# Patient Record
Sex: FEMALE | Race: BLACK OR AFRICAN AMERICAN | Employment: OTHER | URBAN - METROPOLITAN AREA
[De-identification: names, ages, dates, MRNs, and addresses within clinical notes are randomized per-mention and may not be internally consistent; named-entity substitution may affect disease eponyms.]

---

## 2017-10-31 PROBLEM — Z86.79 HISTORY OF PERICARDITIS: Status: ACTIVE | Noted: 2017-10-31

## 2018-01-13 PROBLEM — R10.10 PAIN OF UPPER ABDOMEN: Status: ACTIVE | Noted: 2018-01-13

## 2018-01-13 PROBLEM — R63.4 WEIGHT LOSS: Status: ACTIVE | Noted: 2018-01-13

## 2018-01-13 PROBLEM — R11.15 NON-INTRACTABLE CYCLICAL VOMITING WITH NAUSEA: Status: ACTIVE | Noted: 2018-01-13

## 2018-01-13 PROBLEM — K59.1 FUNCTIONAL DIARRHEA: Status: ACTIVE | Noted: 2018-01-13

## 2018-01-13 PROBLEM — K52.9 COLITIS: Status: ACTIVE | Noted: 2018-01-13

## 2018-04-24 ENCOUNTER — APPOINTMENT (OUTPATIENT)
Dept: CT IMAGING | Age: 48
End: 2018-04-24
Payer: MEDICAID

## 2018-04-24 ENCOUNTER — HOSPITAL ENCOUNTER (EMERGENCY)
Age: 48
Discharge: HOME | End: 2018-04-25
Attending: EMERGENCY MEDICINE
Payer: MEDICAID

## 2018-04-24 VITALS
RESPIRATION RATE: 18 BRPM | HEART RATE: 89 BPM | HEIGHT: 62 IN | SYSTOLIC BLOOD PRESSURE: 142 MMHG | DIASTOLIC BLOOD PRESSURE: 91 MMHG | TEMPERATURE: 98 F | OXYGEN SATURATION: 100 % | BODY MASS INDEX: 31.28 KG/M2 | WEIGHT: 170 LBS

## 2018-04-24 DIAGNOSIS — K59.09 OTHER CONSTIPATION: ICD-10-CM

## 2018-04-24 DIAGNOSIS — R10.30 LOWER ABDOMINAL PAIN: Primary | ICD-10-CM

## 2018-04-24 DIAGNOSIS — K52.9 COLITIS: ICD-10-CM

## 2018-04-24 LAB
ALBUMIN SERPL-MCNC: 4.6 G/DL (ref 3.5–5.2)
ALP BLD-CCNC: 176 U/L (ref 35–104)
ALT SERPL-CCNC: 35 U/L (ref 0–32)
ANION GAP SERPL CALCULATED.3IONS-SCNC: 10 MMOL/L (ref 7–16)
AST SERPL-CCNC: 15 U/L (ref 0–31)
BACTERIA: ABNORMAL /HPF
BASOPHILS ABSOLUTE: 0.01 E9/L (ref 0–0.2)
BASOPHILS RELATIVE PERCENT: 0.2 % (ref 0–2)
BILIRUB SERPL-MCNC: 0.3 MG/DL (ref 0–1.2)
BILIRUBIN URINE: NEGATIVE
BLOOD, URINE: NEGATIVE
BUN BLDV-MCNC: 10 MG/DL (ref 6–20)
CALCIUM SERPL-MCNC: 9.9 MG/DL (ref 8.6–10.2)
CHLORIDE BLD-SCNC: 100 MMOL/L (ref 98–107)
CHP ED QC CHECK: YES
CLARITY: CLEAR
CO2: 30 MMOL/L (ref 22–29)
COLOR: YELLOW
CREAT SERPL-MCNC: 0.6 MG/DL (ref 0.5–1)
EOSINOPHILS ABSOLUTE: 0.01 E9/L (ref 0.05–0.5)
EOSINOPHILS RELATIVE PERCENT: 0.2 % (ref 0–6)
GFR AFRICAN AMERICAN: >60
GFR NON-AFRICAN AMERICAN: >60 ML/MIN/1.73
GLUCOSE BLD-MCNC: 97 MG/DL (ref 74–109)
GLUCOSE URINE: NEGATIVE MG/DL
HCT VFR BLD CALC: 40.7 % (ref 34–48)
HEMOGLOBIN: 12.8 G/DL (ref 11.5–15.5)
IMMATURE GRANULOCYTES #: 0.01 E9/L
IMMATURE GRANULOCYTES %: 0.2 % (ref 0–5)
KETONES, URINE: NEGATIVE MG/DL
LACTIC ACID: 1.3 MMOL/L (ref 0.5–2.2)
LEUKOCYTE ESTERASE, URINE: ABNORMAL
LIPASE: 29 U/L (ref 13–60)
LYMPHOCYTES ABSOLUTE: 1.78 E9/L (ref 1.5–4)
LYMPHOCYTES RELATIVE PERCENT: 29.2 % (ref 20–42)
MCH RBC QN AUTO: 28.8 PG (ref 26–35)
MCHC RBC AUTO-ENTMCNC: 31.4 % (ref 32–34.5)
MCV RBC AUTO: 91.5 FL (ref 80–99.9)
MONOCYTES ABSOLUTE: 0.17 E9/L (ref 0.1–0.95)
MONOCYTES RELATIVE PERCENT: 2.8 % (ref 2–12)
NEUTROPHILS ABSOLUTE: 4.11 E9/L (ref 1.8–7.3)
NEUTROPHILS RELATIVE PERCENT: 67.4 % (ref 43–80)
NITRITE, URINE: NEGATIVE
PDW BLD-RTO: 14.2 FL (ref 11.5–15)
PH UA: 7 (ref 5–9)
PLATELET # BLD: 456 E9/L (ref 130–450)
PMV BLD AUTO: 8.3 FL (ref 7–12)
POTASSIUM SERPL-SCNC: 4.6 MMOL/L (ref 3.5–5)
PREGNANCY TEST URINE, POC: NEGATIVE
PROTEIN UA: NEGATIVE MG/DL
RBC # BLD: 4.45 E12/L (ref 3.5–5.5)
RBC UA: ABNORMAL /HPF (ref 0–2)
SODIUM BLD-SCNC: 140 MMOL/L (ref 132–146)
SPECIFIC GRAVITY UA: <=1.005 (ref 1–1.03)
TOTAL PROTEIN: 7.8 G/DL (ref 6.4–8.3)
UROBILINOGEN, URINE: 0.2 E.U./DL
WBC # BLD: 6.1 E9/L (ref 4.5–11.5)
WBC UA: ABNORMAL /HPF (ref 0–5)

## 2018-04-24 PROCEDURE — 6370000000 HC RX 637 (ALT 250 FOR IP): Performed by: EMERGENCY MEDICINE

## 2018-04-24 PROCEDURE — 36415 COLL VENOUS BLD VENIPUNCTURE: CPT

## 2018-04-24 PROCEDURE — 83605 ASSAY OF LACTIC ACID: CPT

## 2018-04-24 PROCEDURE — 6360000002 HC RX W HCPCS: Performed by: EMERGENCY MEDICINE

## 2018-04-24 PROCEDURE — 96372 THER/PROPH/DIAG INJ SC/IM: CPT

## 2018-04-24 PROCEDURE — 2580000003 HC RX 258: Performed by: EMERGENCY MEDICINE

## 2018-04-24 PROCEDURE — 99284 EMERGENCY DEPT VISIT MOD MDM: CPT

## 2018-04-24 PROCEDURE — 6360000004 HC RX CONTRAST MEDICATION: Performed by: RADIOLOGY

## 2018-04-24 PROCEDURE — 96374 THER/PROPH/DIAG INJ IV PUSH: CPT

## 2018-04-24 PROCEDURE — 81001 URINALYSIS AUTO W/SCOPE: CPT

## 2018-04-24 PROCEDURE — 96375 TX/PRO/DX INJ NEW DRUG ADDON: CPT

## 2018-04-24 PROCEDURE — 80053 COMPREHEN METABOLIC PANEL: CPT

## 2018-04-24 PROCEDURE — 74177 CT ABD & PELVIS W/CONTRAST: CPT

## 2018-04-24 PROCEDURE — 85025 COMPLETE CBC W/AUTO DIFF WBC: CPT

## 2018-04-24 PROCEDURE — 83690 ASSAY OF LIPASE: CPT

## 2018-04-24 RX ORDER — PREDNISONE 50 MG/1
50 TABLET ORAL DAILY
Qty: 5 TABLET | Refills: 0 | Status: SHIPPED | OUTPATIENT
Start: 2018-04-24 | End: 2018-04-29

## 2018-04-24 RX ORDER — ONDANSETRON 2 MG/ML
4 INJECTION INTRAMUSCULAR; INTRAVENOUS ONCE
Status: COMPLETED | OUTPATIENT
Start: 2018-04-24 | End: 2018-04-24

## 2018-04-24 RX ORDER — AMOXICILLIN AND CLAVULANATE POTASSIUM 875; 125 MG/1; MG/1
1 TABLET, FILM COATED ORAL 2 TIMES DAILY
Qty: 20 TABLET | Refills: 0 | Status: SHIPPED | OUTPATIENT
Start: 2018-04-24 | End: 2018-05-04

## 2018-04-24 RX ORDER — PREDNISONE 20 MG/1
60 TABLET ORAL ONCE
Status: COMPLETED | OUTPATIENT
Start: 2018-04-24 | End: 2018-04-24

## 2018-04-24 RX ORDER — ACETAMINOPHEN 325 MG/1
650 TABLET ORAL ONCE
Status: COMPLETED | OUTPATIENT
Start: 2018-04-24 | End: 2018-04-24

## 2018-04-24 RX ORDER — AMOXICILLIN AND CLAVULANATE POTASSIUM 875; 125 MG/1; MG/1
1 TABLET, FILM COATED ORAL ONCE
Status: COMPLETED | OUTPATIENT
Start: 2018-04-24 | End: 2018-04-24

## 2018-04-24 RX ORDER — 0.9 % SODIUM CHLORIDE 0.9 %
1000 INTRAVENOUS SOLUTION INTRAVENOUS ONCE
Status: COMPLETED | OUTPATIENT
Start: 2018-04-24 | End: 2018-04-24

## 2018-04-24 RX ORDER — MORPHINE SULFATE 10 MG/ML
4 INJECTION, SOLUTION INTRAMUSCULAR; INTRAVENOUS ONCE
Status: COMPLETED | OUTPATIENT
Start: 2018-04-24 | End: 2018-04-24

## 2018-04-24 RX ORDER — NALBUPHINE HYDROCHLORIDE 20 MG/ML
5 INJECTION, SOLUTION INTRAMUSCULAR; INTRAVENOUS; SUBCUTANEOUS ONCE
Status: COMPLETED | OUTPATIENT
Start: 2018-04-24 | End: 2018-04-24

## 2018-04-24 RX ADMIN — ACETAMINOPHEN 650 MG: 325 TABLET ORAL at 22:58

## 2018-04-24 RX ADMIN — NALBUPHINE HYDROCHLORIDE 5 MG: 20 INJECTION, SOLUTION INTRAMUSCULAR; INTRAVENOUS; SUBCUTANEOUS at 19:27

## 2018-04-24 RX ADMIN — PREDNISONE 60 MG: 20 TABLET ORAL at 23:38

## 2018-04-24 RX ADMIN — MORPHINE SULFATE 4 MG: 10 INJECTION INTRAVENOUS at 23:38

## 2018-04-24 RX ADMIN — ONDANSETRON 4 MG: 2 INJECTION INTRAMUSCULAR; INTRAVENOUS at 19:27

## 2018-04-24 RX ADMIN — IOHEXOL 50 ML: 240 INJECTION, SOLUTION INTRATHECAL; INTRAVASCULAR; INTRAVENOUS; ORAL at 21:05

## 2018-04-24 RX ADMIN — TRIMETHOBENZAMIDE HYDROCHLORIDE 200 MG: 100 INJECTION INTRAMUSCULAR at 20:28

## 2018-04-24 RX ADMIN — IOPAMIDOL 80 ML: 755 INJECTION, SOLUTION INTRAVENOUS at 21:05

## 2018-04-24 RX ADMIN — AMOXICILLIN AND CLAVULANATE POTASSIUM 1 TABLET: 875; 125 TABLET, FILM COATED ORAL at 23:38

## 2018-04-24 RX ADMIN — SODIUM CHLORIDE 1000 ML: 9 INJECTION, SOLUTION INTRAVENOUS at 19:27

## 2018-04-24 ASSESSMENT — PAIN DESCRIPTION - PAIN TYPE
TYPE: ACUTE PAIN
TYPE: ACUTE PAIN

## 2018-04-24 ASSESSMENT — PAIN SCALES - GENERAL
PAINLEVEL_OUTOF10: 7
PAINLEVEL_OUTOF10: 9
PAINLEVEL_OUTOF10: 7
PAINLEVEL_OUTOF10: 9
PAINLEVEL_OUTOF10: 8

## 2018-04-24 ASSESSMENT — ENCOUNTER SYMPTOMS
WHEEZING: 0
CONSTIPATION: 0
BACK PAIN: 1
DIARRHEA: 0
NAUSEA: 0
VOMITING: 0
COUGH: 0

## 2018-04-24 ASSESSMENT — PAIN DESCRIPTION - LOCATION
LOCATION: ABDOMEN
LOCATION: ABDOMEN

## 2018-04-24 ASSESSMENT — PAIN DESCRIPTION - DESCRIPTORS: DESCRIPTORS: CRAMPING;SHARP

## 2019-01-18 ENCOUNTER — HOSPITAL ENCOUNTER (OUTPATIENT)
Dept: MAMMOGRAPHY | Age: 49
Discharge: HOME | End: 2019-01-20
Payer: MEDICAID

## 2019-01-18 DIAGNOSIS — Z12.39 BREAST SCREENING: ICD-10-CM

## 2019-01-18 PROCEDURE — 77063 BREAST TOMOSYNTHESIS BI: CPT

## 2019-02-15 ENCOUNTER — OFFICE VISIT (OUTPATIENT)
Dept: NEUROLOGY | Age: 49
End: 2019-02-15
Payer: MEDICAID

## 2019-02-15 VITALS
HEART RATE: 111 BPM | WEIGHT: 180 LBS | BODY MASS INDEX: 33.13 KG/M2 | SYSTOLIC BLOOD PRESSURE: 138 MMHG | DIASTOLIC BLOOD PRESSURE: 90 MMHG | HEIGHT: 62 IN | RESPIRATION RATE: 12 BRPM | OXYGEN SATURATION: 99 %

## 2019-02-15 DIAGNOSIS — I10 ESSENTIAL HYPERTENSION: Primary | ICD-10-CM

## 2019-02-15 DIAGNOSIS — R55 VASOVAGAL SYNCOPE: ICD-10-CM

## 2019-02-15 PROBLEM — Z91.199 MEDICALLY NONCOMPLIANT: Status: ACTIVE | Noted: 2019-02-15

## 2019-02-15 PROCEDURE — G8428 CUR MEDS NOT DOCUMENT: HCPCS | Performed by: PSYCHIATRY & NEUROLOGY

## 2019-02-15 PROCEDURE — 99201 PR OFFICE OUTPATIENT NEW 10 MINUTES: CPT | Performed by: PSYCHIATRY & NEUROLOGY

## 2019-02-15 PROCEDURE — 1036F TOBACCO NON-USER: CPT | Performed by: PSYCHIATRY & NEUROLOGY

## 2019-02-15 PROCEDURE — G8484 FLU IMMUNIZE NO ADMIN: HCPCS | Performed by: PSYCHIATRY & NEUROLOGY

## 2019-02-15 PROCEDURE — G8417 CALC BMI ABV UP PARAM F/U: HCPCS | Performed by: PSYCHIATRY & NEUROLOGY

## 2019-02-15 RX ORDER — CITALOPRAM 20 MG/1
20 TABLET ORAL DAILY
Status: ON HOLD | COMMUNITY
End: 2019-08-08 | Stop reason: HOSPADM

## 2019-02-15 RX ORDER — BUPRENORPHINE HYDROCHLORIDE AND NALOXONE HYDROCHLORIDE DIHYDRATE 8; 2 MG/1; MG/1
1 TABLET SUBLINGUAL DAILY
COMMUNITY

## 2019-02-15 RX ORDER — BACLOFEN 10 MG/1
10 TABLET ORAL NIGHTLY
Refills: 0 | COMMUNITY
Start: 2019-01-18 | End: 2019-05-02

## 2019-02-15 RX ORDER — CYPROHEPTADINE HYDROCHLORIDE 4 MG/1
4 TABLET ORAL 2 TIMES DAILY
COMMUNITY
End: 2019-05-02

## 2019-02-15 RX ORDER — OMEPRAZOLE 20 MG/1
20 CAPSULE, DELAYED RELEASE ORAL 2 TIMES DAILY
Refills: 1 | COMMUNITY
Start: 2019-01-10 | End: 2020-03-02 | Stop reason: SDUPTHER

## 2019-05-02 ENCOUNTER — HOSPITAL ENCOUNTER (EMERGENCY)
Age: 49
Discharge: HOME | End: 2019-05-03
Attending: EMERGENCY MEDICINE
Payer: MEDICAID

## 2019-05-02 ENCOUNTER — APPOINTMENT (OUTPATIENT)
Dept: GENERAL RADIOLOGY | Age: 49
End: 2019-05-02
Payer: MEDICAID

## 2019-05-02 DIAGNOSIS — R10.10 PAIN OF UPPER ABDOMEN: Primary | ICD-10-CM

## 2019-05-02 LAB
AMPHETAMINE SCREEN, URINE: NOT DETECTED
BARBITURATE SCREEN URINE: NOT DETECTED
BASOPHILS ABSOLUTE: 0.02 E9/L (ref 0–0.2)
BASOPHILS RELATIVE PERCENT: 0.4 % (ref 0–2)
BENZODIAZEPINE SCREEN, URINE: NOT DETECTED
BILIRUBIN URINE: NEGATIVE
BLOOD, URINE: NEGATIVE
CANNABINOID SCREEN URINE: NOT DETECTED
CLARITY: CLEAR
COCAINE METABOLITE SCREEN URINE: NOT DETECTED
COLOR: YELLOW
EOSINOPHILS ABSOLUTE: 0.08 E9/L (ref 0.05–0.5)
EOSINOPHILS RELATIVE PERCENT: 1.6 % (ref 0–6)
GLUCOSE URINE: NEGATIVE MG/DL
HCG, URINE, POC: NEGATIVE
HCT VFR BLD CALC: 40.3 % (ref 34–48)
HEMOGLOBIN: 12.3 G/DL (ref 11.5–15.5)
IMMATURE GRANULOCYTES #: 0.01 E9/L
IMMATURE GRANULOCYTES %: 0.2 % (ref 0–5)
KETONES, URINE: NEGATIVE MG/DL
LEUKOCYTE ESTERASE, URINE: NEGATIVE
LYMPHOCYTES ABSOLUTE: 2.44 E9/L (ref 1.5–4)
LYMPHOCYTES RELATIVE PERCENT: 48.9 % (ref 20–42)
Lab: NORMAL
MCH RBC QN AUTO: 27 PG (ref 26–35)
MCHC RBC AUTO-ENTMCNC: 30.5 % (ref 32–34.5)
MCV RBC AUTO: 88.4 FL (ref 80–99.9)
METHADONE SCREEN, URINE: NOT DETECTED
MONOCYTES ABSOLUTE: 0.17 E9/L (ref 0.1–0.95)
MONOCYTES RELATIVE PERCENT: 3.4 % (ref 2–12)
NEGATIVE QC PASS/FAIL: NORMAL
NEUTROPHILS ABSOLUTE: 2.27 E9/L (ref 1.8–7.3)
NEUTROPHILS RELATIVE PERCENT: 45.5 % (ref 43–80)
NITRITE, URINE: NEGATIVE
OPIATE SCREEN URINE: NOT DETECTED
PDW BLD-RTO: 13.8 FL (ref 11.5–15)
PH UA: 5.5 (ref 5–9)
PHENCYCLIDINE SCREEN URINE: NOT DETECTED
PLATELET # BLD: 341 E9/L (ref 130–450)
PMV BLD AUTO: 9 FL (ref 7–12)
POSITIVE QC PASS/FAIL: NORMAL
PROPOXYPHENE SCREEN: NOT DETECTED
PROTEIN UA: NEGATIVE MG/DL
RBC # BLD: 4.56 E12/L (ref 3.5–5.5)
SPECIFIC GRAVITY UA: 1.02 (ref 1–1.03)
UROBILINOGEN, URINE: 0.2 E.U./DL
WBC # BLD: 5 E9/L (ref 4.5–11.5)

## 2019-05-02 PROCEDURE — 80053 COMPREHEN METABOLIC PANEL: CPT

## 2019-05-02 PROCEDURE — 83690 ASSAY OF LIPASE: CPT

## 2019-05-02 PROCEDURE — 99284 EMERGENCY DEPT VISIT MOD MDM: CPT

## 2019-05-02 PROCEDURE — 80307 DRUG TEST PRSMV CHEM ANLYZR: CPT

## 2019-05-02 PROCEDURE — 85025 COMPLETE CBC W/AUTO DIFF WBC: CPT

## 2019-05-02 PROCEDURE — 80074 ACUTE HEPATITIS PANEL: CPT

## 2019-05-02 PROCEDURE — 6370000000 HC RX 637 (ALT 250 FOR IP): Performed by: PREVENTIVE MEDICINE

## 2019-05-02 PROCEDURE — 81003 URINALYSIS AUTO W/O SCOPE: CPT

## 2019-05-02 PROCEDURE — 6370000000 HC RX 637 (ALT 250 FOR IP)

## 2019-05-02 PROCEDURE — 84703 CHORIONIC GONADOTROPIN ASSAY: CPT

## 2019-05-02 PROCEDURE — 71045 X-RAY EXAM CHEST 1 VIEW: CPT

## 2019-05-02 PROCEDURE — 83605 ASSAY OF LACTIC ACID: CPT

## 2019-05-02 PROCEDURE — 36415 COLL VENOUS BLD VENIPUNCTURE: CPT

## 2019-05-02 RX ORDER — FAMOTIDINE 20 MG/1
20 TABLET, FILM COATED ORAL ONCE
Status: COMPLETED | OUTPATIENT
Start: 2019-05-02 | End: 2019-05-02

## 2019-05-02 RX ORDER — FAMOTIDINE 20 MG/1
TABLET, FILM COATED ORAL
Status: COMPLETED
Start: 2019-05-02 | End: 2019-05-02

## 2019-05-02 RX ADMIN — FAMOTIDINE 20 MG: 20 TABLET, FILM COATED ORAL at 23:45

## 2019-05-02 RX ADMIN — LIDOCAINE HYDROCHLORIDE: 20 SOLUTION ORAL; TOPICAL at 23:44

## 2019-05-02 RX ADMIN — FAMOTIDINE 20 MG: 20 TABLET ORAL at 23:45

## 2019-05-02 ASSESSMENT — ENCOUNTER SYMPTOMS
SHORTNESS OF BREATH: 0
CHEST TIGHTNESS: 0
CONSTIPATION: 0
NAUSEA: 0
COUGH: 0
ALLERGIC/IMMUNOLOGIC NEGATIVE: 1
DIARRHEA: 0
ABDOMINAL PAIN: 1
VOMITING: 0

## 2019-05-02 ASSESSMENT — PAIN DESCRIPTION - PAIN TYPE: TYPE: ACUTE PAIN

## 2019-05-02 ASSESSMENT — PAIN DESCRIPTION - DESCRIPTORS: DESCRIPTORS: OTHER (COMMENT)

## 2019-05-02 ASSESSMENT — PAIN SCALES - GENERAL: PAINLEVEL_OUTOF10: 4

## 2019-05-02 ASSESSMENT — PAIN DESCRIPTION - ORIENTATION: ORIENTATION: UPPER;MID

## 2019-05-02 ASSESSMENT — PAIN DESCRIPTION - LOCATION: LOCATION: ABDOMEN

## 2019-05-03 VITALS
DIASTOLIC BLOOD PRESSURE: 82 MMHG | TEMPERATURE: 98.4 F | RESPIRATION RATE: 16 BRPM | OXYGEN SATURATION: 100 % | HEIGHT: 62 IN | BODY MASS INDEX: 34.17 KG/M2 | HEART RATE: 77 BPM | WEIGHT: 185.7 LBS | SYSTOLIC BLOOD PRESSURE: 146 MMHG

## 2019-05-03 LAB
ALBUMIN SERPL-MCNC: 4.3 G/DL (ref 3.5–5.2)
ALP BLD-CCNC: 168 U/L (ref 35–104)
ALT SERPL-CCNC: 56 U/L (ref 0–32)
ANION GAP SERPL CALCULATED.3IONS-SCNC: 13 MMOL/L (ref 7–16)
AST SERPL-CCNC: 39 U/L (ref 0–31)
BILIRUB SERPL-MCNC: <0.2 MG/DL (ref 0–1.2)
BUN BLDV-MCNC: 7 MG/DL (ref 6–20)
CALCIUM SERPL-MCNC: 9.2 MG/DL (ref 8.6–10.2)
CHLORIDE BLD-SCNC: 104 MMOL/L (ref 98–107)
CO2: 27 MMOL/L (ref 22–29)
CREAT SERPL-MCNC: 0.7 MG/DL (ref 0.5–1)
GFR AFRICAN AMERICAN: >60
GFR NON-AFRICAN AMERICAN: >60 ML/MIN/1.73
GLUCOSE BLD-MCNC: 97 MG/DL (ref 74–99)
HAV IGM SER IA-ACNC: NORMAL
HCG QUALITATIVE: NEGATIVE
HEPATITIS B CORE IGM ANTIBODY: NORMAL
HEPATITIS B SURFACE ANTIGEN INTERPRETATION: NORMAL
HEPATITIS C ANTIBODY INTERPRETATION: NORMAL
LACTIC ACID: 1.8 MMOL/L (ref 0.5–2.2)
LIPASE: 22 U/L (ref 13–60)
POTASSIUM SERPL-SCNC: 4.4 MMOL/L (ref 3.5–5)
SODIUM BLD-SCNC: 144 MMOL/L (ref 132–146)
TOTAL PROTEIN: 7.1 G/DL (ref 6.4–8.3)

## 2019-05-03 NOTE — ED PROVIDER NOTES
2005 unknown Hep, h/o pyloric dilation. x3-4 days. No change in BM. This is a 75-year-old female with past history of gastroparesis, hepatitis, pyloric stenosis, gastritis presenting to the emergency department for evaluation of abdominal pain. Patient is the primary historian. She reports onset of her symptoms 3-4 days ago. She states her pain is localized to the mid epigastric area is a 3 out of 10 severity. She has been taking Tylenol for this. She reports she has been eating and drinking without difficulty. She states she has had normal bowel movements and has had no changes in her urination. She denies any fevers or chills nausea or vomiting associated with this pain. She reports having a EGD performed approximately one year ago and she thinks there was an ulcer for which she was given Carafate at that time. Review of Systems   Constitutional: Negative for chills and fever. HENT: Negative for congestion. Eyes: Negative for visual disturbance. Respiratory: Negative for cough, chest tightness and shortness of breath. Cardiovascular: Negative for chest pain, palpitations and leg swelling. Gastrointestinal: Positive for abdominal pain. Negative for constipation, diarrhea, nausea and vomiting. Endocrine: Negative. Genitourinary: Negative for dysuria, frequency, hematuria and urgency. Musculoskeletal: Negative for arthralgias. Skin: Negative. Allergic/Immunologic: Negative. Neurological: Negative for dizziness, weakness and headaches. Hematological: Negative. Psychiatric/Behavioral: Negative. Physical Exam   Constitutional: She is oriented to person, place, and time. She appears well-developed and well-nourished. No distress. HENT:   Head: Normocephalic and atraumatic. Right Ear: External ear normal.   Left Ear: External ear normal.   Nose: Nose normal.   Mouth/Throat: Oropharynx is clear and moist. No oropharyngeal exudate.    Eyes: Pupils are equal, round, and reactive to light. Conjunctivae and EOM are normal. Right eye exhibits no discharge. Left eye exhibits no discharge. Neck: Normal range of motion. Neck supple. No JVD present. No tracheal deviation present. No thyromegaly present. Cardiovascular: Normal rate, regular rhythm, normal heart sounds and intact distal pulses. Exam reveals no gallop and no friction rub. No murmur heard. Pulmonary/Chest: Effort normal and breath sounds normal. No respiratory distress. She has no wheezes. She has no rales. Abdominal: Soft. Bowel sounds are normal. She exhibits no distension. There is tenderness. There is no rebound and no guarding. Examination of the abdomen demonstrates multiple prior surgical scars which are well-healed. There is minimal tenderness to palpation in the epigastric area with no rebound or involuntary guarding noted. There are no rashes or ecchymosis noted at this time. Bowel sounds present throughout   Musculoskeletal: Normal range of motion. She exhibits no edema. Lymphadenopathy:     She has no cervical adenopathy. Neurological: She is alert and oriented to person, place, and time. Skin: Skin is warm and dry. She is not diaphoretic. Psychiatric: She has a normal mood and affect. Her behavior is normal. Judgment and thought content normal.   Vitals reviewed. Procedures    MDM  Number of Diagnoses or Management Options  Pain of upper abdomen:   Diagnosis management comments: 49-year-old female with the above stated history, signs and symptoms. We'll obtain appropriate laboratory and imaging studies at this time. Clinical concern is for gastritis, hepatopathy, pancreatitis, patient has a history of cholecystectomy. We'll treat with absent and GI cocktail at this time while waiting for lab work and imaging studies to resolve. 2358: Upon repeat examination the patient's exam is unchanged. She states mild improvement with pain medications.  Review of laboratory and imaging studies is largely unremarkable aside from a mildly elevated transaminitis. Patient's hepatitis panel will not result during this emergency department visits, recommend she follow up with her primary care physician for these results. I have discussed the results of the workup as well as the plan with the patient and family who indicate understanding and agreement with the plan. All questions answered at this time. --------------------------------------------- PAST HISTORY ---------------------------------------------  Past Medical History:  has a past medical history of Back pain, Depression, Gastroparesis, Hepatitis, History of cardiovascular stress test, Medically noncompliant, Pancreatitis, acute, Pyloric stenosis, Seizures ([address] and Vasovagal syncope. Past Surgical History:  has a past surgical history that includes Salpingo-oophorectomy (Left); Cholecystectomy, laparoscopic; Pyloroplasty (3/30/2012); ECHO Compl W Dop Color Flow (7/1/2012); Cardiac catheterization; Upper gastrointestinal endoscopy (3/15/13); and Abdomen surgery. Social History:  reports that she has never smoked. She has never used smokeless tobacco. She reports that she does not drink alcohol or use drugs. Family History: family history includes Depression in her paternal grandmother; High Blood Pressure in her mother; Other in her father. The patients home medications have been reviewed. Allergies: Ketorolac tromethamine; Naproxen; Nsaids; Prochlorperazine edisylate; Reglan [metoclopramide]; Codeine;  Levofloxacin; and Percocet [oxycodone-acetaminophen]    -------------------------------------------------- RESULTS -------------------------------------------------  Labs:  Results for orders placed or performed during the hospital encounter of 05/02/19   CBC auto differential   Result Value Ref Range    WBC 5.0 4.5 - 11.5 E9/L    RBC 4.56 3.50 - 5.50 E12/L    Hemoglobin 12.3 11.5 - 15.5 g/dL    Hematocrit 40.3 34.0 - 48.0 % MCV 88.4 80.0 - 99.9 fL    MCH 27.0 26.0 - 35.0 pg    MCHC 30.5 (L) 32.0 - 34.5 %    RDW 13.8 11.5 - 15.0 fL    Platelets 471 807 - 133 E9/L    MPV 9.0 7.0 - 12.0 fL    Neutrophils % 45.5 43.0 - 80.0 %    Immature Granulocytes % 0.2 0.0 - 5.0 %    Lymphocytes % 48.9 (H) 20.0 - 42.0 %    Monocytes % 3.4 2.0 - 12.0 %    Eosinophils % 1.6 0.0 - 6.0 %    Basophils % 0.4 0.0 - 2.0 %    Neutrophils # 2.27 1.80 - 7.30 E9/L    Immature Granulocytes # 0.01 E9/L    Lymphocytes # 2.44 1.50 - 4.00 E9/L    Monocytes # 0.17 0.10 - 0.95 E9/L    Eosinophils # 0.08 0.05 - 0.50 E9/L    Basophils # 0.02 0.00 - 0.20 E9/L   Comprehensive Metabolic Panel   Result Value Ref Range    Sodium 144 132 - 146 mmol/L    Potassium 4.4 3.5 - 5.0 mmol/L    Chloride 104 98 - 107 mmol/L    CO2 27 22 - 29 mmol/L    Anion Gap 13 7 - 16 mmol/L    Glucose 97 74 - 99 mg/dL    BUN 7 6 - 20 mg/dL    CREATININE 0.7 0.5 - 1.0 mg/dL    GFR Non-African American >60 >=60 mL/min/1.73    GFR African American >60     Calcium 9.2 8.6 - 10.2 mg/dL    Total Protein 7.1 6.4 - 8.3 g/dL    Alb 4.3 3.5 - 5.2 g/dL    Total Bilirubin <0.2 0.0 - 1.2 mg/dL    Alkaline Phosphatase 168 (H) 35 - 104 U/L    ALT 56 (H) 0 - 32 U/L    AST 39 (H) 0 - 31 U/L   Lipase   Result Value Ref Range    Lipase 22 13 - 60 U/L   Lactic Acid, Plasma   Result Value Ref Range    Lactic Acid 1.8 0.5 - 2.2 mmol/L   Urinalysis   Result Value Ref Range    Color, UA Yellow Straw/Yellow    Clarity, UA Clear Clear    Glucose, Ur Negative Negative mg/dL    Bilirubin Urine Negative Negative    Ketones, Urine Negative Negative mg/dL    Specific Gravity, UA 1.025 1.005 - 1.030    Blood, Urine Negative Negative    pH, UA 5.5 5.0 - 9.0    Protein, UA Negative Negative mg/dL    Urobilinogen, Urine 0.2 <2.0 E.U./dL    Nitrite, Urine Negative Negative    Leukocyte Esterase, Urine Negative Negative   Urine Drug Screen   Result Value Ref Range    Amphetamine Screen, Urine NOT DETECTED Negative <1000 ng/mL hospitalization or inpatient management. They have remained hemodynamically stable throughout their entire ED visit and are stable for discharge with outpatient follow-up. The plan has been discussed in detail and they are aware of the specific conditions for emergent return, as well as the importance of follow-up. Discharge Medication List as of 5/3/2019 12:25 AM          Diagnosis:  1. Pain of upper abdomen        Disposition:  Patient's disposition: Discharge to home  Patient's condition is stable.               Ovidio Paredes, DO  Resident  05/03/19 5324

## 2019-07-19 ENCOUNTER — APPOINTMENT (OUTPATIENT)
Dept: CT IMAGING | Age: 49
End: 2019-07-19
Payer: MEDICAID

## 2019-07-19 ENCOUNTER — HOSPITAL ENCOUNTER (EMERGENCY)
Age: 49
Discharge: HOME | End: 2019-07-19
Attending: EMERGENCY MEDICINE
Payer: MEDICAID

## 2019-07-19 VITALS
TEMPERATURE: 99.2 F | HEART RATE: 102 BPM | OXYGEN SATURATION: 97 % | DIASTOLIC BLOOD PRESSURE: 89 MMHG | RESPIRATION RATE: 17 BRPM | WEIGHT: 186 LBS | BODY MASS INDEX: 32.96 KG/M2 | SYSTOLIC BLOOD PRESSURE: 120 MMHG | HEIGHT: 63 IN

## 2019-07-19 DIAGNOSIS — R56.9 SEIZURE-LIKE ACTIVITY (HCC): Primary | ICD-10-CM

## 2019-07-19 LAB
ALBUMIN SERPL-MCNC: 4.5 G/DL (ref 3.5–5.2)
ALP BLD-CCNC: 233 U/L (ref 35–104)
ALT SERPL-CCNC: 117 U/L (ref 0–32)
AMPHETAMINE SCREEN, URINE: NOT DETECTED
ANION GAP SERPL CALCULATED.3IONS-SCNC: 13 MMOL/L (ref 7–16)
AST SERPL-CCNC: 104 U/L (ref 0–31)
BARBITURATE SCREEN URINE: NOT DETECTED
BASOPHILS ABSOLUTE: 0.02 E9/L (ref 0–0.2)
BASOPHILS RELATIVE PERCENT: 0.4 % (ref 0–2)
BENZODIAZEPINE SCREEN, URINE: NOT DETECTED
BILIRUB SERPL-MCNC: <0.2 MG/DL (ref 0–1.2)
BUN BLDV-MCNC: 14 MG/DL (ref 6–20)
CALCIUM SERPL-MCNC: 9.4 MG/DL (ref 8.6–10.2)
CANNABINOID SCREEN URINE: NOT DETECTED
CHLORIDE BLD-SCNC: 104 MMOL/L (ref 98–107)
CHP ED QC CHECK: NORMAL
CO2: 27 MMOL/L (ref 22–29)
COCAINE METABOLITE SCREEN URINE: NOT DETECTED
CREAT SERPL-MCNC: 0.8 MG/DL (ref 0.5–1)
EOSINOPHILS ABSOLUTE: 0.05 E9/L (ref 0.05–0.5)
EOSINOPHILS RELATIVE PERCENT: 0.9 % (ref 0–6)
GFR AFRICAN AMERICAN: >60
GFR NON-AFRICAN AMERICAN: >60 ML/MIN/1.73
GLUCOSE BLD-MCNC: 64 MG/DL (ref 74–99)
GLUCOSE BLD-MCNC: 80 MG/DL
HCT VFR BLD CALC: 40.4 % (ref 34–48)
HEMOGLOBIN: 12.3 G/DL (ref 11.5–15.5)
IMMATURE GRANULOCYTES #: 0.01 E9/L
IMMATURE GRANULOCYTES %: 0.2 % (ref 0–5)
LACTIC ACID: 1.7 MMOL/L (ref 0.5–2.2)
LYMPHOCYTES ABSOLUTE: 2.26 E9/L (ref 1.5–4)
LYMPHOCYTES RELATIVE PERCENT: 40 % (ref 20–42)
MCH RBC QN AUTO: 27.1 PG (ref 26–35)
MCHC RBC AUTO-ENTMCNC: 30.4 % (ref 32–34.5)
MCV RBC AUTO: 89 FL (ref 80–99.9)
METER GLUCOSE: 80 MG/DL (ref 74–99)
METHADONE SCREEN, URINE: NOT DETECTED
MONOCYTES ABSOLUTE: 0.34 E9/L (ref 0.1–0.95)
MONOCYTES RELATIVE PERCENT: 6 % (ref 2–12)
NEUTROPHILS ABSOLUTE: 2.97 E9/L (ref 1.8–7.3)
NEUTROPHILS RELATIVE PERCENT: 52.5 % (ref 43–80)
OPIATE SCREEN URINE: NOT DETECTED
PDW BLD-RTO: 14.6 FL (ref 11.5–15)
PHENCYCLIDINE SCREEN URINE: NOT DETECTED
PLATELET # BLD: 335 E9/L (ref 130–450)
PMV BLD AUTO: 9 FL (ref 7–12)
POTASSIUM SERPL-SCNC: 4.1 MMOL/L (ref 3.5–5)
PROPOXYPHENE SCREEN: NOT DETECTED
RBC # BLD: 4.54 E12/L (ref 3.5–5.5)
SODIUM BLD-SCNC: 144 MMOL/L (ref 132–146)
TOTAL CK: 122 U/L (ref 20–180)
TOTAL PROTEIN: 7.3 G/DL (ref 6.4–8.3)
WBC # BLD: 5.7 E9/L (ref 4.5–11.5)

## 2019-07-19 PROCEDURE — 82550 ASSAY OF CK (CPK): CPT

## 2019-07-19 PROCEDURE — 80053 COMPREHEN METABOLIC PANEL: CPT

## 2019-07-19 PROCEDURE — 70450 CT HEAD/BRAIN W/O DYE: CPT

## 2019-07-19 PROCEDURE — 83605 ASSAY OF LACTIC ACID: CPT

## 2019-07-19 PROCEDURE — 85025 COMPLETE CBC W/AUTO DIFF WBC: CPT

## 2019-07-19 PROCEDURE — 99285 EMERGENCY DEPT VISIT HI MDM: CPT

## 2019-07-19 PROCEDURE — 80307 DRUG TEST PRSMV CHEM ANLYZR: CPT

## 2019-07-19 PROCEDURE — 36415 COLL VENOUS BLD VENIPUNCTURE: CPT

## 2019-07-19 PROCEDURE — 82962 GLUCOSE BLOOD TEST: CPT

## 2019-07-19 PROCEDURE — 93005 ELECTROCARDIOGRAM TRACING: CPT | Performed by: EMERGENCY MEDICINE

## 2019-07-19 PROCEDURE — G0480 DRUG TEST DEF 1-7 CLASSES: HCPCS

## 2019-07-19 RX ORDER — SODIUM CHLORIDE 0.9 % (FLUSH) 0.9 %
10 SYRINGE (ML) INJECTION PRN
Status: DISCONTINUED | OUTPATIENT
Start: 2019-07-19 | End: 2019-07-19 | Stop reason: HOSPADM

## 2019-07-19 ASSESSMENT — ENCOUNTER SYMPTOMS
WHEEZING: 0
SINUS PRESSURE: 0
DIARRHEA: 0
COUGH: 0
ABDOMINAL DISTENTION: 0
EYE DISCHARGE: 0
VOMITING: 0
NAUSEA: 0
BACK PAIN: 0
SORE THROAT: 0
SHORTNESS OF BREATH: 0
EYE REDNESS: 0
EYE PAIN: 0

## 2019-07-19 NOTE — ED NOTES
Bed: 09  Expected date:   Expected time:   Means of arrival:   Comments:  [address], RN  07/19/19 7988

## 2019-07-20 LAB
ACETAMINOPHEN LEVEL: <5 MCG/ML (ref 10–30)
EKG ATRIAL RATE: 102 BPM
EKG P AXIS: 52 DEGREES
EKG P-R INTERVAL: 148 MS
EKG Q-T INTERVAL: 348 MS
EKG QRS DURATION: 78 MS
EKG QTC CALCULATION (BAZETT): 453 MS
EKG R AXIS: 56 DEGREES
EKG T AXIS: 7 DEGREES
EKG VENTRICULAR RATE: 102 BPM
ETHANOL: <10 MG/DL (ref 0–0.08)
SALICYLATE, SERUM: <0.3 MG/DL (ref 0–30)
TRICYCLIC ANTIDEPRESSANTS SCREEN SERUM: POSITIVE NG/ML

## 2019-07-20 PROCEDURE — 93010 ELECTROCARDIOGRAM REPORT: CPT | Performed by: INTERNAL MEDICINE

## 2019-07-20 NOTE — ED PROVIDER NOTES
This patient reports that she has a cyst on her brain and associated seizure activity. She states the primary care physician she was seen at [facility] had been prescribed her Lamictal but, she was taken off of this medication for an unknown reason. She does not have a neurologist nor has she seen a neurosurgeon. She does not know the location of the cyst.  She cannot relate why she was taken off the Lamictal.  She states that she has had at least 3 seizures today witnessed by her family members. She states they told her that each 1 was a grand mal type seizure lasting a few minutes. She denies any associated trauma. No recent illness or injury. The history is provided by the patient. Seizures   Seizure activity on arrival: no    Seizure type:  Unable to specify  Initial focality:  Unable to specify  Return to baseline: yes    Severity:  Unable to specify  Timing:  Unable to specify  Progression:  Resolved      Review of Systems   Constitutional: Negative for chills and fever. HENT: Negative for ear pain, sinus pressure and sore throat. Eyes: Negative for pain, discharge and redness. Respiratory: Negative for cough, shortness of breath and wheezing. Cardiovascular: Negative for chest pain. Gastrointestinal: Negative for abdominal distention, diarrhea, nausea and vomiting. Genitourinary: Negative for dysuria and frequency. Musculoskeletal: Negative for arthralgias and back pain. Skin: Negative for rash and wound. Neurological: Positive for seizures. Negative for weakness and headaches. Hematological: Negative for adenopathy. All other systems reviewed and are negative. Physical Exam   Constitutional: She is oriented to person, place, and time. She appears well-developed and well-nourished. HENT:   Head: Normocephalic and atraumatic. Eyes: Pupils are equal, round, and reactive to light. Neck: Normal range of motion. Neck supple.    Cardiovascular: Normal rate,

## 2019-08-07 ENCOUNTER — HOSPITAL ENCOUNTER (INPATIENT)
Age: 49
LOS: 1 days | Discharge: OTHER INSTITUTION | DRG: 812 | End: 2019-08-09
Attending: EMERGENCY MEDICINE | Admitting: INTERNAL MEDICINE
Payer: MEDICAID

## 2019-08-07 ENCOUNTER — APPOINTMENT (OUTPATIENT)
Dept: CT IMAGING | Age: 49
DRG: 812 | End: 2019-08-07
Payer: MEDICAID

## 2019-08-07 DIAGNOSIS — F32.A DEPRESSION, UNSPECIFIED DEPRESSION TYPE: ICD-10-CM

## 2019-08-07 DIAGNOSIS — Z86.69 HISTORY OF SEIZURE DISORDER: ICD-10-CM

## 2019-08-07 DIAGNOSIS — T50.902A INTENTIONAL DRUG OVERDOSE, INITIAL ENCOUNTER (HCC): Primary | ICD-10-CM

## 2019-08-07 PROBLEM — R45.851 SUICIDE IDEATION: Status: ACTIVE | Noted: 2019-08-07

## 2019-08-07 PROBLEM — G40.909 SEIZURE DISORDER (HCC): Status: ACTIVE | Noted: 2019-08-07

## 2019-08-07 PROBLEM — I10 ESSENTIAL HYPERTENSION: Status: ACTIVE | Noted: 2019-08-07

## 2019-08-07 LAB
ACETAMINOPHEN LEVEL: <5 MCG/ML (ref 10–30)
ALBUMIN SERPL-MCNC: 4.7 G/DL (ref 3.5–5.2)
ALP BLD-CCNC: 251 U/L (ref 35–104)
ALT SERPL-CCNC: 52 U/L (ref 0–32)
AMPHETAMINE SCREEN, URINE: NOT DETECTED
ANION GAP SERPL CALCULATED.3IONS-SCNC: 11 MMOL/L (ref 7–16)
AST SERPL-CCNC: 23 U/L (ref 0–31)
BARBITURATE SCREEN URINE: NOT DETECTED
BASOPHILS ABSOLUTE: 0.02 E9/L (ref 0–0.2)
BASOPHILS RELATIVE PERCENT: 0.3 % (ref 0–2)
BENZODIAZEPINE SCREEN, URINE: NOT DETECTED
BILIRUB SERPL-MCNC: <0.2 MG/DL (ref 0–1.2)
BUN BLDV-MCNC: 13 MG/DL (ref 6–20)
CALCIUM SERPL-MCNC: 9.5 MG/DL (ref 8.6–10.2)
CANNABINOID SCREEN URINE: NOT DETECTED
CHLORIDE BLD-SCNC: 98 MMOL/L (ref 98–107)
CO2: 30 MMOL/L (ref 22–29)
COCAINE METABOLITE SCREEN URINE: NOT DETECTED
CREAT SERPL-MCNC: 0.8 MG/DL (ref 0.5–1)
EOSINOPHILS ABSOLUTE: 0.05 E9/L (ref 0.05–0.5)
EOSINOPHILS RELATIVE PERCENT: 0.8 % (ref 0–6)
ETHANOL: <10 MG/DL (ref 0–0.08)
GFR AFRICAN AMERICAN: >60
GFR NON-AFRICAN AMERICAN: >60 ML/MIN/1.73
GLUCOSE BLD-MCNC: 100 MG/DL (ref 74–99)
HCG, URINE, POC: NEGATIVE
HCT VFR BLD CALC: 40 % (ref 34–48)
HEMOGLOBIN: 12.5 G/DL (ref 11.5–15.5)
IMMATURE GRANULOCYTES #: 0.02 E9/L
IMMATURE GRANULOCYTES %: 0.3 % (ref 0–5)
LYMPHOCYTES ABSOLUTE: 2.34 E9/L (ref 1.5–4)
LYMPHOCYTES RELATIVE PERCENT: 36.4 % (ref 20–42)
Lab: NORMAL
MCH RBC QN AUTO: 27.4 PG (ref 26–35)
MCHC RBC AUTO-ENTMCNC: 31.3 % (ref 32–34.5)
MCV RBC AUTO: 87.5 FL (ref 80–99.9)
METER GLUCOSE: 96 MG/DL (ref 74–99)
METHADONE SCREEN, URINE: NOT DETECTED
MONOCYTES ABSOLUTE: 0.23 E9/L (ref 0.1–0.95)
MONOCYTES RELATIVE PERCENT: 3.6 % (ref 2–12)
NEGATIVE QC PASS/FAIL: NORMAL
NEUTROPHILS ABSOLUTE: 3.76 E9/L (ref 1.8–7.3)
NEUTROPHILS RELATIVE PERCENT: 58.6 % (ref 43–80)
OPIATE SCREEN URINE: NOT DETECTED
PDW BLD-RTO: 14.2 FL (ref 11.5–15)
PHENCYCLIDINE SCREEN URINE: NOT DETECTED
PLATELET # BLD: 364 E9/L (ref 130–450)
PMV BLD AUTO: 8.5 FL (ref 7–12)
POSITIVE QC PASS/FAIL: NORMAL
POTASSIUM SERPL-SCNC: 3.9 MMOL/L (ref 3.5–5)
PROPOXYPHENE SCREEN: NOT DETECTED
RBC # BLD: 4.57 E12/L (ref 3.5–5.5)
SALICYLATE, SERUM: <0.3 MG/DL (ref 0–30)
SODIUM BLD-SCNC: 139 MMOL/L (ref 132–146)
TOTAL PROTEIN: 7.7 G/DL (ref 6.4–8.3)
TRICYCLIC ANTIDEPRESSANTS SCREEN SERUM: POSITIVE NG/ML
TSH SERPL DL<=0.05 MIU/L-ACNC: 2.38 UIU/ML (ref 0.27–4.2)
WBC # BLD: 6.4 E9/L (ref 4.5–11.5)

## 2019-08-07 PROCEDURE — 99285 EMERGENCY DEPT VISIT HI MDM: CPT

## 2019-08-07 PROCEDURE — 96374 THER/PROPH/DIAG INJ IV PUSH: CPT

## 2019-08-07 PROCEDURE — G0378 HOSPITAL OBSERVATION PER HR: HCPCS

## 2019-08-07 PROCEDURE — 6370000000 HC RX 637 (ALT 250 FOR IP): Performed by: INTERNAL MEDICINE

## 2019-08-07 PROCEDURE — 80307 DRUG TEST PRSMV CHEM ANLYZR: CPT

## 2019-08-07 PROCEDURE — 70450 CT HEAD/BRAIN W/O DYE: CPT

## 2019-08-07 PROCEDURE — 99220 PR INITIAL OBSERVATION CARE/DAY 70 MINUTES: CPT | Performed by: INTERNAL MEDICINE

## 2019-08-07 PROCEDURE — 96372 THER/PROPH/DIAG INJ SC/IM: CPT

## 2019-08-07 PROCEDURE — 84443 ASSAY THYROID STIM HORMONE: CPT

## 2019-08-07 PROCEDURE — 2580000003 HC RX 258: Performed by: INTERNAL MEDICINE

## 2019-08-07 PROCEDURE — 2580000003 HC RX 258: Performed by: EMERGENCY MEDICINE

## 2019-08-07 PROCEDURE — G0480 DRUG TEST DEF 1-7 CLASSES: HCPCS

## 2019-08-07 PROCEDURE — APPSS45 APP SPLIT SHARED TIME 31-45 MINUTES: Performed by: NURSE PRACTITIONER

## 2019-08-07 PROCEDURE — 82962 GLUCOSE BLOOD TEST: CPT

## 2019-08-07 PROCEDURE — 93005 ELECTROCARDIOGRAM TRACING: CPT | Performed by: INTERNAL MEDICINE

## 2019-08-07 PROCEDURE — 93005 ELECTROCARDIOGRAM TRACING: CPT | Performed by: EMERGENCY MEDICINE

## 2019-08-07 PROCEDURE — 36415 COLL VENOUS BLD VENIPUNCTURE: CPT

## 2019-08-07 PROCEDURE — 99291 CRITICAL CARE FIRST HOUR: CPT | Performed by: INTERNAL MEDICINE

## 2019-08-07 PROCEDURE — 6360000002 HC RX W HCPCS: Performed by: INTERNAL MEDICINE

## 2019-08-07 PROCEDURE — 85025 COMPLETE CBC W/AUTO DIFF WBC: CPT

## 2019-08-07 PROCEDURE — 80053 COMPREHEN METABOLIC PANEL: CPT

## 2019-08-07 RX ORDER — BUPRENORPHINE HYDROCHLORIDE AND NALOXONE HYDROCHLORIDE DIHYDRATE 8; 2 MG/1; MG/1
1 TABLET SUBLINGUAL 2 TIMES DAILY
Status: DISCONTINUED | OUTPATIENT
Start: 2019-08-07 | End: 2019-08-10 | Stop reason: HOSPADM

## 2019-08-07 RX ORDER — LEVETIRACETAM 500 MG/1
500 TABLET ORAL 2 TIMES DAILY
Status: ON HOLD | COMMUNITY
End: 2019-08-08 | Stop reason: HOSPADM

## 2019-08-07 RX ORDER — LORAZEPAM 2 MG/ML
1 INJECTION INTRAMUSCULAR ONCE
Status: COMPLETED | OUTPATIENT
Start: 2019-08-07 | End: 2019-08-07

## 2019-08-07 RX ORDER — 0.9 % SODIUM CHLORIDE 0.9 %
500 INTRAVENOUS SOLUTION INTRAVENOUS ONCE
Status: COMPLETED | OUTPATIENT
Start: 2019-08-07 | End: 2019-08-07

## 2019-08-07 RX ORDER — PRAVASTATIN SODIUM 20 MG
20 TABLET ORAL DAILY
Status: DISCONTINUED | OUTPATIENT
Start: 2019-08-07 | End: 2019-08-10 | Stop reason: HOSPADM

## 2019-08-07 RX ORDER — LEVETIRACETAM 500 MG/1
500 TABLET ORAL 2 TIMES DAILY
Status: DISCONTINUED | OUTPATIENT
Start: 2019-08-07 | End: 2019-08-07

## 2019-08-07 RX ORDER — PAROXETINE 10 MG/5ML
SUSPENSION ORAL EVERY MORNING
COMMUNITY
End: 2019-08-07 | Stop reason: ALTCHOICE

## 2019-08-07 RX ORDER — ACETAMINOPHEN 325 MG/1
650 TABLET ORAL EVERY 4 HOURS PRN
Status: DISCONTINUED | OUTPATIENT
Start: 2019-08-07 | End: 2019-08-10 | Stop reason: HOSPADM

## 2019-08-07 RX ORDER — ONDANSETRON 2 MG/ML
4 INJECTION INTRAMUSCULAR; INTRAVENOUS EVERY 6 HOURS PRN
Status: DISCONTINUED | OUTPATIENT
Start: 2019-08-07 | End: 2019-08-10 | Stop reason: HOSPADM

## 2019-08-07 RX ORDER — SODIUM CHLORIDE 0.9 % (FLUSH) 0.9 %
10 SYRINGE (ML) INJECTION EVERY 12 HOURS SCHEDULED
Status: DISCONTINUED | OUTPATIENT
Start: 2019-08-07 | End: 2019-08-10 | Stop reason: HOSPADM

## 2019-08-07 RX ORDER — SODIUM CHLORIDE 9 MG/ML
INJECTION, SOLUTION INTRAVENOUS CONTINUOUS
Status: DISCONTINUED | OUTPATIENT
Start: 2019-08-07 | End: 2019-08-09

## 2019-08-07 RX ORDER — PAROXETINE 10 MG/1
10 TABLET, FILM COATED ORAL EVERY MORNING
Status: ON HOLD | COMMUNITY
End: 2019-08-08 | Stop reason: HOSPADM

## 2019-08-07 RX ORDER — PANTOPRAZOLE SODIUM 40 MG/1
40 TABLET, DELAYED RELEASE ORAL
Status: DISCONTINUED | OUTPATIENT
Start: 2019-08-07 | End: 2019-08-10 | Stop reason: HOSPADM

## 2019-08-07 RX ORDER — AMITRIPTYLINE HYDROCHLORIDE 75 MG/1
75 TABLET, FILM COATED ORAL NIGHTLY
Status: ON HOLD | COMMUNITY
End: 2019-08-08 | Stop reason: HOSPADM

## 2019-08-07 RX ORDER — SODIUM CHLORIDE 0.9 % (FLUSH) 0.9 %
10 SYRINGE (ML) INJECTION PRN
Status: DISCONTINUED | OUTPATIENT
Start: 2019-08-07 | End: 2019-08-10 | Stop reason: HOSPADM

## 2019-08-07 RX ORDER — LOSARTAN POTASSIUM 100 MG/1
100 TABLET ORAL DAILY
Status: DISCONTINUED | OUTPATIENT
Start: 2019-08-07 | End: 2019-08-10 | Stop reason: HOSPADM

## 2019-08-07 RX ORDER — LEVETIRACETAM 500 MG/1
500 TABLET ORAL 2 TIMES DAILY
Status: DISCONTINUED | OUTPATIENT
Start: 2019-08-08 | End: 2019-08-07

## 2019-08-07 RX ORDER — LOSARTAN POTASSIUM 100 MG/1
50 TABLET ORAL DAILY
COMMUNITY
End: 2020-03-02 | Stop reason: SDUPTHER

## 2019-08-07 RX ORDER — MIRTAZAPINE 15 MG/1
15 TABLET, FILM COATED ORAL NIGHTLY
Status: ON HOLD | COMMUNITY
End: 2019-08-08 | Stop reason: HOSPADM

## 2019-08-07 RX ADMIN — PRAVASTATIN SODIUM 20 MG: 20 TABLET ORAL at 21:36

## 2019-08-07 RX ADMIN — LORAZEPAM 1 MG: 2 INJECTION INTRAMUSCULAR; INTRAVENOUS at 17:00

## 2019-08-07 RX ADMIN — ENOXAPARIN SODIUM 40 MG: 40 INJECTION SUBCUTANEOUS at 11:00

## 2019-08-07 RX ADMIN — Medication 10 ML: at 11:00

## 2019-08-07 RX ADMIN — BUPRENORPHINE HYDROCHLORIDE AND NALOXONE HYDROCHLORIDE DIHYDRATE 1 TABLET: 8; 2 TABLET SUBLINGUAL at 21:36

## 2019-08-07 RX ADMIN — SODIUM CHLORIDE: 9 INJECTION, SOLUTION INTRAVENOUS at 11:00

## 2019-08-07 RX ADMIN — SODIUM CHLORIDE: 9 INJECTION, SOLUTION INTRAVENOUS at 21:43

## 2019-08-07 RX ADMIN — LEVETIRACETAM 750 MG: 250 TABLET, FILM COATED ORAL at 21:36

## 2019-08-07 RX ADMIN — LOSARTAN POTASSIUM 100 MG: 100 TABLET ORAL at 12:13

## 2019-08-07 RX ADMIN — SODIUM CHLORIDE 500 ML: 9 INJECTION, SOLUTION INTRAVENOUS at 08:15

## 2019-08-07 ASSESSMENT — PAIN SCALES - GENERAL
PAINLEVEL_OUTOF10: 0

## 2019-08-07 ASSESSMENT — ENCOUNTER SYMPTOMS
SHORTNESS OF BREATH: 1
VOMITING: 0
ABDOMINAL DISTENTION: 0
WHEEZING: 0
NAUSEA: 1
INGESTION: 1
VISUAL CHANGE: 0
COUGH: 0
ABDOMINAL PAIN: 0
DIARRHEA: 0

## 2019-08-07 NOTE — PROGRESS NOTES
Pt is a 1:1. There nurse's aid in the room said the patient stated, Chuy[place] November I am about to have a seizure. \" Rapid was called. On entering the room the patient was non responsive but laying still in the bed. She did not have tremors or shaking, but body was stiff. Heart rate was 117. Blood pressure 139/70. 100 RA. Patient finally opened her eyes and stood at the bedside and we instructed her to sit down. Patient is still not responding, but eyes are open. When Dr. Myesha Cruz left the room the patient looked at me and said, Artem [place] what happened. \" Dr. Myesha Cruz entered the room and she stopped talking again. Pt has eyes open and vitals stable. Ct head ordered and 1 mg ativan.

## 2019-08-07 NOTE — CONSULTS
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 Patient has three children. SUBSTANCE ABUSE HISTORY:  reports that she has never smoked. She has never used smokeless tobacco. She reports that she does not drink alcohol or use drugs. VITALS:   Vitals:    08/07/19 1007   BP: 134/69   Pulse: 108   Resp: 10   Temp: 98.7 °F (37.1 °C)   SpO2: 99%     LABS:   Admission on 08/07/2019   Component Date Value Ref Range Status    Ventricular Rate 08/07/2019 96  BPM Incomplete    Atrial Rate 08/07/2019 96  BPM Incomplete    P-R Interval 08/07/2019 144  ms Incomplete    QRS Duration 08/07/2019 82  ms Incomplete    Q-T Interval 08/07/2019 362  ms Incomplete    QTc Calculation (Bazett) 08/07/2019 457  ms Incomplete    P Axis 08/07/2019 67  degrees Incomplete    R Axis 08/07/2019 82  degrees Incomplete    T Axis 08/07/2019 18  degrees Incomplete    Amphetamine Screen, Urine 08/07/2019 NOT DETECTED  Negative <1000 ng/mL Final    Barbiturate Screen, Ur 08/07/2019 NOT DETECTED  Negative < 200 ng/mL Final    Benzodiazepine Screen, Urine 08/07/2019 NOT DETECTED  Negative < 200 ng/mL Final    Cannabinoid Scrn, Ur 08/07/2019 NOT DETECTED  Negative < 50ng/mL Final    Cocaine Metabolite Screen, Urine 08/07/2019 NOT DETECTED  Negative < 300 ng/mL Final    Opiate Scrn, Ur 08/07/2019 NOT DETECTED  Negative < 300ng/mL Final    Note:  The Opiate Screen is not intended to detect Oxycodone.     PCP Screen, Urine 08/07/2019 NOT DETECTED  Negative < 25 ng/mL Final    Methadone Screen, Urine 08/07/2019 NOT DETECTED  Negative <300 ng/mL Final    Propoxyphene Scrn, Ur 08/07/2019 NOT DETECTED  Negative <300 ng/mL Final    Ethanol Lvl 08/07/2019 <10  mg/dL Final    Not Detected    Acetaminophen Level 08/07/2019 <5.0* 10.0 - 30.0 mcg/mL Final    Salicylate, Serum 75/39/2937 <0.3  0.0 - 30.0 mg/dL Final    TCA Scrn 08/07/2019 POSITIVE  Cutoff:300 ng/mL Final    WBC 08/07/2019 6.4  4.5 - 11.5 E9/L Final    on psych. I am going to hold all of her psychotropics for now; unclear what patient is even taking or being prescribed at this point.      Nellie Potter M.D. 8/7/2019 4:21 PM

## 2019-08-07 NOTE — ED PROVIDER NOTES
Patient presents with complaint of depression and feeling of hopelessness. She admits to intentionally overdosing on her medications for the past week. She states today she took 5-Keppra 500mg, 4-Elavil, 5-Respiradol, and 4-Paxil. She admits to nausea and denies pain. She states her seizures have been poorly controlled and she can't work or drive. Her depression has been worsening. The history is provided by the patient. Ingestion   Ingested substance:  Prescription medication  Time since incident: 5:20 AM.  Incident location:  Home  Context: intentional ingestion    Associated symptoms: nausea and shortness of breath    Associated symptoms: no abdominal pain, no altered mental status, no chest pain, no cough, no diarrhea, no headaches, no lethargy, no unresponsiveness, no visual changes and no vomiting    Risk factors: no hx of self injury and no hx of sucide attempts        Review of Systems   Constitutional: Negative for chills and fever. HENT: Negative. Eyes: Negative for visual disturbance. Respiratory: Positive for shortness of breath. Negative for cough and wheezing. Cardiovascular: Negative for chest pain. Gastrointestinal: Positive for nausea. Negative for abdominal distention, abdominal pain, diarrhea and vomiting. Genitourinary: Negative for dysuria and frequency. Musculoskeletal: Negative. Skin: Negative for rash and wound. Neurological: Positive for seizures. Negative for weakness and headaches. Hematological: Negative for adenopathy. Psychiatric/Behavioral: Positive for dysphoric mood. All other systems reviewed and are negative. Physical Exam   Constitutional: She is oriented to person, place, and time. She appears well-developed and well-nourished. HENT:   Head: Normocephalic and atraumatic.    Right Ear: External ear normal.   Left Ear: External ear normal.   Nose: Nose normal.   Mouth/Throat: Oropharynx is clear and moist.   Eyes: Pupils are equal, patient and discussed todays results, in addition to providing specific details for the plan of care and counseling regarding the diagnosis and prognosis. Their questions are answered at this time and they are agreeable with the plan.      --------------------------------- ADDITIONAL PROVIDER NOTES ---------------------------------  Consultations:  Spoke with Dr. Denny Ellis,  They will admit this patient. Patient has requested neurology consult for seizure disorder which she feels is the cause of her seasonal depression. This patient's ED course included: a personal history and physicial examination and multiple bedside re-evaluations    This patient has remained hemodynamically stable during their ED course. Please note that the withdrawal or failure to initiate urgent interventions for this patient would likely result in a life threatening deterioration or permanent disability. Accordingly this patient received 0 minutes of critical care time, excluding separately billable procedures. Clinical Impression  1. Intentional drug overdose, initial encounter ([facility] [address]    2. Depression, unspecified depression type    3. History of seizure disorder          Disposition  Patient's disposition: Admit to telemetry  Patient's condition is stable.        Viktor Prado,   08/07/19 0900

## 2019-08-07 NOTE — SIGNIFICANT EVENT
08/07/19  0750      K 3.9   CL 98   CO2 30*   BUN 13   CREATININE 0.8   GLUCOSE 100*   CALCIUM 9.5       Recent Labs     08/07/19  0750   WBC 6.4   RBC 4.57   HGB 12.5   HCT 40.0   MCV 87.5   MCH 27.4   MCHC 31.3*   RDW 14.2      MPV 8.5           I/O last 3 completed shifts: In: 240 [P.O.:240]  Out: -   No intake/output data recorded. EKG: sinus tachycardia at 103; nonspecific t wave abnormality. Radiology: CT head pending. Assessment:    Active Problems:    Chronic low back pain    Depression    Suicide ideation    Seizure disorder ([facility] [address]    Essential hypertension  Resolved Problems:    * No resolved hospital problems. *      Plan:    1. Unresponsive episode--not clearly seizure activity. Pseudoseizure? Will check CT head to rule out acute abnormality. Ativan 1 mg IV on call to CT. EKG no acute finding. Dr. Olga Lidia Man of psychiatry on floor and was update on incident. He will be evaluating patient shortly. [facility] Jurado updated regarding RRT. Critical care time 35 minutes not including procedures. NOTE: This report was transcribed using voice recognition software.  Every effort was made to ensure accuracy; however, inadvertent computerized transcription errors may be present.     Electronically signed by Gonzalo Vasquez MD on 8/7/2019 at 3:30 PM

## 2019-08-07 NOTE — H&P
drugs. Family History:   family history includes Depression in her paternal grandmother; High Blood Pressure in her mother; Other in her father. PHYSICAL EXAM:  Vitals:  /70   Pulse 92   Temp 98.4 °F (36.9 °C) (Oral)   Resp 22   Ht 5' 2\" (1.575 m)   Wt 183 lb (83 kg)   SpO2 96%   BMI 33.47 kg/m²     General Appearance: alert and oriented to person, place and time and in no acute distress  Skin: warm and dry  Head: normocephalic and atraumatic  Eyes: pupils equal, round, and reactive to light, extraocular eye movements intact, conjunctivae normal  Neck: neck supple and non tender without mass   Pulmonary/Chest: clear to auscultation bilaterally- no wheezes, rales or rhonchi, normal air movement, no respiratory distress  Cardiovascular: normal rate, normal S1 and S2 and no carotid bruits  Abdomen: soft, non-tender, non-distended, normal bowel sounds, no masses or organomegaly  Extremities: no cyanosis, no clubbing and no edema  Neurologic: no cranial nerve deficit and speech normal    LABS:  Recent Labs     08/07/19  0750      K 3.9   CL 98   CO2 30*   BUN 13   CREATININE 0.8   GLUCOSE 100*   CALCIUM 9.5       Recent Labs     08/07/19  0750   WBC 6.4   RBC 4.57   HGB 12.5   HCT 40.0   MCV 87.5   MCH 27.4   MCHC 31.3*   RDW 14.2      MPV 8.5       No results for input(s): POCGLU in the last 72 hours. Radiology: No results found. ASSESSMENT:      Active Problems:    Depression    Suicide ideation    Chronic low back pain    Seizure disorder (HCC)    Essential hypertension  Resolved Problems:    * No resolved hospital problems. *      PLAN:    1. Intentional drug overdose: home psych meds. Will hold for now, consult psych. [place] slipped with sitter  2. seziure disorder: last seizure reported last night. Continue home keppra, consult neurology. 3. Depression with SI/attempt: is pink slipped, conslt psych services.    4. Chronic back pain with chronic opiate use: states is

## 2019-08-08 LAB
ANION GAP SERPL CALCULATED.3IONS-SCNC: 10 MMOL/L (ref 7–16)
BUN BLDV-MCNC: 14 MG/DL (ref 6–20)
CALCIUM SERPL-MCNC: 8.7 MG/DL (ref 8.6–10.2)
CHLORIDE BLD-SCNC: 106 MMOL/L (ref 98–107)
CO2: 25 MMOL/L (ref 22–29)
CREAT SERPL-MCNC: 0.7 MG/DL (ref 0.5–1)
EKG ATRIAL RATE: 103 BPM
EKG ATRIAL RATE: 89 BPM
EKG ATRIAL RATE: 96 BPM
EKG P AXIS: 45 DEGREES
EKG P AXIS: 61 DEGREES
EKG P AXIS: 67 DEGREES
EKG P-R INTERVAL: 144 MS
EKG P-R INTERVAL: 154 MS
EKG P-R INTERVAL: 154 MS
EKG Q-T INTERVAL: 362 MS
EKG Q-T INTERVAL: 372 MS
EKG Q-T INTERVAL: 376 MS
EKG QRS DURATION: 74 MS
EKG QRS DURATION: 78 MS
EKG QRS DURATION: 82 MS
EKG QTC CALCULATION (BAZETT): 452 MS
EKG QTC CALCULATION (BAZETT): 457 MS
EKG QTC CALCULATION (BAZETT): 492 MS
EKG R AXIS: 51 DEGREES
EKG R AXIS: 62 DEGREES
EKG R AXIS: 82 DEGREES
EKG T AXIS: 18 DEGREES
EKG T AXIS: 23 DEGREES
EKG T AXIS: 53 DEGREES
EKG VENTRICULAR RATE: 103 BPM
EKG VENTRICULAR RATE: 89 BPM
EKG VENTRICULAR RATE: 96 BPM
GFR AFRICAN AMERICAN: >60
GFR NON-AFRICAN AMERICAN: >60 ML/MIN/1.73
GLUCOSE BLD-MCNC: 112 MG/DL (ref 74–99)
HCT VFR BLD CALC: 36.8 % (ref 34–48)
HEMOGLOBIN: 11 G/DL (ref 11.5–15.5)
MCH RBC QN AUTO: 27.1 PG (ref 26–35)
MCHC RBC AUTO-ENTMCNC: 29.9 % (ref 32–34.5)
MCV RBC AUTO: 90.6 FL (ref 80–99.9)
METER GLUCOSE: 105 MG/DL (ref 74–99)
PDW BLD-RTO: 14.2 FL (ref 11.5–15)
PLATELET # BLD: 307 E9/L (ref 130–450)
PMV BLD AUTO: 9 FL (ref 7–12)
POTASSIUM REFLEX MAGNESIUM: 4.3 MMOL/L (ref 3.5–5)
RBC # BLD: 4.06 E12/L (ref 3.5–5.5)
SODIUM BLD-SCNC: 141 MMOL/L (ref 132–146)
WBC # BLD: 5.3 E9/L (ref 4.5–11.5)

## 2019-08-08 PROCEDURE — 1200000000 HC SEMI PRIVATE

## 2019-08-08 PROCEDURE — G0378 HOSPITAL OBSERVATION PER HR: HCPCS

## 2019-08-08 PROCEDURE — 99226 PR SBSQ OBSERVATION CARE/DAY 35 MINUTES: CPT | Performed by: INTERNAL MEDICINE

## 2019-08-08 PROCEDURE — 6370000000 HC RX 637 (ALT 250 FOR IP): Performed by: INTERNAL MEDICINE

## 2019-08-08 PROCEDURE — 85027 COMPLETE CBC AUTOMATED: CPT

## 2019-08-08 PROCEDURE — 99291 CRITICAL CARE FIRST HOUR: CPT | Performed by: NURSE PRACTITIONER

## 2019-08-08 PROCEDURE — 80048 BASIC METABOLIC PNL TOTAL CA: CPT

## 2019-08-08 PROCEDURE — 96372 THER/PROPH/DIAG INJ SC/IM: CPT

## 2019-08-08 PROCEDURE — 82962 GLUCOSE BLOOD TEST: CPT

## 2019-08-08 PROCEDURE — APPSS30 APP SPLIT SHARED TIME 16-30 MINUTES: Performed by: NURSE PRACTITIONER

## 2019-08-08 PROCEDURE — 6360000002 HC RX W HCPCS: Performed by: INTERNAL MEDICINE

## 2019-08-08 PROCEDURE — 2580000003 HC RX 258: Performed by: INTERNAL MEDICINE

## 2019-08-08 PROCEDURE — 93010 ELECTROCARDIOGRAM REPORT: CPT | Performed by: INTERNAL MEDICINE

## 2019-08-08 PROCEDURE — 93005 ELECTROCARDIOGRAM TRACING: CPT | Performed by: INTERNAL MEDICINE

## 2019-08-08 PROCEDURE — 36415 COLL VENOUS BLD VENIPUNCTURE: CPT

## 2019-08-08 RX ORDER — LAMOTRIGINE 25 MG/1
25 TABLET ORAL DAILY
Status: DISCONTINUED | OUTPATIENT
Start: 2019-08-08 | End: 2019-08-10 | Stop reason: HOSPADM

## 2019-08-08 RX ORDER — LAMOTRIGINE 25 MG/1
25 TABLET ORAL DAILY
Qty: 30 TABLET | Refills: 3 | Status: ON HOLD | DISCHARGE
Start: 2019-08-09 | End: 2019-11-05 | Stop reason: HOSPADM

## 2019-08-08 RX ADMIN — ENOXAPARIN SODIUM 40 MG: 40 INJECTION SUBCUTANEOUS at 09:24

## 2019-08-08 RX ADMIN — BUPRENORPHINE HYDROCHLORIDE AND NALOXONE HYDROCHLORIDE DIHYDRATE 1 TABLET: 8; 2 TABLET SUBLINGUAL at 09:23

## 2019-08-08 RX ADMIN — PRAVASTATIN SODIUM 20 MG: 20 TABLET ORAL at 21:33

## 2019-08-08 RX ADMIN — SODIUM CHLORIDE: 9 INJECTION, SOLUTION INTRAVENOUS at 17:18

## 2019-08-08 RX ADMIN — PANTOPRAZOLE SODIUM 40 MG: 40 TABLET, DELAYED RELEASE ORAL at 06:33

## 2019-08-08 RX ADMIN — SODIUM CHLORIDE: 9 INJECTION, SOLUTION INTRAVENOUS at 07:43

## 2019-08-08 RX ADMIN — BUPRENORPHINE HYDROCHLORIDE AND NALOXONE HYDROCHLORIDE DIHYDRATE 1 TABLET: 8; 2 TABLET SUBLINGUAL at 21:33

## 2019-08-08 ASSESSMENT — PAIN SCALES - GENERAL
PAINLEVEL_OUTOF10: 0

## 2019-08-08 NOTE — PLAN OF CARE
Problem: Falls - Risk of:  Goal: Will remain free from falls  Description  Will remain free from falls  8/7/2019 2210 by Deven Hassan RN  Outcome: Met This Shift

## 2019-08-08 NOTE — PROGRESS NOTES
Himanshu Arrieta [phone] called floor and stated she was patient's . She was asking to talk to the patient just to get an update from her when she gets a chance. Patient stated she was tired and wanted to take a nap, she did not want to call her back right now.

## 2019-08-08 NOTE — SIGNIFICANT EVENT
[address] [place] [address]    Hospitalist RRT/Code Blue Note    Subjective:    Called to bedside unresponsive    Pt was being assisted to the bathroom. When entered bathroom pt was standing at the sink, weakened, stopped responding and was lowered to the floor per nursing.  losartan  100 mg Oral Daily    pantoprazole  40 mg Oral QAM AC    pravastatin  20 mg Oral Daily    sodium chloride flush  10 mL Intravenous 2 times per day    enoxaparin  40 mg Subcutaneous Daily    buprenorphine-naloxone  1 tablet Sublingual BID    levETIRAcetam  750 mg Oral BID       sodium chloride flush 10 mL PRN   magnesium hydroxide 30 mL Daily PRN   ondansetron 4 mg Q6H PRN   acetaminophen 650 mg Q4H PRN        Objective:    /69   Pulse 94   Temp 98.7 °F (37.1 °C) (Oral)   Resp 16   Ht 5' 2\" (1.575 m)   Wt 186 lb (84.4 kg)   SpO2 97%   BMI 34.02 kg/m²     Upon arrival pt eyes closed /102  RR18 SPO2 97% ORA PERRL. Sternal rub initially ineffective. POCGT 105. Approximately 2minutes after arrival, pt opened her eyes and began to move in an attempt to stand from the floor. Pt did not have any seizure like activity. With assistance, pt was ambulated back to bed. Pt was not verbalizing and was unable to focus. IN assisting the pt with her robe she verbalized \"thank you\". She then requested to [facility] for a walk\". Explained to pt event that just transpired, she did not recall. 0346 /68  RR 18 SPO2 97% ORA 97.8. Recent Labs     08/07/19  0750      K 3.9   CL 98   CO2 30*   BUN 13   CREATININE 0.8   GLUCOSE 100*   CALCIUM 9.5       Recent Labs     08/07/19  0750   WBC 6.4   RBC 4.57   HGB 12.5   HCT 40.0   MCV 87.5   MCH 27.4   MCHC 31.3*   RDW 14.2      MPV 8.5            I/O last 3 completed shifts: In: 480 [P.O.:480]  Out: -   No intake/output data recorded.       Radiology:   CT Head:  Redemonstration of a CSF attenuation arachnoid cyst within the left   middle cranial fossa with mild mass effect in the left anterior   temporal lobe. Assessment:    Active Problems:    Chronic low back pain    Depression    Suicide ideation    Seizure disorder ([address]    Essential hypertension    Pseudoseizure    Intentional drug overdose ([facility] [address]    History of seizure disorder  Resolved Problems:    * No resolved hospital problems. *      Plan:  1. Pt with Seizure history. On Keppra 750mg BID. Suspected pseudoseizure activity. Evaluation psychologist complete Mood disorder and probable conversion disorder noted. Neurology to evaluate this am. Pt continue to have sitter at bedside and seizure precautions remain in place. No arrhythmias noted during event. Orthostatic BP. Appreciate Neurology assessment. Critical care time 30 minutes not including procedures. NOTE: This report was transcribed using voice recognition software. Every effort was made to ensure accuracy; however, inadvertent computerized transcription errors may be present.     Electronically signed by Alyssa Mahmood.  [place] Mon CNP on 8/8/2019 at 3:56 AM

## 2019-08-08 NOTE — CONSULTS
noncompliant  No date: Pancreatitis, acute  No date: Pyloric stenosis  No date: Seizures ([address]  2/15/2019: Vasovagal syncope    Past Surgical History:       No date: ABDOMEN SURGERY      Comment:  Patient has had 9 surgeries  No date: CARDIAC CATHETERIZATION      Comment:  had 7 - 5 - 2012  No date: CHOLECYSTECTOMY, LAPAROSCOPIC  7/1/2012: ECHO COMPL W DOP COLOR FLOW      Comment:     3/30/2012: PYLOROPLASTY      Comment:  lap plylorplasty open upper endoscopy lysis of adhesions  No date: SALPINGO-OOPHORECTOMY; Left      Comment:  left  3/15/13: UPPER GASTROINTESTINAL ENDOSCOPY      Comment:  balloon opened pyloric sphincter      Outside reports reviewed: ER records, historical medical records, lab reports and radiology reports. Patient's medications, allergies, past medical, surgical, social and family histories were reviewed and updated as appropriate. Review of Systems  A comprehensive review of systems was negative except for:      Objective:    Neuro exam 120/70 pulse 94, she is afebrile  General: awake and alert. Cranial nerve testing was normal.  Funduscopic eye exam revealed not testable. Motor exam: normal strength and muscle mass. Deep tendon reflexes were 1+ bilaterally. Plantar responses were flexor bilaterally. Cerebellar exam noted finger to nose without dysmetria. Sensation was normal to joint position sense and light touch . Michael Daily Assessment:  Probable nonepileptic spells. Conversion disorder        Plan:  Stop Keppra since it has well-known neuropsychiatric adverse side effects. We will start lamotrigine which has dual properties of mood stabilization and as an anticonvulsant. Lamotrigine can be titrated slowly to approximately 100 mg twice daily depending upon response. We will check EEG. [facility] will be [facility] as per psychiatry above. She is advised against driving.

## 2019-08-08 NOTE — PROGRESS NOTES
[place] [id number] Hospitalist   Progress Note    Admitting Date and Time: 8/7/2019  7:27 AM  Admit Dx: Suicide ideation [R45.851]    Subjective:    Seen and examined  Eating breakfast  Sitter at bedside  Reports numbness in BUE  Strong hand grasps josé manuel  Wants transferred to [facility] for evaluation of her seizures. ROS: denies fever, chills, cp, sob, n/v, HA unless stated above.      lamoTRIgine  25 mg Oral Daily    losartan  100 mg Oral Daily    pantoprazole  40 mg Oral QAM AC    pravastatin  20 mg Oral Daily    sodium chloride flush  10 mL Intravenous 2 times per day    enoxaparin  40 mg Subcutaneous Daily    buprenorphine-naloxone  1 tablet Sublingual BID       sodium chloride flush 10 mL PRN   magnesium hydroxide 30 mL Daily PRN   ondansetron 4 mg Q6H PRN   acetaminophen 650 mg Q4H PRN        Objective:    /80   Pulse 87   Temp 98.1 °F (36.7 °C) (Oral)   Resp 19   Ht 5' 2\" (1.575 m)   Wt 186 lb (84.4 kg)   SpO2 97%   BMI 34.02 kg/m²   General Appearance: alert and oriented to person, place and time and in no acute distress  Skin: warm and dry  Head: normocephalic and atraumatic  Eyes: pupils equal, round, and reactive to light, extraocular eye movements intact, conjunctivae normal  Neck: neck supple and non tender without mass   Pulmonary/Chest: clear to auscultation bilaterally- no wheezes, rales or rhonchi, normal air movement, no respiratory distress  Cardiovascular: normal rate, normal S1 and S2 and no carotid bruits  Abdomen: soft, non-tender, non-distended, normal bowel sounds, no masses or organomegaly  Extremities: no cyanosis, no clubbing and no edema  Neurologic: no cranial nerve deficit and speech normal      Recent Labs     08/07/19 0750 08/08/19 0527    141   K 3.9 4.3   CL 98 106   CO2 30* 25   BUN 13 14   CREATININE 0.8 0.7   GLUCOSE 100* 112*   CALCIUM 9.5 8.7       Recent Labs     08/07/19  0750 08/08/19 0527   WBC 6.4 5.3   RBC 4.57 4.06   HGB 12.5 11.0*

## 2019-08-09 ENCOUNTER — APPOINTMENT (OUTPATIENT)
Dept: NEUROLOGY | Age: 49
DRG: 812 | End: 2019-08-09
Payer: MEDICAID

## 2019-08-09 VITALS
HEART RATE: 80 BPM | OXYGEN SATURATION: 97 % | BODY MASS INDEX: 34.23 KG/M2 | DIASTOLIC BLOOD PRESSURE: 80 MMHG | WEIGHT: 186 LBS | SYSTOLIC BLOOD PRESSURE: 140 MMHG | TEMPERATURE: 98 F | RESPIRATION RATE: 17 BRPM | HEIGHT: 62 IN

## 2019-08-09 PROBLEM — Z76.5 MALINGERING: Status: ACTIVE | Noted: 2019-08-09

## 2019-08-09 LAB
ALBUMIN SERPL-MCNC: 4.3 G/DL (ref 3.5–5.2)
ALP BLD-CCNC: 223 U/L (ref 35–104)
ALT SERPL-CCNC: 33 U/L (ref 0–32)
ANION GAP SERPL CALCULATED.3IONS-SCNC: 11 MMOL/L (ref 7–16)
AST SERPL-CCNC: 19 U/L (ref 0–31)
BILIRUB SERPL-MCNC: 0.2 MG/DL (ref 0–1.2)
BUN BLDV-MCNC: 10 MG/DL (ref 6–20)
CALCIUM SERPL-MCNC: 9.2 MG/DL (ref 8.6–10.2)
CHLORIDE BLD-SCNC: 106 MMOL/L (ref 98–107)
CO2: 25 MMOL/L (ref 22–29)
CREAT SERPL-MCNC: 0.7 MG/DL (ref 0.5–1)
GFR AFRICAN AMERICAN: >60
GFR NON-AFRICAN AMERICAN: >60 ML/MIN/1.73
GLUCOSE BLD-MCNC: 126 MG/DL (ref 74–99)
POTASSIUM SERPL-SCNC: 4 MMOL/L (ref 3.5–5)
SODIUM BLD-SCNC: 142 MMOL/L (ref 132–146)
TOTAL PROTEIN: 7.2 G/DL (ref 6.4–8.3)

## 2019-08-09 PROCEDURE — 95819 EEG AWAKE AND ASLEEP: CPT

## 2019-08-09 PROCEDURE — 99231 SBSQ HOSP IP/OBS SF/LOW 25: CPT | Performed by: PSYCHIATRY & NEUROLOGY

## 2019-08-09 PROCEDURE — G0378 HOSPITAL OBSERVATION PER HR: HCPCS

## 2019-08-09 PROCEDURE — 6370000000 HC RX 637 (ALT 250 FOR IP): Performed by: INTERNAL MEDICINE

## 2019-08-09 PROCEDURE — 96372 THER/PROPH/DIAG INJ SC/IM: CPT

## 2019-08-09 PROCEDURE — 80053 COMPREHEN METABOLIC PANEL: CPT

## 2019-08-09 PROCEDURE — 6370000000 HC RX 637 (ALT 250 FOR IP): Performed by: PSYCHIATRY & NEUROLOGY

## 2019-08-09 PROCEDURE — 6360000002 HC RX W HCPCS: Performed by: INTERNAL MEDICINE

## 2019-08-09 PROCEDURE — 36415 COLL VENOUS BLD VENIPUNCTURE: CPT

## 2019-08-09 PROCEDURE — APPSS30 APP SPLIT SHARED TIME 16-30 MINUTES: Performed by: NURSE PRACTITIONER

## 2019-08-09 PROCEDURE — 99232 SBSQ HOSP IP/OBS MODERATE 35: CPT | Performed by: INTERNAL MEDICINE

## 2019-08-09 PROCEDURE — 2580000003 HC RX 258: Performed by: INTERNAL MEDICINE

## 2019-08-09 RX ORDER — MIRTAZAPINE 15 MG/1
15 TABLET, FILM COATED ORAL NIGHTLY
Status: DISCONTINUED | OUTPATIENT
Start: 2019-08-09 | End: 2019-08-10 | Stop reason: HOSPADM

## 2019-08-09 RX ORDER — PAROXETINE 10 MG/1
10 TABLET, FILM COATED ORAL DAILY
Status: DISCONTINUED | OUTPATIENT
Start: 2019-08-09 | End: 2019-08-10 | Stop reason: HOSPADM

## 2019-08-09 RX ORDER — AMITRIPTYLINE HYDROCHLORIDE 25 MG/1
75 TABLET, FILM COATED ORAL NIGHTLY
Status: DISCONTINUED | OUTPATIENT
Start: 2019-08-09 | End: 2019-08-10 | Stop reason: HOSPADM

## 2019-08-09 RX ADMIN — PAROXETINE HYDROCHLORIDE 10 MG: 10 TABLET, FILM COATED ORAL at 17:25

## 2019-08-09 RX ADMIN — AMITRIPTYLINE HYDROCHLORIDE 75 MG: 25 TABLET, FILM COATED ORAL at 20:56

## 2019-08-09 RX ADMIN — LAMOTRIGINE 25 MG: 25 TABLET ORAL at 09:16

## 2019-08-09 RX ADMIN — BUPRENORPHINE HYDROCHLORIDE AND NALOXONE HYDROCHLORIDE DIHYDRATE 1 TABLET: 8; 2 TABLET SUBLINGUAL at 20:55

## 2019-08-09 RX ADMIN — LOSARTAN POTASSIUM 100 MG: 100 TABLET ORAL at 09:14

## 2019-08-09 RX ADMIN — PANTOPRAZOLE SODIUM 40 MG: 40 TABLET, DELAYED RELEASE ORAL at 06:01

## 2019-08-09 RX ADMIN — MIRTAZAPINE 15 MG: 15 TABLET, FILM COATED ORAL at 20:56

## 2019-08-09 RX ADMIN — PRAVASTATIN SODIUM 20 MG: 20 TABLET ORAL at 20:56

## 2019-08-09 RX ADMIN — Medication 10 ML: at 20:55

## 2019-08-09 RX ADMIN — ENOXAPARIN SODIUM 40 MG: 40 INJECTION SUBCUTANEOUS at 09:14

## 2019-08-09 RX ADMIN — BUPRENORPHINE HYDROCHLORIDE AND NALOXONE HYDROCHLORIDE DIHYDRATE 1 TABLET: 8; 2 TABLET SUBLINGUAL at 09:14

## 2019-08-09 ASSESSMENT — PAIN SCALES - GENERAL
PAINLEVEL_OUTOF10: 0
PAINLEVEL_OUTOF10: 0
PAINLEVEL_OUTOF10: 5
PAINLEVEL_OUTOF10: 0

## 2019-08-09 NOTE — PLAN OF CARE
Problem: Falls - Risk of:  Goal: Will remain free from falls  Description  Will remain free from falls  8/9/2019 0531 by Cathy Pacheco RN  Outcome: Met This Shift     Problem: Falls - Risk of:  Goal: Absence of physical injury  Description  Absence of physical injury  8/9/2019 0531 by Cathy Pacheco RN  Outcome: Met This Shift     Problem: Suicide risk  Goal: Provide patient with safe environment  Description  Provide patient with safe environment  8/9/2019 0531 by Cathy Pacheco RN  Outcome: Met This Shift

## 2019-08-09 NOTE — PROGRESS NOTES
Patient upset - states that someone told her family that she was on the \"psych\" floor. She stated that that is the only way her family would find out. I corrected her in saying that we would not have told the family that because this is not the psych floor. I reassured her that I would vinay her chart not to give out any information to anyone.

## 2019-09-16 ENCOUNTER — HOSPITAL ENCOUNTER (EMERGENCY)
Age: 49
Discharge: HOME | End: 2019-09-16
Payer: MEDICAID

## 2019-09-16 VITALS
BODY MASS INDEX: 32.78 KG/M2 | OXYGEN SATURATION: 97 % | WEIGHT: 185 LBS | TEMPERATURE: 99 F | HEIGHT: 63 IN | HEART RATE: 106 BPM | SYSTOLIC BLOOD PRESSURE: 122 MMHG | DIASTOLIC BLOOD PRESSURE: 60 MMHG | RESPIRATION RATE: 18 BRPM

## 2019-09-16 DIAGNOSIS — N39.0 UTI (URINARY TRACT INFECTION), UNCOMPLICATED: Primary | ICD-10-CM

## 2019-09-16 LAB
BACTERIA: ABNORMAL /HPF
BILIRUBIN URINE: NEGATIVE
BLOOD, URINE: NEGATIVE
CLARITY: ABNORMAL
COLOR: YELLOW
GLUCOSE URINE: NEGATIVE MG/DL
KETONES, URINE: NEGATIVE MG/DL
LEUKOCYTE ESTERASE, URINE: ABNORMAL
NITRITE, URINE: POSITIVE
PH UA: 6 (ref 5–9)
PROTEIN UA: NEGATIVE MG/DL
RBC UA: ABNORMAL /HPF (ref 0–2)
SPECIFIC GRAVITY UA: 1.02 (ref 1–1.03)
UROBILINOGEN, URINE: 0.2 E.U./DL
WBC UA: ABNORMAL /HPF (ref 0–5)

## 2019-09-16 PROCEDURE — 81001 URINALYSIS AUTO W/SCOPE: CPT

## 2019-09-16 PROCEDURE — 87186 SC STD MICRODIL/AGAR DIL: CPT

## 2019-09-16 PROCEDURE — 99212 OFFICE O/P EST SF 10 MIN: CPT

## 2019-09-16 PROCEDURE — 87088 URINE BACTERIA CULTURE: CPT

## 2019-09-16 RX ORDER — CEPHALEXIN 500 MG/1
500 CAPSULE ORAL 3 TIMES DAILY
Qty: 21 CAPSULE | Refills: 0 | Status: SHIPPED | OUTPATIENT
Start: 2019-09-16 | End: 2019-09-23

## 2019-09-16 ASSESSMENT — PAIN DESCRIPTION - LOCATION: LOCATION: PERINEUM

## 2019-09-16 ASSESSMENT — PAIN SCALES - GENERAL: PAINLEVEL_OUTOF10: 5

## 2019-09-16 ASSESSMENT — PAIN DESCRIPTION - PROGRESSION: CLINICAL_PROGRESSION: GRADUALLY WORSENING

## 2019-09-16 ASSESSMENT — PAIN DESCRIPTION - ONSET: ONSET: GRADUAL

## 2019-09-16 ASSESSMENT — PAIN DESCRIPTION - PAIN TYPE: TYPE: ACUTE PAIN

## 2019-09-16 ASSESSMENT — PAIN DESCRIPTION - FREQUENCY: FREQUENCY: INTERMITTENT

## 2019-09-16 ASSESSMENT — PAIN DESCRIPTION - DESCRIPTORS: DESCRIPTORS: BURNING

## 2019-09-17 NOTE — ED PROVIDER NOTES
Percocet [oxycodone-acetaminophen]        ---------------------------------------------------PHYSICAL EXAM--------------------------------------    Constitutional/General: Alert and oriented x3, well appearing, non toxic in NAD  Head: Normocephalic and atraumatic  Eyes: clear  Mouth: Oropharynx clear, handling secretions,  Neck: Supple, full ROM  Pulmonary: Lungs clear to auscultation bilaterally, no wheezes, rales, or rhonchi. Not in respiratory distress  Cardiovascular:  Regular rate. Regular rhythm. No murmurs, gallops, or rubs. 2+ distal pulses  Abdomen: Soft. Non tender. Non distended. +BS. No rebound, guarding, or rigidity. No pulsatile masses appreciated. Musculoskeletal: Moves all extremities x 4. Warm and well perfused. Capillary refill <3 seconds  Skin: warm and dry. No rashes. Neurologic: GCS 15  Psych: Normal Affect      -------------------------------------------------- RESULTS -------------------------------------------------  I have personally reviewed all laboratory and imaging results for this patient. Results are listed below.      LABS:  Results for orders placed or performed during the hospital encounter of 09/16/19   Urinalysis   Result Value Ref Range    Color, UA Yellow Straw/Yellow    Clarity, UA SL CLOUDY Clear    Glucose, Ur Negative Negative mg/dL    Bilirubin Urine Negative Negative    Ketones, Urine Negative Negative mg/dL    Specific Gravity, UA 1.020 1.005 - 1.030    Blood, Urine Negative Negative    pH, UA 6.0 5.0 - 9.0    Protein, UA Negative Negative mg/dL    Urobilinogen, Urine 0.2 <2.0 E.U./dL    Nitrite, Urine POSITIVE (A) Negative    Leukocyte Esterase, Urine SMALL (A) Negative   Microscopic Urinalysis   Result Value Ref Range    WBC, UA 10-20 (A) 0 - 5 /HPF    RBC, UA 0-1 0 - 2 /HPF    Bacteria, UA MANY (A) /HPF       RADIOLOGY:  Interpreted by Radiologist.  No orders to display         ------------------------- NURSING NOTES AND VITALS REVIEWED

## 2019-09-20 LAB
ORGANISM: ABNORMAL
URINE CULTURE, ROUTINE: ABNORMAL

## 2019-10-24 ENCOUNTER — APPOINTMENT (OUTPATIENT)
Dept: GENERAL RADIOLOGY | Age: 49
End: 2019-10-24
Payer: MEDICAID

## 2019-10-24 ENCOUNTER — HOSPITAL ENCOUNTER (EMERGENCY)
Age: 49
Discharge: ANOTHER HOSPITAL | End: 2019-10-25
Attending: EMERGENCY MEDICINE
Payer: MEDICAID

## 2019-10-24 ENCOUNTER — APPOINTMENT (OUTPATIENT)
Dept: CT IMAGING | Age: 49
End: 2019-10-24
Payer: MEDICAID

## 2019-10-24 DIAGNOSIS — N39.0 URINARY TRACT INFECTION WITHOUT HEMATURIA, SITE UNSPECIFIED: ICD-10-CM

## 2019-10-24 DIAGNOSIS — I31.39 PERICARDIAL EFFUSION: ICD-10-CM

## 2019-10-24 DIAGNOSIS — G40.919 BREAKTHROUGH SEIZURE (HCC): ICD-10-CM

## 2019-10-24 DIAGNOSIS — A41.9 SEPSIS, DUE TO UNSPECIFIED ORGANISM, UNSPECIFIED WHETHER ACUTE ORGAN DYSFUNCTION PRESENT (HCC): Primary | ICD-10-CM

## 2019-10-24 DIAGNOSIS — R07.9 CHEST PAIN, UNSPECIFIED TYPE: ICD-10-CM

## 2019-10-24 LAB
ALBUMIN SERPL-MCNC: 4.6 G/DL (ref 3.5–5.2)
ALP BLD-CCNC: 221 U/L (ref 35–104)
ALT SERPL-CCNC: 71 U/L (ref 0–32)
AMMONIA: 48 UMOL/L (ref 11–51)
AMPHETAMINE SCREEN, URINE: NOT DETECTED
ANION GAP SERPL CALCULATED.3IONS-SCNC: 13 MMOL/L (ref 7–16)
APTT: 32.4 SEC (ref 24.5–35.1)
AST SERPL-CCNC: 62 U/L (ref 0–31)
BACTERIA: ABNORMAL /HPF
BARBITURATE SCREEN URINE: NOT DETECTED
BASOPHILS ABSOLUTE: 0.02 E9/L (ref 0–0.2)
BASOPHILS RELATIVE PERCENT: 0.3 % (ref 0–2)
BENZODIAZEPINE SCREEN, URINE: NOT DETECTED
BILIRUB SERPL-MCNC: <0.2 MG/DL (ref 0–1.2)
BILIRUBIN URINE: NEGATIVE
BLOOD, URINE: NEGATIVE
BUN BLDV-MCNC: 11 MG/DL (ref 6–20)
CALCIUM SERPL-MCNC: 9.4 MG/DL (ref 8.6–10.2)
CANNABINOID SCREEN URINE: NOT DETECTED
CHLORIDE BLD-SCNC: 96 MMOL/L (ref 98–107)
CHP ED QC CHECK: NORMAL
CLARITY: ABNORMAL
CO2: 27 MMOL/L (ref 22–29)
COCAINE METABOLITE SCREEN URINE: NOT DETECTED
COLOR: YELLOW
CREAT SERPL-MCNC: 0.7 MG/DL (ref 0.5–1)
EOSINOPHILS ABSOLUTE: 0.01 E9/L (ref 0.05–0.5)
EOSINOPHILS RELATIVE PERCENT: 0.2 % (ref 0–6)
EPITHELIAL CELLS, UA: ABNORMAL /HPF
GFR AFRICAN AMERICAN: >60
GFR NON-AFRICAN AMERICAN: >60 ML/MIN/1.73
GLUCOSE BLD-MCNC: 130 MG/DL
GLUCOSE BLD-MCNC: 140 MG/DL (ref 74–99)
GLUCOSE URINE: NEGATIVE MG/DL
HCT VFR BLD CALC: 37.7 % (ref 34–48)
HEMOGLOBIN: 11.8 G/DL (ref 11.5–15.5)
IMMATURE GRANULOCYTES #: 0.06 E9/L
IMMATURE GRANULOCYTES %: 0.9 % (ref 0–5)
INR BLD: 1
KETONES, URINE: NEGATIVE MG/DL
LACTIC ACID, SEPSIS: 3.4 MMOL/L (ref 0.5–1.9)
LEUKOCYTE ESTERASE, URINE: ABNORMAL
LIPASE: 25 U/L (ref 13–60)
LYMPHOCYTES ABSOLUTE: 1.27 E9/L (ref 1.5–4)
LYMPHOCYTES RELATIVE PERCENT: 19.9 % (ref 20–42)
Lab: NORMAL
MCH RBC QN AUTO: 27.1 PG (ref 26–35)
MCHC RBC AUTO-ENTMCNC: 31.3 % (ref 32–34.5)
MCV RBC AUTO: 86.5 FL (ref 80–99.9)
METER GLUCOSE: 130 MG/DL (ref 74–99)
METHADONE SCREEN, URINE: NOT DETECTED
MONOCYTES ABSOLUTE: 0.22 E9/L (ref 0.1–0.95)
MONOCYTES RELATIVE PERCENT: 3.5 % (ref 2–12)
NEUTROPHILS ABSOLUTE: 4.79 E9/L (ref 1.8–7.3)
NEUTROPHILS RELATIVE PERCENT: 75.2 % (ref 43–80)
NITRITE, URINE: POSITIVE
OPIATE SCREEN URINE: NOT DETECTED
PDW BLD-RTO: 14.4 FL (ref 11.5–15)
PH UA: 7.5 (ref 5–9)
PHENCYCLIDINE SCREEN URINE: NOT DETECTED
PLATELET # BLD: 371 E9/L (ref 130–450)
PMV BLD AUTO: 8.5 FL (ref 7–12)
POTASSIUM REFLEX MAGNESIUM: 4.4 MMOL/L (ref 3.5–5)
PROPOXYPHENE SCREEN: NOT DETECTED
PROTEIN UA: ABNORMAL MG/DL
PROTHROMBIN TIME: 11.1 SEC (ref 9.3–12.4)
RBC # BLD: 4.36 E12/L (ref 3.5–5.5)
RBC UA: ABNORMAL /HPF (ref 0–2)
SODIUM BLD-SCNC: 136 MMOL/L (ref 132–146)
SPECIFIC GRAVITY UA: 1.02 (ref 1–1.03)
TOTAL PROTEIN: 7.7 G/DL (ref 6.4–8.3)
TROPONIN: <0.01 NG/ML (ref 0–0.03)
UROBILINOGEN, URINE: 0.2 E.U./DL
WBC # BLD: 6.4 E9/L (ref 4.5–11.5)
WBC UA: ABNORMAL /HPF (ref 0–5)

## 2019-10-24 PROCEDURE — 6360000002 HC RX W HCPCS: Performed by: EMERGENCY MEDICINE

## 2019-10-24 PROCEDURE — 85730 THROMBOPLASTIN TIME PARTIAL: CPT

## 2019-10-24 PROCEDURE — 83690 ASSAY OF LIPASE: CPT

## 2019-10-24 PROCEDURE — 6370000000 HC RX 637 (ALT 250 FOR IP): Performed by: EMERGENCY MEDICINE

## 2019-10-24 PROCEDURE — 99285 EMERGENCY DEPT VISIT HI MDM: CPT

## 2019-10-24 PROCEDURE — 85025 COMPLETE CBC W/AUTO DIFF WBC: CPT

## 2019-10-24 PROCEDURE — 96375 TX/PRO/DX INJ NEW DRUG ADDON: CPT

## 2019-10-24 PROCEDURE — 73562 X-RAY EXAM OF KNEE 3: CPT

## 2019-10-24 PROCEDURE — 84484 ASSAY OF TROPONIN QUANT: CPT

## 2019-10-24 PROCEDURE — 80307 DRUG TEST PRSMV CHEM ANLYZR: CPT

## 2019-10-24 PROCEDURE — 72125 CT NECK SPINE W/O DYE: CPT

## 2019-10-24 PROCEDURE — 83605 ASSAY OF LACTIC ACID: CPT

## 2019-10-24 PROCEDURE — 82140 ASSAY OF AMMONIA: CPT

## 2019-10-24 PROCEDURE — 87040 BLOOD CULTURE FOR BACTERIA: CPT

## 2019-10-24 PROCEDURE — 85610 PROTHROMBIN TIME: CPT

## 2019-10-24 PROCEDURE — 94760 N-INVAS EAR/PLS OXIMETRY 1: CPT

## 2019-10-24 PROCEDURE — 80175 DRUG SCREEN QUAN LAMOTRIGINE: CPT

## 2019-10-24 PROCEDURE — 81001 URINALYSIS AUTO W/SCOPE: CPT

## 2019-10-24 PROCEDURE — 80053 COMPREHEN METABOLIC PANEL: CPT

## 2019-10-24 PROCEDURE — 82962 GLUCOSE BLOOD TEST: CPT

## 2019-10-24 PROCEDURE — 36415 COLL VENOUS BLD VENIPUNCTURE: CPT

## 2019-10-24 PROCEDURE — 93005 ELECTROCARDIOGRAM TRACING: CPT | Performed by: EMERGENCY MEDICINE

## 2019-10-24 PROCEDURE — 71045 X-RAY EXAM CHEST 1 VIEW: CPT

## 2019-10-24 PROCEDURE — 2580000003 HC RX 258: Performed by: EMERGENCY MEDICINE

## 2019-10-24 PROCEDURE — 70450 CT HEAD/BRAIN W/O DYE: CPT

## 2019-10-24 PROCEDURE — 96365 THER/PROPH/DIAG IV INF INIT: CPT

## 2019-10-24 PROCEDURE — G0480 DRUG TEST DEF 1-7 CLASSES: HCPCS

## 2019-10-24 PROCEDURE — 87088 URINE BACTERIA CULTURE: CPT

## 2019-10-24 PROCEDURE — 87186 SC STD MICRODIL/AGAR DIL: CPT

## 2019-10-24 RX ORDER — ACETAMINOPHEN 500 MG
1000 TABLET ORAL ONCE
Status: COMPLETED | OUTPATIENT
Start: 2019-10-24 | End: 2019-10-24

## 2019-10-24 RX ORDER — LEVETIRACETAM 10 MG/ML
1000 INJECTION INTRAVASCULAR ONCE
Status: COMPLETED | OUTPATIENT
Start: 2019-10-24 | End: 2019-10-24

## 2019-10-24 RX ORDER — 0.9 % SODIUM CHLORIDE 0.9 %
1000 INTRAVENOUS SOLUTION INTRAVENOUS ONCE
Status: COMPLETED | OUTPATIENT
Start: 2019-10-24 | End: 2019-10-25

## 2019-10-24 RX ORDER — 0.9 % SODIUM CHLORIDE 0.9 %
1000 INTRAVENOUS SOLUTION INTRAVENOUS ONCE
Status: COMPLETED | OUTPATIENT
Start: 2019-10-24 | End: 2019-10-24

## 2019-10-24 RX ADMIN — CEFTRIAXONE 2 G: 2 INJECTION, POWDER, FOR SOLUTION INTRAMUSCULAR; INTRAVENOUS at 23:28

## 2019-10-24 RX ADMIN — SODIUM CHLORIDE 1000 ML: 9 INJECTION, SOLUTION INTRAVENOUS at 22:15

## 2019-10-24 RX ADMIN — SODIUM CHLORIDE 1000 ML: 9 INJECTION, SOLUTION INTRAVENOUS at 23:29

## 2019-10-24 RX ADMIN — LEVETIRACETAM 1000 MG: 10 INJECTION INTRAVENOUS at 22:19

## 2019-10-24 RX ADMIN — ACETAMINOPHEN 1000 MG: 500 TABLET, FILM COATED ORAL at 22:16

## 2019-10-24 ASSESSMENT — PAIN SCALES - GENERAL: PAINLEVEL_OUTOF10: 5

## 2019-10-25 ENCOUNTER — HOSPITAL ENCOUNTER (OUTPATIENT)
Age: 49
Discharge: HOME | End: 2019-10-25
Payer: MEDICAID

## 2019-10-25 ENCOUNTER — HOSPITAL ENCOUNTER (INPATIENT)
Age: 49
LOS: 11 days | Discharge: HOME HEALTH | DRG: 053 | End: 2019-11-05
Attending: INTERNAL MEDICINE | Admitting: INTERNAL MEDICINE
Payer: MEDICAID

## 2019-10-25 ENCOUNTER — APPOINTMENT (OUTPATIENT)
Dept: CT IMAGING | Age: 49
End: 2019-10-25
Payer: MEDICAID

## 2019-10-25 VITALS
BODY MASS INDEX: 35.43 KG/M2 | DIASTOLIC BLOOD PRESSURE: 68 MMHG | OXYGEN SATURATION: 97 % | HEART RATE: 107 BPM | TEMPERATURE: 97.5 F | RESPIRATION RATE: 18 BRPM | WEIGHT: 200 LBS | SYSTOLIC BLOOD PRESSURE: 115 MMHG

## 2019-10-25 PROBLEM — R07.9 CHEST PAIN: Status: ACTIVE | Noted: 2019-10-25

## 2019-10-25 LAB
ACETAMINOPHEN LEVEL: <5 MCG/ML (ref 10–30)
ALBUMIN SERPL-MCNC: 4.2 G/DL (ref 3.5–5.2)
ALP BLD-CCNC: 186 U/L (ref 35–104)
ALT SERPL-CCNC: 54 U/L (ref 0–32)
AMMONIA: 47 UMOL/L (ref 11–51)
ANION GAP SERPL CALCULATED.3IONS-SCNC: 9 MMOL/L (ref 7–16)
AST SERPL-CCNC: 37 U/L (ref 0–31)
B.E.: 0 MMOL/L (ref -3–0)
BASOPHILS ABSOLUTE: 0.02 E9/L (ref 0–0.2)
BASOPHILS RELATIVE PERCENT: 0.3 % (ref 0–2)
BILIRUB SERPL-MCNC: <0.2 MG/DL (ref 0–1.2)
BUN BLDV-MCNC: 10 MG/DL (ref 6–20)
CALCIUM SERPL-MCNC: 9.3 MG/DL (ref 8.6–10.2)
CHLORIDE BLD-SCNC: 103 MMOL/L (ref 98–107)
CO2: 30 MMOL/L (ref 22–29)
CREAT SERPL-MCNC: 0.7 MG/DL (ref 0.5–1)
DELIVERY SYSTEMS: ABNORMAL
DEVICE: ABNORMAL
EOSINOPHILS ABSOLUTE: 0.04 E9/L (ref 0.05–0.5)
EOSINOPHILS RELATIVE PERCENT: 0.6 % (ref 0–6)
ETHANOL: <10 MG/DL (ref 0–0.08)
GFR AFRICAN AMERICAN: >60
GFR NON-AFRICAN AMERICAN: >60 ML/MIN/1.73
GLUCOSE BLD-MCNC: 115 MG/DL (ref 74–99)
HCG QUALITATIVE: NEGATIVE
HCO3 ARTERIAL: 25.2 MMOL/L (ref 22–26)
HCT VFR BLD CALC: 35.3 % (ref 34–48)
HEMOGLOBIN: 10.7 G/DL (ref 11.5–15.5)
IMMATURE GRANULOCYTES #: 0.02 E9/L
IMMATURE GRANULOCYTES %: 0.3 % (ref 0–5)
LACTIC ACID, SEPSIS: 1.9 MMOL/L (ref 0.5–1.9)
LACTIC ACID: 1.9 MMOL/L (ref 0.5–2.2)
LYMPHOCYTES ABSOLUTE: 2.34 E9/L (ref 1.5–4)
LYMPHOCYTES RELATIVE PERCENT: 37.9 % (ref 20–42)
MCH RBC QN AUTO: 26.8 PG (ref 26–35)
MCHC RBC AUTO-ENTMCNC: 30.3 % (ref 32–34.5)
MCV RBC AUTO: 88.5 FL (ref 80–99.9)
MONOCYTES ABSOLUTE: 0.29 E9/L (ref 0.1–0.95)
MONOCYTES RELATIVE PERCENT: 4.7 % (ref 2–12)
NEUTROPHILS ABSOLUTE: 3.47 E9/L (ref 1.8–7.3)
NEUTROPHILS RELATIVE PERCENT: 56.2 % (ref 43–80)
O2 SATURATION: 93.1 % (ref 92–98.5)
OPERATOR ID: 914
PCO2 ARTERIAL: 42.3 MMHG (ref 35–45)
PDW BLD-RTO: 14.6 FL (ref 11.5–15)
PH BLOOD GAS: 7.38 (ref 7.35–7.45)
PLATELET # BLD: 354 E9/L (ref 130–450)
PMV BLD AUTO: 8.9 FL (ref 7–12)
PO2 ARTERIAL: 68.5 MMHG (ref 80–100)
POTASSIUM SERPL-SCNC: 3.9 MMOL/L (ref 3.5–5)
RBC # BLD: 3.99 E12/L (ref 3.5–5.5)
SALICYLATE, SERUM: <0.3 MG/DL (ref 0–30)
SEDIMENTATION RATE, ERYTHROCYTE: 10 MM/HR (ref 0–20)
SODIUM BLD-SCNC: 142 MMOL/L (ref 132–146)
SOURCE, BLOOD GAS: ABNORMAL
TOTAL PROTEIN: 6.9 G/DL (ref 6.4–8.3)
TRICYCLIC ANTIDEPRESSANTS SCREEN SERUM: POSITIVE NG/ML
TROPONIN: <0.01 NG/ML (ref 0–0.03)
TROPONIN: <0.01 NG/ML (ref 0–0.03)
WBC # BLD: 6.2 E9/L (ref 4.5–11.5)

## 2019-10-25 PROCEDURE — 36415 COLL VENOUS BLD VENIPUNCTURE: CPT

## 2019-10-25 PROCEDURE — 85651 RBC SED RATE NONAUTOMATED: CPT

## 2019-10-25 PROCEDURE — 80053 COMPREHEN METABOLIC PANEL: CPT

## 2019-10-25 PROCEDURE — 99253 IP/OBS CNSLTJ NEW/EST LOW 45: CPT | Performed by: PSYCHIATRY & NEUROLOGY

## 2019-10-25 PROCEDURE — 83605 ASSAY OF LACTIC ACID: CPT

## 2019-10-25 PROCEDURE — 2060000000 HC ICU INTERMEDIATE R&B

## 2019-10-25 PROCEDURE — 6360000002 HC RX W HCPCS: Performed by: INTERNAL MEDICINE

## 2019-10-25 PROCEDURE — 2500000003 HC RX 250 WO HCPCS

## 2019-10-25 PROCEDURE — 84484 ASSAY OF TROPONIN QUANT: CPT

## 2019-10-25 PROCEDURE — 82803 BLOOD GASES ANY COMBINATION: CPT

## 2019-10-25 PROCEDURE — 87040 BLOOD CULTURE FOR BACTERIA: CPT

## 2019-10-25 PROCEDURE — 6370000000 HC RX 637 (ALT 250 FOR IP): Performed by: INTERNAL MEDICINE

## 2019-10-25 PROCEDURE — 6370000000 HC RX 637 (ALT 250 FOR IP): Performed by: STUDENT IN AN ORGANIZED HEALTH CARE EDUCATION/TRAINING PROGRAM

## 2019-10-25 PROCEDURE — 2580000003 HC RX 258: Performed by: INTERNAL MEDICINE

## 2019-10-25 PROCEDURE — A0425 GROUND MILEAGE: HCPCS

## 2019-10-25 PROCEDURE — 2580000003 HC RX 258: Performed by: EMERGENCY MEDICINE

## 2019-10-25 PROCEDURE — 84703 CHORIONIC GONADOTROPIN ASSAY: CPT

## 2019-10-25 PROCEDURE — 6360000002 HC RX W HCPCS

## 2019-10-25 PROCEDURE — 87088 URINE BACTERIA CULTURE: CPT

## 2019-10-25 PROCEDURE — 80175 DRUG SCREEN QUAN LAMOTRIGINE: CPT

## 2019-10-25 PROCEDURE — 82140 ASSAY OF AMMONIA: CPT

## 2019-10-25 PROCEDURE — A0426 ALS 1: HCPCS

## 2019-10-25 PROCEDURE — 82962 GLUCOSE BLOOD TEST: CPT

## 2019-10-25 PROCEDURE — 6360000004 HC RX CONTRAST MEDICATION: Performed by: RADIOLOGY

## 2019-10-25 PROCEDURE — 85025 COMPLETE CBC W/AUTO DIFF WBC: CPT

## 2019-10-25 PROCEDURE — 71275 CT ANGIOGRAPHY CHEST: CPT

## 2019-10-25 PROCEDURE — 2580000003 HC RX 258

## 2019-10-25 RX ORDER — LEVETIRACETAM 500 MG/1
1000 TABLET ORAL 2 TIMES DAILY
Status: DISCONTINUED | OUTPATIENT
Start: 2019-10-25 | End: 2019-10-26

## 2019-10-25 RX ORDER — LABETALOL HYDROCHLORIDE 5 MG/ML
INJECTION, SOLUTION INTRAVENOUS
Status: COMPLETED
Start: 2019-10-25 | End: 2019-10-25

## 2019-10-25 RX ORDER — LANOLIN ALCOHOL/MO/W.PET/CERES
3 CREAM (GRAM) TOPICAL NIGHTLY
Status: DISCONTINUED | OUTPATIENT
Start: 2019-10-25 | End: 2019-11-05 | Stop reason: HOSPADM

## 2019-10-25 RX ORDER — LANOLIN ALCOHOL/MO/W.PET/CERES
9 CREAM (GRAM) TOPICAL NIGHTLY
Status: ON HOLD | COMMUNITY
End: 2020-01-31 | Stop reason: HOSPADM

## 2019-10-25 RX ORDER — BUPRENORPHINE HYDROCHLORIDE AND NALOXONE HYDROCHLORIDE DIHYDRATE 8; 2 MG/1; MG/1
1 TABLET SUBLINGUAL 2 TIMES DAILY
Status: DISCONTINUED | OUTPATIENT
Start: 2019-10-25 | End: 2019-10-27

## 2019-10-25 RX ORDER — PRAVASTATIN SODIUM 20 MG
20 TABLET ORAL DAILY
Status: DISCONTINUED | OUTPATIENT
Start: 2019-10-25 | End: 2019-11-05 | Stop reason: HOSPADM

## 2019-10-25 RX ORDER — ONDANSETRON 2 MG/ML
4 INJECTION INTRAMUSCULAR; INTRAVENOUS EVERY 6 HOURS PRN
Status: DISCONTINUED | OUTPATIENT
Start: 2019-10-25 | End: 2019-11-05 | Stop reason: HOSPADM

## 2019-10-25 RX ORDER — LABETALOL HYDROCHLORIDE 5 MG/ML
10 INJECTION, SOLUTION INTRAVENOUS ONCE
Status: COMPLETED | OUTPATIENT
Start: 2019-10-25 | End: 2019-10-25

## 2019-10-25 RX ORDER — SODIUM CHLORIDE 0.9 % (FLUSH) 0.9 %
10 SYRINGE (ML) INJECTION EVERY 12 HOURS SCHEDULED
Status: DISCONTINUED | OUTPATIENT
Start: 2019-10-25 | End: 2019-11-04 | Stop reason: SDUPTHER

## 2019-10-25 RX ORDER — PANTOPRAZOLE SODIUM 40 MG/1
40 TABLET, DELAYED RELEASE ORAL
Status: DISCONTINUED | OUTPATIENT
Start: 2019-10-26 | End: 2019-10-28

## 2019-10-25 RX ORDER — LORAZEPAM 2 MG/ML
INJECTION INTRAMUSCULAR
Status: COMPLETED
Start: 2019-10-25 | End: 2019-10-25

## 2019-10-25 RX ORDER — LEVETIRACETAM 500 MG/1
500 TABLET ORAL 2 TIMES DAILY
Status: DISCONTINUED | OUTPATIENT
Start: 2019-10-25 | End: 2019-10-25

## 2019-10-25 RX ORDER — SODIUM CHLORIDE 0.9 % (FLUSH) 0.9 %
10 SYRINGE (ML) INJECTION PRN
Status: DISCONTINUED | OUTPATIENT
Start: 2019-10-25 | End: 2019-10-28 | Stop reason: SDUPTHER

## 2019-10-25 RX ORDER — 0.9 % SODIUM CHLORIDE 0.9 %
1000 INTRAVENOUS SOLUTION INTRAVENOUS ONCE
Status: COMPLETED | OUTPATIENT
Start: 2019-10-25 | End: 2019-10-25

## 2019-10-25 RX ORDER — LOSARTAN POTASSIUM 50 MG/1
100 TABLET ORAL DAILY
Status: DISCONTINUED | OUTPATIENT
Start: 2019-10-25 | End: 2019-11-05 | Stop reason: HOSPADM

## 2019-10-25 RX ORDER — LORAZEPAM 2 MG/ML
2 INJECTION INTRAMUSCULAR ONCE
Status: COMPLETED | OUTPATIENT
Start: 2019-10-25 | End: 2019-10-25

## 2019-10-25 RX ORDER — LEVETIRACETAM 500 MG/1
750 TABLET ORAL 2 TIMES DAILY
COMMUNITY
End: 2020-06-16

## 2019-10-25 RX ADMIN — LOSARTAN POTASSIUM 100 MG: 50 TABLET, FILM COATED ORAL at 17:56

## 2019-10-25 RX ADMIN — Medication 10 ML: at 22:30

## 2019-10-25 RX ADMIN — BUPRENORPHINE HYDROCHLORIDE AND NALOXONE HYDROCHLORIDE DIHYDRATE 1 TABLET: 8; 2 TABLET SUBLINGUAL at 14:34

## 2019-10-25 RX ADMIN — MELATONIN 3 MG ORAL TABLET 3 MG: 3 TABLET ORAL at 22:29

## 2019-10-25 RX ADMIN — LEVETIRACETAM 1000 MG: 500 TABLET ORAL at 22:30

## 2019-10-25 RX ADMIN — BUPRENORPHINE HYDROCHLORIDE AND NALOXONE HYDROCHLORIDE DIHYDRATE 1 TABLET: 8; 2 TABLET SUBLINGUAL at 22:29

## 2019-10-25 RX ADMIN — LABETALOL HYDROCHLORIDE 10 MG: 5 INJECTION, SOLUTION INTRAVENOUS at 16:07

## 2019-10-25 RX ADMIN — LEVETIRACETAM 500 MG: 500 TABLET ORAL at 14:34

## 2019-10-25 RX ADMIN — LABETALOL HYDROCHLORIDE 10 MG: 5 INJECTION INTRAVENOUS at 16:07

## 2019-10-25 RX ADMIN — SODIUM CHLORIDE 1000 ML: 9 INJECTION, SOLUTION INTRAVENOUS at 00:50

## 2019-10-25 RX ADMIN — LORAZEPAM 2 MG: 2 INJECTION INTRAMUSCULAR at 16:11

## 2019-10-25 RX ADMIN — IOPAMIDOL 80 ML: 755 INJECTION, SOLUTION INTRAVENOUS at 02:39

## 2019-10-25 RX ADMIN — PRAVASTATIN SODIUM 20 MG: 20 TABLET ORAL at 22:28

## 2019-10-25 RX ADMIN — ENOXAPARIN SODIUM 40 MG: 100 INJECTION SUBCUTANEOUS at 17:56

## 2019-10-25 RX ADMIN — LORAZEPAM 2 MG: 2 INJECTION, SOLUTION INTRAMUSCULAR; INTRAVENOUS at 16:11

## 2019-10-25 ASSESSMENT — PAIN SCALES - GENERAL
PAINLEVEL_OUTOF10: 4
PAINLEVEL_OUTOF10: 0
PAINLEVEL_OUTOF10: 3
PAINLEVEL_OUTOF10: 0
PAINLEVEL_OUTOF10: 4

## 2019-10-25 ASSESSMENT — ENCOUNTER SYMPTOMS
VOMITING: 0
NAUSEA: 0
CHEST TIGHTNESS: 0
PHOTOPHOBIA: 0
SHORTNESS OF BREATH: 0
WHEEZING: 0

## 2019-10-25 ASSESSMENT — PAIN DESCRIPTION - LOCATION: LOCATION: OTHER (COMMENT)

## 2019-10-25 NOTE — PROGRESS NOTES
Per edgar NP with [facility] cardiology instructed to check with pt re which cardiologist she wants consulted since she has seen both [facility] cardio and dr Jez Tucker in the past.   Pt request Dr Jez Tucker at this time. Consult switched to Dr Jez Tucker at this time and Dr Urbano Conde made aware of new consult and he will see the patient today.

## 2019-10-25 NOTE — CONSULTS
Cardiology consult  Dr. Jordan Amador      Reason for Consult: pericardial effusion noted on CT and chest pain  Requesting Physician: Cassidy Horn DO   CHIEF COMPLAINT: chest pain and seizures   History Obtained From: patient, electronic medical record  HISTORY OF PRESENT ILLNESS:   Patient is a 52year old female with a medical history of pericardial effusion, hypertension, hyperlipidemia, depression. Patient was admitted to the hospital with chest pain, cardiology was consulted. Patient states that she was out of her medications at home for that past few days. Yesterday she reports she started having seizures that would not stop. This prompted patient to seek medical attention. She did not attempt any at home treatment. She reports she continues to have seizures this admission. She states she started having chest pain, described as discomfort, non radiating. Patient denies any shortness of breath, no lightheadedness, no dizziness, no palpitations, no pedal edema, no PND, no orthopnea, no syncope, no presyncopal episodes.           Past Medical History:   Diagnosis Date    Back pain     Depression 11/30/2016    Essential hypertension 8/7/2019    Gastroparesis     Hepatitis     History of cardiovascular stress test 7/1/2012    EXERCISE NUCLEAR    Hyperlipidemia     Medically noncompliant 2/15/2019    Pancreatitis, acute     Pyloric stenosis     Seizures ([address]     Vasovagal syncope 2/15/2019       Past Surgical History:   Procedure Laterality Date    ABDOMEN SURGERY      Patient has had 9 surgeries   [address]      had 7 - 5 - 2012    CHOLECYSTECTOMY, LAPAROSCOPIC      ECHO COMPL W DOP COLOR FLOW  7/1/2012         ENDOMETRIAL ABLATION      PYLOROPLASTY  3/30/2012    lap plylorplasty open upper endoscopy lysis of adhesions    SALPINGO-OOPHORECTOMY Left     left    UPPER GASTROINTESTINAL ENDOSCOPY  3/15/13    balloon opened pyloric sphincter         Current Facility-Administered jaundice  GENITOURINARY:  negative for frequency, dysuria, nocturia, urinary incontinence and hesitancy  HEMATOLOGIC/LYMPHATIC:  negative for easy bruising, bleeding, lymphadenopathy and petechiae  ALLERGIC/IMMUNOLOGIC:  negative for urticaria, hay fever and angioedema  ENDOCRINE:  negative for heat intolerance, cold intolerance, tremor, hair loss and diabetic symptoms including neither polyuria nor polydipsia nor blurred vision  MUSCULOSKELETAL:  negative for  myalgias, arthralgias, joint swelling, stiff joints and decreased range of motion  NEUROLOGICAL:  negative for memory problems, speech problems, visual disturbance, dysphagia, weakness and numbness      PHYSICAL EXAM:   CONSTITUTIONAL:  awake, alert, cooperative, no apparent distress, and appears stated age  EYES:  lids and lashes normal and pupils equal, round and reactive to light, anicteric sclerae  HEAD:  normocepalic, without obvious abnormality, atraumatic, pink, moist mucous membranes. NECK:  Supple, symmetrical, trachea midline, no adenopathy, thyroid symmetric, not enlarged and no tenderness, skin normal  HEMATOLOGIC/LYMPHATICS:  no cervical lymphadenopathy and no supraclavicular lymphadenopathy  LUNGS:  No increased work of breathing, good air exchange, clear to auscultation bilaterally, no crackles or wheezing  CARDIOVASCULAR:  Normal apical impulse, regular rate and rhythm, normal S1 and S2, no S3 or S4, and no murmur noted and no JVD, no carotid bruit, no pedal edema, good carotid upstroke bilaterally. ABDOMEN:  Soft, nontender, no masses, no hepatomegaly or splenomegaly, BS+  CHEST: nontender to palpation, expands symmetrically  MUSCULOSKELETAL:  No clubbing no cyanosis. there is no redness, warmth, or swelling of the joints  full range of motion noted  NEUROLOGIC:  Alert, awake,oriented x3, no focal neurologic deficit was appreciated.  Seizures   SKIN:  no bruising or bleeding, normal skin color, texture, turgor and no redness, warmth, or swelling        /72   Pulse 99   Temp 97.9 °F (36.6 °C) (Temporal)   Resp 16   SpO2 92%     DATA:   I personally reviewed the admission EKG with the following interpretation: sinus tachycardiac with ST and T wave depression in anterior leads     ECHO:  10/31/17   Summary   Left ventricular size is grossly normal.   Normal diastolic function for age.  Gabi Custer left ventricular concentric hypertrophy noted.   No regional wall motion abnormalities seen.   Ejection fraction is visually estimated at 60%.   There is a small circumferential pericardial effusion noted.     Stress Test: 10/31/17     Impression   ? 1. No pharmacologically-induced perfusion defect identified. 2. Normal left ventricular wall motion.    3. Left ventricular ejection fraction of 70 %         Angiography:   Cardiology Labs:   BMP:    Lab Results   Component Value Date     10/24/2019    K 4.4 10/24/2019    CL 96 10/24/2019    CO2 27 10/24/2019    BUN 11 10/24/2019     CMP:    Lab Results   Component Value Date     10/24/2019    K 4.4 10/24/2019    CL 96 10/24/2019    CO2 27 10/24/2019    BUN 11 10/24/2019    PROT 7.7 10/24/2019     CBC:    Lab Results   Component Value Date    WBC 6.4 10/24/2019    RBC 4.36 10/24/2019    HGB 11.8 10/24/2019    HCT 37.7 10/24/2019    MCV 86.5 10/24/2019    RDW 14.4 10/24/2019     10/24/2019     PT/INR:  No results found for: PTINR  PT/INR Warfarin:  No components found for: PTPATWAR, PTINRWAR  PTT:    Lab Results   Component Value Date    APTT 32.4 10/24/2019     PTT Heparin:  No components found for: APTTHEP  Magnesium:    Lab Results   Component Value Date    MG 2.1 08/11/2016     TSH:    Lab Results   Component Value Date    TSH 2.380 08/07/2019     TROPONIN:  No components found for: TROP  BNP:  No results found for: BNP  FASTING LIPID PANEL:    Lab Results   Component Value Date    CHOL 225 07/05/2012    HDL 69.0 07/05/2012    TRIG 72 07/05/2012     No orders to display         I have personally reviewed the laboratory, cardiac diagnostic and radiographic testing as outlined above:    IMPRESSION:  1. Pericardial effusion: on CT chest. Will further evaluate with echocardiogram   2. Chest Pain: Atypical. ST and T wave changes. Troponin negative x1, will repeat. Stress test is troponin trend is negative   3. Sinus tachycardia: Etiology?,  Will check TSH, will follow closely. 4. Mild mitral valve regurgitation  5. Tricuspid valve regurgitation  6. Hypertension  7. Hyperlipidemia  8. Seizures: will follow neurology. 9. Depression  10. Noncompliance     RECOMMENDATIONS:   1. Echocardiogram  2. Trend troponin   3. TSH  4. Stress test tomorrow, if troponin remains negative   5. CBC and BMP   6. Intake and Output   7. Further cardiac recommendations forthcoming pending patients clinical progression and diagnostic test findings        I have reviewed my findings and recommendations with patient  Discussed with Dr. Giancarlo Martin     Thank you for the consult! Electronically signed by TITI Dowell CNP on 10/25/2019 at 4:10 PM  I have personally participated in a face-to-face history and physical exam on the date of service. Reviewed chart, vitals, labs and radiologic studies. I also participated in medical decision making with Sarah Higgins CNP on the date of service and I agree with all of the pertinent clinical information, assessment and treatment plan. I have reviewed and edited the note above based on my findings during my history, exam, and decision making. CONSTITUTIONAL: awake, alert, cooperative  HEAD: normocepalic, without obvious abnormality, atraumatic  NECK: Supple, no JVD  LUNGS: CTA  CARDIOVASCULAR: Tachycardic with systolic murmur  As above  NOTE: This report was transcribed using voice recognition software.  Every effort was made to ensure accuracy; however, inadvertent computerized transcription errors may be present

## 2019-10-25 NOTE — H&P
History and Physical      CHIEF COMPLAINT:  Seizures/chest pain       HISTORY OF PRESENT ILLNESS:      The patient is a 52 y.o. female patient of Dr Dre Gunderson who presents with seizures from home. She has a long history of seizure disorder. She apparently had an unwitnessed seizure at home presented to the emergency room. She was noted to be tachycardic and CT of the chest revealed a pericardial effusion. She stated she had 5 seizures during the day. She apparently is supposed to be taking Keppra but was out of Keppra and was taking Lamictal only. She does not have a neurologist.  Internal medicine was consulted in the emergency room at [address] [place] [address] and recommended transfer here for cardiothoracic surgery consultation. Patient does admit to having intermittent chest discomfort which is nonradiating not associated with exertion or movement. She denies any worsening with deep inspiration. She denies any cough fever chills nausea or vomiting. Did have lactic acidosis in the emergency room. Patient states that she moved to [place] to live with her sister because of her seizure disorder and has not seen her PCP for some time which has limited her ability to obtain her medication. She attributes transportation issues to her failure to follow-up with primary care. She is requesting  assistance with obtaining transportation. Also states that she is taking Suboxone in an attempt to be weaned from her [address] which she has been taking for chronic back pain.     Past Medical History:    Past Medical History:   Diagnosis Date    Back pain     Depression 11/30/2016    Essential hypertension 8/7/2019    Gastroparesis     Hepatitis     History of cardiovascular stress test 7/1/2012    EXERCISE NUCLEAR    Hyperlipidemia     Medically noncompliant 2/15/2019    Pancreatitis, acute     Pyloric stenosis     Seizures ([facility] [address]     Vasovagal syncope 2/15/2019       Past Surgical History:    Past Surgical History:   Procedure Laterality Date    ABDOMEN SURGERY      Patient has had 9 surgeries    CARDIAC CATHETERIZATION      had 7 - 5 - 2012    CHOLECYSTECTOMY, LAPAROSCOPIC      ECHO COMPL W DOP COLOR FLOW  7/1/2012         ENDOMETRIAL ABLATION      PYLOROPLASTY  3/30/2012    lap plylorplasty open upper endoscopy lysis of adhesions    SALPINGO-OOPHORECTOMY Left     left    UPPER GASTROINTESTINAL ENDOSCOPY  3/15/13    balloon opened pyloric sphincter       Medications Prior to Admission:    Medications Prior to Admission: levETIRAcetam (KEPPRA) 500 MG tablet, Take 500 mg by mouth 2 times daily  melatonin 3 MG TABS tablet, Take 3 mg by mouth nightly  losartan (COZAAR) 100 MG tablet, Take 100 mg by mouth daily  buprenorphine-naloxone (SUBOXONE) 8-2 MG SUBL SL tablet, Place 1 tablet under the tongue 2 times daily. omeprazole (PRILOSEC) 20 MG delayed release capsule, Take 20 mg by mouth 2 times daily   pravastatin (PRAVACHOL) 20 MG tablet, Take 20 mg by mouth daily  lamoTRIgine (LAMICTAL) 25 MG tablet, Take 1 tablet by mouth daily    Allergies:    Ketorolac tromethamine; Naproxen; Nsaids; Prochlorperazine edisylate; Reglan [metoclopramide]; Codeine; Levofloxacin; and Percocet [oxycodone-acetaminophen]    Social History:    reports that she has never smoked. She has never used smokeless tobacco. She reports that she does not drink alcohol or use drugs. Family History:   family history includes Depression in her paternal grandmother; High Blood Pressure in her mother; Other in her father.     REVIEW OF SYSTEMS    Constitutional: negative for chills and fevers  Eyes: negative for icterus and redness  Ears, nose, mouth, throat, and face: negative for epistaxis, hearing loss, nasal congestion, sore mouth, sore throat and tinnitus  Respiratory: negative for cough and hemoptysis  Cardiovascular: positive for chest pain, negative for dyspnea and exertional chest pressure/discomfort  Gastrointestinal: negative for abdominal pain and vomiting  Genitourinary:negative for dysuria and frequency  Integument/breast: negative for pruritus and rash  Hematologic/lymphatic: negative for bleeding and easy bruising  Musculoskeletal:negative for arthralgias and back pain  Neurological: positive for seizures, negative for headaches and memory problems  Behavioral/Psych: negative for anxiety and depression  Endocrine: negative for temperature intolerance  Allergic/Immunologic: negative for anaphylaxis and angioedema    PHYSICAL EXAM:    Vitals:  /74   Pulse 100   Temp 97.4 °F (36.3 °C) (Temporal)   Resp 18   SpO2 95%     General appearance: alert, appears stated age and cooperative  Head: Normocephalic, without obvious abnormality, atraumatic  Eyes: conjunctivae/corneas clear. PERRL, EOM's intact. Fundi benign. Ears: normal TM's and external ear canals both ears  Nose: Nares normal. Septum midline. Mucosa normal. No drainage or sinus tenderness.   Throat: lips, mucosa, and tongue normal; teeth and gums normal  Neck: no adenopathy, no carotid bruit, no JVD, supple, symmetrical, trachea midline and thyroid not enlarged, symmetric, no tenderness/mass/nodules  Lungs: clear to auscultation bilaterally  Heart: regular rate and rhythm, S1, S2 normal, no murmur, click, rub or gallop  Abdomen: soft, non-tender; bowel sounds normal; no masses,  no organomegaly  Extremities: extremities normal, atraumatic, no cyanosis or edema  Pulses: 2+ and symmetric  Skin: Skin color, texture, turgor normal. No rashes or lesions  Neurologic: Grossly normal    Results for orders placed or performed during the hospital encounter of 10/24/19   Lactate, Sepsis   Result Value Ref Range     Lactic Acid, Sepsis 3.4 (H) 0.5 - 1.9 mmol/L   Lactate, Sepsis   Result Value Ref Range     Lactic Acid, Sepsis 1.9 0.5 - 1.9 mmol/L   CBC auto differential   Result Value Ref Range     WBC 6.4 4.5 - 11.5 E9/L     RBC 4.36 3.50 - 5.50 E12/L     Hemoglobin 11.8 11.5 - 15.5 g/dL     Hematocrit 37.7 34.0 - 48.0 %     MCV 86.5 80.0 - 99.9 fL     MCH 27.1 26.0 - 35.0 pg     MCHC 31.3 (L) 32.0 - 34.5 %     RDW 14.4 11.5 - 15.0 fL     Platelets 916 466 - 390 E9/L     MPV 8.5 7.0 - 12.0 fL     Neutrophils % 75.2 43.0 - 80.0 %     Immature Granulocytes % 0.9 0.0 - 5.0 %     Lymphocytes % 19.9 (L) 20.0 - 42.0 %     Monocytes % 3.5 2.0 - 12.0 %     Eosinophils % 0.2 0.0 - 6.0 %     Basophils % 0.3 0.0 - 2.0 %     Neutrophils Absolute 4.79 1.80 - 7.30 E9/L     Immature Granulocytes # 0.06 E9/L     Lymphocytes Absolute 1.27 (L) 1.50 - 4.00 E9/L     Monocytes Absolute 0.22 0.10 - 0.95 E9/L     Eosinophils Absolute 0.01 (L) 0.05 - 0.50 E9/L     Basophils Absolute 0.02 0.00 - 0.20 E9/L   Comprehensive Metabolic Panel w/ Reflex to MG   Result Value Ref Range     Sodium 136 132 - 146 mmol/L     Potassium reflex Magnesium 4.4 3.5 - 5.0 mmol/L     Chloride 96 (L) 98 - 107 mmol/L     CO2 27 22 - 29 mmol/L     Anion Gap 13 7 - 16 mmol/L     Glucose 140 (H) 74 - 99 mg/dL     BUN 11 6 - 20 mg/dL     CREATININE 0.7 0.5 - 1.0 mg/dL     GFR Non-African American >60 >=60 mL/min/1.73     GFR African American >60       Calcium 9.4 8.6 - 10.2 mg/dL     Total Protein 7.7 6.4 - 8.3 g/dL     Alb 4.6 3.5 - 5.2 g/dL     Total Bilirubin <0.2 0.0 - 1.2 mg/dL     Alkaline Phosphatase 221 (H) 35 - 104 U/L     ALT 71 (H) 0 - 32 U/L     AST 62 (H) 0 - 31 U/L   Urinalysis   Result Value Ref Range     Color, UA Yellow Straw/Yellow     Clarity, UA CLOUDY (A) Clear     Glucose, Ur Negative Negative mg/dL     Bilirubin Urine Negative Negative     Ketones, Urine Negative Negative mg/dL     Specific Gravity, UA 1.020 1.005 - 1.030     Blood, Urine Negative Negative     pH, UA 7.5 5.0 - 9.0     Protein, UA TRACE Negative mg/dL     Urobilinogen, Urine 0.2 <2.0 E.U./dL     Nitrite, Urine POSITIVE (A) Negative     Leukocyte Esterase, Urine SMALL (A) Negative   APTT   Result Value Ref Range     aPTT 32.4 24.5 - 35.1 sec   Protime-INR Result Value Ref Range     Protime 11.1 9.3 - 12.4 sec     INR 1.0     Lipase   Result Value Ref Range     Lipase 25 13 - 60 U/L   Troponin   Result Value Ref Range     Troponin <0.01 0.00 - 0.03 ng/mL   Urine Drug Screen   Result Value Ref Range     Amphetamine Screen, Urine NOT DETECTED Negative <1000 ng/mL     Barbiturate Screen, Ur NOT DETECTED Negative < 200 ng/mL     Benzodiazepine Screen, Urine NOT DETECTED Negative < 200 ng/mL     Cannabinoid Scrn, Ur NOT DETECTED Negative < 50ng/mL     Cocaine Metabolite Screen, Urine NOT DETECTED Negative < 300 ng/mL     Opiate Scrn, Ur NOT DETECTED Negative < 300ng/mL     PCP Screen, Urine NOT DETECTED Negative < 25 ng/mL     Methadone Screen, Urine NOT DETECTED Negative <300 ng/mL     Propoxyphene Scrn, Ur NOT DETECTED Negative <300 ng/mL     Drug Screen Comment: see below     Serum Drug Screen   Result Value Ref Range     Ethanol Lvl <10 mg/dL     Acetaminophen Level <5.0 (L) 10.0 - 41.5 mcg/mL     Salicylate, Serum <2.4 0.0 - 30.0 mg/dL   Ammonia   Result Value Ref Range     Ammonia 48.0 11.0 - 51.0 umol/L   Microscopic Urinalysis   Result Value Ref Range     WBC, UA 5-10 0 - 5 /HPF     RBC, UA NONE 0 - 2 /HPF     Epi Cells FEW /HPF     Bacteria, UA MANY (A) /HPF   POCT glucose   Result Value Ref Range     Glucose 130 mg/dL     QC OK? ok     POCT Glucose   Result Value Ref Range     Meter Glucose 130 (H) 74 - 99 mg/dL   EKG 12 lead   Result Value Ref Range     Ventricular Rate 122 BPM     Atrial Rate 122 BPM     P-R Interval 146 ms     QRS Duration 70 ms     Q-T Interval 326 ms     QTc Calculation (Bazett) 464 ms     P Axis 56 degrees     R Axis 70 degrees     T Axis 8 degrees         RADIOLOGY:  CT Head WO Contrast   Final Result   1. There is no acute intracranial abnormality.    2. Stable arachnoid cyst within the left middle cranial fossa with   mild mass effect upon the anterior aspect of the left temporal lobe.       CT Cervical Spine WO Contrast   Final Result 1.No fracture or malalignment.       XR CHEST 1 VW    (Results Pending)   XR KNEE RIGHT (3 VIEWS)    (Results Pending)   CTA CHEST W CONTRAST    (Results Pending)      CTA chest (ONRAD read): No evidence of acute pulmonary embolism. There is slightly limited evaluation of peripheral pulmonary arterial branches. No active pulmonary infiltrates. Cardiomegaly. 1 cm thick pericardial effusion.     This X-Ray is independently viewed and interpreted by me:              - Study: Chest X-Ray              - Number of Views: 1  - Findings: No acute cardiopulmonary disease.     This X-Ray is independently viewed and interpreted by me:              - Study: Right knee              - Number of Views: 3              - Findings: no fracture seen        EKG: This EKG is signed and interpreted by me. Rate: 122  Rhythm: Sinus  Interpretation: non-specific EKG and sinus tachycardia  Comparison: no previous EKG available         Problem list:    Patient Active Problem List   Diagnosis    Nausea & vomiting    Gastroparesis    Gastroparalysis    Precordial pain    Chronic abdominal pain    Chronic low back pain    Mood disorder (HCC)    History of pericarditis    Shortness of breath    Sinus tachycardia    Non-intractable cyclical vomiting with nausea    Functional diarrhea    Weight loss    Pain of upper abdomen    Colitis    Vasovagal syncope    Medically noncompliant    Suicide ideation    Seizure disorder ([address]    Essential hypertension    Pseudoseizure    Intentional drug overdose ([facility] [address]    History of seizure disorder    Suicide and self-inflicted injuries by jumping from high place [facility])    Malingering    Chest pain         ASSESSMENT:      1.  Seizure disorder    2. Pericardial effusion    3. Essential hypertension    4. Chronic back pain    PLAN:     1. Admit for neurologic evaluation    2.  Echocardiogram to assess effusion    3. Consult cardiology regarding chest pain    4.   Continue Marixa Cruz D.O., FACOI  2:26 PM  10/25/2019

## 2019-10-25 NOTE — SIGNIFICANT EVENT
Rapid Response Team Note  Date of event: 10/25/2019   Time of event: 15:40  Phani Ravi 52y.o. year old female   YOB: 1970   Admit date:  10/25/2019   Location: 12/18-A   Witnessed? : [x]Yes  [] No  Monitored? : [x]Yes  [] No  Code status: [x] Full  [] DNR-CCA  []DNR-CC  ______________________________________________________________________  Reason for RRT:    [] RR < 8     [] RR > 28   [] SpO2 <90%   [] HR < 40 bpm   [] HR > 130 bpm  [] SBP < 90 mmHg    [] LOC    [] Seizures   [] Significant Bleeding Event    [x] Other: altered mental status with known seizures    Subjective:   RRT called for altered mental status. Briefly patient is 52years old female with history of hypertension epilepsy was transferred from [address] [place] [address] last night for pericardial effusion evaluation. Moreover, patient also had seizure episode multiple times in [address] [place] [address]. She is on Keppra, and Ativan as needed for seizures. Before RRT was called, patient had episode of staring into space, not responsive to verbal stimuli and not communative. On arrival, patient was not responding to verbal stimuli, but randomly move all of her extremities. Her heart rate and blood pressure were high. Patient had a family member in the room, he reported patient was talking appropriately before all this happened. Before RRT arrived, patient already received 2 mg of Ativan through IV. About several minutes later, patient started to respond, follow commands, lift up all 4 extremities without difficulty. Patient still appeared confused, but she is able to tell her name.        Objective:   Vital signs: BP: 187/106   /RR: 20   /HR: 144    /Temp: 98   Constitutional: Not responsive to verbal stimuli initially  Respiratory: Breathes comfortably, able to protect airway, oxygen saturation about 95%, clear breath sound bilaterally  Cardiac: Tachycardia with heart rate 110 regular, no murmurs  Abdomen: Soft no tenderness  Extremities:

## 2019-10-25 NOTE — PROGRESS NOTES
Attempted to reach Dr Soumya Nichole x3 since [phone]. No response via perfect serve. Message sent to Dr Brenda Villarreal re the above. Advised to call Dr Cristina Andrews re the above. Spoke with Dr Cristina Andrews re the above he will contact Dr Zulma Muñoz at this time to notify of the above. Per Dr Cristina Muñoz states he wrote admission orders and that he signed out to Dr Luz Maria Munoz on his patients. Dr Cristina Andrews notified that there are no admission orders or signed and held orders present at this time. Advised to contact Dr Luz Maria Munoz re the above. Dr Luz Maria Munoz paged re the above.

## 2019-10-25 NOTE — CONSULTS
Kelsey John is a 52 y.o. female       No chief complaint on file. Seizure disorder  HPI:    Patient is a 52year old female with a history of seizure disorder, HTN, HLD that was admitted to the hospital for evaluation of breakthrough seizures. Patient states that she has a history of seizures. She states her seizures have been described as a a starring off spell with lip smacking and shuffling walking. Unsure exactly how long these episodes last for. She also has \"grand mal\" seizures but patient is unable to describe what happens with these episodes. Yesterday the patient reports having 5 seizures. Her son called EMS and she was brought into the ED. Patient reports being out of her keppra medication for at least a week and has been taking Lamictal from a previous prescription during that time. In the ED she was confused consistent with post ictal state. CT head showed a stable subarachnoid cyst with a small amount of mass affect in the temporal lobe. Blood work showed an elevated lactate of 3.4. CTA chest showed a small pericardial effusion. Patient was transferred to [facility] for further neurologic evaluation. At the end of physical with the patient, she went off into a starring spell and was not responding to commands, sitting on the edge of the bed. She did not initially have any abnormal movements. Nursing assistant was called in to help patient lay back in bed. She began having small twitching movements of the hands and was attempting to get out of bed. RRT was called due to suspected seizure. Recommended 2mg of ativan which was given. Internal medicine resident arrived and took over care of the patient at that time. Episode was consistent with patient's previously described seizures. Patient began to calm down after ativan was given but was in a confused stated and not following commands.  She was protecting her airway throughout episode and slowly returned to baseline. Prior to Visit Medications    Medication Sig Taking? Authorizing Provider   levETIRAcetam (KEPPRA) 500 MG tablet Take 500 mg by mouth 2 times daily Yes Historical Provider, MD   melatonin 3 MG TABS tablet Take 3 mg by mouth nightly Yes Historical Provider, MD   losartan (COZAAR) 100 MG tablet Take 100 mg by mouth daily Yes Historical Provider, MD   buprenorphine-naloxone (SUBOXONE) 8-2 MG SUBL SL tablet Place 1 tablet under the tongue 2 times daily.   Yes Historical Provider, MD   omeprazole (PRILOSEC) 20 MG delayed release capsule Take 20 mg by mouth 2 times daily  Yes Historical Provider, MD   pravastatin (PRAVACHOL) 20 MG tablet Take 20 mg by mouth daily Yes Historical Provider, MD   lamoTRIgine (LAMICTAL) 25 MG tablet Take 1 tablet by mouth daily  TIIT Fenton - CNP     Social History     Tobacco Use    Smoking status: Never Smoker    Smokeless tobacco: Never Used   Substance Use Topics    Alcohol use: No     Comment: three times yearly    Drug use: No     Family History   Problem Relation Age of Onset    High Blood Pressure Mother     Other Father         BPH    Depression Paternal Grandmother      Past Surgical History:   Procedure Laterality Date    ABDOMEN SURGERY      Patient has had 5 surgeries   [address]      had 7 - 5 - 2012    CHOLECYSTECTOMY, LAPAROSCOPIC      ECHO COMPL W DOP COLOR FLOW  7/1/2012         ENDOMETRIAL ABLATION      PYLOROPLASTY  3/30/2012    lap plylorplasty open upper endoscopy lysis of adhesions    SALPINGO-OOPHORECTOMY Left     left    UPPER GASTROINTESTINAL ENDOSCOPY  3/15/13    balloon opened pyloric sphincter     Past Medical History:   Diagnosis Date    Back pain     Depression 11/30/2016    Essential hypertension 8/7/2019    Gastroparesis     Hepatitis     History of cardiovascular stress test 7/1/2012    EXERCISE NUCLEAR    Hyperlipidemia     Medically noncompliant 2/15/2019    Pancreatitis, acute     Pyloric stenosis     Seizures ([facility] [address]     Vasovagal syncope 2/15/2019     Review of Systems   Constitutional: Positive for fatigue. Negative for activity change and appetite change. Eyes: Negative for photophobia and visual disturbance. Respiratory: Negative for chest tightness, shortness of breath and wheezing. Cardiovascular: Positive for chest pain. Negative for leg swelling. Gastrointestinal: Negative for nausea and vomiting. Genitourinary: Negative for decreased urine volume, difficulty urinating, dysuria and urgency. Musculoskeletal: Negative for myalgias, neck pain and neck stiffness. Skin: Negative for rash and wound. Neurological: Positive for seizures and syncope. Negative for dizziness, weakness, light-headedness, numbness and headaches. Objective:   /74   Pulse 100   Temp 97.4 °F (36.3 °C) (Temporal)   Resp 18   SpO2 95%     Physical Exam   Constitutional: She appears well-developed and well-nourished. HENT:   Head: Normocephalic and atraumatic. Mouth/Throat: Oropharynx is clear and moist.   Eyes: Pupils are equal, round, and reactive to light. EOM are normal.   Neck: Normal range of motion. Neck supple. Cardiovascular: Regular rhythm. Tachycardia present. Pulmonary/Chest: Effort normal and breath sounds normal. Tachypnea noted. Musculoskeletal: She exhibits no edema or deformity. Neurological: She is alert. Skin: Skin is warm and dry. Nursing note and vitals reviewed. Neurological Exam  Mental Status  Awake and alert. Oriented to person, place, time and situation. no dysarthria present. Language is fluent with no aphasia. Cranial Nerves  CN III, IV, VI: Extraocular movements intact bilaterally. Pupils equal round and reactive to light bilaterally. CN 2-12 intact. Motor  Normal muscle bulk throughout. No abnormal involuntary movements. 5/5 strength in all extremities. AAOx3, follows commands, no aphasia/dysarthria.   Normal fund of knowledge  PERRL, EOMI, VFF, normal saccades and pursuit, no gaze preference/nystagmus. Normal fundi  Intact facial sensation, no asymmetry. Intact hearing bilaterally. Tongue midline. Symmetric palate elevation. Neck muscles and shoulder shrug 5/5. Sensation: intact all over to LT and proprioception, no neglect. Motor: Normal bulk and tone, No drift/orbiting. 5/5 in all extremities. No adventitious movements  Coordination: intact F-N and H-S B/L. No dysmetria.  Intact DIANA.      Laboratory/Radiology:     CBC with Differential:    Lab Results   Component Value Date    WBC 6.4 10/24/2019    RBC 4.36 10/24/2019    HGB 11.8 10/24/2019    HCT 37.7 10/24/2019     10/24/2019    MCV 86.5 10/24/2019    MCH 27.1 10/24/2019    MCHC 31.3 10/24/2019    RDW 14.4 10/24/2019    SEGSPCT 70 11/30/2013    LYMPHOPCT 19.9 10/24/2019    MONOPCT 3.5 10/24/2019    BASOPCT 0.3 10/24/2019    MONOSABS 0.22 10/24/2019    LYMPHSABS 1.27 10/24/2019    EOSABS 0.01 10/24/2019    BASOSABS 0.02 10/24/2019     CMP:    Lab Results   Component Value Date     10/24/2019    K 4.4 10/24/2019    CL 96 10/24/2019    CO2 27 10/24/2019    BUN 11 10/24/2019    CREATININE 0.7 10/24/2019    GFRAA >60 10/24/2019    LABGLOM >60 10/24/2019    GLUCOSE 130 10/24/2019    GLUCOSE 115 03/31/2012    PROT 7.7 10/24/2019    LABALBU 4.6 10/24/2019    LABALBU 4.3 03/31/2012    CALCIUM 9.4 10/24/2019    BILITOT <0.2 10/24/2019    ALKPHOS 221 10/24/2019    AST 62 10/24/2019    ALT 71 10/24/2019     U/A:    Lab Results   Component Value Date    COLORU Yellow 10/24/2019    PROTEINU TRACE 10/24/2019    PHUR 7.5 10/24/2019    WBCUA 5-10 10/24/2019    WBCUA NONE 12/01/2011    RBCUA NONE 10/24/2019    RBCUA NONE 11/30/2013    BACTERIA MANY 10/24/2019    CLARITYU CLOUDY 10/24/2019    SPECGRAV 1.020 10/24/2019    LEUKOCYTESUR SMALL 10/24/2019    UROBILINOGEN 0.2 10/24/2019    BILIRUBINUR Negative 10/24/2019    BILIRUBINUR NEGATIVE 12/01/2011    BLOODU Negative 10/24/2019    GLUCOSEU Negative 10/24/2019    GLUCOSEU NEGATIVE 12/01/2011    AMORPHOUS FEW 07/25/2013       CT head:   1. There is no acute intracranial abnormality. 2. Stable arachnoid cyst within the left middle cranial fossa with   mild mass effect upon the anterior aspect of the left temporal lobe. I independently reviewed the labs and imaging studies at today's appointment. Assessment:         Patient Active Problem List   Diagnosis    Nausea & vomiting    Gastroparesis    Gastroparalysis    Precordial pain    Chronic abdominal pain    Chronic low back pain    Mood disorder (HCC)    History of pericarditis    Shortness of breath    Sinus tachycardia    Non-intractable cyclical vomiting with nausea    Functional diarrhea    Weight loss    Pain of upper abdomen    Colitis    Vasovagal syncope    Medically noncompliant    Suicide ideation    Seizure disorder ([address]    Essential hypertension    Pseudoseizure    Intentional drug overdose ([address]    History of seizure disorder    Suicide and self-inflicted injuries by jumping from high place [facility])    Malingering    Chest pain       Plan:     Breakthrough seizure likely secondary to medication noncompliance.    - Increase keppra to 1000mg BID     Valla Puls  2:56 PM  10/25/2019

## 2019-10-26 ENCOUNTER — APPOINTMENT (OUTPATIENT)
Dept: NUCLEAR MEDICINE | Age: 49
DRG: 053 | End: 2019-10-26
Attending: INTERNAL MEDICINE
Payer: MEDICAID

## 2019-10-26 ENCOUNTER — ANESTHESIA (OUTPATIENT)
Dept: INPATIENT UNIT | Age: 49
DRG: 053 | End: 2019-10-26
Payer: MEDICAID

## 2019-10-26 ENCOUNTER — APPOINTMENT (OUTPATIENT)
Dept: GENERAL RADIOLOGY | Age: 49
DRG: 053 | End: 2019-10-26
Attending: INTERNAL MEDICINE
Payer: MEDICAID

## 2019-10-26 ENCOUNTER — ANESTHESIA EVENT (OUTPATIENT)
Dept: INPATIENT UNIT | Age: 49
DRG: 053 | End: 2019-10-26
Payer: MEDICAID

## 2019-10-26 ENCOUNTER — APPOINTMENT (OUTPATIENT)
Dept: CT IMAGING | Age: 49
DRG: 053 | End: 2019-10-26
Attending: INTERNAL MEDICINE
Payer: MEDICAID

## 2019-10-26 LAB
LAMOTRIGINE LEVEL: 2.1 UG/ML (ref 2.5–15)
LV EF: 69 %
LVEF MODALITY: NORMAL
METER GLUCOSE: 112 MG/DL (ref 74–99)

## 2019-10-26 PROCEDURE — 0BH17EZ INSERTION OF ENDOTRACHEAL AIRWAY INTO TRACHEA, VIA NATURAL OR ARTIFICIAL OPENING: ICD-10-PCS | Performed by: ANESTHESIOLOGY

## 2019-10-26 PROCEDURE — 31500 INSERT EMERGENCY AIRWAY: CPT | Performed by: ANESTHESIOLOGY

## 2019-10-26 PROCEDURE — 2000000000 HC ICU R&B

## 2019-10-26 PROCEDURE — 2580000003 HC RX 258: Performed by: INTERNAL MEDICINE

## 2019-10-26 PROCEDURE — 6360000002 HC RX W HCPCS

## 2019-10-26 PROCEDURE — 6360000002 HC RX W HCPCS: Performed by: NURSE PRACTITIONER

## 2019-10-26 PROCEDURE — 71045 X-RAY EXAM CHEST 1 VIEW: CPT

## 2019-10-26 PROCEDURE — 6360000002 HC RX W HCPCS: Performed by: INTERNAL MEDICINE

## 2019-10-26 PROCEDURE — 5A1945Z RESPIRATORY VENTILATION, 24-96 CONSECUTIVE HOURS: ICD-10-PCS | Performed by: ANESTHESIOLOGY

## 2019-10-26 PROCEDURE — 99232 SBSQ HOSP IP/OBS MODERATE 35: CPT | Performed by: PHYSICIAN ASSISTANT

## 2019-10-26 PROCEDURE — 6370000000 HC RX 637 (ALT 250 FOR IP): Performed by: STUDENT IN AN ORGANIZED HEALTH CARE EDUCATION/TRAINING PROGRAM

## 2019-10-26 PROCEDURE — 93017 CV STRESS TEST TRACING ONLY: CPT

## 2019-10-26 PROCEDURE — 31500 INSERT EMERGENCY AIRWAY: CPT

## 2019-10-26 PROCEDURE — 3430000000 HC RX DIAGNOSTIC RADIOPHARMACEUTICAL: Performed by: RADIOLOGY

## 2019-10-26 PROCEDURE — A9500 TC99M SESTAMIBI: HCPCS | Performed by: RADIOLOGY

## 2019-10-26 PROCEDURE — 6370000000 HC RX 637 (ALT 250 FOR IP): Performed by: INTERNAL MEDICINE

## 2019-10-26 PROCEDURE — 70450 CT HEAD/BRAIN W/O DYE: CPT

## 2019-10-26 PROCEDURE — 72125 CT NECK SPINE W/O DYE: CPT

## 2019-10-26 PROCEDURE — 94002 VENT MGMT INPAT INIT DAY: CPT

## 2019-10-26 PROCEDURE — 82962 GLUCOSE BLOOD TEST: CPT

## 2019-10-26 PROCEDURE — 78452 HT MUSCLE IMAGE SPECT MULT: CPT

## 2019-10-26 PROCEDURE — 74018 RADEX ABDOMEN 1 VIEW: CPT

## 2019-10-26 RX ORDER — SUCCINYLCHOLINE CHLORIDE 20 MG/ML
INJECTION INTRAMUSCULAR; INTRAVENOUS
Status: COMPLETED
Start: 2019-10-26 | End: 2019-10-26

## 2019-10-26 RX ORDER — PROPOFOL 10 MG/ML
10 INJECTION, EMULSION INTRAVENOUS
Status: DISCONTINUED | OUTPATIENT
Start: 2019-10-27 | End: 2019-10-28

## 2019-10-26 RX ORDER — LEVETIRACETAM 15 MG/ML
1500 INJECTION INTRAVASCULAR EVERY 12 HOURS
Status: DISCONTINUED | OUTPATIENT
Start: 2019-10-27 | End: 2019-10-29 | Stop reason: ALTCHOICE

## 2019-10-26 RX ORDER — LEVETIRACETAM 15 MG/ML
1500 INJECTION INTRAVASCULAR ONCE
Status: COMPLETED | OUTPATIENT
Start: 2019-10-26 | End: 2019-10-27

## 2019-10-26 RX ORDER — LORAZEPAM 2 MG/ML
INJECTION INTRAMUSCULAR
Status: COMPLETED
Start: 2019-10-26 | End: 2019-10-27

## 2019-10-26 RX ORDER — BACITRACIN, NEOMYCIN, POLYMYXIN B 400; 3.5; 5 [USP'U]/G; MG/G; [USP'U]/G
OINTMENT TOPICAL 2 TIMES DAILY PRN
Status: DISCONTINUED | OUTPATIENT
Start: 2019-10-26 | End: 2019-11-05 | Stop reason: HOSPADM

## 2019-10-26 RX ORDER — CIPROFLOXACIN 250 MG/1
250 TABLET, FILM COATED ORAL EVERY 12 HOURS SCHEDULED
Status: DISCONTINUED | OUTPATIENT
Start: 2019-10-26 | End: 2019-10-27

## 2019-10-26 RX ORDER — LORAZEPAM 2 MG/ML
2 INJECTION INTRAMUSCULAR 2 TIMES DAILY
Status: DISCONTINUED | OUTPATIENT
Start: 2019-10-26 | End: 2019-10-27

## 2019-10-26 RX ORDER — FENTANYL CITRATE 50 UG/ML
INJECTION, SOLUTION INTRAMUSCULAR; INTRAVENOUS
Status: DISPENSED
Start: 2019-10-26 | End: 2019-10-27

## 2019-10-26 RX ORDER — LEVETIRACETAM 500 MG/1
1500 TABLET ORAL 2 TIMES DAILY
Status: DISCONTINUED | OUTPATIENT
Start: 2019-10-26 | End: 2019-10-26

## 2019-10-26 RX ORDER — FENTANYL CITRATE 50 UG/ML
50 INJECTION, SOLUTION INTRAMUSCULAR; INTRAVENOUS
Status: DISCONTINUED | OUTPATIENT
Start: 2019-10-26 | End: 2019-10-29

## 2019-10-26 RX ORDER — PROPOFOL 10 MG/ML
INJECTION, EMULSION INTRAVENOUS PRN
Status: DISCONTINUED | OUTPATIENT
Start: 2019-10-26 | End: 2019-10-29

## 2019-10-26 RX ORDER — PROPOFOL 10 MG/ML
10 INJECTION, EMULSION INTRAVENOUS
Status: DISCONTINUED | OUTPATIENT
Start: 2019-10-26 | End: 2019-10-26 | Stop reason: CLARIF

## 2019-10-26 RX ORDER — PROPOFOL 10 MG/ML
INJECTION, EMULSION INTRAVENOUS
Status: COMPLETED
Start: 2019-10-26 | End: 2019-10-26

## 2019-10-26 RX ORDER — SUCCINYLCHOLINE CHLORIDE 20 MG/ML
INJECTION INTRAMUSCULAR; INTRAVENOUS PRN
Status: DISCONTINUED | OUTPATIENT
Start: 2019-10-26 | End: 2019-10-31 | Stop reason: HOSPADM

## 2019-10-26 RX ADMIN — BUPRENORPHINE HYDROCHLORIDE AND NALOXONE HYDROCHLORIDE DIHYDRATE 1 TABLET: 8; 2 TABLET SUBLINGUAL at 08:07

## 2019-10-26 RX ADMIN — MIDAZOLAM 1 MG/HR: 5 INJECTION INTRAMUSCULAR; INTRAVENOUS at 22:11

## 2019-10-26 RX ADMIN — Medication 10 ML: at 08:08

## 2019-10-26 RX ADMIN — PANTOPRAZOLE SODIUM 40 MG: 40 TABLET, DELAYED RELEASE ORAL at 06:55

## 2019-10-26 RX ADMIN — REGADENOSON 0.4 MG: 0.08 INJECTION, SOLUTION INTRAVENOUS at 10:13

## 2019-10-26 RX ADMIN — SUCCINYLCHOLINE CHLORIDE 140 MG: 20 INJECTION, SOLUTION INTRAMUSCULAR; INTRAVENOUS at 21:35

## 2019-10-26 RX ADMIN — LEVETIRACETAM 1000 MG: 500 TABLET ORAL at 08:02

## 2019-10-26 RX ADMIN — PROPOFOL 10 MCG/KG/MIN: 10 INJECTION, EMULSION INTRAVENOUS at 21:46

## 2019-10-26 RX ADMIN — LOSARTAN POTASSIUM 100 MG: 50 TABLET, FILM COATED ORAL at 08:03

## 2019-10-26 RX ADMIN — PROPOFOL 200 MG: 10 INJECTION, EMULSION INTRAVENOUS at 21:35

## 2019-10-26 RX ADMIN — PROPOFOL 20 MCG/KG/MIN: 10 INJECTION, EMULSION INTRAVENOUS at 23:45

## 2019-10-26 RX ADMIN — CIPROFLOXACIN HYDROCHLORIDE 250 MG: 250 TABLET, FILM COATED ORAL at 11:56

## 2019-10-26 RX ADMIN — Medication 10.6 MILLICURIE: at 10:19

## 2019-10-26 RX ADMIN — Medication 35 MILLICURIE: at 10:19

## 2019-10-26 RX ADMIN — ENOXAPARIN SODIUM 40 MG: 100 INJECTION SUBCUTANEOUS at 08:03

## 2019-10-26 RX ADMIN — NEOMYCIN AND POLYMYXIN B SULFATES AND BACITRACIN ZINC: 400; 3.5; 5 OINTMENT TOPICAL at 11:55

## 2019-10-26 ASSESSMENT — PULMONARY FUNCTION TESTS
PIF_VALUE: 0
PIF_VALUE: 35

## 2019-10-26 ASSESSMENT — PAIN SCALES - GENERAL
PAINLEVEL_OUTOF10: 0
PAINLEVEL_OUTOF10: 3
PAINLEVEL_OUTOF10: 0
PAINLEVEL_OUTOF10: 0

## 2019-10-26 NOTE — PROGRESS NOTES
Patient is seen in follow-up for pericardial effusion    Subjective:     Ms. Marion denies chest pain, no shortness of breath  Laying in bed no apparent distress    ROS:  CONSTITUTIONAL:  negative for  fevers, chills  HEENT:  negative for earaches, nasal congestion and epistaxis  RESPIRATORY:  negative for  dry cough, cough with sputum,wheezing and hemoptysis  GASTROINTESTINAL:  negative for nausea, vomiting  MUSCULOSKELETAL:  negative for  myalgias, arthralgias  NEUROLOGICAL:  negative for visual disturbance, dysphagia    Medication side effects: none    Scheduled Meds:   ciprofloxacin  250 mg Oral 2 times per day    levETIRAcetam  1,500 mg Oral BID    influenza virus vaccine  0.5 mL Intramuscular Prior to discharge    buprenorphine-naloxone  1 tablet Sublingual BID    losartan  100 mg Oral Daily    melatonin  3 mg Oral Nightly    pantoprazole  40 mg Oral QAM AC    pravastatin  20 mg Oral Daily    sodium chloride flush  10 mL Intravenous 2 times per day    enoxaparin  40 mg Subcutaneous Daily     Continuous Infusions:  PRN Meds:neomycin-bacitracin-polymyxin, sodium chloride flush, magnesium hydroxide, ondansetron, perflutren lipid microspheres      Objective:      Physical Exam:   /77   Pulse 112   Temp 96.9 °F (36.1 °C) (Temporal)   Resp 18   Wt 194 lb 3.2 oz (88.1 kg)   SpO2 95%   BMI 34.40 kg/m²   CONSTITUTIONAL:  awake, alert, cooperative, no apparent distress, and appears stated age  HEAD:  normocepalic, without obvious abnormality, atraumatic  NECK:  Supple, symmetrical, trachea midline, no adenopathy, thyroid symmetric, not enlarged and no tenderness, skin normal  LUNGS:  No increased work of breathing, good air exchange, clear to auscultation bilaterally, no crackles or wheezing  CARDIOVASCULAR:  Normal apical impulse, regular rate and rhythm, normal S1 and S2, no S3 or S4, and no murmur noted, no edema, no JVD, no carotid bruit.   ABDOMEN:  Soft, nontender, no masses, no hepatomegaly, no splenomegaly, BS+  MUSCULOSKELETAL:  No clubbing no cyanosis. there is no redness, warmth, or swelling of the joints  full range of motion noted  NEUROLOGIC:  Alert, awake,oriented x3  SKIN:  no bruising or bleeding, normal skin color, texture, turgor and no redness, warmth, or swelling      Cardiographics  I personally reviewed the telemetry monitor strips with the following interpretation: Sinus rhythm alternating with sinus tachycardia    Echocardiogram:     Imaging  NM Cardiac Stress Test Nuclear Imaging   Final Result   1. There is a small reversible defect in the inferior wall   2. Ejection fraction is 69 %. 3. No significant wall motion abnormality      ALERT:  THIS IS AN ABNORMAL REPORT             Lab Review   Lab Results   Component Value Date     10/25/2019    K 3.9 10/25/2019    K 4.4 10/24/2019     10/25/2019    CO2 30 10/25/2019    BUN 10 10/25/2019    CREATININE 0.7 10/25/2019    GLUCOSE 115 10/25/2019    GLUCOSE 115 03/31/2012    CALCIUM 9.3 10/25/2019     Lab Results   Component Value Date    WBC 6.2 10/25/2019    HGB 10.7 10/25/2019    HCT 35.3 10/25/2019    MCV 88.5 10/25/2019     10/25/2019     I have personally reviewed the laboratory, cardiac diagnostic and radiographic testing as outlined above:    Assessment:     1. Pericardial effusion: on CT chest, echocardiogram is pending  2. Chest Pain: Atypical. ST and T wave changes, for Lexiscan stress test  3. Sinus tachycardia: Etiology?,  Will check TSH, will follow closely. 4. Mild mitral valve regurgitation  5. Tricuspid valve regurgitation  6. Hypertension  7. Hyperlipidemia  8. Seizures: will follow neurology. 9. Depression  10. Noncompliance       Recommendations:     1. Continue current treatment  2.  will review echocardiogram when done  3. Lexiscan stress test  4.   Further cardiac recommendations will be forthcoming pending her clinical course and diagnostic test findings    Discussed with patient  Electronically signed by Keyana Reyez MD on 10/26/2019 at 1:02 PM  NOTE: This report was transcribed using voice recognition software.  Every effort was made to ensure accuracy; however, inadvertent computerized transcription errors may be present

## 2019-10-26 NOTE — PROGRESS NOTES
Pt requesting for her UTI to be addressed. Asking for an antibiotic and for Neosporin for her abrasions. Called Dr. Eduni Post. He said its okay to order pt neosporin, he will order an antibiotic.

## 2019-10-26 NOTE — PROGRESS NOTES
NPO after midnight for stress test in the AM.  Patient is in bed. Padded side rails intact for seizure precautions. Will continue to monitor.

## 2019-10-26 NOTE — PROCEDURES
Procedure: [employer] stress test     Ordering physician: Heidi Aguilar   Referring physician:Odell Tan MD     Indication Chest Pain   Pretest evaluation no chest pain, no shortness of breath  Resting EKG showed: sinus rhythm     Protocol: Patient was given 0.4mg of Lexiscan followed by Cardiolite injection    Heart rate response:   Resting heart rate: 76 BPM   Stress heart rate: 116 BPM     Blood pressure response:   Resting blood pressure:122/78 mmHg   Stress blood pressure: 136/74 mmHg     Symptoms and signs: none     EKG changes:  Resting EKG nonischemic   Stress EKG : nonischemic     Impression:   Clinical: nonischemic   EKG: nonischemic     Cardiolite injected and nuclear images are pending

## 2019-10-26 NOTE — PROGRESS NOTES
myocardial injury       >= 0.10 ng/mL     Myocardial injury       Comprehensive Metabolic Panel [[id number]] (Abnormal)    Collected: 10/25/19 1701    Updated: 10/25/19 1755    Specimen Type: Blood     Sodium 142 mmol/L    Potassium 3.9 mmol/L    Chloride 103 mmol/L    CO2 30High  mmol/L    Anion Gap 9 mmol/L    Glucose 115High  mg/dL    BUN 10 mg/dL    CREATININE 0.7 mg/dL    GFR Non-African American >60 mL/min/1.73    Comment: Chronic Kidney Disease: less than 60 ml/min/1.73 sq. m.         Kidney Failure: less than 15 ml/min/1.73 sq.m. Results valid for patients 18 years and older.         GFR African American >60    Calcium 9.3 mg/dL    Total Protein 6.9 g/dL    Alb 4.2 g/dL    Total Bilirubin <0.2 mg/dL    Alkaline Phosphatase 186High  U/L    ALT 54High  U/L    AST 37High  U/L   Lactic Acid, Plasma [[id number]]    Collected: 10/25/19 1701    Updated: 10/25/19 1752    Specimen Type: Blood     Lactic Acid 1.9 mmol/L   CBC Auto Differential [[id number]] (Abnormal)    Collected: 10/25/19 1701    Updated: 10/25/19 1743    Specimen Source: Blood     WBC 6.2 E9/L    RBC 3.99 E12/L    Hemoglobin 10.7Low  g/dL    Hematocrit 35.3 %    MCV 88.5 fL    MCH 26.8 pg    MCHC 30.3Low  %    RDW 14.6 fL    Platelets 962 D1/H    MPV 8.9 fL    Neutrophils % 56.2 %    Immature Granulocytes % 0.3 %    Lymphocytes % 37.9 %    Monocytes % 4.7 %    Eosinophils % 0.6 %    Basophils % 0.3 %    Neutrophils Absolute 3.47 E9/L    Immature Granulocytes # 0.02 E9/L    Lymphocytes Absolute 2.34 E9/L    Monocytes Absolute 0.29 E9/L    Eosinophils Absolute 0.04Low  E9/L    Basophils Absolute 0.02 E9/L   Culture blood #1 [[id number]]    Collected: 10/25/19 1701    Updated: 10/25/19 1731    Specimen Type: Blood    Culture blood #2 [[id number]]    Collected: 10/25/19 1708    Updated: 10/25/19 1730    Specimen Source: Blood    LAMOTRIGINE LEVEL [[id number]]    Collected: 10/25/19 1701    Updated: 10/25/19 1720    Specimen Source: Blood    Narrative:   (FLUZONE;FLUARIX;FLULAVAL;AFLURIA) injection 0.5 mL  0.5 mL Intramuscular Prior to discharge Tarah Lanier MD        buprenorphine-naloxone (SUBOXONE) 8-2 MG SL tablet 1 tablet  1 tablet Sublingual BID Nubia Corral, DO   1 tablet at 10/26/19 6446    losartan (COZAAR) tablet 100 mg  100 mg Oral Daily Nubia Rack, DO   100 mg at 10/26/19 0803    melatonin tablet 3 mg  3 mg Oral Nightly Nubia Rack, DO   3 mg at 10/25/19 2229    pantoprazole (PROTONIX) tablet 40 mg  40 mg Oral QAM AC Nubia Corral, DO   40 mg at 10/26/19 0788    pravastatin (PRAVACHOL) tablet 20 mg  20 mg Oral Daily Nubia Rack, DO   20 mg at 10/25/19 2228    sodium chloride flush 0.9 % injection 10 mL  10 mL Intravenous 2 times per day Nubia Corral, DO   10 mL at 10/26/19 3647    sodium chloride flush 0.9 % injection 10 mL  10 mL Intravenous PRN Nubia Corral, DO        magnesium hydroxide (MILK OF MAGNESIA) 400 MG/5ML suspension 30 mL  30 mL Oral Daily PRN Nubia Corral, DO        [facility]dans[facility]ron [facility] injection 4 mg  4 mg Intravenous Q6H PRN Nubia Corral, DO        enoxaparin (LOVENOX) injection 40 mg  40 mg Subcutaneous Daily Nubia Corral, DO   40 mg at 10/26/19 6461    perflutren lipid microspheres (DEFINITY) injection 1.65 mg  1.5 mL Intravenous ONCE PRN Nubia Corral, DO               Problem list:    Active Problems:    Chest pain    Intractable epilepsy without status epilepticus ([facility] [address]  Resolved Problems:    * No resolved hospital problems. *      Assessment:       1.  Seizure disorder     2. Pericardial effusion     3.  Essential hypertension     4. Chronic back pain    Plan:    1. Await further plans per cardiology    2. Continue Keppra 1500 mg twice daily    3.   Await further plans per neurology      ABEBE Colvin D.O.  4:00 PM  10/26/2019

## 2019-10-26 NOTE — PROGRESS NOTES
Tonya Wilkinson is a 52 y.o.  female     Neurology is following for seizure     PMH significant for HTN, HLD and seizures     Patient reports hx of seizure since 2012. Initially she was told that it was due to a reaction to Reglan. Patient reports MRI done at [facility] which revealed an arachnoid cyst.     She presented with suspected BT seizure. She is noted to have several staring spells. She had run out of her Keppra a few days prior and was taking a left over Lamictal rx that she had. The patient saw Dr. Lizet Cervantes in 2/2019. At that visit, there was vague history surrounding her seizures. Per chart review, the patient stated that she was driving and that \"if the doctor was going to take away her license, she would leave\". She was informed about the cyst on her scan and that records from [facility] would need to be obtained. It was also noted that the cyst should be evaluated by a neurosurgeon. The patient said that she was \"at the wrong office\" and \"sorry that she wasted our time\" and left the appointment. Through chart review, the patient was seen by Dr. Guillermo Small in 8/2019 for seizure activity. EEG at that time was normal. Keppra was stopped and she was started on Lamotrigine. It was felt that these were probable nonepileptic spells. She was also admitted for inpatient psych at that time for mood disorder and probable conversion disorder. Patient also found to have UTI. Cardiology following for pericardial effusion   She had stress test completed this am     RRT yesterday due to patient not responding to commands and suspected seizure. She was given 2 mg of Ativan. Keppra was increased to 1 G BID. Per nursing, patient came back from her stress test this morning and was in the bathroom. After a couple of minutes, the patient was not responding to RN. RN opened door and found patient to be standing, not responding to verbal commands and picking at the waistband of her pants.  Keppra was again increased to [facility] injection 4 mg  4 mg Intravenous Q6H PRN Lida Romp, DO        enoxaparin (LOVENOX) injection 40 mg  40 mg Subcutaneous Daily Lida Romp, DO   40 mg at 10/26/19 7392    perflutren lipid microspheres (DEFINITY) injection 1.65 mg  1.5 mL Intravenous ONCE PRN Lida Romp, DO           Objective:       /77   Pulse 112   Temp 96.9 °F (36.1 °C) (Temporal)   Resp 18   Wt 194 lb 3.2 oz (88.1 kg)   SpO2 95%   BMI 34.40 kg/m²        General appearance: alert, appears stated age and cooperative  Head: Normocephalic, without obvious abnormality, atraumatic  Eyes: conjunctivae/corneas clear. Neck: no adenopathy, no carotid bruit, no JVD, supple, symmetrical, trachea midline and thyroid not enlarged, symmetric, no tenderness/mass/nodules  Lungs: clear to auscultation bilaterally  Heart: regular rate and rhythm, S1, S2 normal, no murmur, click, rub or gallop  Extremities: extremities normal, atraumatic, no cyanosis or edema  Pulses: 2+ and symmetric  Skin: Skin color, texture, turgor normal. No rashes or lesions       Mental Status: Alert. Oriented. Thought content appropriate.      Appropriate attention/concentration  Intact fundus of knowledge  Repetition intact  Intact memories    Speech: clear  Language: appropriate    Cranial Nerves:  I: smell    II: visual acuity     II: visual fields Full to confrontation   II: pupils JR   III,VII: ptosis None   III,IV,VI: extraocular muscles  Full ROM   V: mastication Normal   V: facial light touch sensation  Normal   V,VII: corneal reflex     VII: facial muscle function - upper  Normal   VII: facial muscle function - lower Normal   VIII: hearing Normal   IX: soft palate elevation  Normal   IX,X: gag reflex    XI: trapezius strength  5/5   XI: sternocleidomastoid strength 5/5   XI: neck extension strength  5/5   XII: tongue strength  Normal     Motor:  5/5 throughout   Normal tone   Obese bulk  No abnormal  Movements seen     Sensory:  LT normal Epi Cells Latest Units: /HPF FEW   Bacteria, UA Latest Units: /HPF MANY (A)       CTH  1. There is no acute intracranial abnormality. 2. Stable arachnoid cyst within the left middle cranial fossa with  mild mass effect upon the anterior aspect of the left temporal lobe. EEG 8/2019  Normal awake and sleep EEG. A normal EEG does  not rule out seizure disorder. Clinical correlation advised. I personally reviewed all labs and images today     Assessment:     BT seizures    Hx medication non-compliance    UTI and pleural effusion on this admission    CT with stable arachnoid cyst with mild mass effect upon the  anterior aspect of the L temporal lobe    ? Psychogenic seizures in the past per chart and hx of mood  disorder and conversion d/o     Based on the above, will obtain another EEG. Keppra was increased to 1500 mg BID today. We may need further EEG monitoring to better characterize these spells.    Plan:     EEG    Keppra 1500 mg bid    Seizure precautions     No driving       Bill Muhammad PA-C  1:44 PM  10/26/2019

## 2019-10-27 ENCOUNTER — APPOINTMENT (OUTPATIENT)
Dept: GENERAL RADIOLOGY | Age: 49
DRG: 053 | End: 2019-10-27
Attending: INTERNAL MEDICINE
Payer: MEDICAID

## 2019-10-27 LAB
AADO2: 214.2 MMHG
AADO2: 224.7 MMHG
ALBUMIN SERPL-MCNC: 4 G/DL (ref 3.5–5.2)
ALP BLD-CCNC: 195 U/L (ref 35–104)
ALT SERPL-CCNC: 44 U/L (ref 0–32)
ANION GAP SERPL CALCULATED.3IONS-SCNC: 13 MMOL/L (ref 7–16)
AST SERPL-CCNC: 28 U/L (ref 0–31)
B.E.: 0.5 MMOL/L (ref -3–3)
B.E.: 1.3 MMOL/L (ref -3–3)
BASOPHILS ABSOLUTE: 0.02 E9/L (ref 0–0.2)
BASOPHILS RELATIVE PERCENT: 0.3 % (ref 0–2)
BILIRUB SERPL-MCNC: <0.2 MG/DL (ref 0–1.2)
BUN BLDV-MCNC: 11 MG/DL (ref 6–20)
CALCIUM SERPL-MCNC: 8.9 MG/DL (ref 8.6–10.2)
CHLORIDE BLD-SCNC: 99 MMOL/L (ref 98–107)
CO2: 26 MMOL/L (ref 22–29)
COHB: 0 % (ref 0–1.5)
COHB: 0.4 % (ref 0–1.5)
CREAT SERPL-MCNC: 0.8 MG/DL (ref 0.5–1)
CRITICAL: ABNORMAL
CRITICAL: ABNORMAL
DATE ANALYZED: ABNORMAL
DATE ANALYZED: ABNORMAL
DATE OF COLLECTION: ABNORMAL
DATE OF COLLECTION: ABNORMAL
EKG ATRIAL RATE: 122 BPM
EKG P AXIS: 56 DEGREES
EKG P-R INTERVAL: 146 MS
EKG Q-T INTERVAL: 326 MS
EKG QRS DURATION: 70 MS
EKG QTC CALCULATION (BAZETT): 464 MS
EKG R AXIS: 70 DEGREES
EKG T AXIS: 8 DEGREES
EKG VENTRICULAR RATE: 122 BPM
EOSINOPHILS ABSOLUTE: 0.02 E9/L (ref 0.05–0.5)
EOSINOPHILS RELATIVE PERCENT: 0.3 % (ref 0–6)
FIO2: 60 %
FIO2: 60 %
GFR AFRICAN AMERICAN: >60
GFR NON-AFRICAN AMERICAN: >60 ML/MIN/1.73
GLUCOSE BLD-MCNC: 112 MG/DL (ref 74–99)
HCO3: 24.6 MMOL/L (ref 22–26)
HCO3: 27.1 MMOL/L (ref 22–26)
HCT VFR BLD CALC: 32.2 % (ref 34–48)
HEMOGLOBIN: 9.9 G/DL (ref 11.5–15.5)
HHB: 0.8 % (ref 0–5)
HHB: 1.4 % (ref 0–5)
IMMATURE GRANULOCYTES #: 0.02 E9/L
IMMATURE GRANULOCYTES %: 0.3 % (ref 0–5)
LAB: ABNORMAL
LAB: ABNORMAL
LACTIC ACID: 2 MMOL/L (ref 0.5–2.2)
LAMOTRIGINE LEVEL: 1.2 UG/ML (ref 2.5–15)
LV EF: 55 %
LVEF MODALITY: NORMAL
LYMPHOCYTES ABSOLUTE: 1.65 E9/L (ref 1.5–4)
LYMPHOCYTES RELATIVE PERCENT: 24.2 % (ref 20–42)
Lab: ABNORMAL
Lab: ABNORMAL
MAGNESIUM: 2 MG/DL (ref 1.6–2.6)
MCH RBC QN AUTO: 26.8 PG (ref 26–35)
MCHC RBC AUTO-ENTMCNC: 30.7 % (ref 32–34.5)
MCV RBC AUTO: 87 FL (ref 80–99.9)
METHB: 0.4 % (ref 0–1.5)
METHB: 0.4 % (ref 0–1.5)
MODE: AC
MODE: AC
MONOCYTES ABSOLUTE: 0.24 E9/L (ref 0.1–0.95)
MONOCYTES RELATIVE PERCENT: 3.5 % (ref 2–12)
NEUTROPHILS ABSOLUTE: 4.87 E9/L (ref 1.8–7.3)
NEUTROPHILS RELATIVE PERCENT: 71.4 % (ref 43–80)
O2 CONTENT: 15.8 ML/DL
O2 CONTENT: 15.8 ML/DL
O2 SATURATION: 98.6 % (ref 92–98.5)
O2 SATURATION: 99.2 % (ref 92–98.5)
O2HB: 98.2 % (ref 94–97)
O2HB: 98.4 % (ref 94–97)
OPERATOR ID: 2464
OPERATOR ID: 2577
ORGANISM: ABNORMAL
PATIENT TEMP: 37 C
PATIENT TEMP: 37 C
PCO2: 37.9 MMHG (ref 35–45)
PCO2: 48.1 MMHG (ref 35–45)
PDW BLD-RTO: 14.8 FL (ref 11.5–15)
PEEP/CPAP: 5 CMH2O
PEEP/CPAP: 5 CMH2O
PFO2: 2.25 MMHG/%
PFO2: 2.62 MMHG/%
PH BLOOD GAS: 7.37 (ref 7.35–7.45)
PH BLOOD GAS: 7.43 (ref 7.35–7.45)
PHOSPHORUS: 3.8 MG/DL (ref 2.5–4.5)
PLATELET # BLD: 254 E9/L (ref 130–450)
PMV BLD AUTO: 10 FL (ref 7–12)
PO2: 135.2 MMHG (ref 60–100)
PO2: 156.9 MMHG (ref 60–100)
POTASSIUM SERPL-SCNC: 4.7 MMOL/L (ref 3.5–5)
PROCALCITONIN: 0.03 NG/ML (ref 0–0.08)
RBC # BLD: 3.7 E12/L (ref 3.5–5.5)
REASON FOR REJECTION: NORMAL
REJECTED TEST: NORMAL
RI(T): 1.37
RI(T): 1.66
RR MECHANICAL: 14 B/MIN
RR MECHANICAL: 14 B/MIN
SODIUM BLD-SCNC: 138 MMOL/L (ref 132–146)
SOURCE, BLOOD GAS: ABNORMAL
SOURCE, BLOOD GAS: ABNORMAL
THB: 11.2 G/DL (ref 11.5–16.5)
THB: 11.3 G/DL (ref 11.5–16.5)
TIME ANALYZED: 36
TIME ANALYZED: 426
TOTAL PROTEIN: 6.5 G/DL (ref 6.4–8.3)
URINE CULTURE, ROUTINE: ABNORMAL
VT MECHANICAL: 450 ML
VT MECHANICAL: 450 ML
WBC # BLD: 6.8 E9/L (ref 4.5–11.5)

## 2019-10-27 PROCEDURE — C9254 INJECTION, LACOSAMIDE: HCPCS | Performed by: PHYSICIAN ASSISTANT

## 2019-10-27 PROCEDURE — 94003 VENT MGMT INPAT SUBQ DAY: CPT

## 2019-10-27 PROCEDURE — 6360000002 HC RX W HCPCS: Performed by: PHYSICIAN ASSISTANT

## 2019-10-27 PROCEDURE — 85025 COMPLETE CBC W/AUTO DIFF WBC: CPT

## 2019-10-27 PROCEDURE — 36415 COLL VENOUS BLD VENIPUNCTURE: CPT

## 2019-10-27 PROCEDURE — 2580000003 HC RX 258: Performed by: PHYSICIAN ASSISTANT

## 2019-10-27 PROCEDURE — 6360000002 HC RX W HCPCS: Performed by: INTERNAL MEDICINE

## 2019-10-27 PROCEDURE — 83605 ASSAY OF LACTIC ACID: CPT

## 2019-10-27 PROCEDURE — 2500000003 HC RX 250 WO HCPCS: Performed by: INTERNAL MEDICINE

## 2019-10-27 PROCEDURE — 83735 ASSAY OF MAGNESIUM: CPT

## 2019-10-27 PROCEDURE — 93010 ELECTROCARDIOGRAM REPORT: CPT | Performed by: INTERNAL MEDICINE

## 2019-10-27 PROCEDURE — 71045 X-RAY EXAM CHEST 1 VIEW: CPT

## 2019-10-27 PROCEDURE — 82805 BLOOD GASES W/O2 SATURATION: CPT

## 2019-10-27 PROCEDURE — 2580000003 HC RX 258: Performed by: INTERNAL MEDICINE

## 2019-10-27 PROCEDURE — 93306 TTE W/DOPPLER COMPLETE: CPT

## 2019-10-27 PROCEDURE — 6360000002 HC RX W HCPCS

## 2019-10-27 PROCEDURE — 2000000000 HC ICU R&B

## 2019-10-27 PROCEDURE — 80053 COMPREHEN METABOLIC PANEL: CPT

## 2019-10-27 PROCEDURE — 84145 PROCALCITONIN (PCT): CPT

## 2019-10-27 PROCEDURE — 84100 ASSAY OF PHOSPHORUS: CPT

## 2019-10-27 PROCEDURE — 99233 SBSQ HOSP IP/OBS HIGH 50: CPT | Performed by: PHYSICIAN ASSISTANT

## 2019-10-27 PROCEDURE — 6370000000 HC RX 637 (ALT 250 FOR IP): Performed by: INTERNAL MEDICINE

## 2019-10-27 RX ORDER — HYDRALAZINE HYDROCHLORIDE 20 MG/ML
10 INJECTION INTRAMUSCULAR; INTRAVENOUS EVERY 6 HOURS PRN
Status: DISCONTINUED | OUTPATIENT
Start: 2019-10-27 | End: 2019-11-05 | Stop reason: HOSPADM

## 2019-10-27 RX ADMIN — Medication 25 MCG/HR: at 10:10

## 2019-10-27 RX ADMIN — LEVETIRACETAM 1500 MG: 15 INJECTION INTRAVENOUS at 09:28

## 2019-10-27 RX ADMIN — PROPOFOL 50 MCG/KG/MIN: 10 INJECTION, EMULSION INTRAVENOUS at 06:35

## 2019-10-27 RX ADMIN — LEVETIRACETAM 1500 MG: 15 INJECTION INTRAVENOUS at 02:17

## 2019-10-27 RX ADMIN — Medication 10 ML: at 08:18

## 2019-10-27 RX ADMIN — Medication 125 MCG/HR: at 23:46

## 2019-10-27 RX ADMIN — PROPOFOL 50 MCG/KG/MIN: 10 INJECTION, EMULSION INTRAVENOUS at 03:00

## 2019-10-27 RX ADMIN — DEXTROSE MONOHYDRATE 200 MG: 50 INJECTION, SOLUTION INTRAVENOUS at 09:32

## 2019-10-27 RX ADMIN — LOSARTAN POTASSIUM 100 MG: 50 TABLET, FILM COATED ORAL at 09:10

## 2019-10-27 RX ADMIN — DEXTROSE MONOHYDRATE 100 MG: 50 INJECTION, SOLUTION INTRAVENOUS at 21:35

## 2019-10-27 RX ADMIN — LORAZEPAM 2 MG: 2 INJECTION, SOLUTION INTRAMUSCULAR; INTRAVENOUS at 08:16

## 2019-10-27 RX ADMIN — PROPOFOL 40 MCG/KG/MIN: 10 INJECTION, EMULSION INTRAVENOUS at 16:28

## 2019-10-27 RX ADMIN — Medication 10 ML: at 21:36

## 2019-10-27 RX ADMIN — LEVETIRACETAM 1500 MG: 15 INJECTION INTRAVENOUS at 22:07

## 2019-10-27 RX ADMIN — PROPOFOL 40 MCG/KG/MIN: 10 INJECTION, EMULSION INTRAVENOUS at 11:45

## 2019-10-27 RX ADMIN — PROPOFOL 50 MCG/KG/MIN: 10 INJECTION, EMULSION INTRAVENOUS at 21:04

## 2019-10-27 RX ADMIN — ENOXAPARIN SODIUM 40 MG: 100 INJECTION SUBCUTANEOUS at 08:15

## 2019-10-27 ASSESSMENT — PULMONARY FUNCTION TESTS
PIF_VALUE: 25
PIF_VALUE: 31
PIF_VALUE: 28
PIF_VALUE: 26
PIF_VALUE: 24
PIF_VALUE: 25
PIF_VALUE: 25
PIF_VALUE: 26
PIF_VALUE: 25
PIF_VALUE: 39
PIF_VALUE: 26
PIF_VALUE: 26
PIF_VALUE: 27
PIF_VALUE: 34
PIF_VALUE: 38
PIF_VALUE: 33
PIF_VALUE: 26
PIF_VALUE: 24
PIF_VALUE: 29
PIF_VALUE: 32
PIF_VALUE: 27
PIF_VALUE: 31
PIF_VALUE: 26
PIF_VALUE: 29
PIF_VALUE: 27
PIF_VALUE: 25
PIF_VALUE: 30
PIF_VALUE: 25
PIF_VALUE: 24
PIF_VALUE: 41

## 2019-10-27 ASSESSMENT — PAIN SCALES - GENERAL
PAINLEVEL_OUTOF10: 0
PAINLEVEL_OUTOF10: 2

## 2019-10-27 NOTE — PROGRESS NOTES
Nurse to nurse report given to Zoey Lange RN on Medical ICU. Patient is to be transferred to room 4429 once CAT scan is complete.

## 2019-10-27 NOTE — PROGRESS NOTES
Subjective: Intubated and sedated    Objective:    BP (!) 158/102   Pulse 92   Temp 98.4 °F (36.9 °C) (Esophageal)   Resp 14   Wt 194 lb (88 kg)   SpO2 100%   BMI 34.37 kg/m²   Skin: Warm and dry  Neck: Supple, no JVD  Heart:  RRR, no murmurs, gallops, or rubs. Lungs:  CTA bilaterally, no wheeze, rales or rhonchi  Abd: bowel sounds present, nontender, nondistended, no masses  Extrem:  No clubbing, cyanosis, or edema, pulses intact    I/O last 3 completed shifts: In: 412 [P.O.:240; I.V.:437; IV Piggyback:50]  Out: 7693 [QUWPS:3302; Emesis/NG output:50]    Last Refreshed: 10/27/19 1901 [Time [facility] Orders]   Results      Component Value Units   XR CHEST PORTABLE [[id number]]    Resulted: 10/27/19 0853    Updated: 10/27/19 0855    Narrative:     Patient MRN: [id number]  : 1970  Age:  49 years  Gender: Female  Order Date: 10/27/2019 6:00 AM  Exam: XR CHEST PORTABLE  Number of Images: 1 view  Indication:  Acute respiratory failure     Comparison: 2019    FINDINGS:  Stable endotracheal and nasogastric tubes. PH probe in the  midesophagus. Heart enlarged. Pulmonary vascularity is upper normal. There is  opacity in the retrocardiac which may represent atelectasis and/or  infiltrate. Impression:     Cardiomegaly. Retrocardiac density which may represent atelectasis  and/or infiltrate.      Urine culture [[id number]]    Collected: 10/25/19 2000    Updated: 10/27/19 0820    Specimen Source: Urine, clean catch     Urine Culture, Routine --    Growth not present, incubation continues   <10,000 CFU/mL   Mixed gram positive organisms    Narrative:     Source: URINE       Site: Urine&Urine             CBC Auto Differential [[id number]] (Abnormal)    Collected: 10/27/19 0440    Updated: 10/27/19 0729     WBC 6.8 E9/L    RBC 3.70 E12/L    Hemoglobin 9.9Low  g/dL    Hematocrit 32.2Low  %    MCV 87.0 fL    MCH 26.8 pg    MCHC 30.7Low  %    RDW 14.8 fL    Platelets 640 R2/E    Comment: Platelet clumps are identified. This may decrease the automated platelet   count artifactually. MPV 10.0 fL    Neutrophils % 71.4 %    Immature Granulocytes % 0.3 %    Lymphocytes % 24.2 %    Monocytes % 3.5 %    Eosinophils % 0.3 %    Basophils % 0.3 %    Neutrophils Absolute 4.87 E9/L    Immature Granulocytes # 0.02 E9/L    Lymphocytes Absolute 1.65 E9/L    Monocytes Absolute 0.24 E9/L    Eosinophils Absolute 0. 02Low  E9/L    Basophils Absolute 0.02 E9/L   Blood Gas, Arterial [[id number]] (Abnormal)    Resulted: 10/27/19 0426    Updated: 10/27/19 0428    Specimen Source: Blood     Date Analyzed [id number]    Time Analyzed 0426    Source: Blood Arterial    pH, Blood Gas 7.431    PCO2 37.9 mmHg    PO2 156. 9High  mmHg    HCO3 24.6 mmol/L    B.E. 0.5 mmol/L    O2 Sat 99. 2High  %    PO2/FIO2 2.62 mmHg/%    AaDO2 214.2 mmHg    RI(T) 1.37    O2Hb 98. 4High  %    COHb 0.4 %    MetHb 0.4 %    O2 Content 15.8 mL/dL    HHb 0.8 %    tHb (est) 11.2Low  g/dL    Mode AC    FIO2 60.0 %    Rr Mechanical 14.0 b/min    Vt Mechanical 450.0 mL    Peep/Cpap 5.0 cmH2O    Date Of Collection --    Time Collected --    Pt Temp 37.0 C     ID [id number]    Lab [id number]    Critical(s) Notified . No Critical Values   Procalcitonin [[id number]]    Collected: 10/27/19 0026    Updated: 10/27/19 0119    Specimen Type: Blood     Procalcitonin 0.03 ng/mL   Comprehensive metabolic panel [[id number]] (Abnormal)    Collected: 10/27/19 0026    Updated: 10/27/19 0114    Specimen Source: Blood     Sodium 138 mmol/L    Potassium 4.7 mmol/L    Chloride 99 mmol/L    CO2 26 mmol/L    Anion Gap 13 mmol/L    Glucose 112High  mg/dL    BUN 11 mg/dL    CREATININE 0.8 mg/dL    GFR Non-African American >60 mL/min/1.73    Comment: Chronic Kidney Disease: less than 60 ml/min/1.73 sq. m.         Kidney Failure: less than 15 ml/min/1.73 sq.m. Results valid for patients 18 years and older.         GFR African American >60    Calcium 8.9 mg/dL    Total Protein 6.5 g/dL    Alb 4.0 g/dL 48.1High  mmHg    PO2 135. 2High  mmHg    HCO3 27.1High  mmol/L    B.E. 1.3 mmol/L    O2 Sat 98. 6High  %    PO2/FIO2 2.25 mmHg/%    AaDO2 224.7 mmHg    RI(T) 1.66    O2Hb 98. 2High  %    COHb 0.0 %    MetHb 0.4 %    O2 Content 15.8 mL/dL    HHb 1.4 %    tHb (est) 11.3Low  g/dL    Mode AC    FIO2 60.0 %    Rr Mechanical 14.0 b/min    Vt Mechanical 450.0 mL    Peep/Cpap 5.0 cmH2O    Date Of Collection --    Time Collected --    Pt Temp 37.0 C     ID [id number]    Lab [id number]    Critical(s) Notified . No Critical Values   XR ABDOMEN FOR NG/OG/NE TUBE PLACEMENT [[id number]]    Resulted: 10/27/19 0029    Updated: 10/27/19 003    Narrative:     Patient MRN:  [id number]  : 1970  Age: 52 years  Gender: Female    Order Date:  10/26/2019 10:30 PM        TECHNIQUE/NUMBER OF IMAGES/COMPARISON/CLINICAL HISTORY:    Abdomen supine view    1 image one view    History NG tube placement. FINDINGS: NG tube tip in the stomach in good position. This study not  intended to full evaluate the chest or the abdomen. There is a relative low position for the endotracheal tube. This can  be relate with the plain radiographs of the chest.   Impression:     NG tube in good position. Relative low position for an endotracheal tube. Can correlate with the  chest radiograph. CT CERVICAL SPINE WO CONTRAST [[id number]]    Resulted: 10/26/19 2320    Updated: 10/26/19 2322    Narrative:     Patient MRN:  [id number]  : 1970  Age: 52 years  Gender: Female    Order Date:  10/26/2019 10:30 PM        TECHNIQUE/NUMBER OF IMAGES/COMPARISON/CLINICAL HISTORY:    CT cervical    Axial with sagittal coronal and oblique reconstructions    72-year-old female patient is seizure trauma injury. Comparison study . FINDINGS: Vertebral bodies have normal height. Disc spaces are  well-maintained. Alignment is preserved in the sagittal and coronal  images are    The odontoid process intact.  Articular relation between the occipital  condyles and C1 and between C1 and C2 are preserved. All facet joints are well aligned the. There is no bone or soft tissue encroachment of the cervical spine  canal or neural foramina. There are intact appearance for pedicles, spinous process and  transverse process. All facet joints are well aligned. Patient has a orotracheal tube and orogastric tube. No acute fractures are seen in the cervical spine. Lung apices demonstrate a lateral pleural effusions. Impression:     No acute fractures or dislocations identified in the  cervical spine. CT Head WO Contrast [[id number]]    Resulted: 10/26/19 2315    Updated: 10/26/19 2317    Narrative:     Patient MRN:  [id number]  : 1970  Age: 52 years  Gender: Female    Order Date:  10/26/2019 10:30 PM        TECHNIQUE/NUMBER OF IMAGES/COMPARISON/CLINICAL HISTORY:    CT brain    Axial images were obtained sagittal and coronal reconstructions    History seizures and fall. 445 images    Comparison study of . FINDINGS: There is no indication for an acute intracranial hemorrhagic  event. There is no indication for a sizable effusion acute or recent  insult to the brain parenchyma. There is a left medial cranial fossa arachnoid cyst which is an old  finding likely on developmental bases. There is no focal mass defect or midline shift. Images with bone window settings demonstrate no significant findings. Midline structures have unremarkable appearance. Impression:     No interval change since the previous study of . No indication for an acute intracranial process. XR CHEST PORTABLE [[id number]]    Resulted: 10/26/19 2243    Updated: 10/26/19 2246    Narrative:     Patient MRN:  [id number]  : 1970  Age: 52 years  Gender: Female    Order Date:  10/26/2019 9:45 PM        TECHNIQUE/NUMBER OF IMAGES/COMPARISON/CLINICAL HISTORY:    Chest AP    1 image, one view    Comparison  7 day.     After fentaNYL (SUBLIMAZE) injection 50 mcg  50 mcg Intravenous Q1H PRN Avelina Colindres MD        influenza quadrivalent split vaccine (FLUZONE;FLUARIX;FLULAVAL;AFLURIA) injection 0.5 mL  0.5 mL Intramuscular Prior to discharge Saman Ward MD        losartan (COZAAR) tablet 100 mg  100 mg Oral Daily Ugandan Osler, DO   100 mg at 10/27/19 0910    melatonin tablet 3 mg  3 mg Oral Nightly Ugandan Osler, DO   3 mg at 10/25/19 2229    pantoprazole (PROTONIX) tablet 40 mg  40 mg Oral QAM AC Ugandan Osler, DO   40 mg at 10/26/19 9293    pravastatin (PRAVACHOL) tablet 20 mg  20 mg Oral Daily Ugandan Osler, DO   20 mg at 10/25/19 2228    sodium chloride flush 0.9 % injection 10 mL  10 mL Intravenous 2 times per day Ugandan Osler, DO   10 mL at 10/27/19 0818    sodium chloride flush 0.9 % injection 10 mL  10 mL Intravenous PRN Ugandan Osler, DO        magnesium hydroxide (MILK OF MAGNESIA) 400 MG/5ML suspension 30 mL  30 mL Oral Daily PRN Ugandan Osler, DO        ondansetron [facility]) injection 4 mg  4 mg Intravenous Q6H PRN Ugandan Osler, DO        enoxaparin (LOVENOX) injection 40 mg  40 mg Subcutaneous Daily Ugandan Osler, DO   40 mg at 10/27/19 0815    perflutren lipid microspheres (DEFINITY) injection 1.65 mg  1.5 mL Intravenous ONCE PRN Ugandan Osler, DO         Facility-Administered Medications Ordered in Other Encounters   Medication Dose Route Frequency Provider Last Rate Last Dose    propofol injection    PRN Curry Hoffman RN   200 mg at 10/26/19 2135    succinylcholine (ANECTINE) injection    PRN Curry Hoffman RN   140 mg at 10/26/19 2135           Problem list:    Active Problems:    Chest pain    Intractable epilepsy without status epilepticus ([facility] [address]  Resolved Problems:    * No resolved hospital problems.  *      Assessment:       1.  Seizure disorder     2.  Pericardial effusion mild per CT     3.  Essential hypertension     4.  Chronic back pain    Plan:    Continue Keppra 1500 mg twice

## 2019-10-27 NOTE — PROGRESS NOTES
Patients son Arturo Montaño notified of change in patient's condition. Informed that patient was found on the floor and having seizure and is currently intubated. States that he will be coming to hospital to see pt.

## 2019-10-27 NOTE — SIGNIFICANT EVENT
Normal Saline:  Bolus:       L, Rate:   cc/hr      Imaging:   CXR: No infiltrates bilateral     CT: No intracranial acute processes, no cervical fracture            Laboratory Tests:     CBC:   Lab Results   Component Value Date    WBC 6.2 10/25/2019    RBC 3.99 10/25/2019    HGB 10.7 10/25/2019    HCT 35.3 10/25/2019    MCV 88.5 10/25/2019    MCH 26.8 10/25/2019    MCHC 30.3 10/25/2019    RDW 14.6 10/25/2019     10/25/2019    MPV 8.9 10/25/2019     BMP:    Lab Results   Component Value Date     10/25/2019    K 3.9 10/25/2019    K 4.4 10/24/2019     10/25/2019    CO2 30 10/25/2019    BUN 10 10/25/2019    LABALBU 4.2 10/25/2019    LABALBU 4.3 03/31/2012    CREATININE 0.7 10/25/2019    CALCIUM 9.3 10/25/2019    GFRAA >60 10/25/2019    LABGLOM >60 10/25/2019    GLUCOSE 115 10/25/2019    GLUCOSE 115 03/31/2012         Teams Assessment and Plan:    · Status epilepticus, patient with known history of epilepsy, this episode seemed to be more likely generalized tonic seizures, given patient rigidity and lack of movement, there is hypertonicity was bilateral in upper and lower extremities, jaw muscles, she was unable to protect her airway and was intubated by anesthesia. Neurology already following, had increased Keppra to 1.5 g twice daily   · Fall, unsure mechanism but most likely secondary to seizure activity. · History of ESBL     Plan  · Secure airway  · Stat CT had and cervical spine without contrast  · Start propofol and Versed drip  · Continue Keppra 1.5 g twice daily  · Discussed with neurology  · Obtain UA and urine culture  · Seizure precautions  · Fall precautions  · Obtain EEG  · Transfer to ICU    ?   Disposition:  [] No transfer   [] Transfer to monitor floor  [x] Transfer to: [x] MICU [] NICU [] CCU [] SICU        Patients family updated: [x] Yes  [] No   Discussed with:  [x] Critical Care Intensivist: Dr. Darrel Alexandra        [x] Primary Care Provider: Dr. Merlinda Picket    [] Other: ?    Zofia Cervantes Bita Kemp MD, PGY-3  11:38 PM  Attending Physician: Dr. Arcadio Lanes

## 2019-10-27 NOTE — PROGRESS NOTES
sodium chloride flush 0.9 % injection 10 mL  10 mL Intravenous 2 times per day Lynwhit Mcclellan, DO   10 mL at 10/27/19 0818    sodium chloride flush 0.9 % injection 10 mL  10 mL Intravenous PRN Lynwhit Mcclellan, DO        magnesium hydroxide (MILK OF MAGNESIA) 400 MG/5ML suspension 30 mL  30 mL Oral Daily PRN Lyn Eleuterio, DO        ondansetron [facility]) injection 4 mg  4 mg Intravenous Q6H PRN Lyn Mcclellan, DO        enoxaparin (LOVENOX) injection 40 mg  40 mg Subcutaneous Daily Lyn Eleuterio, DO   40 mg at 10/27/19 0815    perflutren lipid microspheres (DEFINITY) injection 1.65 mg  1.5 mL Intravenous ONCE PRN Lyn Mcclellan, DO         Facility-Administered Medications Ordered in Other Encounters   Medication Dose Route Frequency Provider Last Rate Last Dose    propofol injection    PRN Earma Drain, RN   200 mg at 10/26/19 2135    succinylcholine (ANECTINE) injection    PRN Earma Drain, RN   140 mg at 10/26/19 2135       Objective:       BP (!) 152/100   Pulse 74   Temp 96.6 °F (35.9 °C) (Esophageal)   Resp 25   Wt 194 lb (88 kg)   SpO2 98%   BMI 34.37 kg/m²        General appearance: Intubated, sedated. In no acute distress   Head: Normocephalic, without obvious abnormality, atraumatic  Eyes: conjunctivae/corneas clear. Neck: no adenopathy, no carotid bruit, no JVD, supple, symmetrical, trachea midline and thyroid not enlarged, symmetric, no tenderness/mass/nodules  Lungs: mechanically ventilated. Breathing same rate as vent. Effort normal   Heart: NSR on tele. Extremities: extremities normal, atraumatic, no cyanosis or edema-- bilateral soft wrist restraints in place   Pulses: 2+ and symmetric  Skin: Skin color, texture, turgor normal. No rashes or lesions     Mental Status: Intubated. Sedated. Not following commands.      Exam limited due to intubation and sedation     Cranial Nerves:  I: smell    II: visual acuity     II: visual fields    II: pupils JR   III,VII: ptosis None   III,IV,VI:

## 2019-10-27 NOTE — PROGRESS NOTES
A purse, cellphone  was found in patient's room by housekeeping. This nurse along with Germain Matos, [address] [place] [address] handed jaimee (cellphone  inside) to Almita Squires [address] [place] [address].

## 2019-10-27 NOTE — FLOWSHEET NOTE
Continues to attempt to pull @ ETT. Unable to verbally redirect @ this time. B/L soft wrist restraints continued. Will continue to monitor.

## 2019-10-27 NOTE — PROGRESS NOTES
Intubated by Anesthesia with a 7.5 ETT, 22 @ lip. No difficulties, slight skin tear on corner of lip. ETCO2 38, BS bilaterally, chest x-ray ordered.

## 2019-10-28 ENCOUNTER — APPOINTMENT (OUTPATIENT)
Dept: GENERAL RADIOLOGY | Age: 49
DRG: 053 | End: 2019-10-28
Attending: INTERNAL MEDICINE
Payer: MEDICAID

## 2019-10-28 ENCOUNTER — APPOINTMENT (OUTPATIENT)
Dept: NEUROLOGY | Age: 49
DRG: 053 | End: 2019-10-28
Attending: INTERNAL MEDICINE
Payer: MEDICAID

## 2019-10-28 LAB
AADO2: 187.3 MMHG
ALBUMIN SERPL-MCNC: 3.9 G/DL (ref 3.5–5.2)
ALP BLD-CCNC: 214 U/L (ref 35–104)
ALT SERPL-CCNC: 45 U/L (ref 0–32)
ANION GAP SERPL CALCULATED.3IONS-SCNC: 15 MMOL/L (ref 7–16)
AST SERPL-CCNC: 39 U/L (ref 0–31)
B.E.: 0.8 MMOL/L (ref -3–3)
BASOPHILS ABSOLUTE: 0.01 E9/L (ref 0–0.2)
BASOPHILS RELATIVE PERCENT: 0.1 % (ref 0–2)
BILIRUB SERPL-MCNC: 0.3 MG/DL (ref 0–1.2)
BUN BLDV-MCNC: 6 MG/DL (ref 6–20)
CALCIUM SERPL-MCNC: 8.9 MG/DL (ref 8.6–10.2)
CHLORIDE BLD-SCNC: 101 MMOL/L (ref 98–107)
CO2: 22 MMOL/L (ref 22–29)
COHB: 0.2 % (ref 0–1.5)
CREAT SERPL-MCNC: 0.7 MG/DL (ref 0.5–1)
CRITICAL: ABNORMAL
DATE ANALYZED: ABNORMAL
DATE OF COLLECTION: ABNORMAL
EOSINOPHILS ABSOLUTE: 0.01 E9/L (ref 0.05–0.5)
EOSINOPHILS RELATIVE PERCENT: 0.1 % (ref 0–6)
FIO2: 50 %
GFR AFRICAN AMERICAN: >60
GFR NON-AFRICAN AMERICAN: >60 ML/MIN/1.73
GLUCOSE BLD-MCNC: 120 MG/DL (ref 74–99)
HCO3: 23.1 MMOL/L (ref 22–26)
HCT VFR BLD CALC: 35.1 % (ref 34–48)
HEMOGLOBIN: 11 G/DL (ref 11.5–15.5)
HHB: 1.4 % (ref 0–5)
IMMATURE GRANULOCYTES #: 0.05 E9/L
IMMATURE GRANULOCYTES %: 0.5 % (ref 0–5)
LAB: ABNORMAL
LYMPHOCYTES ABSOLUTE: 1.57 E9/L (ref 1.5–4)
LYMPHOCYTES RELATIVE PERCENT: 17 % (ref 20–42)
Lab: ABNORMAL
MCH RBC QN AUTO: 26.5 PG (ref 26–35)
MCHC RBC AUTO-ENTMCNC: 31.3 % (ref 32–34.5)
MCV RBC AUTO: 84.6 FL (ref 80–99.9)
METER GLUCOSE: 115 MG/DL (ref 74–99)
METER GLUCOSE: 93 MG/DL (ref 74–99)
METHB: 0.2 % (ref 0–1.5)
MODE: AC
MONOCYTES ABSOLUTE: 0.33 E9/L (ref 0.1–0.95)
MONOCYTES RELATIVE PERCENT: 3.6 % (ref 2–12)
NEUTROPHILS ABSOLUTE: 7.24 E9/L (ref 1.8–7.3)
NEUTROPHILS RELATIVE PERCENT: 78.7 % (ref 43–80)
O2 CONTENT: 16.9 ML/DL
O2 SATURATION: 98.6 % (ref 92–98.5)
O2HB: 98.2 % (ref 94–97)
OPERATOR ID: 1874
PATIENT TEMP: 37 C
PCO2: 29.8 MMHG (ref 35–45)
PDW BLD-RTO: 14.7 FL (ref 11.5–15)
PEEP/CPAP: 5 CMH2O
PFO2: 2.46 MMHG/%
PH BLOOD GAS: 7.51 (ref 7.35–7.45)
PLATELET # BLD: 359 E9/L (ref 130–450)
PMV BLD AUTO: 9.1 FL (ref 7–12)
PO2: 123.2 MMHG (ref 60–100)
POTASSIUM SERPL-SCNC: 4.1 MMOL/L (ref 3.5–5)
RBC # BLD: 4.15 E12/L (ref 3.5–5.5)
RI(T): 1.52
RR MECHANICAL: 14 B/MIN
SODIUM BLD-SCNC: 138 MMOL/L (ref 132–146)
SOURCE, BLOOD GAS: ABNORMAL
THB: 12.1 G/DL (ref 11.5–16.5)
TIME ANALYZED: 551
TOTAL PROTEIN: 7.2 G/DL (ref 6.4–8.3)
URINE CULTURE, ROUTINE: NORMAL
VT MECHANICAL: 450 ML
WBC # BLD: 9.2 E9/L (ref 4.5–11.5)

## 2019-10-28 PROCEDURE — 6360000002 HC RX W HCPCS: Performed by: INTERNAL MEDICINE

## 2019-10-28 PROCEDURE — 2580000003 HC RX 258: Performed by: INTERNAL MEDICINE

## 2019-10-28 PROCEDURE — 71045 X-RAY EXAM CHEST 1 VIEW: CPT

## 2019-10-28 PROCEDURE — C9254 INJECTION, LACOSAMIDE: HCPCS | Performed by: PHYSICIAN ASSISTANT

## 2019-10-28 PROCEDURE — 82962 GLUCOSE BLOOD TEST: CPT

## 2019-10-28 PROCEDURE — 36415 COLL VENOUS BLD VENIPUNCTURE: CPT

## 2019-10-28 PROCEDURE — 6360000002 HC RX W HCPCS

## 2019-10-28 PROCEDURE — 94664 DEMO&/EVAL PT USE INHALER: CPT

## 2019-10-28 PROCEDURE — 94003 VENT MGMT INPAT SUBQ DAY: CPT

## 2019-10-28 PROCEDURE — 6370000000 HC RX 637 (ALT 250 FOR IP): Performed by: INTERNAL MEDICINE

## 2019-10-28 PROCEDURE — 85025 COMPLETE CBC W/AUTO DIFF WBC: CPT

## 2019-10-28 PROCEDURE — 94640 AIRWAY INHALATION TREATMENT: CPT

## 2019-10-28 PROCEDURE — 80053 COMPREHEN METABOLIC PANEL: CPT

## 2019-10-28 PROCEDURE — 2500000003 HC RX 250 WO HCPCS: Performed by: INTERNAL MEDICINE

## 2019-10-28 PROCEDURE — 95953 HC EEG CONT EA 24HR 16CH UNATTENDED: CPT

## 2019-10-28 PROCEDURE — 82805 BLOOD GASES W/O2 SATURATION: CPT

## 2019-10-28 PROCEDURE — 99233 SBSQ HOSP IP/OBS HIGH 50: CPT | Performed by: NURSE PRACTITIONER

## 2019-10-28 PROCEDURE — 95813 EEG EXTND MNTR 61-119 MIN: CPT | Performed by: PSYCHIATRY & NEUROLOGY

## 2019-10-28 PROCEDURE — 6360000002 HC RX W HCPCS: Performed by: PHYSICIAN ASSISTANT

## 2019-10-28 PROCEDURE — 2580000003 HC RX 258: Performed by: PHYSICIAN ASSISTANT

## 2019-10-28 PROCEDURE — 74018 RADEX ABDOMEN 1 VIEW: CPT

## 2019-10-28 PROCEDURE — 2000000000 HC ICU R&B

## 2019-10-28 RX ORDER — PANTOPRAZOLE SODIUM 40 MG/10ML
40 INJECTION, POWDER, LYOPHILIZED, FOR SOLUTION INTRAVENOUS DAILY
Status: DISCONTINUED | OUTPATIENT
Start: 2019-10-28 | End: 2019-10-29

## 2019-10-28 RX ORDER — DEXTROSE MONOHYDRATE 25 G/50ML
12.5 INJECTION, SOLUTION INTRAVENOUS PRN
Status: DISCONTINUED | OUTPATIENT
Start: 2019-10-28 | End: 2019-11-05 | Stop reason: HOSPADM

## 2019-10-28 RX ORDER — DEXTROSE MONOHYDRATE 50 MG/ML
100 INJECTION, SOLUTION INTRAVENOUS PRN
Status: DISCONTINUED | OUTPATIENT
Start: 2019-10-28 | End: 2019-11-05 | Stop reason: HOSPADM

## 2019-10-28 RX ORDER — SODIUM CHLORIDE FOR INHALATION 0.9 %
3 VIAL, NEBULIZER (ML) INHALATION ONCE
Status: COMPLETED | OUTPATIENT
Start: 2019-10-28 | End: 2019-10-28

## 2019-10-28 RX ORDER — NICOTINE POLACRILEX 4 MG
15 LOZENGE BUCCAL PRN
Status: DISCONTINUED | OUTPATIENT
Start: 2019-10-28 | End: 2019-11-05 | Stop reason: HOSPADM

## 2019-10-28 RX ORDER — ALBUTEROL SULFATE 2.5 MG/3ML
2.5 SOLUTION RESPIRATORY (INHALATION) EVERY 6 HOURS PRN
Status: DISCONTINUED | OUTPATIENT
Start: 2019-10-28 | End: 2019-11-05 | Stop reason: HOSPADM

## 2019-10-28 RX ORDER — MIDAZOLAM HYDROCHLORIDE 1 MG/ML
INJECTION INTRAMUSCULAR; INTRAVENOUS
Status: COMPLETED
Start: 2019-10-28 | End: 2019-10-28

## 2019-10-28 RX ORDER — ACETAMINOPHEN 325 MG/1
650 TABLET ORAL EVERY 4 HOURS PRN
Status: DISCONTINUED | OUTPATIENT
Start: 2019-10-28 | End: 2019-11-05 | Stop reason: HOSPADM

## 2019-10-28 RX ORDER — SODIUM CHLORIDE 0.9 % (FLUSH) 0.9 %
10 SYRINGE (ML) INJECTION PRN
Status: DISCONTINUED | OUTPATIENT
Start: 2019-10-28 | End: 2019-11-04 | Stop reason: SDUPTHER

## 2019-10-28 RX ORDER — SODIUM CHLORIDE 9 MG/ML
10 INJECTION INTRAVENOUS DAILY
Status: DISCONTINUED | OUTPATIENT
Start: 2019-10-28 | End: 2019-10-29

## 2019-10-28 RX ADMIN — ACETAMINOPHEN 650 MG: 325 TABLET, FILM COATED ORAL at 22:45

## 2019-10-28 RX ADMIN — ALBUTEROL SULFATE 2.5 MG: 2.5 SOLUTION RESPIRATORY (INHALATION) at 17:20

## 2019-10-28 RX ADMIN — ENOXAPARIN SODIUM 40 MG: 100 INJECTION SUBCUTANEOUS at 09:28

## 2019-10-28 RX ADMIN — Medication 10 ML: at 23:25

## 2019-10-28 RX ADMIN — Medication 10 ML: at 09:28

## 2019-10-28 RX ADMIN — PROPOFOL 50 MCG/KG/MIN: 10 INJECTION, EMULSION INTRAVENOUS at 11:01

## 2019-10-28 RX ADMIN — PROPOFOL 50 MCG/KG/MIN: 10 INJECTION, EMULSION INTRAVENOUS at 03:48

## 2019-10-28 RX ADMIN — MIDAZOLAM HYDROCHLORIDE 2 MG: 2 INJECTION, SOLUTION INTRAMUSCULAR; INTRAVENOUS at 10:38

## 2019-10-28 RX ADMIN — LOSARTAN POTASSIUM 100 MG: 50 TABLET, FILM COATED ORAL at 09:28

## 2019-10-28 RX ADMIN — PROPOFOL 50 MCG/KG/MIN: 10 INJECTION, EMULSION INTRAVENOUS at 08:29

## 2019-10-28 RX ADMIN — PROPOFOL 50 MCG/KG/MIN: 10 INJECTION, EMULSION INTRAVENOUS at 00:29

## 2019-10-28 RX ADMIN — LEVETIRACETAM 1500 MG: 15 INJECTION INTRAVENOUS at 23:24

## 2019-10-28 RX ADMIN — PRAVASTATIN SODIUM 20 MG: 20 TABLET ORAL at 23:04

## 2019-10-28 RX ADMIN — DEXTROSE MONOHYDRATE 100 MG: 50 INJECTION, SOLUTION INTRAVENOUS at 21:44

## 2019-10-28 RX ADMIN — RACEPINEPHRINE HYDROCHLORIDE 11.25 MG: 11.25 SOLUTION RESPIRATORY (INHALATION) at 17:27

## 2019-10-28 RX ADMIN — MELATONIN 3 MG ORAL TABLET 3 MG: 3 TABLET ORAL at 23:05

## 2019-10-28 RX ADMIN — Medication 150 MCG/HR: at 08:29

## 2019-10-28 RX ADMIN — ISODIUM CHLORIDE 3 ML: 0.03 SOLUTION RESPIRATORY (INHALATION) at 17:27

## 2019-10-28 ASSESSMENT — PULMONARY FUNCTION TESTS
PIF_VALUE: 42
PIF_VALUE: 35
PIF_VALUE: 38
PIF_VALUE: 33
PIF_VALUE: 37
PIF_VALUE: 38
PIF_VALUE: 31
PIF_VALUE: 31
PIF_VALUE: 22
PIF_VALUE: 28
PIF_VALUE: 37
PIF_VALUE: 26
PIF_VALUE: 28
PIF_VALUE: 37
PIF_VALUE: 28
PIF_VALUE: 26
PIF_VALUE: 11
PIF_VALUE: 0
PIF_VALUE: 24
PIF_VALUE: 21
PIF_VALUE: 26

## 2019-10-28 ASSESSMENT — PAIN SCALES - GENERAL
PAINLEVEL_OUTOF10: 0
PAINLEVEL_OUTOF10: 0
PAINLEVEL_OUTOF10: 3

## 2019-10-28 NOTE — PLAN OF CARE
Problem: Falls - Risk of:  Goal: Will remain free from falls  Description  Will remain free from falls  Outcome: Met This Shift     Problem: Falls - Risk of:  Goal: Absence of physical injury  Description  Absence of physical injury  Outcome: Met This Shift     Problem: Pain:  Goal: Control of acute pain  Description  Control of acute pain  Outcome: Met This Shift     Problem: Risk for Impaired Skin Integrity  Goal: Tissue integrity - skin and mucous membranes  Description  Structural intactness and normal physiological function of skin and  mucous membranes.   Outcome: Met This Shift     Problem: Restraint Use - Nonviolent/Non-Self-Destructive Behavior:  Goal: Absence of restraint indications  Description  Absence of restraint indications  Outcome: Completed     Problem: Restraint Use - Nonviolent/Non-Self-Destructive Behavior:  Goal: Absence of restraint-related injury  Description  Absence of restraint-related injury  Outcome: Completed

## 2019-10-28 NOTE — PROGRESS NOTES
bruit.  ABDOMEN:  Soft, nontender, no masses, no hepatomegaly, no splenomegaly, BS+  MUSCULOSKELETAL:  No clubbing no cyanosis. there is no redness, warmth, or swelling of the joints  NEUROLOGIC: Intubated, sedated  SKIN:  no bruising or bleeding, normal skin color, texture, turgor and no redness, warmth, or swelling      Cardiographics  I personally reviewed the telemetry monitor strips with the following interpretation: Sinus rhythm alternating with sinus tachycardia    Echocardiogram: Not done yet    Imaging  XR ABDOMEN FOR NG/OG/NE TUBE PLACEMENT   Final Result   Nasogastric tube with the tip looped in the stomach. Endotracheal tube close to porfirio and needs to be retracted. ALERT:  THIS IS AN ABNORMAL REPORT                  XR CHEST PORTABLE   Final Result   Low lung volumes with  mild CHF. Endotracheal tube close to porfirio and needs to be retracted by 1 cm. No discrete pneumothorax. XR CHEST PORTABLE   Final Result      Endotracheal tube tip is encroaching upon the right mainstem bronchus. Opacities in the right lung base are suggestive of atelectasis. Hazy opacification in the left lung base may also be due to   atelectasis, although a developing infiltrate and/or pleural effusion   is difficult to exclude, especially given the low lung volumes. Large cardiac silhouette, similar to prior studies, compatible with   cardiomegaly and small pericardial effusion, which can be seen on the   recent CTA. XR CHEST PORTABLE   Final Result   Cardiomegaly. Retrocardiac density which may represent atelectasis   and/or infiltrate. CT CERVICAL SPINE WO CONTRAST   Final Result   No acute fractures or dislocations identified in the   cervical spine. CT Head WO Contrast   Final Result   No interval change since the previous study of October 24. No indication for an acute intracranial process.       XR ABDOMEN FOR NG/OG/NE TUBE PLACEMENT   Final Result   NG tube in clinical course and diagnostic test findings        No family at bedside  Discussed with Dr. Eleni Calloway      Electronically signed by TITI Villarreal CNP on 10/28/2019 at 3:04 PM   I have discussed the care of patient including pertinent history and exam and reviewed chart, vitals, labs and radiologic studies. I also participated in medical decision making with Stefani Fajardo CNP on the date of service and I agree with all of the pertinent clinical information, assessment and treatment plan and status of the problem list as documented and have edited it. NOTE: This report was transcribed using voice recognition software.  Every effort was made to ensure accuracy; however, inadvertent computerized transcription errors may be present

## 2019-10-28 NOTE — FLOWSHEET NOTE
# RN attempted to place peripheral iv. Unable to obtain access at this time. Patient will require a line tomorrow.

## 2019-10-28 NOTE — PROGRESS NOTES
Subjective: Intubated awake and on assist now    Objective:    BP (!) 197/153   Pulse 118   Temp 100.2 °F (37.9 °C)   Resp 29   Wt 195 lb 6.4 oz (88.6 kg)   SpO2 96%   BMI 34.61 kg/m²   Skin: Warm and dry  Neck: Supple, no JVD  Heart:  RRR, no murmurs, gallops, or rubs. Lungs:  CTA bilaterally, no wheeze, rales or rhonchi  Abd: bowel sounds present, nontender, nondistended, no masses  Extrem:  No clubbing, cyanosis, or edema, pulses intact    I/O last 3 completed shifts: In: 1340 [I.V.:1240; IV Piggyback:100]  Out: 7369 [Urine:2560; Emesis/NG output:250]    Last Refreshed: 10/27/19 1901 [Time [facility] Orders]   Results      Component Value Units   XR CHEST PORTABLE [[id number]]    Resulted: 10/27/19 0853    Updated: 10/27/19 0855    Narrative:     Patient MRN: [id number]  : 1970  Age:  49 years  Gender: Female  Order Date: 10/27/2019 6:00 AM  Exam: XR CHEST PORTABLE  Number of Images: 1 view  Indication:  Acute respiratory failure     Comparison: 2019    FINDINGS:  Stable endotracheal and nasogastric tubes. PH probe in the  midesophagus. Heart enlarged. Pulmonary vascularity is upper normal. There is  opacity in the retrocardiac which may represent atelectasis and/or  infiltrate. Impression:     Cardiomegaly. Retrocardiac density which may represent atelectasis  and/or infiltrate.      Urine culture [[id number]]    Collected: 10/25/19 2000    Updated: 10/27/19 0820    Specimen Source: Urine, clean catch     Urine Culture, Routine --    Growth not present, incubation continues   <10,000 CFU/mL   Mixed gram positive organisms    Narrative:     Source: URINE       Site: Urine&Urine             CBC Auto Differential [[id number]] (Abnormal)    Collected: 10/27/19 0440    Updated: 10/27/19 0729     WBC 6.8 E9/L    RBC 3.70 E12/L    Hemoglobin 9.9Low  g/dL    Hematocrit 32.2Low  %    MCV 87.0 fL    MCH 26.8 pg    MCHC 30.7Low  %    RDW 14.8 fL    Platelets 013 U8/W    Comment: Platelet clumps are identified. This may decrease the automated platelet   count artifactually. MPV 10.0 fL    Neutrophils % 71.4 %    Immature Granulocytes % 0.3 %    Lymphocytes % 24.2 %    Monocytes % 3.5 %    Eosinophils % 0.3 %    Basophils % 0.3 %    Neutrophils Absolute 4.87 E9/L    Immature Granulocytes # 0.02 E9/L    Lymphocytes Absolute 1.65 E9/L    Monocytes Absolute 0.24 E9/L    Eosinophils Absolute 0. 02Low  E9/L    Basophils Absolute 0.02 E9/L   Blood Gas, Arterial [[id number]] (Abnormal)    Resulted: 10/27/19 0426    Updated: 10/27/19 0428    Specimen Source: Blood     Date Analyzed [id number]    Time Analyzed 0426    Source: Blood Arterial    pH, Blood Gas 7.431    PCO2 37.9 mmHg    PO2 156. 9High  mmHg    HCO3 24.6 mmol/L    B.E. 0.5 mmol/L    O2 Sat 99. 2High  %    PO2/FIO2 2.62 mmHg/%    AaDO2 214.2 mmHg    RI(T) 1.37    O2Hb 98. 4High  %    COHb 0.4 %    MetHb 0.4 %    O2 Content 15.8 mL/dL    HHb 0.8 %    tHb (est) 11.2Low  g/dL    Mode AC    FIO2 60.0 %    Rr Mechanical 14.0 b/min    Vt Mechanical 450.0 mL    Peep/Cpap 5.0 cmH2O    Date Of Collection --    Time Collected --    Pt Temp 37.0 C     ID [id number]    Lab [id number]    Critical(s) Notified . No Critical Values   Procalcitonin [[id number]]    Collected: 10/27/19 0026    Updated: 10/27/19 0119    Specimen Type: Blood     Procalcitonin 0.03 ng/mL   Comprehensive metabolic panel [[id number]] (Abnormal)    Collected: 10/27/19 0026    Updated: 10/27/19 0114    Specimen Source: Blood     Sodium 138 mmol/L    Potassium 4.7 mmol/L    Chloride 99 mmol/L    CO2 26 mmol/L    Anion Gap 13 mmol/L    Glucose 112High  mg/dL    BUN 11 mg/dL    CREATININE 0.8 mg/dL    GFR Non-African American >60 mL/min/1.73    Comment: Chronic Kidney Disease: less than 60 ml/min/1.73 sq. m.         Kidney Failure: less than 15 ml/min/1.73 sq.m. Results valid for patients 18 years and older.         GFR African American >60    Calcium 8.9 mg/dL    Total Protein 6.5 g/dL    Alb 4.0 g/dL Total Bilirubin <0.2 mg/dL    Alkaline Phosphatase 195High  U/L    ALT 44High  U/L    AST 28 U/L   Narrative:     CALL  [facility]  H44 tel. ,  Rejected Test Name/Called to:velasquez cannon rn, 10/27/2019 01:03, by  [facility]  Collection has been rescheduled by [facility] at 10/26/2019 21:45 Reason: pt   is rrt   Magnesium [[id number]]    Collected: 10/27/19 0026    Updated: 10/27/19 0114    Specimen Source: Blood     Magnesium 2.0 mg/dL   Narrative:     CALL  [facility]  H44 tel. ,  Rejected Test Name/Called to:velasquez cannon rn, 10/27/2019 01:03, by  [facility]  Collection has been rescheduled by [facility] at 10/26/2019 21:45 Reason: pt   is rrt   Phosphorus [[id number]]    Collected: 10/27/19 0026    Updated: 10/27/19 0114    Specimen Source: Blood     Phosphorus 3.8 mg/dL   Narrative:     CALL  [facility]  H44 tel. ,  Rejected Test Name/Called to:velasquez cannon rn, 10/27/2019 01:03, by  [facility]  Collection has been rescheduled by [facility] at 10/26/2019 21:45 Reason: pt   is rrt   Lactic acid, plasma [[id number]]    Collected: 10/27/19 0026    Updated: 10/27/19 0108    Specimen Source: Blood     Lactic Acid 2.0 mmol/L   Narrative:     CALL  [facility]  H44 tel. ,  Rejected Test Name/Called to:velasquez cannon rn, 10/27/2019 01:03, by  [facility]  Collection has been rescheduled by [facility] at 10/26/2019 21:45 Reason: pt   is rrt   [address] [[id number]]    Collected: 10/27/19 0103    Updated: 10/27/19 0105     Rejected Test velasquez    Reason for Rejection see below    Comment: Unable to perform testing; specimen clotted.    To perform testing the specimen will need to be recollected.  Clotted       Narrative:     CALL  [facility]  H44 tel. ,  Rejected Test Name/Called to:velasquez cannon rn, 10/27/2019 01:03, by  [facility]   Blood Gas, Arterial [[id number]] (Abnormal)    Resulted: 10/27/19 0036    Updated: 10/27/19 0037    Specimen Source: Blood     Date Analyzed [id number]    Time Analyzed 0036    Source: Blood Arterial    pH, Blood Gas 7.369    PCO2 48.1High  mmHg    PO2 135. 2High  mmHg    HCO3 27.1High  mmol/L    B.E. 1.3 mmol/L    O2 Sat 98. 6High  %    PO2/FIO2 2.25 mmHg/%    AaDO2 224.7 mmHg    RI(T) 1.66    O2Hb 98. 2High  %    COHb 0.0 %    MetHb 0.4 %    O2 Content 15.8 mL/dL    HHb 1.4 %    tHb (est) 11.3Low  g/dL    Mode AC    FIO2 60.0 %    Rr Mechanical 14.0 b/min    Vt Mechanical 450.0 mL    Peep/Cpap 5.0 cmH2O    Date Of Collection --    Time Collected --    Pt Temp 37.0 C     ID [id number]    Lab [id number]    Critical(s) Notified . No Critical Values   XR ABDOMEN FOR NG/OG/NE TUBE PLACEMENT [[id number]]    Resulted: 10/27/19 0029    Updated: 10/27/19 0031    Narrative:     Patient MRN:  [id number]  : 1970  Age: 52 years  Gender: Female    Order Date:  10/26/2019 10:30 PM        TECHNIQUE/NUMBER OF IMAGES/COMPARISON/CLINICAL HISTORY:    Abdomen supine view    1 image one view    History NG tube placement. FINDINGS: NG tube tip in the stomach in good position. This study not  intended to full evaluate the chest or the abdomen. There is a relative low position for the endotracheal tube. This can  be relate with the plain radiographs of the chest.   Impression:     NG tube in good position. Relative low position for an endotracheal tube. Can correlate with the  chest radiograph. CT CERVICAL SPINE WO CONTRAST [[id number]]    Resulted: 10/26/19 2320    Updated: 10/26/19 2322    Narrative:     Patient MRN:  [id number]  : 1970  Age: 52 years  Gender: Female    Order Date:  10/26/2019 10:30 PM        TECHNIQUE/NUMBER OF IMAGES/COMPARISON/CLINICAL HISTORY:    CT cervical    Axial with sagittal coronal and oblique reconstructions    26-year-old female patient is seizure trauma injury. Comparison study . FINDINGS: Vertebral bodies have normal height. Disc spaces are  well-maintained. Alignment is preserved in the sagittal and coronal  images are    The odontoid process intact.  Articular relation between the occipital  condyles and C1 and between C1 and C2 are preserved. All facet joints are well aligned the. There is no bone or soft tissue encroachment of the cervical spine  canal or neural foramina. There are intact appearance for pedicles, spinous process and  transverse process. All facet joints are well aligned. Patient has a orotracheal tube and orogastric tube. No acute fractures are seen in the cervical spine. Lung apices demonstrate a lateral pleural effusions. Impression:     No acute fractures or dislocations identified in the  cervical spine. CT Head WO Contrast [[id number]]    Resulted: 10/26/19 2315    Updated: 10/26/19 2317    Narrative:     Patient MRN:  [id number]  : 1970  Age: 52 years  Gender: Female    Order Date:  10/26/2019 10:30 PM        TECHNIQUE/NUMBER OF IMAGES/COMPARISON/CLINICAL HISTORY:    CT brain    Axial images were obtained sagittal and coronal reconstructions    History seizures and fall. 445 images    Comparison study of . FINDINGS: There is no indication for an acute intracranial hemorrhagic  event. There is no indication for a sizable effusion acute or recent  insult to the brain parenchyma. There is a left medial cranial fossa arachnoid cyst which is an old  finding likely on developmental bases. There is no focal mass defect or midline shift. Images with bone window settings demonstrate no significant findings. Midline structures have unremarkable appearance. Impression:     No interval change since the previous study of . No indication for an acute intracranial process. XR CHEST PORTABLE [[id number]]    Resulted: 10/26/19 2243    Updated: 10/26/19 2246    Narrative:     Patient MRN:  [id number]  : 1970  Age: 52 years  Gender: Female    Order Date:  10/26/2019 9:45 PM        TECHNIQUE/NUMBER OF IMAGES/COMPARISON/CLINICAL HISTORY:    Chest AP    1 image, one view    Comparison  7 day.     After endotracheal tube placement. FINDINGS: Endotracheal tube is in the lower borderline position the  level of the lower margin of the aorta, it is proximal to the porfirio. NG tube in good position below diaphragma. Expiratory study causing  some crowding of the bronchovascular markings. No sizable infiltrates  or consolidations are seen. Impression:     Endotracheal tube in low borderline position at the level of the lower  arch of the aorta, proximal to the porfirio. NG tube in good position    Expiratory study, no acute cardiopulmonary process. POCT Glucose [[id number]] (Abnormal)    Collected: 10/26/19 2118    Updated: 10/26/19 2139     Meter Glucose 112High  mg/dL   Culture blood #2 [[id number]]    Collected: 10/25/19 1708    Updated: 10/26/19 2135    Specimen Source: Blood     Culture, Blood 2 24 Hours- no growth   Narrative:     Source: BLOOD       Site:              Culture blood #1 [[id number]]    Collected: 10/25/19 1701    Updated: 10/26/19 2135    Specimen Type: Blood     Blood Culture, Routine 24 Hours- no growth   Narrative:     Source: BLOOD       Site: Blood&Blood                   XR CHEST PORTABLE   Final Result      Endotracheal tube tip is encroaching upon the right mainstem bronchus. Opacities in the right lung base are suggestive of atelectasis. Hazy opacification in the left lung base may also be due to   atelectasis, although a developing infiltrate and/or pleural effusion   is difficult to exclude, especially given the low lung volumes. Large cardiac silhouette, similar to prior studies, compatible with   cardiomegaly and small pericardial effusion, which can be seen on the   recent CTA. XR CHEST PORTABLE   Final Result   Cardiomegaly. Retrocardiac density which may represent atelectasis   and/or infiltrate. CT CERVICAL SPINE WO CONTRAST   Final Result   No acute fractures or dislocations identified in the   cervical spine.        CT Head WO Contrast   Final Result above    Plan:  Continue Keppra 1500 mg twice daily  Vimpat started per neurology  Wean sedation as tolerated  Cardiology input appreciated        Moshe Soriano MD  8:09 AM  10/28/2019

## 2019-10-28 NOTE — FLOWSHEET NOTE
Patient remains intubated. Will attempt to pull ett and lines when restraints loosened. 2 point soft restraints continued for safety.

## 2019-10-28 NOTE — PLAN OF CARE
Problem: Falls - Risk of:  Goal: Will remain free from falls  Description  Will remain free from falls  10/27/2019 2211 by Carolina Bonilla RN  Outcome: Met This Shift     Problem: Falls - Risk of:  Goal: Absence of physical injury  Description  Absence of physical injury  10/27/2019 2211 by Carolina Bonilla RN  Outcome: Met This Shift     Problem: Restraint Use - Nonviolent/Non-Self-Destructive Behavior:  Goal: Absence of restraint-related injury  Description  Absence of restraint-related injury  10/27/2019 2211 by Carolina Bonilla RN  Outcome: Met This Shift     Problem: Restraint Use - Nonviolent/Non-Self-Destructive Behavior:  Goal: Absence of restraint indications  Description  Absence of restraint indications  10/27/2019 2211 by Carolina Bonilla RN  Outcome: Not Met This Shift  [id number] by Ulises Meléndez RN  Outcome: Not Met This Shift

## 2019-10-28 NOTE — PROCEDURES
EEG Report  Prema Pereira is a 52 y.o. female      Appointment Date 10/28/2019 Appointment Time 2:30 PM   [place] [facility] [place]  [id number]   Type of Study Routine Floor 4429     Technical Specifications  Technician [facility] of consciousness Awake   Sleep deprived? No   Hyperventilation tested? No   Photic stim tested? No   EEG recording Standard 10-20 electrode placement    Duration of recording Ongoing   EEG complete?  Ongoing       Clinical History  Sz    Medications    Current Facility-Administered Medications:     lidocaine 1 % injection 5 mL, 5 mL, Intradermal, Once, Glynis Leyden, MD    sodium chloride flush 0.9 % injection 10 mL, 10 mL, Intravenous, PRN, Glynis Leyden, MD    heparin flush (100 units/mL) injection 100 Units, 1 mL, Intravenous, 2 times per day, Glynis Leyden, MD    heparin flush (100 units/mL) injection 100 Units, 1 mL, Intracatheter, PRN, Glynis Leyden, MD    albuterol (PROVENTIL) nebulizer solution 2.5 mg, 2.5 mg, Nebulization, Q6H PRN, Hillary Hodge MD    glucose (GLUTOSE) 40 % oral gel 15 g, 15 g, Oral, PRN, Flakito Cardozo MD    dextrose 50 % IV solution, 12.5 g, Intravenous, PRN, Flakito Cardozo MD    glucagon (rDNA) injection 1 mg, 1 mg, Intramuscular, PRN, Flakito Cardozo MD    dextrose 5 % solution, 100 mL/hr, Intravenous, PRN, Flakito Cardozo MD    pantoprazole (PROTONIX) injection 40 mg, 40 mg, Intravenous, Daily **AND** sodium chloride (PF) 0.9 % injection 10 mL, 10 mL, Intravenous, Daily, Flakito Cardozo MD    perflutren lipid microspheres (DEFINITY) injection 1.65 mg, 1.5 mL, Intravenous, ONCE PRN, Hailey Martin APRN - CNP    lacosamide (VIMPAT) 100 mg in dextrose 5 % 50 mL IVPB, 100 mg, Intravenous, BID, ZACARIAS Morgan, Stopped at 10/27/19 2205    fentaNYL 5 mcg/ml in 0.9%  ml infusion, 25 mcg/hr, Intravenous, Continuous, Flakito Cardozo MD, Stopped at 10/28/19 1544    hydrALAZINE (APRESOLINE) injection 10 mg, 10 mg, Intravenous, Q6H PRN, Flakito Cardozo MD  [address] [place] [address] neomycin-bacitracin-polymyxin (NEOSPORIN) ointment, , Topical, BID PRN, Carletha Moses, DO    propofol injection, 10 mcg/kg/min, Intravenous, Titrated, Cesar Emery MD, Stopped at 10/28/19 1544    levetiracetam (KEPPRA) 1500 mg/100 mL IVPB, 1,500 mg, Intravenous, Q12H, Tavo French MD, Stopped at 10/28/19 0028    fentaNYL (SUBLIMAZE) injection 50 mcg, 50 mcg, Intravenous, Q1H PRN, Tavo French MD    influenza quadrivalent split vaccine (FLUZONE;FLUARIX;FLULAVAL;AFLURIA) injection 0.5 mL, 0.5 mL, Intramuscular, Prior to discharge, Ilsa Pandya MD    losartan (COZAAR) tablet 100 mg, 100 mg, Oral, Daily, Carletha Moses, DO, 100 mg at 10/28/19 3004    melatonin tablet 3 mg, 3 mg, Oral, Nightly, Carletha Moses, DO, 3 mg at 10/25/19 2229    pravastatin (PRAVACHOL) tablet 20 mg, 20 mg, Oral, Daily, Carletha Moses, DO, 20 mg at 10/25/19 2228    sodium chloride flush 0.9 % injection 10 mL, 10 mL, Intravenous, 2 times per day, Carletha Moses, DO, 10 mL at 10/28/19 9800    magnesium hydroxide (MILK OF MAGNESIA) 400 MG/5ML suspension 30 mL, 30 mL, Oral, Daily PRN, Carletha Moses, DO    ondansetron [facility]) injection 4 mg, 4 mg, Intravenous, Q6H PRN, Carletha Moses, DO    enoxaparin (LOVENOX) injection 40 mg, 40 mg, Subcutaneous, Daily, Carletha Moses, DO, 40 mg at 10/28/19 9676    perflutren lipid microspheres (DEFINITY) injection 1.65 mg, 1.5 mL, Intravenous, ONCE PRN, Carletha Moses, DO    Facility-Administered Medications Ordered in Other Encounters:     propofol injection, , , PRN, Charlie Godfrey RN, 200 mg at 10/26/19 2135    succinylcholine (ANECTINE) injection, , , PRN, Charlie Godfrey, RN, 140 mg at 10/26/19 0310        Physician Interpretation    General EEG Report        Epileptiform Discharge   Sharp waves, max C3      Non-Epileptiform Abnormality  No clear PDR but the patient has blinking artifact.       Ictal  None    General Impression  This 9 hours continuous EEG shows epliptogenicity

## 2019-10-28 NOTE — PROGRESS NOTES
Jennifer Salazar is a 52 y.o.  female     Neurology is following for seizure     PMH significant for HTN, HLD and seizures     Patient reports hx of seizure since 2012. Initially she was told that it was due to a reaction to Reglan. Patient reports MRI done at [facility] which revealed an arachnoid cyst.     Presented with suspected BT seizure  ---noted to have several staring spells   ---ran out of her Keppra a few days prior and was taking a left over Lamictal rx that she had     The patient saw Dr. Cortney Patiño in 2/2019  ---At that visit, there was vague history surrounding her seizures    Per chart review, the patient stated that she was driving and that \"if the doctor was going to take away her license, she would leave\". She was informed about the cyst on her scan and that records from [facility] would need to be obtained. It was also noted that the cyst should be evaluated by a neurosurgeon. The patient said that she was \"at the wrong office\" and \"sorry that she wasted our time\" and left the appointment. Through chart review, the patient was seen by Dr. Poonam Cobb in 8/2019 for seizure activity  ---EEG at that time was normal  --- Keppra was stopped and she was started on Lamotrigine   ---probable nonepileptic spells  ---admitted for inpatient psych at that time for mood disorder and probable conversion disorder    RRT on 10/25 for decreased responsiveness  ---Given 2 mg Ativan  ---Keppra was increased to 1 G BID at that time  ---10/26- patient found to be standing in the bathroom, not responding to verbal direction and picking at her waistband  ---Keppra increased to 1500 mg BID.  Patient with post ictal confusion afterwards   ---RRT last pm 10/26- patient had unwitnessed fall, RRT team found patient to be very rigid, unresponsive, clenching her jaw, biting her tongue  ---Given 4 mg of Ativan w/o resolution of sx  ---Intubated for airway protection and transferred to MICU    Patient remains in the MICU, intubated and sedated to discharge Allen Frazier MD        losartan (COZAAR) tablet 100 mg  100 mg Oral Daily Felix Actis, DO   100 mg at 10/28/19 4142    melatonin tablet 3 mg  3 mg Oral Nightly Shobonier Actis, DO   3 mg at 10/25/19 2229    pantoprazole (PROTONIX) tablet 40 mg  40 mg Oral QAM AC Felix Actis, DO   40 mg at 10/26/19 2981    pravastatin (PRAVACHOL) tablet 20 mg  20 mg Oral Daily Shobonier Actis, DO   20 mg at 10/25/19 2228    sodium chloride flush 0.9 % injection 10 mL  10 mL Intravenous 2 times per day Shobonier Actis, DO   10 mL at 10/28/19 5396    magnesium hydroxide (MILK OF MAGNESIA) 400 MG/5ML suspension 30 mL  30 mL Oral Daily PRN Shobonier Actis, DO        on[facility]ron [facility]) injection 4 mg  4 mg Intravenous Q6H PRN Shobonier Actis, DO        enoxaparin (LOVENOX) injection 40 mg  40 mg Subcutaneous Daily Shobonier Actis, DO   40 mg at 10/28/19 8311    perflutren lipid microspheres (DEFINITY) injection 1.65 mg  1.5 mL Intravenous ONCE PRN Felix Actis, DO         Facility-Administered Medications Ordered in Other Encounters   Medication Dose Route Frequency Provider Last Rate Last Dose    propofol injection    PRN Melanie Cogan, RN   200 mg at 10/26/19 2135    succinylcholine (ANECTINE) injection    PRN Melanie Cogan, RN   140 mg at 10/26/19 2135     Objective:     BP (!) 166/105   Pulse 96   Temp 99 °F (37.2 °C) (Esophageal)   Resp 19   Wt 195 lb 6.4 oz (88.6 kg)   SpO2 100%   BMI 34.61 kg/m²      General appearance: Intubated, sedated. In no acute distress   Head: Normocephalic, without obvious abnormality, atraumatic  Eyes: conjunctivae/corneas clear. Neck: no adenopathy, no carotid bruit, no JVD, supple, symmetrical, trachea midline and thyroid not enlarged, symmetric, no tenderness/mass/nodules  Lungs: mechanically ventilated. Breathing same rate as vent. Effort normal   Heart: NSR on tele.    Extremities: extremities normal, atraumatic, no cyanosis or edema-- bilateral soft wrist restraints in place and secured   Pulses: 2+ and symmetric  Skin: Skin color, texture, turgor normal. No rashes or lesions     Mental Status: Intubated. Sedated. Not following commands. Only opens her eyes when name is called.     Exam limited due to intubation and sedation     Cranial Nerves:  I: smell    II: visual acuity     II: visual fields    II: pupils JR   III,VII: ptosis None   III,IV,VI: extraocular muscles  + VOR    V: mastication    V: facial light touch sensation     V,VII: corneal reflex     VII: facial muscle function - upper     VII: facial muscle function - lower Appears symmetric    VIII: hearing    IX: soft palate elevation     IX,X: gag reflex    XI: trapezius strength     XI: sternocleidomastoid strength    XI: neck extension strength     XII: tongue strength       Motor/Sensory:  Does not withdraw to noxious stimuli in all extremities   No spontaneous or purposeful movement at time of my exam   No abnormal movements seen      DTR:   No Babinskis    No pathological reflexes    Laboratory/Radiology:     CBC with Differential:    Lab Results   Component Value Date    WBC 6.8 10/27/2019    RBC 3.70 10/27/2019    HGB 9.9 10/27/2019    HCT 32.2 10/27/2019     10/27/2019    MCV 87.0 10/27/2019    MCH 26.8 10/27/2019    MCHC 30.7 10/27/2019    RDW 14.8 10/27/2019    SEGSPCT 70 11/30/2013    LYMPHOPCT 24.2 10/27/2019    MONOPCT 3.5 10/27/2019    BASOPCT 0.3 10/27/2019    MONOSABS 0.24 10/27/2019    LYMPHSABS 1.65 10/27/2019    EOSABS 0.02 10/27/2019    BASOSABS 0.02 10/27/2019     CMP:    Lab Results   Component Value Date     10/27/2019    K 4.7 10/27/2019    K 4.4 10/24/2019    CL 99 10/27/2019    CO2 26 10/27/2019    BUN 11 10/27/2019    CREATININE 0.8 10/27/2019    GFRAA >60 10/27/2019    LABGLOM >60 10/27/2019    GLUCOSE 112 10/27/2019    GLUCOSE 115 03/31/2012    PROT 6.5 10/27/2019    LABALBU 4.0 10/27/2019    LABALBU 4.3 03/31/2012    CALCIUM 8.9 10/27/2019    BILITOT <0.2 10/27/2019    ALKPHOS 195 10/27/2019    AST 28 10/27/2019    ALT 44 10/27/2019     Results for Belia Ramos (MRN [id number]) as of 10/26/2019 13:59   Ref. Range 10/24/2019 23:00   Color, UA Latest Ref Range: Straw/Yellow  Yellow   Clarity, UA Latest Ref Range: Clear  CLOUDY (A)   Glucose, UA Latest Ref Range: Negative mg/dL Negative   Bilirubin, Urine Latest Ref Range: Negative  Negative   Ketones, Urine Latest Ref Range: Negative mg/dL Negative   Specific Gravity, UA Latest Ref Range: 1.005 - 1.030  1.020   Blood, Urine Latest Ref Range: Negative  Negative   pH, UA Latest Ref Range: 5.0 - 9.0  7.5   Protein, UA Latest Ref Range: Negative mg/dL TRACE   Urobilinogen, Urine Latest Ref Range: <2.0 E.U./dL 0.2   Nitrite, Urine Latest Ref Range: Negative  POSITIVE (A)   Leukocyte Esterase, Urine Latest Ref Range: Negative  SMALL (A)   WBC, UA Latest Ref Range: 0 - 5 /HPF 5-10   RBC, UA Latest Ref Range: 0 - 2 /HPF NONE   Epi Cells Latest Units: /HPF FEW   Bacteria, UA Latest Units: /HPF MANY (A)       CTH  1. There is no acute intracranial abnormality. 2. Stable arachnoid cyst within the left middle cranial fossa with  mild mass effect upon the anterior aspect of the left temporal lobe. EEG 8/2019  Normal awake and sleep EEG. A normal EEG does  not rule out seizure disorder. Clinical correlation advised. I personally reviewed all labs and images today     Assessment:     BT seizures   ---Hx medication non-compliance   ---CTH with stable arachnoid cyst with mild mass effect upon the anterior aspect of the L temporal lobe --? Possible seizure focus   ---EEG 8/2019 normal  ---?  Psychogenic seizures in the past per chart and hx of mood disorder and conversion d/o    ---no reported seizures by nursing staff although pt is sedated     Pericardial effusion  ---Cardiology following     Plan:     Continuous EEG pending although pt is sedated at this time     Continue Keppra 1500 mg bid    Continue Vimpat 100 mg BID Wean sedation as able     Seizure precautions     No driving     Neurology will follow       TITI Franco, CNP  10:21 AM  10/28/2019

## 2019-10-28 NOTE — PROGRESS NOTES
Attending Physician Attestation: Dr. Nain Turcios    Thank you very much for allowing me to see this patient in consultation and follow up. I personally saw, examined and provided care for the patient. Radiographs, labs and medication list were reviewed by me independently. I spoke with bedside nursing, respiratory therapists and consultants. Critical care services and times documented are independent of procedures and multidisciplinary rounds with Residents. Additionally comprehensive, multidisciplinary rounds were conducted with the MICU team. The case was discussed in detail and plans for care were established. Review of Residents documentation was conducted and revisions were made as appropriate. I agree with the the above documented information. ASSESSMENT:  1.) Status Epilepticus vs Psychogenic Seizures   2.) Acute Respiratory Failure with Hypoxia     In addition the following applies:    Check: reviewed   Medication Alterations: N/A   Procedures: EEG  Imaging: reviewed  New Consultations: N/A  VENT: ACVC, wean, extubation to room air      - look to transfer patient out of ICU level of care    Access: peripheral   Consults: Neurology, Cardiology   Drips: N/A    Thank you for allowing me to participate in the care of this patient. Care reviewed with nursing staff, medical and surgical specialty care, primary care and the patient's family as available. Restraints are ordered when the patient can do harm to him/herself by pulling out devices.     Critical care time spent reviewing labs/films, examining patient, collaborating with other physicians but excluding procedures for life threatening organ failure is:    Chart review/lab review/X-ray viewing/documentation: 10 minutes  Assessment: 10 minutes  Conversation patient/family re: prognosis, care options and any end of life issues: 10 minutes  Conversation with staff: 5 minutes    Critical Care Time: > 35 minutes excluding procedures    Nain Turcios

## 2019-10-28 NOTE — PLAN OF CARE
Problem: Falls - Risk of:  Goal: Will remain free from falls  Description  Will remain free from falls  10/28/2019 0223 by Stephane Carrasco RN  Outcome: Met This Shift     Problem: Falls - Risk of:  Goal: Absence of physical injury  Description  Absence of physical injury  10/28/2019 0223 by Stephane Carrasco RN  Outcome: Met This Shift     Problem: Pain:  Goal: Pain level will decrease  Description  Pain level will decrease  Outcome: Met This Shift     Problem: Restraint Use - Nonviolent/Non-Self-Destructive Behavior:  Goal: Absence of restraint-related injury  Description  Absence of restraint-related injury  10/28/2019 0223 by Stephane Carrasco RN  Outcome: Met This Shift     Problem: Risk for Impaired Skin Integrity  Goal: Tissue integrity - skin and mucous membranes  Description  Structural intactness and normal physiological function of skin and  mucous membranes.   Outcome: Met This Shift     Problem: Restraint Use - Nonviolent/Non-Self-Destructive Behavior:  Goal: Absence of restraint indications  Description  Absence of restraint indications  10/28/2019 0223 by Stephane Carrasco RN  Outcome: Not Met This Shift

## 2019-10-29 ENCOUNTER — APPOINTMENT (OUTPATIENT)
Dept: GENERAL RADIOLOGY | Age: 49
DRG: 053 | End: 2019-10-29
Attending: INTERNAL MEDICINE
Payer: MEDICAID

## 2019-10-29 LAB
ALBUMIN SERPL-MCNC: 4 G/DL (ref 3.5–5.2)
ALP BLD-CCNC: 204 U/L (ref 35–104)
ALT SERPL-CCNC: 43 U/L (ref 0–32)
ANION GAP SERPL CALCULATED.3IONS-SCNC: 15 MMOL/L (ref 7–16)
AST SERPL-CCNC: 32 U/L (ref 0–31)
BASOPHILS ABSOLUTE: 0.02 E9/L (ref 0–0.2)
BASOPHILS RELATIVE PERCENT: 0.3 % (ref 0–2)
BILIRUB SERPL-MCNC: 0.6 MG/DL (ref 0–1.2)
BUN BLDV-MCNC: 6 MG/DL (ref 6–20)
CALCIUM SERPL-MCNC: 9.2 MG/DL (ref 8.6–10.2)
CHLORIDE BLD-SCNC: 102 MMOL/L (ref 98–107)
CO2: 23 MMOL/L (ref 22–29)
CREAT SERPL-MCNC: 0.7 MG/DL (ref 0.5–1)
EOSINOPHILS ABSOLUTE: 0.08 E9/L (ref 0.05–0.5)
EOSINOPHILS RELATIVE PERCENT: 1.1 % (ref 0–6)
GFR AFRICAN AMERICAN: >60
GFR NON-AFRICAN AMERICAN: >60 ML/MIN/1.73
GLUCOSE BLD-MCNC: 93 MG/DL (ref 74–99)
HCT VFR BLD CALC: 34.9 % (ref 34–48)
HEMOGLOBIN: 10.8 G/DL (ref 11.5–15.5)
IMMATURE GRANULOCYTES #: 0.03 E9/L
IMMATURE GRANULOCYTES %: 0.4 % (ref 0–5)
LYMPHOCYTES ABSOLUTE: 2.45 E9/L (ref 1.5–4)
LYMPHOCYTES RELATIVE PERCENT: 32.6 % (ref 20–42)
MCH RBC QN AUTO: 26.8 PG (ref 26–35)
MCHC RBC AUTO-ENTMCNC: 30.9 % (ref 32–34.5)
MCV RBC AUTO: 86.6 FL (ref 80–99.9)
METER GLUCOSE: 105 MG/DL (ref 74–99)
METER GLUCOSE: 99 MG/DL (ref 74–99)
MONOCYTES ABSOLUTE: 0.27 E9/L (ref 0.1–0.95)
MONOCYTES RELATIVE PERCENT: 3.6 % (ref 2–12)
NEUTROPHILS ABSOLUTE: 4.67 E9/L (ref 1.8–7.3)
NEUTROPHILS RELATIVE PERCENT: 62 % (ref 43–80)
PDW BLD-RTO: 14.6 FL (ref 11.5–15)
PLATELET # BLD: 341 E9/L (ref 130–450)
PMV BLD AUTO: 8.7 FL (ref 7–12)
POTASSIUM SERPL-SCNC: 4 MMOL/L (ref 3.5–5)
RBC # BLD: 4.03 E12/L (ref 3.5–5.5)
SODIUM BLD-SCNC: 140 MMOL/L (ref 132–146)
TOTAL PROTEIN: 7 G/DL (ref 6.4–8.3)
TSH SERPL DL<=0.05 MIU/L-ACNC: 1.8 UIU/ML (ref 0.27–4.2)
WBC # BLD: 7.5 E9/L (ref 4.5–11.5)

## 2019-10-29 PROCEDURE — 82962 GLUCOSE BLOOD TEST: CPT

## 2019-10-29 PROCEDURE — 71045 X-RAY EXAM CHEST 1 VIEW: CPT

## 2019-10-29 PROCEDURE — C9254 INJECTION, LACOSAMIDE: HCPCS | Performed by: PHYSICIAN ASSISTANT

## 2019-10-29 PROCEDURE — 80053 COMPREHEN METABOLIC PANEL: CPT

## 2019-10-29 PROCEDURE — 2580000003 HC RX 258: Performed by: INTERNAL MEDICINE

## 2019-10-29 PROCEDURE — 6370000000 HC RX 637 (ALT 250 FOR IP): Performed by: INTERNAL MEDICINE

## 2019-10-29 PROCEDURE — 2700000000 HC OXYGEN THERAPY PER DAY

## 2019-10-29 PROCEDURE — 6360000002 HC RX W HCPCS: Performed by: INTERNAL MEDICINE

## 2019-10-29 PROCEDURE — 99233 SBSQ HOSP IP/OBS HIGH 50: CPT | Performed by: NURSE PRACTITIONER

## 2019-10-29 PROCEDURE — 2060000000 HC ICU INTERMEDIATE R&B

## 2019-10-29 PROCEDURE — 36415 COLL VENOUS BLD VENIPUNCTURE: CPT

## 2019-10-29 PROCEDURE — 0CJS8ZZ INSPECTION OF LARYNX, VIA NATURAL OR ARTIFICIAL OPENING ENDOSCOPIC: ICD-10-PCS | Performed by: OTOLARYNGOLOGY

## 2019-10-29 PROCEDURE — C9113 INJ PANTOPRAZOLE SODIUM, VIA: HCPCS | Performed by: INTERNAL MEDICINE

## 2019-10-29 PROCEDURE — 2580000003 HC RX 258: Performed by: PHYSICIAN ASSISTANT

## 2019-10-29 PROCEDURE — 6370000000 HC RX 637 (ALT 250 FOR IP): Performed by: RADIOLOGY

## 2019-10-29 PROCEDURE — 84443 ASSAY THYROID STIM HORMONE: CPT

## 2019-10-29 PROCEDURE — 6370000000 HC RX 637 (ALT 250 FOR IP): Performed by: NURSE PRACTITIONER

## 2019-10-29 PROCEDURE — 6360000002 HC RX W HCPCS: Performed by: PHYSICIAN ASSISTANT

## 2019-10-29 PROCEDURE — 85025 COMPLETE CBC W/AUTO DIFF WBC: CPT

## 2019-10-29 RX ORDER — BUPRENORPHINE HYDROCHLORIDE AND NALOXONE HYDROCHLORIDE DIHYDRATE 8; 2 MG/1; MG/1
1 TABLET SUBLINGUAL 2 TIMES DAILY
Status: DISCONTINUED | OUTPATIENT
Start: 2019-10-29 | End: 2019-11-05 | Stop reason: HOSPADM

## 2019-10-29 RX ORDER — LACOSAMIDE 50 MG/1
100 TABLET ORAL 2 TIMES DAILY
Status: DISCONTINUED | OUTPATIENT
Start: 2019-10-29 | End: 2019-10-30

## 2019-10-29 RX ORDER — BENZONATATE 100 MG/1
100 CAPSULE ORAL 3 TIMES DAILY PRN
Status: DISCONTINUED | OUTPATIENT
Start: 2019-10-29 | End: 2019-11-05 | Stop reason: HOSPADM

## 2019-10-29 RX ORDER — LEVETIRACETAM 500 MG/1
1500 TABLET ORAL 2 TIMES DAILY
Status: DISCONTINUED | OUTPATIENT
Start: 2019-10-29 | End: 2019-10-30

## 2019-10-29 RX ADMIN — LOSARTAN POTASSIUM 100 MG: 50 TABLET, FILM COATED ORAL at 08:42

## 2019-10-29 RX ADMIN — BENZONATATE 100 MG: 100 CAPSULE ORAL at 17:47

## 2019-10-29 RX ADMIN — ACETAMINOPHEN 650 MG: 325 TABLET, FILM COATED ORAL at 06:07

## 2019-10-29 RX ADMIN — Medication 10 ML: at 22:37

## 2019-10-29 RX ADMIN — CHLORASEPTIC 1 SPRAY: 1.5 LIQUID ORAL at 06:07

## 2019-10-29 RX ADMIN — DEXTROSE MONOHYDRATE 100 MG: 50 INJECTION, SOLUTION INTRAVENOUS at 08:43

## 2019-10-29 RX ADMIN — LEVETIRACETAM 1500 MG: 15 INJECTION INTRAVENOUS at 11:04

## 2019-10-29 RX ADMIN — PANTOPRAZOLE SODIUM 40 MG: 40 INJECTION, POWDER, FOR SOLUTION INTRAVENOUS at 11:05

## 2019-10-29 RX ADMIN — LACOSAMIDE 100 MG: 50 TABLET, FILM COATED ORAL at 21:04

## 2019-10-29 RX ADMIN — BUPRENORPHINE HYDROCHLORIDE AND NALOXONE HYDROCHLORIDE DIHYDRATE 1 TABLET: 8; 2 TABLET SUBLINGUAL at 21:05

## 2019-10-29 RX ADMIN — CHLORASEPTIC 1 SPRAY: 1.5 LIQUID ORAL at 00:24

## 2019-10-29 RX ADMIN — ACETAMINOPHEN 650 MG: 325 TABLET, FILM COATED ORAL at 17:47

## 2019-10-29 RX ADMIN — ACETAMINOPHEN 650 MG: 325 TABLET, FILM COATED ORAL at 22:36

## 2019-10-29 RX ADMIN — Medication 10 ML: at 11:05

## 2019-10-29 RX ADMIN — ENOXAPARIN SODIUM 40 MG: 100 INJECTION SUBCUTANEOUS at 08:42

## 2019-10-29 RX ADMIN — MELATONIN 3 MG ORAL TABLET 3 MG: 3 TABLET ORAL at 21:04

## 2019-10-29 RX ADMIN — PRAVASTATIN SODIUM 20 MG: 20 TABLET ORAL at 21:42

## 2019-10-29 RX ADMIN — BUPRENORPHINE HYDROCHLORIDE AND NALOXONE HYDROCHLORIDE DIHYDRATE 1 TABLET: 8; 2 TABLET SUBLINGUAL at 13:27

## 2019-10-29 RX ADMIN — LEVETIRACETAM 1500 MG: 500 TABLET ORAL at 22:36

## 2019-10-29 ASSESSMENT — PAIN DESCRIPTION - ONSET
ONSET: ON-GOING
ONSET: ON-GOING

## 2019-10-29 ASSESSMENT — PAIN DESCRIPTION - LOCATION
LOCATION: GENERALIZED

## 2019-10-29 ASSESSMENT — PAIN SCALES - GENERAL
PAINLEVEL_OUTOF10: 4
PAINLEVEL_OUTOF10: 0
PAINLEVEL_OUTOF10: 2
PAINLEVEL_OUTOF10: 0
PAINLEVEL_OUTOF10: 3
PAINLEVEL_OUTOF10: 3
PAINLEVEL_OUTOF10: 0

## 2019-10-29 ASSESSMENT — PAIN - FUNCTIONAL ASSESSMENT
PAIN_FUNCTIONAL_ASSESSMENT: ACTIVITIES ARE NOT PREVENTED
PAIN_FUNCTIONAL_ASSESSMENT: ACTIVITIES ARE NOT PREVENTED

## 2019-10-29 ASSESSMENT — PAIN DESCRIPTION - DESCRIPTORS
DESCRIPTORS: ACHING;DISCOMFORT
DESCRIPTORS: ACHING
DESCRIPTORS: ACHING;DISCOMFORT

## 2019-10-29 ASSESSMENT — PAIN DESCRIPTION - PAIN TYPE
TYPE: CHRONIC PAIN

## 2019-10-29 ASSESSMENT — PAIN DESCRIPTION - FREQUENCY
FREQUENCY: INTERMITTENT
FREQUENCY: INTERMITTENT

## 2019-10-29 ASSESSMENT — PAIN DESCRIPTION - PROGRESSION
CLINICAL_PROGRESSION: NOT CHANGED
CLINICAL_PROGRESSION: NOT CHANGED

## 2019-10-29 NOTE — PROGRESS NOTES
10/29/2019    MCV 86.6 10/29/2019    MCH 26.8 10/29/2019    MCHC 30.9 10/29/2019    RDW 14.6 10/29/2019    SEGSPCT 70 11/30/2013    LYMPHOPCT 32.6 10/29/2019    MONOPCT 3.6 10/29/2019    BASOPCT 0.3 10/29/2019    MONOSABS 0.27 10/29/2019    LYMPHSABS 2.45 10/29/2019    EOSABS 0.08 10/29/2019    BASOSABS 0.02 10/29/2019     Magnesium:    Lab Results   Component Value Date    MG 2.0 10/27/2019     Phosphorus:    Lab Results   Component Value Date    PHOS 3.8 10/27/2019        CBC:   Recent Labs     10/27/19  0440 10/28/19  1810 10/29/19  0550   WBC 6.8 9.2 7.5   HGB 9.9* 11.0* 10.8*   HCT 32.2* 35.1 34.9   MCV 87.0 84.6 86.6    359 341       BMP:    Recent Labs     10/27/19  0026 10/28/19  1810 10/29/19  0550    138 140   K 4.7 4.1 4.0   CL 99 101 102   CO2 26 22 23   BUN 11 6 6   CREATININE 0.8 0.7 0.7   GLUCOSE 112* 120* 93       LIVER PROFILE:   Recent Labs     10/27/19  0026 10/28/19  1810 10/29/19  0550   AST 28 39* 32*   ALT 44* 45* 43*   BILITOT <0.2 0.3 0.6   ALKPHOS 195* 214* 204*       PT/INR:   No results for input(s): PROTIME, INR in the last 72 hours. APTT:   No results for input(s): APTT in the last 72 hours. Fasting Lipid Panel:    Lab Results   Component Value Date    CHOL 225 07/05/2012    TRIG 72 07/05/2012    HDL 69.0 07/05/2012       Cardiac Enzymes:    Lab Results   Component Value Date    CKTOTAL 122 07/19/2019    CKTOTAL 78 10/31/2017    CKTOTAL 74 10/31/2017    CKMB <1.0 10/31/2017    CKMB <1.0 10/31/2017    CKMB <0.3 09/21/2013    TROPONINI <0.01 10/25/2019    TROPONINI <0.01 10/25/2019    TROPONINI <0.01 10/24/2019         Notable Cultures:       Culture  Date sent  Result    Urine  10/25/19  E. Coli       Antibiotic  Day started  Days     None            Lines:  Site   Date inserted Days     Peripheral line   L brachial    10/28                     DVT Prophylaxis      Enoxaparin          PPI Prophylaxis      Protonix        Imaging studies:  Ct Head Wo Contrast    Result Date: 10/26/2019  Patient MRN:  [id number] : 1970 Age: 52 years Gender: Female Order Date:  10/26/2019 10:30 PM TECHNIQUE/NUMBER OF IMAGES/COMPARISON/CLINICAL HISTORY: CT brain Axial images were obtained sagittal and coronal reconstructions History seizures and fall. 445 images Comparison study of . FINDINGS: There is no indication for an acute intracranial hemorrhagic event. There is no indication for a sizable effusion acute or recent insult to the brain parenchyma. There is a left medial cranial fossa arachnoid cyst which is an old finding likely on developmental bases. There is no focal mass defect or midline shift. Images with bone window settings demonstrate no significant findings. Midline structures have unremarkable appearance. No interval change since the previous study of . No indication for an acute intracranial process. Ct Cervical Spine Wo Contrast    Result Date: 10/26/2019  Patient MRN:  [id number] : 1970 Age: 52 years Gender: Female Order Date:  10/26/2019 10:30 PM TECHNIQUE/NUMBER OF IMAGES/COMPARISON/CLINICAL HISTORY: CT cervical Axial with sagittal coronal and oblique reconstructions 70-year-old female patient is seizure trauma injury. Comparison study . FINDINGS: Vertebral bodies have normal height. Disc spaces are well-maintained. Alignment is preserved in the sagittal and coronal images are The odontoid process intact. Articular relation between the occipital condyles and C1 and between C1 and C2 are preserved. All facet joints are well aligned the. There is no bone or soft tissue encroachment of the cervical spine canal or neural foramina. There are intact appearance for pedicles, spinous process and transverse process. All facet joints are well aligned. Patient has a orotracheal tube and orogastric tube. No acute fractures are seen in the cervical spine. Lung apices demonstrate a lateral pleural effusions.      No acute fractures or are unremarkable. OSSEOUS STRUCTURES: No suspicious lytic or blastic lesions. No fractures. 1. No evidence of pulmonary embolism. 2. Small pericardial effusion. 3. 6 mm nodule in the right upper lobe is likely a perifissural nodule. Recommend follow-up. Xr Abdomen For Ng/og/ne Tube Placement    Result Date: 10/27/2019  Patient MRN:  [id number] : 1970 Age: 52 years Gender: Female Order Date:  10/26/2019 10:30 PM TECHNIQUE/NUMBER OF IMAGES/COMPARISON/CLINICAL HISTORY: Abdomen supine view 1 image one view History NG tube placement. FINDINGS: NG tube tip in the stomach in good position. This study not intended to full evaluate the chest or the abdomen. There is a relative low position for the endotracheal tube. This can be relate with the plain radiographs of the chest.     NG tube in good position. Relative low position for an endotracheal tube. Can correlate with the chest radiograph. Nm Cardiac Stress Test Nuclear Imaging    Result Date: 10/26/2019  Patient MRN:  [id number] : 1970 Age: 52 years Gender: Female Order Date:  10/26/2019 10:00 AM EXAM: NM MYOCARDIAL SPECT REST EXERCISE OR RX Number of Images:  9   views INDICATION:  Chest pain Reason for Exam?->Chest pain Procedure Type->Rx COMPARISON: None TECHNIQUE: 10.6 mCi of Tc-99m MIBI was injected intravenously at rest and cardiac SPECT images were performed. In addition 33 mCi of Tc-99m MIBI was injected intravenously at maximum stress by using Lexiscan stress. Stress SPECT images and gated study were performed. FINDINGS: Perfusion images demonstrate small anterior wall reversible defect Wall motion is within normal limits. The end diastolic volume is 54 ml. The end systolic volume is 17 ml. The estimated ejection fraction is 69 %. 1. There is a small reversible defect in the inferior wall 2. Ejection fraction is 69 %.  3. No significant wall motion abnormality ALERT:  THIS IS AN ABNORMAL REPORT       Resident's Assessment & Plan Aryan Miller a 52 y.o. female was tranfered to the MICU for status epilepticus and failure to protect airway.      Neurologic   Status epilepticus 2/2 noncompliance vs Psychogenic seizure   · Neurology on board  · On Keppra 1.5 mg twice daily, Vimpat 100 mg  · CT head showed no acute intracranial bleeds or processes  · Follow up continuous EEG       Unwitnessed fall with LOC  · CT cervical spine was negative for acute fractures or intra-spinal processes  · CT head without contrast was negative for acute intracranial events     Cardiology   Chest pain  · Told that patient initially complained of chest pain  · Stress test was negative for chest pain and EKG changes  · [place] showed reversible inferior wall defect, EF 69% with no wall motion abnormalities and reversible defect in inferior wall.       HTN  · Continue home med-  Losartan      Hyperlipidemia  · Continue home med-  Pravastatin     Pulmonary   Acute respiratory failure 2/2 inability to protect airway 2/2 status epilepticus  - resolved  · Patient was extubated 10/28/2019  · Patient is awake, alert, oriented. Passed swallow study. All meds changed to PO. Vocal cord injury vs paralysis   - Coarse voice   - ENT consulted      Gastrointestinal    Transaminites    - ALP - 204, ALT/AST- 43/32    Infectious disease  UTI vs colonization  · UA shows positive nitrates small leukocyte esterase 5-10 WBCs many bacteria. Culture positive for E. coli  · History of ESBL  · Afebrile without leucocytosis  · Antibiotics not indicated at this time     Hematology/Oncology    Anemia  · Initial hemoglobin 11.8  · Today 10.8  · Monitor H&H     Consults  1. Neurology   2. ENT     DVT/GI prophylaxis: Enoxaparin/PPI  Disposition: MICU.  Plan to transfer out today.   Ryan Loaiza MD, PGY-1  Attending physician: Dr. Lebron Weeks    Attending Physician Attestation: Dr. Jung Quezada    Thank you very much for allowing me to see this patient in consultation and follow

## 2019-10-29 NOTE — PROGRESS NOTES
Dandre  is a 52 y.o.  female     Neurology is following for seizure     PMH significant for HTN, HLD and seizures     Presented with suspected BT seizure  ---noted to have several staring spells   ---ran out of her Keppra a few days prior and was taking a left over Lamictal rx that she had   ---saw Dr. Mark Delgado in 2019  ---At that visit, there was vague history surrounding her seizures    Per chart review, the patient stated that she was driving and that \"if the doctor was going to take away her license, she would leave\". She was informed about the cyst on her scan and that records from [facility] would need to be obtained. It was also noted that the cyst should be evaluated by a neurosurgeon. The patient said that she was \"at the wrong office\" and \"sorry that she wasted our time\" and left the appointment. Patient was seen by Dr. Debbie Floyd in 2019 for seizure activity  ---EEG at that time was normal  --- Keppra was stopped and she was started on Lamotrigine   ---probable nonepileptic spells  ---admitted for inpatient psych at that time for mood disorder and probable conversion disorder    RRT on 10/25 for decreased responsiveness  ---Given 2 mg Ativan  ---Keppra was increased to 1 G BID at that time  ---10/26- patient found to be standing in the bathroom, not responding to verbal direction and picking at her waistband  ---Keppra increased to 1500 mg BID.  Patient with post ictal confusion afterwards   ---RRT 10/26- patient had unwitnessed fall, RRT team found patient to be very rigid, unresponsive, clenching her jaw, biting her tongue  ---Given 4 mg of Ativan w/o resolution of sx  ---Intubated for airway protection and transferred to MICU    Patient was extubated yesterday and able to explain her history regarding her seizures she reports hx of seizure since   ---pt reports that she had lysis adhesion surgery back in [id number] and this surgeon did something to her vagus nerve   ---she was given high doses spray at 10/29/19 0507    hydrALAZINE (APRESOLINE) injection 10 mg  10 mg Intravenous Q6H PRN Alexi Victor MD        neomycin-bacitracin-polymyxin (NEOSPORIN) ointment   Topical BID PRN Sarah Johnson DO        influenza quadrivalent split vaccine (FLUZONE;FLUARIX;FLULAVAL;AFLURIA) injection 0.5 mL  0.5 mL Intramuscular Prior to discharge Andreea Wilson MD        losartan (COZAAR) tablet 100 mg  100 mg Oral Daily Sarah Johnson DO   100 mg at 10/29/19 2465    melatonin tablet 3 mg  3 mg Oral Nightly Sarah Johnson DO   3 mg at 10/28/19 2305    pravastatin (PRAVACHOL) tablet 20 mg  20 mg Oral Daily Sarah Johnson DO   20 mg at 10/28/19 2304    sodium chloride flush 0.9 % injection 10 mL  10 mL Intravenous 2 times per day Sarah Johnson DO   10 mL at 10/29/19 1105    magnesium hydroxide (MILK OF MAGNESIA) 400 MG/5ML suspension 30 mL  30 mL Oral Daily PRN Sarah Johnson DO        [facility]ron [facility] injection 4 mg  4 mg Intravenous Q6H PRN Sarah Johnson DO        enoxaparin (LOVENOX) injection 40 mg  40 mg Subcutaneous Daily Sarah Johnson DO   40 mg at 10/29/19 5596    perflutren lipid microspheres (DEFINITY) injection 1.65 mg  1.5 mL Intravenous ONCE PRN Sarah Johnson DO         Facility-Administered Medications Ordered in Other Encounters   Medication Dose Route Frequency Provider Last Rate Last Dose    succinylcholine (ANECTINE) injection    PRN Alvino Wilks RN   140 mg at 10/26/19 2135     Objective:     /86   Pulse 104   Temp 97.2 °F (36.2 °C)   Resp 18   Wt 191 lb (86.6 kg)   SpO2 95%   BMI 33.83 kg/m²      General appearance: awake lying in bed and in no acute distress   Head: Normocephalic, without obvious abnormality, atraumatic  Eyes: conjunctivae/corneas clear. Neck: no adenopathy, no carotid bruit, no JVD, supple, symmetrical, trachea midline and thyroid not enlarged, symmetric, no tenderness/mass/nodules  Lungs: mechanically ventilated. Breathing same rate as vent. Effort normal   Heart: NSR on tele. Extremities: extremities normal, atraumatic, no cyanosis or edema   Pulses: 2+ and symmetric  Skin: Skin color, texture, turgor normal. No rashes or lesions     Mental Status: alert and oriented x 3. Follows all commands.     Speech: no dysarthria  Language: no aphasias     Cranial Nerves:  I: smell    II: visual acuity     II: visual fields Blinks to threat   II: pupils JR   III,VII: ptosis None   III,IV,VI: extraocular muscles  EOMI with horizontal nystagmus   V: mastication    V: facial light touch sensation  Intact    V,VII: corneal reflex     VII: facial muscle function - upper  Symmetric   VII: facial muscle function - lower Appears symmetric    VIII: hearing Intact   IX: soft palate elevation     IX,X: gag reflex    XI: trapezius strength  5/5   XI: sternocleidomastoid strength 5/5   XI: neck extension strength  5/5   XII: tongue strength  Symmetric      Motor:  5/5 strength throughout  Normal muscle bulk and tone  No drift or abnormal movements    Sensory:  LT intact throughout    Coordination:  Difficulty with FFM  FN intact mild dysmetria on the left  HS intact     DTR:   2+ throughout     No Babinskis    No pathological reflexes    Laboratory/Radiology:     CBC with Differential:    Lab Results   Component Value Date    WBC 7.5 10/29/2019    RBC 4.03 10/29/2019    HGB 10.8 10/29/2019    HCT 34.9 10/29/2019     10/29/2019    MCV 86.6 10/29/2019    MCH 26.8 10/29/2019    MCHC 30.9 10/29/2019    RDW 14.6 10/29/2019    SEGSPCT 70 11/30/2013    LYMPHOPCT 32.6 10/29/2019    MONOPCT 3.6 10/29/2019    BASOPCT 0.3 10/29/2019    MONOSABS 0.27 10/29/2019    LYMPHSABS 2.45 10/29/2019    EOSABS 0.08 10/29/2019    BASOSABS 0.02 10/29/2019     CMP:    Lab Results   Component Value Date     10/29/2019    K 4.0 10/29/2019    K 4.4 10/24/2019     10/29/2019    CO2 23 10/29/2019    BUN 6 10/29/2019    CREATININE 0.7 10/29/2019    GFRAA >60 10/29/2019    LABGLOM >60

## 2019-10-29 NOTE — PROGRESS NOTES
Patient is seen in follow-up for pericardial effusion    Subjective:     Ms. Bonnie Heath states she feels okay, very tired. She denies any chest pain or shortness of breath. Sitting up in bed, awake and alert.      ROS:  CONSTITUTIONAL:  negative for  fevers, chills  HEENT:  negative for earaches, nasal congestion and epistaxis  RESPIRATORY:  negative for  dry cough, cough with sputum,wheezing and hemoptysis  GASTROINTESTINAL:  negative for nausea, vomiting  MUSCULOSKELETAL:  negative for  myalgias, arthralgias  NEUROLOGICAL:  negative for visual disturbance, dysphagia      Medication side effects: none    Scheduled Meds:   lidocaine  5 mL Intradermal Once    heparin flush (100 units/mL)  1 mL Intravenous 2 times per day    pantoprazole  40 mg Intravenous Daily    And    sodium chloride (PF)  10 mL Intravenous Daily    lacosamide (VIMPAT) IVPB  100 mg Intravenous BID    levetiracetam  1,500 mg Intravenous Q12H    influenza virus vaccine  0.5 mL Intramuscular Prior to discharge    losartan  100 mg Oral Daily    melatonin  3 mg Oral Nightly    pravastatin  20 mg Oral Daily    sodium chloride flush  10 mL Intravenous 2 times per day    enoxaparin  40 mg Subcutaneous Daily     Continuous Infusions:   dextrose       PRN Meds:sodium chloride flush, heparin flush (100 units/mL), albuterol, glucose, dextrose, glucagon (rDNA), dextrose, perflutren lipid microspheres, acetaminophen, phenol, hydrALAZINE, neomycin-bacitracin-polymyxin, fentanNYL, magnesium hydroxide, ondansetron, perflutren lipid microspheres      Objective:      Physical Exam:   /78   Pulse 94   Temp 97 °F (36.1 °C) (Temporal)   Resp 14   Wt 191 lb (86.6 kg)   SpO2 93%   BMI 33.83 kg/m²   CONSTITUTIONAL: Awake and alert, no apparent distress, and appears stated age  HEAD:  normocepalic, without obvious abnormality, atraumatic  NECK:  Supple, symmetrical, trachea midline, no adenopathy, thyroid symmetric, not enlarged and no tenderness, are suggestive of atelectasis. Hazy opacification in the left lung base may also be due to   atelectasis, although a developing infiltrate and/or pleural effusion   is difficult to exclude, especially given the low lung volumes. Large cardiac silhouette, similar to prior studies, compatible with   cardiomegaly and small pericardial effusion, which can be seen on the   recent CTA. XR CHEST PORTABLE   Final Result   Cardiomegaly. Retrocardiac density which may represent atelectasis   and/or infiltrate. CT CERVICAL SPINE WO CONTRAST   Final Result   No acute fractures or dislocations identified in the   cervical spine. CT Head WO Contrast   Final Result   No interval change since the previous study of October 24. No indication for an acute intracranial process. XR ABDOMEN FOR NG/OG/NE TUBE PLACEMENT   Final Result   NG tube in good position. Relative low position for an endotracheal tube. Can correlate with the   chest radiograph. XR CHEST PORTABLE   Final Result   Endotracheal tube in low borderline position at the level of the lower   arch of the aorta, proximal to the porfirio. NG tube in good position      Expiratory study, no acute cardiopulmonary process. NM Cardiac Stress Test Nuclear Imaging   Final Result   1. There is a small reversible defect in the inferior wall   2. Ejection fraction is 69 %.    3. No significant wall motion abnormality      ALERT:  THIS IS AN ABNORMAL REPORT             Lab Review   Lab Results   Component Value Date     10/29/2019    K 4.0 10/29/2019    K 4.4 10/24/2019     10/29/2019    CO2 23 10/29/2019    BUN 6 10/29/2019    CREATININE 0.7 10/29/2019    GLUCOSE 93 10/29/2019    GLUCOSE 115 03/31/2012    CALCIUM 9.2 10/29/2019     Lab Results   Component Value Date    WBC 7.5 10/29/2019    HGB 10.8 10/29/2019    HCT 34.9 10/29/2019    MCV 86.6 10/29/2019     10/29/2019     I have personally reviewed the laboratory, cardiac diagnostic and radiographic testing as outlined above:    Assessment:     1. Pericardial effusion: small based upon echocardiogram. Will continue current treatment and monitor. Will repeat echo in a few weeks  2. Abnormal stress test: May need further evaluation with cardiac catheterization, will plan prior to discharge once more clinically stable and out of ICU   3. Sinus tachycardia: Etiology?, TSH normal. Appears to be mildly improving   4. Mild mitral valve regurgitation  5. Tricuspid valve regurgitation  6. Hypertension  7. Hyperlipidemia  8. Seizures: will follow neurology. 9. Depression   10. Noncompliance       Recommendations:     1. Will continue current treatment  2. Possible cardiac catheterization prior to discharge  3. CBC and BMP   4. Intake and Output   5. Further cardiac recommendations will be forthcoming pending her clinical course and diagnostic test findings        No family at bedside  discussed with Dr. Savannah Botello      Electronically signed by TITI Mcgee CNP on 10/29/2019 at 11:05 AM   I have discussed the care of patient including pertinent history and exam and reviewed chart, vitals, labs and radiologic studies. I also participated in medical decision making with Frandy Crawley CNP on the date of service and I agree with all of the pertinent clinical information, assessment and treatment plan and status of the problem list as documented and have edited it. NOTE: This report was transcribed using voice recognition software.  Every effort was made to ensure accuracy; however, inadvertent computerized transcription errors may be present

## 2019-10-29 NOTE — PROGRESS NOTES
Subjective:  Patient is extubated awake alert. She feels much better  She is completely oriented    Objective:    /78   Pulse 94   Temp 97 °F (36.1 °C) (Temporal)   Resp 14   Wt 191 lb (86.6 kg)   SpO2 93%   BMI 33.83 kg/m²    Awake and alert  Eyes are clear pupils are equal and reactive to light there is normal range of motion of external ocular muscles  Face is symmetric  Mouth and tongue are clear  Skin: Warm and dry  Neck: Supple, no JVD  Heart:  RRR, no murmurs, gallops, or rubs. Lungs:  CTA bilaterally, no wheeze, rales or rhonchi  Abd: bowel sounds present, nontender, nondistended, no masses  Extrem:  No clubbing, cyanosis, or edema, pulses intact    I/O last 3 completed shifts: In: 280 [P.O.:80; I.V.:200]  Out: 8944 [Urine:2548]    Last Refreshed: 10/27/19 190 [Time [facility] Orders]   Results      Component Value Units   XR CHEST PORTABLE [[id number]]    Resulted: 10/27/19 0853    Updated: 10/27/19 0855    Narrative:     Patient MRN: [id number]  : 1970  Age:  49 years  Gender: Female  Order Date: 10/27/2019 6:00 AM  Exam: XR CHEST PORTABLE  Number of Images: 1 view  Indication:  Acute respiratory failure     Comparison: 2019    FINDINGS:  Stable endotracheal and nasogastric tubes. PH probe in the  midesophagus. Heart enlarged. Pulmonary vascularity is upper normal. There is  opacity in the retrocardiac which may represent atelectasis and/or  infiltrate. Impression:     Cardiomegaly. Retrocardiac density which may represent atelectasis  and/or infiltrate.      Urine culture [[id number]]    Collected: 10/25/19 2000    Updated: 10/27/19 0820    Specimen Source: Urine, clean catch     Urine Culture, Routine --    Growth not present, incubation continues   <10,000 CFU/mL   Mixed gram positive organisms    Narrative:     Source: URINE       Site: Urine&Urine             CBC Auto Differential [[id number]] (Abnormal)    Collected: 10/27/19 0440    Updated: 10/27/19 0729     WBC 6.8 E9/L    RBC 3.70 E12/L    Hemoglobin 9.9Low  g/dL    Hematocrit 32.2Low  %    MCV 87.0 fL    MCH 26.8 pg    MCHC 30.7Low  %    RDW 14.8 fL    Platelets 553 G1/E    Comment: Platelet clumps are identified. This may decrease the automated platelet   count artifactually. MPV 10.0 fL    Neutrophils % 71.4 %    Immature Granulocytes % 0.3 %    Lymphocytes % 24.2 %    Monocytes % 3.5 %    Eosinophils % 0.3 %    Basophils % 0.3 %    Neutrophils Absolute 4.87 E9/L    Immature Granulocytes # 0.02 E9/L    Lymphocytes Absolute 1.65 E9/L    Monocytes Absolute 0.24 E9/L    Eosinophils Absolute 0. 02Low  E9/L    Basophils Absolute 0.02 E9/L   Blood Gas, Arterial [[id number]] (Abnormal)    Resulted: 10/27/19 0426    Updated: 10/27/19 0428    Specimen Source: Blood     Date Analyzed [id number]    Time Analyzed 0426    Source: Blood Arterial    pH, Blood Gas 7.431    PCO2 37.9 mmHg    PO2 156. 9High  mmHg    HCO3 24.6 mmol/L    B.E. 0.5 mmol/L    O2 Sat 99. 2High  %    PO2/FIO2 2.62 mmHg/%    AaDO2 214.2 mmHg    RI(T) 1.37    O2Hb 98. 4High  %    COHb 0.4 %    MetHb 0.4 %    O2 Content 15.8 mL/dL    HHb 0.8 %    tHb (est) 11.2Low  g/dL    Mode AC    FIO2 60.0 %    Rr Mechanical 14.0 b/min    Vt Mechanical 450.0 mL    Peep/Cpap 5.0 cmH2O    Date Of Collection --    Time Collected --    Pt Temp 37.0 C     ID [id number]    Lab [id number]    Critical(s) Notified . No Critical Values   Procalcitonin [[id number]]    Collected: 10/27/19 0026    Updated: 10/27/19 0119    Specimen Type: Blood     Procalcitonin 0.03 ng/mL   Comprehensive metabolic panel [[id number]] (Abnormal)    Collected: 10/27/19 0026    Updated: 10/27/19 0114    Specimen Source: Blood     Sodium 138 mmol/L    Potassium 4.7 mmol/L    Chloride 99 mmol/L    CO2 26 mmol/L    Anion Gap 13 mmol/L    Glucose 112High  mg/dL    BUN 11 mg/dL    CREATININE 0.8 mg/dL    GFR Non-African American >60 mL/min/1.73    Comment: Chronic Kidney Disease: less than 60 ml/min/1.73 sq. m.            (Abnormal)    Resulted: 10/27/19 0036    Updated: 10/27/19 0037    Specimen Source: Blood     Date Analyzed [id number]    Time Analyzed 36    Source: Blood Arterial    pH, Blood Gas 7.369    PCO2 48. 1High  mmHg    PO2 135. 2High  mmHg    HCO3 27.1High  mmol/L    B.E. 1.3 mmol/L    O2 Sat 98. 6High  %    PO2/FIO2 2.25 mmHg/%    AaDO2 224.7 mmHg    RI(T) 1.66    O2Hb 98. 2High  %    COHb 0.0 %    MetHb 0.4 %    O2 Content 15.8 mL/dL    HHb 1.4 %    tHb (est) 11.3Low  g/dL    Mode AC    FIO2 60.0 %    Rr Mechanical 14.0 b/min    Vt Mechanical 450.0 mL    Peep/Cpap 5.0 cmH2O    Date Of Collection --    Time Collected --    Pt Temp 37.0 C     ID [id number]    Lab [id number]    Critical(s) Notified . No Critical Values   XR ABDOMEN FOR NG/OG/NE TUBE PLACEMENT [[id number]]    Resulted: 10/27/19 0029    Updated: 10/27/19 0031    Narrative:     Patient MRN:  [id number]  : 1970  Age: 52 years  Gender: Female    Order Date:  10/26/2019 10:30 PM        TECHNIQUE/NUMBER OF IMAGES/COMPARISON/CLINICAL HISTORY:    Abdomen supine view    1 image one view    History NG tube placement. FINDINGS: NG tube tip in the stomach in good position. This study not  intended to full evaluate the chest or the abdomen. There is a relative low position for the endotracheal tube. This can  be relate with the plain radiographs of the chest.   Impression:     NG tube in good position. Relative low position for an endotracheal tube. Can correlate with the  chest radiograph. CT CERVICAL SPINE WO CONTRAST [[id number]]    Resulted: 10/26/19 2320    Updated: 10/26/19 2322    Narrative:     Patient MRN:  [id number]  : 1970  Age: 52 years  Gender: Female    Order Date:  10/26/2019 10:30 PM        TECHNIQUE/NUMBER OF IMAGES/COMPARISON/CLINICAL HISTORY:    CT cervical    Axial with sagittal coronal and oblique reconstructions    19-year-old female patient is seizure trauma injury. Comparison study .     FINDINGS: Vertebral bodies have normal height. Disc spaces are  well-maintained. Alignment is preserved in the sagittal and coronal  images are    The odontoid process intact. Articular relation between the occipital  condyles and C1 and between C1 and C2 are preserved. All facet joints are well aligned the. There is no bone or soft tissue encroachment of the cervical spine  canal or neural foramina. There are intact appearance for pedicles, spinous process and  transverse process. All facet joints are well aligned. Patient has a orotracheal tube and orogastric tube. No acute fractures are seen in the cervical spine. Lung apices demonstrate a lateral pleural effusions. Impression:     No acute fractures or dislocations identified in the  cervical spine. CT Head WO Contrast [[id number]]    Resulted: 10/26/19 2315    Updated: 10/26/19 2317    Narrative:     Patient MRN:  [id number]  : 1970  Age: 52 years  Gender: Female    Order Date:  10/26/2019 10:30 PM        TECHNIQUE/NUMBER OF IMAGES/COMPARISON/CLINICAL HISTORY:    CT brain    Axial images were obtained sagittal and coronal reconstructions    History seizures and fall. 445 images    Comparison study of . FINDINGS: There is no indication for an acute intracranial hemorrhagic  event. There is no indication for a sizable effusion acute or recent  insult to the brain parenchyma. There is a left medial cranial fossa arachnoid cyst which is an old  finding likely on developmental bases. There is no focal mass defect or midline shift. Images with bone window settings demonstrate no significant findings. Midline structures have unremarkable appearance. Impression:     No interval change since the previous study of . No indication for an acute intracranial process.    XR CHEST PORTABLE [[id number]]    Resulted: 10/26/19 2243    Updated: 10/26/19 2246    Narrative:     Patient MRN:  [id number]  : 1970  Age: 52 years  Gender: Female    Order Date:  10/26/2019 9:45 PM        TECHNIQUE/NUMBER OF IMAGES/COMPARISON/CLINICAL HISTORY:    Chest AP    1 image, one view    Comparison October 2 4 7 day. After endotracheal tube placement. FINDINGS: Endotracheal tube is in the lower borderline position the  level of the lower margin of the aorta, it is proximal to the porfirio. NG tube in good position below diaphragma. Expiratory study causing  some crowding of the bronchovascular markings. No sizable infiltrates  or consolidations are seen. Impression:     Endotracheal tube in low borderline position at the level of the lower  arch of the aorta, proximal to the porfirio. NG tube in good position    Expiratory study, no acute cardiopulmonary process. POCT Glucose [[id number]] (Abnormal)    Collected: 10/26/19 2118    Updated: 10/26/19 2139     Meter Glucose 112High  mg/dL   Culture blood #2 [[id number]]    Collected: 10/25/19 1708    Updated: 10/26/19 2135    Specimen Source: Blood     Culture, Blood 2 24 Hours- no growth   Narrative:     Source: BLOOD       Site:              Culture blood #1 [[id number]]    Collected: 10/25/19 1701    Updated: 10/26/19 2135    Specimen Type: Blood     Blood Culture, Routine 24 Hours- no growth   Narrative:     Source: BLOOD       Site: Blood&Blood                   XR ABDOMEN FOR NG/OG/NE TUBE PLACEMENT   Final Result   Nasogastric tube with the tip looped in the stomach. Endotracheal tube close to porfirio and needs to be retracted. ALERT:  THIS IS AN ABNORMAL REPORT                  XR CHEST PORTABLE   Final Result   Low lung volumes with  mild CHF. Endotracheal tube close to porfirio and needs to be retracted by 1 cm. No discrete pneumothorax. XR CHEST PORTABLE   Final Result      Endotracheal tube tip is encroaching upon the right mainstem bronchus. Opacities in the right lung base are suggestive of atelectasis.    Hazy opacification in the left lung base may also be due to capsule Take 20 mg by mouth 2 times daily  1/10/19  Yes Historical Provider, MD   pravastatin (PRAVACHOL) 20 MG tablet Take 20 mg by mouth daily   Yes Historical Provider, MD   lamoTRIgine (LAMICTAL) 25 MG tablet Take 1 tablet by mouth daily 8/9/19   TITI Miguel - CNP        Current Facility-Administered Medications   Medication Dose Route Frequency Provider Last Rate Last Dose    lidocaine 1 % injection 5 mL  5 mL Intradermal Once Kerri Garza MD        sodium chloride flush 0.9 % injection 10 mL  10 mL Intravenous PRN Kerri Garza MD   10 mL at 10/28/19 2325    heparin flush (100 units/mL) injection 100 Units  1 mL Intravenous 2 times per day Kerri Garza MD        heparin flush (100 units/mL) injection 100 Units  1 mL Intracatheter PRN Kerri Garza MD        albuterol (PROVENTIL) nebulizer solution 2.5 mg  2.5 mg Nebulization Q6H PRN Anatoly Lawson MD   2.5 mg at 10/28/19 1720    glucose (GLUTOSE) 40 % oral gel 15 g  15 g Oral PRN Lloyd Yao MD        dextrose 50 % IV solution  12.5 g Intravenous PRN Lloyd Yao MD        glucagon (rDNA) injection 1 mg  1 mg Intramuscular PRN Lloyd Yao MD        dextrose 5 % solution  100 mL/hr Intravenous PRN Lloyd Yao MD        pantoprazole (PROTONIX) injection 40 mg  40 mg Intravenous Daily Lloyd Yao MD        And    sodium chloride (PF) 0.9 % injection 10 mL  10 mL Intravenous Daily Lloyd Yao MD        perflutren lipid microspheres (DEFINITY) injection 1.65 mg  1.5 mL Intravenous ONCE PRN TITI Colon CNP        acetaminophen (TYLENOL) tablet 650 mg  650 mg Oral Q4H PRN Arlen Thy Jacque Izquierdo MD   650 mg at 10/29/19 0607    phenol 1.4 % mouth spray 1 spray  1 spray Mouth/Throat Q2H PRN Ester Cowden, MD   1 spray at 10/29/19 0607    lacosamide (VIMPAT) 100 mg in dextrose 5 % 50 mL IVPB  100 mg Intravenous BID ZACARIAS Rodriguez   Stopped at 10/28/19 2250    hydrALAZINE (APRESOLINE) injection 10 mg  10 mg Intravenous Q6H PRN Lloyd Yao MD  neomycin-bacitracin-polymyxin (NEOSPORIN) ointment   Topical BID PRN [place] Actis, DO        levetiracetam (KEPPRA) 1500 mg/100 mL IVPB  1,500 mg Intravenous Q12H Emperatriz Cueva MD   Stopped at 10/29/19 0018    fentaNYL (SUBLIMAZE) injection 50 mcg  50 mcg Intravenous Q1H PRN Emperatriz Cueva MD        influenza quadrivalent split vaccine (FLUZONE;FLUARIX;FLULAVAL;AFLURIA) injection 0.5 mL  0.5 mL Intramuscular Prior to discharge Allen Frazier MD        losartan (COZAAR) tablet 100 mg  100 mg Oral Daily Felix Actis, DO   100 mg at 10/28/19 9840    melatonin tablet 3 mg  3 mg Oral Nightly [place] Actis, DO   3 mg at 10/28/19 2305    pravastatin (PRAVACHOL) tablet 20 mg  20 mg Oral Daily Felix Actis, DO   20 mg at 10/28/19 2304    sodium chloride flush 0.9 % injection 10 mL  10 mL Intravenous 2 times per day Felix Actis, DO   10 mL at 10/28/19 2429    magnesium hydroxide (MILK OF MAGNESIA) 400 MG/5ML suspension 30 mL  30 mL Oral Daily PRN [place] Actis, DO        ondansetron [facility]) injection 4 mg  4 mg Intravenous Q6H PRN [place] Actis, DO        enoxaparin (LOVENOX) injection 40 mg  40 mg Subcutaneous Daily Felix Actis, DO   40 mg at 10/28/19 6915    perflutren lipid microspheres (DEFINITY) injection 1.65 mg  1.5 mL Intravenous ONCE PRN Felix Actis, DO         Facility-Administered Medications Ordered in Other Encounters   Medication Dose Route Frequency Provider Last Rate Last Dose    propofol injection    PRN Melanie Cogan, RN   200 mg at 10/26/19 2135    succinylcholine (ANECTINE) injection    PRN Melanie Cogan, RN   140 mg at 10/26/19 2135     Cardiology Assessment:      1. Pericardial effusion: on CT chest, will review echocardiogram when done  2. Abnormal stress test: May need further evaluation with cardiac catheterization, will plan prior to discharge  3. Sinus tachycardia: Etiology?,  Will check TSH, will follow closely.   4. Mild mitral valve

## 2019-10-29 NOTE — CONSULTS
Brittney Estrella DO   Physician   Emergency Medicine   ED Provider Notes   Signed   Date of Service:  10/24/2019  8:59 PM          Related encounter: ED from 10/24/2019 in [address] [facility] Emergency Department                  Pulm note 10/28/2019 -   Patient seen and examined. Patient lost peripheral IV access, woke up. Patient was agitated, wanted ET tube removed. Weaning trial was successful. Patient was extubated today.      10/29/2019 1700 - pt states voice remains hoarse. Fiberoptic laryngoscopy - vocal cords mobile bilaterally. No lesions. Mild edema of membranous true cods but no evidence of nodules, polyps or granulomas    IMP - hoarse s/p intubation    Avoid vocal abuse.  Edema will resolve in time

## 2019-10-29 NOTE — CARE COORDINATION
KILO Discharge planning:    SW met with patient. Patients plan remains home with [facility] at discharge. Patient is agreeable to [facility]. Will await therapy eval. Will continue to follow.  Deepti Rios, [facility]

## 2019-10-29 NOTE — PLAN OF CARE
Problem: Falls - Risk of:  Goal: Will remain free from falls  Description  Will remain free from falls  10/29/2019 0253 by Cata Chacon RN  Outcome: Met This Shift     Problem: Falls - Risk of:  Goal: Absence of physical injury  Description  Absence of physical injury  10/29/2019 0253 by Cata Chacon RN  Outcome: Met This Shift     Problem: Risk for Impaired Skin Integrity  Goal: Tissue integrity - skin and mucous membranes  Description  Structural intactness and normal physiological function of skin and  mucous membranes.   10/29/2019 0253 by Cata Chacon RN  Outcome: Met This Shift

## 2019-10-30 LAB
ALBUMIN SERPL-MCNC: 4 G/DL (ref 3.5–5.2)
ALP BLD-CCNC: 201 U/L (ref 35–104)
ALT SERPL-CCNC: 43 U/L (ref 0–32)
ANION GAP SERPL CALCULATED.3IONS-SCNC: 11 MMOL/L (ref 7–16)
AST SERPL-CCNC: 40 U/L (ref 0–31)
BASOPHILS ABSOLUTE: 0.02 E9/L (ref 0–0.2)
BASOPHILS RELATIVE PERCENT: 0.3 % (ref 0–2)
BILIRUB SERPL-MCNC: 0.3 MG/DL (ref 0–1.2)
BLOOD CULTURE, ROUTINE: NORMAL
BLOOD CULTURE, ROUTINE: NORMAL
BUN BLDV-MCNC: 11 MG/DL (ref 6–20)
CALCIUM SERPL-MCNC: 9.7 MG/DL (ref 8.6–10.2)
CHLORIDE BLD-SCNC: 99 MMOL/L (ref 98–107)
CO2: 23 MMOL/L (ref 22–29)
CREAT SERPL-MCNC: 0.6 MG/DL (ref 0.5–1)
CULTURE, BLOOD 2: NORMAL
CULTURE, BLOOD 2: NORMAL
EOSINOPHILS ABSOLUTE: 0.1 E9/L (ref 0.05–0.5)
EOSINOPHILS RELATIVE PERCENT: 1.7 % (ref 0–6)
GFR AFRICAN AMERICAN: >60
GFR NON-AFRICAN AMERICAN: >60 ML/MIN/1.73
GLUCOSE BLD-MCNC: 99 MG/DL (ref 74–99)
HCT VFR BLD CALC: 39.5 % (ref 34–48)
HEMOGLOBIN: 11.9 G/DL (ref 11.5–15.5)
IMMATURE GRANULOCYTES #: 0.01 E9/L
IMMATURE GRANULOCYTES %: 0.2 % (ref 0–5)
LYMPHOCYTES ABSOLUTE: 1.92 E9/L (ref 1.5–4)
LYMPHOCYTES RELATIVE PERCENT: 33.1 % (ref 20–42)
MCH RBC QN AUTO: 26.7 PG (ref 26–35)
MCHC RBC AUTO-ENTMCNC: 30.1 % (ref 32–34.5)
MCV RBC AUTO: 88.8 FL (ref 80–99.9)
METER GLUCOSE: 106 MG/DL (ref 74–99)
METER GLUCOSE: 193 MG/DL (ref 74–99)
METER GLUCOSE: 81 MG/DL (ref 74–99)
MONOCYTES ABSOLUTE: 0.24 E9/L (ref 0.1–0.95)
MONOCYTES RELATIVE PERCENT: 4.1 % (ref 2–12)
NEUTROPHILS ABSOLUTE: 3.51 E9/L (ref 1.8–7.3)
NEUTROPHILS RELATIVE PERCENT: 60.6 % (ref 43–80)
PDW BLD-RTO: 14.6 FL (ref 11.5–15)
PLATELET # BLD: 357 E9/L (ref 130–450)
PMV BLD AUTO: 9.3 FL (ref 7–12)
POTASSIUM SERPL-SCNC: 4.8 MMOL/L (ref 3.5–5)
RBC # BLD: 4.45 E12/L (ref 3.5–5.5)
SODIUM BLD-SCNC: 133 MMOL/L (ref 132–146)
TOTAL PROTEIN: 7.9 G/DL (ref 6.4–8.3)
WBC # BLD: 5.8 E9/L (ref 4.5–11.5)

## 2019-10-30 PROCEDURE — 6360000002 HC RX W HCPCS: Performed by: NURSE PRACTITIONER

## 2019-10-30 PROCEDURE — 82962 GLUCOSE BLOOD TEST: CPT

## 2019-10-30 PROCEDURE — 85025 COMPLETE CBC W/AUTO DIFF WBC: CPT

## 2019-10-30 PROCEDURE — 6370000000 HC RX 637 (ALT 250 FOR IP): Performed by: RADIOLOGY

## 2019-10-30 PROCEDURE — 36415 COLL VENOUS BLD VENIPUNCTURE: CPT

## 2019-10-30 PROCEDURE — 6360000002 HC RX W HCPCS: Performed by: INTERNAL MEDICINE

## 2019-10-30 PROCEDURE — 2060000000 HC ICU INTERMEDIATE R&B

## 2019-10-30 PROCEDURE — 6370000000 HC RX 637 (ALT 250 FOR IP): Performed by: INTERNAL MEDICINE

## 2019-10-30 PROCEDURE — 2580000003 HC RX 258: Performed by: INTERNAL MEDICINE

## 2019-10-30 PROCEDURE — 2580000003 HC RX 258: Performed by: NURSE PRACTITIONER

## 2019-10-30 PROCEDURE — 6370000000 HC RX 637 (ALT 250 FOR IP): Performed by: NURSE PRACTITIONER

## 2019-10-30 PROCEDURE — C9254 INJECTION, LACOSAMIDE: HCPCS | Performed by: NURSE PRACTITIONER

## 2019-10-30 PROCEDURE — 80053 COMPREHEN METABOLIC PANEL: CPT

## 2019-10-30 PROCEDURE — 2700000000 HC OXYGEN THERAPY PER DAY

## 2019-10-30 RX ORDER — LEVETIRACETAM 15 MG/ML
1500 INJECTION INTRAVASCULAR EVERY 12 HOURS
Status: DISCONTINUED | OUTPATIENT
Start: 2019-10-30 | End: 2019-10-31

## 2019-10-30 RX ORDER — LACOSAMIDE 50 MG/1
150 TABLET ORAL 2 TIMES DAILY
Status: DISCONTINUED | OUTPATIENT
Start: 2019-10-30 | End: 2019-10-30

## 2019-10-30 RX ORDER — LEVETIRACETAM 500 MG/1
1500 TABLET ORAL EVERY 12 HOURS
Status: DISCONTINUED | OUTPATIENT
Start: 2019-10-30 | End: 2019-10-31

## 2019-10-30 RX ORDER — LACOSAMIDE 50 MG/1
200 TABLET ORAL 2 TIMES DAILY
Status: DISCONTINUED | OUTPATIENT
Start: 2019-10-30 | End: 2019-10-30

## 2019-10-30 RX ORDER — LACOSAMIDE 50 MG/1
150 TABLET ORAL 2 TIMES DAILY
Status: DISCONTINUED | OUTPATIENT
Start: 2019-10-30 | End: 2019-10-31

## 2019-10-30 RX ADMIN — ACETAMINOPHEN 650 MG: 325 TABLET, FILM COATED ORAL at 21:51

## 2019-10-30 RX ADMIN — BUPRENORPHINE HYDROCHLORIDE AND NALOXONE HYDROCHLORIDE DIHYDRATE 1 TABLET: 8; 2 TABLET SUBLINGUAL at 21:50

## 2019-10-30 RX ADMIN — ENOXAPARIN SODIUM 40 MG: 100 INJECTION SUBCUTANEOUS at 10:57

## 2019-10-30 RX ADMIN — Medication 10 ML: at 21:40

## 2019-10-30 RX ADMIN — Medication 10 ML: at 10:55

## 2019-10-30 RX ADMIN — BENZONATATE 100 MG: 100 CAPSULE ORAL at 21:41

## 2019-10-30 RX ADMIN — BENZONATATE 100 MG: 100 CAPSULE ORAL at 02:16

## 2019-10-30 RX ADMIN — BUPRENORPHINE HYDROCHLORIDE AND NALOXONE HYDROCHLORIDE DIHYDRATE 1 TABLET: 8; 2 TABLET SUBLINGUAL at 10:56

## 2019-10-30 RX ADMIN — DEXTROSE MONOHYDRATE 150 MG: 50 INJECTION, SOLUTION INTRAVENOUS at 20:54

## 2019-10-30 RX ADMIN — LEVETIRACETAM 1500 MG: 15 INJECTION INTRAVENOUS at 22:46

## 2019-10-30 RX ADMIN — MELATONIN 3 MG ORAL TABLET 3 MG: 3 TABLET ORAL at 21:40

## 2019-10-30 RX ADMIN — LACOSAMIDE 100 MG: 50 TABLET, FILM COATED ORAL at 10:56

## 2019-10-30 RX ADMIN — PRAVASTATIN SODIUM 20 MG: 20 TABLET ORAL at 21:40

## 2019-10-30 RX ADMIN — LEVETIRACETAM 1500 MG: 500 TABLET ORAL at 10:56

## 2019-10-30 RX ADMIN — LOSARTAN POTASSIUM 100 MG: 50 TABLET, FILM COATED ORAL at 10:56

## 2019-10-30 RX ADMIN — ACETAMINOPHEN 650 MG: 325 TABLET, FILM COATED ORAL at 10:57

## 2019-10-30 ASSESSMENT — PAIN SCALES - GENERAL
PAINLEVEL_OUTOF10: 0
PAINLEVEL_OUTOF10: 0
PAINLEVEL_OUTOF10: 3
PAINLEVEL_OUTOF10: 3
PAINLEVEL_OUTOF10: 0
PAINLEVEL_OUTOF10: 4

## 2019-10-30 ASSESSMENT — PAIN DESCRIPTION - LOCATION: LOCATION: THROAT

## 2019-10-30 ASSESSMENT — PAIN DESCRIPTION - PAIN TYPE: TYPE: ACUTE PAIN

## 2019-10-30 ASSESSMENT — PAIN DESCRIPTION - PROGRESSION: CLINICAL_PROGRESSION: NOT CHANGED

## 2019-10-30 ASSESSMENT — PAIN DESCRIPTION - DESCRIPTORS: DESCRIPTORS: ACHING

## 2019-10-30 ASSESSMENT — PAIN DESCRIPTION - FREQUENCY: FREQUENCY: INTERMITTENT

## 2019-10-30 ASSESSMENT — PAIN DESCRIPTION - ONSET: ONSET: ON-GOING

## 2019-10-30 ASSESSMENT — PAIN - FUNCTIONAL ASSESSMENT: PAIN_FUNCTIONAL_ASSESSMENT: ACTIVITIES ARE NOT PREVENTED

## 2019-10-30 NOTE — PROGRESS NOTES
infiltrate. Urine culture [[id number]]    Collected: 10/25/19 2000    Updated: 10/27/19 0820    Specimen Source: Urine, clean catch     Urine Culture, Routine --    Growth not present, incubation continues   <10,000 CFU/mL   Mixed gram positive organisms    Narrative:     Source: URINE       Site: Urine&Urine             CBC Auto Differential [[id number]] (Abnormal)    Collected: 10/27/19 0440    Updated: 10/27/19 0729     WBC 6.8 E9/L    RBC 3.70 E12/L    Hemoglobin 9.9Low  g/dL    Hematocrit 32.2Low  %    MCV 87.0 fL    MCH 26.8 pg    MCHC 30.7Low  %    RDW 14.8 fL    Platelets 205 Q2/Q    Comment: Platelet clumps are identified. This may decrease the automated platelet   count artifactually. MPV 10.0 fL    Neutrophils % 71.4 %    Immature Granulocytes % 0.3 %    Lymphocytes % 24.2 %    Monocytes % 3.5 %    Eosinophils % 0.3 %    Basophils % 0.3 %    Neutrophils Absolute 4.87 E9/L    Immature Granulocytes # 0.02 E9/L    Lymphocytes Absolute 1.65 E9/L    Monocytes Absolute 0.24 E9/L    Eosinophils Absolute 0. 02Low  E9/L    Basophils Absolute 0.02 E9/L   Blood Gas, Arterial [[id number]] (Abnormal)    Resulted: 10/27/19 0426    Updated: 10/27/19 0428    Specimen Source: Blood     Date Analyzed [id number]    Time Analyzed 0426    Source: Blood Arterial    pH, Blood Gas 7.431    PCO2 37.9 mmHg    PO2 156. 9High  mmHg    HCO3 24.6 mmol/L    B.E. 0.5 mmol/L    O2 Sat 99. 2High  %    PO2/FIO2 2.62 mmHg/%    AaDO2 214.2 mmHg    RI(T) 1.37    O2Hb 98. 4High  %    COHb 0.4 %    MetHb 0.4 %    O2 Content 15.8 mL/dL    HHb 0.8 %    tHb (est) 11.2Low  g/dL    Mode AC    FIO2 60.0 %    Rr Mechanical 14.0 b/min    Vt Mechanical 450.0 mL    Peep/Cpap 5.0 cmH2O    Date Of Collection --    Time Collected --    Pt Temp 37.0 C     ID [id number]    Lab [id number]    Critical(s) Notified .  No Critical Values   Procalcitonin [[id number]]    Collected: 10/27/19 0026    Updated: 10/27/19 0119    Specimen Type: Blood     Procalcitonin 0.03 SPECIMEN REJECTION [[id number]]    Collected: 10/27/19 0103    Updated: 10/27/19 0105     Rejected Test cbcwd    Reason for Rejection see below    Comment: Unable to perform testing; specimen clotted. To perform testing the specimen will need to be recollected.  Clotted       Narrative:     CALL  [facility]  H44 tel. ,  Rejected Test Name/Called to:cbcwd amena cannon rn, 10/27/2019 01:03, by  Dylan Curry   Blood Gas, Arterial [[id number]] (Abnormal)    Resulted: 10/27/19 0036    Updated: 10/27/19 0037    Specimen Source: Blood     Date Analyzed [id number]    Time Analyzed 36    Source: Blood Arterial    pH, Blood Gas 7.369    PCO2 48. 1High  mmHg    PO2 135. 2High  mmHg    HCO3 27.1High  mmol/L    B.E. 1.3 mmol/L    O2 Sat 98. 6High  %    PO2/FIO2 2.25 mmHg/%    AaDO2 224.7 mmHg    RI(T) 1.66    O2Hb 98. 2High  %    COHb 0.0 %    MetHb 0.4 %    O2 Content 15.8 mL/dL    HHb 1.4 %    tHb (est) 11.3Low  g/dL    Mode AC    FIO2 60.0 %    Rr Mechanical 14.0 b/min    Vt Mechanical 450.0 mL    Peep/Cpap 5.0 cmH2O    Date Of Collection --    Time Collected --    Pt Temp 37.0 C     ID [id number]    Lab [id number]    Critical(s) Notified . No Critical Values   XR ABDOMEN FOR NG/OG/NE TUBE PLACEMENT [[id number]]    Resulted: 10/27/19 0029    Updated: 10/27/19 0031    Narrative:     Patient MRN:  [id number]  : 1970  Age: 52 years  Gender: Female    Order Date:  10/26/2019 10:30 PM        TECHNIQUE/NUMBER OF IMAGES/COMPARISON/CLINICAL HISTORY:    Abdomen supine view    1 image one view    History NG tube placement. FINDINGS: NG tube tip in the stomach in good position. This study not  intended to full evaluate the chest or the abdomen. There is a relative low position for the endotracheal tube. This can  be relate with the plain radiographs of the chest.   Impression:     NG tube in good position. Relative low position for an endotracheal tube. Can correlate with the  chest radiograph.    CT CERVICAL SPINE WO CONTRAST [[id number]] Resulted: 10/26/19 2320    Updated: 10/26/19 2322    Narrative:     Patient MRN:  [id number]  : 1970  Age: 52 years  Gender: Female    Order Date:  10/26/2019 10:30 PM        TECHNIQUE/NUMBER OF IMAGES/COMPARISON/CLINICAL HISTORY:    CT cervical    Axial with sagittal coronal and oblique reconstructions    70-year-old female patient is seizure trauma injury. Comparison study . FINDINGS: Vertebral bodies have normal height. Disc spaces are  well-maintained. Alignment is preserved in the sagittal and coronal  images are    The odontoid process intact. Articular relation between the occipital  condyles and C1 and between C1 and C2 are preserved. All facet joints are well aligned the. There is no bone or soft tissue encroachment of the cervical spine  canal or neural foramina. There are intact appearance for pedicles, spinous process and  transverse process. All facet joints are well aligned. Patient has a orotracheal tube and orogastric tube. No acute fractures are seen in the cervical spine. Lung apices demonstrate a lateral pleural effusions. Impression:     No acute fractures or dislocations identified in the  cervical spine. CT Head WO Contrast [[id number]]    Resulted: 10/26/19 2315    Updated: 10/26/19 2317    Narrative:     Patient MRN:  [id number]  : 1970  Age: 52 years  Gender: Female    Order Date:  10/26/2019 10:30 PM        TECHNIQUE/NUMBER OF IMAGES/COMPARISON/CLINICAL HISTORY:    CT brain    Axial images were obtained sagittal and coronal reconstructions    History seizures and fall. 445 images    Comparison study of . FINDINGS: There is no indication for an acute intracranial hemorrhagic  event. There is no indication for a sizable effusion acute or recent  insult to the brain parenchyma. There is a left medial cranial fossa arachnoid cyst which is an old  finding likely on developmental bases.     There is no focal mass defect or EEG        Lenora May MD  10:31 AM  10/30/2019

## 2019-10-30 NOTE — PROGRESS NOTES
Patient was found in the bathroom by the aid having a seizure on the toilet. I and other nurses came to patients side and pt was actively having a focal seizure pt was safely transported to the bed with the janet steady. Vitals signs and patient currently stable.  Dr. Aura Vergara and NP Jaun Rogers notified via perfect serve, new orders placed see [facility]   Alexandra Kahn RN

## 2019-10-30 NOTE — PROGRESS NOTES
Left a voicemail message with pt's mother, Iliana Burris re: RRT and seizure like activity, pt was found unresponsive, called RRT and physician's came. Gave pt a bolus of fluids. Currently pt is ok, alert and orient, 100% fine at this time. Pt wanted son called but was unable to leave a message and contacted mother per pt request, so someone in family knew what was going on. Pt up eating breakfast. Informed mother in message Aleksandr Gerber will be her nurse this morning and she can call [phone] for any further information.

## 2019-10-30 NOTE — SIGNIFICANT EVENT
Rapid Response Team Note  Date of event: 10/30/2019   Time of event: Fer Marion 52y.o. year old female   YOB: 1970   Admit date:  10/25/2019   Location: [facility]   Witnessed? : [x]Yes  [] No  Monitored? : [x]Yes  [] No  Code status: [x] Full  [] DNR-CCA  []DNR-CC  ______________________________________________________________________  Reason for RRT:    RRT called for AMS    Subjective:   Upon arrival the pt was not responsive. Patient showed rigidity on upper extremities. Then patient was attempting getting out of the bed. Nursing staffs tried to calm her down but she was fighting against that. She was actively resisting when I tried to exam her eyes. Then she opens her eye suddenly and follows commands. She started to communicate with nodding. Hemodynamically stable. Saturating well with RA.  bolus was also given. PCP was updated. Decision was made to keep patient on current floor since her mentation improved.      Objective:   Vital signs: BP:147/89 /RR:34/HR:136 /Temp:97.3 / O2 Sat:    Repeat Vitals: BP:152/83/HR:121  / O2 Sat: 100  · General Appearance: appears stated age and mild distress , uncooperative, rigid   · Lung: clear to auscultation bilaterally  · Heart: regular rate and rhythm, S1, S2 normal, no murmur, click, rub or gallop  · Abdomen: soft, non-tender; bowel sounds normal; no masses,  no organomegaly  · Extremities:  extremities normal, atraumatic, no cyanosis or edema    · Neurologic: Mental status: alertness: lethargic ->alert   NIHSS Score:       Response to pain:   [] Yes  [x] No -> yes 10 min later  Follow commands:  [] Yes  [x] No -> yes 10 min later  Facial asymmetry:  [] Yes  [x] No  Motor strength:  [x] Equal  [] Focal deficit:     Diagnostic Test:  Blood Sugar:  106 mg/dL  EKG:      ABG:      Lab Results   Component Value Date    PH 7.507 10/28/2019    PCO2 29.8 10/28/2019    PO2 123.2 10/28/2019    HCO3 23.1 10/28/2019    O2SAT 98.6 10/28/2019 Infusion(s):   [x] Normal Saline:  Bolus:       500L, Rate:   cc/hr       Teams Assessment and Plan:     Acute encephalopathy with rigidity  - Doubt pt having seizure, likely psychogenic nonepileptic seizures given atypical presentation  - EEG was done, result is pending  - On Keppra and vimpat  - Neuro on board    Disposition:  [x] No transfer   [] Transfer to monitor floor  [] Transfer to: [] MICU [] NICU [] CCU [] SICU        Patients family updated: [] Yes  [x] No   Discussed with:  [] Critical Care Intensivist:        [x] Primary Care Provider: Tennie Scheuermann, MD      [] Other: ?    Paul Salcido MD, PGY-3  6:29 AM  Attending Physician: Dr. Sun Marino

## 2019-10-30 NOTE — PROGRESS NOTES
Patient is seen in follow-up for pericardial effusion    Subjective:     Ms. Marion denies any chest pain or shortness of breath  Laying in bed no apparent distress    ROS:  CONSTITUTIONAL: + Fatigue  HEENT:  negative for earaches, nasal congestion and epistaxis  RESPIRATORY:  negative for  dry cough, cough with sputum,wheezing and hemoptysis  GASTROINTESTINAL:  negative for nausea, vomiting  MUSCULOSKELETAL:  negative for  myalgias, arthralgias  NEUROLOGICAL:  negative for visual disturbance, dysphagia    Medication side effects: none    Scheduled Meds:   lacosamide (VIMPAT) IVPB  150 mg Intravenous BID    Or    lacosamide  150 mg Oral BID    levetiracetam  1,500 mg Intravenous Q12H    Or    levETIRAcetam  1,500 mg Oral Q12H    buprenorphine-naloxone  1 tablet Sublingual BID    lidocaine  5 mL Intradermal Once    heparin flush (100 units/mL)  1 mL Intravenous 2 times per day    influenza virus vaccine  0.5 mL Intramuscular Prior to discharge    losartan  100 mg Oral Daily    melatonin  3 mg Oral Nightly    pravastatin  20 mg Oral Daily    sodium chloride flush  10 mL Intravenous 2 times per day    enoxaparin  40 mg Subcutaneous Daily     Continuous Infusions:   dextrose       PRN Meds:benzonatate, sodium chloride flush, heparin flush (100 units/mL), albuterol, glucose, dextrose, glucagon (rDNA), dextrose, perflutren lipid microspheres, acetaminophen, phenol, hydrALAZINE, neomycin-bacitracin-polymyxin, magnesium hydroxide, ondansetron, perflutren lipid microspheres      Objective:      Physical Exam:   BP (!) 162/87   Pulse 116   Temp 97.7 °F (36.5 °C) (Temporal)   Resp 16   Wt 186 lb (84.4 kg)   SpO2 99%   BMI 32.95 kg/m²   CONSTITUTIONAL: Intubated, no apparent distress, and appears stated age  HEAD:  normocepalic, without obvious abnormality, atraumatic  NECK:  Supple, symmetrical, trachea midline, no adenopathy, thyroid symmetric, not enlarged and no tenderness, skin normal  LUNGS:  No increased work of breathing, good air exchange, clear to auscultation bilaterally, no crackles or wheezing  CARDIOVASCULAR:  Normal apical impulse, regular rate and rhythm, normal S1 and S2, no S3 or S4, and no murmur noted, no edema, no JVD, no carotid bruit. ABDOMEN:  Soft, nontender, no masses, no hepatomegaly, no splenomegaly, BS+  MUSCULOSKELETAL:  No clubbing no cyanosis. there is no redness, warmth, or swelling of the joints  NEUROLOGIC: Intubated, sedated  SKIN:  no bruising or bleeding, normal skin color, texture, turgor and no redness, warmth, or swelling      Cardiographics  I personally reviewed the telemetry monitor strips with the following interpretation: sinus tachycardia    Echocardiogram: 10/27/2019,  Summary   No previous echo for comparison. Technically adequate study.   Mild concentric left ventricular hypertrophy.   Ejection fraction is visually estimated at 55%.   No regional wall motion abnormalities seen.  E/A flow reversal noted. Suggestive of diastolic dysfunction.   Mild tricuspid regurgitation.  RVSP is 32 mmHg.   Pulmonary hypertension is mild .   Physiologic and/or trace pulmonic regurgitation present.  Dora Rast is a small to moderate pericardial effusion noted.     Imaging  XR CHEST PORTABLE   Final Result   Improving CHF with  minimal atelectasis in the lung bases            XR ABDOMEN FOR NG/OG/NE TUBE PLACEMENT   Final Result   Nasogastric tube with the tip looped in the stomach. Endotracheal tube close to porfirio and needs to be retracted. ALERT:  THIS IS AN ABNORMAL REPORT                  XR CHEST PORTABLE   Final Result   Low lung volumes with  mild CHF. Endotracheal tube close to porfirio and needs to be retracted by 1 cm. No discrete pneumothorax. XR CHEST PORTABLE   Final Result      Endotracheal tube tip is encroaching upon the right mainstem bronchus. Opacities in the right lung base are suggestive of atelectasis.    Hazy opacification in the

## 2019-10-31 ENCOUNTER — APPOINTMENT (OUTPATIENT)
Dept: MRI IMAGING | Age: 49
DRG: 053 | End: 2019-10-31
Attending: INTERNAL MEDICINE
Payer: MEDICAID

## 2019-10-31 LAB
ALBUMIN SERPL-MCNC: 4.4 G/DL (ref 3.5–5.2)
ALP BLD-CCNC: 199 U/L (ref 35–104)
ALT SERPL-CCNC: 48 U/L (ref 0–32)
ANION GAP SERPL CALCULATED.3IONS-SCNC: 15 MMOL/L (ref 7–16)
AST SERPL-CCNC: 44 U/L (ref 0–31)
BASOPHILS ABSOLUTE: 0.01 E9/L (ref 0–0.2)
BASOPHILS RELATIVE PERCENT: 0.2 % (ref 0–2)
BILIRUB SERPL-MCNC: 0.3 MG/DL (ref 0–1.2)
BUN BLDV-MCNC: 8 MG/DL (ref 6–20)
CALCIUM SERPL-MCNC: 9.4 MG/DL (ref 8.6–10.2)
CHLORIDE BLD-SCNC: 100 MMOL/L (ref 98–107)
CO2: 25 MMOL/L (ref 22–29)
CREAT SERPL-MCNC: 0.6 MG/DL (ref 0.5–1)
EOSINOPHILS ABSOLUTE: 0.11 E9/L (ref 0.05–0.5)
EOSINOPHILS RELATIVE PERCENT: 2.4 % (ref 0–6)
GFR AFRICAN AMERICAN: >60
GFR NON-AFRICAN AMERICAN: >60 ML/MIN/1.73
GLUCOSE BLD-MCNC: 105 MG/DL (ref 74–99)
HCT VFR BLD CALC: 37.9 % (ref 34–48)
HEMOGLOBIN: 11.6 G/DL (ref 11.5–15.5)
IMMATURE GRANULOCYTES #: 0.01 E9/L
IMMATURE GRANULOCYTES %: 0.2 % (ref 0–5)
LYMPHOCYTES ABSOLUTE: 2.45 E9/L (ref 1.5–4)
LYMPHOCYTES RELATIVE PERCENT: 53.1 % (ref 20–42)
MCH RBC QN AUTO: 26.7 PG (ref 26–35)
MCHC RBC AUTO-ENTMCNC: 30.6 % (ref 32–34.5)
MCV RBC AUTO: 87.1 FL (ref 80–99.9)
METER GLUCOSE: 100 MG/DL (ref 74–99)
METER GLUCOSE: 104 MG/DL (ref 74–99)
METER GLUCOSE: 109 MG/DL (ref 74–99)
MONOCYTES ABSOLUTE: 0.23 E9/L (ref 0.1–0.95)
MONOCYTES RELATIVE PERCENT: 5 % (ref 2–12)
NEUTROPHILS ABSOLUTE: 1.8 E9/L (ref 1.8–7.3)
NEUTROPHILS RELATIVE PERCENT: 39.1 % (ref 43–80)
PDW BLD-RTO: 14.3 FL (ref 11.5–15)
PLATELET # BLD: 408 E9/L (ref 130–450)
PMV BLD AUTO: 8.8 FL (ref 7–12)
POTASSIUM SERPL-SCNC: 4.1 MMOL/L (ref 3.5–5)
RBC # BLD: 4.35 E12/L (ref 3.5–5.5)
SODIUM BLD-SCNC: 140 MMOL/L (ref 132–146)
TOTAL PROTEIN: 7.6 G/DL (ref 6.4–8.3)
WBC # BLD: 4.6 E9/L (ref 4.5–11.5)

## 2019-10-31 PROCEDURE — 6360000004 HC RX CONTRAST MEDICATION: Performed by: RADIOLOGY

## 2019-10-31 PROCEDURE — 87088 URINE BACTERIA CULTURE: CPT

## 2019-10-31 PROCEDURE — 6370000000 HC RX 637 (ALT 250 FOR IP): Performed by: INTERNAL MEDICINE

## 2019-10-31 PROCEDURE — 6360000002 HC RX W HCPCS: Performed by: INTERNAL MEDICINE

## 2019-10-31 PROCEDURE — 2580000003 HC RX 258: Performed by: INTERNAL MEDICINE

## 2019-10-31 PROCEDURE — 80053 COMPREHEN METABOLIC PANEL: CPT

## 2019-10-31 PROCEDURE — 87186 SC STD MICRODIL/AGAR DIL: CPT

## 2019-10-31 PROCEDURE — 87077 CULTURE AEROBIC IDENTIFY: CPT

## 2019-10-31 PROCEDURE — 82962 GLUCOSE BLOOD TEST: CPT

## 2019-10-31 PROCEDURE — 36415 COLL VENOUS BLD VENIPUNCTURE: CPT

## 2019-10-31 PROCEDURE — A9579 GAD-BASE MR CONTRAST NOS,1ML: HCPCS | Performed by: RADIOLOGY

## 2019-10-31 PROCEDURE — 6360000002 HC RX W HCPCS: Performed by: NURSE PRACTITIONER

## 2019-10-31 PROCEDURE — 2060000000 HC ICU INTERMEDIATE R&B

## 2019-10-31 PROCEDURE — 70553 MRI BRAIN STEM W/O & W/DYE: CPT

## 2019-10-31 PROCEDURE — 85025 COMPLETE CBC W/AUTO DIFF WBC: CPT

## 2019-10-31 PROCEDURE — 6370000000 HC RX 637 (ALT 250 FOR IP): Performed by: NURSE PRACTITIONER

## 2019-10-31 PROCEDURE — 99232 SBSQ HOSP IP/OBS MODERATE 35: CPT | Performed by: NURSE PRACTITIONER

## 2019-10-31 PROCEDURE — 2700000000 HC OXYGEN THERAPY PER DAY

## 2019-10-31 RX ORDER — LACOSAMIDE 50 MG/1
200 TABLET ORAL 2 TIMES DAILY
Status: DISCONTINUED | OUTPATIENT
Start: 2019-10-31 | End: 2019-10-31

## 2019-10-31 RX ORDER — LEVETIRACETAM 10 MG/ML
1000 INJECTION INTRAVASCULAR EVERY 12 HOURS
Status: DISPENSED | OUTPATIENT
Start: 2019-10-31 | End: 2019-11-01

## 2019-10-31 RX ORDER — LACOSAMIDE 50 MG/1
100 TABLET ORAL 2 TIMES DAILY
Status: DISCONTINUED | OUTPATIENT
Start: 2019-10-31 | End: 2019-11-01

## 2019-10-31 RX ORDER — LEVETIRACETAM 500 MG/1
1000 TABLET ORAL EVERY 12 HOURS
Status: COMPLETED | OUTPATIENT
Start: 2019-10-31 | End: 2019-11-01

## 2019-10-31 RX ORDER — LORAZEPAM 2 MG/ML
2 INJECTION INTRAMUSCULAR ONCE
Status: COMPLETED | OUTPATIENT
Start: 2019-10-31 | End: 2019-10-31

## 2019-10-31 RX ADMIN — LEVETIRACETAM 1500 MG: 500 TABLET ORAL at 09:48

## 2019-10-31 RX ADMIN — ACETAMINOPHEN 650 MG: 325 TABLET, FILM COATED ORAL at 18:34

## 2019-10-31 RX ADMIN — ENOXAPARIN SODIUM 40 MG: 100 INJECTION SUBCUTANEOUS at 09:52

## 2019-10-31 RX ADMIN — LORAZEPAM 2 MG: 2 INJECTION, SOLUTION INTRAMUSCULAR; INTRAVENOUS at 12:35

## 2019-10-31 RX ADMIN — BUPRENORPHINE HYDROCHLORIDE AND NALOXONE HYDROCHLORIDE DIHYDRATE 1 TABLET: 8; 2 TABLET SUBLINGUAL at 18:32

## 2019-10-31 RX ADMIN — LEVETIRACETAM 1000 MG: 500 TABLET ORAL at 21:30

## 2019-10-31 RX ADMIN — LOSARTAN POTASSIUM 100 MG: 50 TABLET, FILM COATED ORAL at 10:12

## 2019-10-31 RX ADMIN — PRAVASTATIN SODIUM 20 MG: 20 TABLET ORAL at 21:30

## 2019-10-31 RX ADMIN — LACOSAMIDE 100 MG: 50 TABLET, FILM COATED ORAL at 21:30

## 2019-10-31 RX ADMIN — ACETAMINOPHEN 650 MG: 325 TABLET, FILM COATED ORAL at 23:50

## 2019-10-31 RX ADMIN — ACETAMINOPHEN 650 MG: 325 TABLET, FILM COATED ORAL at 10:12

## 2019-10-31 RX ADMIN — LACOSAMIDE 150 MG: 50 TABLET, FILM COATED ORAL at 10:02

## 2019-10-31 RX ADMIN — MELATONIN 3 MG ORAL TABLET 3 MG: 3 TABLET ORAL at 21:30

## 2019-10-31 RX ADMIN — BUPRENORPHINE HYDROCHLORIDE AND NALOXONE HYDROCHLORIDE DIHYDRATE 1 TABLET: 8; 2 TABLET SUBLINGUAL at 09:54

## 2019-10-31 RX ADMIN — Medication 10 ML: at 09:49

## 2019-10-31 RX ADMIN — GADOTERIDOL 17 ML: 279.3 INJECTION, SOLUTION INTRAVENOUS at 14:12

## 2019-10-31 ASSESSMENT — PAIN DESCRIPTION - PROGRESSION: CLINICAL_PROGRESSION: NOT CHANGED

## 2019-10-31 ASSESSMENT — PAIN - FUNCTIONAL ASSESSMENT
PAIN_FUNCTIONAL_ASSESSMENT: ACTIVITIES ARE NOT PREVENTED

## 2019-10-31 ASSESSMENT — PAIN SCALES - GENERAL
PAINLEVEL_OUTOF10: 2
PAINLEVEL_OUTOF10: 2
PAINLEVEL_OUTOF10: 4
PAINLEVEL_OUTOF10: 0
PAINLEVEL_OUTOF10: 0
PAINLEVEL_OUTOF10: 4
PAINLEVEL_OUTOF10: 6
PAINLEVEL_OUTOF10: 0

## 2019-10-31 ASSESSMENT — PAIN DESCRIPTION - DESCRIPTORS
DESCRIPTORS: ACHING
DESCRIPTORS: ACHING;HEADACHE

## 2019-10-31 ASSESSMENT — PAIN DESCRIPTION - LOCATION
LOCATION: THROAT
LOCATION: HEAD
LOCATION: THROAT

## 2019-10-31 ASSESSMENT — PAIN DESCRIPTION - FREQUENCY
FREQUENCY: INTERMITTENT

## 2019-10-31 ASSESSMENT — PAIN DESCRIPTION - PAIN TYPE
TYPE: ACUTE PAIN

## 2019-10-31 ASSESSMENT — PAIN DESCRIPTION - ONSET: ONSET: ON-GOING

## 2019-10-31 NOTE — PROGRESS NOTES
symmetric, not enlarged and no tenderness, skin normal  LUNGS:  No increased work of breathing, good air exchange, clear to auscultation bilaterally, no crackles or wheezing  CARDIOVASCULAR:  Normal apical impulse, regular rate and rhythm, normal S1 and S2, no S3 or S4, and no murmur noted, no edema, no JVD, no carotid bruit. ABDOMEN:  Soft, nontender, no masses, no hepatomegaly, no splenomegaly, BS+  MUSCULOSKELETAL:  No clubbing no cyanosis. there is no redness, warmth, or swelling of the joints  NEUROLOGIC:Awake, alert, oriented x3   SKIN:  no bruising or bleeding, normal skin color, texture, turgor and no redness, warmth, or swelling      Cardiographics  I personally reviewed the telemetry monitor strips with the following interpretation: sinus tachycardia    Echocardiogram: 10/27/2019,  Summary   No previous echo for comparison. Technically adequate study.   Mild concentric left ventricular hypertrophy.   Ejection fraction is visually estimated at 55%.   No regional wall motion abnormalities seen.  E/A flow reversal noted. Suggestive of diastolic dysfunction.   Mild tricuspid regurgitation.  RVSP is 32 mmHg.   Pulmonary hypertension is mild .   Physiologic and/or trace pulmonic regurgitation present.  Felecia Yelena is a small to moderate pericardial effusion noted.     Imaging  XR CHEST PORTABLE   Final Result   Improving CHF with  minimal atelectasis in the lung bases            XR ABDOMEN FOR NG/OG/NE TUBE PLACEMENT   Final Result   Nasogastric tube with the tip looped in the stomach. Endotracheal tube close to porfirio and needs to be retracted. ALERT:  THIS IS AN ABNORMAL REPORT                  XR CHEST PORTABLE   Final Result   Low lung volumes with  mild CHF. Endotracheal tube close to porfirio and needs to be retracted by 1 cm. No discrete pneumothorax. XR CHEST PORTABLE   Final Result      Endotracheal tube tip is encroaching upon the right mainstem bronchus.    Opacities in the  10/31/2019     I have personally reviewed the laboratory, cardiac diagnostic and radiographic testing as outlined above:    Assessment:     1. Pericardial effusion: Small to moderate on echocardiogram, will require follow-up echocardiogram in a few weeks  2. Abnormal stress test: May need further evaluation with cardiac catheterization/coronary CTA, will plan prior to discharge  3. Sinus tachycardia: Etiology?,  TSH normal, will follow closely. 4. Mild mitral valve regurgitation  5. Tricuspid valve regurgitation  6. Hypertension  7. Hyperlipidemia  8. Seizures  9. Depression  10. Noncompliance       Recommendations:     1. Continue current treatment  2. Will follow neurology   3. Further cardiac recommendations will be forthcoming pending her clinical course and diagnostic test findings    Discussed with patient, no family at bedside  discussed with Dr. Yariel Bermudez     Electronically signed by TTII Sandoval CNP on 10/31/2019 at 11:02 AM   I have discussed the care of patient including pertinent history and exam and reviewed chart, vitals, labs and radiologic studies. I also participated in medical decision making with Greg Preston CNP on the date of service and I agree with all of the pertinent clinical information, assessment and treatment plan and status of the problem list as documented and have edited it. NOTE: This report was transcribed using voice recognition software.  Every effort was made to ensure accuracy; however, inadvertent computerized transcription errors may be present

## 2019-10-31 NOTE — PROGRESS NOTES
Note sent to Dr Delilah Tillman regarding sister wanting to speak to him. Phone number was provided.

## 2019-10-31 NOTE — PROGRESS NOTES
Source: Blood     Phosphorus 3.8 mg/dL   Narrative:     CALL  [facility]  H44 tel. ,  Rejected Test Name/Called to:velasquez cannon rn, 10/27/2019 01:03, by  Roddy Butts has been rescheduled by [facility] at 10/26/2019 21:45 Reason: pt   is rrt   Lactic acid, plasma [[id number]]    Collected: 10/27/19 0026    Updated: 10/27/19 0108    Specimen Source: Blood     Lactic Acid 2.0 mmol/L   Narrative:     CALL  [facility]  H44 tel. ,  Rejected Test Name/Called to:velasquez cannon rn, 10/27/2019 01:03, by  [facility]  Collection has been rescheduled by [facility] at 10/26/2019 21:45 Reason: pt   is rrt   [address] [[id number]]    Collected: 10/27/19 0103    Updated: 10/27/19 0105     Rejected Test cbcwd    Reason for Rejection see below    Comment: Unable to perform testing; specimen clotted. To perform testing the specimen will need to be recollected.  Clotted       Narrative:     CALL  [facility]  H44 tel. ,  Rejected Test Name/Called to:velasquez cannon rn, 10/27/2019 01:03, by  [facility]   Blood Gas, Arterial [[id number]] (Abnormal)    Resulted: 10/27/19 0036    Updated: 10/27/19 0037    Specimen Source: Blood     Date Analyzed     Time Analyzed 36    Source: Blood Arterial    pH, Blood Gas 7.369    PCO2 48. 1High  mmHg    PO2 135. 2High  mmHg    HCO3 27.1High  mmol/L    B.E. 1.3 mmol/L    O2 Sat 98. 6High  %    PO2/FIO2 2.25 mmHg/%    AaDO2 224.7 mmHg    RI(T) 1.66    O2Hb 98. 2High  %    COHb 0.0 %    MetHb 0.4 %    O2 Content 15.8 mL/dL    HHb 1.4 %    tHb (est) 11.3Low  g/dL    Mode AC    FIO2 60.0 %    Rr Mechanical 14.0 b/min    Vt Mechanical 450.0 mL    Peep/Cpap 5.0 cmH2O    Date Of Collection --    Time Collected --    Pt Temp 37.0 C     ID [id number]    Lab [id number]    Critical(s) Notified .  No Critical Values   XR ABDOMEN FOR NG/OG/NE TUBE PLACEMENT [[id number]]    Resulted: 10/27/19 0029    Updated: 10/27/19 0031    Narrative:     Patient MRN:  [id number]  : 1970  Age: 52 years  Gender: Female    Order Date:  10/26/2019 10:30 PM        TECHNIQUE/NUMBER OF IMAGES/COMPARISON/CLINICAL HISTORY:    Abdomen supine view    1 image one view    History NG tube placement. FINDINGS: NG tube tip in the stomach in good position. This study not  intended to full evaluate the chest or the abdomen. There is a relative low position for the endotracheal tube. This can  be relate with the plain radiographs of the chest.   Impression:     NG tube in good position. Relative low position for an endotracheal tube. Can correlate with the  chest radiograph. CT CERVICAL SPINE WO CONTRAST [[id number]]    Resulted: 10/26/19 2320    Updated: 10/26/19 2322    Narrative:     Patient MRN:  [id number]  : 1970  Age: 52 years  Gender: Female    Order Date:  10/26/2019 10:30 PM        TECHNIQUE/NUMBER OF IMAGES/COMPARISON/CLINICAL HISTORY:    CT cervical    Axial with sagittal coronal and oblique reconstructions    78-year-old female patient is seizure trauma injury. Comparison study . FINDINGS: Vertebral bodies have normal height. Disc spaces are  well-maintained. Alignment is preserved in the sagittal and coronal  images are    The odontoid process intact. Articular relation between the occipital  condyles and C1 and between C1 and C2 are preserved. All facet joints are well aligned the. There is no bone or soft tissue encroachment of the cervical spine  canal or neural foramina. There are intact appearance for pedicles, spinous process and  transverse process. All facet joints are well aligned. Patient has a orotracheal tube and orogastric tube. No acute fractures are seen in the cervical spine. Lung apices demonstrate a lateral pleural effusions. Impression:     No acute fractures or dislocations identified in the  cervical spine.     CT Head WO Contrast [[id number]]    Resulted: 10/26/19 2315    Updated: 10/26/19 2317    Narrative:     Patient MRN:  [id number]  : 1970  Age: 52 years  Gender: Cruz, DO   3 mg at 10/30/19 2140    pravastatin (PRAVACHOL) tablet 20 mg  20 mg Oral Daily Lissett Whitney, DO   20 mg at 10/30/19 2140    sodium chloride flush 0.9 % injection 10 mL  10 mL Intravenous 2 times per day Lissett Whitney, DO   10 mL at 10/30/19 2140    magnesium hydroxide (MILK OF MAGNESIA) 400 MG/5ML suspension 30 mL  30 mL Oral Daily PRN Lissett Whitney, DO        ondansetron [facility]) injection 4 mg  4 mg Intravenous Q6H PRN Lissett Whitney, DO        enoxaparin (LOVENOX) injection 40 mg  40 mg Subcutaneous Daily Lissett Whitney, DO   40 mg at 10/30/19 1057    perflutren lipid microspheres (DEFINITY) injection 1.65 mg  1.5 mL Intravenous ONCE PRN Lissett Whitney, DO           Neurology assessment:  Seizures with medical noncompliance with treatment  Possible psychogenic seizures in the past  Continuous EEG is ordered  Keppra is to be continued  Vimpat has been started    Critical care/pulmonary  1.) Status Epilepticus vs Psychogenic Seizures   2.) Acute Respiratory Failure with Hypoxia     Problem list:  · Seizure disorder with medical noncompliance with treatment resulting in exacerbation and breakthrough seizure  · These are very atypical seizure presentations EEG underway  · Reported unwitnessed fall with loss of consciousness  · Acute respiratory failure requiring airway protection and intubation-resolved  · Possible active cardiac disease-evaluation on hold until neurologic and respiratory stabilization  · Discussed with pulmonary medicine and critical care medicine      Active Problems:    Chest pain    Intractable epilepsy without status epilepticus ([facility] [address]  Resolved Problems:    * No resolved hospital problems.  *      Assessment:  As above    Plan:  Continue Keppra 1500 mg twice daily  Vimpat started per neurology  Wean sedation as tolerated  Cardiology input appreciated  Reviewed tentative EEG report  Possible discharge contingent upon neurology and cardiology assessment        Elis Simmons Eitan Penn MD  7:34 AM  10/31/2019

## 2019-10-31 NOTE — PROGRESS NOTES
Notified Dr. Milagros Baker re: 5 min episode at approx 0600. Pt sitting at side of bed, hunched over, eyes open, able to follow commands. Bilateral strong , pt gripped nurse and PCA hand and was able to lay back down in bed.  Pt alert, talking and has 4L NC applied at this time

## 2019-10-31 NOTE — PLAN OF CARE
Problem: Falls - Risk of:  Goal: Will remain free from falls  Description  Will remain free from falls  10/31/2019 0843 by Sofía Donald RN  Outcome: Met This Shift     Problem: Falls - Risk of:  Goal: Absence of physical injury  Description  Absence of physical injury  10/31/2019 0843 by Sofía Donald RN  Outcome: Met This Shift     Problem: Pain:  Goal: Pain level will decrease  Description  Pain level will decrease  10/31/2019 0843 by Sofía Donald RN  Outcome: Met This Shift     Problem: Pain:  Goal: Control of acute pain  Description  Control of acute pain  10/31/2019 0843 by Sofía Donald RN  Outcome: Met This Shift     Problem: Pain:  Goal: Control of chronic pain  Description  Control of chronic pain  10/31/2019 0843 by Sofía Donald RN  Outcome: Met This Shift     Problem: Risk for Impaired Skin Integrity  Goal: Tissue integrity - skin and mucous membranes  Description  Structural intactness and normal physiological function of skin and  mucous membranes.   10/31/2019 0843 by Sofía Donald RN  Outcome: Met This Shift

## 2019-10-31 NOTE — PROGRESS NOTES
Merrick Stevens is a 52 y.o.  female     Neurology is following for seizure     PMH significant for HTN, HLD and seizures     Presented with suspected BT seizure  ---noted to have several staring spells  ---ran out of her Keppra a few days prior and was taking a left over Lamictal rx that she had   ---saw Dr. Vicente Irizarry in 2/2019  ---At that visit, there was vague history surrounding her seizures    Per chart review, the patient stated that she was driving and that \"if the doctor was going to take away her license, she would leave\". She was informed about the cyst on her scan and that records from [facility] would need to be obtained. It was also noted that the cyst should be evaluated by a neurosurgeon. The patient said that she was \"at the wrong office\" and \"sorry that she wasted our time\" and left the appointment. Patient was seen by Dr. Jeanette Martin in 8/2019 for seizure activity  ---EEG at that time was normal  ---Keppra was stopped and she was started on Lamotrigine   ---probable nonepileptic spells  ---admitted for inpatient psych at that time for mood disorder and probable conversion disorder    RRT on 10/25 for decreased responsiveness  ---Given 2 mg Ativan and Keppra was increased to 1 G BID at that time  ---10/26- patient found to be standing in the bathroom, not responding to verbal direction and picking at her waistband--staring spell given Ativan 2 mg IV once and Keppra increased to 1500 mg BID.  Patient with post ictal confusion afterwards   ---RRT 10/26- patient had unwitnessed fall, RRT team found patient to be very rigid, unresponsive, clenching her jaw, biting her tongue was given 4 mg of Ativan w/o resolution of sx ---bolus of Vimpat 200 mg was given and started on Vimpat 100 mg BID  ---Intubated and sedated for airway protection and transferred to MICU  ---no seizures reported   ---RRT on 10/30 unresponsiveness, showed rigidity and attempted to get out of bed and fighting with nursing staff, actively influenza quadrivalent split vaccine (FLUZONE;FLUARIX;FLULAVAL;AFLURIA) injection 0.5 mL  0.5 mL Intramuscular Prior to discharge Ilsa Pandya MD        losartan (COZAAR) tablet 100 mg  100 mg Oral Daily Carletha Moses, DO   100 mg at 10/31/19 1012    melatonin tablet 3 mg  3 mg Oral Nightly Carletha Moses, DO   3 mg at 10/30/19 2140    pravastatin (PRAVACHOL) tablet 20 mg  20 mg Oral Daily Carletha Moses, DO   20 mg at 10/30/19 2140    sodium chloride flush 0.9 % injection 10 mL  10 mL Intravenous 2 times per day Carletha Moses, DO   10 mL at 10/31/19 0949    magnesium hydroxide (MILK OF MAGNESIA) 400 MG/5ML suspension 30 mL  30 mL Oral Daily PRN Carletha Moses, DO        ondansetron [facility]) injection 4 mg  4 mg Intravenous Q6H PRN Carletha Moses, DO        enoxaparin (LOVENOX) injection 40 mg  40 mg Subcutaneous Daily Carletha Moses, DO   40 mg at 10/31/19 6391    perflutren lipid microspheres (DEFINITY) injection 1.65 mg  1.5 mL Intravenous ONCE PRN Carletha Moses, DO         Objective:     /70   Pulse 109   Temp 97.3 °F (36.3 °C) (Temporal)   Resp 18   Ht 5' 3\" (1.6 m)   Wt 186 lb 14.4 oz (84.8 kg)   SpO2 96%   BMI 33.11 kg/m²      General appearance: awake lying in bed and in no acute distress   Head: Normocephalic, without obvious abnormality, atraumatic  Eyes: conjunctivae/corneas clear. Neck: no adenopathy, no carotid bruit, no JVD, supple, symmetrical, trachea midline and thyroid not enlarged, symmetric, no tenderness/mass/nodules  Lungs: mechanically ventilated. Breathing same rate as vent. Effort normal   Heart: NSR on tele. Extremities: extremities normal, atraumatic, no cyanosis or edema   Pulses: 2+ and symmetric  Skin: Skin color, texture, turgor normal. No rashes or lesions     Mental Status: alert and oriented x 3. Follows all commands.     Speech: no dysarthria  Language: no aphasias     Cranial Nerves:  I: smell    II: visual acuity     II: visual fields Blinks to threat   II: pupils JR   III,VII: ptosis None   III,IV,VI: extraocular muscles  EOMI without  nystagmus   V: mastication    V: facial light touch sensation  Intact    V,VII: corneal reflex     VII: facial muscle function - upper  Symmetric   VII: facial muscle function - lower Appears symmetric    VIII: hearing Intact   IX: soft palate elevation     IX,X: gag reflex    XI: trapezius strength  5/5   XI: sternocleidomastoid strength 5/5   XI: neck extension strength  5/5   XII: tongue strength  Symmetric      Motor:  5/5 strength throughout  Normal muscle bulk and tone  No drift or abnormal movements    Sensory:  LT intact throughout    Coordination:  Difficulty with FFM  FN intact mild dysmetria on the left  HS intact     DTR:   2+ throughout     No Babinskis    No pathological reflexes    Laboratory/Radiology:     CBC with Differential:    Lab Results   Component Value Date    WBC 4.6 10/31/2019    RBC 4.35 10/31/2019    HGB 11.6 10/31/2019    HCT 37.9 10/31/2019     10/31/2019    MCV 87.1 10/31/2019    MCH 26.7 10/31/2019    MCHC 30.6 10/31/2019    RDW 14.3 10/31/2019    SEGSPCT 70 11/30/2013    LYMPHOPCT 53.1 10/31/2019    MONOPCT 5.0 10/31/2019    BASOPCT 0.2 10/31/2019    MONOSABS 0.23 10/31/2019    LYMPHSABS 2.45 10/31/2019    EOSABS 0.11 10/31/2019    BASOSABS 0.01 10/31/2019     CMP:    Lab Results   Component Value Date     10/31/2019    K 4.1 10/31/2019    K 4.4 10/24/2019     10/31/2019    CO2 25 10/31/2019    BUN 8 10/31/2019    CREATININE 0.6 10/31/2019    GFRAA >60 10/31/2019    LABGLOM >60 10/31/2019    GLUCOSE 105 10/31/2019    GLUCOSE 115 03/31/2012    PROT 7.6 10/31/2019    LABALBU 4.4 10/31/2019    LABALBU 4.3 03/31/2012    CALCIUM 9.4 10/31/2019    BILITOT 0.3 10/31/2019    ALKPHOS 199 10/31/2019    AST 44 10/31/2019    ALT 48 10/31/2019     Results for Raquel Face (MRN [id number]) as of 10/26/2019 13:59   Ref.  Range 10/24/2019 23:00   Color, UA Latest Ref Range: Straw/Yellow  Yellow   Clarity, UA Latest Ref Range: Clear  CLOUDY (A)   Glucose, UA Latest Ref Range: Negative mg/dL Negative   Bilirubin, Urine Latest Ref Range: Negative  Negative   Ketones, Urine Latest Ref Range: Negative mg/dL Negative   Specific Gravity, UA Latest Ref Range: 1.005 - 1.030  1.020   Blood, Urine Latest Ref Range: Negative  Negative   pH, UA Latest Ref Range: 5.0 - 9.0  7.5   Protein, UA Latest Ref Range: Negative mg/dL TRACE   Urobilinogen, Urine Latest Ref Range: <2.0 E.U./dL 0.2   Nitrite, Urine Latest Ref Range: Negative  POSITIVE (A)   Leukocyte Esterase, Urine Latest Ref Range: Negative  SMALL (A)   WBC, UA Latest Ref Range: 0 - 5 /HPF 5-10   RBC, UA Latest Ref Range: 0 - 2 /HPF NONE   Epi Cells Latest Units: /HPF FEW   Bacteria, UA Latest Units: /HPF MANY (A)     CTH:  1. There is no acute intracranial abnormality. 2. Stable arachnoid cyst within the left middle cranial fossa with  mild mass effect upon the anterior aspect of the left temporal lobe. EEG: This 9 hours continuous EEG shows epliptogenicity arising from left central area in addition to mild diffuse encephalopathy. I personally reviewed all labs and images today     Assessment:     BT seizures   ---Hx medication non-compliance   ---CTH with stable arachnoid cyst with mild mass effect upon the anterior aspect of the L temporal lobe   ---her seizures are with tongue biting but no loss continence urine/bowel  ---from patient description appears she was having focal seizures and was not seeking tx at the time  ---?  Psychogenic seizures in the past per chart and hx of mood disorder and conversion d/o    ---EEG with epileptogenicity arising from left central area likely her stable arachnoid cyst   ---last seizure like episode this morning   ---spoke with Dr. Sandra Moore and we will begin to decrease her AED's these seizures as described by witnesses appear to be psychogenic in nature that is why Ativan does not work for her

## 2019-11-01 ENCOUNTER — APPOINTMENT (OUTPATIENT)
Dept: NEUROLOGY | Age: 49
DRG: 053 | End: 2019-11-01
Attending: INTERNAL MEDICINE
Payer: MEDICAID

## 2019-11-01 LAB
METER GLUCOSE: 108 MG/DL (ref 74–99)
METER GLUCOSE: 144 MG/DL (ref 74–99)
METER GLUCOSE: 80 MG/DL (ref 74–99)

## 2019-11-01 PROCEDURE — 95819 EEG AWAKE AND ASLEEP: CPT

## 2019-11-01 PROCEDURE — 82962 GLUCOSE BLOOD TEST: CPT

## 2019-11-01 PROCEDURE — 6370000000 HC RX 637 (ALT 250 FOR IP): Performed by: INTERNAL MEDICINE

## 2019-11-01 PROCEDURE — 6370000000 HC RX 637 (ALT 250 FOR IP): Performed by: NURSE PRACTITIONER

## 2019-11-01 PROCEDURE — 95813 EEG EXTND MNTR 61-119 MIN: CPT | Performed by: PSYCHIATRY & NEUROLOGY

## 2019-11-01 PROCEDURE — 6360000002 HC RX W HCPCS: Performed by: INTERNAL MEDICINE

## 2019-11-01 PROCEDURE — 2060000000 HC ICU INTERMEDIATE R&B

## 2019-11-01 PROCEDURE — 2580000003 HC RX 258: Performed by: INTERNAL MEDICINE

## 2019-11-01 RX ORDER — LEVETIRACETAM 5 MG/ML
500 INJECTION INTRAVASCULAR EVERY 12 HOURS
Status: DISCONTINUED | OUTPATIENT
Start: 2019-11-02 | End: 2019-11-01 | Stop reason: SDUPTHER

## 2019-11-01 RX ORDER — LEVETIRACETAM 500 MG/1
500 TABLET ORAL EVERY 12 HOURS SCHEDULED
Status: DISCONTINUED | OUTPATIENT
Start: 2019-11-02 | End: 2019-11-05 | Stop reason: HOSPADM

## 2019-11-01 RX ORDER — LEVETIRACETAM 5 MG/ML
500 INJECTION INTRAVASCULAR EVERY 12 HOURS SCHEDULED
Status: DISCONTINUED | OUTPATIENT
Start: 2019-11-02 | End: 2019-11-05 | Stop reason: HOSPADM

## 2019-11-01 RX ORDER — PETROLATUM 42 G/100G
OINTMENT TOPICAL 2 TIMES DAILY PRN
Status: DISCONTINUED | OUTPATIENT
Start: 2019-11-01 | End: 2019-11-05 | Stop reason: HOSPADM

## 2019-11-01 RX ORDER — LEVETIRACETAM 500 MG/1
500 TABLET ORAL 2 TIMES DAILY
Status: DISCONTINUED | OUTPATIENT
Start: 2019-11-02 | End: 2019-11-01 | Stop reason: SDUPTHER

## 2019-11-01 RX ORDER — LACOSAMIDE 50 MG/1
50 TABLET ORAL 2 TIMES DAILY
Status: COMPLETED | OUTPATIENT
Start: 2019-11-01 | End: 2019-11-01

## 2019-11-01 RX ADMIN — BUPRENORPHINE HYDROCHLORIDE AND NALOXONE HYDROCHLORIDE DIHYDRATE 1 TABLET: 8; 2 TABLET SUBLINGUAL at 08:36

## 2019-11-01 RX ADMIN — LEVETIRACETAM 1000 MG: 500 TABLET ORAL at 08:36

## 2019-11-01 RX ADMIN — ONDANSETRON HYDROCHLORIDE 4 MG: 2 INJECTION, SOLUTION INTRAMUSCULAR; INTRAVENOUS at 16:19

## 2019-11-01 RX ADMIN — LEVETIRACETAM 1000 MG: 500 TABLET ORAL at 22:35

## 2019-11-01 RX ADMIN — LACOSAMIDE 50 MG: 50 TABLET, FILM COATED ORAL at 22:35

## 2019-11-01 RX ADMIN — LACOSAMIDE 100 MG: 50 TABLET, FILM COATED ORAL at 08:36

## 2019-11-01 RX ADMIN — ACETAMINOPHEN 650 MG: 325 TABLET, FILM COATED ORAL at 22:35

## 2019-11-01 RX ADMIN — LOSARTAN POTASSIUM 100 MG: 50 TABLET, FILM COATED ORAL at 08:36

## 2019-11-01 RX ADMIN — PRAVASTATIN SODIUM 20 MG: 20 TABLET ORAL at 22:36

## 2019-11-01 RX ADMIN — Medication 10 ML: at 22:48

## 2019-11-01 RX ADMIN — ENOXAPARIN SODIUM 40 MG: 100 INJECTION SUBCUTANEOUS at 08:36

## 2019-11-01 RX ADMIN — BUPRENORPHINE HYDROCHLORIDE AND NALOXONE HYDROCHLORIDE DIHYDRATE 1 TABLET: 8; 2 TABLET SUBLINGUAL at 19:10

## 2019-11-01 RX ADMIN — MELATONIN 3 MG ORAL TABLET 3 MG: 3 TABLET ORAL at 22:36

## 2019-11-01 ASSESSMENT — PAIN DESCRIPTION - PAIN TYPE
TYPE: ACUTE PAIN

## 2019-11-01 ASSESSMENT — PAIN DESCRIPTION - PROGRESSION
CLINICAL_PROGRESSION: NOT CHANGED
CLINICAL_PROGRESSION: GRADUALLY WORSENING
CLINICAL_PROGRESSION: NOT CHANGED

## 2019-11-01 ASSESSMENT — PAIN SCALES - GENERAL
PAINLEVEL_OUTOF10: 3
PAINLEVEL_OUTOF10: 3
PAINLEVEL_OUTOF10: 2
PAINLEVEL_OUTOF10: 3

## 2019-11-01 ASSESSMENT — PAIN - FUNCTIONAL ASSESSMENT
PAIN_FUNCTIONAL_ASSESSMENT: ACTIVITIES ARE NOT PREVENTED

## 2019-11-01 ASSESSMENT — PAIN DESCRIPTION - FREQUENCY
FREQUENCY: INTERMITTENT

## 2019-11-01 ASSESSMENT — PAIN DESCRIPTION - ONSET
ONSET: ON-GOING

## 2019-11-01 ASSESSMENT — PAIN DESCRIPTION - DESCRIPTORS
DESCRIPTORS: HEADACHE;DISCOMFORT
DESCRIPTORS: ACHING;DISCOMFORT
DESCRIPTORS: ACHING;DISCOMFORT

## 2019-11-01 ASSESSMENT — PAIN DESCRIPTION - LOCATION
LOCATION: LEG
LOCATION: LEG
LOCATION: HEAD

## 2019-11-01 NOTE — PROGRESS NOTES
Nutrition Assessment    Type and Reason for Visit: Initial(LOS)    Nutrition Recommendations:  Continue current diet. Nutrition Assessment: Pt appears adequately nourished on admit,now at nutriton compromise 2/2 AMS and decreased appetite. Add daily ONS. Malnutrition Assessment:  · Malnutrition Status: At risk for malnutrition  · Context: Acute illness or injury  · Findings of the 6 clinical characteristics of malnutrition (Minimum of 2 out of 6 clinical characteristics is required to make the diagnosis of moderate or severe Protein Calorie Malnutrition based on AND/ASPEN Guidelines):  1. Energy Intake-Less than or equal to 75% of estimated energy requirement, Greater than or equal to 7 days    2. Weight Loss-No significant weight loss, in 1 week  3. Fat Loss-No significant subcutaneous fat loss,    4. Muscle Loss-No significant muscle mass loss,    5. Fluid Accumulation-No significant fluid accumulation,    6.   Strength-Not measured    Nutrition Risk Level: Low    Nutrient Needs:  · Estimated Daily Total Kcal: 5563-5217(CBW 15-18 kcals )  · Estimated Daily Protein (g): 60-75(IBW 1.2-1.4 g/kg)  · Estimated Daily Total Fluid (ml/day): 6841-2227(1 ml/kcal)    Nutrition Diagnosis:   · Problem: Inadequate oral intake  · Etiology: related to Cognitive or neurological impairment     Signs and symptoms:  as evidenced by Diet history of poor intake, Intake 50-75%    Objective Information:  · Nutrition-Focused Physical Findings: alert, no edema noted, - I/O, abdomen WDL, + BS,   · Wound Type: (abrasion)  · Current Nutrition Therapies:  · Oral Diet Orders: General   · Oral Diet intake: 26-50%  · Anthropometric Measures:  · Ht: 5' 3\" (160 cm)   · Current Body Wt: 186 lb (84.4 kg)(11;/1 standing scale)  · Admission Body Wt: 194 lb (88 kg)(10/26 standing scale)  · Usual Body Wt: 185 lb (83.9 kg)(5/2019 from [facility], standing scale)  · % Weight Change:  ,  weight changes r/t fluid status, - I/O  · Ideal Body Wt: 115 lb (52.2 kg), % Ideal Body 162%  · BMI Classification: BMI 30.0 - 34.9 Obese Class I    Nutrition Interventions:   Continue current diet, Start ONS(ensure daily)  Continued Inpatient Monitoring, Education not appropriate at this time    Nutrition Evaluation:   · Evaluation: Goals set   · Goals: Consume > 75% of meals.     · Monitoring: Meal Intake, Diet Tolerance, Skin Integrity, Wound Healing, I&O, Mental Status/Confusion, Weight, Pertinent Labs, Monitor Bowel Function      Electronically signed by Eugenio Salgado RD, LD on 11/1/19 at 3:31 PM    Contact Number: [phone]

## 2019-11-01 NOTE — PROGRESS NOTES
Orthostatic BP's done per verbal order by RADHA Rascon Blunt: /74   Sitting: /78   Standing: /85   Pt tolerated it well with no symptoms returned to bed with assistance   RADHA Dolan notified via perfect serve of results  [address]

## 2019-11-01 NOTE — PROGRESS NOTES
Source: Blood     Phosphorus 3.8 mg/dL   Narrative:     CALL  [facility]  H44 tel. ,  Rejected Test Name/Called to:velasquez cannon rn, 10/27/2019 01:03, by  [place] has been rescheduled by [facility] at 10/26/2019 21:45 Reason: pt   is rrt   Lactic acid, plasma [[id number]]    Collected: 10/27/19 0026    Updated: 10/27/19 0108    Specimen Source: Blood     Lactic Acid 2.0 mmol/L   Narrative:     CALL  [facility]  H44 tel. ,  Rejected Test Name/Called to:velasquez cannon rn, 10/27/2019 01:03, by  [facility]  Collection has been rescheduled by [facility] at 10/26/2019 21:45 Reason: pt   is rrt   [address] [[id number]]    Collected: 10/27/19 0103    Updated: 10/27/19 0105     Rejected Test cbcwd    Reason for Rejection see below    Comment: Unable to perform testing; specimen clotted. To perform testing the specimen will need to be recollected.  Clotted       Narrative:     CALL  [facility]  H44 tel. ,  Rejected Test Name/Called to:velasquez cannon rn, 10/27/2019 01:03, by  [facility]   Blood Gas, Arterial [[id number]] (Abnormal)    Resulted: 10/27/19 0036    Updated: 10/27/19 0037    Specimen Source: Blood     Date Analyzed [id number]    Time Analyzed 36    Source: Blood Arterial    pH, Blood Gas 7.369    PCO2 48. 1High  mmHg    PO2 135. 2High  mmHg    HCO3 27.1High  mmol/L    B.E. 1.3 mmol/L    O2 Sat 98. 6High  %    PO2/FIO2 2.25 mmHg/%    AaDO2 224.7 mmHg    RI(T) 1.66    O2Hb 98. 2High  %    COHb 0.0 %    MetHb 0.4 %    O2 Content 15.8 mL/dL    HHb 1.4 %    tHb (est) 11.3Low  g/dL    Mode AC    FIO2 60.0 %    Rr Mechanical 14.0 b/min    Vt Mechanical 450.0 mL    Peep/Cpap 5.0 cmH2O    Date Of Collection --    Time Collected --    Pt Temp 37.0 C     ID [id number]    Lab [id number]    Critical(s) Notified .  No Critical Values   XR ABDOMEN FOR NG/OG/NE TUBE PLACEMENT [[id number]]    Resulted: 10/27/19 0029    Updated: 10/27/19 0031    Narrative:     Patient MRN:  [id number]  : 1970  Age: 52 years  Gender: Female    Order Date:  10/26/2019 10:30 PM        TECHNIQUE/NUMBER OF IMAGES/COMPARISON/CLINICAL HISTORY:    Abdomen supine view    1 image one view    History NG tube placement. FINDINGS: NG tube tip in the stomach in good position. This study not  intended to full evaluate the chest or the abdomen. There is a relative low position for the endotracheal tube. This can  be relate with the plain radiographs of the chest.   Impression:     NG tube in good position. Relative low position for an endotracheal tube. Can correlate with the  chest radiograph. CT CERVICAL SPINE WO CONTRAST [[id number]]    Resulted: 10/26/19 2320    Updated: 10/26/19 2322    Narrative:     Patient MRN:  [id number]  : 1970  Age: 52 years  Gender: Female    Order Date:  10/26/2019 10:30 PM        TECHNIQUE/NUMBER OF IMAGES/COMPARISON/CLINICAL HISTORY:    CT cervical    Axial with sagittal coronal and oblique reconstructions    49-year-old female patient is seizure trauma injury. Comparison study . FINDINGS: Vertebral bodies have normal height. Disc spaces are  well-maintained. Alignment is preserved in the sagittal and coronal  images are    The odontoid process intact. Articular relation between the occipital  condyles and C1 and between C1 and C2 are preserved. All facet joints are well aligned the. There is no bone or soft tissue encroachment of the cervical spine  canal or neural foramina. There are intact appearance for pedicles, spinous process and  transverse process. All facet joints are well aligned. Patient has a orotracheal tube and orogastric tube. No acute fractures are seen in the cervical spine. Lung apices demonstrate a lateral pleural effusions. Impression:     No acute fractures or dislocations identified in the  cervical spine.     CT Head WO Contrast [[id number]]    Resulted: 10/26/19 2315    Updated: 10/26/19 2317    Narrative:     Patient MRN:  [id number]  : 1970  Age: 52 years  Gender: Female    Order Date:  10/26/2019 10:30 PM        TECHNIQUE/NUMBER OF IMAGES/COMPARISON/CLINICAL HISTORY:    CT brain    Axial images were obtained sagittal and coronal reconstructions    History seizures and fall. 445 images    Comparison study of . FINDINGS: There is no indication for an acute intracranial hemorrhagic  event. There is no indication for a sizable effusion acute or recent  insult to the brain parenchyma. There is a left medial cranial fossa arachnoid cyst which is an old  finding likely on developmental bases. There is no focal mass defect or midline shift. Images with bone window settings demonstrate no significant findings. Midline structures have unremarkable appearance. Impression:     No interval change since the previous study of . No indication for an acute intracranial process. XR CHEST PORTABLE [[id number]]    Resulted: 10/26/19 2243    Updated: 10/26/19 2246    Narrative:     Patient MRN:  [id number]  : 1970  Age: 52 years  Gender: Female    Order Date:  10/26/2019 9:45 PM        TECHNIQUE/NUMBER OF IMAGES/COMPARISON/CLINICAL HISTORY:    Chest AP    1 image, one view    Comparison  4 7 day. After endotracheal tube placement. FINDINGS: Endotracheal tube is in the lower borderline position the  level of the lower margin of the aorta, it is proximal to the porfirio. NG tube in good position below diaphragma. Expiratory study causing  some crowding of the bronchovascular markings. No sizable infiltrates  or consolidations are seen. Impression:     Endotracheal tube in low borderline position at the level of the lower  arch of the aorta, proximal to the porfirio. NG tube in good position    Expiratory study, no acute cardiopulmonary process.    POCT Glucose [[id number]] (Abnormal)    Collected: 10/26/19 2118    Updated: 10/26/19 2139     Meter Glucose 112High  mg/dL   Culture blood #2 [[id number]]    Collected: 10/25/19 0372 Updated: 10/26/19 2135    Specimen Source: Blood     Culture, Blood 2 24 Hours- no growth   Narrative:     Source: BLOOD       Site:              Culture blood #1 [[id number]]    Collected: 10/25/19 1701    Updated: 10/26/19 2135    Specimen Type: Blood     Blood Culture, Routine 24 Hours- no growth   Narrative:     Source: BLOOD       Site: Blood&Blood                   MRI BRAIN W WO CONTRAST   Final Result      Minimal asymmetry with slight volume loss of the amygdala and   hippocampus on the right suggesting the possibility of mesial temporal   sclerosis. Stable left anterior temporal fossa arachnoid cyst.       Mild diffuse mucosal thickening within the paranasal sinuses. XR CHEST PORTABLE   Final Result   Improving CHF with  minimal atelectasis in the lung bases            XR ABDOMEN FOR NG/OG/NE TUBE PLACEMENT   Final Result   Nasogastric tube with the tip looped in the stomach. Endotracheal tube close to porfirio and needs to be retracted. ALERT:  THIS IS AN ABNORMAL REPORT                  XR CHEST PORTABLE   Final Result   Low lung volumes with  mild CHF. Endotracheal tube close to porfirio and needs to be retracted by 1 cm. No discrete pneumothorax. XR CHEST PORTABLE   Final Result      Endotracheal tube tip is encroaching upon the right mainstem bronchus. Opacities in the right lung base are suggestive of atelectasis. Hazy opacification in the left lung base may also be due to   atelectasis, although a developing infiltrate and/or pleural effusion   is difficult to exclude, especially given the low lung volumes. Large cardiac silhouette, similar to prior studies, compatible with   cardiomegaly and small pericardial effusion, which can be seen on the   recent CTA. XR CHEST PORTABLE   Final Result   Cardiomegaly. Retrocardiac density which may represent atelectasis   and/or infiltrate.             CT CERVICAL SPINE WO CONTRAST   Final Result   No acute fractures or dislocations identified in the   cervical spine. CT Head WO Contrast   Final Result   No interval change since the previous study of October 24. No indication for an acute intracranial process. XR ABDOMEN FOR NG/OG/NE TUBE PLACEMENT   Final Result   NG tube in good position. Relative low position for an endotracheal tube. Can correlate with the   chest radiograph. XR CHEST PORTABLE   Final Result   Endotracheal tube in low borderline position at the level of the lower   arch of the aorta, proximal to the porfirio. NG tube in good position      Expiratory study, no acute cardiopulmonary process. NM Cardiac Stress Test Nuclear Imaging   Final Result   1. There is a small reversible defect in the inferior wall   2. Ejection fraction is 69 %. 3. No significant wall motion abnormality      ALERT:  THIS IS AN ABNORMAL REPORT             Prior to Admission medications    Medication Sig Start Date End Date Taking? Authorizing Provider   levETIRAcetam (KEPPRA) 500 MG tablet Take 500 mg by mouth 2 times daily   Yes Historical Provider, MD   melatonin 3 MG TABS tablet Take 3 mg by mouth nightly   Yes Historical Provider, MD   losartan (COZAAR) 100 MG tablet Take 100 mg by mouth daily   Yes Historical Provider, MD   buprenorphine-naloxone (SUBOXONE) 8-2 MG SUBL SL tablet Place 1 tablet under the tongue 2 times daily.     Yes Historical Provider, MD   omeprazole (PRILOSEC) 20 MG delayed release capsule Take 20 mg by mouth 2 times daily  1/10/19  Yes Historical Provider, MD   pravastatin (PRAVACHOL) 20 MG tablet Take 20 mg by mouth daily   Yes Historical Provider, MD   lamoTRIgine (LAMICTAL) 25 MG tablet Take 1 tablet by mouth daily 8/9/19   TITI Meek - CNP        Current Facility-Administered Medications   Medication Dose Route Frequency Provider Last Rate Last Dose    mineral oil-hydrophilic petrolatum (HYDROPHOR) ointment   Topical BID PRN Sharon Walls MD       [facility] lacosamide (VIMPAT) tablet 50 mg  50 mg Oral BID Anola Yolanda, APRN - CNP        Or    lacosamide (VIMPAT) 50 mg in dextrose 5 % 50 mL IVPB  50 mg Intravenous BID Anola Yolanda, APRN - CNP        [START ON 11/2/2019] levETIRAcetam (KEPPRA) tablet 500 mg  500 mg Oral 2 times per day Anola Yolanda, APRN - CNP        Or   Megan Good [START ON 11/2/2019] levetiracetam (KEPPRA) 500 mg/100 mL IVPB  500 mg Intravenous 2 times per day Anola Yolanda, APRN - CNP        levetiracetam (KEPPRA) 1000 mg/100 mL IVPB  1,000 mg Intravenous Q12H Anola Yolanda, APRN - CNP        Or    levETIRAcetam (KEPPRA) tablet 1,000 mg  1,000 mg Oral Q12H Anola Yolanda, APRN - CNP   1,000 mg at 11/01/19 0836    buprenorphine-naloxone (SUBOXONE) 8-2 MG SL tablet 1 tablet  1 tablet Sublingual BID Juju Palomares MD   1 tablet at 11/01/19 0836    benzonatate (TESSALON) capsule 100 mg  100 mg Oral TID PRN Alpha Sas, DO   100 mg at 10/30/19 2141    lidocaine 1 % injection 5 mL  5 mL Intradermal Once Rubio Mcwilliams MD        sodium chloride flush 0.9 % injection 10 mL  10 mL Intravenous PRN Rubio Mcwilliams MD   10 mL at 10/28/19 2325    heparin flush (100 units/mL) injection 100 Units  1 mL Intravenous 2 times per day Rubio Mcwilliams MD   Stopped at 10/30/19 1058    heparin flush (100 units/mL) injection 100 Units  1 mL Intracatheter PRN Rubio Mcwilliams MD        albuterol (PROVENTIL) nebulizer solution 2.5 mg  2.5 mg Nebulization Q6H PRN Radha Tomas MD   2.5 mg at 10/28/19 1720    glucose (GLUTOSE) 40 % oral gel 15 g  15 g Oral PRN Juju Palomares MD        dextrose 50 % IV solution  12.5 g Intravenous PRN Juju Palomares MD        glucagon (rDNA) injection 1 mg  1 mg Intramuscular PRN Juju Palomares MD        dextrose 5 % solution  100 mL/hr Intravenous PRN Juju Palomares MD        perflutren lipid microspheres (DEFINITY) injection 1.65 mg  1.5 mL Intravenous ONCE PRN TITI Sofia - CNP        acetaminophen (TYLENOL) tablet 650 mg  650 mg Oral Q4H PRN Arlen Thy Stephanie Barros MD   650 mg at 10/31/19 2350    phenol 1.4 % mouth spray 1 spray  1 spray Mouth/Throat Q2H PRN Mookie Rice MD   1 spray at 10/29/19 4299    hydrALAZINE (APRESOLINE) injection 10 mg  10 mg Intravenous Q6H PRN Bulmaro Licona MD        neomycin-bacitracin-polymyxin (NEOSPORIN) ointment   Topical BID PRN Clive Pair, DO        influenza quadrivalent split vaccine (FLUZONE;FLUARIX;FLULAVAL;AFLURIA) injection 0.5 mL  0.5 mL Intramuscular Prior to discharge Cathi Chavarria MD        losartan (COZAAR) tablet 100 mg  100 mg Oral Daily Clive Pair, DO   100 mg at 11/01/19 9613    melatonin tablet 3 mg  3 mg Oral Nightly Clive Pair, DO   3 mg at 10/31/19 2130    pravastatin (PRAVACHOL) tablet 20 mg  20 mg Oral Daily Clive Pair, DO   20 mg at 10/31/19 2130    sodium chloride flush 0.9 % injection 10 mL  10 mL Intravenous 2 times per day Clive Pair, DO   10 mL at 10/31/19 7041    magnesium hydroxide (MILK OF MAGNESIA) 400 MG/5ML suspension 30 mL  30 mL Oral Daily PRN Clive Pair, DO        ondansetron [facility]) injection 4 mg  4 mg Intravenous Q6H PRN Clive Pair, DO        enoxaparin (LOVENOX) injection 40 mg  40 mg Subcutaneous Daily Clive Pair, DO   40 mg at 11/01/19 4776    perflutren lipid microspheres (DEFINITY) injection 1.65 mg  1.5 mL Intravenous ONCE PRN Clive Pair, DO           Neurology assessment:  Seizures with medical noncompliance with treatment  Possible psychogenic seizures in the past  Continuous EEG is ordered  Keppra is to be continued  Vimpat has been started    Critical care/pulmonary  1.) Status Epilepticus vs Psychogenic Seizures   2.) Acute Respiratory Failure with Hypoxia     Problem list:  · Seizure disorder with medical noncompliance with treatment resulting in exacerbation and breakthrough seizure  · These are very atypical seizure presentations EEG underway  · Reported unwitnessed fall with loss of consciousness  · Acute respiratory failure requiring airway protection and intubation-resolved  · Possible active cardiac disease-evaluation on hold until neurologic and respiratory stabilization  · Discussed with pulmonary medicine and critical care medicine      Active Problems:    Chest pain    Intractable epilepsy without status epilepticus ([address]  Resolved Problems:    * No resolved hospital problems.  *      Assessment:  As above    Plan:  Continue Keppra 1500 mg twice daily  Vimpat started per neurology  Wean sedation as tolerated  Cardiology input appreciated  Reviewed tentative EEG report-patient is now undergoing 4-hour EEG   possible discharge contingent upon neurology and cardiology assessment-awaiting completion        Gregory Delarosa MD  10:27 AM  11/1/2019

## 2019-11-01 NOTE — PLAN OF CARE
Problem: Falls - Risk of:  Goal: Will remain free from falls  Description  Will remain free from falls  11/1/2019 0607 by Carmeliat Graves RN  Outcome: Met This Shift

## 2019-11-01 NOTE — PROCEDURES
EEG Report  Radha Pinto is a 52 y.o. female      Appointment Date 11/1/2019   Appointment Time  8:30am     Fac[facility] [place] [id number]   Type of Study Portable 4 hr Floor [address]     Technical Specifications  Technician [facility] of consciousness Awake/sleep   Sleep deprived? no   Hyperventilation tested? no   Photic stim tested? no   EEG recording Standard 10-20 electrode placement    Duration of recording 4 hrs 23 mins   EEG complete? yes       Clinical History   Sz?     Medications    Current Facility-Administered Medications:     mineral oil-hydrophilic petrolatum (HYDROPHOR) ointment, , Topical, BID PRN, Kristen Ray MD    lacosamide (VIMPAT) 50 mg in dextrose 5 % 50 mL IVPB, 50 mg, Intravenous, BID **OR** lacosamide (VIMPAT) tablet 50 mg, 50 mg, Oral, BID, Ashley Thomas APRN - CNP    [START ON 11/2/2019] levETIRAcetam (KEPPRA) tablet 500 mg, 500 mg, Oral, 2 times per day **OR** [START ON 11/2/2019] levetiracetam (KEPPRA) 500 mg/100 mL IVPB, 500 mg, Intravenous, 2 times per day, Ashley Thomas APRN - CNP    mineral oil-hydrophilic petrolatum (HYDROPHOR) ointment, , Topical, BID PRN, Kristen Ray MD    levetiracetam (KEPPRA) 1000 mg/100 mL IVPB, 1,000 mg, Intravenous, Q12H **OR** levETIRAcetam (KEPPRA) tablet 1,000 mg, 1,000 mg, Oral, Q12H, TITI Dale - CNP, 1,000 mg at 11/01/19 0836    buprenorphine-naloxone (SUBOXONE) 8-2 MG SL tablet 1 tablet, 1 tablet, Sublingual, BID, Rylee Thomas MD, 1 tablet at 11/01/19 0836    benzonatate (TESSALON) capsule 100 mg, 100 mg, Oral, TID PRN, Kassi , DO, 100 mg at 10/30/19 2141    lidocaine 1 % injection 5 mL, 5 mL, Intradermal, Once, Mireya Ludwig MD    sodium chloride flush 0.9 % injection 10 mL, 10 mL, Intravenous, PRN, Mireya Ludwig MD, 10 mL at 10/28/19 2325    heparin flush (100 units/mL) injection 100 Units, 1 mL, Intravenous, 2 times per day, Mireya Ludwig MD, Stopped at 10/30/19 1058    heparin flush (100 units/mL) injection 100 Units, 1 mL, Intracatheter, PRN, Kerri Garza MD    albuterol (PROVENTIL) nebulizer solution 2.5 mg, 2.5 mg, Nebulization, Q6H PRN, Anatoly Lawson MD, 2.5 mg at 10/28/19 1720    glucose (GLUTOSE) 40 % oral gel 15 g, 15 g, Oral, PRN, Lloyd Yao MD    dextrose 50 % IV solution, 12.5 g, Intravenous, PRN, Lloyd Yao MD    glucagon (rDNA) injection 1 mg, 1 mg, Intramuscular, PRN, Lloyd Yao MD    dextrose 5 % solution, 100 mL/hr, Intravenous, PRN, Lloyd Yao MD    perflutren lipid microspheres (DEFINITY) injection 1.65 mg, 1.5 mL, Intravenous, ONCE PRN, TITI Sofia - CNP    acetaminophen (TYLENOL) tablet 650 mg, 650 mg, Oral, Q4H PRN, Arlen Thy Jacque Izquierdo MD, 650 mg at 10/31/19 2350    phenol 1.4 % mouth spray 1 spray, 1 spray, Mouth/Throat, Q2H PRN, Ester Cowden, MD, 1 spray at 10/29/19 0607    hydrALAZINE (APRESOLINE) injection 10 mg, 10 mg, Intravenous, Q6H PRN, Lloyd Yao MD    neomycin-bacitracin-polymyxin (NEOSPORIN) ointment, , Topical, BID PRN, Lida Romp, DO    influenza quadrivalent split vaccine (FLUZONE;FLUARIX;FLULAVAL;AFLURIA) injection 0.5 mL, 0.5 mL, Intramuscular, Prior to discharge, Jenifer Salcido MD    losartan (COZAAR) tablet 100 mg, 100 mg, Oral, Daily, Lida Romp, DO, 100 mg at 11/01/19 0836    melatonin tablet 3 mg, 3 mg, Oral, Nightly, Lida Romp, DO, 3 mg at 10/31/19 2130    pravastatin (PRAVACHOL) tablet 20 mg, 20 mg, Oral, Daily, Lida Romp, DO, 20 mg at 10/31/19 2130    sodium chloride flush 0.9 % injection 10 mL, 10 mL, Intravenous, 2 times per day, Lida Romp, DO, 10 mL at 10/31/19 0949    magnesium hydroxide (MILK OF MAGNESIA) 400 MG/5ML suspension 30 mL, 30 mL, Oral, Daily PRN, Lida Romp, DO    ondansetron [facility]) injection 4 mg, 4 mg, Intravenous, Q6H PRN, Lida Romp, DO    enoxaparin (LOVENOX) injection 40 mg, 40 mg, Subcutaneous, Daily, Lida Romp, DO, 40 mg at 11/01/19 0836    perflutren lipid microspheres (DEFINITY) injection 1.65 mg, 1.5 mL, Intravenous, ONCE PRN, Nubia Corral,         Physician Interpretation    General EEG Report        Epileptiform Discharge   None    Non-Epileptiform Abnormality  Intermittent slowing, left temporal area, at times are shrarper in morphology but do not assume the sape of typical epileptiform activity. PDR is 9 hz      Ictal  None    General Impression  This more than 4 hours video EEG shows a structural lesion in left temporal area. No epileptiform activities are seen.

## 2019-11-02 ENCOUNTER — APPOINTMENT (OUTPATIENT)
Dept: CT IMAGING | Age: 49
DRG: 053 | End: 2019-11-02
Attending: INTERNAL MEDICINE
Payer: MEDICAID

## 2019-11-02 ENCOUNTER — APPOINTMENT (OUTPATIENT)
Dept: GENERAL RADIOLOGY | Age: 49
DRG: 053 | End: 2019-11-02
Attending: INTERNAL MEDICINE
Payer: MEDICAID

## 2019-11-02 LAB
ANION GAP SERPL CALCULATED.3IONS-SCNC: 14 MMOL/L (ref 7–16)
BUN BLDV-MCNC: 13 MG/DL (ref 6–20)
CALCIUM SERPL-MCNC: 10.1 MG/DL (ref 8.6–10.2)
CHLORIDE BLD-SCNC: 97 MMOL/L (ref 98–107)
CO2: 26 MMOL/L (ref 22–29)
CREAT SERPL-MCNC: 0.6 MG/DL (ref 0.5–1)
GFR AFRICAN AMERICAN: >60
GFR NON-AFRICAN AMERICAN: >60 ML/MIN/1.73
GLUCOSE BLD-MCNC: 164 MG/DL (ref 74–99)
LACTIC ACID: 2.6 MMOL/L (ref 0.5–2.2)
MAGNESIUM: 1.9 MG/DL (ref 1.6–2.6)
METER GLUCOSE: 107 MG/DL (ref 74–99)
METER GLUCOSE: 108 MG/DL (ref 74–99)
ORGANISM: ABNORMAL
PHOSPHORUS: 3.6 MG/DL (ref 2.5–4.5)
POTASSIUM SERPL-SCNC: 4 MMOL/L (ref 3.5–5)
PROLACTIN: 6.78 NG/ML
SODIUM BLD-SCNC: 137 MMOL/L (ref 132–146)
TROPONIN: <0.01 NG/ML (ref 0–0.03)
URINE CULTURE, ROUTINE: ABNORMAL
URINE CULTURE, ROUTINE: ABNORMAL

## 2019-11-02 PROCEDURE — 86900 BLOOD TYPING SEROLOGIC ABO: CPT

## 2019-11-02 PROCEDURE — G0480 DRUG TEST DEF 1-7 CLASSES: HCPCS

## 2019-11-02 PROCEDURE — 72125 CT NECK SPINE W/O DYE: CPT

## 2019-11-02 PROCEDURE — 70450 CT HEAD/BRAIN W/O DYE: CPT

## 2019-11-02 PROCEDURE — 86901 BLOOD TYPING SEROLOGIC RH(D): CPT

## 2019-11-02 PROCEDURE — 99291 CRITICAL CARE FIRST HOUR: CPT | Performed by: CLINICAL NURSE SPECIALIST

## 2019-11-02 PROCEDURE — 84484 ASSAY OF TROPONIN QUANT: CPT

## 2019-11-02 PROCEDURE — 6360000002 HC RX W HCPCS: Performed by: INTERNAL MEDICINE

## 2019-11-02 PROCEDURE — 84100 ASSAY OF PHOSPHORUS: CPT

## 2019-11-02 PROCEDURE — 2580000003 HC RX 258: Performed by: INTERNAL MEDICINE

## 2019-11-02 PROCEDURE — 93005 ELECTROCARDIOGRAM TRACING: CPT | Performed by: INTERNAL MEDICINE

## 2019-11-02 PROCEDURE — 83605 ASSAY OF LACTIC ACID: CPT

## 2019-11-02 PROCEDURE — 6370000000 HC RX 637 (ALT 250 FOR IP): Performed by: NURSE PRACTITIONER

## 2019-11-02 PROCEDURE — 6370000000 HC RX 637 (ALT 250 FOR IP): Performed by: RADIOLOGY

## 2019-11-02 PROCEDURE — 80048 BASIC METABOLIC PNL TOTAL CA: CPT

## 2019-11-02 PROCEDURE — 6360000002 HC RX W HCPCS

## 2019-11-02 PROCEDURE — 83735 ASSAY OF MAGNESIUM: CPT

## 2019-11-02 PROCEDURE — 71045 X-RAY EXAM CHEST 1 VIEW: CPT

## 2019-11-02 PROCEDURE — 6370000000 HC RX 637 (ALT 250 FOR IP): Performed by: INTERNAL MEDICINE

## 2019-11-02 PROCEDURE — 84146 ASSAY OF PROLACTIN: CPT

## 2019-11-02 PROCEDURE — 86850 RBC ANTIBODY SCREEN: CPT

## 2019-11-02 PROCEDURE — 2060000000 HC ICU INTERMEDIATE R&B

## 2019-11-02 PROCEDURE — 36415 COLL VENOUS BLD VENIPUNCTURE: CPT

## 2019-11-02 PROCEDURE — 1200000000 HC SEMI PRIVATE

## 2019-11-02 PROCEDURE — 82962 GLUCOSE BLOOD TEST: CPT

## 2019-11-02 PROCEDURE — 80177 DRUG SCRN QUAN LEVETIRACETAM: CPT

## 2019-11-02 RX ORDER — LEVETIRACETAM 10 MG/ML
1000 INJECTION INTRAVASCULAR ONCE
Status: DISCONTINUED | OUTPATIENT
Start: 2019-11-02 | End: 2019-11-05 | Stop reason: HOSPADM

## 2019-11-02 RX ORDER — SODIUM CHLORIDE 0.9 % (FLUSH) 0.9 %
10 SYRINGE (ML) INJECTION PRN
Status: DISCONTINUED | OUTPATIENT
Start: 2019-11-02 | End: 2019-11-04 | Stop reason: SDUPTHER

## 2019-11-02 RX ORDER — LORAZEPAM 2 MG/ML
INJECTION INTRAMUSCULAR
Status: COMPLETED
Start: 2019-11-02 | End: 2019-11-02

## 2019-11-02 RX ADMIN — BENZONATATE 100 MG: 100 CAPSULE ORAL at 21:32

## 2019-11-02 RX ADMIN — LOSARTAN POTASSIUM 100 MG: 50 TABLET, FILM COATED ORAL at 08:47

## 2019-11-02 RX ADMIN — LEVETIRACETAM 500 MG: 500 TABLET ORAL at 08:47

## 2019-11-02 RX ADMIN — ONDANSETRON HYDROCHLORIDE 4 MG: 2 INJECTION, SOLUTION INTRAMUSCULAR; INTRAVENOUS at 08:47

## 2019-11-02 RX ADMIN — LEVETIRACETAM 500 MG: 500 TABLET ORAL at 21:32

## 2019-11-02 RX ADMIN — BUPRENORPHINE HYDROCHLORIDE AND NALOXONE HYDROCHLORIDE DIHYDRATE 1 TABLET: 8; 2 TABLET SUBLINGUAL at 08:48

## 2019-11-02 RX ADMIN — MELATONIN 3 MG ORAL TABLET 3 MG: 3 TABLET ORAL at 21:44

## 2019-11-02 RX ADMIN — ENOXAPARIN SODIUM 40 MG: 100 INJECTION SUBCUTANEOUS at 08:47

## 2019-11-02 RX ADMIN — ACETAMINOPHEN 650 MG: 325 TABLET, FILM COATED ORAL at 18:45

## 2019-11-02 RX ADMIN — BUPRENORPHINE HYDROCHLORIDE AND NALOXONE HYDROCHLORIDE DIHYDRATE 1 TABLET: 8; 2 TABLET SUBLINGUAL at 18:42

## 2019-11-02 RX ADMIN — ACETAMINOPHEN 650 MG: 325 TABLET, FILM COATED ORAL at 05:38

## 2019-11-02 RX ADMIN — Medication 10 ML: at 08:47

## 2019-11-02 RX ADMIN — PRAVASTATIN SODIUM 20 MG: 20 TABLET ORAL at 21:32

## 2019-11-02 RX ADMIN — LORAZEPAM 4 MG: 2 INJECTION INTRAMUSCULAR; INTRAVENOUS at 12:43

## 2019-11-02 ASSESSMENT — PAIN SCALES - GENERAL
PAINLEVEL_OUTOF10: 0
PAINLEVEL_OUTOF10: 3
PAINLEVEL_OUTOF10: 0
PAINLEVEL_OUTOF10: 4
PAINLEVEL_OUTOF10: 5
PAINLEVEL_OUTOF10: 5

## 2019-11-02 ASSESSMENT — PAIN DESCRIPTION - ONSET
ONSET: ON-GOING
ONSET: ON-GOING

## 2019-11-02 ASSESSMENT — PAIN DESCRIPTION - FREQUENCY
FREQUENCY: CONTINUOUS
FREQUENCY: INTERMITTENT

## 2019-11-02 ASSESSMENT — PAIN DESCRIPTION - PROGRESSION
CLINICAL_PROGRESSION: NOT CHANGED
CLINICAL_PROGRESSION: NOT CHANGED

## 2019-11-02 ASSESSMENT — PAIN DESCRIPTION - DESCRIPTORS
DESCRIPTORS: TENDER;DISCOMFORT
DESCRIPTORS: DISCOMFORT

## 2019-11-02 ASSESSMENT — PAIN DESCRIPTION - LOCATION
LOCATION: THROAT
LOCATION: THROAT

## 2019-11-02 ASSESSMENT — PAIN DESCRIPTION - PAIN TYPE
TYPE: ACUTE PAIN
TYPE: ACUTE PAIN

## 2019-11-02 NOTE — PROGRESS NOTES
atraumatic  Neck: no adenopathy, no carotid bruit and limited ROM  Lungs: clear to auscultation bilaterally  Heart: regular rate and rhythm  Extremities: no cyanosis or edema  Pulses: 2+ and symmetric  Skin: no rashes or lesions    Mental Status: Alert, lethargic but following commands     Speech: clear  Language: appropriate and following commands     Cranial Nerves:  I: smell    II: visual acuity     II: visual fields Full   II: pupils JR   III,VII: ptosis None   III,IV,VI: extraocular muscles  Looks around the room    V: mastication Normal   V: facial light touch sensation  Normal   V,VII: corneal reflex  Present   VII: facial muscle function - upper     VII: facial muscle function - lower Normal   VIII: hearing Normal   IX: soft palate elevation     IX,X: gag reflex    XI: trapezius strength  5/5   XI: sternocleidomastoid strength 5/5   XI: neck extension strength  5/5   XII: tongue strength  Normal     Motor:  5/5 throughout  Normal bulk and tone    Sensory:  Normal to LT     DTR:   No Babinski    No Almodovar's     Laboratory/Radiology:     CBC with Differential:    Lab Results   Component Value Date    WBC 4.6 10/31/2019    RBC 4.35 10/31/2019    HGB 11.6 10/31/2019    HCT 37.9 10/31/2019     10/31/2019    MCV 87.1 10/31/2019    MCH 26.7 10/31/2019    MCHC 30.6 10/31/2019    RDW 14.3 10/31/2019    SEGSPCT 70 11/30/2013    LYMPHOPCT 53.1 10/31/2019    MONOPCT 5.0 10/31/2019    BASOPCT 0.2 10/31/2019    MONOSABS 0.23 10/31/2019    LYMPHSABS 2.45 10/31/2019    EOSABS 0.11 10/31/2019    BASOSABS 0.01 10/31/2019     CMP:    Lab Results   Component Value Date     10/31/2019    K 4.1 10/31/2019    K 4.4 10/24/2019     10/31/2019    CO2 25 10/31/2019    BUN 8 10/31/2019    CREATININE 0.6 10/31/2019    GFRAA >60 10/31/2019    LABGLOM >60 10/31/2019    GLUCOSE 105 10/31/2019    GLUCOSE 115 03/31/2012    PROT 7.6 10/31/2019    LABALBU 4.4 10/31/2019    LABALBU 4.3 03/31/2012    CALCIUM 9.4 10/31/2019 BILITOT 0.3 10/31/2019    ALKPHOS 199 10/31/2019    AST 44 10/31/2019    ALT 48 10/31/2019       4 hour + EEG:  This more than 4 hours video EEG shows a structural lesion in left temporal area. No epileptiform activities are seen. MRI Brain:  Minimal asymmetry with slight volume loss of the amygdala and  hippocampus on the right suggesting the possibility of mesial temporal  sclerosis. Stable left anterior temporal fossa arachnoid cyst.      I personally reviewed the patient's lab and imaging studies at this time.     Assessment:     Recurrent seizure versus nonepileptic spell   Many spells including today involve following commands   EEG was obtained for more than 4 hours without seizure activity noted -- with the frequency of her spells, this EEG should have demonstrated some epileptiform activity    However, we cannot r/o kindling r/t her AED noncompliance though unlikely     Plan:     From a neurology perspective, we will obtain medication levels but she needs tx to EMU for further evaluation    This was conveyed to resident    RRT was called off by resident  35 minutes case spent with patient or in discussion with Dr Joleen Fagan for development of PoC    Pieter Click  11:52 AM  11/2/2019

## 2019-11-02 NOTE — PROGRESS NOTES
Spoke to pt. She states that recently her head has started to \"pound\" in the front part of her head and feels lightheaded. But says that she doesn't have a headache. She states that these are similar feelings she gets before some of her seizures. Pt has a normal neuro assessment. A/o x4. Pt says that when  Her heart rate gets into the 120-130's that there is a possibility she is having a seizure.

## 2019-11-02 NOTE — PROGRESS NOTES
age  HEAD:  normocepalic, without obvious abnormality, atraumatic  NECK:  Supple, symmetrical, trachea midline, no adenopathy, thyroid symmetric, not enlarged and no tenderness, skin normal  LUNGS:  No increased work of breathing, good air exchange, clear to auscultation bilaterally, no crackles or wheezing  CARDIOVASCULAR:  Normal apical impulse, regular rate and rhythm, normal S1 and S2, no S3 or S4, and no murmur noted, no edema, no JVD, no carotid bruit. ABDOMEN:  Soft, nontender, no masses, no hepatomegaly, no splenomegaly, BS+  MUSCULOSKELETAL:  No clubbing no cyanosis. there is no redness, warmth, or swelling of the joints  NEUROLOGIC:Awake, alert, oriented x3   SKIN:  no bruising or bleeding, normal skin color, texture, turgor and no redness, warmth, or swelling      Cardiographics  I personally reviewed the telemetry monitor strips with the following interpretation: sinus tachycardia    Echocardiogram: 10/27/2019,  Summary   No previous echo for comparison. Technically adequate study.   Mild concentric left ventricular hypertrophy.   Ejection fraction is visually estimated at 55%.   No regional wall motion abnormalities seen.  E/A flow reversal noted. Suggestive of diastolic dysfunction.   Mild tricuspid regurgitation.  RVSP is 32 mmHg.   Pulmonary hypertension is mild .   Physiologic and/or trace pulmonic regurgitation present.  Dorothe Ink is a small to moderate pericardial effusion noted.     Imaging  XR CHEST PORTABLE   Final Result   Borderline cardiomegaly without evidence of acute decompensation. Subtle apparent infrahilar density on the right may be extrinsic chest   wall artifact. At worst, it would represent bronchovascular crowding. There is no evidence of consolidation      CT HEAD WO CONTRAST   Final Result   Negative noncontrast CT study. Specifically, there is no evidence of intracranial hemorrhage or mass   effect, and no calvarial traumatic findings are noted.           CT CERVICAL SPINE WO CONTRAST   Final Result   No evidence of cervical spinal skeletal trauma or adjacent paraspinous   soft tissue traumatic findings. MRI BRAIN W WO CONTRAST   Final Result      Minimal asymmetry with slight volume loss of the amygdala and   hippocampus on the right suggesting the possibility of mesial temporal   sclerosis. Stable left anterior temporal fossa arachnoid cyst.       Mild diffuse mucosal thickening within the paranasal sinuses. XR CHEST PORTABLE   Final Result   Improving CHF with  minimal atelectasis in the lung bases            XR ABDOMEN FOR NG/OG/NE TUBE PLACEMENT   Final Result   Nasogastric tube with the tip looped in the stomach. Endotracheal tube close to porfirio and needs to be retracted. ALERT:  THIS IS AN ABNORMAL REPORT                  XR CHEST PORTABLE   Final Result   Low lung volumes with  mild CHF. Endotracheal tube close to porfirio and needs to be retracted by 1 cm. No discrete pneumothorax. XR CHEST PORTABLE   Final Result      Endotracheal tube tip is encroaching upon the right mainstem bronchus. Opacities in the right lung base are suggestive of atelectasis. Hazy opacification in the left lung base may also be due to   atelectasis, although a developing infiltrate and/or pleural effusion   is difficult to exclude, especially given the low lung volumes. Large cardiac silhouette, similar to prior studies, compatible with   cardiomegaly and small pericardial effusion, which can be seen on the   recent CTA. XR CHEST PORTABLE   Final Result   Cardiomegaly. Retrocardiac density which may represent atelectasis   and/or infiltrate. CT CERVICAL SPINE WO CONTRAST   Final Result   No acute fractures or dislocations identified in the   cervical spine. CT Head WO Contrast   Final Result   No interval change since the previous study of October 24. No indication for an acute intracranial process.       XR cardiac catheterization, procedure, alternatives, risks, benefits, discussed with her, she desired to proceed. 3. Will follow neurology   4. Further cardiac recommendations will be forthcoming pending her clinical course and diagnostic test findings    Discussed with patient, no family at bedside    Electronically signed by Ashley Bal MD on 11/4/2019 at 1:07 AM   NOTE: This report was transcribed using voice recognition software.  Every effort was made to ensure accuracy; however, inadvertent computerized transcription errors may be present

## 2019-11-02 NOTE — PATIENT CARE CONFERENCE
Patient is yelling and screaming because she doesn't have an iv site and the dr stated that was ok and that we can transfer her to a general floor. She continues to want to shower and she is on the heart monitor. She is not able to be calmed down, stated she wants security so I called security.

## 2019-11-02 NOTE — PLAN OF CARE
Problem: Falls - Risk of:  Goal: Will remain free from falls  Description  Will remain free from falls  11/2/2019 0433 by Leon Sahrpe RN  Outcome: Met This Shift     Problem: Falls - Risk of:  Goal: Absence of physical injury  Description  Absence of physical injury  11/2/2019 0433 by Leon Sharpe RN  Outcome: Met This Shift     Problem: Pain:  Goal: Pain level will decrease  Description  Pain level will decrease  11/2/2019 0433 by Leon Sharpe RN  Outcome: Met This Shift     Problem: Pain:  Goal: Control of acute pain  Description  Control of acute pain  11/2/2019 0433 by Leon Sharpe RN  Outcome: Met This Shift     Problem: Risk for Impaired Skin Integrity  Goal: Tissue integrity - skin and mucous membranes  Description  Structural intactness and normal physiological function of skin and  mucous membranes.   11/2/2019 0433 by Leon Sharpe RN  Outcome: Met This Shift     Problem: Coping:  Goal: Ability to identify appropriate support needs will improve  Description  Ability to identify appropriate support needs will improve  Outcome: Met This Shift     Problem: Health Behavior:  Goal: Ability to manage health-related needs will improve  Description  Ability to manage health-related needs will improve  Outcome: Met This Shift     Problem: Physical Regulation:  Goal: Ability to maintain a stable neurologic state will improve  Description  Ability to maintain a stable neurologic state will improve  Outcome: Met This Shift     Problem: Safety:  Goal: Ability to remain free from injury will improve  Description  Ability to remain free from injury will improve  Outcome: Met This Shift

## 2019-11-02 NOTE — PROGRESS NOTES
300 [P.O.:300]  Out: [address] [Urine:775]      Shortly after our conversation she had another episode with an RRT  Discussed with the team director  Neurology was notified  CT scan was done    Last Refreshed: 10/27/19 1901 [Time [facility] Orders]   Results      Component Value Units   XR CHEST PORTABLE [[id number]]    Resulted: 10/27/19 0853    Updated: 10/27/19 0855    Narrative:     Patient MRN: [id number]  : 1970  Age:  49 years  Gender: Female  Order Date: 10/27/2019 6:00 AM  Exam: XR CHEST PORTABLE  Number of Images: 1 view  Indication:  Acute respiratory failure     Comparison: 2019    FINDINGS:  Stable endotracheal and nasogastric tubes. PH probe in the  midesophagus. Heart enlarged. Pulmonary vascularity is upper normal. There is  opacity in the retrocardiac which may represent atelectasis and/or  infiltrate. Impression:     Cardiomegaly. Retrocardiac density which may represent atelectasis  and/or infiltrate. Urine culture [[id number]]    Collected: 10/25/19 2000    Updated: 10/27/19 0820    Specimen Source: Urine, clean catch     Urine Culture, Routine --    Growth not present, incubation continues   <10,000 CFU/mL   Mixed gram positive organisms    Narrative:     Source: URINE       Site: Urine&Urine             CBC Auto Differential [[id number]] (Abnormal)    Collected: 10/27/19 0440    Updated: 10/27/19 0729     WBC 6.8 E9/L    RBC 3.70 E12/L    Hemoglobin 9.9Low  g/dL    Hematocrit 32.2Low  %    MCV 87.0 fL    MCH 26.8 pg    MCHC 30.7Low  %    RDW 14.8 fL    Platelets 067 B1/Z    Comment: Platelet clumps are identified. This may decrease the automated platelet   count artifactually.         MPV 10.0 fL    Neutrophils % 71.4 %    Immature Granulocytes % 0.3 %    Lymphocytes % 24.2 %    Monocytes % 3.5 %    Eosinophils % 0.3 %    Basophils % 0.3 %    Neutrophils Absolute 4.87 E9/L    Immature Granulocytes # 0.02 E9/L    Lymphocytes Absolute 1.65 E9/L    Monocytes Absolute 0.24 E9/L Specimen Source: Blood     Magnesium 2.0 mg/dL   Narrative:     CALL  [facility]  H44 tel. ,  Rejected Test Name/Called to:velasquez cannon rn, 10/27/2019 01:03, by  Garett Chowdhury has been rescheduled by [facility] at 10/26/2019 21:45 Reason: pt   is rrt   Phosphorus [[id number]]    Collected: 10/27/19 0026    Updated: 10/27/19 0114    Specimen Source: Blood     Phosphorus 3.8 mg/dL   Narrative:     CALL  [facility]  H44 tel. ,  Rejected Test Name/Called to:velasquez cannon rn, 10/27/2019 01:03, by  Garett Chowdhury has been rescheduled by [facility] at 10/26/2019 21:45 Reason: pt   is rrt   Lactic acid, plasma [[id number]]    Collected: 10/27/19 0026    Updated: 10/27/19 0108    Specimen Source: Blood     Lactic Acid 2.0 mmol/L   Narrative:     CALL  [facility]  H44 tel. ,  Rejected Test Name/Called to:velasquez cannon rn, 10/27/2019 01:03, by  [facility]  Collection has been rescheduled by [facility] at 10/26/2019 21:45 Reason: pt   is rrt   [address] [[id number]]    Collected: 10/27/19 0103    Updated: 10/27/19 0105     Rejected Test cbcwd    Reason for Rejection see below    Comment: Unable to perform testing; specimen clotted. To perform testing the specimen will need to be recollected.  Clotted       Narrative:     CALL  [facility]  H44 tel. ,  Rejected Test Name/Called to:velasquez cannon rn, 10/27/2019 01:03, by  [facility]   Blood Gas, Arterial [[id number]] (Abnormal)    Resulted: 10/27/19 0036    Updated: 10/27/19 0037    Specimen Source: Blood     Date Analyzed [id number]    Time Analyzed 0036    Source: Blood Arterial    pH, Blood Gas 7.369    PCO2 48. 1High  mmHg    PO2 135. 2High  mmHg    HCO3 27.1High  mmol/L    B.E. 1.3 mmol/L    O2 Sat 98. 6High  %    PO2/FIO2 2.25 mmHg/%    AaDO2 224.7 mmHg    RI(T) 1.66    O2Hb 98. 2High  %    COHb 0.0 %    MetHb 0.4 %    O2 Content 15.8 mL/dL    HHb 1.4 %    tHb (est) 11.3Low  g/dL    Mode AC    FIO2 60.0 %    Rr Mechanical 14.0 b/min    Vt Mechanical 450.0 mL    Peep/Cpap 5.0 cmH2O    Date Of Collection --    Time Collected --    Pt Temp 37.0 C     ID [id number]    Lab [id number]    Critical(s) Notified . No Critical Values   XR ABDOMEN FOR NG/OG/NE TUBE PLACEMENT [[id number]]    Resulted: 10/27/19 0029    Updated: 10/27/19 0031    Narrative:     Patient MRN:  [id number]  : 1970  Age: 52 years  Gender: Female    Order Date:  10/26/2019 10:30 PM        TECHNIQUE/NUMBER OF IMAGES/COMPARISON/CLINICAL HISTORY:    Abdomen supine view    1 image one view    History NG tube placement. FINDINGS: NG tube tip in the stomach in good position. This study not  intended to full evaluate the chest or the abdomen. There is a relative low position for the endotracheal tube. This can  be relate with the plain radiographs of the chest.   Impression:     NG tube in good position. Relative low position for an endotracheal tube. Can correlate with the  chest radiograph. CT CERVICAL SPINE WO CONTRAST [[id number]]    Resulted: 10/26/19 2320    Updated: 10/26/19 2322    Narrative:     Patient MRN:  [id number]  : 1970  Age: 52 years  Gender: Female    Order Date:  10/26/2019 10:30 PM        TECHNIQUE/NUMBER OF IMAGES/COMPARISON/CLINICAL HISTORY:    CT cervical    Axial with sagittal coronal and oblique reconstructions    49-year-old female patient is seizure trauma injury. Comparison study . FINDINGS: Vertebral bodies have normal height. Disc spaces are  well-maintained. Alignment is preserved in the sagittal and coronal  images are    The odontoid process intact. Articular relation between the occipital  condyles and C1 and between C1 and C2 are preserved. All facet joints are well aligned the. There is no bone or soft tissue encroachment of the cervical spine  canal or neural foramina. There are intact appearance for pedicles, spinous process and  transverse process. All facet joints are well aligned. Patient has a orotracheal tube and orogastric tube.     No acute fractures are Historical Provider, MD   lamoTRIgine (LAMICTAL) 25 MG tablet Take 1 tablet by mouth daily 8/9/19   TITI Abraham CNP        Current Facility-Administered Medications   Medication Dose Route Frequency Provider Last Rate Last Dose    mineral oil-hydrophilic petrolatum (HYDROPHOR) ointment   Topical BID PRN Ilsa Pandya MD        levETIRAcetam (KEPPRA) tablet 500 mg  500 mg Oral 2 times per day TITI Elizabeth CNP   500 mg at 11/02/19 1310    Or    levetiracetam (KEPPRA) 500 mg/100 mL IVPB  500 mg Intravenous 2 times per day TITI Elizabeth CNP        mineral oil-hydrophilic petrolatum (HYDROPHOR) ointment   Topical BID PRN Ilsa Pandya MD        buprenorphine-naloxone (SUBOXONE) 8-2 MG SL tablet 1 tablet  1 tablet Sublingual BID Salo Ayers MD   1 tablet at 11/02/19 0848    benzonatate (TESSALON) capsule 100 mg  100 mg Oral TID PRN Savanah Bhandari DO   100 mg at 10/30/19 2141    lidocaine 1 % injection 5 mL  5 mL Intradermal Once Josie Haro MD        sodium chloride flush 0.9 % injection 10 mL  10 mL Intravenous PRN Josie Haro MD   10 mL at 10/28/19 2325    heparin flush (100 units/mL) injection 100 Units  1 mL Intravenous 2 times per day Josie Haro MD   Stopped at 10/30/19 1058    heparin flush (100 units/mL) injection 100 Units  1 mL Intracatheter PRN Josie Haro MD        albuterol (PROVENTIL) nebulizer solution 2.5 mg  2.5 mg Nebulization Q6H PRN Evans Paniagua MD   2.5 mg at 10/28/19 1720    glucose (GLUTOSE) 40 % oral gel 15 g  15 g Oral PRN Salo Ayers MD        dextrose 50 % IV solution  12.5 g Intravenous PRN Salo Ayers MD        glucagon (rDNA) injection 1 mg  1 mg Intramuscular PRN Salo Ayers MD        dextrose 5 % solution  100 mL/hr Intravenous PRN Salo Ayers MD        perflutren lipid microspheres (DEFINITY) injection 1.65 mg  1.5 mL Intravenous ONCE PRN Hailey N Humfleet, APRN - CNP        acetaminophen (TYLENOL) tablet 650 mg  650 mg Oral Q4H PRN Arlen Lulu Salguero MD   650 mg at 11/02/19 0538    phenol 1.4 % mouth spray 1 spray  1 spray Mouth/Throat Q2H PRN Di Elliott MD   1 spray at 10/29/19 3500    hydrALAZINE (APRESOLINE) injection 10 mg  10 mg Intravenous Q6H PRN Zohra Xavier MD        neomycin-bacitracin-polymyxin (NEOSPORIN) ointment   Topical BID PRN Neptali Hathaway, DO        influenza quadrivalent split vaccine (FLUZONE;FLUARIX;FLULAVAL;AFLURIA) injection 0.5 mL  0.5 mL Intramuscular Prior to discharge Dewayne Kennedy MD        losartan (COZAAR) tablet 100 mg  100 mg Oral Daily Duanne Hathaway, DO   100 mg at 11/02/19 0847    melatonin tablet 3 mg  3 mg Oral Nightly Duanne Hathaway, DO   3 mg at 11/01/19 2236    pravastatin (PRAVACHOL) tablet 20 mg  20 mg Oral Daily Duanne Hathaway, DO   20 mg at 11/01/19 2236    sodium chloride flush 0.9 % injection 10 mL  10 mL Intravenous 2 times per day Neptali Hathaway, DO   10 mL at 11/02/19 0847    magnesium hydroxide (MILK OF MAGNESIA) 400 MG/5ML suspension 30 mL  30 mL Oral Daily PRN Neptali Hathaway, DO        [facility]etron [facility]) injection 4 mg  4 mg Intravenous Q6H PRN Neptali Hathaway, DO   4 mg at 11/02/19 0847    enoxaparin (LOVENOX) injection 40 mg  40 mg Subcutaneous Daily Duanne Hathaway, DO   40 mg at 11/02/19 0847    perflutren lipid microspheres (DEFINITY) injection 1.65 mg  1.5 mL Intravenous ONCE PRN Neptali Hathaway, DO           Neurology assessment:  Seizures with medical noncompliance with treatment  Possible psychogenic seizures in the past  Continuous EEG is ordered-and was negative  Keppra is to be continued  Vimpat has been started-todays assess,ment reviewed    Critical care/pulmonary  1.) Status Epilepticus vs Psychogenic Seizures   2.) Acute Respiratory Failure with Hypoxia     Problem list:  · Seizure disorder with medical noncompliance with treatment resulting in exacerbation and breakthrough seizure  · These are very atypical seizure presentations EEG underway  · Reported

## 2019-11-03 LAB
ALBUMIN SERPL-MCNC: 4.6 G/DL (ref 3.5–5.2)
ALP BLD-CCNC: 185 U/L (ref 35–104)
ALT SERPL-CCNC: 38 U/L (ref 0–32)
ANION GAP SERPL CALCULATED.3IONS-SCNC: 18 MMOL/L (ref 7–16)
AST SERPL-CCNC: 38 U/L (ref 0–31)
BASOPHILS ABSOLUTE: 0.04 E9/L (ref 0–0.2)
BASOPHILS RELATIVE PERCENT: 0.6 % (ref 0–2)
BILIRUB SERPL-MCNC: 0.3 MG/DL (ref 0–1.2)
BUN BLDV-MCNC: 13 MG/DL (ref 6–20)
CALCIUM SERPL-MCNC: 9.7 MG/DL (ref 8.6–10.2)
CHLORIDE BLD-SCNC: 95 MMOL/L (ref 98–107)
CO2: 24 MMOL/L (ref 22–29)
CREAT SERPL-MCNC: 0.6 MG/DL (ref 0.5–1)
EKG ATRIAL RATE: 135 BPM
EKG P AXIS: 63 DEGREES
EKG P-R INTERVAL: 138 MS
EKG Q-T INTERVAL: 302 MS
EKG QRS DURATION: 72 MS
EKG QTC CALCULATION (BAZETT): 453 MS
EKG R AXIS: 74 DEGREES
EKG T AXIS: 49 DEGREES
EKG VENTRICULAR RATE: 135 BPM
EOSINOPHILS ABSOLUTE: 0.08 E9/L (ref 0.05–0.5)
EOSINOPHILS RELATIVE PERCENT: 1.2 % (ref 0–6)
GFR AFRICAN AMERICAN: >60
GFR NON-AFRICAN AMERICAN: >60 ML/MIN/1.73
GLUCOSE BLD-MCNC: 125 MG/DL (ref 74–99)
HCT VFR BLD CALC: 38.6 % (ref 34–48)
HEMOGLOBIN: 11.8 G/DL (ref 11.5–15.5)
IMMATURE GRANULOCYTES #: 0.01 E9/L
IMMATURE GRANULOCYTES %: 0.2 % (ref 0–5)
LYMPHOCYTES ABSOLUTE: 3.06 E9/L (ref 1.5–4)
LYMPHOCYTES RELATIVE PERCENT: 47.6 % (ref 20–42)
MCH RBC QN AUTO: 26.9 PG (ref 26–35)
MCHC RBC AUTO-ENTMCNC: 30.6 % (ref 32–34.5)
MCV RBC AUTO: 88.1 FL (ref 80–99.9)
METER GLUCOSE: 115 MG/DL (ref 74–99)
MONOCYTES ABSOLUTE: 0.31 E9/L (ref 0.1–0.95)
MONOCYTES RELATIVE PERCENT: 4.8 % (ref 2–12)
NEUTROPHILS ABSOLUTE: 2.93 E9/L (ref 1.8–7.3)
NEUTROPHILS RELATIVE PERCENT: 45.6 % (ref 43–80)
PDW BLD-RTO: 14.6 FL (ref 11.5–15)
PLATELET # BLD: 412 E9/L (ref 130–450)
PMV BLD AUTO: 9.4 FL (ref 7–12)
POTASSIUM SERPL-SCNC: 4.8 MMOL/L (ref 3.5–5)
RBC # BLD: 4.38 E12/L (ref 3.5–5.5)
SODIUM BLD-SCNC: 137 MMOL/L (ref 132–146)
TOTAL PROTEIN: 7.8 G/DL (ref 6.4–8.3)
WBC # BLD: 6.4 E9/L (ref 4.5–11.5)

## 2019-11-03 PROCEDURE — 93010 ELECTROCARDIOGRAM REPORT: CPT | Performed by: INTERNAL MEDICINE

## 2019-11-03 PROCEDURE — 82962 GLUCOSE BLOOD TEST: CPT

## 2019-11-03 PROCEDURE — 6360000002 HC RX W HCPCS: Performed by: INTERNAL MEDICINE

## 2019-11-03 PROCEDURE — 6370000000 HC RX 637 (ALT 250 FOR IP): Performed by: INTERNAL MEDICINE

## 2019-11-03 PROCEDURE — 2060000000 HC ICU INTERMEDIATE R&B

## 2019-11-03 PROCEDURE — 2580000003 HC RX 258: Performed by: INTERNAL MEDICINE

## 2019-11-03 PROCEDURE — 36415 COLL VENOUS BLD VENIPUNCTURE: CPT

## 2019-11-03 PROCEDURE — 85025 COMPLETE CBC W/AUTO DIFF WBC: CPT

## 2019-11-03 PROCEDURE — 6360000002 HC RX W HCPCS: Performed by: NURSE PRACTITIONER

## 2019-11-03 PROCEDURE — 80053 COMPREHEN METABOLIC PANEL: CPT

## 2019-11-03 PROCEDURE — 2700000000 HC OXYGEN THERAPY PER DAY

## 2019-11-03 PROCEDURE — 2500000003 HC RX 250 WO HCPCS: Performed by: INTERNAL MEDICINE

## 2019-11-03 RX ADMIN — BUPRENORPHINE HYDROCHLORIDE AND NALOXONE HYDROCHLORIDE DIHYDRATE 1 TABLET: 8; 2 TABLET SUBLINGUAL at 10:07

## 2019-11-03 RX ADMIN — LEVETIRACETAM 500 MG: 5 INJECTION INTRAVENOUS at 10:12

## 2019-11-03 RX ADMIN — ACETAMINOPHEN 650 MG: 325 TABLET, FILM COATED ORAL at 04:18

## 2019-11-03 RX ADMIN — Medication 100 UNITS: at 10:08

## 2019-11-03 RX ADMIN — ENOXAPARIN SODIUM 40 MG: 100 INJECTION SUBCUTANEOUS at 10:08

## 2019-11-03 RX ADMIN — ACETAMINOPHEN 650 MG: 325 TABLET, FILM COATED ORAL at 15:56

## 2019-11-03 RX ADMIN — ONDANSETRON HYDROCHLORIDE 4 MG: 2 INJECTION, SOLUTION INTRAMUSCULAR; INTRAVENOUS at 17:56

## 2019-11-03 RX ADMIN — Medication 10 ML: at 21:31

## 2019-11-03 RX ADMIN — PRAVASTATIN SODIUM 20 MG: 20 TABLET ORAL at 21:31

## 2019-11-03 RX ADMIN — LIDOCAINE HYDROCHLORIDE 5 ML: 10 INJECTION, SOLUTION EPIDURAL; INFILTRATION; INTRACAUDAL at 10:11

## 2019-11-03 RX ADMIN — LOSARTAN POTASSIUM 100 MG: 50 TABLET, FILM COATED ORAL at 10:08

## 2019-11-03 RX ADMIN — ACETAMINOPHEN 650 MG: 325 TABLET, FILM COATED ORAL at 10:06

## 2019-11-03 RX ADMIN — MELATONIN 3 MG ORAL TABLET 3 MG: 3 TABLET ORAL at 21:31

## 2019-11-03 RX ADMIN — BUPRENORPHINE HYDROCHLORIDE AND NALOXONE HYDROCHLORIDE DIHYDRATE 1 TABLET: 8; 2 TABLET SUBLINGUAL at 17:51

## 2019-11-03 RX ADMIN — Medication 10 ML: at 10:09

## 2019-11-03 RX ADMIN — Medication 100 UNITS: at 10:09

## 2019-11-03 RX ADMIN — Medication 100 UNITS: at 21:31

## 2019-11-03 RX ADMIN — LEVETIRACETAM 500 MG: 5 INJECTION INTRAVENOUS at 21:32

## 2019-11-03 ASSESSMENT — PAIN SCALES - GENERAL
PAINLEVEL_OUTOF10: 2
PAINLEVEL_OUTOF10: 6
PAINLEVEL_OUTOF10: 4
PAINLEVEL_OUTOF10: 5
PAINLEVEL_OUTOF10: 0

## 2019-11-03 NOTE — PROGRESS NOTES
Subjective:  Patient is awake and alert  Today she is quite angry--she states that she has a mass on her brain  She has called police to the floor  patient was seen with police and nurse present    She was angry for multiple reasons  She had a rather rambling discussion about her concerns  Her concerns may be reviewed as follows:  -she wants an IV access  -she stated that Neurology told her there is a mass in her brain and this is a serious issue and they want her to go to [facility] and not go home.  -she generally does not like how her treatment is going  -she states that Cardiology wants her to stay for a cath      The EEG does not show any evidence of seizure activity but does show a structural lesion in the left temporal area  There is a left temporal anterior fossa arachnoid cyst which appears unchanged over the previous study  The MRI shows slight volume loss of the amygdala and hippocampus on the right suggestive of mesial temporal sclerosis    Latest cardiology assessment- 2d  Days ago none today  Assessment:      1. Pericardial effusion: Small to moderate on echocardiogram, will require follow-up echocardiogram in a few weeks  2. Abnormal stress test: May need further evaluation with cardiac catheterization/coronary CTA, will plan prior to discharge  3. Sinus tachycardia: Etiology?,  Will check TSH, will follow closely. 4. Mild mitral valve regurgitation  5. Tricuspid valve regurgitation  6. Hypertension  7. Hyperlipidemia  8. Seizures:  9. Depression  10. Noncompliance   Recommendations:      1. Continue current treatment  2. Further cardiac recommendations will be forthcoming pending her clinical course and diagnostic test findings    Reviewed today's Neurology assessment and plan    11/3:  Patient again had another RRT yesterday after being seen.   The event was reviewed with the nursing staff as well as with the resident  According to the nursing staff--she was seated at the side of the [P.O.:120]  Out: 0         Last Refreshed: 10/27/19 1901 [[facility] Orders]   Results      Component Value Units   XR CHEST PORTABLE [[id number]]    Resulted: 10/27/19 0853    Updated: 10/27/19 0855    Narrative:     Patient MRN: [id number]  : 1970  Age:  49 years  Gender: Female  Order Date: 10/27/2019 6:00 AM  Exam: XR CHEST PORTABLE  Number of Images: 1 view  Indication:  Acute respiratory failure     Comparison: 2019    FINDINGS:  Stable endotracheal and nasogastric tubes. PH probe in the  midesophagus. Heart enlarged. Pulmonary vascularity is upper normal. There is  opacity in the retrocardiac which may represent atelectasis and/or  infiltrate. Impression:     Cardiomegaly. Retrocardiac density which may represent atelectasis  and/or infiltrate. Urine culture [[id number]]    Collected: 10/25/19 2000    Updated: 10/27/19 0820    Specimen Source: Urine, clean catch     Urine Culture, Routine --    Growth not present, incubation continues   <10,000 CFU/mL   Mixed gram positive organisms    Narrative:     Source: URINE       Site: Urine&Urine             CBC Auto Differential [[id number]] (Abnormal)    Collected: 10/27/19 0440    Updated: 10/27/19 0729     WBC 6.8 E9/L    RBC 3.70 E12/L    Hemoglobin 9.9Low  g/dL    Hematocrit 32.2Low  %    MCV 87.0 fL    MCH 26.8 pg    MCHC 30.7Low  %    RDW 14.8 fL    Platelets 256 J2/P    Comment: Platelet clumps are identified. This may decrease the automated platelet   count artifactually. MPV 10.0 fL    Neutrophils % 71.4 %    Immature Granulocytes % 0.3 %    Lymphocytes % 24.2 %    Monocytes % 3.5 %    Eosinophils % 0.3 %    Basophils % 0.3 %    Neutrophils Absolute 4.87 E9/L    Immature Granulocytes # 0.02 E9/L    Lymphocytes Absolute 1.65 E9/L    Monocytes Absolute 0.24 E9/L    Eosinophils Absolute 0. 02Low  E9/L    Basophils Absolute 0.02 E9/L   Blood Gas, Arterial [[id number]] (Abnormal)    Resulted: 10/27/19 5730    Updated: 10/27/19 9673 Specimen Source: Blood     Date Analyzed [id number]    Time Analyzed 0426    Source: Blood Arterial    pH, Blood Gas 7.431    PCO2 37.9 mmHg    PO2 156. 9High  mmHg    HCO3 24.6 mmol/L    B.E. 0.5 mmol/L    O2 Sat 99. 2High  %    PO2/FIO2 2.62 mmHg/%    AaDO2 214.2 mmHg    RI(T) 1.37    O2Hb 98. 4High  %    COHb 0.4 %    MetHb 0.4 %    O2 Content 15.8 mL/dL    HHb 0.8 %    tHb (est) 11.2Low  g/dL    Mode AC    FIO2 60.0 %    Rr Mechanical 14.0 b/min    Vt Mechanical 450.0 mL    Peep/Cpap 5.0 cmH2O    Date Of Collection --    Time Collected --    Pt Temp 37.0 C     ID [id number]    Lab [id number]    Critical(s) Notified . No Critical Values   Procalcitonin [[id number]]    Collected: 10/27/19 0026    Updated: 10/27/19 0119    Specimen Type: Blood     Procalcitonin 0.03 ng/mL   Comprehensive metabolic panel [[id number]] (Abnormal)    Collected: 10/27/19 0026    Updated: 10/27/19 0114    Specimen Source: Blood     Sodium 138 mmol/L    Potassium 4.7 mmol/L    Chloride 99 mmol/L    CO2 26 mmol/L    Anion Gap 13 mmol/L    Glucose 112High  mg/dL    BUN 11 mg/dL    CREATININE 0.8 mg/dL    GFR Non-African American >60 mL/min/1.73    Comment: Chronic Kidney Disease: less than 60 ml/min/1.73 sq. m.         Kidney Failure: less than 15 ml/min/1.73 sq.m. Results valid for patients 18 years and older.         GFR African American >60    Calcium 8.9 mg/dL    Total Protein 6.5 g/dL    Alb 4.0 g/dL    Total Bilirubin <0.2 mg/dL    Alkaline Phosphatase 195High  U/L    ALT 44High  U/L    AST 28 U/L   Narrative:     CALL  [facility]  S28 tel. ,  Rejected Test Name/Called to:velasquez cannon rn, 10/27/2019 01:03, by  [facility]  Collection has been rescheduled by [facility] at 10/26/2019 21:45 Reason: pt   is rrt   Magnesium [[id number]]    Collected: 10/27/19 0026    Updated: 10/27/19 0114    Specimen Source: Blood     Magnesium 2.0 mg/dL   Narrative:     CALL  [facility]  H44 tel. ,  Rejected Test Name/Called to:velasquez cannon rn, 10/27/2019 01:03, by  [place] has been rescheduled by [facility] at 10/26/2019 21:45 Reason: pt   is rrt   Phosphorus [[id number]]    Collected: 10/27/19 0026    Updated: 10/27/19 0114    Specimen Source: Blood     Phosphorus 3.8 mg/dL   Narrative:     CALL  [facility]  H44 tel. ,  Rejected Test Name/Called to:velasquez cannon rn, 10/27/2019 01:03, by  [place] [facility] has been rescheduled by [facility] at 10/26/2019 21:45 Reason: pt   is rrt   Lactic acid, plasma [[id number]]    Collected: 10/27/19 0026    Updated: 10/27/19 0108    Specimen Source: Blood     Lactic Acid 2.0 mmol/L   Narrative:     CALL  [facility]  H44 tel. ,  Rejected Test Name/Called to:velasquez cannon rn, 10/27/2019 01:03, by  [facility]  Collection has been rescheduled by [facility] at 10/26/2019 21:45 Reason: pt   is rrt   [address] [[id number]]    Collected: 10/27/19 0103    Updated: 10/27/19 0105     Rejected Test cbcwd    Reason for Rejection see below    Comment: Unable to perform testing; specimen clotted. To perform testing the specimen will need to be recollected.  Clotted       Narrative:     CALL  [facility]  H44 tel. ,  Rejected Test Name/Called to:velasquez cannon rn, 10/27/2019 01:03, by  [facility]   Blood Gas, Arterial [[id number]] (Abnormal)    Resulted: 10/27/19 0036    Updated: 10/27/19 0037    Specimen Source: Blood     Date Analyzed [id number]    Time Analyzed 0036    Source: Blood Arterial    pH, Blood Gas 7.369    PCO2 48. 1High  mmHg    PO2 135. 2High  mmHg    HCO3 27.1High  mmol/L    B.E. 1.3 mmol/L    O2 Sat 98. 6High  %    PO2/FIO2 2.25 mmHg/%    AaDO2 224.7 mmHg    RI(T) 1.66    O2Hb 98. 2High  %    COHb 0.0 %    MetHb 0.4 %    O2 Content 15.8 mL/dL    HHb 1.4 %    tHb (est) 11.3Low  g/dL    Mode AC    FIO2 60.0 %    Rr Mechanical 14.0 b/min    Vt Mechanical 450.0 mL    Peep/Cpap 5.0 cmH2O    Date Of Collection --    Time Collected --    Pt Temp 37.0 C     ID [id number]    Lab [id number]    Critical(s) Notified .  No Critical Values   XR ABDOMEN FOR NG/OG/NE [facility] [[id number]]    Resulted: 10/27/19 0029    Updated: 10/27/19 0031    Narrative:     Patient MRN:  [id number]  : 1970  Age: 52 years  Gender: Female    Order Date:  10/26/2019 10:30 PM        TECHNIQUE/NUMBER OF IMAGES/COMPARISON/CLINICAL HISTORY:    Abdomen supine view    1 image one view    History NG tube placement. FINDINGS: NG tube tip in the stomach in good position. This study not  intended to full evaluate the chest or the abdomen. There is a relative low position for the endotracheal tube. This can  be relate with the plain radiographs of the chest.   Impression:     NG tube in good position. Relative low position for an endotracheal tube. Can correlate with the  chest radiograph. CT CERVICAL SPINE WO CONTRAST [[id number]]    Resulted: 10/26/19 2320    Updated: 10/26/19 2322    Narrative:     Patient MRN:  [id number]  : 1970  Age: 52 years  Gender: Female    Order Date:  10/26/2019 10:30 PM        TECHNIQUE/NUMBER OF IMAGES/COMPARISON/CLINICAL HISTORY:    CT cervical    Axial with sagittal coronal and oblique reconstructions    66-year-old female patient is seizure trauma injury. Comparison study . FINDINGS: Vertebral bodies have normal height. Disc spaces are  well-maintained. Alignment is preserved in the sagittal and coronal  images are    The odontoid process intact. Articular relation between the occipital  condyles and C1 and between C1 and C2 are preserved. All facet joints are well aligned the. There is no bone or soft tissue encroachment of the cervical spine  canal or neural foramina. There are intact appearance for pedicles, spinous process and  transverse process. All facet joints are well aligned. Patient has a orotracheal tube and orogastric tube. No acute fractures are seen in the cervical spine. Lung apices demonstrate a lateral pleural effusions.      Impression:     No acute fractures or dislocations identified in the  cervical spine. CT Head WO Contrast [[id number]]    Resulted: 10/26/19 2315    Updated: 10/26/19 2317    Narrative:     Patient MRN:  [id number]  : 1970  Age: 52 years  Gender: Female    Order Date:  10/26/2019 10:30 PM        TECHNIQUE/NUMBER OF IMAGES/COMPARISON/CLINICAL HISTORY:    CT brain    Axial images were obtained sagittal and coronal reconstructions    History seizures and fall. 445 images    Comparison study of . FINDINGS: There is no indication for an acute intracranial hemorrhagic  event. There is no indication for a sizable effusion acute or recent  insult to the brain parenchyma. There is a left medial cranial fossa arachnoid cyst which is an old  finding likely on developmental bases. There is no focal mass defect or midline shift. Images with bone window settings demonstrate no significant findings. Midline structures have unremarkable appearance. Impression:     No interval change since the previous study of . No indication for an acute intracranial process. XR CHEST PORTABLE [[id number]]    Resulted: 10/26/19 2243    Updated: 10/26/19 2246    Narrative:     Patient MRN:  [id number]  : 1970  Age: 52 years  Gender: Female    Order Date:  10/26/2019 9:45 PM        TECHNIQUE/NUMBER OF IMAGES/COMPARISON/CLINICAL HISTORY:    Chest AP    1 image, one view    Comparison  4 7 day. After endotracheal tube placement. FINDINGS: Endotracheal tube is in the lower borderline position the  level of the lower margin of the aorta, it is proximal to the porfirio. NG tube in good position below diaphragma. Expiratory study causing  some crowding of the bronchovascular markings. No sizable infiltrates  or consolidations are seen. Impression:     Endotracheal tube in low borderline position at the level of the lower  arch of the aorta, proximal to the porfirio. NG tube in good position    Expiratory study, no acute cardiopulmonary process. CHF.      Endotracheal tube close to porfirio and needs to be retracted by 1 cm. No discrete pneumothorax. XR CHEST PORTABLE   Final Result      Endotracheal tube tip is encroaching upon the right mainstem bronchus. Opacities in the right lung base are suggestive of atelectasis. Hazy opacification in the left lung base may also be due to   atelectasis, although a developing infiltrate and/or pleural effusion   is difficult to exclude, especially given the low lung volumes. Large cardiac silhouette, similar to prior studies, compatible with   cardiomegaly and small pericardial effusion, which can be seen on the   recent CTA. XR CHEST PORTABLE   Final Result   Cardiomegaly. Retrocardiac density which may represent atelectasis   and/or infiltrate. CT CERVICAL SPINE WO CONTRAST   Final Result   No acute fractures or dislocations identified in the   cervical spine. CT Head WO Contrast   Final Result   No interval change since the previous study of October 24. No indication for an acute intracranial process. XR ABDOMEN FOR NG/OG/NE TUBE PLACEMENT   Final Result   NG tube in good position. Relative low position for an endotracheal tube. Can correlate with the   chest radiograph. XR CHEST PORTABLE   Final Result   Endotracheal tube in low borderline position at the level of the lower   arch of the aorta, proximal to the porfirio. NG tube in good position      Expiratory study, no acute cardiopulmonary process. NM Cardiac Stress Test Nuclear Imaging   Final Result   1. There is a small reversible defect in the inferior wall   2. Ejection fraction is 69 %. 3. No significant wall motion abnormality      ALERT:  THIS IS AN ABNORMAL REPORT             Prior to Admission medications    Medication Sig Start Date End Date Taking?  Authorizing Provider   levETIRAcetam (KEPPRA) 500 MG tablet Take 500 mg by mouth 2 times daily   Yes Historical Provider, MD 5 mL  5 mL Intradermal Once Josie Haro MD        sodium chloride flush 0.9 % injection 10 mL  10 mL Intravenous PRN Josie Haro MD   10 mL at 10/28/19 2325    heparin flush (100 units/mL) injection 100 Units  1 mL Intravenous 2 times per day Josie Haro MD   Stopped at 10/30/19 1058    heparin flush (100 units/mL) injection 100 Units  1 mL Intracatheter PRN Josie Haro MD        albuterol (PROVENTIL) nebulizer solution 2.5 mg  2.5 mg Nebulization Q6H PRN Evans Paniagua MD   2.5 mg at 10/28/19 1720    glucose (GLUTOSE) 40 % oral gel 15 g  15 g Oral PRN Salo Ayers MD        dextrose 50 % IV solution  12.5 g Intravenous PRN Salo Ayers MD        glucagon (rDNA) injection 1 mg  1 mg Intramuscular PRN Salo Ayers MD        dextrose 5 % solution  100 mL/hr Intravenous PRN Salo Ayers MD        perflutren lipid microspheres (DEFINITY) injection 1.65 mg  1.5 mL Intravenous ONCE PRN TITI Marroquin - ELIZABETH        acetaminophen (TYLENOL) tablet 650 mg  650 mg Oral Q4H PRN Arlen Tea Leach MD   650 mg at 11/03/19 0418    phenol 1.4 % mouth spray 1 spray  1 spray Mouth/Throat Q2H PRN El Shultz MD   1 spray at 10/29/19 8984    hydrALAZINE (APRESOLINE) injection 10 mg  10 mg Intravenous Q6H PRN Salo Ayers MD        neomycin-bacitracin-polymyxin (NEOSPORIN) ointment   Topical BID PRN Carletha Moses, DO        influenza quadrivalent split vaccine (FLUZONE;FLUARIX;FLULAVAL;AFLURIA) injection 0.5 mL  0.5 mL Intramuscular Prior to discharge Ilsa Pandya MD        losartan (COZAAR) tablet 100 mg  100 mg Oral Daily Carletha Moses, DO   100 mg at 11/02/19 0847    melatonin tablet 3 mg  3 mg Oral Nightly Carletha Moses, DO   3 mg at 11/02/19 2144    pravastatin (PRAVACHOL) tablet 20 mg  20 mg Oral Daily Carletha Moses, DO   20 mg at 11/02/19 2132    sodium chloride flush 0.9 % injection 10 mL  10 mL Intravenous 2 times per day Iron Lopes, DO   10 mL at 11/02/19 0822    magnesium hydroxide (MILK OF

## 2019-11-03 NOTE — PLAN OF CARE
Problem: Falls - Risk of:  Goal: Will remain free from falls  Description  Will remain free from falls  11/3/2019 1733 by Eduardo Hoffman RN  Outcome: Met This Shift  11/3/2019 0901 by Tip Solitario RN  Outcome: Met This Shift  Goal: Absence of physical injury  Description  Absence of physical injury  11/3/2019 1733 by Eduardo Hoffman RN  Outcome: Met This Shift  11/3/2019 0901 by Tip Solitario RN  Outcome: Met This Shift     Problem: Pain:  Goal: Pain level will decrease  Description  Pain level will decrease  11/3/2019 1733 by Eduardo Hoffman RN  Outcome: Met This Shift  11/3/2019 0901 by Tip Solitario RN  Outcome: Met This Shift  Goal: Control of acute pain  Description  Control of acute pain  11/3/2019 1733 by Eduardo Hoffman RN  Outcome: Met This Shift  11/3/2019 0901 by Tip Solitario RN  Outcome: Met This Shift  Goal: Control of chronic pain  Description  Control of chronic pain  11/3/2019 1733 by Eduardo Hoffman RN  Outcome: Met This Shift  11/3/2019 0901 by Tip Solitario RN  Outcome: Met This Shift     Problem: Risk for Impaired Skin Integrity  Goal: Tissue integrity - skin and mucous membranes  Description  Structural intactness and normal physiological function of skin and  mucous membranes.   11/3/2019 1733 by Eduardo Hoffman RN  Outcome: Met This Shift  11/3/2019 0901 by Tip Solitario RN  Outcome: Met This Shift     Problem: Coping:  Goal: Ability to cope will improve  Description  Ability to cope will improve  11/3/2019 1733 by Eduardo Hoffman RN  Outcome: Met This Shift  11/3/2019 0901 by Tip Solitario RN  Outcome: Met This Shift  Goal: Ability to identify appropriate support needs will improve  Description  Ability to identify appropriate support needs will improve  11/3/2019 1733 by Eduardo Hoffman RN  Outcome: Met This Shift  11/3/2019 0901 by Tip Solitario RN  Outcome: Met This Shift

## 2019-11-03 NOTE — PLAN OF CARE
Problem: Falls - Risk of:  Goal: Will remain free from falls  Description  Will remain free from falls  Outcome: Met This Shift  Goal: Absence of physical injury  Description  Absence of physical injury  Outcome: Met This Shift     Problem: Pain:  Goal: Pain level will decrease  Description  Pain level will decrease  Outcome: Met This Shift  Goal: Control of acute pain  Description  Control of acute pain  Outcome: Met This Shift  Goal: Control of chronic pain  Description  Control of chronic pain  Outcome: Met This Shift     Problem: Risk for Impaired Skin Integrity  Goal: Tissue integrity - skin and mucous membranes  Description  Structural intactness and normal physiological function of skin and  mucous membranes.   Outcome: Met This Shift     Problem: Coping:  Goal: Ability to cope will improve  Description  Ability to cope will improve  Outcome: Met This Shift  Goal: Ability to identify appropriate support needs will improve  Description  Ability to identify appropriate support needs will improve  Outcome: Met This Shift     Problem: Health Behavior:  Goal: Ability to manage health-related needs will improve  Description  Ability to manage health-related needs will improve  Outcome: Met This Shift     Problem: Health Behavior:  Goal: Ability to manage health-related needs will improve  Description  Ability to manage health-related needs will improve  Outcome: Met This Shift

## 2019-11-04 LAB
ALBUMIN SERPL-MCNC: 4.4 G/DL (ref 3.5–5.2)
ALP BLD-CCNC: 190 U/L (ref 35–104)
ALT SERPL-CCNC: 32 U/L (ref 0–32)
ANION GAP SERPL CALCULATED.3IONS-SCNC: 15 MMOL/L (ref 7–16)
AST SERPL-CCNC: 21 U/L (ref 0–31)
BASOPHILS ABSOLUTE: 0.01 E9/L (ref 0–0.2)
BASOPHILS RELATIVE PERCENT: 0.2 % (ref 0–2)
BILIRUB SERPL-MCNC: 0.4 MG/DL (ref 0–1.2)
BUN BLDV-MCNC: 12 MG/DL (ref 6–20)
CALCIUM SERPL-MCNC: 9.5 MG/DL (ref 8.6–10.2)
CHLORIDE BLD-SCNC: 97 MMOL/L (ref 98–107)
CO2: 26 MMOL/L (ref 22–29)
CREAT SERPL-MCNC: 0.6 MG/DL (ref 0.5–1)
EOSINOPHILS ABSOLUTE: 0.11 E9/L (ref 0.05–0.5)
EOSINOPHILS RELATIVE PERCENT: 1.7 % (ref 0–6)
GFR AFRICAN AMERICAN: >60
GFR NON-AFRICAN AMERICAN: >60 ML/MIN/1.73
GLUCOSE BLD-MCNC: 110 MG/DL (ref 74–99)
HCG QUALITATIVE: NEGATIVE
HCT VFR BLD CALC: 39 % (ref 34–48)
HEMOGLOBIN: 11.7 G/DL (ref 11.5–15.5)
IMMATURE GRANULOCYTES #: 0.01 E9/L
IMMATURE GRANULOCYTES %: 0.2 % (ref 0–5)
LYMPHOCYTES ABSOLUTE: 2.52 E9/L (ref 1.5–4)
LYMPHOCYTES RELATIVE PERCENT: 39.4 % (ref 20–42)
MCH RBC QN AUTO: 26.1 PG (ref 26–35)
MCHC RBC AUTO-ENTMCNC: 30 % (ref 32–34.5)
MCV RBC AUTO: 87.1 FL (ref 80–99.9)
MONOCYTES ABSOLUTE: 0.38 E9/L (ref 0.1–0.95)
MONOCYTES RELATIVE PERCENT: 5.9 % (ref 2–12)
NEUTROPHILS ABSOLUTE: 3.36 E9/L (ref 1.8–7.3)
NEUTROPHILS RELATIVE PERCENT: 52.6 % (ref 43–80)
PDW BLD-RTO: 14.3 FL (ref 11.5–15)
PLATELET # BLD: 463 E9/L (ref 130–450)
PMV BLD AUTO: 8.5 FL (ref 7–12)
POTASSIUM SERPL-SCNC: 4.3 MMOL/L (ref 3.5–5)
RBC # BLD: 4.48 E12/L (ref 3.5–5.5)
SODIUM BLD-SCNC: 138 MMOL/L (ref 132–146)
TOTAL PROTEIN: 7.4 G/DL (ref 6.4–8.3)
WBC # BLD: 6.4 E9/L (ref 4.5–11.5)

## 2019-11-04 PROCEDURE — 84703 CHORIONIC GONADOTROPIN ASSAY: CPT

## 2019-11-04 PROCEDURE — C1760 CLOSURE DEV, VASC: HCPCS

## 2019-11-04 PROCEDURE — 2500000003 HC RX 250 WO HCPCS

## 2019-11-04 PROCEDURE — 36415 COLL VENOUS BLD VENIPUNCTURE: CPT

## 2019-11-04 PROCEDURE — 93454 CORONARY ARTERY ANGIO S&I: CPT | Performed by: INTERNAL MEDICINE

## 2019-11-04 PROCEDURE — 2580000003 HC RX 258: Performed by: INTERNAL MEDICINE

## 2019-11-04 PROCEDURE — 2060000000 HC ICU INTERMEDIATE R&B

## 2019-11-04 PROCEDURE — 6370000000 HC RX 637 (ALT 250 FOR IP): Performed by: INTERNAL MEDICINE

## 2019-11-04 PROCEDURE — B2111ZZ FLUOROSCOPY OF MULTIPLE CORONARY ARTERIES USING LOW OSMOLAR CONTRAST: ICD-10-PCS | Performed by: INTERNAL MEDICINE

## 2019-11-04 PROCEDURE — 6360000002 HC RX W HCPCS: Performed by: INTERNAL MEDICINE

## 2019-11-04 PROCEDURE — 80053 COMPREHEN METABOLIC PANEL: CPT

## 2019-11-04 PROCEDURE — 6370000000 HC RX 637 (ALT 250 FOR IP): Performed by: NURSE PRACTITIONER

## 2019-11-04 PROCEDURE — C1894 INTRO/SHEATH, NON-LASER: HCPCS

## 2019-11-04 PROCEDURE — 85025 COMPLETE CBC W/AUTO DIFF WBC: CPT

## 2019-11-04 PROCEDURE — C1725 CATH, TRANSLUMIN NON-LASER: HCPCS

## 2019-11-04 PROCEDURE — 6360000002 HC RX W HCPCS

## 2019-11-04 PROCEDURE — 2709999900 HC NON-CHARGEABLE SUPPLY

## 2019-11-04 PROCEDURE — 99232 SBSQ HOSP IP/OBS MODERATE 35: CPT | Performed by: PHYSICIAN ASSISTANT

## 2019-11-04 PROCEDURE — C1887 CATHETER, GUIDING: HCPCS

## 2019-11-04 PROCEDURE — C1769 GUIDE WIRE: HCPCS

## 2019-11-04 RX ORDER — SODIUM CHLORIDE 9 MG/ML
INJECTION, SOLUTION INTRAVENOUS CONTINUOUS
Status: DISCONTINUED | OUTPATIENT
Start: 2019-11-04 | End: 2019-11-05 | Stop reason: HOSPADM

## 2019-11-04 RX ORDER — SODIUM CHLORIDE 0.9 % (FLUSH) 0.9 %
10 SYRINGE (ML) INJECTION PRN
Status: DISCONTINUED | OUTPATIENT
Start: 2019-11-04 | End: 2019-11-05 | Stop reason: HOSPADM

## 2019-11-04 RX ORDER — AMITRIPTYLINE HYDROCHLORIDE 75 MG/1
75 TABLET, FILM COATED ORAL NIGHTLY
Status: ON HOLD | COMMUNITY
End: 2019-11-05 | Stop reason: HOSPADM

## 2019-11-04 RX ORDER — SODIUM CHLORIDE 0.9 % (FLUSH) 0.9 %
10 SYRINGE (ML) INJECTION PRN
Status: CANCELLED | OUTPATIENT
Start: 2019-11-04

## 2019-11-04 RX ORDER — PAROXETINE 30 MG/1
30 TABLET, FILM COATED ORAL DAILY
Status: ON HOLD | COMMUNITY
End: 2020-01-31 | Stop reason: HOSPADM

## 2019-11-04 RX ORDER — ACETAMINOPHEN 325 MG/1
650 TABLET ORAL EVERY 4 HOURS PRN
Status: CANCELLED | OUTPATIENT
Start: 2019-11-04

## 2019-11-04 RX ORDER — SODIUM CHLORIDE 9 MG/ML
INJECTION, SOLUTION INTRAVENOUS CONTINUOUS
Status: CANCELLED | OUTPATIENT
Start: 2019-11-04 | End: 2019-11-05

## 2019-11-04 RX ORDER — SODIUM CHLORIDE 0.9 % (FLUSH) 0.9 %
10 SYRINGE (ML) INJECTION EVERY 12 HOURS SCHEDULED
Status: DISCONTINUED | OUTPATIENT
Start: 2019-11-04 | End: 2019-11-05 | Stop reason: HOSPADM

## 2019-11-04 RX ORDER — SODIUM CHLORIDE 0.9 % (FLUSH) 0.9 %
10 SYRINGE (ML) INJECTION EVERY 12 HOURS SCHEDULED
Status: CANCELLED | OUTPATIENT
Start: 2019-11-04

## 2019-11-04 RX ADMIN — BUPRENORPHINE HYDROCHLORIDE AND NALOXONE HYDROCHLORIDE DIHYDRATE 1 TABLET: 8; 2 TABLET SUBLINGUAL at 10:03

## 2019-11-04 RX ADMIN — SODIUM CHLORIDE: 9 INJECTION, SOLUTION INTRAVENOUS at 22:57

## 2019-11-04 RX ADMIN — BUPRENORPHINE HYDROCHLORIDE AND NALOXONE HYDROCHLORIDE DIHYDRATE 1 TABLET: 8; 2 TABLET SUBLINGUAL at 22:47

## 2019-11-04 RX ADMIN — MELATONIN 3 MG ORAL TABLET 3 MG: 3 TABLET ORAL at 22:47

## 2019-11-04 RX ADMIN — Medication 10 ML: at 22:49

## 2019-11-04 RX ADMIN — ACETAMINOPHEN 650 MG: 325 TABLET, FILM COATED ORAL at 22:48

## 2019-11-04 RX ADMIN — LOSARTAN POTASSIUM 100 MG: 50 TABLET, FILM COATED ORAL at 10:03

## 2019-11-04 RX ADMIN — Medication 10 ML: at 10:04

## 2019-11-04 RX ADMIN — LEVETIRACETAM 500 MG: 500 TABLET ORAL at 10:03

## 2019-11-04 RX ADMIN — PRAVASTATIN SODIUM 20 MG: 20 TABLET ORAL at 22:57

## 2019-11-04 RX ADMIN — ASPIRIN 325 MG: 325 TABLET, COATED ORAL at 10:03

## 2019-11-04 RX ADMIN — Medication 100 UNITS: at 22:48

## 2019-11-04 RX ADMIN — SODIUM CHLORIDE: 9 INJECTION, SOLUTION INTRAVENOUS at 10:05

## 2019-11-04 RX ADMIN — LEVETIRACETAM 500 MG: 500 TABLET ORAL at 22:47

## 2019-11-04 ASSESSMENT — PAIN SCALES - GENERAL
PAINLEVEL_OUTOF10: 4
PAINLEVEL_OUTOF10: 0
PAINLEVEL_OUTOF10: 4

## 2019-11-04 ASSESSMENT — PAIN DESCRIPTION - FREQUENCY: FREQUENCY: CONTINUOUS

## 2019-11-04 ASSESSMENT — PAIN DESCRIPTION - PAIN TYPE: TYPE: ACUTE PAIN

## 2019-11-04 ASSESSMENT — PAIN DESCRIPTION - ONSET: ONSET: PROGRESSIVE

## 2019-11-04 ASSESSMENT — PAIN DESCRIPTION - PROGRESSION: CLINICAL_PROGRESSION: NOT CHANGED

## 2019-11-04 ASSESSMENT — PAIN DESCRIPTION - DESCRIPTORS: DESCRIPTORS: ACHING;DISCOMFORT;NAGGING

## 2019-11-04 ASSESSMENT — PAIN DESCRIPTION - LOCATION: LOCATION: THROAT

## 2019-11-04 NOTE — PROGRESS NOTES
Subjective:  Patient is awake and alert  Today she is quite angry--she states that she has a mass on her brain  She has called police to the floor  patient was seen with police and nurse present    She was angry for multiple reasons  She had a rather rambling discussion about her concerns  Her concerns may be reviewed as follows:  -she wants an IV access  -she stated that Neurology told her there is a mass in her brain and this is a serious issue and they want her to go to [facility] and not go home.  -she generally does not like how her treatment is going  -she states that Cardiology wants her to stay for a cath      The EEG does not show any evidence of seizure activity but does show a structural lesion in the left temporal area  There is a left temporal anterior fossa arachnoid cyst which appears unchanged over the previous study  The MRI shows slight volume loss of the amygdala and hippocampus on the right suggestive of mesial temporal sclerosis    Latest cardiology assessment- 2d  Days ago none today    Assessment:      1. Pericardial effusion: Small to moderate on echocardiogram, will require follow-up echocardiogram in a few weeks  2. Abnormal stress test: May need further evaluation with cardiac catheterization/coronary CTA, will plan prior to discharge  3. Sinus tachycardia: Etiology?,  Will check TSH, will follow closely. 4. Mild mitral valve regurgitation  5. Tricuspid valve regurgitation  6. Hypertension  7. Hyperlipidemia  8. Seizures:  9. Depression  10. Noncompliance   Recommendations:      1. Continue current treatment  2. Further cardiac recommendations will be forthcoming pending her clinical course and diagnostic test findings    Reviewed today's Neurology assessment and plan    11/3:  Patient again had another RRT yesterday after being seen.   The event was reviewed with the nursing staff as well as with the resident  According to the nursing staff--she was seated at the side of the complaints  There have been no seizure events overnight  Objective:  BP (!) 109/59   Pulse 100   Temp 97.1 °F (36.2 °C) (Temporal)   Resp 20   Ht 5' 3\" (1.6 m)   Wt 185 lb 1.6 oz (84 kg)   SpO2 98%   BMI 32.79 kg/m²    Awake and alert    Lungs are clear  Cardiovascular exam regular rate and rhythm no ectopy  Abdomen soft non tender  Moves all extremities normally  Cognition is normal patient remains agitated  No focal neurologic symptoms or signs    I/O last 3 completed shifts: In: 420 [P.O.:420]  Out: -         Last Refreshed: 10/27/19 1901 [Time [facility] Orders]   Results      Component Value Units   XR CHEST PORTABLE [[id number]]    Resulted: 10/27/19 0853    Updated: 10/27/19 0855    Narrative:     Patient MRN: [id number]  : 1970  Age:  49 years  Gender: Female  Order Date: 10/27/2019 6:00 AM  Exam: XR CHEST PORTABLE  Number of Images: 1 view  Indication:  Acute respiratory failure     Comparison: 2019    FINDINGS:  Stable endotracheal and nasogastric tubes. PH probe in the  midesophagus. Heart enlarged. Pulmonary vascularity is upper normal. There is  opacity in the retrocardiac which may represent atelectasis and/or  infiltrate. Impression:     Cardiomegaly. Retrocardiac density which may represent atelectasis  and/or infiltrate. Urine culture [[id number]]    Collected: 10/25/19 2000    Updated: 10/27/19 0820    Specimen Source: Urine, clean catch     Urine Culture, Routine --    Growth not present, incubation continues   <10,000 CFU/mL   Mixed gram positive organisms    Narrative:     Source: URINE       Site: Urine&Urine             CBC Auto Differential [[id number]] (Abnormal)    Collected: 10/27/19 0440    Updated: 10/27/19 0729     WBC 6.8 E9/L    RBC 3.70 E12/L    Hemoglobin 9.9Low  g/dL    Hematocrit 32.2Low  %    MCV 87.0 fL    MCH 26.8 pg    MCHC 30.7Low  %    RDW 14.8 fL    Platelets 297 X8/O    Comment: Platelet clumps are identified.  This may decrease the automated platelet count artifactually. MPV 10.0 fL    Neutrophils % 71.4 %    Immature Granulocytes % 0.3 %    Lymphocytes % 24.2 %    Monocytes % 3.5 %    Eosinophils % 0.3 %    Basophils % 0.3 %    Neutrophils Absolute 4.87 E9/L    Immature Granulocytes # 0.02 E9/L    Lymphocytes Absolute 1.65 E9/L    Monocytes Absolute 0.24 E9/L    Eosinophils Absolute 0. 02Low  E9/L    Basophils Absolute 0.02 E9/L   Blood Gas, Arterial [[id number]] (Abnormal)    Resulted: 10/27/19 0426    Updated: 10/27/19 0428    Specimen Source: Blood     Date Analyzed [id number]    Time Analyzed 0426    Source: Blood Arterial    pH, Blood Gas 7.431    PCO2 37.9 mmHg    PO2 156. 9High  mmHg    HCO3 24.6 mmol/L    B.E. 0.5 mmol/L    O2 Sat 99. 2High  %    PO2/FIO2 2.62 mmHg/%    AaDO2 214.2 mmHg    RI(T) 1.37    O2Hb 98. 4High  %    COHb 0.4 %    MetHb 0.4 %    O2 Content 15.8 mL/dL    HHb 0.8 %    tHb (est) 11.2Low  g/dL    Mode AC    FIO2 60.0 %    Rr Mechanical 14.0 b/min    Vt Mechanical 450.0 mL    Peep/Cpap 5.0 cmH2O    Date Of Collection --    Time Collected --    Pt Temp 37.0 C     ID [id number]    Lab [id number]    Critical(s) Notified . No Critical Values   Procalcitonin [[id number]]    Collected: 10/27/19 0026    Updated: 10/27/19 0119    Specimen Type: Blood     Procalcitonin 0.03 ng/mL   Comprehensive metabolic panel [[id number]] (Abnormal)    Collected: 10/27/19 0026    Updated: 10/27/19 0114    Specimen Source: Blood     Sodium 138 mmol/L    Potassium 4.7 mmol/L    Chloride 99 mmol/L    CO2 26 mmol/L    Anion Gap 13 mmol/L    Glucose 112High  mg/dL    BUN 11 mg/dL    CREATININE 0.8 mg/dL    GFR Non-African American >60 mL/min/1.73    Comment: Chronic Kidney Disease: less than 60 ml/min/1.73 sq. m.         Kidney Failure: less than 15 ml/min/1.73 sq.m. Results valid for patients 18 years and older.         GFR African American >60    Calcium 8.9 mg/dL    Total Protein 6.5 g/dL    Alb 4.0 g/dL    Total Bilirubin <0.2 mg/dL    Alkaline Phosphatase lower borderline position the  level of the lower margin of the aorta, it is proximal to the porfirio. NG tube in good position below diaphragma. Expiratory study causing  some crowding of the bronchovascular markings. No sizable infiltrates  or consolidations are seen. Impression:     Endotracheal tube in low borderline position at the level of the lower  arch of the aorta, proximal to the porfirio. NG tube in good position    Expiratory study, no acute cardiopulmonary process. POCT Glucose [[id number]] (Abnormal)    Collected: 10/26/19 2118    Updated: 10/26/19 2139     Meter Glucose 112High  mg/dL   Culture blood #2 [[id number]]    Collected: 10/25/19 1708    Updated: 10/26/19 2135    Specimen Source: Blood     Culture, Blood 2 24 Hours- no growth   Narrative:     Source: BLOOD       Site:              Culture blood #1 [[id number]]    Collected: 10/25/19 1701    Updated: 10/26/19 2135    Specimen Type: Blood     Blood Culture, Routine 24 Hours- no growth   Narrative:     Source: BLOOD       Site: Blood&Blood                   XR CHEST PORTABLE   Final Result   Borderline cardiomegaly without evidence of acute decompensation. Subtle apparent infrahilar density on the right may be extrinsic chest   wall artifact. At worst, it would represent bronchovascular crowding. There is no evidence of consolidation      CT HEAD WO CONTRAST   Final Result   Negative noncontrast CT study. Specifically, there is no evidence of intracranial hemorrhage or mass   effect, and no calvarial traumatic findings are noted. CT CERVICAL SPINE WO CONTRAST   Final Result   No evidence of cervical spinal skeletal trauma or adjacent paraspinous   soft tissue traumatic findings. MRI BRAIN W WO CONTRAST   Final Result      Minimal asymmetry with slight volume loss of the amygdala and   hippocampus on the right suggesting the possibility of mesial temporal   sclerosis.       Stable left anterior temporal fossa acute cardiopulmonary process. NM Cardiac Stress Test Nuclear Imaging   Final Result   1. There is a small reversible defect in the inferior wall   2. Ejection fraction is 69 %. 3. No significant wall motion abnormality      ALERT:  THIS IS AN ABNORMAL REPORT             Prior to Admission medications    Medication Sig Start Date End Date Taking? Authorizing Provider   levETIRAcetam (KEPPRA) 500 MG tablet Take 500 mg by mouth 2 times daily   Yes Historical Provider, MD   melatonin 3 MG TABS tablet Take 3 mg by mouth nightly   Yes Historical Provider, MD   losartan (COZAAR) 100 MG tablet Take 100 mg by mouth daily   Yes Historical Provider, MD   buprenorphine-naloxone (SUBOXONE) 8-2 MG SUBL SL tablet Place 1 tablet under the tongue 2 times daily.     Yes Historical Provider, MD   omeprazole (PRILOSEC) 20 MG delayed release capsule Take 20 mg by mouth 2 times daily  1/10/19  Yes Historical Provider, MD   pravastatin (PRAVACHOL) 20 MG tablet Take 20 mg by mouth daily   Yes Historical Provider, MD   lamoTRIgine (LAMICTAL) 25 MG tablet Take 1 tablet by mouth daily 8/9/19   TITI Diaz CNP        Current Facility-Administered Medications   Medication Dose Route Frequency Provider Last Rate Last Dose    aspirin EC tablet 325 mg  325 mg Oral Daily Hi Garrett MD        levetiracetam (KEPPRA) 1000 mg/100 mL IVPB  1,000 mg Intravenous Once Crow Hodge MD        sodium chloride flush 0.9 % injection 10 mL  10 mL Intravenous PRN Lenora May MD        heparin flush (100 units/mL) injection 100 Units  1 mL Intravenous 2 times per day Lenora May MD   100 Units at 11/03/19 2131    heparin flush (100 units/mL) injection 100 Units  1 mL Intracatheter PRN Lenora May MD        mineral oil-hydrophilic petrolatum (HYDROPHOR) ointment   Topical BID PRN Lenora May MD        levETIRAcetam (KEPPRA) tablet 500 mg  500 mg Oral 2 times per day TITI Pelaez CNP   500 mg at 0.5 mL  0.5 mL Intramuscular Prior to discharge Tisha Enriquez MD        losartan (COZAAR) tablet 100 mg  100 mg Oral Daily [place] Daily, DO   100 mg at 11/03/19 1008    melatonin tablet 3 mg  3 mg Oral Nightly [place] Daily, DO   3 mg at 11/03/19 2131    pravastatin (PRAVACHOL) tablet 20 mg  20 mg Oral Daily [place] Daily, DO   20 mg at 11/03/19 2131    sodium chloride flush 0.9 % injection 10 mL  10 mL Intravenous 2 times per day [place] Daily, DO   10 mL at 11/03/19 2131    magnesium hydroxide (MILK OF MAGNESIA) 400 MG/5ML suspension 30 mL  30 mL Oral Daily PRN [place] Daily, DO        ondansetron [facility]) injection 4 mg  4 mg Intravenous Q6H PRN Denis Daily, DO   4 mg at 11/03/19 1756    enoxaparin (LOVENOX) injection 40 mg  40 mg Subcutaneous Daily Denis Daily, DO   40 mg at 11/03/19 1008    perflutren lipid microspheres (DEFINITY) injection 1.65 mg  1.5 mL Intravenous ONCE PRN [place] Daily, DO           Neurology assessment:  Seizures with medical noncompliance with treatment  Possible psychogenic seizures in the past  Continuous EEG is ordered-and was negative  Keppra is to be continued  Vimpat has been started-  There has been no recent reassessment      Problem list:  · Seizure disorder with medical noncompliance with treatment resulting in exacerbation and breakthrough seizure  · These are very atypical seizure presentations EEG underway  · Reported unwitnessed fall with loss of consciousness  · Acute respiratory failure requiring airway protection and intubation-resolved  · Possible active cardiac disease-evaluation with possible catheterization today  · Prolactin level was normal  · TSH is normal        Active Problems:    Chest pain    Seizure-like activity ([facility] [address]  Resolved Problems:    * No resolved hospital problems.  *      Assessment:  As above    Plan:  Continue Keppra 1500 mg twice daily  Vimpat started per neurology  Wean sedation as tolerated  Cardiology input appreciated  Plan contingent upon further Neurology plan and Cardiology plan  Awaiting neurology plan and cardiology plan        Nelta Cowden MD  7:22 AM  11/4/2019

## 2019-11-04 NOTE — PROGRESS NOTES
Pt complained of chest pain, 3L applied ekg done at bedside should no changes from ekg done on 10/31.  Left message with cardiologist

## 2019-11-04 NOTE — PROGRESS NOTES
microspheres (DEFINITY) injection 1.65 mg  1.5 mL Intravenous ONCE PRN Mercedes Davila MD        acetaminophen (TYLENOL) tablet 650 mg  650 mg Oral Q4H PRN Mercedes Davila MD   650 mg at 11/03/19 1556    phenol 1.4 % mouth spray 1 spray  1 spray Mouth/Throat Q2H PRN Mercedes Davila MD   1 spray at 10/29/19 4534    hydrALAZINE (APRESOLINE) injection 10 mg  10 mg Intravenous Q6H PRN Mercedes Davila MD        neomycin-bacitracin-polymyxin (NEOSPORIN) ointment   Topical BID PRN Mercedes Davila MD        influenza quadrivalent split vaccine (FLUZONE;FLUARIX;FLULAVAL;AFLURIA) injection 0.5 mL  0.5 mL Intramuscular Prior to discharge Mercedes Davila MD        losartan (COZAAR) tablet 100 mg  100 mg Oral Daily Mercedes Davila MD   100 mg at 11/04/19 1003    melatonin tablet 3 mg  3 mg Oral Nightly Mercedes Davila MD   3 mg at 11/03/19 2131    pravastatin (PRAVACHOL) tablet 20 mg  20 mg Oral Daily Mercedes Davila MD   20 mg at 11/03/19 2131    magnesium hydroxide (MILK OF MAGNESIA) 400 MG/5ML suspension 30 mL  30 mL Oral Daily PRN Mercedes Davila MD        ondansetron [facility]) injection 4 mg  4 mg Intravenous Q6H PRN Mercedes Davila MD   4 mg at 11/03/19 1756    enoxaparin (LOVENOX) injection 40 mg  40 mg Subcutaneous Daily Mercedes Davila MD   40 mg at 11/03/19 1008    perflutren lipid microspheres (DEFINITY) injection 1.65 mg  1.5 mL Intravenous ONCE PRN Mercedes Davila MD         Objective:     /73   Pulse 123   Temp 96.7 °F (35.9 °C) (Temporal)   Resp 18   Ht 5' 3\" (1.6 m)   Wt 185 lb 1.6 oz (84 kg)   SpO2 98%   BMI 32.79 kg/m²      General appearance: alert, appears stated age and cooperative  Head: Normocephalic, without obvious abnormality, atraumatic  Neck: no adenopathy, no carotid bruit  Lungs: clear to auscultation bilaterally  Heart: regular rate and rhythm  Extremities: no cyanosis or edema  Pulses: 2+ and symmetric  Skin: no rashes or lesions    Mental Status: Alert, oriented, following commands     Speech: clear  Language: appropriate     Cranial

## 2019-11-04 NOTE — PROGRESS NOTES
Missed call back from Dr. Juliann Torres, was providing care to patient.  Report given to nurse taking over about events

## 2019-11-04 NOTE — PROGRESS NOTES
telesitter went off. Patient on her knees in the bed \"fixing her seizure pads\"  She states they \"were too lose\". Redirected patient that for her safety she shouldn't be getting on her knees in the bed.

## 2019-11-04 NOTE — PLAN OF CARE
Problem: Falls - Risk of:  Goal: Will remain free from falls  Description  Will remain free from falls  11/4/2019 0340 by Chyna Paredes RN  Outcome: Met This Shift     Problem: Falls - Risk of:  Goal: Absence of physical injury  Description  Absence of physical injury  11/4/2019 0340 by Chyna Paredes RN  Outcome: Met This Shift     Problem: Pain:  Goal: Pain level will decrease  Description  Pain level will decrease  11/4/2019 0340 by Chyna Paredes RN  Outcome: Met This Shift     Problem: Pain:  Goal: Control of acute pain  Description  Control of acute pain  11/4/2019 0340 by Chyna Paredes RN  Outcome: Met This Shift     Problem: Pain:  Goal: Control of chronic pain  Description  Control of chronic pain  11/4/2019 0340 by Chyna Paredes RN  Outcome: Met This Shift     Problem: Risk for Impaired Skin Integrity  Goal: Tissue integrity - skin and mucous membranes  Description  Structural intactness and normal physiological function of skin and  mucous membranes.   11/4/2019 0340 by Chyna Paredes RN  Outcome: Met This Shift

## 2019-11-05 VITALS
BODY MASS INDEX: 34.05 KG/M2 | TEMPERATURE: 97.1 F | HEIGHT: 63 IN | OXYGEN SATURATION: 96 % | WEIGHT: 192.2 LBS | HEART RATE: 101 BPM | DIASTOLIC BLOOD PRESSURE: 53 MMHG | RESPIRATION RATE: 18 BRPM | SYSTOLIC BLOOD PRESSURE: 107 MMHG

## 2019-11-05 LAB
KEPPRA: 21 UG/ML (ref 12–46)
METER GLUCOSE: 125 MG/DL (ref 74–99)

## 2019-11-05 PROCEDURE — 6370000000 HC RX 637 (ALT 250 FOR IP): Performed by: INTERNAL MEDICINE

## 2019-11-05 PROCEDURE — 2580000003 HC RX 258: Performed by: INTERNAL MEDICINE

## 2019-11-05 PROCEDURE — 99253 IP/OBS CNSLTJ NEW/EST LOW 45: CPT | Performed by: NURSE PRACTITIONER

## 2019-11-05 PROCEDURE — 6360000002 HC RX W HCPCS: Performed by: INTERNAL MEDICINE

## 2019-11-05 PROCEDURE — 6370000000 HC RX 637 (ALT 250 FOR IP): Performed by: NURSE PRACTITIONER

## 2019-11-05 PROCEDURE — G0008 ADMIN INFLUENZA VIRUS VAC: HCPCS | Performed by: INTERNAL MEDICINE

## 2019-11-05 PROCEDURE — 82962 GLUCOSE BLOOD TEST: CPT

## 2019-11-05 PROCEDURE — 90686 IIV4 VACC NO PRSV 0.5 ML IM: CPT | Performed by: INTERNAL MEDICINE

## 2019-11-05 RX ORDER — PAROXETINE HYDROCHLORIDE 20 MG/1
40 TABLET, FILM COATED ORAL DAILY
Status: DISCONTINUED | OUTPATIENT
Start: 2019-11-05 | End: 2019-11-05 | Stop reason: HOSPADM

## 2019-11-05 RX ADMIN — SALINE NASAL SPRAY 1 SPRAY: 1.5 SOLUTION NASAL at 13:03

## 2019-11-05 RX ADMIN — ACETAMINOPHEN 650 MG: 325 TABLET, FILM COATED ORAL at 10:53

## 2019-11-05 RX ADMIN — Medication 1 LOZENGE: at 10:54

## 2019-11-05 RX ADMIN — INFLUENZA A VIRUS A/BRISBANE/02/2018 IVR-190 (H1N1) ANTIGEN (PROPIOLACTONE INACTIVATED), INFLUENZA A VIRUS A/KANSAS/14/2017 X-327 (H3N2) ANTIGEN (PROPIOLACTONE INACTIVATED), INFLUENZA B VIRUS B/MARYLAND/15/2016 ANTIGEN (PROPIOLACTONE INACTIVATED), INFLUENZA B VIRUS B/PHUKET/3073/2013 BVR-1B ANTIGEN (PROPIOLACTONE INACTIVATED) 0.5 ML: 15; 15; 15; 15 INJECTION, SUSPENSION INTRAMUSCULAR at 18:08

## 2019-11-05 RX ADMIN — ONDANSETRON HYDROCHLORIDE 4 MG: 2 INJECTION, SOLUTION INTRAMUSCULAR; INTRAVENOUS at 10:55

## 2019-11-05 RX ADMIN — BUPRENORPHINE HYDROCHLORIDE AND NALOXONE HYDROCHLORIDE DIHYDRATE 1 TABLET: 8; 2 TABLET SUBLINGUAL at 10:53

## 2019-11-05 RX ADMIN — ENOXAPARIN SODIUM 40 MG: 100 INJECTION SUBCUTANEOUS at 10:52

## 2019-11-05 RX ADMIN — LEVETIRACETAM 500 MG: 500 TABLET ORAL at 10:53

## 2019-11-05 RX ADMIN — LOSARTAN POTASSIUM 100 MG: 50 TABLET, FILM COATED ORAL at 10:52

## 2019-11-05 RX ADMIN — SALINE NASAL SPRAY 1 SPRAY: 1.5 SOLUTION NASAL at 12:09

## 2019-11-05 RX ADMIN — Medication 100 UNITS: at 11:11

## 2019-11-05 RX ADMIN — ASPIRIN 325 MG: 325 TABLET, COATED ORAL at 10:52

## 2019-11-05 RX ADMIN — SODIUM CHLORIDE: 9 INJECTION, SOLUTION INTRAVENOUS at 10:55

## 2019-11-05 RX ADMIN — PAROXETINE HYDROCHLORIDE 40 MG: 20 TABLET, FILM COATED ORAL at 12:09

## 2019-11-05 RX ADMIN — Medication 1 LOZENGE: at 13:02

## 2019-11-05 RX ADMIN — Medication 100 UNITS: at 11:04

## 2019-11-05 RX ADMIN — Medication 10 ML: at 11:03

## 2019-11-05 ASSESSMENT — PAIN SCALES - GENERAL
PAINLEVEL_OUTOF10: 10
PAINLEVEL_OUTOF10: 4

## 2019-11-05 ASSESSMENT — PAIN DESCRIPTION - LOCATION: LOCATION: THROAT;GENERALIZED

## 2019-11-05 ASSESSMENT — PAIN DESCRIPTION - PAIN TYPE: TYPE: ACUTE PAIN

## 2019-11-05 NOTE — CONSULTS
PSYCHIATRIC EVALUATION  (CONSULT)     CHIEF COMPLAINT:   [] Mood Problems [x] Anxiety Problems [] Psychosis                    [] Suicidal/Homicidal   [] Aggression  [] Other    HISTORY OF PRESENT ILLNESS: Kayleigh He  is a 52 y.o. female who has a previous psychiatric history of depression and anxiety and presents for admission with chest pain and psych is consulted for evaluation of patient's elavil and paxil. Patient states she has been on elavil for years and cannot sleep without it. Patient denies SI, HI, or AVH. Patient is medication compliant. Patient's chest pain ruled out from cardiology as cardiac cause.         Precipitating Factors:     [] Family Stress   [] Recent loss/grief Stress   [x] Health Stress   [] Relationship Stress    [] Legal Stress   [] Environmental Stress    [] Occupational Stress   [] Financial Stress   [] Substance Abuse [] Other      PAST PSYCHIATRIC HISTORY:   History of psychiatric Hospitalization:    [x] Denies    [] yes  [] Days ago     []  Weeks Ago    [] Months ago  [] Years ago              [] [facility]  [] [facility]  [] Other:        [] Once  [] More than once    Outpatient treatment:  [] [facility]  [] [facility]  [] [facility] [employer]              [] [facility]  [] [facility]      [] [facility] [] [facility]      [] [facility] [] CSN  [] [facility] [] [facility]             [] currently  [] in the past  [] Non-Compliant    [x] Denies    Previous suicide attempt: [x]Denies            [] yes  [] OD  [] Cutting  [] Hanging  [] Gun  [] Other    Previous psych medications:  [] Was prescribed               [x] Currently Taking       [] Never taken medications      PAST MEDICAL HISTORY:       Diagnosis Date    Back pain     Depression 11/30/2016    Essential hypertension 8/7/2019    Gastroparesis     Hepatitis     History of cardiovascular stress test 7/1/2012    EXERCISE NUCLEAR    Hyperlipidemia     Medically noncompliant 2/15/2019    Pancreatitis, acute     Pyloric stenosis     Seizures ([facility] [address]     Vasovagal syncope 2/15/2019       FAMILY PSYCHIATRIC HISTORY:  Family History   Problem Relation Age of Onset    High Blood Pressure Mother     Other Father         BPH    Depression Paternal Grandmother            [x] Denies       [] Endorses               [] Father                [] Depression  [] Anxiety  [] Bipolar  [] Psychosis  []  Other                  [] Mother               [] Depression  [] Anxiety  [] Bipolar  [] Psychosis  []  Other                  [] Sibling               [] Depression  [] Anxiety  [] Bipolar  [] Psychosis  []  Other                  [] Grandparent               [] Depression  [] Anxiety  [] Bipolar  [] Psychosis  []  Other       SOCIAL HISTORY:     1. Living Situation:[x] Private Residence [] Homeless [] [address]             [] Assisted Living [] [address]  [] Shelter [] Other   2. Employment:  [x] Unemployed  [] Employed  [] Disabled  [] Retired   1. Legal History: [x] No Arrest [] Arrest  [] Theft  []  Assault  [] Substances   4. History of Trama/ Abuse: [x] Denies  [] Emotional [] Physical [] Sexual   5. Spirituality: [x] Spiritual [] Not Spiritual   6. Substance Abuse: [x] Denies  [] Drug of choice      [] Amphetamines [] Marijuana [] Cocaine      [] Opioids  [] Alcohol  [] Benzodiazepines     For further SH review SW note. Risk Assessment:  1. Risk Factors:   [x] Depression  [x] Anxiety  [] Psychosis   [] Suicidal/Homicidal Thoughts [] Suicide Attempt [] Substance Abuse     2. Protective Factors: [x] Controlled Environment         [x] Supportive Family []         [] Amish Support     3. Level of Risk: [x] Mild [] Moderate [] Severe      Strengths & Weaknesses:    1. Strengths: [x] Ability to communicate feelings     [x] Independent ADL's     [x] Supportive Family    [] Current Health Status     2.  Weaknesses: [x] Emotional          [] Motivational     MEDICATIONS: Current Facility-Administered Medications: PRN  neomycin-bacitracin-polymyxin (NEOSPORIN) ointment, , Topical, BID PRN  influenza quadrivalent split vaccine (FLUZONE;FLUARIX;FLULAVAL;AFLURIA) injection 0.5 mL, 0.5 mL, Intramuscular, Prior to discharge  losartan (COZAAR) tablet 100 mg, 100 mg, Oral, Daily  melatonin tablet 3 mg, 3 mg, Oral, Nightly  pravastatin (PRAVACHOL) tablet 20 mg, 20 mg, Oral, Daily  magnesium hydroxide (MILK OF MAGNESIA) 400 MG/5ML suspension 30 mL, 30 mL, Oral, Daily PRN  ondansetron (ZOFRAN) injection 4 mg, 4 mg, Intravenous, Q6H PRN  enoxaparin (LOVENOX) injection 40 mg, 40 mg, Subcutaneous, Daily  perflutren lipid microspheres (DEFINITY) injection 1.65 mg, 1.5 mL, Intravenous, ONCE PRN    Medical Review of Systems:     All other than marked systmes have been reviewed and are all negative. Constitutional Symptoms: []  fever []  Chills  Skin Symptoms: [] rash []  Pruritus   Eye Symptoms: [] Vision unchanged []  recent vision problems[] blurred vision   Respiratory Symptoms:[] shortness of breath [] cough  Cardiovascular Symptoms:  [] chest pain   [] palpitations   Gastrointestinal Symptoms: []  abdominal pain []  nausea []  vomiting []  diarrhea  Genitourinary Symptoms: []  dysuria  []  hematuria   Musculoskeletal Symptoms: []  back pain []  muscle pain []  joint pain  Neurologic Symptoms: []  headache []  dizziness  Hematolymphoid Symptoms: [] Adenopathy [] Bruises   [] Schimosis       VITALS: BP (!) 107/53   Pulse 101   Temp 97.1 °F (36.2 °C) (Temporal)   Resp 18   Ht 5' 3\" (1.6 m)   Wt 192 lb 3.2 oz (87.2 kg)   SpO2 96%   BMI 34.05 kg/m²     ALLERGIES: Ketorolac tromethamine; Naproxen; Nsaids; Prochlorperazine edisylate; Reglan [metoclopramide]; Codeine;  Levofloxacin; and Percocet [oxycodone-acetaminophen]        MENTAL STATUS EXAM:       Mental Status Examination:    Cognition:      [x] Alert  [x] Awake  [x] Oriented  [x] Person  [x] Place [x] Time      [] drowsy  [] tired  [] lethargic  [] distractable  [] Attention/Concentration:   [x] Attentive  [] Distracted        Memory Recent and Remote: [x] Intact   [] Impaired [] Partially Impaired     Language: [] Able to recognize and name objects          [] Unable to recognize and name Objects    Fund of Knowledge:  [] Poor []  Fair  [x] Good    Speech: [x] Normal  [] Soft  [] Slow  [] Fast [] Pressured            [] Loud [] Dysarthria  [] Incoherent       Appearance: [] Well Groomed  [x] Casual Dressed  [] Unkept  [] Disheveled          [] Normal weight[] Thin  [] Overweight  [] Obese           Attitude: [] Positive  [] Hostile  [] Demanding  [] Guarded  [] Defensive         [x] Cooperative  []  Uncooperative      Behavior:  [] Normal Gait  [] Walks with Assistance  [] [address]    [] Walks with Larwence Mura  [x] In Hospital Bed  [] Sitting in Chair    Muscle-Skeletal:  [x] Normal Muscle Tone [] Muscle Atrophy       [] Abnormal Muscle Movement     Eye Contact:  [x] Good eye contact  [] Intermittent Eye Contact  [] Poor Eye Contact     Mood: [] Depressed  [x] Anxious  [x] Irritated  [] Euthymic   [] Angry [] Restless    Affect:  [x] Congruent  [] Incongruent  [] Labile  [] Constricted  [] Flat  [] Bizarre     Thought Process and Association:  [] Logical [] Illogical       [x] Linear and Goal Directed  [] Tangential  [] Circumstantial     Thought Content:  [x] Denies [] Endorses [] Suicidal [] Homicidal  [] Delusional      [] Paranoid  [] Somatic  [] Grandiose    Perception: [x]  None  [] Auditory   [] Visual  [] tactile   [] olfactory  [] Illusions         Insight: [x] Intact  [] Fair  [] Limited    Judgement:  [x] Intact  [] Fair  [] Limited        ASSESSMENT  Patient Active Problem List   Diagnosis    Nausea & vomiting    Gastroparesis    Gastroparalysis    Precordial pain    Chronic abdominal pain    Chronic low back pain    Mood disorder (HCC)    History of pericarditis    Shortness of breath    Sinus tachycardia    Non-intractable cyclical vomiting with nausea  Functional diarrhea    Weight loss    Pain of upper abdomen    Colitis    Vasovagal syncope    Medically noncompliant    Suicide ideation    Seiz[facility] disorder ([facility] [address]    Essential hypertension    Pseudoseizure    Intentional drug overdose ([facility] [address]    History of seizure disorder    Suicide and self-inflicted injuries by jumping from high place [facility])    Malingering    Chest pain    Seizure-like activity ([facility] [address]     Recommendations and plan of treatment:    Will restart Paxil at 40 mg, however will not start Elavil due to cardiac side effects. Patient is minerva for safety, denies SI, HI, or AVH, and does not meet admission criteria. Please set up with OP psych follow up.       Signed:  Marce Gee  11/5/2019  8:53 AM

## 2019-11-05 NOTE — DISCHARGE SUMMARY
Discharge Summary    Vicente Gilliam  :  1970  MRN:  [id number]    Admit date:  10/25/2019  Discharge date:  2019 12:57 PM    Admitting Physician:  Nelta Cowden, MD    Discharge Diagnoses:    Patient Active Problem List    Diagnosis Date Noted    Nausea & vomiting 2012     Priority: High    Seizure-like activity ([address]     Chest pain 10/25/2019    Malingering 2019    Suicide and self-inflicted injuries by jumping from high place ([address] 2019    Suicide ideation 2019    Seizure disorder ([address] 2019    Essential hypertension 2019    Pseudoseizure     Intentional drug overdose ([address]     History of seizure disorder     Vasovagal syncope 02/15/2019    Medically noncompliant 02/15/2019    Non-intractable cyclical vomiting with nausea 2018    Functional diarrhea 2018    Weight loss 2018    Pain of upper abdomen 2018    Colitis 2018    History of pericarditis 10/31/2017    Shortness of breath     Sinus tachycardia     Mood disorder ([address] 2016    Chronic low back pain 2016    Chronic abdominal pain 2013    Precordial pain 2012    Gastroparalysis 2012    Gastroparesis 2012       Past Medical Hx :   Past Medical History:   Diagnosis Date    Back pain     Depression 2016    Essential hypertension 2019    Gastroparesis     Hepatitis     History of cardiovascular stress test 2012    EXERCISE NUCLEAR    Hyperlipidemia     Medically noncompliant 2/15/2019    Pancreatitis, acute     Pyloric stenosis     Seizures ([address]     Vasovagal syncope 2/15/2019       Past Surgical Hx :   Past Surgical History:   Procedure Laterality Date    ABDOMEN SURGERY      Patient has had 9 surgeries    CARDIAC CATHETERIZATION      had 2012    CARDIAC CATHETERIZATION  2019    Dr. Corwin Gamble, LAPAROSCOPIC      ECHO COMPL W DOP COLOR FLOW  2012         cardiopulmonary process. Cta Chest W Contrast    Result Date: 10/25/2019  LOCATION:200 EXAM: CTA CHEST W CONTRAST COMPARISON: None HISTORY:  Chest pain and tachycardia TECHNIQUE: Axial CT images are obtained of the chest.  Coronal and sagittal reconstructions were obtained with 3-D maximum intensity projection (MIP) reconstructed images. These were performed on a separate workstation with concurrent supervision for detailed evaluation of the pulmonary arteries. CONTRAST: 80 mL Isovue-370 intravenous contrast. FINDINGS: SUPPORT DEVICES: None LUNGS/CENTRAL AIRWAYS/PLEURA: 6 mm perifissural nodule is seen in the right upper lobe. Lungs are otherwise clear PULMONARY ARTERIES: Evaluation is adequate for pulmonary embolus. No filling defects identified to the subsegmental level. HEART/PERICARDIUM/GREAT VESSELS: Cardiac size is normal.  Small pericardial effusion is noted. The great vessels of the chest are normal in caliber. LYMPH NODES: No thoracic adenopathy by size criteria. NECK BASE/CHEST WALL/DIAPHRAGM: No soft tissue lesions or diaphragmatic abnormality. UPPER ABDOMEN: Limited images through the upper abdomen are unremarkable. OSSEOUS STRUCTURES: No suspicious lytic or blastic lesions. No fractures. 1. No evidence of pulmonary embolism. 2. Small pericardial effusion. 3. 6 mm nodule in the right upper lobe is likely a perifissural nodule. Recommend follow-up. Xr Abdomen For Ng/og/ne Tube Placement    Result Date: 10/27/2019  Patient MRN:  [id number] : 1970 Age: 52 years Gender: Female Order Date:  10/26/2019 10:30 PM TECHNIQUE/NUMBER OF IMAGES/COMPARISON/CLINICAL HISTORY: Abdomen supine view 1 image one view History NG tube placement. FINDINGS: NG tube tip in the stomach in good position. This study not intended to full evaluate the chest or the abdomen. There is a relative low position for the endotracheal tube.  This can be relate with the plain radiographs of the chest.     NG tube in good position. Relative low position for an endotracheal tube. Can correlate with the chest radiograph. Nm Cardiac Stress Test Nuclear Imaging    Result Date: 10/26/2019  Patient MRN:  [id number] : 1970 Age: 52 years Gender: Female Order Date:  10/26/2019 10:00 AM EXAM: NM MYOCARDIAL SPECT REST EXERCISE OR RX Number of Images:  9   views INDICATION:  Chest pain Reason for Exam?->Chest pain Procedure Type->Rx COMPARISON: None TECHNIQUE: 10.6 mCi of Tc-99m MIBI was injected intravenously at rest and cardiac SPECT images were performed. In addition 33 mCi of Tc-99m MIBI was injected intravenously at maximum stress by using Lexiscan stress. Stress SPECT images and gated study were performed. FINDINGS: Perfusion images demonstrate small anterior wall reversible defect Wall motion is within normal limits. The end diastolic volume is 54 ml. The end systolic volume is 17 ml. The estimated ejection fraction is 69 %. 1. There is a small reversible defect in the inferior wall 2. Ejection fraction is 69 %. 3. No significant wall motion abnormality ALERT:  THIS IS AN ABNORMAL REPORT       Hospital Course:  Patient admitted 10/25 for possible seizures  She was at an outlying facility and transferred to [facility]  She was admitted in my absence  The patient is a 52 y.o. female patient of Dr Mita Noland who presents with seizures from home. She has a long history of seizure disorder. She apparently had an unwitnessed seizure at home presented to the emergency room. She was noted to be tachycardic and CT of the chest revealed a pericardial effusion. She stated she had 5 seizures during the day. She apparently is supposed to be taking Keppra but was out of Keppra and was taking Lamictal only. She does not have a neurologist.  Internal medicine was consulted in the emergency room at [facility] and recommended transfer here for cardiothoracic surgery consultation.   Patient does admit to having intermittent chest discomfort which is nonradiating not associated with exertion or movement. She denies any worsening with deep inspiration. She denies any cough fever chills nausea or vomiting. Did have lactic acidosis in the emergency room. Patient states that she moved to [place] to live with her sister because of her seizure disorder and has not seen her PCP for some time which has limited her ability to obtain her medication. She attributes transportation issues to her failure to follow-up with primary care. She is requesting  assistance with obtaining transportation. Also states that she is taking Suboxone in an attempt to be weaned from her [address] which she has been taking for chronic back pain. She was then seen by Cardiology and Neurology:  She had an RRT 10/25 for AMS  Neurologist was present-unclear as to what this was  keppra was increased    He past medical history war reviewed and she had been evaluated both at [facility] and [facility] for possible seizures    Neurology sarina note:     She has been seizure free for > 24 hours. Will d/c home with close neurology follow up as OP and plan to set up EMU evaluation in near future.      Continue Keppra 500 mg BID      Seizure precautions advised      No driving      It is important to continue to try and achieve seizure control because of the potential for injury and illness due to seizures. In a very small minority of patients with generalized tonic clonic seizures (\"grand mal\"), breathing or heart function can stop during a seizure and result in demise (sudden unexpected death in epilepsy or SUDEP).  [place] from seizures prevents this kind of outcome.     Please follow seizure precautions:  Please do not engage in any high risk activities such as, but not limited to, driving, bathing in tubs, swimming alone, climbing ladders, working at heights, near open flames or machinery with moving parts etc.  For patients with epilepsy it is recommended to stay seizure free for 6 months on medication before resume driving.     These will be re-assessed at your next appointment.     Discussed with patient. All questions answered. She was not compliant with her medications    It was felt that whatever these were they were not epileptic    She had a long hospital course with medication adjustments a variety of episodes  And ultimately had a Cardiac Cath which was as expected--negative    She also had a 4 hour continuous EEG that did not show seizure  Prolactins were negative as well    At one point she was intubated for airway protection but the intensivist did not think this was needed and she was extubated    Finally Neurology wanted her referred to an EMU  Attempt  made to [facility]  They do these ambulatory with no current bed availability and no waiting list     Neurology final note:  She has been seizure free for > 24 hours. Will d/c home with close neurology follow up as OP and plan to set up EMU evaluation in near future.      Continue Keppra 500 mg BID      Seizure precautions advised      No driving      It is important to continue to try and achieve seizure control because of the potential for injury and illness due to seizures. In a very small minority of patients with generalized tonic clonic seizures (\"grand mal\"), breathing or heart function can stop during a seizure and result in demise (sudden unexpected death in epilepsy or SUDEP). [place] from seizures prevents this kind of outcome.     Please follow seizure precautions:  Please do not engage in any high risk activities such as, but not limited to, driving, bathing in tubs, swimming alone, climbing ladders, working at heights, near open flames or machinery with moving parts etc.  For patients with epilepsy it is recommended to stay seizure free for 6 months on medication before resume driving.     These will be re-assessed at your next appointment.     Discussed with patient. All questions answered.      Discharge Medications:    Sherri Acuña   Home Medication Instructions VMY:[id number]    Printed on:11/05/19 1257   Medication Information                      buprenorphine-naloxone (SUBOXONE) 8-2 MG SUBL SL tablet  Place 1 tablet under the tongue 2 times daily.               levETIRAcetam (KEPPRA) 500 MG tablet  Take 500 mg by mouth 2 times daily             losartan (COZAAR) 100 MG tablet  Take 100 mg by mouth daily             melatonin 3 MG TABS tablet  Take 3 mg by mouth nightly             omeprazole (PRILOSEC) 20 MG delayed release capsule  Take 20 mg by mouth 2 times daily              PARoxetine (PAXIL) 30 MG tablet  Take 30 mg by mouth daily             pravastatin (PRAVACHOL) 20 MG tablet  Take 20 mg by mouth daily                 Discharge Exam:  Normal exam  No neuroglial findings      Disposition: home    Patient Instructions:   REFER TO AVR document  To see PCP  To keep in touch with Neurology    Signed:  Rashmi Aguilar  [facility]  [employer] Ger[employer]  11/5/2019, 12:57 PM

## 2019-11-05 NOTE — PROGRESS NOTES
Transfer to [facility] EMU initiated  Await callback  Staff will be contacted to provide specific documents    Addendum:  No beds available-no waiting list  Will advise Neurology    Please notify Dr Annette Ambriz no beds available at [facility]  These are usually done by elective admissions  They do not accept waiting list  What are recommendations now?     I have sent Dr Annette Ambriz message perfect serve requesting guidance

## 2019-11-05 NOTE — PROGRESS NOTES
Notified Dr Basil Ugarte per Dr Brittany Ramirez re that no beds available at [facility] and for further recs

## 2019-11-05 NOTE — PROGRESS NOTES
Patient is seen in follow-up for pericardial effusion    Subjective:     Ms. Chente Medina denies any chest pain or shortness of breath  Sitting up in bed no apparent distress    ROS:  CONSTITUTIONAL: + Fatigue  HEENT:  negative for earaches, nasal congestion and epistaxis  RESPIRATORY:  negative for  dry cough, cough with sputum,wheezing and hemoptysis  GASTROINTESTINAL:  negative for nausea, vomiting  MUSCULOSKELETAL:  negative for  myalgias, arthralgias  NEUROLOGICAL:  negative for visual disturbance, dysphagia    Medication side effects: none    Scheduled Meds:   aspirin  325 mg Oral Daily    sodium chloride flush  10 mL Intravenous 2 times per day    levetiracetam  1,000 mg Intravenous Once    heparin flush (100 units/mL)  1 mL Intravenous 2 times per day    levETIRAcetam  500 mg Oral 2 times per day    Or    levetiracetam  500 mg Intravenous 2 times per day    buprenorphine-naloxone  1 tablet Sublingual BID    lidocaine  5 mL Intradermal Once    heparin flush (100 units/mL)  1 mL Intravenous 2 times per day    influenza virus vaccine  0.5 mL Intramuscular Prior to discharge    losartan  100 mg Oral Daily    melatonin  3 mg Oral Nightly    pravastatin  20 mg Oral Daily    enoxaparin  40 mg Subcutaneous Daily     Continuous Infusions:   sodium chloride 75 mL/hr at 11/04/19 2257    dextrose       PRN Meds:benzocaine-menthol, sodium chloride, sodium chloride flush, heparin flush (100 units/mL), mineral oil-hydrophilic petrolatum, mineral oil-hydrophilic petrolatum, benzonatate, heparin flush (100 units/mL), albuterol, glucose, dextrose, glucagon (rDNA), dextrose, perflutren lipid microspheres, acetaminophen, phenol, hydrALAZINE, neomycin-bacitracin-polymyxin, magnesium hydroxide, ondansetron, perflutren lipid microspheres      Objective:      Physical Exam:   BP (!) 107/53   Pulse 101   Temp 97.1 °F (36.2 °C) (Temporal)   Resp 18   Ht 5' 3\" (1.6 m)   Wt 192 lb 3.2 oz (87.2 kg)   SpO2 96%   BMI follow neurology     Discussed with patient, no family at bedside  Discussed with Dr. Darnell Arita     Electronically signed by TITI Delgado CNP on 11/5/2019 at 10:45 AM   I have discussed the care of patient including pertinent history and exam and reviewed chart, vitals, labs and radiologic studies. I also participated in medical decision making with Adair Nuñez CNP on the date of service and I agree with all of the pertinent clinical information, assessment and treatment plan and status of the problem list as documented and have edited it. NOTE: This report was transcribed using voice recognition software.  Every effort was made to ensure accuracy; however, inadvertent computerized transcription errors may be present

## 2019-11-06 LAB
Lab: NORMAL
REPORT: NORMAL
THIS TEST SENT TO: NORMAL

## 2019-11-13 ENCOUNTER — OFFICE VISIT (OUTPATIENT)
Dept: NEUROLOGY | Age: 49
End: 2019-11-13
Payer: MEDICAID

## 2019-11-13 VITALS
HEART RATE: 121 BPM | SYSTOLIC BLOOD PRESSURE: 144 MMHG | WEIGHT: 189.3 LBS | DIASTOLIC BLOOD PRESSURE: 88 MMHG | HEIGHT: 62 IN | OXYGEN SATURATION: 95 % | BODY MASS INDEX: 34.83 KG/M2

## 2019-11-13 DIAGNOSIS — G40.909 SEIZURE DISORDER (HCC): Primary | ICD-10-CM

## 2019-11-13 PROCEDURE — 99214 OFFICE O/P EST MOD 30 MIN: CPT | Performed by: PHYSICIAN ASSISTANT

## 2019-11-13 PROCEDURE — 1036F TOBACCO NON-USER: CPT | Performed by: PHYSICIAN ASSISTANT

## 2019-11-13 PROCEDURE — G8417 CALC BMI ABV UP PARAM F/U: HCPCS | Performed by: PHYSICIAN ASSISTANT

## 2019-11-13 PROCEDURE — 1111F DSCHRG MED/CURRENT MED MERGE: CPT | Performed by: PHYSICIAN ASSISTANT

## 2019-11-13 PROCEDURE — G8482 FLU IMMUNIZE ORDER/ADMIN: HCPCS | Performed by: PHYSICIAN ASSISTANT

## 2019-11-13 PROCEDURE — G8427 DOCREV CUR MEDS BY ELIG CLIN: HCPCS | Performed by: PHYSICIAN ASSISTANT

## 2019-11-25 ENCOUNTER — OFFICE VISIT (OUTPATIENT)
Dept: FAMILY MEDICINE CLINIC | Age: 49
End: 2019-11-25
Payer: MEDICAID

## 2019-11-25 ENCOUNTER — TELEPHONE (OUTPATIENT)
Dept: FAMILY MEDICINE CLINIC | Age: 49
End: 2019-11-25

## 2019-11-25 VITALS
BODY MASS INDEX: 34.82 KG/M2 | HEART RATE: 88 BPM | WEIGHT: 189.2 LBS | OXYGEN SATURATION: 99 % | TEMPERATURE: 98.1 F | RESPIRATION RATE: 18 BRPM | DIASTOLIC BLOOD PRESSURE: 72 MMHG | HEIGHT: 62 IN | SYSTOLIC BLOOD PRESSURE: 124 MMHG

## 2019-11-25 DIAGNOSIS — G40.909 SEIZURE DISORDER (HCC): Primary | ICD-10-CM

## 2019-11-25 PROCEDURE — 1036F TOBACCO NON-USER: CPT | Performed by: FAMILY MEDICINE

## 2019-11-25 PROCEDURE — 1111F DSCHRG MED/CURRENT MED MERGE: CPT | Performed by: FAMILY MEDICINE

## 2019-11-25 PROCEDURE — G8427 DOCREV CUR MEDS BY ELIG CLIN: HCPCS | Performed by: FAMILY MEDICINE

## 2019-11-25 PROCEDURE — 99203 OFFICE O/P NEW LOW 30 MIN: CPT | Performed by: FAMILY MEDICINE

## 2019-11-25 PROCEDURE — G8482 FLU IMMUNIZE ORDER/ADMIN: HCPCS | Performed by: FAMILY MEDICINE

## 2019-11-25 PROCEDURE — G8417 CALC BMI ABV UP PARAM F/U: HCPCS | Performed by: FAMILY MEDICINE

## 2019-11-25 RX ORDER — AMITRIPTYLINE HYDROCHLORIDE 50 MG/1
TABLET, FILM COATED ORAL
Refills: 0 | Status: ON HOLD | COMMUNITY
Start: 2019-11-22 | End: 2020-02-07 | Stop reason: HOSPADM

## 2019-11-26 ENCOUNTER — TELEPHONE (OUTPATIENT)
Dept: NEUROLOGY | Age: 49
End: 2019-11-26

## 2019-11-27 ENCOUNTER — TELEPHONE (OUTPATIENT)
Dept: NEUROLOGY | Age: 49
End: 2019-11-27

## 2019-11-27 NOTE — TELEPHONE ENCOUNTER
Pt called requesting to speak with a manager. Pt stated our office \"did everything wrong\" per [facility] staff regarding referring pt to [facility] EMU. Pt called our office 11/26 stating referral was not received by [facility] and requested referral be faxed to a different fax number. Pt stated 1hr after she contacted our office [facility] called her and scheduled her for eval. Pt was upset that EMU referral took this long. MA explained referral was faxed by MA with all information to [facility], like we do for all pt's, on 11/14 and fax confirmation was received. MA also explained she informs pt's at their neuro ov to call the office if they have not received a call from referring facility after 1 week so we can look into why pt was not contacted. Pt still requested to speak w/ so message being forwarded to [facility].   Electronically signed by Shayne Villanueva on 11/27/19 at 10:54 AM

## 2019-12-19 LAB
EKG ATRIAL RATE: 112 BPM
EKG P AXIS: 59 DEGREES
EKG P-R INTERVAL: 138 MS
EKG Q-T INTERVAL: 310 MS
EKG QRS DURATION: 76 MS
EKG QTC CALCULATION (BAZETT): 423 MS
EKG R AXIS: 63 DEGREES
EKG T AXIS: 31 DEGREES
EKG VENTRICULAR RATE: 112 BPM

## 2020-01-28 ENCOUNTER — HOSPITAL ENCOUNTER (INPATIENT)
Age: 50
LOS: 1 days | Discharge: ANOTHER HOSPITAL | DRG: 754 | End: 2020-01-29
Attending: EMERGENCY MEDICINE | Admitting: PSYCHIATRY & NEUROLOGY
Payer: MEDICAID

## 2020-01-28 VITALS
SYSTOLIC BLOOD PRESSURE: 112 MMHG | TEMPERATURE: 98.3 F | HEART RATE: 129 BPM | RESPIRATION RATE: 16 BRPM | DIASTOLIC BLOOD PRESSURE: 69 MMHG | OXYGEN SATURATION: 97 %

## 2020-01-28 PROBLEM — F32.A DEPRESSION WITH SUICIDAL IDEATION: Status: ACTIVE | Noted: 2020-01-28

## 2020-01-28 PROBLEM — R45.851 DEPRESSION WITH SUICIDAL IDEATION: Status: ACTIVE | Noted: 2020-01-28

## 2020-01-28 LAB
ACETAMINOPHEN LEVEL: <5 MCG/ML (ref 10–30)
ALBUMIN SERPL-MCNC: 4.4 G/DL (ref 3.5–5.2)
ALP BLD-CCNC: 225 U/L (ref 35–104)
ALT SERPL-CCNC: 56 U/L (ref 0–32)
AMPHETAMINE SCREEN, URINE: NOT DETECTED
ANION GAP SERPL CALCULATED.3IONS-SCNC: 14 MMOL/L (ref 7–16)
AST SERPL-CCNC: 40 U/L (ref 0–31)
BACTERIA: ABNORMAL /HPF
BARBITURATE SCREEN URINE: NOT DETECTED
BASOPHILS ABSOLUTE: 0.02 E9/L (ref 0–0.2)
BASOPHILS RELATIVE PERCENT: 0.3 % (ref 0–2)
BENZODIAZEPINE SCREEN, URINE: NOT DETECTED
BILIRUB SERPL-MCNC: <0.2 MG/DL (ref 0–1.2)
BILIRUBIN URINE: NEGATIVE
BLOOD, URINE: NEGATIVE
BUN BLDV-MCNC: 12 MG/DL (ref 6–20)
CALCIUM SERPL-MCNC: 9.7 MG/DL (ref 8.6–10.2)
CANNABINOID SCREEN URINE: NOT DETECTED
CHLORIDE BLD-SCNC: 99 MMOL/L (ref 98–107)
CLARITY: CLEAR
CO2: 26 MMOL/L (ref 22–29)
COCAINE METABOLITE SCREEN URINE: NOT DETECTED
COLOR: YELLOW
CREAT SERPL-MCNC: 0.7 MG/DL (ref 0.5–1)
EOSINOPHILS ABSOLUTE: 0.03 E9/L (ref 0.05–0.5)
EOSINOPHILS RELATIVE PERCENT: 0.5 % (ref 0–6)
ETHANOL: <10 MG/DL (ref 0–0.08)
FENTANYL SCREEN, URINE: NOT DETECTED
GFR AFRICAN AMERICAN: >60
GFR NON-AFRICAN AMERICAN: >60 ML/MIN/1.73
GLUCOSE BLD-MCNC: 120 MG/DL (ref 74–99)
GLUCOSE URINE: NEGATIVE MG/DL
HCT VFR BLD CALC: 39.2 % (ref 34–48)
HEMOGLOBIN: 11.7 G/DL (ref 11.5–15.5)
IMMATURE GRANULOCYTES #: 0.01 E9/L
IMMATURE GRANULOCYTES %: 0.2 % (ref 0–5)
KETONES, URINE: NEGATIVE MG/DL
LEUKOCYTE ESTERASE, URINE: ABNORMAL
LYMPHOCYTES ABSOLUTE: 2.37 E9/L (ref 1.5–4)
LYMPHOCYTES RELATIVE PERCENT: 41.4 % (ref 20–42)
Lab: NORMAL
MCH RBC QN AUTO: 26.2 PG (ref 26–35)
MCHC RBC AUTO-ENTMCNC: 29.8 % (ref 32–34.5)
MCV RBC AUTO: 87.9 FL (ref 80–99.9)
METHADONE SCREEN, URINE: NOT DETECTED
MONOCYTES ABSOLUTE: 0.21 E9/L (ref 0.1–0.95)
MONOCYTES RELATIVE PERCENT: 3.7 % (ref 2–12)
NEUTROPHILS ABSOLUTE: 3.09 E9/L (ref 1.8–7.3)
NEUTROPHILS RELATIVE PERCENT: 53.9 % (ref 43–80)
NITRITE, URINE: NEGATIVE
OPIATE SCREEN URINE: NOT DETECTED
OXYCODONE URINE: NOT DETECTED
PDW BLD-RTO: 14.4 FL (ref 11.5–15)
PH UA: 6.5 (ref 5–9)
PHENCYCLIDINE SCREEN URINE: NOT DETECTED
PLATELET # BLD: 405 E9/L (ref 130–450)
PMV BLD AUTO: 8.4 FL (ref 7–12)
POTASSIUM REFLEX MAGNESIUM: 4.3 MMOL/L (ref 3.5–5)
PROTEIN UA: NEGATIVE MG/DL
RBC # BLD: 4.46 E12/L (ref 3.5–5.5)
RBC UA: ABNORMAL /HPF (ref 0–2)
SALICYLATE, SERUM: <0.3 MG/DL (ref 0–30)
SODIUM BLD-SCNC: 139 MMOL/L (ref 132–146)
SPECIFIC GRAVITY UA: 1.02 (ref 1–1.03)
TOTAL PROTEIN: 7.8 G/DL (ref 6.4–8.3)
TRICYCLIC ANTIDEPRESSANTS SCREEN SERUM: POSITIVE NG/ML
TSH SERPL DL<=0.05 MIU/L-ACNC: 1.36 UIU/ML (ref 0.27–4.2)
UROBILINOGEN, URINE: 0.2 E.U./DL
WBC # BLD: 5.7 E9/L (ref 4.5–11.5)
WBC UA: ABNORMAL /HPF (ref 0–5)

## 2020-01-28 PROCEDURE — 1200000000 HC SEMI PRIVATE

## 2020-01-28 PROCEDURE — 85025 COMPLETE CBC W/AUTO DIFF WBC: CPT

## 2020-01-28 PROCEDURE — 99285 EMERGENCY DEPT VISIT HI MDM: CPT

## 2020-01-28 PROCEDURE — 87088 URINE BACTERIA CULTURE: CPT

## 2020-01-28 PROCEDURE — 81001 URINALYSIS AUTO W/SCOPE: CPT

## 2020-01-28 PROCEDURE — G0480 DRUG TEST DEF 1-7 CLASSES: HCPCS

## 2020-01-28 PROCEDURE — 6370000000 HC RX 637 (ALT 250 FOR IP): Performed by: EMERGENCY MEDICINE

## 2020-01-28 PROCEDURE — G0378 HOSPITAL OBSERVATION PER HR: HCPCS

## 2020-01-28 PROCEDURE — 36415 COLL VENOUS BLD VENIPUNCTURE: CPT

## 2020-01-28 PROCEDURE — 93005 ELECTROCARDIOGRAM TRACING: CPT | Performed by: EMERGENCY MEDICINE

## 2020-01-28 PROCEDURE — 80053 COMPREHEN METABOLIC PANEL: CPT

## 2020-01-28 PROCEDURE — 84443 ASSAY THYROID STIM HORMONE: CPT

## 2020-01-28 PROCEDURE — 80307 DRUG TEST PRSMV CHEM ANLYZR: CPT

## 2020-01-28 RX ORDER — BENZTROPINE MESYLATE 1 MG/ML
2 INJECTION INTRAMUSCULAR; INTRAVENOUS 2 TIMES DAILY PRN
Status: CANCELLED | OUTPATIENT
Start: 2020-01-28

## 2020-01-28 RX ORDER — HYDROXYZINE HYDROCHLORIDE 25 MG/1
50 TABLET, FILM COATED ORAL 3 TIMES DAILY PRN
Status: CANCELLED | OUTPATIENT
Start: 2020-01-28

## 2020-01-28 RX ORDER — MAGNESIUM HYDROXIDE/ALUMINUM HYDROXICE/SIMETHICONE 120; 1200; 1200 MG/30ML; MG/30ML; MG/30ML
30 SUSPENSION ORAL PRN
Status: CANCELLED | OUTPATIENT
Start: 2020-01-28

## 2020-01-28 RX ORDER — OLANZAPINE 5 MG/1
5 TABLET ORAL EVERY 4 HOURS PRN
Status: CANCELLED | OUTPATIENT
Start: 2020-01-28

## 2020-01-28 RX ORDER — GRANULES FOR ORAL 3 G/1
3 POWDER ORAL ONCE
Status: COMPLETED | OUTPATIENT
Start: 2020-01-28 | End: 2020-01-28

## 2020-01-28 RX ORDER — CEFDINIR 300 MG/1
300 CAPSULE ORAL ONCE
Status: DISCONTINUED | OUTPATIENT
Start: 2020-01-28 | End: 2020-01-28

## 2020-01-28 RX ORDER — ACETAMINOPHEN 325 MG/1
650 TABLET ORAL EVERY 4 HOURS PRN
Status: CANCELLED | OUTPATIENT
Start: 2020-01-28

## 2020-01-28 RX ORDER — OLANZAPINE 10 MG/1
10 INJECTION, POWDER, LYOPHILIZED, FOR SOLUTION INTRAMUSCULAR EVERY 4 HOURS PRN
Status: CANCELLED | OUTPATIENT
Start: 2020-01-28

## 2020-01-28 RX ORDER — TRAZODONE HYDROCHLORIDE 50 MG/1
50 TABLET ORAL NIGHTLY PRN
Status: CANCELLED | OUTPATIENT
Start: 2020-01-29

## 2020-01-28 RX ADMIN — FOSFOMYCIN TROMETHAMINE 1 PACKET: 3 POWDER ORAL at 18:36

## 2020-01-28 ASSESSMENT — ENCOUNTER SYMPTOMS
COUGH: 0
ABDOMINAL PAIN: 0
BACK PAIN: 0

## 2020-01-28 NOTE — ED NOTES
C.O. at bedside, clothing removed and all personals placed into locker #2. Patient again was upset that personals had to be removed from her possession. 1:1 on hospital policy and procedure and safety with questionable effect, will continue to instruct on safety measures and accomodates needs. Drink was provided for patient and will order dinner. Assisted patient to [facility] for urine speci and will obtain blood work, EKG.      Monet Brewer RN  01/28/20 Rudy Araya RN  01/28/20 0123

## 2020-01-28 NOTE — ED PROVIDER NOTES
effort is normal.      Breath sounds: Normal breath sounds. No wheezing, rhonchi or rales. Chest:      Chest wall: No tenderness. Abdominal:      General: There is no distension. Palpations: Abdomen is soft. Tenderness: There is no abdominal tenderness. There is no guarding or rebound. Hernia: No hernia is present. Musculoskeletal:      Right lower leg: No edema. Left lower leg: No edema. Lymphadenopathy:      Cervical: No cervical adenopathy. Skin:     General: Skin is warm and dry. Capillary Refill: Capillary refill takes less than 2 seconds. Neurological:      General: No focal deficit present. Mental Status: She is alert and oriented to person, place, and time. Cranial Nerves: No cranial nerve deficit. Psychiatric:         Mood and Affect: Mood normal.         Behavior: Behavior normal.          Procedures     MDM  Number of Diagnoses or Management Options  Acute cystitis without hematuria:   Suicidal ideation:   Diagnosis management comments: This is a 66-year-old female who presented to the ED for evaluation of suicidal ideation. The patient was alert and oriented to person place time and situation who presented to the ED for evaluation after tellign a hotline she was suicidal. The patient was tachycardic on presentation but I suspect this is due to her anxiety. Patient was also found to have findings suggestive of cystitis and given her old cultures she was treated with a one time dose of fosfomycin. The patient was placed on a hold and spoke with our  here. Patient had a urine culture sent off and she was cleared medically for evaluation by the behavioral health unit.   Patient was agreeable to this.                --------------------------------------------- PAST HISTORY ---------------------------------------------  Past Medical History:  has a past medical history of Back pain, Depression, Essential hypertension, Gastroparesis, Hepatitis, History of cardiovascular stress test, Hyperlipidemia, Medically noncompliant, Pancreatitis, acute, Pyloric stenosis, Seizures ([address] and Vasovagal syncope. Past Surgical History:  has a past surgical history that includes Salpingo-oophorectomy (Left); Cholecystectomy, laparoscopic; Pyloroplasty (3/30/2012); ECHO Compl W Dop Color Flow (7/1/2012); Cardiac catheterization; Upper gastrointestinal endoscopy (3/15/13); Abdomen surgery; Endometrial ablation; and Cardiac catheterization (11/04/2019). Social History:  reports that she has never smoked. She has never used smokeless tobacco. She reports that she does not drink alcohol or use drugs. Family History: family history includes Depression in her paternal grandmother; High Blood Pressure in her mother; No Known Problems in her brother, brother, brother, brother, daughter, sister, sister, sister, son, and son; Other in her father. The patients home medications have been reviewed. Allergies: Ketorolac tromethamine; Naproxen; Nsaids; Prochlorperazine edisylate; Reglan [metoclopramide]; Codeine;  Levofloxacin; and Percocet [oxycodone-acetaminophen]    -------------------------------------------------- RESULTS -------------------------------------------------    LABS:  Results for orders placed or performed during the hospital encounter of 01/28/20   Comprehensive Metabolic Panel w/ Reflex to MG   Result Value Ref Range    Sodium 139 132 - 146 mmol/L    Potassium reflex Magnesium 4.3 3.5 - 5.0 mmol/L    Chloride 99 98 - 107 mmol/L    CO2 26 22 - 29 mmol/L    Anion Gap 14 7 - 16 mmol/L    Glucose 120 (H) 74 - 99 mg/dL    BUN 12 6 - 20 mg/dL    CREATININE 0.7 0.5 - 1.0 mg/dL    GFR Non-African American >60 >=60 mL/min/1.73    GFR African American >60     Calcium 9.7 8.6 - 10.2 mg/dL    Total Protein 7.8 6.4 - 8.3 g/dL    Alb 4.4 3.5 - 5.2 g/dL    Total Bilirubin <0.2 0.0 - 1.2 mg/dL    Alkaline Phosphatase 225 (H) 35 - 104 U/L    ALT 56 (H) 0 - 32 U/L AST 40 (H) 0 - 31 U/L   CBC Auto Differential   Result Value Ref Range    WBC 5.7 4.5 - 11.5 E9/L    RBC 4.46 3.50 - 5.50 E12/L    Hemoglobin 11.7 11.5 - 15.5 g/dL    Hematocrit 39.2 34.0 - 48.0 %    MCV 87.9 80.0 - 99.9 fL    MCH 26.2 26.0 - 35.0 pg    MCHC 29.8 (L) 32.0 - 34.5 %    RDW 14.4 11.5 - 15.0 fL    Platelets 206 532 - 448 E9/L    MPV 8.4 7.0 - 12.0 fL    Neutrophils % 53.9 43.0 - 80.0 %    Immature Granulocytes % 0.2 0.0 - 5.0 %    Lymphocytes % 41.4 20.0 - 42.0 %    Monocytes % 3.7 2.0 - 12.0 %    Eosinophils % 0.5 0.0 - 6.0 %    Basophils % 0.3 0.0 - 2.0 %    Neutrophils Absolute 3.09 1.80 - 7.30 E9/L    Immature Granulocytes # 0.01 E9/L    Lymphocytes Absolute 2.37 1.50 - 4.00 E9/L    Monocytes Absolute 0.21 0.10 - 0.95 E9/L    Eosinophils Absolute 0.03 (L) 0.05 - 0.50 E9/L    Basophils Absolute 0.02 0.00 - 0.20 E9/L   Serum Drug Screen   Result Value Ref Range    Ethanol Lvl <10 mg/dL    Acetaminophen Level <5.0 (L) 10.0 - 69.4 mcg/mL    Salicylate, Serum <2.6 0.0 - 30.0 mg/dL   Urine Drug Screen   Result Value Ref Range    Amphetamine Screen, Urine NOT DETECTED Negative <1000 ng/mL    Barbiturate Screen, Ur NOT DETECTED Negative < 200 ng/mL    Benzodiazepine Screen, Urine NOT DETECTED Negative < 200 ng/mL    Cannabinoid Scrn, Ur NOT DETECTED Negative < 50ng/mL    Cocaine Metabolite Screen, Urine NOT DETECTED Negative < 300 ng/mL    Opiate Scrn, Ur NOT DETECTED Negative < 300ng/mL    PCP Screen, Urine NOT DETECTED Negative < 25 ng/mL    Methadone Screen, Urine NOT DETECTED Negative <300 ng/mL    Oxycodone Urine NOT DETECTED Negative <100 ng/mL    FENTANYL SCREEN, URINE NOT DETECTED Negative <1 ng/mL    Drug Screen Comment: see below    Urinalysis   Result Value Ref Range    Color, UA Yellow Straw/Yellow    Clarity, UA Clear Clear    Glucose, Ur Negative Negative mg/dL    Bilirubin Urine Negative Negative    Ketones, Urine Negative Negative mg/dL    Specific Gravity, UA 1.020 1.005 - 1.030    Blood, Urine Negative Negative    pH, UA 6.5 5.0 - 9.0    Protein, UA Negative Negative mg/dL    Urobilinogen, Urine 0.2 <2.0 E.U./dL    Nitrite, Urine Negative Negative    Leukocyte Esterase, Urine LARGE (A) Negative   TSH without Reflex   Result Value Ref Range    TSH 1.360 0.270 - 4.200 uIU/mL   Microscopic Urinalysis   Result Value Ref Range    WBC, UA 5-10 0 - 5 /HPF    RBC, UA NONE 0 - 2 /HPF    Bacteria, UA MODERATE (A) /HPF   EKG 12 Lead   Result Value Ref Range    Ventricular Rate 120 BPM    Atrial Rate 120 BPM    P-R Interval 148 ms    QRS Duration 70 ms    Q-T Interval 298 ms    QTc Calculation (Bazett) 421 ms    P Axis 68 degrees    R Axis 86 degrees    T Axis 25 degrees       RADIOLOGY:  No orders to display       EKG: This EKG is signed and interpreted by me. Rate: 120  Rhythm: Sinus  Interpretation: sinus tachycardia  Comparison: no previous EKG and no previous EKG available      ------------------------- NURSING NOTES AND VITALS REVIEWED ---------------------------  Date / Time Roomed:  1/28/2020  3:17 PM  ED Bed Assignment:  09/09    The nursing notes within the ED encounter and vital signs as below have been reviewed. Patient Vitals for the past 24 hrs:   BP Temp Temp src Pulse Resp SpO2   01/28/20 1507 (!) 148/69 98.2 °F (36.8 °C) Oral 121 16 97 %       Oxygen Saturation Interpretation: Normal    ------------------------------------------ PROGRESS NOTES ------------------------------------------  Re-evaluation(s):  Time: 064  Patients symptoms show no change  Repeat physical examination is not changed    Counseling:  I have spoken with the patient and discussed todays results, in addition to providing specific details for the plan of care and counseling regarding the diagnosis and prognosis. Their questions are answered at this time and they are agreeable with the plan of admission.       This patient's ED course included: a personal history and physicial examination, re-evaluation prior to

## 2020-01-28 NOTE — ED NOTES
Approached patient and attempted to communicate what brought her in. She stated her mouth was dry and wanted a drink, she stated she wanted me to walk her out to the vending machine in the lobby and get things. Setting boundaries, I instructed I would not take her the machines, but would be more then glad to get her some beverages when she is able/ allowed by the physicians team . The patient became very angry and stated I was rude, and felt offended and disrespected, she didn't like the way I told her no. 1:1 that I was here to meet her needs with no effect. When I instructed the team needed to ok for beverages currently the patient ignored me and would not respond to questions. Unable to evaluate her. I instructed that I would go ask the team since she stated 'she is going through a lot of things right now and cant think'. The team said to hold off on beverages at this point, the doctor had yet spoke to the patient.       Phillip Medina, DINO  01/28/20 2192

## 2020-01-28 NOTE — ED NOTES
Jamar Dent was notified @ [facility] by sabina @ [address] [facility]  01/28/20 8741

## 2020-01-29 ENCOUNTER — HOSPITAL ENCOUNTER (INPATIENT)
Age: 50
LOS: 13 days | Discharge: HOME | DRG: 751 | End: 2020-02-11
Attending: PSYCHIATRY & NEUROLOGY | Admitting: PSYCHIATRY & NEUROLOGY
Payer: MEDICAID

## 2020-01-29 PROBLEM — F33.9 MAJOR DEPRESSION, RECURRENT (HCC): Status: ACTIVE | Noted: 2020-01-29

## 2020-01-29 LAB
EKG ATRIAL RATE: 120 BPM
EKG P AXIS: 68 DEGREES
EKG P-R INTERVAL: 148 MS
EKG Q-T INTERVAL: 298 MS
EKG QRS DURATION: 70 MS
EKG QTC CALCULATION (BAZETT): 421 MS
EKG R AXIS: 86 DEGREES
EKG T AXIS: 25 DEGREES
EKG VENTRICULAR RATE: 120 BPM
GONADOTROPIN, CHORIONIC (HCG) QUANT: 0.8 MIU/ML

## 2020-01-29 PROCEDURE — 6360000002 HC RX W HCPCS: Performed by: NURSE PRACTITIONER

## 2020-01-29 PROCEDURE — 1240000000 HC EMOTIONAL WELLNESS R&B

## 2020-01-29 PROCEDURE — 84702 CHORIONIC GONADOTROPIN TEST: CPT

## 2020-01-29 PROCEDURE — 80074 ACUTE HEPATITIS PANEL: CPT

## 2020-01-29 PROCEDURE — 36415 COLL VENOUS BLD VENIPUNCTURE: CPT

## 2020-01-29 PROCEDURE — 6370000000 HC RX 637 (ALT 250 FOR IP): Performed by: PSYCHIATRY & NEUROLOGY

## 2020-01-29 PROCEDURE — G0378 HOSPITAL OBSERVATION PER HR: HCPCS

## 2020-01-29 PROCEDURE — 6370000000 HC RX 637 (ALT 250 FOR IP): Performed by: NURSE PRACTITIONER

## 2020-01-29 PROCEDURE — 99222 1ST HOSP IP/OBS MODERATE 55: CPT | Performed by: NURSE PRACTITIONER

## 2020-01-29 RX ORDER — LOSARTAN POTASSIUM 50 MG/1
50 TABLET ORAL DAILY
Status: DISCONTINUED | OUTPATIENT
Start: 2020-01-29 | End: 2020-02-11 | Stop reason: HOSPADM

## 2020-01-29 RX ORDER — ARIPIPRAZOLE 2 MG/1
2 TABLET ORAL DAILY
Status: DISCONTINUED | OUTPATIENT
Start: 2020-01-29 | End: 2020-01-30

## 2020-01-29 RX ORDER — MIRTAZAPINE 15 MG/1
30 TABLET, FILM COATED ORAL NIGHTLY
Status: DISCONTINUED | OUTPATIENT
Start: 2020-01-29 | End: 2020-02-11 | Stop reason: HOSPADM

## 2020-01-29 RX ORDER — PAROXETINE HYDROCHLORIDE 20 MG/1
40 TABLET, FILM COATED ORAL DAILY
Status: DISCONTINUED | OUTPATIENT
Start: 2020-01-30 | End: 2020-02-01

## 2020-01-29 RX ORDER — HYDROXYZINE PAMOATE 25 MG/1
50 CAPSULE ORAL 3 TIMES DAILY PRN
Status: DISCONTINUED | OUTPATIENT
Start: 2020-01-29 | End: 2020-02-04

## 2020-01-29 RX ORDER — NICOTINE 21 MG/24HR
1 PATCH, TRANSDERMAL 24 HOURS TRANSDERMAL DAILY
Status: DISCONTINUED | OUTPATIENT
Start: 2020-01-29 | End: 2020-02-11 | Stop reason: HOSPADM

## 2020-01-29 RX ORDER — MAGNESIUM HYDROXIDE/ALUMINUM HYDROXICE/SIMETHICONE 120; 1200; 1200 MG/30ML; MG/30ML; MG/30ML
30 SUSPENSION ORAL PRN
Status: DISCONTINUED | OUTPATIENT
Start: 2020-01-29 | End: 2020-02-11 | Stop reason: HOSPADM

## 2020-01-29 RX ORDER — BUPRENORPHINE HYDROCHLORIDE AND NALOXONE HYDROCHLORIDE DIHYDRATE 8; 2 MG/1; MG/1
1 TABLET SUBLINGUAL 2 TIMES DAILY
Status: DISCONTINUED | OUTPATIENT
Start: 2020-01-29 | End: 2020-02-11 | Stop reason: HOSPADM

## 2020-01-29 RX ORDER — BUPRENORPHINE HYDROCHLORIDE AND NALOXONE HYDROCHLORIDE DIHYDRATE 8; 2 MG/1; MG/1
2 TABLET SUBLINGUAL DAILY
Status: DISCONTINUED | OUTPATIENT
Start: 2020-01-29 | End: 2020-01-29

## 2020-01-29 RX ORDER — PANTOPRAZOLE SODIUM 40 MG/1
40 TABLET, DELAYED RELEASE ORAL
Status: DISCONTINUED | OUTPATIENT
Start: 2020-01-30 | End: 2020-02-11 | Stop reason: HOSPADM

## 2020-01-29 RX ORDER — AMITRIPTYLINE HYDROCHLORIDE 25 MG/1
50 TABLET, FILM COATED ORAL NIGHTLY
Status: DISCONTINUED | OUTPATIENT
Start: 2020-01-29 | End: 2020-01-29

## 2020-01-29 RX ORDER — AMITRIPTYLINE HYDROCHLORIDE 25 MG/1
50 TABLET, FILM COATED ORAL DAILY
Status: DISCONTINUED | OUTPATIENT
Start: 2020-01-29 | End: 2020-02-02

## 2020-01-29 RX ORDER — BENZTROPINE MESYLATE 1 MG/ML
2 INJECTION INTRAMUSCULAR; INTRAVENOUS 2 TIMES DAILY PRN
Status: DISCONTINUED | OUTPATIENT
Start: 2020-01-29 | End: 2020-02-06

## 2020-01-29 RX ORDER — TRAZODONE HYDROCHLORIDE 50 MG/1
50 TABLET ORAL NIGHTLY PRN
Status: DISCONTINUED | OUTPATIENT
Start: 2020-01-29 | End: 2020-02-02

## 2020-01-29 RX ORDER — TRAZODONE HYDROCHLORIDE 50 MG/1
50 TABLET ORAL NIGHTLY PRN
Status: DISCONTINUED | OUTPATIENT
Start: 2020-01-29 | End: 2020-01-29

## 2020-01-29 RX ORDER — OLANZAPINE 10 MG/1
10 INJECTION, POWDER, LYOPHILIZED, FOR SOLUTION INTRAMUSCULAR EVERY 4 HOURS PRN
Status: DISCONTINUED | OUTPATIENT
Start: 2020-01-29 | End: 2020-02-02

## 2020-01-29 RX ORDER — PRAVASTATIN SODIUM 20 MG
20 TABLET ORAL DAILY
Status: DISCONTINUED | OUTPATIENT
Start: 2020-01-29 | End: 2020-02-11 | Stop reason: HOSPADM

## 2020-01-29 RX ORDER — OLANZAPINE 5 MG/1
5 TABLET ORAL EVERY 4 HOURS PRN
Status: DISCONTINUED | OUTPATIENT
Start: 2020-01-29 | End: 2020-02-02

## 2020-01-29 RX ADMIN — LOSARTAN POTASSIUM 50 MG: 50 TABLET ORAL at 11:36

## 2020-01-29 RX ADMIN — LEVETIRACETAM 750 MG: 500 TABLET ORAL at 20:33

## 2020-01-29 RX ADMIN — HYDROXYZINE PAMOATE 50 MG: 25 CAPSULE ORAL at 00:54

## 2020-01-29 RX ADMIN — LEVETIRACETAM 750 MG: 500 TABLET ORAL at 12:25

## 2020-01-29 RX ADMIN — BUPRENORPHINE AND NALOXONE 1 TABLET: 8; 2 TABLET SUBLINGUAL at 10:16

## 2020-01-29 RX ADMIN — HYDROXYZINE PAMOATE 50 MG: 25 CAPSULE ORAL at 20:32

## 2020-01-29 RX ADMIN — PRAVASTATIN SODIUM 20 MG: 20 TABLET ORAL at 20:33

## 2020-01-29 RX ADMIN — AMITRIPTYLINE HYDROCHLORIDE 50 MG: 25 TABLET, FILM COATED ORAL at 11:35

## 2020-01-29 RX ADMIN — ARIPIPRAZOLE 2 MG: 2 TABLET ORAL at 11:35

## 2020-01-29 RX ADMIN — MIRTAZAPINE 30 MG: 15 TABLET, FILM COATED ORAL at 20:33

## 2020-01-29 RX ADMIN — TRAZODONE HYDROCHLORIDE 50 MG: 50 TABLET ORAL at 00:55

## 2020-01-29 RX ADMIN — BUPRENORPHINE AND NALOXONE 1 TABLET: 8; 2 TABLET SUBLINGUAL at 20:32

## 2020-01-29 RX ADMIN — PAROXETINE 30 MG: 20 TABLET, FILM COATED ORAL at 10:18

## 2020-01-29 ASSESSMENT — LIFESTYLE VARIABLES
HISTORY_ALCOHOL_USE: NO
HISTORY_ALCOHOL_USE: NO

## 2020-01-29 ASSESSMENT — SLEEP AND FATIGUE QUESTIONNAIRES
AVERAGE NUMBER OF SLEEP HOURS: 5
DIFFICULTY ARISING: NO
RESTFUL SLEEP: YES
DO YOU HAVE DIFFICULTY SLEEPING: YES
SLEEP PATTERN: INSOMNIA;DIFFICULTY FALLING ASLEEP
DO YOU USE A SLEEP AID: NO
AVERAGE NUMBER OF SLEEP HOURS: 4
DIFFICULTY STAYING ASLEEP: YES
DIFFICULTY FALLING ASLEEP: YES
DIFFICULTY ARISING: NO
SLEEP PATTERN: NORMAL
DIFFICULTY FALLING ASLEEP: YES
DIFFICULTY STAYING ASLEEP: YES
RESTFUL SLEEP: NO
DO YOU USE A SLEEP AID: YES
DO YOU HAVE DIFFICULTY SLEEPING: YES

## 2020-01-29 ASSESSMENT — PAIN SCALES - GENERAL
PAINLEVEL_OUTOF10: 0
PAINLEVEL_OUTOF10: 2

## 2020-01-29 ASSESSMENT — PATIENT HEALTH QUESTIONNAIRE - PHQ9
SUM OF ALL RESPONSES TO PHQ QUESTIONS 1-9: 16
SUM OF ALL RESPONSES TO PHQ QUESTIONS 1-9: 18

## 2020-01-29 NOTE — ED NOTES
THE PT WAS ACCEPTED TO [address] [place] [address]- ROOM 75[facility]. DISPOSITION CALLED TO YOGESH IN ADMITTING.        Joellen Simmons, [facility]  01/28/20 7335

## 2020-01-29 NOTE — H&P
PSYCHIATRIC EVALUATION  (HISTORY & PHYSICAL)     CHIEF COMPLAINT:   [x] Mood Problems [] Anxiety Problems [] Psychosis                    [x] Suicidal/Homicidal   [] Aggression  [] Other    HISTORY OF PRESENT ILLNESS: Cat Bansal  is a 52 y.o. female who has a previous psychiatric history of depression and anxiety and presents for admission with increased sadness and SI due to hr seizure disorder . Symptoms onset was August and is becoming severe for the last month.        Precipitating Factors:     [] Family Stress   [] Recent loss/grief Stress   [] Health Stress   [] Relationship Stress    [] Legal Stress   [x] Environmental Stress    [] Occupational Stress   [] Financial Stress   [] Substance Abuse [] Other      PAST PSYCHIATRIC HISTORY:   History of psychiatric Hospitalization:    [] Denies    [x] yes  [] Days ago     []  Weeks Ago    [x] Months ago  [] Years ago              [] [facility]  [] [facility]  [] Other:        [] Once  [x] More than once    Outpatient treatment:  [] [facility]  [] [facility]  [] [employer]              [] [employer]  [] [facility]      [] [address] [place] [address] [] [facility]      [] [facility] [] CS[facility]  [x] [facility] [] [facility]             [x] currently  [] in the past  [] Non-Compliant    [] Denies    Previous suicide attempt: [x]Denies            [] yes  [] OD  [] Cutting  [] Hanging  [] Gun  [] Other    Previous psych medications:  [] Was prescribed               [x] Currently Taking       [] Never taken medications      PAST MEDICAL HISTORY:       Diagnosis Date    Back pain     Depression 11/30/2016    Essential hypertension 8/7/2019    Gastroparesis     Hepatitis     History of cardiovascular stress test 7/1/2012    EXERCISE NUCLEAR    Hyperlipidemia     Medically noncompliant 2/15/2019    Pancreatitis, acute     Pyloric stenosis     Se[facility]     Vasovagal syncope 2/15/2019       FAMILY PSYCHIATRIC HISTORY:  Family History   Problem Relation Age of Onset    High Blood Pressure Mother     Other Father         BPH    No Known Problems Sister     No Known Problems Brother     No Known Problems Sister     No Known Problems Sister     No Known Problems Brother     No Known Problems Brother     No Known Problems Brother     Depression Paternal Grandmother     No Known Problems Daughter     No Known Problems Son     No Known Problems Son            [x] Denies       [] Endorses               [] Father                [] Depression  [] Anxiety  [] Bipolar  [] Psychosis  []  Other                  [] Mother               [] Depression  [] Anxiety  [] Bipolar  [] Psychosis  []  Other                  [] Sibling               [] Depression  [] Anxiety  [] Bipolar  [] Psychosis  []  Other                  [] Grandparent               [] Depression  [] Anxiety  [] Bipolar  [] Psychosis  []  Other       SOCIAL HISTORY:     1. Living Situation:[] Private Residence [x] Homeless [] [address]             [] [facility] [] [address]  [] Shelter [] Other   2. Employment:  [x] Unemployed  [] Employed  [] Disabled  [] Retired   1. Legal History: [] No Arrest [x] Arrest  [] Theft  []  Assault  [] Substances   4. History of Trama/ Abuse: [] Denies  [] Emotional [] Physical [] Sexual   5. Spirituality: [x] Spiritual [] Not Spiritual   6. Substance Abuse: [x] Denies  [] Drug of choice      [] Amphetamines [] Marijuana [] Cocaine      [] Opioids  [] Alcohol  [] Benzodiazepines     For further SH review SW note. Risk Assessment:  1. Risk Factors:   [x] Depression  [x] Anxiety  [] Psychosis   [x] Suicidal/Homicidal Thoughts [] Suicide Attempt [] Substance Abuse     2. Protective Factors: [x] Controlled Environment         [] Supportive Family []         [] Adventism Support     3. Level of Risk: [] Mild [] Moderate [x] Severe      Strengths & Weaknesses:    1.  Strengths: [x] Ability to communicate feelings     [x] Independent ADL's     [] Supportive Family    [] Current Health Status     2. Weaknesses: [x] Emotional          [] Motivational     MEDICATIONS: Current Facility-Administered Medications: hydrOXYzine (VISTARIL) capsule 50 mg, 50 mg, Oral, TID PRN  OLANZapine (ZYPREXA) tablet 5 mg, 5 mg, Oral, Q4H PRN **OR** OLANZapine (ZYPREXA) injection 10 mg, 10 mg, Intramuscular, Q4H PRN  sterile water injection 2.1 mL, 2.1 mL, Intramuscular, Q4H PRN  benztropine mesylate (COGENTIN) injection 2 mg, 2 mg, Intramuscular, BID PRN  magnesium hydroxide (MILK OF MAGNESIA) 400 MG/5ML suspension 30 mL, 30 mL, Oral, Daily PRN  aluminum & magnesium hydroxide-simethicone (MAALOX) 200-200-20 MG/5ML suspension 30 mL, 30 mL, Oral, PRN  nicotine (NICODERM CQ) 21 MG/24HR 1 patch, 1 patch, Transdermal, Daily  traZODone (DESYREL) tablet 50 mg, 50 mg, Oral, Nightly PRN  amitriptyline (ELAVIL) tablet 50 mg, 50 mg, Oral, Nightly  buprenorphine-naloxone (SUBOXONE) 8-2 MG SL tablet 2 tablet, 2 tablet, Sublingual, Daily  levETIRAcetam (KEPPRA) tablet 750 mg, 750 mg, Oral, BID  losartan (COZAAR) tablet 50 mg, 50 mg, Oral, Daily  [START ON 1/30/2020] pantoprazole (PROTONIX) tablet 40 mg, 40 mg, Oral, QAM AC  PARoxetine (PAXIL) tablet 30 mg, 30 mg, Oral, Daily  pravastatin (PRAVACHOL) tablet 20 mg, 20 mg, Oral, Daily    Medical Review of Systems:     All other than marked systmes have been reviewed and are all negative.     Constitutional Symptoms: []  fever []  Chills  Skin Symptoms: [] rash []  Pruritus   Eye Symptoms: [] Vision unchanged []  recent vision problems[] blurred vision   Respiratory Symptoms:[] shortness of breath [] cough  Cardiovascular Symptoms:  [] chest pain   [] palpitations   Gastrointestinal Symptoms: []  abdominal pain []  nausea []  vomiting []  diarrhea  Genitourinary Symptoms: []  dysuria  []  hematuria   Musculoskeletal Symptoms: []  back pain []  muscle pain []  joint pain  Neurologic Symptoms: []  headache []  dizziness  Hematolymphoid Symptoms: [] Adenopathy [] Bruises   [] [] Guarded  [] Defensive         [x] Cooperative  []  Uncooperative      Behavior:  [x] Normal Gait  [] Walks with Assistance  [] Sebas Sherman    [] Walks with Justin De Los Santos  [] In Hospital Bed  [] Sitting in Chair    Muscle-Skeletal:  [x] Normal Muscle Tone [] Muscle Atrophy       [] Abnormal Muscle Movement     Eye Contact:  [x] Good eye contact  [] Intermittent Eye Contact  [] Poor Eye Contact     Mood: [x] Depressed  [x] Anxious  [] Irritated  [] Euthymic   [] Angry [] Restless    Affect:  [] Congruent  [] Incongruent  [x] Labile  [] Constricted  [] Flat  [] Bizarre     Thought Process and Association:  [] Logical [] Illogical       [] Linear and Goal Directed  [x] Tangential  [] Circumstantial     Thought Content:  [] Denies [x] Endorses [x] Suicidal [] Homicidal  [] Delusional      [] Paranoid  [] Somatic  [] Grandiose    Perception: [x]  None  [] Auditory   [] Visual  [] tactile   [] olfactory  [] Illusions         Insight: [] Intact  [] Fair  [x] Limited    Judgement:  [] Intact  [] Fair  [x] Limited        ASSESSMENT  Patient Active Problem List   Diagnosis    Nausea & vomiting    Gastroparesis    Gastroparalysis    Precordial pain    Chronic abdominal pain    Chronic low back pain    Mood disorder (HCC)    History of pericarditis    Shortness of breath    Sinus tachycardia    Non-intractable cyclical vomiting with nausea    Functional diarrhea    Weight loss    Pain of upper abdomen    Colitis    Vasovagal syncope    Medically noncompliant    Suicide ideation    [facility]iz[facility] ([facility]    Essential hypertension    Pseudoseizure    Intentional drug overdose ([facility]    History of seizure disorder    Suicide and self-inflicted injuries by jumping from high [facility])    Malingering    Chest pain    Seizure-like activity ([address]    Depression with suicidal ideation    Major depression, recurrent ([address]     Recommendations and plan of treatment:  1- admit to inpatient unit  2[facility]   3-

## 2020-01-29 NOTE — ED NOTES
Call to unit and requested I call back when staff finished report. Patient made aware of transportation and going to main. Belongings ready for transfer.              Shanelle Black RN  01/28/20 0273

## 2020-01-29 NOTE — ED NOTES
Spoke with June Angulo at [facility]. E's psych unit. Explained that pt.alana de la fuente is in custody of [id number] police at [facility]. Her valuables were inventoried by [employer]. Upon discharge she can coordinate with [id number] [address] police for pickup.      Sergio Moyer, RN  01/29/20 3052

## 2020-01-29 NOTE — PLAN OF CARE
Problem: Depressive Behavior With or Without Suicide Precautions:  Goal: Able to verbalize and/or display a decrease in depressive symptoms  Description  Able to verbalize and/or display a decrease in depressive symptoms  1/29/2020 1644 by Iván Shelby RN  Outcome: Ongoing  1/29/2020 1048 by Smitha Parker RN  Outcome: Ongoing  Goal: Ability to disclose and discuss suicidal ideas will improve  Description  Ability to disclose and discuss suicidal ideas will improve  1/29/2020 1644 by Iván Shelby RN  Outcome: Ongoing  1/29/2020 1048 by Simtha Parker RN  Outcome: Met This Shift     Pt has death wish. Pt denies homicidal ideations and hallucinations. Pt stated she has multiple stressors. Pt stated little things that go wrong ruin her day. \"She took blood and now look at this hematoma. I told her to listen to me. \" Pt is easily agitated. Will continue to monitor.

## 2020-01-29 NOTE — HOME CARE
Current with [facility] for SN, PT, OT, SW. Will need ERVIN orders prior to discharge if appropriate. Rob Monroy LPN, [facility].

## 2020-01-29 NOTE — PLAN OF CARE
[facility]  Initial Interdisciplinary Treatment Plan NOTE    Review Date & Time: 1/29/2020 1000    Patient was in treatment team    Admission Type:   Admission Type: Involuntary    Reason for admission:  Reason for Admission: DEPRESSED RELATED TO Vasquez6 Xochitl Dunlap      Estimated Length of Stay Update:  3 DAYS  Estimated Discharge Date Update: FRIDAY    PATIENT STRENGTHS:  Patient Strengths Strengths: Medication Compliance  Patient Strengths and Limitations:Limitations: Multiple barriers to leisure interests  Addictive Behavior:Addictive Behavior  In the past 3 months, have you felt or has someone told you that you have a problem with:  : None  Do you have a history of Chemical Use?: No  Do you have a history of Alcohol Use?: No  Do you have a history of Street Drug Abuse?: No  Histroy of Prescripton Drug Abuse?: No  Medical Problems:  Past Medical History:   Diagnosis Date    Back pain     Depression 11/30/2016    Essential hypertension 8/7/2019    Gastroparesis     Hepatitis     History of cardiovascular stress test 7/1/2012    EXERCISE NUCLEAR    Hyperlipidemia     Medically noncompliant 2/15/2019    Pancreatitis, acute     Pyloric stenosis     Seizures ([facility]     Vasovagal syncope 2/15/2019       EDUCATION:   Learner Progress Toward Treatment Goals: Reviewed results and recommendations of this team    Method: Small group    Outcome: Verbalized understanding    PATIENT GOALS: DECLINED TO GIVE    PLAN/TREATMENT RECOMMENDATIONS UPDATE: START MEDICATIONS, ADD ABILIFY, ASSESS SUICIDE RISK, SUPPORTIVE CARE, ENCOURAGE GROUPS, DISCHARGE PLANNING AND FOLLOW UP    GOALS UPDATE:   Time frame for Short-Term Goals:  3 DAYS    PT. MOOD IRRITABLE,EVASIVE THIS A.M.., CALMER AFTER MEDICATIONS. COMPLIANT TO BASIC UNIT ROUTINE. SELECTIVELY SOCIAL. HAS MAINTAINED CONTROL. REPORTS DEATH WISH. DENIES SUICIDE PLAN AT THIS TIME.      Home Saeed RN

## 2020-01-30 LAB
HAV IGM SER IA-ACNC: NORMAL
HEPATITIS B CORE IGM ANTIBODY: NORMAL
HEPATITIS B SURFACE ANTIGEN INTERPRETATION: NORMAL
HEPATITIS C ANTIBODY INTERPRETATION: NORMAL

## 2020-01-30 PROCEDURE — 6370000000 HC RX 637 (ALT 250 FOR IP): Performed by: NURSE PRACTITIONER

## 2020-01-30 PROCEDURE — 99232 SBSQ HOSP IP/OBS MODERATE 35: CPT | Performed by: NURSE PRACTITIONER

## 2020-01-30 PROCEDURE — 1240000000 HC EMOTIONAL WELLNESS R&B

## 2020-01-30 PROCEDURE — 6370000000 HC RX 637 (ALT 250 FOR IP): Performed by: PSYCHIATRY & NEUROLOGY

## 2020-01-30 PROCEDURE — 6360000002 HC RX W HCPCS: Performed by: NURSE PRACTITIONER

## 2020-01-30 RX ORDER — ARIPIPRAZOLE 5 MG/1
5 TABLET ORAL EVERY EVENING
Status: DISCONTINUED | OUTPATIENT
Start: 2020-01-30 | End: 2020-01-31

## 2020-01-30 RX ADMIN — BUPRENORPHINE AND NALOXONE 1 TABLET: 8; 2 TABLET SUBLINGUAL at 09:13

## 2020-01-30 RX ADMIN — PANTOPRAZOLE SODIUM 40 MG: 40 TABLET, DELAYED RELEASE ORAL at 06:32

## 2020-01-30 RX ADMIN — MIRTAZAPINE 30 MG: 15 TABLET, FILM COATED ORAL at 20:31

## 2020-01-30 RX ADMIN — PRAVASTATIN SODIUM 20 MG: 20 TABLET ORAL at 20:31

## 2020-01-30 RX ADMIN — TRAZODONE HYDROCHLORIDE 50 MG: 50 TABLET ORAL at 20:31

## 2020-01-30 RX ADMIN — LEVETIRACETAM 750 MG: 500 TABLET ORAL at 09:12

## 2020-01-30 RX ADMIN — ARIPIPRAZOLE 5 MG: 5 TABLET ORAL at 17:27

## 2020-01-30 RX ADMIN — PAROXETINE 40 MG: 20 TABLET, FILM COATED ORAL at 09:12

## 2020-01-30 RX ADMIN — HYDROXYZINE PAMOATE 50 MG: 25 CAPSULE ORAL at 20:31

## 2020-01-30 RX ADMIN — LEVETIRACETAM 750 MG: 500 TABLET ORAL at 21:12

## 2020-01-30 RX ADMIN — BUPRENORPHINE AND NALOXONE 1 TABLET: 8; 2 TABLET SUBLINGUAL at 20:31

## 2020-01-30 RX ADMIN — AMITRIPTYLINE HYDROCHLORIDE 50 MG: 25 TABLET, FILM COATED ORAL at 09:11

## 2020-01-30 RX ADMIN — LOSARTAN POTASSIUM 50 MG: 50 TABLET ORAL at 09:12

## 2020-01-30 ASSESSMENT — PAIN SCALES - GENERAL
PAINLEVEL_OUTOF10: 3
PAINLEVEL_OUTOF10: 0
PAINLEVEL_OUTOF10: 2
PAINLEVEL_OUTOF10: 0
PAINLEVEL_OUTOF10: 0

## 2020-01-30 NOTE — PLAN OF CARE
Problem: Depressive Behavior With or Without Suicide Precautions:  Goal: Able to verbalize and/or display a decrease in depressive symptoms  Description  Able to verbalize and/or display a decrease in depressive symptoms  1/30/2020 0820 by Yamilex Dukes RN  Outcome: Ongoing  1/30/2020 0323 by Gianluca Armstrong RN  Outcome: Ongoing  1/29/2020 2222 by Gianluca Armstrong RN  Outcome: Ongoing  Goal: Ability to disclose and discuss suicidal ideas will improve  Description  Ability to disclose and discuss suicidal ideas will improve  1/30/2020 0820 by Yamilex Dukes RN  Outcome: Ongoing  1/30/2020 0323 by Gianluca Armstrong RN  Outcome: Ongoing  1/29/2020 2222 by Gianluca Armstrong RN  Outcome: Ongoing   pt states she is suicidal with plan to overdose on psych meds. Pt denies Hi and hallucinations. Pt is irritable with staff but social with peers. Pt takes meds and attends groups.

## 2020-01-30 NOTE — GROUP NOTE
Group Therapy Note    Date: 1/30/2020    Group Start Time: 1000  Group End Time: 6929  Group Topic: Psychoeducation    [facility] [address]        Group Therapy Note    Attendees: 6     Notes: Active and engaged during discussion on positive steps to well being. Able to share one thing to apply today. Pleasant and sharing with peers. Status After Intervention:  Improved    Participation Level:  Active Listener and Interactive    Participation Quality: Appropriate, Attentive and Sharing      Speech:  normal      Thought Process/Content: Logical      Affective Functioning: Congruent      Mood: euthymic      Level of consciousness:  Alert and Attentive      Response to Learning: Capable of insight, Able to change behavior and Progressing to goal      Endings: None Reported    Modes of Intervention: Education, Support, Socialization and Exploration      Discipline Responsible: Psychoeducational Specialist      Signature:  Isaiah Flower, [address]

## 2020-01-30 NOTE — PROGRESS NOTES
DATE OF SERVICE:     1/30/2020    [address] [place] [address] seen today for the purpose of continuation of care. Nursing, social work reports, laboratory studies and vital signs are reviewed. Patient chief complaint today is:             [x] Depression      [x] Anxiety        [] Psychosis         [x] Suicidal/Homicidal                         [] Delusions           [] Aggression          Subjective: Today patient states that she is having fleeting SI. Denies HI or AVH, appears sad and flat. Sleep:  [x] Good [] Fair  [] Poor  Appetite:  [x] Good [] Fair  [] Poor    Depression:  [] Mild [] Moderate [x] Severe                [x] Constant [] Sporadic     Anxiety: [] Mild [x] Moderate [] Severe    [x] Constant [] Sporadic     Delusions: [] Mild [] Moderate [] Severe     [] Constant [] Sporadic     [] Paranoid [] Somatic [] Grandiose     Hallucinations: [] Mild [] Moderate [] Severe     [] Constant [] Sporadic    [] Auditory  [] Visual [] Tactile       Suicidal: [] Constant [x] Sporadic  Homicidal: [] Constant [] Sporadic    Unscheduled Medications     [] Patient Receiving Emergency Medications \" Chemical Restraint\"   [] Requesting PRN medications for anxiety    Medical Review of Systems:     All other than marked systmes have been reviewed and are all negative.     Constitutional Symptoms: []  fever []  Chills  Skin Symptoms: [] rash []  Pruritus   Eye Symptoms: [] Vision unchanged []  recent vision problems[] blurred vision   Respiratory Symptoms:[] shortness of breath [] cough  Cardiovascular Symptoms:  [] chest pain   [] palpitations   Gastrointestinal Symptoms: []  abdominal pain []  nausea []  vomiting []  diarrhea  Genitourinary Symptoms: []  dysuria  []  hematuria   Musculoskeletal Symptoms: []  back pain []  muscle pain []  joint pain  Neurologic Symptoms: []  headache []  dizziness  Hematolymphoid Symptoms: [] Adenopathy [] Bruises   [] Schimosis       Psychiatric Review of systems  Delusions:  [] Denies [] Endorses   Withdrawals:  [] Denies [] Endorses    Hallucinations: [] Denies [] Endorses    Extra Pyramidal Symptoms: [] Denies [] Endorses      BP (!) 168/75   Pulse 105   Temp 97.9 °F (36.6 °C) (Temporal)   Resp 16   Ht 5' 2\" (1.575 m)   Wt 180 lb (81.6 kg)   BMI 32.92 kg/m²     Mental Status Examination:    Cognition:      [x] Alert  [x] Awake  [x] Oriented  [x] Person  [x] Place [x] Time      [] drowsy  [] tired  [] lethargic  [] distractable  [] Other     Attention/Concentration:   [x] Attentive  [] Distracted        Memory Recent and Remote: [x] Intact   [] Impaired [] Partially Impaired     Language: [] Able to recognize and name objects          [] Unable to recognize and name Objects    Fund of Knowledge:  [] Poor []  Fair  [x] Good    Speech: [] Normal  [x] Soft  [] Slow  [] Fast [] Pressured            [] Loud [] Dysarthria  [] Incoherent    Appearance: [] Well Groomed  [x] Casual Dressed  [] Unkept  [] Disheveled          [] Normal weight[] Thin  [] Overweight  [] Obese           Attitude: [] Positive  [] Hostile  [x] Demanding  [] Guarded  [] Defensive         [x] Cooperative  []  Uncooperative      Behavior:  [x] Normal Gait  [] Walks with Assistance  [] Kourtney Chair    [] Walks with Jeremiah Sidra  [] In Hospital Bed  [] Sitting in Chair    Muscle-Skeletal:  [x] Normal Muscle Tone [] Muscle Atrophy       [] Abnormal Muscle Movement     Eye Contact:  [x] Good eye contact  [] Intermittent Eye Contact  [] Poor Eye Contact     Mood: [x] Depressed  [x] Anxious  [x] Irritated  [] Euthymic   [] Angry [] Restless    Affect:  [x] Congruent  [] Incongruent  [] Labile  [] Constricted  [] Flat  [] Bizarre     Thought Process and Association:  [] Logical [] Illogical       [x] Linear and Goal Directed  [] Tangential  [] Circumstantial     Thought Content:  [] Denies [x] Endorses [x] Suicidal [] Homicidal  [] Delusional      [] Paranoid  [] Somatic  [] Grandiose    Perception: [x]  None  [] Auditory   [] Visual  [] tactile   [] olfactory  [] Illusions         Insight: [] Intact  [] Fair  [x] Limited    Judgement:  [] Intact  [] Fair  [x] Limited      Assessment/Plan:        Patient Active Problem List   Diagnosis Code    Nausea & vomiting R11.2    Gastroparesis K31.84    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    Precordial pain R07.2    Chronic abdominal pain R10.9, G89.29    Chronic low back pain M54.5, G89.29    Mood disorder (HCC) F39    History of pericarditis Z86.79    Shortness of breath R06.02    Sinus tachycardia R00.0    Non-intractable cyclical vomiting with nausea R11.15    Functional diarrhea K59.1    Weight loss R63.4    Pain of upper abdomen R10.10    Colitis K52.9    Vasovagal syncope R55    Medically noncompliant Z91.19    Suicide ideation R45.851    Seizure disorder ([address] G40.909    Essential hypertension I10    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 Simeon Ramírez Malingering Z76.5    Chest pain R07.9    Seizure-like activity ([address] R56.9    Depression with suicidal ideation F32.9, R45.851    Major depression, recurrent ([address] F33.9         Plan:    []  Patient is refusing medications  [] Improving as expected   [x] Not improving as expected   [] Worsening    []  At Baseline     Will increase Abilify to 5 mg    Reason for more than one antipsychotic:  [x] N/A  [] 3 failed monotherapy(drugs tried):  [] Cross over to a new antipsychotic  [] Taper to monotherapy from polypharmacy  [] Augmentation of Clozapine therapy due to treatment resistance to single therapy      Signed:  Philip Wong  1/30/2020  8:57 AM

## 2020-01-30 NOTE — CONSULTS
[address] [facility]  Initial Consult Note      REASON FOR CONSULTATION:  Med management     ATTENDING/ADMITTING PHYSICIAN: psych     HISTORY OF PRESENT ILLNESS:      The patient is a 52 y.o. female patient of dr. Hillary Santacruz  who presents with psych issues. She is admitted to [facility] and we are asked to follow from med standpoint for h/o seizures. She was in icu in November for [place] seizures / intubated. She recovered well , currently seizure free on KEppra 750 BID. Denies aura, seizures or headaches.  Resting comfortably on my eval.   BP (!) 168/75   Pulse 105   Temp 97.9 °F (36.6 °C) (Temporal)   Resp 16   Ht 5' 2\" (1.575 m)   Wt 180 lb (81.6 kg)   BMI 32.92 kg/m²           Past Medical History:   Diagnosis Date    Back pain     Depression 11/30/2016    Essential hypertension 8/7/2019    Gastroparesis     Hepatitis     History of cardiovascular stress test 7/1/2012    EXERCISE NUCLEAR    Hyperlipidemia     Medically noncompliant 2/15/2019    Pancreatitis, acute     Pyloric stenosis     Seizures ([address]     Vasovagal syncope 2/15/2019           Past Surgical History:   Procedure Laterality Date    ABDOMEN SURGERY      Patient has had 9 surgeries   [address]      had 7 - 5 - 2012    CARDIAC CATHETERIZATION  11/04/2019    Dr. Hoa Fall, LAPAROSCOPIC      ECHO COMPL W DOP COLOR FLOW  7/1/2012         ENDOMETRIAL ABLATION      PYLOROPLASTY  3/30/2012    lap plylorplasty open upper endoscopy lysis of adhesions    SALPINGO-OOPHORECTOMY Left     left    UPPER GASTROINTESTINAL ENDOSCOPY  3/15/13    balloon opened pyloric sphincter       Medications Prior to Admission:    Medications Prior to Admission: amitriptyline (ELAVIL) 50 MG tablet, take 1 tablet by mouth every evening  PARoxetine (PAXIL) 30 MG tablet, Take 30 mg by mouth daily  levETIRAcetam (KEPPRA) 500 MG tablet, Take 750 mg by mouth 2 times daily   melatonin 3 MG TABS tablet, Take 9 mg WBC 5.7 01/28/2020    RBC 4.46 01/28/2020    HGB 11.7 01/28/2020    HCT 39.2 01/28/2020     01/28/2020    MCV 87.9 01/28/2020    MCH 26.2 01/28/2020    MCHC 29.8 01/28/2020    RDW 14.4 01/28/2020    SEGSPCT 70 11/30/2013    LYMPHOPCT 41.4 01/28/2020    MONOPCT 3.7 01/28/2020    BASOPCT 0.3 01/28/2020    MONOSABS 0.21 01/28/2020    LYMPHSABS 2.37 01/28/2020    EOSABS 0.03 01/28/2020    BASOSABS 0.02 01/28/2020     Sodium:    Lab Results   Component Value Date     01/28/2020       ASSESSMENT:      Patient Active Problem List   Diagnosis    Nausea & vomiting    Gastroparesis    Gastroparalysis    Precordial pain    Chronic abdominal pain    Chronic low back pain    Mood disorder (HCC)    History of pericarditis    Shortness of breath    Sinus tachycardia    Non-intractable cyclical vomiting with nausea    Functional diarrhea    Weight loss    Pain of upper abdomen    Colitis    Vasovagal syncope    Medically noncompliant    Suicide ideation    [facility]    Essential hypertension    Pseudoseizure    Intentional drug overdose ([facility]    History of seizure disorder    Suicide and self-inflicted injuries by jumping from high place [facility])    Malingering    Chest pain    Seizure-like activity ([facility]    Depression with suicidal ideation    Major depression, recurrent ([address]       PLAN:    Admitted to psych   continue home medications including antiepileptics  Check stool for vre   Labile bp's   Blood work and labs reviewed   Asymptomatic uti - nnt  Medicine will see as needed    Note that over 50 minutes was spent in evaluation of the patient, review of the chart and pertinent records, discussion with family/staff, etc    Amena Silva MD  10:08 AM  1/30/2020

## 2020-01-30 NOTE — PROGRESS NOTES
Patient attended community meeting.  Patient identified goal for the day as: \"Learn to deal with seizures\"

## 2020-01-31 LAB — URINE CULTURE, ROUTINE: NORMAL

## 2020-01-31 PROCEDURE — 6370000000 HC RX 637 (ALT 250 FOR IP): Performed by: NURSE PRACTITIONER

## 2020-01-31 PROCEDURE — 99232 SBSQ HOSP IP/OBS MODERATE 35: CPT | Performed by: NURSE PRACTITIONER

## 2020-01-31 PROCEDURE — 1240000000 HC EMOTIONAL WELLNESS R&B

## 2020-01-31 PROCEDURE — 6370000000 HC RX 637 (ALT 250 FOR IP): Performed by: PSYCHIATRY & NEUROLOGY

## 2020-01-31 PROCEDURE — 2580000003 HC RX 258: Performed by: PSYCHIATRY & NEUROLOGY

## 2020-01-31 PROCEDURE — 6360000002 HC RX W HCPCS: Performed by: NURSE PRACTITIONER

## 2020-01-31 PROCEDURE — 2500000003 HC RX 250 WO HCPCS: Performed by: PSYCHIATRY & NEUROLOGY

## 2020-01-31 RX ORDER — MIRTAZAPINE 30 MG/1
30 TABLET, FILM COATED ORAL NIGHTLY
Qty: 30 TABLET | Refills: 0 | Status: SHIPPED | OUTPATIENT
Start: 2020-01-31 | End: 2020-02-07

## 2020-01-31 RX ORDER — ARIPIPRAZOLE 10 MG/1
10 TABLET ORAL EVERY EVENING
Status: DISCONTINUED | OUTPATIENT
Start: 2020-01-31 | End: 2020-02-01

## 2020-01-31 RX ORDER — PAROXETINE HYDROCHLORIDE 40 MG/1
40 TABLET, FILM COATED ORAL DAILY
Qty: 30 TABLET | Refills: 0 | Status: SHIPPED | OUTPATIENT
Start: 2020-02-01 | End: 2020-02-07

## 2020-01-31 RX ORDER — NICOTINE 21 MG/24HR
1 PATCH, TRANSDERMAL 24 HOURS TRANSDERMAL DAILY
Qty: 30 PATCH | Refills: 0 | Status: SHIPPED | OUTPATIENT
Start: 2020-01-31 | End: 2020-02-07

## 2020-01-31 RX ORDER — BACITRACIN, NEOMYCIN, POLYMYXIN B 400; 3.5; 5 [USP'U]/G; MG/G; [USP'U]/G
OINTMENT TOPICAL 2 TIMES DAILY
Status: DISCONTINUED | OUTPATIENT
Start: 2020-01-31 | End: 2020-02-11 | Stop reason: HOSPADM

## 2020-01-31 RX ORDER — ARIPIPRAZOLE 5 MG/1
5 TABLET ORAL EVERY EVENING
Qty: 30 TABLET | Refills: 0 | Status: SHIPPED | OUTPATIENT
Start: 2020-01-31 | End: 2020-01-31 | Stop reason: HOSPADM

## 2020-01-31 RX ADMIN — BUPRENORPHINE AND NALOXONE 1 TABLET: 8; 2 TABLET SUBLINGUAL at 20:04

## 2020-01-31 RX ADMIN — OLANZAPINE 5 MG: 5 TABLET, FILM COATED ORAL at 09:52

## 2020-01-31 RX ADMIN — PRAVASTATIN SODIUM 20 MG: 20 TABLET ORAL at 20:05

## 2020-01-31 RX ADMIN — AMITRIPTYLINE HYDROCHLORIDE 50 MG: 25 TABLET, FILM COATED ORAL at 09:47

## 2020-01-31 RX ADMIN — LEVETIRACETAM 750 MG: 500 TABLET ORAL at 09:46

## 2020-01-31 RX ADMIN — MIRTAZAPINE 30 MG: 15 TABLET, FILM COATED ORAL at 20:05

## 2020-01-31 RX ADMIN — LEVETIRACETAM 750 MG: 500 TABLET ORAL at 20:05

## 2020-01-31 RX ADMIN — LOSARTAN POTASSIUM 50 MG: 50 TABLET ORAL at 09:47

## 2020-01-31 RX ADMIN — HYDROXYZINE PAMOATE 50 MG: 25 CAPSULE ORAL at 20:05

## 2020-01-31 RX ADMIN — PANTOPRAZOLE SODIUM 40 MG: 40 TABLET, DELAYED RELEASE ORAL at 06:48

## 2020-01-31 RX ADMIN — WATER 2.1 ML: 1 INJECTION INTRAMUSCULAR; INTRAVENOUS; SUBCUTANEOUS at 14:05

## 2020-01-31 RX ADMIN — OLANZAPINE 10 MG: 10 INJECTION, POWDER, FOR SOLUTION INTRAMUSCULAR at 14:05

## 2020-01-31 RX ADMIN — ARIPIPRAZOLE 10 MG: 10 TABLET ORAL at 18:19

## 2020-01-31 RX ADMIN — BUPRENORPHINE AND NALOXONE 1 TABLET: 8; 2 TABLET SUBLINGUAL at 09:47

## 2020-01-31 RX ADMIN — TRAZODONE HYDROCHLORIDE 50 MG: 50 TABLET ORAL at 20:05

## 2020-01-31 RX ADMIN — PAROXETINE 40 MG: 20 TABLET, FILM COATED ORAL at 09:46

## 2020-01-31 ASSESSMENT — PAIN SCALES - GENERAL
PAINLEVEL_OUTOF10: 0
PAINLEVEL_OUTOF10: 2
PAINLEVEL_OUTOF10: 0

## 2020-01-31 NOTE — CARE COORDINATION
KILO Note: d/c planning: SW met with pt during treatment team. Plan is for her to continue follow up with [facility] and stepdown to [place] upon d/c.  Pt is a client of [facility] program.

## 2020-01-31 NOTE — GROUP NOTE
Group Therapy Note    Date: 1/31/2020    Group Start Time: 0115  Group End Time: 0200  Group Topic: Cognitive Skills    [facility]    FABIOLA Lambert, [facility] [place]    Number of participants: 7  Type of group: Cognitive Skills  Mode of intervention: Education, Support, Socialization, Exploration, Clarifying, and Problem-solving  Topic: CBT/ Core beliefs  Objective: To identify a negative core belief and identify traits that provides evidence to the contrary    Group Therapy Notes       Notes:  Pt was an active participant in cognitive skills group focusing on identifying core beliefs and coming up with evidence that contradicts identified core belief. Pt shared with the group and was able to come up with positive attributes that contradicted pt's negative core belief. Status After Intervention:  Improved    Participation Level:  Active Listener and Interactive    Participation Quality: Appropriate, Attentive, Sharing and Supportive      Speech:  normal      Thought Process/Content: Logical      Affective Functioning: Congruent      Mood: euthymic      Level of consciousness:  Alert, Oriented x4 and Attentive      Response to Learning: Able to verbalize current knowledge/experience, Able to verbalize/acknowledge new learning, Able to retain information, Capable of insight and Progressing to goal      Endings: None Reported    Modes of Intervention: Education, Support, Socialization, Exploration, Clarifying and Problem-solving      Discipline Responsible: /Counselor      Signature:  AFBIOLA Lambert, [facility]

## 2020-01-31 NOTE — PLAN OF CARE
Patient denies HI and hallucinations. Admits to fleeting SI with plan to OD but denies SI currently. Patient contracts for safety while on the unit. Rates anxiety 1/10 but reports that she is still depressed. Presents calm and cooperative. Patient is out on unit and is social with peers. Medications taken without issue. No complaints or concerns voiced at this time. No unit problems reported. Will continue to observe and support.      Problem: Depressive Behavior With or Without Suicide Precautions:  Goal: Able to verbalize and/or display a decrease in depressive symptoms  Description  Able to verbalize and/or display a decrease in depressive symptoms  1/30/2020 2159 by George Portillo RN  Outcome: Ongoing  1/30/2020 0820 by Deidre Campbell RN  Outcome: Ongoing  Goal: Ability to disclose and discuss suicidal ideas will improve  Description  Ability to disclose and discuss suicidal ideas will improve  1/30/2020 2159 by George Portillo RN  Outcome: Ongoing  1/30/2020 0820 by Deidre Campbell RN  Outcome: Ongoing

## 2020-01-31 NOTE — PROGRESS NOTES
CLINICAL PHARMACY NOTE: MEDS TO [address] Select Patient?: No  Total # of Prescriptions Filled: 3   The following medications were delivered to the patient:  · abilify 5mg  · paxil 40mg  · Nicotine 21mg patch  Total # of Interventions Completed: 3  Time Spent (min): 30    Additional Documentation:

## 2020-01-31 NOTE — PROGRESS NOTES
DATE OF SERVICE:     1/31/2020    [facility] seen today for the purpose of continuation of care. Nursing, social work reports, laboratory studies and vital signs are reviewed. Patient chief complaint today is:             [x] Depression      [x] Anxiety        [] Psychosis         [x] Suicidal/Homicidal                         [] Delusions           [] Aggression          Subjective: Today patient states she \"feels like I am in a slump. \"   Denies SI, HI, or AVH, appears sad and flat. Sleep:  [x] Good [] Fair  [] Poor  Appetite:  [x] Good [] Fair  [] Poor    Depression:  [] Mild [] Moderate [x] Severe                [x] Constant [] Sporadic     Anxiety: [] Mild [x] Moderate [] Severe    [x] Constant [] Sporadic     Delusions: [] Mild [] Moderate [] Severe     [] Constant [] Sporadic     [] Paranoid [] Somatic [] Grandiose     Hallucinations: [] Mild [] Moderate [] Severe     [] Constant [] Sporadic    [] Auditory  [] Visual [] Tactile       Suicidal: [] Constant [] Sporadic  Homicidal: [] Constant [] Sporadic    Unscheduled Medications     [] Patient Receiving Emergency Medications \" Chemical Restraint\"   [] Requesting PRN medications for anxiety    Medical Review of Systems:     All other than marked systmes have been reviewed and are all negative.     Constitutional Symptoms: []  fever []  Chills  Skin Symptoms: [] rash []  Pruritus   Eye Symptoms: [] Vision unchanged []  recent vision problems[] blurred vision   Respiratory Symptoms:[] shortness of breath [] cough  Cardiovascular Symptoms:  [] chest pain   [] palpitations   Gastrointestinal Symptoms: []  abdominal pain []  nausea []  vomiting []  diarrhea  Genitourinary Symptoms: []  dysuria  []  hematuria   Musculoskeletal Symptoms: []  back pain []  muscle pain []  joint pain  Neurologic Symptoms: []  headache []  dizziness  Hematolymphoid Symptoms: [] Adenopathy [] Bruises   [] Schimosis       Psychiatric Review of systems  Delusions:  []

## 2020-01-31 NOTE — PROGRESS NOTES
Denies suicidal ideation, denies homicidal ideation, and denies hallucinations. Pt. Is depressed, and frequently demanding irritable and sometimes pleasant.

## 2020-02-01 PROCEDURE — 6360000002 HC RX W HCPCS: Performed by: NURSE PRACTITIONER

## 2020-02-01 PROCEDURE — 6370000000 HC RX 637 (ALT 250 FOR IP): Performed by: NURSE PRACTITIONER

## 2020-02-01 PROCEDURE — 1240000000 HC EMOTIONAL WELLNESS R&B

## 2020-02-01 PROCEDURE — 6370000000 HC RX 637 (ALT 250 FOR IP): Performed by: PSYCHIATRY & NEUROLOGY

## 2020-02-01 PROCEDURE — 99231 SBSQ HOSP IP/OBS SF/LOW 25: CPT | Performed by: NURSE PRACTITIONER

## 2020-02-01 PROCEDURE — 2500000003 HC RX 250 WO HCPCS: Performed by: PSYCHIATRY & NEUROLOGY

## 2020-02-01 RX ORDER — PAROXETINE 10 MG/1
30 TABLET, FILM COATED ORAL DAILY
Status: DISCONTINUED | OUTPATIENT
Start: 2020-02-02 | End: 2020-02-02

## 2020-02-01 RX ORDER — ARIPIPRAZOLE 2 MG/1
2 TABLET ORAL EVERY EVENING
Status: DISCONTINUED | OUTPATIENT
Start: 2020-02-01 | End: 2020-02-04

## 2020-02-01 RX ADMIN — MIRTAZAPINE 30 MG: 15 TABLET, FILM COATED ORAL at 20:29

## 2020-02-01 RX ADMIN — LEVETIRACETAM 750 MG: 500 TABLET ORAL at 09:22

## 2020-02-01 RX ADMIN — LOSARTAN POTASSIUM 50 MG: 50 TABLET ORAL at 09:22

## 2020-02-01 RX ADMIN — BUPRENORPHINE AND NALOXONE 1 TABLET: 8; 2 TABLET SUBLINGUAL at 20:29

## 2020-02-01 RX ADMIN — TRAZODONE HYDROCHLORIDE 50 MG: 50 TABLET ORAL at 20:29

## 2020-02-01 RX ADMIN — BUPRENORPHINE AND NALOXONE 1 TABLET: 8; 2 TABLET SUBLINGUAL at 09:22

## 2020-02-01 RX ADMIN — PRAVASTATIN SODIUM 20 MG: 20 TABLET ORAL at 20:29

## 2020-02-01 RX ADMIN — LEVETIRACETAM 750 MG: 500 TABLET ORAL at 20:28

## 2020-02-01 RX ADMIN — HYDROXYZINE PAMOATE 50 MG: 25 CAPSULE ORAL at 20:36

## 2020-02-01 RX ADMIN — HYDROXYZINE PAMOATE 50 MG: 25 CAPSULE ORAL at 09:22

## 2020-02-01 RX ADMIN — PAROXETINE 40 MG: 20 TABLET, FILM COATED ORAL at 09:22

## 2020-02-01 RX ADMIN — ARIPIPRAZOLE 2 MG: 2 TABLET ORAL at 18:18

## 2020-02-01 RX ADMIN — BACITRACIN, NEOMYCIN, POLYMYXIN B: 400; 3.5; 5 OINTMENT TOPICAL at 20:29

## 2020-02-01 RX ADMIN — PANTOPRAZOLE SODIUM 40 MG: 40 TABLET, DELAYED RELEASE ORAL at 06:33

## 2020-02-01 RX ADMIN — OLANZAPINE 10 MG: 10 INJECTION, POWDER, FOR SOLUTION INTRAMUSCULAR at 13:20

## 2020-02-01 RX ADMIN — AMITRIPTYLINE HYDROCHLORIDE 50 MG: 25 TABLET, FILM COATED ORAL at 09:22

## 2020-02-01 ASSESSMENT — PAIN SCALES - GENERAL
PAINLEVEL_OUTOF10: 1
PAINLEVEL_OUTOF10: 0
PAINLEVEL_OUTOF10: 0

## 2020-02-01 NOTE — PROGRESS NOTES
anxiety    Medical Review of Systems:     All other than marked systmes have been reviewed and are all negative.     Constitutional Symptoms: []  fever []  Chills  Skin Symptoms: [] rash []  Pruritus   Eye Symptoms: [] Vision unchanged []  recent vision problems[] blurred vision   Respiratory Symptoms:[] shortness of breath [] cough  Cardiovascular Symptoms:  [] chest pain   [] palpitations   Gastrointestinal Symptoms: []  abdominal pain []  nausea []  vomiting []  diarrhea  Genitourinary Symptoms: []  dysuria  []  hematuria   Musculoskeletal Symptoms: []  back pain []  muscle pain []  joint pain  Neurologic Symptoms: []  headache []  dizziness  Hematolymphoid Symptoms: [] Adenopathy [] Bruises   [] Schimosis       Psychiatric Review of systems  Delusions:  [] Denies [] Endorses   Withdrawals:  [] Denies [] Endorses    Hallucinations: [] Denies [] Endorses    Extra Pyramidal Symptoms: [] Denies [] Endorses      BP (!) 115/59   Pulse 90   Temp 98.2 °F (36.8 °C)   Resp 14   Ht 5' 2\" (1.575 m)   Wt 180 lb (81.6 kg)   BMI 32.92 kg/m²     Mental Status Examination:    Cognition:      [] Alert  [] Awake  [x] Oriented  [x] Person  [x] Place [x] Time      [x] drowsy  [x] tired  [] lethargic  [] distractable  [] Other     Attention/Concentration:   [x] Attentive  [] Distracted        Memory Recent and Remote: [x] Intact   [] Impaired [] Partially Impaired     Language: [x] Able to recognize and name objects          [] Unable to recognize and name Objects    Fund of Knowledge:  [] Poor []  Fair  [x] Good    Speech: [] Normal  [x] Soft  [] Slow  [] Fast [] Pressured            [] Loud [] Dysarthria  [] Incoherent    Appearance: [] Well Groomed  [x] Casual Dressed  [] Unkept  [] Disheveled          [] Normal weight[] Thin  [] Overweight  [] Obese           Attitude: [] Positive  [] Hostile  [x] Demanding  [] Guarded  [] Defensive         [x] Cooperative  []  Uncooperative      Behavior:  [] Normal Gait  [] Walks with R45.851    Major depression, recurrent ([facility] F33.9         Plan:    []  Patient is refusing medications  [] Improving as expected   [] Not improving as expected   [x] Worsening    []  At Baseline     Discussed with patient's the risk associated with amitriptyline and Paxil. Patient does not want to discontinue the amitriptyline she states this is given by her pain doctor for her chronic pain. Patient agrees to adjusting the Paxil dose. Patient was informed that although we are decreasing the dose of Paxil the risk remains of cardiac events. She demonstrated understanding of this and has a capacity understand this. Plan was discussed with Dr. Layne Evans will be a slow taper of Paxil due to the concurrent use of Paxil and amitriptyline. We will also decrease the Abilify to 2 mg daily which is the normal augmenting dose for depression.     Reason for more than one antipsychotic:  [x] N/A  [] 3 failed monotherapy(drugs tried):  [] Cross over to a new antipsychotic  [] Taper to monotherapy from polypharmacy  [] Augmentation of Clozapine therapy due to treatment resistance to single therapy      Signed:  Mati Haq  3/6/6623  0:69 PM

## 2020-02-01 NOTE — PROGRESS NOTES
Pt alert, anxious, and cooperative. Watching tv in lounge. Pt denied currently having SI, HI, and AVH. 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 Pt has been having seizures since last August. She is frustrated that, after the diagnosis, nothing has been done except they increased her Keppra dose. Pt is worried, depressed, and \"stressed out\". Pt states she hasnt worked in months and this is also stressing her. Pt states she was having SI. Nothing current. Will cont to monitor. Pt was out on the unit watching tv and socializing. Pt has burn from cooking to rt lower arm/wrist. Neosporin applied and left to air for night. Night meds given along with anti-anxiety med as requested by pt.

## 2020-02-02 LAB
CHOLESTEROL, TOTAL: 212 MG/DL (ref 0–199)
HBA1C MFR BLD: 6 % (ref 4–5.6)
HDLC SERPL-MCNC: 68 MG/DL
LDL CHOLESTEROL CALCULATED: 112 MG/DL (ref 0–99)
TRIGL SERPL-MCNC: 160 MG/DL (ref 0–149)
VLDLC SERPL CALC-MCNC: 32 MG/DL

## 2020-02-02 PROCEDURE — 6360000002 HC RX W HCPCS: Performed by: NURSE PRACTITIONER

## 2020-02-02 PROCEDURE — 36415 COLL VENOUS BLD VENIPUNCTURE: CPT

## 2020-02-02 PROCEDURE — 80061 LIPID PANEL: CPT

## 2020-02-02 PROCEDURE — 6370000000 HC RX 637 (ALT 250 FOR IP): Performed by: NURSE PRACTITIONER

## 2020-02-02 PROCEDURE — 99231 SBSQ HOSP IP/OBS SF/LOW 25: CPT | Performed by: NURSE PRACTITIONER

## 2020-02-02 PROCEDURE — 6370000000 HC RX 637 (ALT 250 FOR IP): Performed by: PSYCHIATRY & NEUROLOGY

## 2020-02-02 PROCEDURE — 83036 HEMOGLOBIN GLYCOSYLATED A1C: CPT

## 2020-02-02 PROCEDURE — 87081 CULTURE SCREEN ONLY: CPT

## 2020-02-02 PROCEDURE — 2500000003 HC RX 250 WO HCPCS: Performed by: PSYCHIATRY & NEUROLOGY

## 2020-02-02 PROCEDURE — 2500000003 HC RX 250 WO HCPCS: Performed by: NURSE PRACTITIONER

## 2020-02-02 PROCEDURE — 1240000000 HC EMOTIONAL WELLNESS R&B

## 2020-02-02 RX ORDER — OLANZAPINE 10 MG/1
10 INJECTION, POWDER, LYOPHILIZED, FOR SOLUTION INTRAMUSCULAR EVERY 8 HOURS PRN
Status: DISCONTINUED | OUTPATIENT
Start: 2020-02-02 | End: 2020-02-03

## 2020-02-02 RX ORDER — OLANZAPINE 5 MG/1
5 TABLET ORAL EVERY 8 HOURS PRN
Status: DISCONTINUED | OUTPATIENT
Start: 2020-02-02 | End: 2020-02-03

## 2020-02-02 RX ORDER — PAROXETINE HYDROCHLORIDE 20 MG/1
40 TABLET, FILM COATED ORAL DAILY
Status: DISCONTINUED | OUTPATIENT
Start: 2020-02-03 | End: 2020-02-11 | Stop reason: HOSPADM

## 2020-02-02 RX ADMIN — BACITRACIN, NEOMYCIN, POLYMYXIN B: 400; 3.5; 5 OINTMENT TOPICAL at 08:36

## 2020-02-02 RX ADMIN — PANTOPRAZOLE SODIUM 40 MG: 40 TABLET, DELAYED RELEASE ORAL at 06:23

## 2020-02-02 RX ADMIN — LEVETIRACETAM 750 MG: 500 TABLET ORAL at 08:35

## 2020-02-02 RX ADMIN — OLANZAPINE 10 MG: 10 INJECTION, POWDER, FOR SOLUTION INTRAMUSCULAR at 08:00

## 2020-02-02 RX ADMIN — LOSARTAN POTASSIUM 50 MG: 50 TABLET ORAL at 08:35

## 2020-02-02 RX ADMIN — BACITRACIN, NEOMYCIN, POLYMYXIN B: 400; 3.5; 5 OINTMENT TOPICAL at 21:48

## 2020-02-02 RX ADMIN — MIRTAZAPINE 30 MG: 15 TABLET, FILM COATED ORAL at 21:48

## 2020-02-02 RX ADMIN — AMITRIPTYLINE HYDROCHLORIDE 50 MG: 25 TABLET, FILM COATED ORAL at 08:35

## 2020-02-02 RX ADMIN — OLANZAPINE 10 MG: 10 INJECTION, POWDER, FOR SOLUTION INTRAMUSCULAR at 20:40

## 2020-02-02 RX ADMIN — PRAVASTATIN SODIUM 20 MG: 20 TABLET ORAL at 21:48

## 2020-02-02 RX ADMIN — PAROXETINE 30 MG: 10 TABLET, FILM COATED ORAL at 08:35

## 2020-02-02 RX ADMIN — LEVETIRACETAM 750 MG: 500 TABLET ORAL at 21:48

## 2020-02-02 RX ADMIN — HYDROXYZINE PAMOATE 50 MG: 25 CAPSULE ORAL at 21:51

## 2020-02-02 RX ADMIN — BUPRENORPHINE AND NALOXONE 1 TABLET: 8; 2 TABLET SUBLINGUAL at 08:35

## 2020-02-02 RX ADMIN — OLANZAPINE 10 MG: 10 INJECTION, POWDER, FOR SOLUTION INTRAMUSCULAR at 12:26

## 2020-02-02 RX ADMIN — BUPRENORPHINE AND NALOXONE 1 TABLET: 8; 2 TABLET SUBLINGUAL at 21:55

## 2020-02-02 RX ADMIN — HYDROXYZINE PAMOATE 50 MG: 25 CAPSULE ORAL at 08:35

## 2020-02-02 RX ADMIN — ARIPIPRAZOLE 2 MG: 2 TABLET ORAL at 16:49

## 2020-02-02 ASSESSMENT — PAIN SCALES - GENERAL
PAINLEVEL_OUTOF10: 0
PAINLEVEL_OUTOF10: 1
PAINLEVEL_OUTOF10: 2

## 2020-02-02 NOTE — PROGRESS NOTES
Group Therapy Note    Date: 2/2/2020  Start Time: 10:00 am  End Time:  10:30 am  Number of Participants: 13    Type of Group: Recovery    Wellness Binder Information  Module Name:  alberto  Session Number:  na    Patient's Goal:  To explore and practice mindfulness techniques. Notes:  Pt was open to group topic and discussion. Pt was engaged in activity and appropriately practiced mindfulness techniques. Status After Intervention:  Improved    Participation Level:  Active Listener and Interactive    Participation Quality: Appropriate, Attentive, Sharing and Supportive      Speech:  normal      Thought Process/Content: Logical  Linear      Affective Functioning: Congruent      Mood: euthymic      Level of consciousness:  Alert, Oriented x4 and Attentive      Response to Learning: Able to verbalize current knowledge/experience, Able to verbalize/acknowledge new learning, Able to retain information and Capable of insight      Endings: None Reported    Modes of Intervention: Education, Support, Socialization, Exploration, Clarifying, Problem-solving, Activity and Movement      Discipline Responsible: /Counselor

## 2020-02-02 NOTE — PROGRESS NOTES
DATE OF SERVICE:     2/2/2020    [address] [place] [address] seen today for the purpose of continuation of care. Nursing, social work reports, laboratory studies and vital signs are reviewed. Patient chief complaint today is: \"Yesterday I was looking at the board on the wall and wondering if I would be able to cut my wrist.\"            Subjective: Patient seen in her room. She reports an increase in depression today. She states that last night she was having suicidal thoughts and wondering if the board that was post in the wall will be sharp enough to cut her wrist.  She states that she believes the Elavil does not help her with her depression. She understands the risks associated with the multi antidepressant therapy and she is asking to discontinue the Elavil so that way her Paxil and Abilify can be optimized. Denies homicidal ideations intent or plan. She endorses fleeting suicidal ideations. She denies auditory visualizations. Sleep:  [x] Good [] Fair  [] Poor  Appetite:  [x] Good [] Fair  [] Poor    Depression:  [] Mild [] Moderate [x] Severe                [x] Constant [] Sporadic     Anxiety: [] Mild [x] Moderate [] Severe    [x] Constant [] Sporadic     Delusions: [] Mild [] Moderate [] Severe     [] Constant [] Sporadic     [] Paranoid [] Somatic [] Grandiose     Hallucinations: [] Mild [] Moderate [] Severe     [] Constant [] Sporadic    [] Auditory  [] Visual [] Tactile       Suicidal: [] Constant [x] Sporadic  Homicidal: [] Constant [] Sporadic    Unscheduled Medications     [] Patient Receiving Emergency Medications \" Chemical Restraint\"   [] Requesting PRN medications for anxiety    Medical Review of Systems:     All other than marked systmes have been reviewed and are all negative.     Constitutional Symptoms: []  fever []  Chills  Skin Symptoms: [] rash []  Pruritus   Eye Symptoms: [] Vision unchanged []  recent vision problems[] blurred vision   Respiratory Symptoms:[] shortness of breath []

## 2020-02-03 PROCEDURE — 99232 SBSQ HOSP IP/OBS MODERATE 35: CPT | Performed by: NURSE PRACTITIONER

## 2020-02-03 PROCEDURE — 6370000000 HC RX 637 (ALT 250 FOR IP): Performed by: PSYCHIATRY & NEUROLOGY

## 2020-02-03 PROCEDURE — 2500000003 HC RX 250 WO HCPCS: Performed by: NURSE PRACTITIONER

## 2020-02-03 PROCEDURE — 6360000002 HC RX W HCPCS: Performed by: NURSE PRACTITIONER

## 2020-02-03 PROCEDURE — 6370000000 HC RX 637 (ALT 250 FOR IP): Performed by: NURSE PRACTITIONER

## 2020-02-03 PROCEDURE — 1240000000 HC EMOTIONAL WELLNESS R&B

## 2020-02-03 PROCEDURE — 2580000003 HC RX 258: Performed by: PSYCHIATRY & NEUROLOGY

## 2020-02-03 RX ORDER — OLANZAPINE 10 MG/1
5 INJECTION, POWDER, LYOPHILIZED, FOR SOLUTION INTRAMUSCULAR EVERY 8 HOURS PRN
Status: DISCONTINUED | OUTPATIENT
Start: 2020-02-03 | End: 2020-02-04

## 2020-02-03 RX ORDER — OLANZAPINE 5 MG/1
5 TABLET ORAL EVERY 8 HOURS PRN
Status: DISCONTINUED | OUTPATIENT
Start: 2020-02-03 | End: 2020-02-04

## 2020-02-03 RX ADMIN — BACITRACIN, NEOMYCIN, POLYMYXIN B 1 G: 400; 3.5; 5 OINTMENT TOPICAL at 08:59

## 2020-02-03 RX ADMIN — WATER 2.1 ML: 1 INJECTION INTRAMUSCULAR; INTRAVENOUS; SUBCUTANEOUS at 02:01

## 2020-02-03 RX ADMIN — MIRTAZAPINE 30 MG: 15 TABLET, FILM COATED ORAL at 20:49

## 2020-02-03 RX ADMIN — PANTOPRAZOLE SODIUM 40 MG: 40 TABLET, DELAYED RELEASE ORAL at 06:25

## 2020-02-03 RX ADMIN — HYDROXYZINE PAMOATE 50 MG: 25 CAPSULE ORAL at 08:58

## 2020-02-03 RX ADMIN — OLANZAPINE 5 MG: 10 INJECTION, POWDER, FOR SOLUTION INTRAMUSCULAR at 20:48

## 2020-02-03 RX ADMIN — BUPRENORPHINE AND NALOXONE 1 TABLET: 8; 2 TABLET SUBLINGUAL at 08:59

## 2020-02-03 RX ADMIN — ARIPIPRAZOLE 2 MG: 2 TABLET ORAL at 16:55

## 2020-02-03 RX ADMIN — HYDROXYZINE PAMOATE 50 MG: 25 CAPSULE ORAL at 20:50

## 2020-02-03 RX ADMIN — OLANZAPINE 10 MG: 10 INJECTION, POWDER, FOR SOLUTION INTRAMUSCULAR at 12:47

## 2020-02-03 RX ADMIN — LEVETIRACETAM 750 MG: 500 TABLET ORAL at 08:59

## 2020-02-03 RX ADMIN — HYDROXYZINE PAMOATE 50 MG: 25 CAPSULE ORAL at 14:29

## 2020-02-03 RX ADMIN — BACITRACIN, NEOMYCIN, POLYMYXIN B: 400; 3.5; 5 OINTMENT TOPICAL at 20:49

## 2020-02-03 RX ADMIN — WATER 2.1 ML: 1 INJECTION INTRAMUSCULAR; INTRAVENOUS; SUBCUTANEOUS at 20:51

## 2020-02-03 RX ADMIN — BUPRENORPHINE AND NALOXONE 1 TABLET: 8; 2 TABLET SUBLINGUAL at 20:49

## 2020-02-03 RX ADMIN — PAROXETINE 40 MG: 20 TABLET, FILM COATED ORAL at 08:59

## 2020-02-03 RX ADMIN — LOSARTAN POTASSIUM 50 MG: 50 TABLET ORAL at 08:59

## 2020-02-03 RX ADMIN — LEVETIRACETAM 750 MG: 500 TABLET ORAL at 20:49

## 2020-02-03 RX ADMIN — PRAVASTATIN SODIUM 20 MG: 20 TABLET ORAL at 20:49

## 2020-02-03 ASSESSMENT — PAIN DESCRIPTION - LOCATION: LOCATION: BACK

## 2020-02-03 ASSESSMENT — PAIN SCALES - GENERAL
PAINLEVEL_OUTOF10: 0
PAINLEVEL_OUTOF10: 1
PAINLEVEL_OUTOF10: 2
PAINLEVEL_OUTOF10: 0

## 2020-02-03 ASSESSMENT — PAIN DESCRIPTION - PAIN TYPE: TYPE: ACUTE PAIN

## 2020-02-03 ASSESSMENT — PAIN DESCRIPTION - DESCRIPTORS: DESCRIPTORS: ACHING;DISCOMFORT;TIGHTNESS

## 2020-02-03 ASSESSMENT — PAIN DESCRIPTION - ORIENTATION: ORIENTATION: LOWER

## 2020-02-03 NOTE — PROGRESS NOTES
Patient feels worst since admission. \"mental breakdown last night\". Normally she is not like that. Contributes this to Abilify. Encouraged her to discuss this with Doctor. Apologetic for her behavior. Allowed her to verbalize. hopeless and overwhelmed. Requested and given Zyprexa 10 mg IM.

## 2020-02-03 NOTE — GROUP NOTE
Group Therapy Note    Date: 2/3/2020    Group Start Time: 2448  Group End Time: 1100  Group Topic: Psychoeducation    [facility]  [id number] [address], [address]        Group Therapy Note  Date: 2/3/2020    Wellness Binder Information  Module Name: dimensions of wellness   Session Number: na     Patient's Goal: patient will be able to id dimensions of wellness and what he/she can do to improve them. Notes: pleasant and sharing in group, able to participate appropriately. Status After Intervention:  Improved    Participation Level:  Active Listener and Interactive    Participation Quality: Appropriate, Attentive, Sharing, and Supportive      Speech:  normal     Thought Process/Content: Logical      Affective Functioning: Congruent      Mood: euthymic      Level of consciousness:  Alert, Oriented x4, and Attentive      Response to Learning: Able to verbalize/acknowledge new learning, Able to retain information, and Progressing to goal      Endings: None Reported    Modes of Intervention: Education, Support, Socialization, Exploration, and Problem-solving      Discipline Responsible: Psychoeducational Specialist      Signature:  Mari Tsai

## 2020-02-03 NOTE — PLAN OF CARE
Patient reports she has been feeling increased anxiety over the past two days due to family stress. She stated she received a call last night informing her that her 15 y/o granddaughter had run away. She stated this is because her daughter has poor taste in men and frequently brings home drug users and dealers. She reports she has called CPS multiple times and it has led to her daughter getting a restraining order against her- until she needs a . Patient reports additional feelings of hopelessness and helplessness because the doctors can not find the cause of her seizures. This has been and additional cause of stress. Patient has been quiet in her room this morning after medications, she joined the unit for the community meeting. Patient is taking prescribed medications, is attending groups, and reports no disturbance to appetite. Patient is appropriately groomed and attired; gait is steady and patient is independent with ADLs. Denies thoughts or intent to harm self or others at this time. Denies audio or visual hallucinations at this time. Patient is encouraged to continue to work towards discharge goal by complying with medications, attending groups and to seek staff if feelings are overwhelming. Environmental rounds completed per unit policy to maintain safety of everyone on the unit. Staff will offer support and interventions as requested or required.       Problem: Depressive Behavior With or Without Suicide Precautions:  Goal: Ability to disclose and discuss suicidal ideas will improve  Description  Ability to disclose and discuss suicidal ideas will improve  Outcome: Met This Shift     Problem: Depressive Behavior With or Without Suicide Precautions:  Goal: Able to verbalize and/or display a decrease in depressive symptoms  Description  Able to verbalize and/or display a decrease in depressive symptoms  Outcome: Ongoing

## 2020-02-04 LAB — VRE CULTURE: NORMAL

## 2020-02-04 PROCEDURE — 2500000003 HC RX 250 WO HCPCS: Performed by: NURSE PRACTITIONER

## 2020-02-04 PROCEDURE — 6360000002 HC RX W HCPCS: Performed by: NURSE PRACTITIONER

## 2020-02-04 PROCEDURE — 1240000000 HC EMOTIONAL WELLNESS R&B

## 2020-02-04 PROCEDURE — 6370000000 HC RX 637 (ALT 250 FOR IP): Performed by: NURSE PRACTITIONER

## 2020-02-04 PROCEDURE — 6370000000 HC RX 637 (ALT 250 FOR IP): Performed by: PSYCHIATRY & NEUROLOGY

## 2020-02-04 PROCEDURE — 99232 SBSQ HOSP IP/OBS MODERATE 35: CPT | Performed by: NURSE PRACTITIONER

## 2020-02-04 RX ORDER — HYDROXYZINE PAMOATE 50 MG/1
50 CAPSULE ORAL EVERY 6 HOURS PRN
Status: DISCONTINUED | OUTPATIENT
Start: 2020-02-04 | End: 2020-02-11 | Stop reason: HOSPADM

## 2020-02-04 RX ORDER — BUSPIRONE HYDROCHLORIDE 10 MG/1
10 TABLET ORAL 3 TIMES DAILY
Status: DISCONTINUED | OUTPATIENT
Start: 2020-02-04 | End: 2020-02-05

## 2020-02-04 RX ADMIN — BUPRENORPHINE AND NALOXONE 1 TABLET: 8; 2 TABLET SUBLINGUAL at 09:57

## 2020-02-04 RX ADMIN — BUSPIRONE HYDROCHLORIDE 10 MG: 10 TABLET ORAL at 12:10

## 2020-02-04 RX ADMIN — BACITRACIN, NEOMYCIN, POLYMYXIN B: 400; 3.5; 5 OINTMENT TOPICAL at 21:38

## 2020-02-04 RX ADMIN — PRAVASTATIN SODIUM 20 MG: 20 TABLET ORAL at 21:38

## 2020-02-04 RX ADMIN — PAROXETINE 40 MG: 20 TABLET, FILM COATED ORAL at 09:57

## 2020-02-04 RX ADMIN — BACITRACIN, NEOMYCIN, POLYMYXIN B 1 G: 400; 3.5; 5 OINTMENT TOPICAL at 09:57

## 2020-02-04 RX ADMIN — OLANZAPINE 5 MG: 10 INJECTION, POWDER, FOR SOLUTION INTRAMUSCULAR at 09:57

## 2020-02-04 RX ADMIN — HYDROXYZINE PAMOATE 50 MG: 25 CAPSULE ORAL at 13:12

## 2020-02-04 RX ADMIN — PANTOPRAZOLE SODIUM 40 MG: 40 TABLET, DELAYED RELEASE ORAL at 07:01

## 2020-02-04 RX ADMIN — LEVETIRACETAM 750 MG: 500 TABLET ORAL at 09:57

## 2020-02-04 RX ADMIN — BUSPIRONE HYDROCHLORIDE 10 MG: 10 TABLET ORAL at 16:54

## 2020-02-04 RX ADMIN — LEVETIRACETAM 750 MG: 500 TABLET ORAL at 21:38

## 2020-02-04 RX ADMIN — BUSPIRONE HYDROCHLORIDE 10 MG: 10 TABLET ORAL at 21:38

## 2020-02-04 RX ADMIN — MIRTAZAPINE 30 MG: 15 TABLET, FILM COATED ORAL at 21:38

## 2020-02-04 RX ADMIN — BUPRENORPHINE AND NALOXONE 1 TABLET: 8; 2 TABLET SUBLINGUAL at 21:39

## 2020-02-04 RX ADMIN — HYDROXYZINE PAMOATE 50 MG: 25 CAPSULE ORAL at 07:02

## 2020-02-04 RX ADMIN — LOSARTAN POTASSIUM 50 MG: 50 TABLET ORAL at 09:57

## 2020-02-04 RX ADMIN — HYDROXYZINE PAMOATE 50 MG: 25 CAPSULE ORAL at 19:04

## 2020-02-04 ASSESSMENT — PAIN DESCRIPTION - PAIN TYPE: TYPE: CHRONIC PAIN

## 2020-02-04 ASSESSMENT — PAIN SCALES - GENERAL
PAINLEVEL_OUTOF10: 0
PAINLEVEL_OUTOF10: 4
PAINLEVEL_OUTOF10: 2

## 2020-02-04 ASSESSMENT — PAIN DESCRIPTION - LOCATION: LOCATION: BACK

## 2020-02-04 ASSESSMENT — PAIN DESCRIPTION - PROGRESSION: CLINICAL_PROGRESSION: NOT CHANGED

## 2020-02-04 NOTE — PLAN OF CARE
Patient denies SI, HI and hallucinations currently. Does admit to fleeting SI but no plan while here. Contracts for safety while on the unit. Rates anxiety 10+/10 and depression 8/10. Presents calm and cooperative at this time but can be irritable. Patient is out on unit and is social with peers. Patient attended evening groups. Medications taken without issue. No complaints or concerns voiced at this time. No unit problems reported. Will continue to observe and support.      Problem: Depressive Behavior With or Without Suicide Precautions:  Goal: Able to verbalize and/or display a decrease in depressive symptoms  Description  Able to verbalize and/or display a decrease in depressive symptoms  2/3/2020 2255 by Suha Hickman RN  Outcome: Ongoing  2/3/2020 0934 by Alejandra Godinez RN  Outcome: Ongoing  Goal: Ability to disclose and discuss suicidal ideas will improve  Description  Ability to disclose and discuss suicidal ideas will improve  2/3/2020 2255 by Suha Hickman RN  Outcome: Ongoing  2/3/2020 0934 by Alejandra Godinez RN  Outcome: Met This Shift

## 2020-02-04 NOTE — PLAN OF CARE
Irritable, pressured states \"I need my trazodone, I didn't sleep last night and they discontinued it. You need to call and get it back\". NP Rivas Chet notified and reports she is on Remeron and they will not order trazodone because she is on enough antidepressants. Refusing to go to group. Resting in bed. Continues to report fleeting suicidal thoughts but denies plan. Denies homicidal thoughts. Denies hallucinations. No voiced  delusions.

## 2020-02-04 NOTE — PROGRESS NOTES
Patient is resting quietly in bed with eyes closed at this time. No signs of distress or discomfort noted. Safety needs met. No unit problems reported. Will continue to observe and support.

## 2020-02-04 NOTE — PLAN OF CARE
Problem: Depressive Behavior With or Without Suicide Precautions:  Goal: Able to verbalize and/or display a decrease in depressive symptoms  Description  Able to verbalize and/or display a decrease in depressive symptoms  2/4/2020 1101 by Elidia Calvo RN  Outcome: Ongoing  PT. MOOD DEPRESSED, DEATH WISH THIS A.M., VOICED FLEETING SUICIDAL IDEATION DURING TREATMENT TEAM.   2/3/2020 2255 by Halley Sultana, RN  Outcome: Ongoing     Problem: Depressive Behavior With or Without Suicide Precautions:  Goal: Ability to disclose and discuss suicidal ideas will improve  Description  Ability to disclose and discuss suicidal ideas will improve  2/4/2020 1101 by Elidia Calvo RN  Outcome: Ongoing  AS ABOVE. PT. DENIES INTENT TO [address] [facility].   2/3/2020 2255 by Halley Sultana, RN  Outcome: Ongoing

## 2020-02-04 NOTE — PROGRESS NOTES
R45.851    Major depression, recurrent ([facility] [address] F33.9         Plan:    []  Patient is refusing medications  [] Improving as expected   [] Not improving as expected   [x] Worsening    []  At Baseline     \Continue Paxil to 40 mg daily with plan to increase further as clinically indicated  Plan to increase Abilify as clinically indicated  Decrease as needed IM Zyprexa to 5 mg every 8 hours as needed        Reason for more than one antipsychotic:  [x] N/A  [] 3 failed monotherapy(drugs tried):  [] Cross over to a new antipsychotic  [] Taper to monotherapy from polypharmacy  [] Augmentation of Clozapine therapy due to treatment resistance to single therapy      Signed:  Cristina Lopez  2/6/1740  8:24 PM

## 2020-02-04 NOTE — PROGRESS NOTES
DATE OF SERVICE:     2/4/2020    [address] [place] [address] seen today for the purpose of continuation of care. Nursing, social work reports, laboratory studies and vital signs are reviewed. Patient chief complaint today is:  \"I still have thoughts to hurt myself. \"            Subjective: Patient seen in treatment team.  Patient was highly labile and agitated. She was crying at one point. She was blaming nurses for multiple things. She was upset about not being able to shave she was upset about multiple things. Staff reports she is continued to ask for the as needed Zyprexa for anxiety. She complains of high levels of anxiety. She was highly agitated, angry irritable. She was difficult to redirect at times. She is agreeable to the medication adjustments. Sleep:  [x] Good [] Fair  [] Poor  Appetite:  [x] Good [] Fair  [] Poor    Depression:  [] Mild [x] Moderate [] Severe                [x] Constant [] Sporadic     Anxiety: [] Mild [] Moderate [x] Severe    [x] Constant [] Sporadic     Delusions: [] Mild [] Moderate [] Severe     [] Constant [] Sporadic     [] Paranoid [] Somatic [] Grandiose     Hallucinations: [] Mild [] Moderate [] Severe     [] Constant [] Sporadic    [] Auditory  [] Visual [] Tactile       Suicidal: [] Constant [x] Sporadic  Homicidal: [] Constant [] Sporadic    Unscheduled Medications     [] Patient Receiving Emergency Medications \" Chemical Restraint\"   [x] Requesting PRN medications for anxiety    Medical Review of Systems:     All other than marked systmes have been reviewed and are all negative.     Constitutional Symptoms: []  fever []  Chills  Skin Symptoms: [] rash []  Pruritus   Eye Symptoms: [] Vision unchanged []  recent vision problems[] blurred vision   Respiratory Symptoms:[] shortness of breath [] cough  Cardiovascular Symptoms:  [] chest pain   [] palpitations   Gastrointestinal Symptoms: []  abdominal pain []  nausea []  vomiting []  diarrhea  Genitourinary Symptoms: [] dysuria  []  hematuria   Musculoskeletal Symptoms: []  back pain []  muscle pain []  joint pain  Neurologic Symptoms: []  headache []  dizziness  Hematolymphoid Symptoms: [] Adenopathy [] Bruises   [] Schimosis       Psychiatric Review of systems  Delusions:  [x] Denies [] Endorses   Withdrawals:  [x] Denies [] Endorses    Hallucinations: [x] Denies [] Endorses    Extra Pyramidal Symptoms: [x] Denies [] Endorses      /65   Pulse 84   Temp 97.8 °F (36.6 °C)   Resp 16   Ht 5' 2\" (1.575 m)   Wt 180 lb (81.6 kg)   BMI 32.92 kg/m²     Mental Status Examination:    Cognition:      [] Alert  [x] Awake  [x] Oriented  [x] Person  [x] Place [x] Time      [] drowsy  [] tired  [] lethargic  [] distractable  [] Other     Attention/Concentration:   [x] Attentive  [] Distracted        Memory Recent and Remote: [x] Intact   [] Impaired [] Partially Impaired     Language: [x] Able to recognize and name objects          [] Unable to recognize and name Objects    Fund of Knowledge:  [] Poor []  Fair  [x] Good    Speech: [] Normal  [] Soft  [] Slow  [x] Fast [] Pressured            [x] Loud [] Dysarthria  [] Incoherent    Appearance: [] Well Groomed  [x] Casual Dressed  [] Unkept  [] Disheveled          [] Normal weight[] Thin  [] Overweight  [] Obese           Attitude: [] Positive  [] Hostile  [x] Demanding  [] Guarded  [] Defensive         [x] Cooperative  []  Uncooperative      Behavior:  [] Normal Gait  [] Walks with Assistance  [] Kourtney Chair    [] Walks with Walker  [x] In Hospital Bed  [] Sitting in Chair    Muscle-Skeletal:  [x] Normal Muscle Tone [] Muscle Atrophy       [] Abnormal Muscle Movement     Eye Contact:  [x] Good eye contact  [] Intermittent Eye Contact  [] Poor Eye Contact     Mood: [] Depressed  [x] Anxious  [x] Irritated  [] Euthymic   [] Angry [] Restless \"Im upset no one can help me\"    Affect:  [] Congruent  [] Incongruent  [x] Labile  [] Constricted  [] Flat  [] Bizarre     Thought Process and Association:  [] Logical [] Illogical       [x] Linear and Goal Directed  [] Tangential  [] Circumstantial     Thought Content:  [] Denies [] Endorses [x] Suicidal [] Homicidal  [] Delusional      [] Paranoid  [] Somatic  [] Grandiose    Perception: [x]  None  [] Auditory   [] Visual  [] tactile   [] olfactory  [] Illusions         Insight: [] Intact  [] Fair  [x] Limited    Judgement:  [] Intact  [] Fair  [x] Limited      Assessment/Plan:        Patient Active Problem List   Diagnosis Code    Nausea & vomiting R11.2    Gastroparesis K31.84    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    Precordial pain R07.2    Chronic abdominal pain R10.9, G89.29    Chronic low back pain M54.5, G89.29    Mood disorder (HCC) F39    History of pericarditis Z86.79    Shortness of breath R06.02    Sinus tachycardia R00.0    Non-intractable cyclical vomiting with nausea R11.15    Functional diarrhea K59.1    Weight loss R63.4    Pain of upper abdomen R10.10    Colitis K52.9    Vasovagal syncope R55    Medically noncompliant Z91.19    Suicide ideation R45.851    Seizure disorder ([address] G40.909    Essential hypertension I10    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 Douglas Jennifer Malingering Z76.5    Chest pain R07.9    Seizure-like activity (HCC) R56.9    Depression with suicidal ideation F32.9, R45.851    Major depression, recurrent ([address] F33.9         Plan:    []  Patient is refusing medications  [] Improving as expected   [] Not improving as expected   [x] Worsening    []  At Baseline   Plan discussed with Dr. Rolanda Bush to 40 mg daily with plan to increase further as clinically indicated  Discontinue Abilify  Discontinue as needed Zyprexa  Discontinue as needed trazodone  Continue Remeron 30 mg at bedtime  Start BuSpar 10 mg 3 times daily for anxiety    Reason for more than one antipsychotic:  [x] N/A  [] 3 failed monotherapy(drugs tried):  [] Cross over to a new antipsychotic  [] Taper to monotherapy from polypharmacy  [] Augmentation of Clozapine therapy due to treatment resistance to single therapy      Signed:  Kennedy [place]  7/9/8705  3:94 PM

## 2020-02-05 PROCEDURE — 6360000002 HC RX W HCPCS: Performed by: NURSE PRACTITIONER

## 2020-02-05 PROCEDURE — 1240000000 HC EMOTIONAL WELLNESS R&B

## 2020-02-05 PROCEDURE — 6370000000 HC RX 637 (ALT 250 FOR IP): Performed by: NURSE PRACTITIONER

## 2020-02-05 PROCEDURE — 99231 SBSQ HOSP IP/OBS SF/LOW 25: CPT | Performed by: NURSE PRACTITIONER

## 2020-02-05 RX ADMIN — BACITRACIN, NEOMYCIN, POLYMYXIN B 1 G: 400; 3.5; 5 OINTMENT TOPICAL at 20:27

## 2020-02-05 RX ADMIN — PRAVASTATIN SODIUM 20 MG: 20 TABLET ORAL at 20:27

## 2020-02-05 RX ADMIN — BUSPIRONE HYDROCHLORIDE 10 MG: 10 TABLET ORAL at 13:55

## 2020-02-05 RX ADMIN — BUSPIRONE HYDROCHLORIDE 15 MG: 10 TABLET ORAL at 20:27

## 2020-02-05 RX ADMIN — HYDROXYZINE PAMOATE 50 MG: 25 CAPSULE ORAL at 04:59

## 2020-02-05 RX ADMIN — LOSARTAN POTASSIUM 50 MG: 50 TABLET ORAL at 09:16

## 2020-02-05 RX ADMIN — MIRTAZAPINE 30 MG: 15 TABLET, FILM COATED ORAL at 20:27

## 2020-02-05 RX ADMIN — BUSPIRONE HYDROCHLORIDE 10 MG: 10 TABLET ORAL at 09:16

## 2020-02-05 RX ADMIN — PAROXETINE 40 MG: 20 TABLET, FILM COATED ORAL at 09:16

## 2020-02-05 RX ADMIN — LEVETIRACETAM 750 MG: 500 TABLET ORAL at 09:16

## 2020-02-05 RX ADMIN — HYDROXYZINE PAMOATE 50 MG: 25 CAPSULE ORAL at 11:11

## 2020-02-05 RX ADMIN — HYDROXYZINE PAMOATE 50 MG: 25 CAPSULE ORAL at 17:14

## 2020-02-05 RX ADMIN — BACITRACIN, NEOMYCIN, POLYMYXIN B 1 G: 400; 3.5; 5 OINTMENT TOPICAL at 11:11

## 2020-02-05 RX ADMIN — PANTOPRAZOLE SODIUM 40 MG: 40 TABLET, DELAYED RELEASE ORAL at 06:50

## 2020-02-05 RX ADMIN — HYDROXYZINE PAMOATE 50 MG: 25 CAPSULE ORAL at 23:14

## 2020-02-05 RX ADMIN — BUPRENORPHINE AND NALOXONE 1 TABLET: 8; 2 TABLET SUBLINGUAL at 20:26

## 2020-02-05 RX ADMIN — BUPRENORPHINE AND NALOXONE 1 TABLET: 8; 2 TABLET SUBLINGUAL at 09:16

## 2020-02-05 RX ADMIN — LEVETIRACETAM 750 MG: 500 TABLET ORAL at 20:27

## 2020-02-05 ASSESSMENT — PAIN SCALES - GENERAL
PAINLEVEL_OUTOF10: 0
PAINLEVEL_OUTOF10: 0
PAINLEVEL_OUTOF10: 1
PAINLEVEL_OUTOF10: 0

## 2020-02-05 ASSESSMENT — PAIN DESCRIPTION - PAIN TYPE: TYPE: ACUTE PAIN

## 2020-02-05 ASSESSMENT — PAIN DESCRIPTION - LOCATION: LOCATION: LEG

## 2020-02-05 ASSESSMENT — PAIN DESCRIPTION - DESCRIPTORS: DESCRIPTORS: ACHING;DISCOMFORT;SORE

## 2020-02-05 ASSESSMENT — PAIN DESCRIPTION - ORIENTATION: ORIENTATION: LEFT

## 2020-02-05 NOTE — PLAN OF CARE
[facility]  Day 3 Interdisciplinary Treatment Plan NOTE    Review Date & Time: 2/7/2020 1000    Patient was in treatment team    Admission Type:   Admission Type: Involuntary    Reason for admission:  Reason for Admission: DEPRESSED RELATED TO Moni Dunlap  Estimated Length of Stay Update: 1 week  Estimated Discharge Date Update: FRIDAY    PATIENT STRENGTHS:  Patient Strengths Strengths: Communication, Positive Support, Connection to output provider  Patient Strengths and Limitations:Limitations: Multiple barriers to leisure interests  Addictive Behavior:Addictive Behavior  In the past 3 months, have you felt or has someone told you that you have a problem with:  : None  Do you have a history of Chemical Use?: No  Do you have a history of Alcohol Use?: No  Do you have a history of Street Drug Abuse?: No  Histroy of Prescripton Drug Abuse?: No  Medical Problems:  Past Medical History:   Diagnosis Date    Back pain     Depression 11/30/2016    Essential hypertension 8/7/2019    Gastroparesis     Hepatitis     History of cardiovascular stress test 7/1/2012    EXERCISE NUCLEAR    Hyperlipidemia     Medically noncompliant 2/15/2019    Pancreatitis, acute     Pyloric stenosis     Seizures ([facility] [address]     Vasovagal syncope 2/15/2019       Risk:  Fall RiskTotal: 79  Dileep Scale Dileep Scale Score: 22  BVC Total: 0  Change in scores: PT. IS NOT A FALLS OR DILEEP RISK THIS SHIFT.  Changes to plan of Care : CONTINUE TO ASSESS FOR ANY INCREASE IN RISK OR CHANGE IN STATUS    Status EXAM:   Status and Exam  Normal: Yes  Facial Expression: Brightened  Affect: Appropriate  Level of Consciousness: Alert  Mood:Normal: No  Mood: Depressed, Anxious, Labile  Motor Activity:Normal: Yes  Motor Activity: (WDL)  Interview Behavior: Cooperative, Impulsive  Preception: [place] to Person, Neri Stager to Time, [place] to Place, [place] to Situation  Attention:Normal: Yes  Attention: Distractible  Thought Processes:

## 2020-02-05 NOTE — PROGRESS NOTES
DATE OF SERVICE:     2/5/2020    [address] [place] [address] seen today for the purpose of continuation of care. Nursing, social work reports, laboratory studies and vital signs are reviewed. Patient chief complaint today is:  \"My anxiety is more of a challenge\"            Subjective: Patient seen in treatment team.  Patient up on the unit less agitated. She is calm and cooperative. She states that her depression has gotten better and she is no longer having any suicidal thoughts. She states that her anxiety remains high. She is agreeable to increase her BuSpar. Staff report she is not attending groups remains isolative on the unit. Staff also reported fleeting suicidal ideations without a plan. Sleep:  [x] Good [] Fair  [] Poor  Appetite:  [x] Good [] Fair  [] Poor    Depression:  [] Mild [x] Moderate [] Severe                [x] Constant [] Sporadic     Anxiety: [] Mild [] Moderate [x] Severe    [x] Constant [] Sporadic     Delusions: [] Mild [] Moderate [] Severe     [] Constant [] Sporadic     [] Paranoid [] Somatic [] Grandiose     Hallucinations: [] Mild [] Moderate [] Severe     [] Constant [] Sporadic    [] Auditory  [] Visual [] Tactile       Suicidal: [] Constant [x] Sporadic  Homicidal: [] Constant [] Sporadic    Unscheduled Medications     [] Patient Receiving Emergency Medications \" Chemical Restraint\"   [x] Requesting PRN medications for anxiety    Medical Review of Systems:     All other than marked systmes have been reviewed and are all negative.     Constitutional Symptoms: []  fever []  Chills  Skin Symptoms: [] rash []  Pruritus   Eye Symptoms: [] Vision unchanged []  recent vision problems[] blurred vision   Respiratory Symptoms:[] shortness of breath [] cough  Cardiovascular Symptoms:  [] chest pain   [] palpitations   Gastrointestinal Symptoms: []  abdominal pain []  nausea []  vomiting []  diarrhea  Genitourinary Symptoms: []  dysuria  []  hematuria   Musculoskeletal Symptoms: []  back pain []  muscle pain []  joint pain  Neurologic Symptoms: []  headache []  dizziness  Hematolymphoid Symptoms: [] Adenopathy [] Bruises   [] Schimosis       Psychiatric Review of systems  Delusions:  [x] Denies [] Endorses   Withdrawals:  [x] Denies [] Endorses    Hallucinations: [x] Denies [] Endorses    Extra Pyramidal Symptoms: [x] Denies [] Endorses      /70   Pulse 106   Temp 97.2 °F (36.2 °C) (Temporal)   Resp 17   Ht 5' 2\" (1.575 m)   Wt 180 lb (81.6 kg)   BMI 32.92 kg/m²     Mental Status Examination:    Cognition:      [] Alert  [x] Awake  [x] Oriented  [x] Person  [x] Place [x] Time      [] drowsy  [] tired  [] lethargic  [] distractable  [] Other     Attention/Concentration:   [x] Attentive  [] Distracted        Memory Recent and Remote: [x] Intact   [] Impaired [] Partially Impaired     Language: [x] Able to recognize and name objects          [] Unable to recognize and name Objects    Fund of Knowledge:  [] Poor []  Fair  [x] Good    Speech: [x] Normal  [] Soft  [] Slow  [] Fast [] Pressured            [] Loud [] Dysarthria  [] Incoherent    Appearance: [] Well Groomed  [x] Casual Dressed  [] Unkept  [] Disheveled          [] Normal weight[] Thin  [] Overweight  [] Obese           Attitude: [] Positive  [] Hostile  [x] Demanding  [] Guarded  [] Defensive         [x] Cooperative  []  Uncooperative      Behavior:  [] Normal Gait  [] Walks with Assistance  [] Kourtney Chair    [] Walks with Walker  [x] In Hospital Bed  [] Sitting in Chair    Muscle-Skeletal:  [x] Normal Muscle Tone [] Muscle Atrophy       [] Abnormal Muscle Movement     Eye Contact:  [x] Good eye contact  [] Intermittent Eye Contact  [] Poor Eye Contact     Mood: [] Depressed  [x] Anxious  [] Irritated  [] Euthymic   [] Angry [] Restless \"Im upset no one can help me\"    Affect:  [] Congruent  [x] Incongruent  [] Labile  [] Constricted  [] Flat  [] Bizarre     Thought Process and Association:  [] Logical [] Illogical       [x] Linear and from polypharmacy  [] Augmentation of Clozapine therapy due to treatment resistance to single therapy      Signed:  Luiz Sample  5/6/9090  2:69 PM

## 2020-02-05 NOTE — PROGRESS NOTES
UP, BRIGHT, SOCIAL. DENIES SUICIDAL IDEATION. REPORTS ANXIETY. IN CONTROL WITH NO UNIT PROBLEMS. COOPERATIVE TO MEDICATIONS AND GROUPS.

## 2020-02-05 NOTE — GROUP NOTE
Group Therapy Note    Date: 2/5/2020    Group Start Time: 1115  Group End Time: 1200  Group Topic: Psychotherapy    [facility]    HOMAR Carlos        Group Therapy Note    Attendees: 8         Patient's Goal:  Pt will engage in healthy interactions to manage mood and symptoms and increase socialization and communication with others in psychotherapy group. Note: Pt was interactive during group and was able to discuss and process feelings related to difficulties with communication within relationships. Pt able to acknowledge what he would like to change and discuss ways to implement changes. Pt made positive appropriate connections with others by providing positive feedback and support. Status After Intervention:  Improved    Participation Level:  Active Listener and Interactive    Participation Quality: Appropriate, Attentive, Sharing and Supportive      Speech:  normal      Thought Process/Content: Logical      Affective Functioning: Blunted      Mood: depressed      Level of consciousness:  Alert, Oriented x4 and Attentive      Response to Learning: Able to verbalize current knowledge/experience, Able to verbalize/acknowledge new learning and Progressing to goal      Endings: None Reported    Modes of Intervention: Support, Socialization, Exploration and Problem-solving      Discipline Responsible: /Counselor      Signature:  HOMAR Leos

## 2020-02-06 PROCEDURE — 6360000002 HC RX W HCPCS: Performed by: NURSE PRACTITIONER

## 2020-02-06 PROCEDURE — 1240000000 HC EMOTIONAL WELLNESS R&B

## 2020-02-06 PROCEDURE — 99233 SBSQ HOSP IP/OBS HIGH 50: CPT | Performed by: NURSE PRACTITIONER

## 2020-02-06 PROCEDURE — 6370000000 HC RX 637 (ALT 250 FOR IP): Performed by: NURSE PRACTITIONER

## 2020-02-06 PROCEDURE — 6360000002 HC RX W HCPCS: Performed by: PSYCHIATRY & NEUROLOGY

## 2020-02-06 RX ORDER — HYDROXYZINE HYDROCHLORIDE 50 MG/ML
50 INJECTION, SOLUTION INTRAMUSCULAR EVERY 6 HOURS PRN
Status: DISCONTINUED | OUTPATIENT
Start: 2020-02-06 | End: 2020-02-10

## 2020-02-06 RX ORDER — HALOPERIDOL 5 MG/ML
5 INJECTION INTRAMUSCULAR EVERY 6 HOURS PRN
Status: DISCONTINUED | OUTPATIENT
Start: 2020-02-06 | End: 2020-02-10

## 2020-02-06 RX ORDER — TRAZODONE HYDROCHLORIDE 50 MG/1
50 TABLET ORAL NIGHTLY PRN
Status: DISCONTINUED | OUTPATIENT
Start: 2020-02-06 | End: 2020-02-07

## 2020-02-06 RX ORDER — HALOPERIDOL 5 MG
5 TABLET ORAL EVERY 6 HOURS PRN
Status: DISCONTINUED | OUTPATIENT
Start: 2020-02-06 | End: 2020-02-10

## 2020-02-06 RX ORDER — SENNOSIDES 8.8 MG/5ML
5 LIQUID ORAL 2 TIMES DAILY PRN
Status: DISCONTINUED | OUTPATIENT
Start: 2020-02-06 | End: 2020-02-06

## 2020-02-06 RX ADMIN — BUSPIRONE HYDROCHLORIDE 15 MG: 10 TABLET ORAL at 14:10

## 2020-02-06 RX ADMIN — TRAZODONE HYDROCHLORIDE 50 MG: 50 TABLET ORAL at 20:34

## 2020-02-06 RX ADMIN — HALOPERIDOL LACTATE 5 MG: 5 INJECTION INTRAMUSCULAR at 16:51

## 2020-02-06 RX ADMIN — LEVETIRACETAM 750 MG: 500 TABLET ORAL at 10:09

## 2020-02-06 RX ADMIN — BUPRENORPHINE AND NALOXONE 1 TABLET: 8; 2 TABLET SUBLINGUAL at 20:33

## 2020-02-06 RX ADMIN — BUSPIRONE HYDROCHLORIDE 15 MG: 10 TABLET ORAL at 20:34

## 2020-02-06 RX ADMIN — MIRTAZAPINE 30 MG: 15 TABLET, FILM COATED ORAL at 20:34

## 2020-02-06 RX ADMIN — BACITRACIN, NEOMYCIN, POLYMYXIN B 1 G: 400; 3.5; 5 OINTMENT TOPICAL at 20:34

## 2020-02-06 RX ADMIN — LOSARTAN POTASSIUM 50 MG: 50 TABLET ORAL at 10:10

## 2020-02-06 RX ADMIN — PANTOPRAZOLE SODIUM 40 MG: 40 TABLET, DELAYED RELEASE ORAL at 06:44

## 2020-02-06 RX ADMIN — HYDROXYZINE HYDROCHLORIDE 50 MG: 50 INJECTION, SOLUTION INTRAMUSCULAR at 16:16

## 2020-02-06 RX ADMIN — HALOPERIDOL LACTATE 5 MG: 5 INJECTION INTRAMUSCULAR at 10:07

## 2020-02-06 RX ADMIN — BUSPIRONE HYDROCHLORIDE 15 MG: 10 TABLET ORAL at 10:10

## 2020-02-06 RX ADMIN — LEVETIRACETAM 750 MG: 500 TABLET ORAL at 20:34

## 2020-02-06 RX ADMIN — BACITRACIN, NEOMYCIN, POLYMYXIN B 1 G: 400; 3.5; 5 OINTMENT TOPICAL at 10:09

## 2020-02-06 RX ADMIN — PAROXETINE 40 MG: 20 TABLET, FILM COATED ORAL at 10:10

## 2020-02-06 RX ADMIN — PRAVASTATIN SODIUM 20 MG: 20 TABLET ORAL at 20:34

## 2020-02-06 RX ADMIN — HYDROXYZINE HYDROCHLORIDE 50 MG: 50 INJECTION, SOLUTION INTRAMUSCULAR at 10:07

## 2020-02-06 RX ADMIN — BUPRENORPHINE AND NALOXONE 1 TABLET: 8; 2 TABLET SUBLINGUAL at 10:09

## 2020-02-06 RX ADMIN — BENZTROPINE MESYLATE 2 MG: 1 INJECTION INTRAMUSCULAR; INTRAVENOUS at 00:28

## 2020-02-06 ASSESSMENT — PAIN SCALES - GENERAL
PAINLEVEL_OUTOF10: 0
PAINLEVEL_OUTOF10: 0
PAINLEVEL_OUTOF10: 10
PAINLEVEL_OUTOF10: 4

## 2020-02-06 NOTE — GROUP NOTE
Group Therapy Note    Date: 2/6/2020    Group Start Time: 1115  Group End Time: 1200  Group Topic: Psychotherapy    [facility]    FABIOLA Vitale, [facility]        Group Therapy Note  Number of participants:6  Type of group: Psychotherapy  Mode of intervention: Education, Support, Socialization, Exploration, and Clarifying  Topic: psychotherapy  Objective:  Communication Skills       Patient's Goal:  To improve communication skills through participation in psychotherapy group focusing on interpersonal relationships     Notes: Pt was an active participant in psychotherapy group focusing on interpersonal relationships. Pt shared personal information and provided supportive feedback to other group members. Status After Intervention:  Improved    Participation Level:  Active Listener and Interactive    Participation Quality: Appropriate, Attentive, Sharing and Supportive      Speech:  normal      Thought Process/Content: Logical      Affective Functioning: Congruent      Mood: anxious      Level of consciousness:  Alert, Oriented x4 and Attentive      Response to Learning: Able to verbalize current knowledge/experience, Capable of insight and Progressing to goal      Endings: None Reported    Modes of Intervention: Education, Support, Socialization, Exploration, Clarifying and Problem-solving      Discipline Responsible: /Counselor      Signature:  FABIOLA Vitale, [place]

## 2020-02-06 NOTE — GROUP NOTE
Group Therapy Note    Date: 2/6/2020    Group Start Time: 0115  Group End Time: 0200  Group Topic: Cognitive Skills    [facility]    FABIOLA Wooten, [facility]    Number of participants:13  Type of group: Cognitive Skills  Mode of intervention: Education, Support, Socialization, Exploration, Clarifying, and Problem-solving  Topic: Cognitive Skills  Objective: To write a letter to pt's greatest barrier to mental health    Group Therapy Note         Patient's Goal:    To write a letter to pt's greatest barrier to mental health     Notes: Pt was an active participant in cognitive skills group with focus of writing a letter to pt's greatest barrier to mental health. PT was able to identify a barrier and write a letter to perceived barrier. Status After Intervention:  Improved    Participation Level:  Active Listener and Interactive    Participation Quality: Appropriate, Attentive, Sharing and Supportive      Speech:  normal      Thought Process/Content: Logical      Affective Functioning: Congruent      Mood: euthymic      Level of consciousness:  Alert, Oriented x4 and Attentive      Response to Learning: Able to verbalize current knowledge/experience, Able to verbalize/acknowledge new learning, Able to retain information and Progressing to goal      Endings: None Reported    Modes of Intervention: Education, Support, Socialization and Exploration      Discipline Responsible: /Counselor      Signature:  FABIOLA Wooten, [facility]

## 2020-02-06 NOTE — PROGRESS NOTES
Given Vistaril 50 mg IM to right dorsogluteal area per Nurse Practitioner. Patient wants Haldol injection, threatening \"wait til these motherfuckin' kids leave, I'm gonna go the fuck off. \" Insulting staff, calling staff \"ugly ass bitch\" and to \"shut the fuck up. \"

## 2020-02-06 NOTE — SIGNIFICANT EVENT
Pt. angry, hostile, yelling in room, threatening to lose control and  making verbal threats to harm self, demanding medications for agitation. Panic button called to summon police. Pt. reports belief is being treated with disrespect and is angry over change in available as needed medications. Refusing to discuss options and yelling out, calling staff \"bitch\". Demanding staff to stay away,demanding police be called. Psych N.P., [facility] on site. Medications given per order I.M. route with show of support with [facility] police. Pt. Medications administered event.

## 2020-02-06 NOTE — PLAN OF CARE
Problem: Depressive Behavior With or Without Suicide Precautions:  Goal: Ability to disclose and discuss suicidal ideas will improve  Description  Ability to disclose and discuss suicidal ideas will improve  2/6/2020 1042 by Elidia Calvo RN  Outcome: Met This Shift  PT. REPORTS FEELS SUICIDAL WHEN ANXIETY IS EXTREME.  2/6/2020 0319 by Remedios Lama RN  Outcome: Ongoing     Problem: Depressive Behavior With or Without Suicide Precautions:  Goal: Able to verbalize and/or display a decrease in depressive symptoms  Description  Able to verbalize and/or display a decrease in depressive symptoms  2/6/2020 1042 by Elidia Calvo RN  Outcome: Ongoing  PT.  MOOD IRRITABLE, REPORTS EXTREME ANXIETY THIS A.M., WHICH REQUIRED PRN MEDICATIONS AFTER PT. HAD A VERBAL OUTBURST.  2/6/2020 0319 by Remedios Lama RN  Outcome: Ongoing
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
## 2020-02-07 PROCEDURE — 6370000000 HC RX 637 (ALT 250 FOR IP): Performed by: NURSE PRACTITIONER

## 2020-02-07 PROCEDURE — 6360000002 HC RX W HCPCS: Performed by: NURSE PRACTITIONER

## 2020-02-07 PROCEDURE — 99232 SBSQ HOSP IP/OBS MODERATE 35: CPT | Performed by: NURSE PRACTITIONER

## 2020-02-07 PROCEDURE — 1240000000 HC EMOTIONAL WELLNESS R&B

## 2020-02-07 RX ORDER — NICOTINE 21 MG/24HR
1 PATCH, TRANSDERMAL 24 HOURS TRANSDERMAL DAILY
Qty: 14 PATCH | Refills: 0 | Status: SHIPPED | OUTPATIENT
Start: 2020-02-07 | End: 2020-02-09 | Stop reason: HOSPADM

## 2020-02-07 RX ORDER — TRAZODONE HYDROCHLORIDE 100 MG/1
100 TABLET ORAL NIGHTLY PRN
Qty: 14 TABLET | Refills: 0 | Status: SHIPPED | OUTPATIENT
Start: 2020-02-07 | End: 2020-02-09

## 2020-02-07 RX ORDER — BUSPIRONE HYDROCHLORIDE 15 MG/1
15 TABLET ORAL 3 TIMES DAILY
Qty: 42 TABLET | Refills: 0 | Status: SHIPPED | OUTPATIENT
Start: 2020-02-07 | End: 2020-02-09

## 2020-02-07 RX ORDER — MIRTAZAPINE 30 MG/1
30 TABLET, FILM COATED ORAL NIGHTLY
Qty: 14 TABLET | Refills: 0 | Status: SHIPPED | OUTPATIENT
Start: 2020-02-07 | End: 2020-02-09

## 2020-02-07 RX ORDER — PAROXETINE HYDROCHLORIDE 40 MG/1
40 TABLET, FILM COATED ORAL DAILY
Qty: 14 TABLET | Refills: 0 | Status: SHIPPED | OUTPATIENT
Start: 2020-02-07 | End: 2020-02-09

## 2020-02-07 RX ORDER — TRAZODONE HYDROCHLORIDE 50 MG/1
100 TABLET ORAL NIGHTLY PRN
Status: DISCONTINUED | OUTPATIENT
Start: 2020-02-07 | End: 2020-02-11 | Stop reason: HOSPADM

## 2020-02-07 RX ADMIN — HYDROXYZINE HYDROCHLORIDE 50 MG: 50 INJECTION, SOLUTION INTRAMUSCULAR at 21:21

## 2020-02-07 RX ADMIN — HALOPERIDOL LACTATE 5 MG: 5 INJECTION INTRAMUSCULAR at 09:19

## 2020-02-07 RX ADMIN — MIRTAZAPINE 30 MG: 15 TABLET, FILM COATED ORAL at 20:56

## 2020-02-07 RX ADMIN — HYDROXYZINE HYDROCHLORIDE 50 MG: 50 INJECTION, SOLUTION INTRAMUSCULAR at 15:19

## 2020-02-07 RX ADMIN — HALOPERIDOL LACTATE 5 MG: 5 INJECTION INTRAMUSCULAR at 15:19

## 2020-02-07 RX ADMIN — HALOPERIDOL LACTATE 5 MG: 5 INJECTION INTRAMUSCULAR at 01:39

## 2020-02-07 RX ADMIN — LEVETIRACETAM 750 MG: 500 TABLET ORAL at 09:19

## 2020-02-07 RX ADMIN — BUSPIRONE HYDROCHLORIDE 15 MG: 10 TABLET ORAL at 20:55

## 2020-02-07 RX ADMIN — HALOPERIDOL LACTATE 5 MG: 5 INJECTION INTRAMUSCULAR at 21:19

## 2020-02-07 RX ADMIN — TRAZODONE HYDROCHLORIDE 100 MG: 50 TABLET ORAL at 21:01

## 2020-02-07 RX ADMIN — BUPRENORPHINE AND NALOXONE 1 TABLET: 8; 2 TABLET SUBLINGUAL at 09:20

## 2020-02-07 RX ADMIN — BUSPIRONE HYDROCHLORIDE 15 MG: 10 TABLET ORAL at 13:01

## 2020-02-07 RX ADMIN — LEVETIRACETAM 750 MG: 500 TABLET ORAL at 20:55

## 2020-02-07 RX ADMIN — PAROXETINE 40 MG: 20 TABLET, FILM COATED ORAL at 09:20

## 2020-02-07 RX ADMIN — BUPRENORPHINE AND NALOXONE 1 TABLET: 8; 2 TABLET SUBLINGUAL at 20:56

## 2020-02-07 RX ADMIN — BUSPIRONE HYDROCHLORIDE 15 MG: 10 TABLET ORAL at 09:20

## 2020-02-07 RX ADMIN — HYDROXYZINE HYDROCHLORIDE 50 MG: 50 INJECTION, SOLUTION INTRAMUSCULAR at 01:35

## 2020-02-07 RX ADMIN — HYDROXYZINE HYDROCHLORIDE 50 MG: 50 INJECTION, SOLUTION INTRAMUSCULAR at 09:19

## 2020-02-07 RX ADMIN — PANTOPRAZOLE SODIUM 40 MG: 40 TABLET, DELAYED RELEASE ORAL at 06:22

## 2020-02-07 RX ADMIN — LOSARTAN POTASSIUM 50 MG: 50 TABLET ORAL at 09:19

## 2020-02-07 RX ADMIN — PRAVASTATIN SODIUM 20 MG: 20 TABLET ORAL at 20:57

## 2020-02-07 ASSESSMENT — PAIN SCALES - GENERAL
PAINLEVEL_OUTOF10: 0

## 2020-02-07 NOTE — GROUP NOTE
Group Therapy Note    Date: 2/7/2020    Group Start Time: 2600  Group End Time: 1100  Group Topic: Psychoeducation    [facility]  [address]        Group Therapy Note    Attendees: 9                                                                        Group Therapy Note    Date: 2/7/2020  Wellness Binder Information  Module Name: stress management strategies  Session Number:  na  Patient's Goal: patient will be able to id nontraditional stress management techniques. Notes: pleasant and engaged in group. Status After Intervention:  Improved    Participation Level:  Active Listener and Interactive    Participation Quality: Appropriate, Attentive, Sharing, and Supportive      Speech:  normal       Thought Process/Content: Logical      Affective Functioning: Congruent      Mood: euthymic      Level of consciousness:  Alert, Oriented x4, and Attentive      Response to Learning: Able to verbalize/acknowledge new learning, Able to retain information, and Progressing to goal      Endings: None Reported    Modes of Intervention: Education, Support, Socialization, Exploration, and Problem-solving      Discipline Responsible: Psychoeducational Specialist      Signature:  Cynthia Sen

## 2020-02-07 NOTE — PROGRESS NOTES
Pt was observed to be asleep at HS rounds. Pt woke and came to NS and demanded \"my shot\". Pt said \"I've been anxious and not sleeping all night\". Attempted to offer po prn medication for anxiety and pt rapidly became loud, angry and escalated. Demanded IM medication. Evan Mulligan  \"shot was set up for me whenever I need it\". Will give prn injection as ordered for agitation.

## 2020-02-07 NOTE — PLAN OF CARE
Patient is active on the unit; friendly and sociable with staff and peers. Cooperative with staff and care, patient is taking prescribed medications, is attending groups, and reports no disturbance to appetite. Patient is utilizing PRN medication to control her anxiety. Patient is appropriately groomed and attired; gait is steady and patient is independent with ADLs. Denies thoughts or intent to harm self or others at this time. Denies audio or visual hallucinations at this time. Patient is encouraged to continue to work towards discharge goal by complying with medications, attending groups and to seek staff if feelings are overwhelming. Environmental rounds completed per unit policy to maintain safety of everyone on the unit. Staff will offer support and interventions as requested or required.       Problem: Depressive Behavior With or Without Suicide Precautions:  Goal: Ability to disclose and discuss suicidal ideas will improve  Description  Ability to disclose and discuss suicidal ideas will improve  2/7/2020 1302 by Azeem Collier RN  Outcome: Met This Shift     Problem: Depressive Behavior With or Without Suicide Precautions:  Goal: Able to verbalize and/or display a decrease in depressive symptoms  Description  Able to verbalize and/or display a decrease in depressive symptoms  2/7/2020 1302 by Azeem Collier RN  Outcome: Ongoing

## 2020-02-07 NOTE — CARE COORDINATION
Pt is able to go to [place] if she is able to get transportation to her appointment at [facility] for [address] SW called [facility] for transportation for Pt. They are not able to schedule her transportation until Tuesday at 2:20 pm.  They can pick her up at [place] and take her to her to [facility] to make it for her evening dose for suboxone.

## 2020-02-07 NOTE — PROGRESS NOTES
Patient is aware of the complications surrounding her discharge and saboxone. She stated she is fine with staying till Monday to ensure there are no difficulties. Patient is social on the unit and has thus far remained in control of her behaviors and emotions. Patient is encouraged to continue to work towards discharge goal by complying with medications, attending groups and to seek staff if feelings are overwhelming. Environmental rounds completed per unit policy to maintain safety of everyone on the unit. Staff will offer support and interventions as requested or required.

## 2020-02-07 NOTE — PROGRESS NOTES
CLINICAL PHARMACY NOTE: MEDS TO [address] [facility] Patient?: No  Total # of Prescriptions Filled: 3   The following medications were delivered to the patient:  · Mirtazapine 30  · Trazodone 100   · buspirone 15  Total # of Interventions Completed: 3  Time Spent (min): 30    Additional Documentation:

## 2020-02-07 NOTE — GROUP NOTE
Group Therapy Note    Date: 2/7/2020    Group Start Time: 1115  Group End Time: 1200  Group Topic: Psychotherapy    [facility]    FABIOLA Edmonds LSW        Group Therapy Note    Attendees: 12         Patient's Goal:   IMPROVING RELATIONSHIPS WITH OTHERS    Notes:  Patient is working on improving her relationship with her ex . Status After Intervention:  Improved    Participation Level:  Active Listener and Interactive    Participation Quality: Appropriate, Attentive and Sharing      Speech:  normal      Thought Process/Content: Logical      Affective Functioning: Congruent      Mood: euthymic      Level of consciousness:  Alert and Oriented x4      Response to Learning: Able to verbalize current knowledge/experience, Able to verbalize/acknowledge new learning and Able to retain information      Endings: None Reported    Modes of Intervention: Education, Support and Socialization      Discipline Responsible: /Counselor      Signature:  FABIOLA Edmonds LSW

## 2020-02-07 NOTE — PROGRESS NOTES
DATE OF SERVICE:     2/6/2020    [address] [place] [address] seen today for the purpose of continuation of care. Nursing, social work reports, laboratory studies and vital signs are reviewed. Patient chief complaint today is: Agitated requiring police to be called several times throughout the day        Subjective: Patient seen in her room this afternoon. Per staff patient had been requesting all IM injections and was demanding IM Cogentin last night for \"anxiety. \" The IM Cogentin was discontinued as there is no clinical indication for that medication at this time patient is not on any antipsychotic medications and it was a as needed order from the prior providers. There is no clinical indication for benztropine at this time. When patient was told by her nurse that the benztropine had been discontinued she became irate yelling threatening screaming requiring security to be called to the unit and IM Haldol and Vistaril to be given. Only a few minutes after being given the IM injections patient was calm cooperative and attending groups. When I tried to talk to patient she was screaming yelling screaming that she wanted to kill her self she did not get the IM Cogentin. She shows very poor limited insight and judgment into her treatment. Staff reports that on the afternoon she was again demanding IM injections even though there was no clinical indication for this shot at that time. Per staff notes patient threatens to act out in order to get the injections. Patient extremely labile requiring security be called to the unit twice.     Sleep:  [x] Good [] Fair  [] Poor  Appetite:  [x] Good [] Fair  [] Poor    Depression:  [] Mild [x] Moderate [] Severe                [x] Constant [] Sporadic     Anxiety: [] Mild [] Moderate [x] Severe    [x] Constant [] Sporadic     Delusions: [] Mild [] Moderate [] Severe     [] Constant [] Sporadic     [] Paranoid [] Somatic [] Grandiose     Hallucinations: [] Mild [] Moderate [] T50.902A    History of seizure disorder Z80.75    Suicide and self-inflicted injuries by jumping from high place ([facility] [address] X80. Jonah Mustache Malingering Z76.5    Chest pain R07.9    Seizure-like activity ([facility] R56.9    Depression with suicidal ideation F32.9, R45.851    Major depression, recurrent ([facility] F33.9         Plan:    []  Patient is refusing medications  [] Improving as expected   [] Not improving as expected   [x] Worsening    []  At Baseline     Continue Paxil to 40 mg daily with plan to increase further as clinically indicated  Continue Remeron 30 mg at bedtime  Continue BuSpar 15 mg 3 times daily for anxiety    Reason for more than one antipsychotic:  [x] N/A  [] 3 failed monotherapy(drugs tried):  [] Cross over to a new antipsychotic  [] Taper to monotherapy from polypharmacy  [] Augmentation of Clozapine therapy due to treatment resistance to single therapy      Signed:  Ann Sol  9/9/4625  2:74 PM

## 2020-02-07 NOTE — PROGRESS NOTES
During med pass, patient attempted to hug this nurse, saying things like \"you're the best nurse here\" and \"you're the only one that checks people's med changes. \" Then began complimenting this nurse's looks. After med administration, patient then said \"I'm gonna need my shot, it's due at 11:15, don't forget so I don't go off on anybody. \" Patient reminded this nurse again at approximately 21:30, saying \"I'll be in my room, make sure you come give me my shot at 11:15.\" Patient is focused on medication and medication administration times.

## 2020-02-07 NOTE — PROGRESS NOTES
IM injectio haldol 1 ml  site dorso gluteal left  And IM injection vistaril 1 ml.site dorso gluteal right, administered to patient per RN instruction due to agitation.

## 2020-02-07 NOTE — PROGRESS NOTES
Patient c/o shakiness possibly r/t anxiety. Patient just wants the doctor/np to know. Will continue to monitor patient and provide support as needed.

## 2020-02-08 LAB
EKG ATRIAL RATE: 106 BPM
EKG P AXIS: 62 DEGREES
EKG P-R INTERVAL: 126 MS
EKG Q-T INTERVAL: 348 MS
EKG QRS DURATION: 80 MS
EKG QTC CALCULATION (BAZETT): 462 MS
EKG R AXIS: 61 DEGREES
EKG VENTRICULAR RATE: 106 BPM

## 2020-02-08 PROCEDURE — 93010 ELECTROCARDIOGRAM REPORT: CPT | Performed by: INTERNAL MEDICINE

## 2020-02-08 PROCEDURE — 6360000002 HC RX W HCPCS: Performed by: NURSE PRACTITIONER

## 2020-02-08 PROCEDURE — 93005 ELECTROCARDIOGRAM TRACING: CPT | Performed by: NURSE PRACTITIONER

## 2020-02-08 PROCEDURE — 1240000000 HC EMOTIONAL WELLNESS R&B

## 2020-02-08 PROCEDURE — 6370000000 HC RX 637 (ALT 250 FOR IP): Performed by: NURSE PRACTITIONER

## 2020-02-08 PROCEDURE — 99232 SBSQ HOSP IP/OBS MODERATE 35: CPT | Performed by: NURSE PRACTITIONER

## 2020-02-08 RX ADMIN — HYDROXYZINE HYDROCHLORIDE 50 MG: 50 INJECTION, SOLUTION INTRAMUSCULAR at 16:36

## 2020-02-08 RX ADMIN — HALOPERIDOL LACTATE 5 MG: 5 INJECTION INTRAMUSCULAR at 22:19

## 2020-02-08 RX ADMIN — LOSARTAN POTASSIUM 50 MG: 50 TABLET ORAL at 09:23

## 2020-02-08 RX ADMIN — BUPRENORPHINE AND NALOXONE 1 TABLET: 8; 2 TABLET SUBLINGUAL at 21:41

## 2020-02-08 RX ADMIN — HYDROXYZINE HYDROCHLORIDE 50 MG: 50 INJECTION, SOLUTION INTRAMUSCULAR at 22:20

## 2020-02-08 RX ADMIN — PAROXETINE 40 MG: 20 TABLET, FILM COATED ORAL at 09:23

## 2020-02-08 RX ADMIN — PRAVASTATIN SODIUM 20 MG: 20 TABLET ORAL at 21:42

## 2020-02-08 RX ADMIN — LEVETIRACETAM 750 MG: 500 TABLET ORAL at 09:23

## 2020-02-08 RX ADMIN — LEVETIRACETAM 750 MG: 500 TABLET ORAL at 21:42

## 2020-02-08 RX ADMIN — BACITRACIN, NEOMYCIN, POLYMYXIN B: 400; 3.5; 5 OINTMENT TOPICAL at 21:42

## 2020-02-08 RX ADMIN — BUSPIRONE HYDROCHLORIDE 15 MG: 10 TABLET ORAL at 21:42

## 2020-02-08 RX ADMIN — TRAZODONE HYDROCHLORIDE 100 MG: 50 TABLET ORAL at 21:45

## 2020-02-08 RX ADMIN — PANTOPRAZOLE SODIUM 40 MG: 40 TABLET, DELAYED RELEASE ORAL at 06:33

## 2020-02-08 RX ADMIN — BUSPIRONE HYDROCHLORIDE 15 MG: 10 TABLET ORAL at 09:23

## 2020-02-08 RX ADMIN — BUPRENORPHINE AND NALOXONE 1 TABLET: 8; 2 TABLET SUBLINGUAL at 09:23

## 2020-02-08 RX ADMIN — MIRTAZAPINE 30 MG: 15 TABLET, FILM COATED ORAL at 21:42

## 2020-02-08 RX ADMIN — BACITRACIN, NEOMYCIN, POLYMYXIN B: 400; 3.5; 5 OINTMENT TOPICAL at 09:23

## 2020-02-08 RX ADMIN — HALOPERIDOL LACTATE 5 MG: 5 INJECTION INTRAMUSCULAR at 16:36

## 2020-02-08 RX ADMIN — HYDROXYZINE HYDROCHLORIDE 50 MG: 50 INJECTION, SOLUTION INTRAMUSCULAR at 10:11

## 2020-02-08 RX ADMIN — HYDROXYZINE HYDROCHLORIDE 50 MG: 50 INJECTION, SOLUTION INTRAMUSCULAR at 03:47

## 2020-02-08 RX ADMIN — HALOPERIDOL LACTATE 5 MG: 5 INJECTION INTRAMUSCULAR at 10:10

## 2020-02-08 ASSESSMENT — PAIN SCALES - GENERAL
PAINLEVEL_OUTOF10: 0
PAINLEVEL_OUTOF10: 2
PAINLEVEL_OUTOF10: 3

## 2020-02-08 NOTE — PROGRESS NOTES
diarrhea  Genitourinary Symptoms: []  dysuria  []  hematuria   Musculoskeletal Symptoms: []  back pain []  muscle pain []  joint pain  Neurologic Symptoms: []  headache []  dizziness  Hematolymphoid Symptoms: [] Adenopathy [] Bruises   [] Schimosis       Psychiatric Review of systems  Delusions:  [x] Denies [] Endorses   Withdrawals:  [x] Denies [] Endorses    Hallucinations: [x] Denies [] Endorses    Extra Pyramidal Symptoms: [x] Denies [] Endorses      /89   Pulse 119   Temp 97.1 °F (36.2 °C)   Resp 18   Ht 5' 2\" (1.575 m)   Wt 180 lb (81.6 kg)   BMI 32.92 kg/m²     Mental Status Examination:    Cognition:      [] Alert  [x] Awake  [x] Oriented  [x] Person  [x] Place [x] Time      [] drowsy  [] tired  [] lethargic  [] distractable  [] Other     Attention/Concentration:   [x] Attentive  [] Distracted        Memory Recent and Remote: [x] Intact   [] Impaired [] Partially Impaired     Language: [x] Able to recognize and name objects          [] Unable to recognize and name Objects    Fund of Knowledge:  [] Poor []  Fair  [x] Good    Speech: [x] Normal  [] Soft  [] Slow  [] Fast [] Pressured            [] Loud [] Dysarthria  [] Incoherent    Appearance: [] Well Groomed  [x] Casual Dressed  [] Unkept  [] Disheveled          [] Normal weight[] Thin  [] Overweight  [] Obese           Attitude: [] Positive  [] Hostile  [] Demanding  [] Guarded  [] Defensive         [x] Cooperative  []  Uncooperative      Behavior:  [x] Normal Gait  [] Walks with Assistance  [] Kourtney Chair    [] Walks with Fayne Don  [] In Hospital Bed  [] Sitting in Chair    Muscle-Skeletal:  [x] Normal Muscle Tone [] Muscle Atrophy       [] Abnormal Muscle Movement     Eye Contact:  [x] Good eye contact  [] Intermittent Eye Contact  [] Poor Eye Contact     Mood: [] Depressed  [x] Anxious  [] Irritated  [] Euthymic   [] Angry [] Restless     Affect:  [x] Congruent  [x] Incongruent  [] Labile  [] Constricted  [] Flat  [] Bizarre     Thought Process and Association:  [x] Logical [x] Illogical       [] Linear and Goal Directed  [] Tangential  [] Circumstantial     Thought Content:  [x] Denies [] Endorses [] Suicidal [] Homicidal  [] Delusional      [] Paranoid  [] Somatic  [] Grandiose    Perception: [x]  None  [] Auditory   [] Visual  [] tactile   [] olfactory  [] Illusions         Insight: [] Intact  [x] Fair  [] Limited   judgement:  [] Intact  [x] Fair  [x] Limited       Assessment/Plan:        Patient Active Problem List   Diagnosis Code    Nausea & vomiting R11.2    Gastroparesis K31.84    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    Precordial pain R07.2    Chronic abdominal pain R10.9, G89.29    Chronic low back pain M54.5, G89.29    Mood disorder (HCC) F39    History of pericarditis Z86.79    Shortness of breath R06.02    Sinus tachycardia R00.0    Non-intractable cyclical vomiting with nausea R11.15    Functional diarrhea K59.1    Weight loss R63.4    Pain of upper abdomen R10.10    Colitis K52.9    Vasovagal syncope R55    Medically noncompliant Z91.19    Suicide ideation R45.851    Seizure disorder ([address] G40.909    Essential hypertension I10    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 Leanna Umesh Malingering Z76.5    Chest pain R07.9    Seizure-like activity ([address] R56.9    Depression with suicidal ideation F32.9, R45.851    Major depression, recurrent ([address] F33.9         Plan:    []  Patient is refusing medications  [x] Improving as expected   [] Not improving as expected   [] Worsening    []  At Baseline     Continue Paxil to 40 mg daily   Continue Remeron 30 mg at bedtime  Continue BuSpar 15 mg 3 times daily for anxiety  Increase as needed trazodone 200 mg at bedtime    Reason for more than one antipsychotic:  [x] N/A  [] 3 failed monotherapy(drugs tried):  [] Cross over to a new antipsychotic  [] Taper to

## 2020-02-08 NOTE — PLAN OF CARE
Somatic stating her heart was beating very fast  vitals taken and pt stated \"my shot isn't due so can you call [facility] and ask for more? \" isolative to room most of this shift   denies SI/HI  denies hallucinations   select groups   will continue to monitor

## 2020-02-08 NOTE — CARE COORDINATION
SW spoke to Dirk Republic at [facility]     She reported they can transport pt to weekly dosing as pt receives 7 day supply and is med compliant    SW met with pt pt reports she will use her continued coping skills to maintain positive mood    Pt reports her goal is to stay with family and get an apartment once she is released from [place]

## 2020-02-08 NOTE — PROGRESS NOTES
Patient complains of severe anxiety and hand tremors related to this anxiety, requests IM Haldol and Vistaril, states only IM form is effective at decreasing her anxiety and tremors. Hand tremors visible to this nurse, patient medicated with PRN medications. Patient complains of trouble sleeping, requested PRN Trazodone for help sleeping. Was given as requested.

## 2020-02-09 PROCEDURE — 99232 SBSQ HOSP IP/OBS MODERATE 35: CPT | Performed by: NURSE PRACTITIONER

## 2020-02-09 PROCEDURE — 6370000000 HC RX 637 (ALT 250 FOR IP): Performed by: NURSE PRACTITIONER

## 2020-02-09 PROCEDURE — 6360000002 HC RX W HCPCS: Performed by: NURSE PRACTITIONER

## 2020-02-09 PROCEDURE — 1240000000 HC EMOTIONAL WELLNESS R&B

## 2020-02-09 RX ORDER — TRAZODONE HYDROCHLORIDE 100 MG/1
100 TABLET ORAL NIGHTLY PRN
Qty: 14 TABLET | Refills: 0 | Status: SHIPPED | OUTPATIENT
Start: 2020-02-09 | End: 2022-01-05

## 2020-02-09 RX ORDER — PAROXETINE HYDROCHLORIDE 40 MG/1
40 TABLET, FILM COATED ORAL DAILY
Qty: 14 TABLET | Refills: 0 | Status: SHIPPED | OUTPATIENT
Start: 2020-02-09 | End: 2021-03-05 | Stop reason: DRUGHIGH

## 2020-02-09 RX ORDER — BUSPIRONE HYDROCHLORIDE 15 MG/1
15 TABLET ORAL 3 TIMES DAILY
Qty: 42 TABLET | Refills: 0 | Status: SHIPPED | OUTPATIENT
Start: 2020-02-09 | End: 2020-02-23

## 2020-02-09 RX ORDER — MIRTAZAPINE 30 MG/1
30 TABLET, FILM COATED ORAL NIGHTLY
Qty: 14 TABLET | Refills: 0 | Status: SHIPPED | OUTPATIENT
Start: 2020-02-09 | End: 2021-03-05 | Stop reason: DRUGHIGH

## 2020-02-09 RX ADMIN — LOSARTAN POTASSIUM 50 MG: 50 TABLET ORAL at 09:23

## 2020-02-09 RX ADMIN — PRAVASTATIN SODIUM 20 MG: 20 TABLET ORAL at 20:51

## 2020-02-09 RX ADMIN — LEVETIRACETAM 750 MG: 500 TABLET ORAL at 09:23

## 2020-02-09 RX ADMIN — LEVETIRACETAM 750 MG: 500 TABLET ORAL at 20:51

## 2020-02-09 RX ADMIN — HALOPERIDOL LACTATE 5 MG: 5 INJECTION INTRAMUSCULAR at 15:53

## 2020-02-09 RX ADMIN — BUSPIRONE HYDROCHLORIDE 15 MG: 10 TABLET ORAL at 14:32

## 2020-02-09 RX ADMIN — MIRTAZAPINE 30 MG: 15 TABLET, FILM COATED ORAL at 20:51

## 2020-02-09 RX ADMIN — HALOPERIDOL LACTATE 5 MG: 5 INJECTION INTRAMUSCULAR at 21:51

## 2020-02-09 RX ADMIN — BACITRACIN, NEOMYCIN, POLYMYXIN B 1 G: 400; 3.5; 5 OINTMENT TOPICAL at 09:23

## 2020-02-09 RX ADMIN — PAROXETINE 40 MG: 20 TABLET, FILM COATED ORAL at 09:23

## 2020-02-09 RX ADMIN — HYDROXYZINE HYDROCHLORIDE 50 MG: 50 INJECTION, SOLUTION INTRAMUSCULAR at 09:35

## 2020-02-09 RX ADMIN — HYDROXYZINE HYDROCHLORIDE 50 MG: 50 INJECTION, SOLUTION INTRAMUSCULAR at 21:59

## 2020-02-09 RX ADMIN — BUPRENORPHINE AND NALOXONE 1 TABLET: 8; 2 TABLET SUBLINGUAL at 20:52

## 2020-02-09 RX ADMIN — BUSPIRONE HYDROCHLORIDE 15 MG: 10 TABLET ORAL at 09:23

## 2020-02-09 RX ADMIN — HALOPERIDOL LACTATE 5 MG: 5 INJECTION INTRAMUSCULAR at 09:35

## 2020-02-09 RX ADMIN — BUSPIRONE HYDROCHLORIDE 15 MG: 10 TABLET ORAL at 20:51

## 2020-02-09 RX ADMIN — BUPRENORPHINE AND NALOXONE 1 TABLET: 8; 2 TABLET SUBLINGUAL at 09:23

## 2020-02-09 RX ADMIN — PANTOPRAZOLE SODIUM 40 MG: 40 TABLET, DELAYED RELEASE ORAL at 06:17

## 2020-02-09 RX ADMIN — HYDROXYZINE HYDROCHLORIDE 50 MG: 50 INJECTION, SOLUTION INTRAMUSCULAR at 15:54

## 2020-02-09 ASSESSMENT — PAIN SCALES - GENERAL
PAINLEVEL_OUTOF10: 0

## 2020-02-09 NOTE — CARE COORDINATION
KILO informed by nursing there is no delivery from pharmacy at [facility] to [facility] today    Pt to be accepted tomorrow    SW to call tomorrow [phone]

## 2020-02-09 NOTE — PROGRESS NOTES
DATE OF SERVICE:     2/9/2020    [address] [place] [address] seen today for the purpose of continuation of care. Nursing, social work reports, laboratory studies and vital signs are reviewed. Patient chief complaint today is: Agitated requiring police to be called several times throughout the day        Subjective: Patient seen in treatment team today. She states that her anxiety is improving. She states that she feels like she is doing much better than she was on admission. She vehemently denies any suicidal homicidal ideations intent or plan. She is eating well sleeping well no neurovegetative signs of depression. She denies auditory visual [place] no overt overt signs psychosis. Has not had any other behavioral outburst.  She has been up on the unit, cooperative attends group social with peers in the unit. She is a patient health that she is received here. Sleep:  [x] Good [] Fair  [] Poor  Appetite:  [x] Good [] Fair  [] Poor    Depression:  [] Mild [x] Moderate [] Severe                [x] Constant [] Sporadic     Anxiety: [] Mild [] Moderate [x] Severe    [x] Constant [] Sporadic     Delusions: [] Mild [] Moderate [] Severe     [] Constant [] Sporadic     [] Paranoid [] Somatic [] Grandiose     Hallucinations: [] Mild [] Moderate [] Severe     [] Constant [] Sporadic    [] Auditory  [] Visual [] Tactile       Suicidal: [] Constant [x] Sporadic  Homicidal: [] Constant [] Sporadic    Unscheduled Medications     [] Patient Receiving Emergency Medications \" Chemical Restraint\"   [x] Requesting PRN medications for anxiety    Medical Review of Systems:     All other than marked systmes have been reviewed and are all negative.     Constitutional Symptoms: []  fever []  Chills  Skin Symptoms: [] rash []  Pruritus   Eye Symptoms: [] Vision unchanged []  recent vision problems[] blurred vision   Respiratory Symptoms:[] shortness of breath [] cough  Cardiovascular Symptoms:  [] chest pain   [] palpitations Gastrointestinal Symptoms: []  abdominal pain []  nausea []  vomiting []  diarrhea  Genitourinary Symptoms: []  dysuria  []  hematuria   Musculoskeletal Symptoms: []  back pain []  muscle pain []  joint pain  Neurologic Symptoms: []  headache []  dizziness  Hematolymphoid Symptoms: [] Adenopathy [] Bruises   [] Schimosis       Psychiatric Review of systems  Delusions:  [x] Denies [] Endorses   Withdrawals:  [x] Denies [] Endorses    Hallucinations: [x] Denies [] Endorses    Extra Pyramidal Symptoms: [x] Denies [] Endorses      BP (!) 120/57   Pulse 97   Temp 98 °F (36.7 °C)   Resp 14   Ht 5' 2\" (1.575 m)   Wt 180 lb (81.6 kg)   BMI 32.92 kg/m²     Mental Status Examination:    Cognition:      [] Alert  [x] Awake  [x] Oriented  [x] Person  [x] Place [x] Time      [] drowsy  [] tired  [] lethargic  [] distractable  [] Other     Attention/Concentration:   [x] Attentive  [] Distracted        Memory Recent and Remote: [x] Intact   [] Impaired [] Partially Impaired     Language: [x] Able to recognize and name objects          [] Unable to recognize and name Objects    Fund of Knowledge:  [] Poor []  Fair  [x] Good    Speech: [x] Normal  [] Soft  [] Slow  [] Fast [] Pressured            [] Loud [] Dysarthria  [] Incoherent    Appearance: [] Well Groomed  [x] Casual Dressed  [] Unkept  [] Disheveled          [] Normal weight[] Thin  [] Overweight  [] Obese           Attitude: [] Positive  [] Hostile  [] Demanding  [] Guarded  [] Defensive         [x] Cooperative  []  Uncooperative      Behavior:  [x] Normal Gait  [] Walks with Assistance  [] Kourtney Chair    [] Walks with Alexus Gonzalez  [] In Hospital Bed  [] Sitting in Chair    Muscle-Skeletal:  [x] Normal Muscle Tone [] Muscle Atrophy       [] Abnormal Muscle Movement     Eye Contact:  [x] Good eye contact  [] Intermittent Eye Contact  [] Poor Eye Contact     Mood: [] Depressed  [x] Anxious  [] Irritated  [] Euthymic   [] Angry [] Restless     Affect:  [x] Congruent  [x] Incongruent  [] Labile  [] Constricted  [] Flat  [] Bizarre     Thought Process and Association:  [x] Logical [] Illogical       [] Linear and Goal Directed  [] Tangential  [] Circumstantial     Thought Content:  [x] Denies [] Endorses [] Suicidal [] Homicidal  [] Delusional      [] Paranoid  [] Somatic  [] Grandiose    Perception: [x]  None  [] Auditory   [] Visual  [] tactile   [] olfactory  [] Illusions         Insight: [] Intact  [x] Fair  [] Limited   judgement:  [] Intact  [x] Fair  [] Limited       Assessment/Plan:        Patient Active Problem List   Diagnosis Code    Nausea & vomiting R11.2    Gastroparesis K31.84    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    Precordial pain R07.2    Chronic abdominal pain R10.9, G89.29    Chronic low back pain M54.5, G89.29    Mood disorder (HCC) F39    History of pericarditis Z86.79    Shortness of breath R06.02    Sinus tachycardia R00.0    Non-intractable cyclical vomiting with nausea R11.15    Functional diarrhea K59.1    Weight loss R63.4    Pain of upper abdomen R10.10    Colitis K52.9    Vasovagal syncope R55    Medically noncompliant Z91.19    Suicide ideation R45.851    Seizure disorder ([address] G40.909    Essential hypertension I10    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 Karilyn Messenger Malingering Z76.5    Chest pain R07.9    Seizure-like activity ([address] R56.9    Depression with suicidal ideation F32.9, R45.851    Major depression, recurrent ([address] F33.9         Plan:    []  Patient is refusing medications  [x] Improving as expected   [] Not improving as expected   [] Worsening    []  At Baseline     Continue Paxil to 40 mg daily   Continue Remeron 30 mg at bedtime  Continue BuSpar 15 mg 3 times daily for anxiety  Continue as needed trazodone 200 mg at bedtime    Reason for more than one antipsychotic:  [x] N/A  [] 3 failed monotherapy(drugs tried):  [] Cross over to a new antipsychotic  [] Taper to monotherapy from polypharmacy  [] Augmentation of Clozapine therapy due to treatment resistance to single therapy      Signed:  Sherrie Mejia  5/9/0214  1:74 PM

## 2020-02-09 NOTE — PROGRESS NOTES
Patient approached nurse asking for PRN IM medications for agitation and anxiety. Patient stating that she is doing better with medications, able to go longer in between, 12 hours this time. Medicated per orders with (see MAR). Observed to have mild tremors but are better than yesterday. Appears to be agitated but remaining in control at this time. Patient has a history of becoming physically violent with explosive behavior. RN notified, will continue to monitor for effect.

## 2020-02-09 NOTE — PROGRESS NOTES
cough  Cardiovascular Symptoms:  [] chest pain   [] palpitations   Gastrointestinal Symptoms: []  abdominal pain []  nausea []  vomiting []  diarrhea  Genitourinary Symptoms: []  dysuria  []  hematuria   Musculoskeletal Symptoms: []  back pain []  muscle pain []  joint pain  Neurologic Symptoms: []  headache []  dizziness  Hematolymphoid Symptoms: [] Adenopathy [] Bruises   [] Schimosis       Psychiatric Review of systems  Delusions:  [x] Denies [] Endorses   Withdrawals:  [x] Denies [] Endorses    Hallucinations: [x] Denies [] Endorses    Extra Pyramidal Symptoms: [x] Denies [] Endorses      BP (!) 120/57   Pulse 97   Temp 98 °F (36.7 °C)   Resp 14   Ht 5' 2\" (1.575 m)   Wt 180 lb (81.6 kg)   BMI 32.92 kg/m²     Mental Status Examination:    Cognition:      [] Alert  [x] Awake  [x] Oriented  [x] Person  [x] Place [x] Time      [] drowsy  [] tired  [] lethargic  [] distractable  [] Other     Attention/Concentration:   [x] Attentive  [] Distracted        Memory Recent and Remote: [x] Intact   [] Impaired [] Partially Impaired     Language: [x] Able to recognize and name objects          [] Unable to recognize and name Objects    Fund of Knowledge:  [] Poor []  Fair  [x] Good    Speech: [x] Normal  [] Soft  [] Slow  [] Fast [] Pressured            [] Loud [] Dysarthria  [] Incoherent    Appearance: [] Well Groomed  [x] Casual Dressed  [] Unkept  [] Disheveled          [] Normal weight[] Thin  [] Overweight  [] Obese           Attitude: [] Positive  [] Hostile  [] Demanding  [] Guarded  [] Defensive         [x] Cooperative  []  Uncooperative      Behavior:  [x] Normal Gait  [] Walks with Assistance  [] Kourtney Chair    [] Walks with Brian Chyna  [] In Hospital Bed  [] Sitting in Chair    Muscle-Skeletal:  [x] Normal Muscle Tone [] Muscle Atrophy       [] Abnormal Muscle Movement     Eye Contact:  [x] Good eye contact  [] Intermittent Eye Contact  [] Poor Eye Contact     Mood: [] Depressed  [x] Anxious  [] Irritated  [] Euthymic   [] Angry [] Restless     Affect:  [x] Congruent  [x] Incongruent  [] Labile  [] Constricted  [] Flat  [] Bizarre     Thought Process and Association:  [x] Logical [] Illogical       [] Linear and Goal Directed  [] Tangential  [] Circumstantial     Thought Content:  [x] Denies [] Endorses [] Suicidal [] Homicidal  [] Delusional      [] Paranoid  [] Somatic  [] Grandiose    Perception: [x]  None  [] Auditory   [] Visual  [] tactile   [] olfactory  [] Illusions         Insight: [] Intact  [x] Fair  [] Limited   judgement:  [] Intact  [x] Fair  [] Limited       Assessment/Plan:        Patient Active Problem List   Diagnosis Code    Nausea & vomiting R11.2    Gastroparesis K31.84    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    Precordial pain R07.2    Chronic abdominal pain R10.9, G89.29    Chronic low back pain M54.5, G89.29    Mood disorder (HCC) F39    History of pericarditis Z86.79    Shortness of breath R06.02    Sinus tachycardia R00.0    Non-intractable cyclical vomiting with nausea R11.15    Functional diarrhea K59.1    Weight loss R63.4    Pain of upper abdomen R10.10    Colitis K52.9    Vasovagal syncope R55    Medically noncompliant Z91.19    Suicide ideation R45.851    Seizure disorder ([address] G40.909    Essential hypertension I10    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 Cezar Abiola Malingering Z76.5    Chest pain R07.9    Seizure-like activity ([address] R56.9    Depression with suicidal ideation F32.9, R45.851    Major depression, recurrent ([facility] F33.9         Plan:    []  Patient is refusing medications  [x] Improving as expected   [] Not improving as expected   [] Worsening    []  At Baseline     Continue Paxil to 40 mg daily   Continue Remeron 30 mg at bedtime  Continue BuSpar 15 mg 3 times daily for anxiety  Continue as needed trazodone 200 mg at

## 2020-02-09 NOTE — PLAN OF CARE
Mood is improved   remains anxious  denies SI/HI   denies hallucinations   optimistic about going to [place]  however informed pt that she can not go today due to inability to obtain her meds today and will go tomorrow  pt voiced that she is ok with that   will continue to monitor

## 2020-02-10 PROCEDURE — 6360000002 HC RX W HCPCS: Performed by: NURSE PRACTITIONER

## 2020-02-10 PROCEDURE — 99232 SBSQ HOSP IP/OBS MODERATE 35: CPT | Performed by: NURSE PRACTITIONER

## 2020-02-10 PROCEDURE — 6370000000 HC RX 637 (ALT 250 FOR IP): Performed by: NURSE PRACTITIONER

## 2020-02-10 PROCEDURE — 1240000000 HC EMOTIONAL WELLNESS R&B

## 2020-02-10 RX ORDER — PRAVASTATIN SODIUM 20 MG
20 TABLET ORAL DAILY
Qty: 14 TABLET | Refills: 0 | Status: SHIPPED
Start: 2020-02-10 | End: 2020-03-02 | Stop reason: SDUPTHER

## 2020-02-10 RX ADMIN — BUSPIRONE HYDROCHLORIDE 15 MG: 10 TABLET ORAL at 09:05

## 2020-02-10 RX ADMIN — HYDROXYZINE HYDROCHLORIDE 50 MG: 50 INJECTION, SOLUTION INTRAMUSCULAR at 08:59

## 2020-02-10 RX ADMIN — MIRTAZAPINE 30 MG: 15 TABLET, FILM COATED ORAL at 20:01

## 2020-02-10 RX ADMIN — BUSPIRONE HYDROCHLORIDE 15 MG: 10 TABLET ORAL at 13:21

## 2020-02-10 RX ADMIN — LOSARTAN POTASSIUM 50 MG: 50 TABLET ORAL at 09:06

## 2020-02-10 RX ADMIN — HYDROXYZINE PAMOATE 50 MG: 25 CAPSULE ORAL at 21:36

## 2020-02-10 RX ADMIN — BACITRACIN, NEOMYCIN, POLYMYXIN B 1 G: 400; 3.5; 5 OINTMENT TOPICAL at 09:04

## 2020-02-10 RX ADMIN — HYDROXYZINE PAMOATE 50 MG: 25 CAPSULE ORAL at 15:34

## 2020-02-10 RX ADMIN — LEVETIRACETAM 750 MG: 500 TABLET ORAL at 09:06

## 2020-02-10 RX ADMIN — HALOPERIDOL LACTATE 5 MG: 5 INJECTION INTRAMUSCULAR at 08:59

## 2020-02-10 RX ADMIN — BUPRENORPHINE AND NALOXONE 1 TABLET: 8; 2 TABLET SUBLINGUAL at 09:05

## 2020-02-10 RX ADMIN — PANTOPRAZOLE SODIUM 40 MG: 40 TABLET, DELAYED RELEASE ORAL at 06:08

## 2020-02-10 RX ADMIN — LEVETIRACETAM 750 MG: 500 TABLET ORAL at 20:01

## 2020-02-10 RX ADMIN — PAROXETINE 40 MG: 20 TABLET, FILM COATED ORAL at 09:12

## 2020-02-10 RX ADMIN — PRAVASTATIN SODIUM 20 MG: 20 TABLET ORAL at 20:01

## 2020-02-10 RX ADMIN — BUSPIRONE HYDROCHLORIDE 15 MG: 10 TABLET ORAL at 20:01

## 2020-02-10 RX ADMIN — BACITRACIN, NEOMYCIN, POLYMYXIN B 1 G: 400; 3.5; 5 OINTMENT TOPICAL at 20:01

## 2020-02-10 RX ADMIN — BUPRENORPHINE AND NALOXONE 1 TABLET: 8; 2 TABLET SUBLINGUAL at 20:04

## 2020-02-10 RX ADMIN — TRAZODONE HYDROCHLORIDE 100 MG: 50 TABLET ORAL at 20:01

## 2020-02-10 ASSESSMENT — PAIN SCALES - GENERAL
PAINLEVEL_OUTOF10: 0
PAINLEVEL_OUTOF10: 6
PAINLEVEL_OUTOF10: 0
PAINLEVEL_OUTOF10: 0

## 2020-02-10 NOTE — CARE COORDINATION
KILO Note: d/c planning: KILO faxed updated info this am on pt. KILO called [facility] and informed them of updated fax. They will have Zandra call me re: if they can accept her. Per info on chart on Saturday, Zandra stated that they would be able to transport her to weekly suboxone appts as she receives 7 day supply and is med complaint. KILO received message from [facility] that they are full today so will fax info to [place] crisis unit as they have beds today. PT will need to get transport to suboxone appt through [facility]. [place] crisis unit declined as they don't feel she is stable enough currently. [facility] called back stating female bed just opened. However, upon review of her chart, they noticed she has been getting ims. They can't accept any pts that are getting im meds for agitation. She must be off im meds for 24 hrs before they will look at taking her.

## 2020-02-10 NOTE — PROGRESS NOTES
Patient stated she was anxious and wanted IM PRN medications. This staff informed patient that receiving IM PRN medications can effect discharge today, this staff asked patient if she would like PO. Pt. Agreed, this staff went to get PO PRN medication for patient. When this staff returned to give patient medication, patient became belligerent with this staff, stating \"you have no right to tell me what I can take, you're ignorant! \". I attempted to apply ENOC and calm patient but patient would not stop being verbally aggressive with this staff. This administered IM PRN medications to patient, will continue to monitor.

## 2020-02-10 NOTE — PROGRESS NOTES
are all negative.     Constitutional Symptoms: []  fever []  Chills  Skin Symptoms: [] rash []  Pruritus   Eye Symptoms: [] Vision unchanged []  recent vision problems[] blurred vision   Respiratory Symptoms:[] shortness of breath [] cough  Cardiovascular Symptoms:  [] chest pain   [] palpitations   Gastrointestinal Symptoms: []  abdominal pain []  nausea []  vomiting []  diarrhea  Genitourinary Symptoms: []  dysuria  []  hematuria   Musculoskeletal Symptoms: []  back pain []  muscle pain []  joint pain  Neurologic Symptoms: []  headache []  dizziness  Hematolymphoid Symptoms: [] Adenopathy [] Bruises   [] Schimosis       Psychiatric Review of systems  Delusions:  [x] Denies [] Endorses   Withdrawals:  [x] Denies [] Endorses    Hallucinations: [x] Denies [] Endorses    Extra Pyramidal Symptoms: [x] Denies [] Endorses      /78   Pulse 125   Temp 98.1 °F (36.7 °C)   Resp 20   Ht 5' 2\" (1.575 m)   Wt 180 lb (81.6 kg)   BMI 32.92 kg/m²     Mental Status Examination:    Cognition:      [] Alert  [x] Awake  [x] Oriented  [x] Person  [x] Place [x] Time      [] drowsy  [] tired  [] lethargic  [] distractable  [] Other     Attention/Concentration:   [x] Attentive  [] Distracted        Memory Recent and Remote: [x] Intact   [] Impaired [] Partially Impaired     Language: [x] Able to recognize and name objects          [] Unable to recognize and name Objects    Fund of Knowledge:  [] Poor []  Fair  [x] Good    Speech: [x] Normal  [] Soft  [] Slow  [] Fast [] Pressured            [] Loud [] Dysarthria  [] Incoherent    Appearance: [] Well Groomed  [x] Casual Dressed  [] Unkept  [] Disheveled          [] Normal weight[] Thin  [] Overweight  [] Obese           Attitude: [] Positive  [] Hostile  [] Demanding  [] Guarded  [] Defensive         [x] Cooperative  []  Uncooperative      Behavior:  [x] Normal Gait  [] Walks with Assistance  [] Kourtney Chair    [] Walks with Aliyah Bartelso  [] In Hospital Bed  [] Sitting in Chair    Muscle-Skeletal:  [x] Normal Muscle Tone [] Muscle Atrophy       [] Abnormal Muscle Movement     Eye Contact:  [x] Good eye contact  [] Intermittent Eye Contact  [] Poor Eye Contact     Mood: [] Depressed  [x] Anxious  [] Irritated  [] Euthymic   [] Angry [] Restless     Affect:  [x] Congruent  [x] Incongruent  [] Labile  [] Constricted  [] Flat  [] Bizarre     Thought Process and Association:  [x] Logical [] Illogical       [] Linear and Goal Directed  [] Tangential  [] Circumstantial     Thought Content:  [x] Denies [] Endorses [] Suicidal [] Homicidal  [] Delusional      [] Paranoid  [] Somatic  [] Grandiose    Perception: [x]  None  [] Auditory   [] Visual  [] tactile   [] olfactory  [] Illusions         Insight: [] Intact  [x] Fair  [] Limited   judgement:  [] Intact  [x] Fair  [] Limited       Assessment/Plan:        Patient Active Problem List   Diagnosis Code    Nausea & vomiting R11.2    Gastroparesis K31.84    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    Precordial pain R07.2    Chronic abdominal pain R10.9, G89.29    Chronic low back pain M54.5, G89.29    Mood disorder (HCC) F39    History of pericarditis Z86.79    Shortness of breath R06.02    Sinus tachycardia R00.0    Non-intractable cyclical vomiting with nausea R11.15    Functional diarrhea K59.1    Weight loss R63.4    Pain of upper abdomen R10.10    Colitis K52.9    Vasovagal syncope R55    Medically noncompliant Z91.19    Suicide ideation R45.851    Seizure disorder ([facility] G40.909    Essential hypertension I10    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 [place] Malingering Z76.5    Chest pain R07.9    Seizure-like activity ([facility] R56.9    Depression with suicidal ideation F32.9, R45.851    Major depression, recurrent ([facility] [address] [facility] F33.9         Plan:    []  Patient is refusing medications  [x] Improving

## 2020-02-10 NOTE — GROUP NOTE
Group Therapy Note    Date: 2/10/2020    Group Start Time: 1115  Group End Time: 1200  Group Topic: Psychotherapy    [facility]    FABIOLA Katz; HOMAR Watson        Group Therapy Note    Attendees: 5         Patient's Goal:  To learn how to more effectively communicate in relationships. Notes: Patient was able to identify different ways to positively communicate with others. Patient was interactive and able to give positive feedback to others. Status After Intervention:  Improved    Participation Level:  Active Listener and Interactive    Participation Quality: Appropriate, Attentive, Sharing and Supportive      Speech:  normal      Thought Process/Content: Logical      Affective Functioning: Flat      Mood: euthymic      Level of consciousness:  Alert, Oriented x4 and Attentive      Response to Learning: Able to verbalize current knowledge/experience, Able to verbalize/acknowledge new learning and Able to retain information      Endings: None Reported    Modes of Intervention: Education, Support, Socialization and Exploration      Discipline Responsible: /Counselor      Signature:  FABIOLA Katz

## 2020-02-10 NOTE — PLAN OF CARE
Problem: Depressive Behavior With or Without Suicide Precautions:  Goal: Able to verbalize and/or display a decrease in depressive symptoms  Description  Able to verbalize and/or display a decrease in depressive symptoms  2/9/2020 2015 by Homar Carrasco RN  Outcome: Ongoing     Patient is withdrawn to room. Presents as sad and depressed. Reports feeling \"anxious\" about not leaving because she \"had things set up for today\". Emotional support given. Currently denies SI, HI, and hallucinations at this time. Purposeful rounding continued.

## 2020-02-11 ENCOUNTER — TELEPHONE (OUTPATIENT)
Dept: CARDIOLOGY | Age: 50
End: 2020-02-11

## 2020-02-11 VITALS
WEIGHT: 180 LBS | DIASTOLIC BLOOD PRESSURE: 80 MMHG | HEIGHT: 62 IN | BODY MASS INDEX: 33.13 KG/M2 | TEMPERATURE: 98.5 F | SYSTOLIC BLOOD PRESSURE: 141 MMHG | HEART RATE: 98 BPM | RESPIRATION RATE: 14 BRPM

## 2020-02-11 PROCEDURE — 99239 HOSP IP/OBS DSCHRG MGMT >30: CPT | Performed by: NURSE PRACTITIONER

## 2020-02-11 PROCEDURE — 6360000002 HC RX W HCPCS: Performed by: NURSE PRACTITIONER

## 2020-02-11 PROCEDURE — 6370000000 HC RX 637 (ALT 250 FOR IP): Performed by: NURSE PRACTITIONER

## 2020-02-11 RX ADMIN — HYDROXYZINE PAMOATE 50 MG: 25 CAPSULE ORAL at 12:55

## 2020-02-11 RX ADMIN — PANTOPRAZOLE SODIUM 40 MG: 40 TABLET, DELAYED RELEASE ORAL at 06:45

## 2020-02-11 RX ADMIN — LOSARTAN POTASSIUM 50 MG: 50 TABLET ORAL at 09:06

## 2020-02-11 RX ADMIN — BUSPIRONE HYDROCHLORIDE 15 MG: 10 TABLET ORAL at 14:36

## 2020-02-11 RX ADMIN — HYDROXYZINE PAMOATE 50 MG: 25 CAPSULE ORAL at 06:45

## 2020-02-11 RX ADMIN — LEVETIRACETAM 750 MG: 500 TABLET ORAL at 09:05

## 2020-02-11 RX ADMIN — BUPRENORPHINE AND NALOXONE 1 TABLET: 8; 2 TABLET SUBLINGUAL at 09:05

## 2020-02-11 RX ADMIN — PAROXETINE 40 MG: 20 TABLET, FILM COATED ORAL at 09:06

## 2020-02-11 RX ADMIN — BUSPIRONE HYDROCHLORIDE 15 MG: 10 TABLET ORAL at 09:06

## 2020-02-11 ASSESSMENT — PAIN SCALES - GENERAL
PAINLEVEL_OUTOF10: 0
PAINLEVEL_OUTOF10: 0

## 2020-02-11 NOTE — PROGRESS NOTES
230-300    Pt attended pet therapy group. Enjoyed visiting with pets and socializing with peers. Pt was 1 out of 13 in attendance.

## 2020-02-11 NOTE — GROUP NOTE
Group Therapy Note    Date: 2/10/2020    Group Start Time: 2030  Group End Time: 2100  Group Topic: Relaxation    [facility]  1    Caden Benítez RN        Group Therapy Note    Attendees: 12/21       Patient attended group    Signature:  Caden Benítez RN

## 2020-02-11 NOTE — CARE COORDINATION
KILO note: d/c planning: KILO faxed updated info to [facility]. KILO spoke with Deepti Valdez there who states they have no beds currently. KILO faxed info then to crisis Stabilization unit to Criss's attn. Crisis unit called back and they will accept pt today.

## 2020-02-11 NOTE — PROGRESS NOTES
[facility]  Discharge Note    Pt discharged with followings belongings:   Dentures: None  Vision - Corrective Lenses: None  Hearing Aid: None  Jewelry: None  Body Piercings Removed: N/A  Clothing: Footwear, Jacket / coat, Pants, Shirt, Undergarments (Comment), Socks, Other (Comment)(boots, jeans, bra, 3 t shirts3 pr socks, 3 underwears, jacket, pants, pr shoes,feminine hygiene, more clothes in pink bag )  Were All Patient Medications Collected?: Yes  Other Valuables: None(med drawer) . Valuables retrieved from safe and returned to patient.  Patient education on aftercare instructions:   Patient verbalize understanding of AVS:  .    Status EXAM upon discharge:  Status and Exam  Normal: Yes  Facial Expression: Brightened  Affect: Appropriate  Level of Consciousness: Alert  Mood:Normal: Yes  Mood: Anxious  Motor Activity:Normal: Yes  Motor Activity: Decreased  Interview Behavior: Cooperative  Preception: [place] to Person, Tara Cables to Time, [place] to Place, [place] to Situation  Attention:Normal: Yes  Attention: Distractible  Thought Processes: Circumstantial  Thought Content:Normal: Yes  Thought Content: Preoccupations  Hallucinations: None  Delusions: No  Memory:Normal: Yes  Memory: (intact)  Insight and Judgment: Yes  Insight and Judgment: Poor Judgment, Poor Insight  Present Suicidal Ideation: No  Present Homicidal Ideation: No      Metabolic Screening:    Lab Results   Component Value Date    LABA1C 6.0 (H) 02/02/2020       Lab Results   Component Value Date    CHOL 212 (H) 02/02/2020    CHOL 225 (H) 07/05/2012    CHOL 190 01/02/2012     Lab Results   Component Value Date    TRIG 160 (H) 02/02/2020    TRIG 72 07/05/2012    TRIG 125 01/02/2012     Lab Results   Component Value Date    HDL 68 02/02/2020    HDL 69.0 07/05/2012    HDL 56.0 01/02/2012     No components found for: [facility] EVALUATION AND TREATMENT [place]  Lab Results   Component Value Date    LABVLDL 32 02/02/2020       Cara Deluna RN

## 2020-02-11 NOTE — DISCHARGE SUMMARY
the patient. Due to the risks of cardiac side effects patient agreed to adjust her antidepressant medication. We discontinue the Elavil. We continue the Paxil 40 mg daily due to her anxiety. We discontinue the Abilify as there is no clinical indication for that medication at this time. She continues to report high levels of anxiety she was started on BuSpar 10 mg 3 times daily which is optimize up to 50 mg 3 times daily. She is also continued on her Remeron 30 mg at bedtime as well as trazodone 100 mg at bedtime. Medical events are insignificant and patient continue to improve on the floor. First patient was having some behavioral outburst.  She was asking for IM injections of Haldol Vistaril due to her \"anxiety. \"  I talked with patient explained to her the clinical indication for these medications. I explained to her that these medications be discontinued. And she will continue on the oral Vistaril as needed for anxiety. Patient stopped having behavioral outburst.  She was seen in treatment team she vehemently denied suicidal homicidal ideations intent or plan. She denied auditory visualizations there were no overt overt signs psychosis. Treatment team felt the patient had the maximum benefit for hospitalization. She was set up with an outpatient mental health agency for outpatient follow-up services she was stepped down to crisis stabilization for next level of care. At the time of discharge patient vehemently not any suicidal homicidal ideations intent or plan. She denied auditory visualizations or no vertical present psychosis. She is appreciate help that she received here. This patient no longer meets criteria for inpatient hospitalization.      No AVH or paranoid thoughts  No Hopeless or worthless feeling  No active SI/HI  Appetite:  [x] Normal  [] Increased  [] Decreased    Sleep:       [x] Normal  [] Fair       [] Poor            Energy:    [x] Normal  [] Increased  [] Decreased     SI [] These medications were sent to [facility] [address] [address]    Phone:  [phone]   · pravastatin 20 MG tablet       Patient is counseled she was being compliant with all medications all outpatient follow-up appointments. Condition at time of discharge is stable. Patient is stepped down to [facility] for next level of care.       TIME SPEND - 35 MINUTES TO COMPLETE THE EVALUATION, DISCHARGE SUMMARY, MEDICATION RECONCILIATION AND FOLLOW UP CARE     Signed:  Bonnie Tran  2/54/8283  3:82 PM

## 2020-02-11 NOTE — PROGRESS NOTES
"Last 5 Patient Entered Readings Current 30 Day Average: 149/90     Recent Readings 12/20/2017 12/20/2017 12/3/2017 12/3/2017 11/9/2017    SBP (mmHg) 149 149 136 136 139    DBP (mmHg) 90 90 81 81 80    Pulse 75 75 62 62 69        Hypertension Digital Medicine Program (HDMP): Health  Follow Up    Follow up with Mrs. Manisha Francoisshavonnejulian completed via [email or web address]. Patient states, "I'm watching the salt but eating too many sweets.  But haven't been exercising. I worked both [employer] and [employer] Year[employer] Day and basically was the only  on day shift for the week in between, so it has been stressful. I got 2 kittens (who chew and are into everything) so I moved my BP monitor into my bedroom (where they are not permitted) and haven't found the right spot for it yet where I can sit with my feet on the floor and calmly take my BP." No further questions or concerns. I will follow up in a few weeks to assess progress.         " Patient medication compliant. Polite and cooperative with this nurse. Will continue to monitor.

## 2020-02-11 NOTE — TELEPHONE ENCOUNTER
Patient was a no show no call for echo today. Message was left for patient to call back to reschedule.

## 2020-02-29 ENCOUNTER — HOSPITAL ENCOUNTER (OUTPATIENT)
Dept: GENERAL RADIOLOGY | Age: 50
Discharge: HOME | End: 2020-03-02
Payer: MEDICAID

## 2020-02-29 PROCEDURE — 77063 BREAST TOMOSYNTHESIS BI: CPT

## 2020-03-02 ENCOUNTER — OFFICE VISIT (OUTPATIENT)
Dept: FAMILY MEDICINE CLINIC | Age: 50
End: 2020-03-02
Payer: MEDICAID

## 2020-03-02 ENCOUNTER — TELEPHONE (OUTPATIENT)
Dept: FAMILY MEDICINE CLINIC | Age: 50
End: 2020-03-02

## 2020-03-02 VITALS
RESPIRATION RATE: 18 BRPM | HEART RATE: 103 BPM | DIASTOLIC BLOOD PRESSURE: 82 MMHG | OXYGEN SATURATION: 94 % | WEIGHT: 195.6 LBS | BODY MASS INDEX: 34.66 KG/M2 | SYSTOLIC BLOOD PRESSURE: 122 MMHG | TEMPERATURE: 98.1 F | HEIGHT: 63 IN

## 2020-03-02 LAB
BILIRUBIN, POC: NEGATIVE
BLOOD URINE, POC: NORMAL
CLARITY, POC: NORMAL
COLOR, POC: YELLOW
GLUCOSE URINE, POC: NEGATIVE
KETONES, POC: NEGATIVE
LEUKOCYTE EST, POC: NORMAL
NITRITE, POC: POSITIVE
PH, POC: 5.5
PROTEIN, POC: NORMAL
SPECIFIC GRAVITY, POC: >=1.03
UROBILINOGEN, POC: NORMAL

## 2020-03-02 PROCEDURE — 1111F DSCHRG MED/CURRENT MED MERGE: CPT | Performed by: FAMILY MEDICINE

## 2020-03-02 PROCEDURE — G8427 DOCREV CUR MEDS BY ELIG CLIN: HCPCS | Performed by: FAMILY MEDICINE

## 2020-03-02 PROCEDURE — G8417 CALC BMI ABV UP PARAM F/U: HCPCS | Performed by: FAMILY MEDICINE

## 2020-03-02 PROCEDURE — 99214 OFFICE O/P EST MOD 30 MIN: CPT | Performed by: FAMILY MEDICINE

## 2020-03-02 PROCEDURE — 81002 URINALYSIS NONAUTO W/O SCOPE: CPT | Performed by: FAMILY MEDICINE

## 2020-03-02 PROCEDURE — 1036F TOBACCO NON-USER: CPT | Performed by: FAMILY MEDICINE

## 2020-03-02 PROCEDURE — G8482 FLU IMMUNIZE ORDER/ADMIN: HCPCS | Performed by: FAMILY MEDICINE

## 2020-03-02 RX ORDER — LANOLIN ALCOHOL/MO/W.PET/CERES
9 CREAM (GRAM) TOPICAL DAILY
Qty: 30 TABLET | Refills: 2 | Status: SHIPPED | OUTPATIENT
Start: 2020-03-02

## 2020-03-02 RX ORDER — MIRTAZAPINE 30 MG/1
30 TABLET, FILM COATED ORAL NIGHTLY
Qty: 30 TABLET | Refills: 3 | Status: CANCELLED | OUTPATIENT
Start: 2020-03-02 | End: 2020-03-16

## 2020-03-02 RX ORDER — PRAVASTATIN SODIUM 20 MG
20 TABLET ORAL DAILY
Qty: 30 TABLET | Refills: 2 | Status: SHIPPED
Start: 2020-03-02 | End: 2020-03-26 | Stop reason: SDUPTHER

## 2020-03-02 RX ORDER — NITROFURANTOIN 25; 75 MG/1; MG/1
100 CAPSULE ORAL 2 TIMES DAILY
Qty: 10 CAPSULE | Refills: 0 | Status: SHIPPED | OUTPATIENT
Start: 2020-03-02 | End: 2020-03-07

## 2020-03-02 RX ORDER — LOSARTAN POTASSIUM 100 MG/1
50 TABLET ORAL DAILY
Qty: 30 TABLET | Refills: 2 | Status: SHIPPED
Start: 2020-03-02 | End: 2020-03-26 | Stop reason: SDUPTHER

## 2020-03-02 RX ORDER — LANOLIN ALCOHOL/MO/W.PET/CERES
9 CREAM (GRAM) TOPICAL DAILY
COMMUNITY
End: 2020-03-02 | Stop reason: SDUPTHER

## 2020-03-02 RX ORDER — PAROXETINE HYDROCHLORIDE 40 MG/1
40 TABLET, FILM COATED ORAL DAILY
Qty: 30 TABLET | Refills: 0 | Status: CANCELLED | OUTPATIENT
Start: 2020-03-02 | End: 2020-03-16

## 2020-03-02 RX ORDER — OMEPRAZOLE 20 MG/1
20 CAPSULE, DELAYED RELEASE ORAL 2 TIMES DAILY
Qty: 60 CAPSULE | Refills: 1 | Status: SHIPPED
Start: 2020-03-02 | End: 2020-11-12 | Stop reason: SDUPTHER

## 2020-03-02 RX ORDER — TRAZODONE HYDROCHLORIDE 100 MG/1
100 TABLET ORAL NIGHTLY PRN
Qty: 30 TABLET | Refills: 0 | Status: CANCELLED | OUTPATIENT
Start: 2020-03-02 | End: 2020-03-16

## 2020-03-02 ASSESSMENT — ENCOUNTER SYMPTOMS
SORE THROAT: 0
DIARRHEA: 0
VOMITING: 0
SHORTNESS OF BREATH: 0
NAUSEA: 0
RHINORRHEA: 0
ABDOMINAL PAIN: 0
WHEEZING: 0
COUGH: 0
SINUS PRESSURE: 0
BACK PAIN: 0
CONSTIPATION: 0

## 2020-03-02 NOTE — PROGRESS NOTES
diarrhea, nausea and vomiting. Genitourinary: Positive for urgency. Negative for dysuria, frequency and hematuria. Musculoskeletal: Negative for arthralgias, back pain and myalgias. Skin: Negative for rash. Neurological: Positive for seizures. Negative for dizziness, light-headedness and headaches. Psychiatric/Behavioral: Negative for self-injury, sleep disturbance and suicidal ideas. The patient is not nervous/anxious. Depression       Physical Exam  Vitals signs and nursing note reviewed. Constitutional:       Appearance: She is well-developed. HENT:      Head: Normocephalic and atraumatic. Right Ear: External ear normal.      Left Ear: External ear normal.      Nose: Nose normal.   Eyes:      Conjunctiva/sclera: Conjunctivae normal.      Pupils: Pupils are equal, round, and reactive to light. Neck:      Musculoskeletal: Normal range of motion and neck supple. Thyroid: No thyromegaly. Cardiovascular:      Rate and Rhythm: Normal rate and regular rhythm. Heart sounds: Normal heart sounds. Pulmonary:      Effort: Pulmonary effort is normal.      Breath sounds: Normal breath sounds. No wheezing. Abdominal:      General: Bowel sounds are normal.      Palpations: Abdomen is soft. Tenderness: There is no abdominal tenderness. Musculoskeletal: Normal range of motion. Skin:     General: Skin is warm and dry. Findings: No rash. Neurological:      Mental Status: She is alert and oriented to person, place, and time. Deep Tendon Reflexes: Reflexes are normal and symmetric. Psychiatric:         Behavior: Behavior normal.         Assessment:  1. Essential hypertension  Blood pressure well controlled  Continue cozaar as prescribed  Labs reviewed  Diet and exercise were discussed and recommended to the patient. - losartan (COZAAR) 100 MG tablet; Take 0.5 tablets by mouth daily  Dispense: 30 tablet; Refill: 2    2.  Gastroparalysis  GERD is well controlled  Continue prilosec as prescribed  - omeprazole (PRILOSEC) 20 MG delayed release capsule; Take 1 capsule by mouth 2 times daily  Dispense: 60 capsule; Refill: 1    3. Severe episode of recurrent major depressive disorder, without psychotic features ([facility] [address]  Follows with psychiatry  She is to call their office for refills of her medications    4. Mixed hyperlipidemia  Labs reviewed  Continue pravachol as prescribed. - pravastatin (PRAVACHOL) 20 MG tablet; Take 1 tablet by mouth daily  Dispense: 30 tablet; Refill: 2    5. Abnormal urine odor  UA positive for infection  Will start macrobid  Side effects reviewed. - POCT Urinalysis no Micro    6. Acute cystitis with hematuria  macrobid prescribed   Side effects reviewed. - nitrofurantoin, macrocrystal-monohydrate, (MACROBID) 100 MG capsule; Take 1 capsule by mouth 2 times daily for 5 days  Dispense: 10 capsule; Refill: 0      Plan:  As above. Call or go to [address] if symptoms worsen or persist.  Return in about 3 months (around 6/2/2020) for htn, seizure disorder, GERD. , or sooner if necessary. Educational materials and/or home exercises printed forpatient's review and were included in patient instructions on his/her After Visit Summary and given to patient at the end of visit. Counseledregarding above diagnosis, including possible risks and complications,  especially if left uncontrolled. Counseled regarding the possible side effects, risks, benefits and alternatives to treatment; patient and/orguardian verbalizes understanding, agrees, feels comfortable with and wishes to proceed with above treatment plan. Advised patient to call with any new medication issues, and read all Rx info from pharmacy to assureaware of all possible risks and side effects of medication before taking. Reviewed age and gender appropriate health screening exams and vaccinations.   Advised patient regarding importance of keeping up withrecommended health maintenance and to schedule as soon as possible if overdue, as this is important in assessing for undiagnosed pathology, especially cancer, as well as protecting against potentially harmful/lifethreatening disease. Patient and/or guardian verbalizes understanding and agrees with above counseling, assessment and plan. All questions answered.

## 2020-03-02 NOTE — PATIENT INSTRUCTIONS
Patient Education        nitrofurantoin  Pronunciation:  NERIS FOSTER toin  Brand:  Furadantin, Macrobid, Macrodantin  What is the most important information I should know about nitrofurantoin? You should not take nitrofurantoin if you have severe kidney disease, urination problems, or a history of jaundice or liver problems caused by nitrofurantoin. Do not take nitrofurantoin during late pregnancy (from 38 weeks through delivery). What is nitrofurantoin? Nitrofurantoin is an antibiotic that is used to treat urinary tract infections caused by bacteria. Nitrofurantoin may also be used for purposes not listed in this medication guide. What should I discuss with my healthcare provider before taking nitrofurantoin? You should not take nitrofurantoin if you are allergic to it, or if you have:  · severe kidney disease;  · urination problems (little or no urination); or  · a history of jaundice or liver problems caused by taking nitrofurantoin. Do not take nitrofurantoin during late pregnancy (from 38 weeks through delivery). Tell your doctor if you have ever had:  · kidney disease;  · anemia;  · diabetes;  · an electrolyte imbalance or vitamin B deficiency;  · glucose-6-phosphate dehydrogenase (G6PD) deficiency; or  · any type of debilitating disease. You should not breastfeed a baby younger than 2 month old while you are taking nitrofurantoin. Nitrofurantoin should not be given to a child younger than 2 month old. How should I take nitrofurantoin? Follow all directions on your prescription label and read all medication guides or instruction sheets. Use the medicine exactly as directed. Take nitrofurantoin with food, even if you take it at bedtime. Shake the oral suspension (liquid) before you measure a dose. Use the dosing syringe provided, or use a medicine dose-measuring device (not a kitchen spoon).   You may need to keep taking nitrofurantoin for up to 7 days after lab tests show that the infection has cleared. Follow your doctor's instructions. Use this medicine for the full prescribed length of time, even if your symptoms quickly improve. Skipping doses can increase your risk of infection that is resistant to medication. Nitrofurantoin will not treat a viral infection such as the flu or a common cold. This medicine can affect the results of certain medical tests. Tell any doctor who treats you that you are using nitrofurantoin. If you use this medicine long-term, you may need frequent medical tests. Store at room temperature away from moisture, heat, and light. Do not freeze the liquid medicine, and keep the bottle tightly closed when not in use. Throw away any nitrofurantoin liquid that has not been used within 30 days. What happens if I miss a dose? Take the medicine as soon as you can, but skip the missed dose if it is almost time for your next dose. Do not take two doses at one time. What happens if I overdose? Seek emergency medical attention or call the Poison Help line at 1-691.487.2893. Overdose can cause vomiting. What should I avoid while taking nitrofurantoin? Antibiotic medicines can cause diarrhea, which may be a sign of a new infection. If you have diarrhea that is watery or bloody, call your doctor before using anti-diarrhea medicine. Avoid taking an antacid that contains magnesium trisilicate, which could make it harder for your body to absorb nitrofurantoin. What are the possible side effects of nitrofurantoin? Get emergency medical help if you have signs of an allergic reaction (hives, difficult breathing, swelling in your face or throat) or a severe skin reaction (fever, sore throat, burning eyes, skin pain, red or purple skin rash with blistering and peeling).   Call your doctor at once if you have:  · severe stomach pain, diarrhea that is watery or bloody (even if it occurs months after your last dose);  · vision problems;  · fever, chills, cough, chest pain, trouble States are appropriate, unless specifically indicated otherwise. [facility] drug information does not endorse drugs, diagnose patients or recommend therapy. [facility] drug information is an informational resource designed to assist licensed healthcare practitioners in caring for their patients and/or to serve consumers viewing this service as a supplement to, and not a substitute for, the expertise, skill, knowledge and judgment of healthcare practitioners. The absence of a warning for a given drug or drug combination in no way should be construed to indicate that the drug or drug combination is safe, effective or appropriate for any given patient. [facility] does not assume any responsibility for any aspect of healthcare administered with the aid of information [facility] provides. The information contained herein is not intended to cover all possible uses, directions, precautions, warnings, drug interactions, allergic reactions, or adverse effects. If you have questions about the drugs you are taking, check with your doctor, nurse or pharmacist.  Copyright 7566-5037 [address]. Version: 9.01. Revision date: 7/1/2019. Care instructions adapted under license by [facility] ([facility]). If you have questions about a medical condition or this instruction, always ask your healthcare professional. [place] [address] any warranty or liability for your use of this information.

## 2020-03-06 ENCOUNTER — HOSPITAL ENCOUNTER (OUTPATIENT)
Dept: CARDIOLOGY | Age: 50
Discharge: HOME | End: 2020-03-06
Payer: MEDICAID

## 2020-03-06 LAB
LV EF: 55 %
LVEF MODALITY: NORMAL

## 2020-03-06 PROCEDURE — 93306 TTE W/DOPPLER COMPLETE: CPT

## 2020-03-16 ENCOUNTER — TELEPHONE (OUTPATIENT)
Dept: CARDIOLOGY CLINIC | Age: 50
End: 2020-03-16

## 2020-03-16 NOTE — TELEPHONE ENCOUNTER
Patient had recent echocardiogram to follow up on pericardial effusion from the hospital.  Please advise on results and she has no scheduled follow up

## 2020-03-26 RX ORDER — PRAVASTATIN SODIUM 20 MG
20 TABLET ORAL DAILY
Qty: 30 TABLET | Refills: 2 | Status: SHIPPED
Start: 2020-03-26 | End: 2020-10-26 | Stop reason: SDUPTHER

## 2020-03-26 RX ORDER — LEVETIRACETAM 750 MG/1
750 TABLET ORAL 2 TIMES DAILY
Qty: 60 TABLET | Refills: 5 | Status: SHIPPED
Start: 2020-03-26 | End: 2020-06-09 | Stop reason: SDUPTHER

## 2020-03-26 RX ORDER — LOSARTAN POTASSIUM 100 MG/1
50 TABLET ORAL DAILY
Qty: 30 TABLET | Refills: 2 | Status: SHIPPED
Start: 2020-03-26 | End: 2020-06-16 | Stop reason: CLARIF

## 2020-05-13 ENCOUNTER — TELEPHONE (OUTPATIENT)
Dept: CARDIOLOGY CLINIC | Age: 50
End: 2020-05-13

## 2020-05-14 RX ORDER — AMLODIPINE BESYLATE 10 MG/1
10 TABLET ORAL DAILY
Qty: 90 TABLET | Refills: 1 | Status: SHIPPED
Start: 2020-05-14 | End: 2021-02-24

## 2020-06-09 RX ORDER — LEVETIRACETAM 750 MG/1
750 TABLET ORAL 2 TIMES DAILY
Qty: 60 TABLET | Refills: 5 | Status: SHIPPED
Start: 2020-06-09 | End: 2020-08-20 | Stop reason: SDUPTHER

## 2020-06-11 ENCOUNTER — HOSPITAL ENCOUNTER (EMERGENCY)
Age: 50
Discharge: HOME | End: 2020-06-12
Attending: EMERGENCY MEDICINE
Payer: MEDICAID

## 2020-06-11 VITALS
DIASTOLIC BLOOD PRESSURE: 84 MMHG | TEMPERATURE: 98.8 F | SYSTOLIC BLOOD PRESSURE: 123 MMHG | RESPIRATION RATE: 18 BRPM | HEART RATE: 113 BPM | OXYGEN SATURATION: 97 %

## 2020-06-11 LAB
BACTERIA: ABNORMAL /HPF
BASOPHILS ABSOLUTE: 0.02 E9/L (ref 0–0.2)
BASOPHILS RELATIVE PERCENT: 0.4 % (ref 0–2)
BILIRUBIN URINE: NEGATIVE
BLOOD, URINE: NEGATIVE
CLARITY: NORMAL
COLOR: YELLOW
EOSINOPHILS ABSOLUTE: 0.06 E9/L (ref 0.05–0.5)
EOSINOPHILS RELATIVE PERCENT: 1.1 % (ref 0–6)
EPITHELIAL CELLS, UA: ABNORMAL /HPF
GLUCOSE URINE: NEGATIVE MG/DL
HCT VFR BLD CALC: 37.5 % (ref 34–48)
HEMOGLOBIN: 11.3 G/DL (ref 11.5–15.5)
IMMATURE GRANULOCYTES #: 0.01 E9/L
IMMATURE GRANULOCYTES %: 0.2 % (ref 0–5)
KETONES, URINE: NEGATIVE MG/DL
LEUKOCYTE ESTERASE, URINE: NEGATIVE
LYMPHOCYTES ABSOLUTE: 2.14 E9/L (ref 1.5–4)
LYMPHOCYTES RELATIVE PERCENT: 37.7 % (ref 20–42)
MCH RBC QN AUTO: 26 PG (ref 26–35)
MCHC RBC AUTO-ENTMCNC: 30.1 % (ref 32–34.5)
MCV RBC AUTO: 86.2 FL (ref 80–99.9)
MONOCYTES ABSOLUTE: 0.26 E9/L (ref 0.1–0.95)
MONOCYTES RELATIVE PERCENT: 4.6 % (ref 2–12)
NEUTROPHILS ABSOLUTE: 3.19 E9/L (ref 1.8–7.3)
NEUTROPHILS RELATIVE PERCENT: 56 % (ref 43–80)
NITRITE, URINE: NEGATIVE
PDW BLD-RTO: 15.9 FL (ref 11.5–15)
PH UA: 6 (ref 5–9)
PLATELET # BLD: 403 E9/L (ref 130–450)
PMV BLD AUTO: 8.8 FL (ref 7–12)
PROTEIN UA: NEGATIVE MG/DL
RBC # BLD: 4.35 E12/L (ref 3.5–5.5)
RBC UA: ABNORMAL /HPF (ref 0–2)
SPECIFIC GRAVITY UA: 1.02 (ref 1–1.03)
UROBILINOGEN, URINE: 0.2 E.U./DL
WBC # BLD: 5.7 E9/L (ref 4.5–11.5)
WBC UA: ABNORMAL /HPF (ref 0–5)

## 2020-06-11 PROCEDURE — 84484 ASSAY OF TROPONIN QUANT: CPT

## 2020-06-11 PROCEDURE — 85025 COMPLETE CBC W/AUTO DIFF WBC: CPT

## 2020-06-11 PROCEDURE — 80048 BASIC METABOLIC PNL TOTAL CA: CPT

## 2020-06-11 PROCEDURE — 81025 URINE PREGNANCY TEST: CPT

## 2020-06-11 PROCEDURE — 99284 EMERGENCY DEPT VISIT MOD MDM: CPT

## 2020-06-11 PROCEDURE — 81001 URINALYSIS AUTO W/SCOPE: CPT

## 2020-06-11 PROCEDURE — 83880 ASSAY OF NATRIURETIC PEPTIDE: CPT

## 2020-06-11 PROCEDURE — 93005 ELECTROCARDIOGRAM TRACING: CPT | Performed by: EMERGENCY MEDICINE

## 2020-06-11 ASSESSMENT — PAIN SCALES - GENERAL: PAINLEVEL_OUTOF10: 6

## 2020-06-11 ASSESSMENT — ENCOUNTER SYMPTOMS
ABDOMINAL PAIN: 0
SHORTNESS OF BREATH: 0
CHEST TIGHTNESS: 0

## 2020-06-11 ASSESSMENT — PAIN DESCRIPTION - LOCATION: LOCATION: CHEST

## 2020-06-12 ENCOUNTER — APPOINTMENT (OUTPATIENT)
Dept: GENERAL RADIOLOGY | Age: 50
End: 2020-06-12
Payer: MEDICAID

## 2020-06-12 ENCOUNTER — APPOINTMENT (OUTPATIENT)
Dept: ULTRASOUND IMAGING | Age: 50
End: 2020-06-12
Payer: MEDICAID

## 2020-06-12 LAB
ANION GAP SERPL CALCULATED.3IONS-SCNC: 9 MMOL/L (ref 7–16)
BUN BLDV-MCNC: 10 MG/DL (ref 6–20)
CALCIUM SERPL-MCNC: 8.9 MG/DL (ref 8.6–10.2)
CHLORIDE BLD-SCNC: 104 MMOL/L (ref 98–107)
CO2: 30 MMOL/L (ref 22–29)
CREAT SERPL-MCNC: 0.8 MG/DL (ref 0.5–1)
EKG ATRIAL RATE: 107 BPM
EKG P AXIS: 58 DEGREES
EKG P-R INTERVAL: 166 MS
EKG Q-T INTERVAL: 342 MS
EKG QRS DURATION: 72 MS
EKG QTC CALCULATION (BAZETT): 456 MS
EKG R AXIS: 60 DEGREES
EKG T AXIS: -17 DEGREES
EKG VENTRICULAR RATE: 107 BPM
GFR AFRICAN AMERICAN: >60
GFR NON-AFRICAN AMERICAN: >60 ML/MIN/1.73
GLUCOSE BLD-MCNC: 110 MG/DL (ref 74–99)
HCG(URINE) PREGNANCY TEST: NEGATIVE
POTASSIUM SERPL-SCNC: 4.3 MMOL/L (ref 3.5–5)
PRO-BNP: 7 PG/ML (ref 0–125)
SODIUM BLD-SCNC: 143 MMOL/L (ref 132–146)
TROPONIN: <0.01 NG/ML (ref 0–0.03)

## 2020-06-12 PROCEDURE — 71046 X-RAY EXAM CHEST 2 VIEWS: CPT

## 2020-06-12 PROCEDURE — 93971 EXTREMITY STUDY: CPT

## 2020-06-12 PROCEDURE — 93010 ELECTROCARDIOGRAM REPORT: CPT | Performed by: INTERNAL MEDICINE

## 2020-06-12 RX ORDER — FUROSEMIDE 40 MG/1
40 TABLET ORAL DAILY
Qty: 7 TABLET | Refills: 0 | Status: SHIPPED | OUTPATIENT
Start: 2020-06-12 | End: 2020-06-19 | Stop reason: SDUPTHER

## 2020-06-12 NOTE — ED NOTES
Bed: 07  Expected date:   Expected time:   Means of arrival:   Comments:  EMT     Jassi Polo RN  06/11/20 7660

## 2020-06-12 NOTE — ED PROVIDER NOTES
27-year-old female presenting with 2 to 3 weeks of chest discomfort. She also states that her legs been swelling more and more lately. She reports a history of seizures, having been intubated back in November for status epilepticus. Found to have what she calls an enlarged heart. She is here today because several days ago her leg started to swell, it is moderate in severity, it is ongoing and worse throughout the day. Unchanged and has tightness to her skin as an associated sign or symptom. Family History   Problem Relation Age of Onset    High Blood Pressure Mother     Other Father         BPH    No Known Problems Sister     No Known Problems Brother     No Known Problems Sister     No Known Problems Sister     No Known Problems Brother     No Known Problems Brother     No Known Problems Brother     Depression Paternal Grandmother     No Known Problems Daughter     No Known Problems Son     No Known Problems Son      Past Surgical History:   Procedure Laterality Date    ABDOMEN SURGERY      Patient has had 9 surgeries    CARDIAC CATHETERIZATION      had 7 - 5 - 2012    CARDIAC CATHETERIZATION  11/04/2019    Dr. Shila Hutchinson, LAPAROSCOPIC      ECHO COMPL W DOP COLOR FLOW  7/1/2012         ENDOMETRIAL ABLATION      PYLOROPLASTY  3/30/2012    lap plylorplasty open upper endoscopy lysis of adhesions    SALPINGO-OOPHORECTOMY Left     left    UPPER GASTROINTESTINAL ENDOSCOPY  3/15/13    balloon opened pyloric sphincter       Review of Systems   Constitutional: Negative for chills and fever. Respiratory: Negative for chest tightness and shortness of breath. Cardiovascular: Positive for chest pain and leg swelling. Gastrointestinal: Negative for abdominal pain. All other systems reviewed and are negative. Physical Exam  Constitutional:       General: She is not in acute distress. Appearance: She is well-developed.    HENT:      Head: Normocephalic worsening. I cautioned her about her salt consumption and counseled her regarding other reasons for this. Additional discharge instructions were given verbally. [SO]      ED Course User Index  [SO] Fredi Montanez DO          ED Course as of Jun 12 0122 Fri Jun 12, 2020   0121 Patient sleeping during reexamination. Heart rate of 80, woke her up and we were able to have a conversation about the plan moving forward. She agrees to take the Lasix and will follow-up with her family doctor and especially the cardiologist by calling tomorrow for an appointment. She understands return to the ED for any problems or any worsening. I cautioned her about her salt consumption and counseled her regarding other reasons for this. Additional discharge instructions were given verbally. [SO]      ED Course User Index  [SO] Fredi Montanez DO       --------------------------------------------- PAST HISTORY ---------------------------------------------  Past Medical History:  has a past medical history of Back pain, Depression, Essential hypertension, Gastroparesis, Hepatitis, History of cardiovascular stress test, Hyperlipidemia, Medically noncompliant, Pancreatitis, acute, Pyloric stenosis, Seizures ([facility] [address] and Vasovagal syncope. Past Surgical History:  has a past surgical history that includes Salpingo-oophorectomy (Left); Cholecystectomy, laparoscopic; Pyloroplasty (3/30/2012); ECHO Compl W Dop Color Flow (7/1/2012); Cardiac catheterization; Upper gastrointestinal endoscopy (3/15/13); Abdomen surgery; Endometrial ablation; and Cardiac catheterization (11/04/2019). Social History:  reports that she has never smoked. She has never used smokeless tobacco. She reports that she does not drink alcohol or use drugs.     Family History: family history includes Depression in her paternal grandmother; High Blood Pressure in her mother; No Known Problems in her brother, brother, brother, brother, daughter, sister, sister,

## 2020-06-16 ENCOUNTER — OFFICE VISIT (OUTPATIENT)
Dept: NEUROLOGY | Age: 50
End: 2020-06-16
Payer: MEDICAID

## 2020-06-16 VITALS
BODY MASS INDEX: 35.88 KG/M2 | DIASTOLIC BLOOD PRESSURE: 61 MMHG | HEIGHT: 62 IN | OXYGEN SATURATION: 99 % | SYSTOLIC BLOOD PRESSURE: 108 MMHG | WEIGHT: 195 LBS | HEART RATE: 94 BPM | TEMPERATURE: 97.3 F

## 2020-06-16 PROCEDURE — G8417 CALC BMI ABV UP PARAM F/U: HCPCS | Performed by: PHYSICIAN ASSISTANT

## 2020-06-16 PROCEDURE — 1036F TOBACCO NON-USER: CPT | Performed by: PHYSICIAN ASSISTANT

## 2020-06-16 PROCEDURE — 99214 OFFICE O/P EST MOD 30 MIN: CPT | Performed by: PHYSICIAN ASSISTANT

## 2020-06-16 PROCEDURE — G8427 DOCREV CUR MEDS BY ELIG CLIN: HCPCS | Performed by: PHYSICIAN ASSISTANT

## 2020-06-16 RX ORDER — PRIMIDONE 50 MG/1
50 TABLET ORAL NIGHTLY
Qty: 30 TABLET | Refills: 0 | Status: SHIPPED
Start: 2020-06-16 | End: 2020-08-21 | Stop reason: SDUPTHER

## 2020-06-16 NOTE — PROGRESS NOTES
[address] [place] [address]. Marino Che M.D., F.A.C.P. Willem Ochoa, OBED, APRN, [facility]. Shaun Melgar, MSN, APRN-FNP-C  SMILEY Mejia, PAElieserC  Kathrin Lagunas, MSN, APRN-FNP-C  [address], [facility] [place], [address] [address]  Phone: [phone]  Fax: [phone]       Damon Foote is a 52 y.o. right handed female     Presents for follow-up of seizures. She presents alone and is a questionable historian. 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 At that time she had lysis adhesion surgery and she reports that the surgeon \"nicked my vagus nerve\". Her daughter reports that she had several spells almost daily after this time. Most of which are described as staring, jumbled up words, impaired awareness followed by confusion. Prior to these episodes she states that her head feels \"funny\"-- but cannot further describe this. She reports \"grand mal\" seizures, but denies tonic-clonic movements. She does report becoming extremely tense. She describes impaired memories surrounding all of the above events. She reports loss of consciousness, bruises all over her body and tongue biting. She denies incontinence. She was seen in August 2019 by Dr. Ravi Moreira for seizure-like activity. At that time she presented with overdose on multiple neuroleptic medications. EEG in August 2019 was normal.  It was felt that these events were probable nonepileptic spells in addition to conversion disorder. Keppra was to be discontinued at that time and switched to Lamictal for its mood stabilizing properties.      In October 2019 she was seen in the hospital for possible breakthrough seizures after running out of her Keppra and taking leftover Lamictal. strength 5/5   XI: neck extension strength  5/5   XII: tongue strength  Normal      Motor:  5/5 throughout  No drift  Normal bulk and tone  Mild tremor of outstretched hands worse while holding objects     Sensory:  Normal to LT   Vibration normal     Coordination:   FN, FFM intact bilaterally   Heel-to-shin normal    Gait:   Normal  Romberg's negative     DTR:   +2 throughout  No Babinski    Laboratory/Radiology:     CBC with Differential:    Lab Results   Component Value Date    WBC 5.7 06/11/2020    RBC 4.35 06/11/2020    HGB 11.3 06/11/2020    HCT 37.5 06/11/2020     06/11/2020    MCV 86.2 06/11/2020    MCH 26.0 06/11/2020    MCHC 30.1 06/11/2020    RDW 15.9 06/11/2020    SEGSPCT 70 11/30/2013    LYMPHOPCT 37.7 06/11/2020    MONOPCT 4.6 06/11/2020    BASOPCT 0.4 06/11/2020    MONOSABS 0.26 06/11/2020    LYMPHSABS 2.14 06/11/2020    EOSABS 0.06 06/11/2020    BASOSABS 0.02 06/11/2020     CMP:    Lab Results   Component Value Date     06/11/2020    K 4.3 06/11/2020    K 4.3 01/28/2020     06/11/2020    CO2 30 06/11/2020    BUN 10 06/11/2020    CREATININE 0.8 06/11/2020    GFRAA >60 06/11/2020    LABGLOM >60 06/11/2020    GLUCOSE 110 06/11/2020    GLUCOSE 115 03/31/2012    PROT 7.8 01/28/2020    LABALBU 4.4 01/28/2020    LABALBU 4.3 03/31/2012    CALCIUM 8.9 06/11/2020    BILITOT <0.2 01/28/2020    ALKPHOS 225 01/28/2020    AST 40 01/28/2020    ALT 56 01/28/2020     Mri brain WWO  Minimal asymmetry with slight volume loss of the amygdala and  hippocampus on the right suggesting the possibility of mesial temporal  sclerosis.     Stable left anterior temporal fossa arachnoid cyst.      Mild diffuse mucosal thickening within the paranasal sinuses. EEG 8/9/19  Normal     EEG 10/28/2019  This 9 hours continuous EEG shows epliptogenicity arising from   left central area in addition to mild diffuse encephalopathy.     EEG 11/1/19   This more than 4 hours video EEG shows a structural lesion in   left temporal area. No epileptiform activities are seen. EEG report from [facility]   This 5-day video EEG evaluation between 12/17/2019 and 12/21/2019 was consistent  with a diagnosis of psychogenic non-epileptic seizures. Despite activation  procedures including photic stimulation, sleep deprivation, and holding home  anticonvulsant (levetiracetam) on admission, no evidence of comorbid epilepsy  was seen during this evaluation. However, given episode type with lip smacking and convulsions was not captured,  this evaluation was in part in conclusive. Because a prior outside EEG was read  as epileptiform (not available for review) and the uncaptured episode types  sound clinically concerning, we agreed to continue anticonvulsant treatment, but  that this should not be uptitrated for the frequent \"foggy\" episodes as above. 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 Unfortunately, an episode with lip smacking and convulsions was not captured and in part inconclusive. Due to a previous EEG with epileptogenicity arising from the left central area and description of event being clinically concerning for epileptic seizure it was decided to continue on AED therapy. Risk factors for seizures: possible mesial temporal sclerosis on MRI    She will remain on Keppra 750 mg twice daily    Patient requests referral to another EMU due to the above event not being captured and she would like a definitive answer in regards to her seizures. [facility] be sent to [facility]    Essential tremor versus enhanced physiologic tremor   Worsened by stress and anxiety   Will try primidone 50 mg at night. We can titrate  this based on response.     Plan:     Continue Keppra 750 mg twice daily    EMU referral to [facility]    Referral to [facility] for CBT for management of her PNES    Referral to sleep medicine that was [facility] from [facility] for

## 2020-06-19 ENCOUNTER — OFFICE VISIT (OUTPATIENT)
Dept: CARDIOLOGY CLINIC | Age: 50
End: 2020-06-19
Payer: MEDICAID

## 2020-06-19 VITALS
HEART RATE: 96 BPM | SYSTOLIC BLOOD PRESSURE: 120 MMHG | BODY MASS INDEX: 35.7 KG/M2 | DIASTOLIC BLOOD PRESSURE: 78 MMHG | HEIGHT: 62 IN | WEIGHT: 194 LBS

## 2020-06-19 PROCEDURE — 99214 OFFICE O/P EST MOD 30 MIN: CPT | Performed by: INTERNAL MEDICINE

## 2020-06-19 PROCEDURE — 93000 ELECTROCARDIOGRAM COMPLETE: CPT | Performed by: INTERNAL MEDICINE

## 2020-06-19 RX ORDER — FUROSEMIDE 40 MG/1
40 TABLET ORAL DAILY PRN
Qty: 60 TABLET | Refills: 2 | Status: SHIPPED
Start: 2020-06-19 | End: 2021-05-08

## 2020-06-19 RX ORDER — POTASSIUM CHLORIDE 750 MG/1
10 TABLET, EXTENDED RELEASE ORAL DAILY
Qty: 60 TABLET | Refills: 1 | Status: SHIPPED
Start: 2020-06-19 | End: 2021-02-15 | Stop reason: SDUPTHER

## 2020-06-19 NOTE — PROGRESS NOTES
[facility] Cardiology Progress Note  Dr. Tc Barajas      Referring Physician: Óscar Billy DO  CHIEF COMPLAINT:   Chief Complaint   Patient presents with    Follow-up     went to [facility] ER Saturday with edema which she states has improved-no CP or chest heaviness       HISTORY OF PRESENT ILLNESS:   Patient is a 52year old female with a medical history of pericardial effusion, hypertension, hyperlipidemia, is here for follow-up appointment. Complaining of pedal edema, shortness of breath, occasional palpitations, denies any chest pain, no lightheadedness or dizziness, no PND, no orthopnea, no syncope, no presyncopal episodes.         Past Medical History:   Diagnosis Date    Back pain     Depression 11/30/2016    Essential hypertension 8/7/2019    Gastroparesis     Hepatitis     History of cardiovascular stress test 7/1/2012    EXERCISE NUCLEAR    Hyperlipidemia     Medically noncompliant 2/15/2019    Pancreatitis, acute     Pyloric stenosis     Seizures ([facility] [address]     Vasovagal syncope 2/15/2019         Past Surgical History:   Procedure Laterality Date    ABDOMEN SURGERY      Patient has had 9 surgeries    CARDIAC CATHETERIZATION      had 7 - 5 - 2012    CARDIAC CATHETERIZATION  11/04/2019    Dr. Wallace Kim, LAPAROSCOPIC      ECHO COMPL W DOP COLOR FLOW  7/1/2012         ENDOMETRIAL ABLATION      PYLOROPLASTY  3/30/2012    lap plylorplasty open upper endoscopy lysis of adhesions    SALPINGO-OOPHORECTOMY Left     left    UPPER GASTROINTESTINAL ENDOSCOPY  3/15/13    balloon opened pyloric sphincter         Current Outpatient Medications   Medication Sig Dispense Refill    furosemide (LASIX) 40 MG tablet Take 1 tablet by mouth daily as needed (legs swelling) 60 tablet 2    potassium chloride (KLOR-CON M) 10 MEQ extended release tablet Take 1 tablet by mouth daily Take with lasix 60 tablet 1    primidone (MYSOLINE) 50 MG tablet Take 1 tablet by mouth nightly 30 tablet 0    levETIRAcetam (KEPPRA) 750 MG tablet Take 1 tablet by mouth 2 times daily 60 tablet 5    amLODIPine (NORVASC) 10 MG tablet Take 1 tablet by mouth daily 90 tablet 1    pravastatin (PRAVACHOL) 20 MG tablet Take 1 tablet by mouth daily 30 tablet 2    melatonin 3 MG TABS tablet Take 3 tablets by mouth daily 30 tablet 2    omeprazole (PRILOSEC) 20 MG delayed release capsule Take 1 capsule by mouth 2 times daily 60 capsule 1    mirtazapine (REMERON) 30 MG tablet Take 1 tablet by mouth nightly for 14 days 14 tablet 0    PARoxetine (PAXIL) 40 MG tablet Take 1 tablet by mouth daily for 14 days 14 tablet 0    traZODone (DESYREL) 100 MG tablet Take 1 tablet by mouth nightly as needed for Sleep 14 tablet 0    buprenorphine-naloxone (SUBOXONE) 8-2 MG SUBL SL tablet Place 2 tablets under the tongue daily. No current facility-administered medications for this visit.           Allergies as of 06/19/2020 - Review Complete 06/19/2020   Allergen Reaction Noted    Ketorolac tromethamine Nausea Only 03/25/2013    Naproxen Nausea Only 03/25/2013    Nsaids Other (See Comments) 09/17/2011    Prochlorperazine edisylate Other (See Comments) 09/17/2011    Reglan [metoclopramide] Other (See Comments) 11/15/2012    Codeine Nausea And Vomiting 09/17/2011    Levofloxacin Nausea Only 10/03/2012    Percocet [oxycodone-acetaminophen] Itching and Nausea And Vomiting 03/14/2016       Social History     Socioeconomic History    Marital status:      Spouse name: Not on file    Number of children: 3    Years of education: Not on file    Highest education level: Not on file   Occupational History    Not on file   Social Needs    Financial resource strain: Not on file    Food insecurity     Worry: Not on file     Inability: Not on file   [employer] needs     Medical: Not on file     Non-medical: Not on file   Tobacco Use    Smoking status: Never Smoker    Smokeless tobacco: Never Used   Substance and Sexual Activity    Alcohol use: No     Comment: three times yearly;rare caffeine    Drug use: No    Sexual activity: Not Currently     Comment: not assessed   Lifestyle    Physical activity     Days per week: Not on file     Minutes per session: Not on file    Stress: Not on file   Relationships    Social connections     Talks on phone: Not on file     Gets together: Not on file     Attends [employer] service: Not on file     Active member of club or organization: Not on file     Attends meetings of clubs or organizations: Not on file     Relationship status: Not on file    Intimate partner violence     Fear of current or ex partner: Not on file     Emotionally abused: Not on file     Physically abused: Not on file     Forced sexual activity: Not on file   Other Topics Concern    Not on file   Social History Narrative    Not on file       Family History   Problem Relation Age of Onset    High Blood Pressure Mother     Other Father         BPH    No Known Problems Sister     No Known Problems Brother     No Known Problems Sister     No Known Problems Sister     No Known Problems Brother     No Known Problems Brother     No Known Problems Brother     Depression Paternal Grandmother     No Known Problems Daughter     No Known Problems Son     No Known Problems Son        REVIEW OF SYSTEMS:     CONSTITUTIONAL:  negative for  fevers, chills, sweats and fatigue  HEENT:  negative for  tinnitus, earaches, nasal congestion and epistaxis  RESPIRATORY:  negative for  dry cough, cough with sputum, dyspnea, wheezing and hemoptysis  GASTROINTESTINAL:  negative for nausea, vomiting, diarrhea, constipation, pruritus and jaundice  HEMATOLOGIC/LYMPHATIC:  negative for easy bruising, bleeding, lymphadenopathy and petechiae  ENDOCRINE:  negative for heat intolerance, cold intolerance, tremor, hair loss and diabetic symptoms including neither polyuria nor polydipsia nor blurred vision  MUSCULOSKELETAL:  negative for myalgias, arthralgias, joint swelling, stiff joints and decreased range of motion  NEUROLOGICAL:  negative for memory problems, speech problems, visual disturbance, dysphagia, weakness and numbness      PHYSICAL EXAM:   CONSTITUTIONAL:  awake, alert, cooperative, no apparent distress, and appears stated age  EYES:  lids and lashes normal and pupils equal, round and reactive to light, anicteric sclerae  HEAD:  normocepalic, without obvious abnormality, atraumatic, pink, moist mucous membranes. NECK:  Supple, symmetrical, trachea midline, no adenopathy, thyroid symmetric, not enlarged and no tenderness, skin normal  HEMATOLOGIC/LYMPHATICS:  no cervical lymphadenopathy and no supraclavicular lymphadenopathy  LUNGS:  No increased work of breathing, good air exchange, clear to auscultation bilaterally, no crackles or wheezing  CARDIOVASCULAR:  Normal apical impulse, regular rate and rhythm, normal S1 and S2, no S3 or S4, and no murmur noted and no JVD, no carotid bruit, no pedal edema, good carotid upstroke bilaterally. ABDOMEN:  Soft, nontender, no masses, no hepatomegaly or splenomegaly, BS+  CHEST: nontender to palpation, expands symmetrically  MUSCULOSKELETAL:  No clubbing no cyanosis. there is no redness, warmth, or swelling of the joints  full range of motion noted  NEUROLOGIC:  Alert, awake,oriented x3, no focal neurologic deficit was appreciated. Seizures   SKIN:  no bruising or bleeding, normal skin color, texture, turgor and no redness, warmth, or swelling         /78 (Site: Right Upper Arm, Position: Sitting, Cuff Size: Large Adult)   Pulse 96   Ht 5' 2\" (1.575 m)   Wt 194 lb (88 kg)   BMI 35.48 kg/m²     DATA:   I personally reviewed the visit EKG with the following interpretation: Sinus rhythm, nonspecific T wave changes    ECHO: 3/6/20 Summary   Compared to prior echo, mild improvement of the size of pericardial   effusion from moderate on the previous study to mild on the current study. Technically adequate study. Normal left ventricular wall thickness. Ejection fraction is visually estimated at 55%. No regional wall motion abnormalities seen. Normal left ventricular diastolic filling pattern for age. Physiologic and/or trace tricuspid regurgitation. RVSP is normal.   There is a small pericardial effusion noted. Stress Test: 10/26/19   FINDINGS:   Perfusion images demonstrate small anterior wall reversible defect   Wall motion is within normal limits. The end diastolic volume is 54 ml. The end systolic volume is 17 ml. The estimated ejection fraction is 69 %.         Impression   1. There is a small reversible defect in the inferior wall   2. Ejection fraction is 69 %. 3. No significant wall motion abnormality       Angiography: 11/4/19 IMPRESSION:  1. Angiographically normal coronary arteries. 2.  Successful deployment of 6-[place] Angio-Seal in the right common  femoral artery.   Cardiology Labs: BMP:    Lab Results   Component Value Date     06/11/2020    K 4.3 06/11/2020    K 4.3 01/28/2020     06/11/2020    CO2 30 06/11/2020    BUN 10 06/11/2020    CREATININE 0.8 06/11/2020     CMP:    Lab Results   Component Value Date     06/11/2020    K 4.3 06/11/2020    K 4.3 01/28/2020     06/11/2020    CO2 30 06/11/2020    BUN 10 06/11/2020    CREATININE 0.8 06/11/2020    PROT 7.8 01/28/2020     CBC:    Lab Results   Component Value Date    WBC 5.7 06/11/2020    RBC 4.35 06/11/2020    HGB 11.3 06/11/2020    HCT 37.5 06/11/2020    MCV 86.2 06/11/2020    RDW 15.9 06/11/2020     06/11/2020     PT/INR:  No results found for: PTINR  PT/INR Warfarin:  No components found for: PTPATWAR, PTINRWAR  PTT:    Lab Results   Component Value Date    APTT 32.4 10/24/2019     PTT Heparin:  No components found for: APTTHEP  Magnesium:    Lab Results   Component Value Date    MG 1.9 11/02/2019     TSH:    Lab Results   Component Value Date    TSH 1.360 01/28/2020 TROPONIN:  No components found for: TROP  BNP:  No results found for: BNP  FASTING LIPID PANEL:    Lab Results   Component Value Date    CHOL 212 02/02/2020    HDL 68 02/02/2020    TRIG 160 02/02/2020     No orders to display     I have personally reviewed the laboratory, cardiac diagnostic and radiographic testing as outlined above:      IMPRESSION:  1. Pericardial effusion: Will repeat echocardiogram   2. acute exacerbation of CHF: Diastolic, decompensated, will start Lasix  3. Hypertension  4. Hyperlipidemia  5. Seizures: will follow neurology. 6. Depression  7. Noncompliance      RECOMMENDATIONS:   1. Lasix 20 mg 1 by mouth daily  2. Continue the rest of medications  3.  Echocardiogram to reevaluate pericardial effusion  4. CHF: Daily weight, take an extra Lasix for weight gain of more than 2-3 pounds in 24 hours, compliance with diuretics, low-salt diet were all advised. 5.  Follow-up with Dr. Yang Cavazos as scheduled  6. Follow-up with Dr. Lewis Lackey in 6 months, sooner if symptomatic for any reason    I have reviewed my findings and recommendations with patient    Electronically signed by Kashmir Shrestha MD on 7/3/2020 at 1:39 PM  NOTE: This report was transcribed using voice recognition software.  Every effort was made to ensure accuracy; however, inadvertent computerized transcription errors may be present

## 2020-06-22 ENCOUNTER — VIRTUAL VISIT (OUTPATIENT)
Dept: FAMILY MEDICINE CLINIC | Age: 50
End: 2020-06-22
Payer: MEDICAID

## 2020-06-22 ENCOUNTER — TELEPHONE (OUTPATIENT)
Dept: CARDIOLOGY | Age: 50
End: 2020-06-22

## 2020-06-22 PROCEDURE — G8427 DOCREV CUR MEDS BY ELIG CLIN: HCPCS | Performed by: FAMILY MEDICINE

## 2020-06-22 PROCEDURE — 99214 OFFICE O/P EST MOD 30 MIN: CPT | Performed by: FAMILY MEDICINE

## 2020-06-22 ASSESSMENT — ENCOUNTER SYMPTOMS
COUGH: 0
BACK PAIN: 0
WHEEZING: 0
VOMITING: 0
NAUSEA: 0
CONSTIPATION: 0
SORE THROAT: 0
SHORTNESS OF BREATH: 0
DIARRHEA: 0
SINUS PRESSURE: 0
RHINORRHEA: 0
ABDOMINAL PAIN: 0

## 2020-06-22 NOTE — PATIENT INSTRUCTIONS
Patient Education        Leg and Ankle Edema: Care Instructions  Your Care Instructions  Swelling in the legs, ankles, and feet is called edema. It is common after you sit or stand for a while. Long plane flights or car rides often cause swelling in the legs and feet. You may also have swelling if you have to stand for long periods of time at your job. Problems with the veins in the legs (varicose veins) and changes in hormones can also cause swelling. Sometimes the swelling in the ankles and feet is caused by a more serious problem, such as heart failure, infection, blood clots, or liver or kidney disease. Follow-up care is a key part of your treatment and safety. Be sure to make and go to all appointments, and call your doctor if you are having problems. It's also a good idea to know your test results and keep a list of the medicines you take. How can you care for yourself at home? · If your doctor gave you medicine, take it as prescribed. Call your doctor if you think you are having a problem with your medicine. · Whenever you are resting, raise your legs up. Try to keep the swollen area higher than the level of your heart. · Take breaks from standing or sitting in one position. ? Walk around to increase the blood flow in your lower legs. ? Move your feet and ankles often while you stand, or tighten and relax your leg muscles. · Wear support stockings. Put them on in the morning, before swelling gets worse. · Eat a balanced diet. Lose weight if you need to. · Limit the amount of salt (sodium) in your diet. Salt holds fluid in the body and may increase swelling. When should you call for help? [id number] anytime you think you may need emergency care. For example, call if:  · You have symptoms of a blood clot in your lung (called a pulmonary embolism). These may include:  ? Sudden chest pain. ? Trouble breathing. ? Coughing up blood.   Call your doctor now or seek immediate medical care if:  · You have

## 2020-06-22 NOTE — PROGRESS NOTES
reviewing her chart, she had entercoccus faecium that was resistant to vancomycin. Hypertension:   Patient is here for follow up chronic hypertension. Patient is  compliant with lifestyle modifications. Patient is  well controlled. Patient denies chest pain, diaphoresis, dyspnea, dyspnea on exertion, peripheral edema, palpitations, HA, visual issues. Cardiovascular risk factors: hypertension and obesity (BMI >= 30 kg/m2). Patient does not smoke. Is currently on norvasc 10 mg daily. Taking as prescribed. No adverse effects. She states that she was in the Ed last week. She states that her feet started to swell. She was started on lasix. She was seen by cardiology after she was seen in the ED. She was started on lasix and potassium to take as needed. Review of Systems   Constitutional: Negative for chills, fatigue and fever. HENT: Negative for congestion, ear discharge, ear pain, postnasal drip, rhinorrhea, sinus pressure, sneezing and sore throat. Respiratory: Negative for cough, shortness of breath and wheezing. Cardiovascular: Positive for leg swelling. Negative for chest pain and palpitations. Gastrointestinal: Negative for abdominal pain, constipation, diarrhea, nausea and vomiting. Genitourinary: Positive for decreased urine volume and difficulty urinating. Negative for dysuria, frequency and hematuria. Musculoskeletal: Negative for arthralgias, back pain and myalgias. Skin: Negative for rash. Neurological: Negative for dizziness, light-headedness and headaches. Prior to Visit Medications    Medication Sig Taking?  Authorizing Provider   furosemide (LASIX) 40 MG tablet Take 1 tablet by mouth daily as needed (legs swelling) Yes Rory Carbajal MD   potassium chloride (KLOR-CON M) 10 MEQ extended release tablet Take 1 tablet by mouth daily Take with lasix Yes Rory Carbajal MD   primidone (MYSOLINE) 50 MG tablet Take 1 tablet by mouth nightly Yes ZACARIAS Ervin Meningococcal (ACWY) vaccine  Aged Out    Pneumococcal 0-64 years Vaccine  Aged Out       PHYSICAL EXAMINATION:  [ INSTRUCTIONS:  \"[x]\" Indicates a positive item  \"[]\" Indicates a negative item  -- DELETE ALL ITEMS NOT EXAMINED]  Vital Signs: (As obtained by patient/caregiver or practitioner observation)    Blood pressure-  Heart rate-    Respiratory rate-    Temperature-  Pulse oximetry-   Vitals unable to be obtained    Constitutional: [x] Appears well-developed and well-nourished [x] No apparent distress      [] Abnormal-   Mental status  [x] Alert and awake  [x] Oriented to person/place/time [x]Able to follow commands      Eyes:  EOM    [x]  Normal  [] Abnormal-  Sclera  [x]  Normal  [] Abnormal -         Discharge [x]  None visible  [] Abnormal -    HENT:   [x] Normocephalic, atraumatic. [] Abnormal   [x] Mouth/Throat: Mucous membranes are moist.     External Ears [x] Normal  [] Abnormal-     Neck: [x] No visualized mass     Pulmonary/Chest: [x] Respiratory effort normal.  [x] No visualized signs of difficulty breathing or respiratory distress        [] Abnormal-      Musculoskeletal:   [] Normal gait with no signs of ataxia         [x] Normal range of motion of neck        [] Abnormal-       Neurological:        [x] No Facial Asymmetry (Cranial nerve 7 motor function) (limited exam to video visit)          [] No gaze palsy        [] Abnormal-         Skin:        [x] No significant exanthematous lesions or discoloration noted on facial skin         [] Abnormal-            Psychiatric:       [x] Normal Affect [x] No Hallucinations        [] Abnormal-       Other pertinent observable physical exam findings-     Due to this being a TeleHealth encounter, evaluation of the following organ systems is limited: Vitals/Constitutional/EENT/Resp/CV/GI//MS/Neuro/Skin/Heme-Lymph-Imm. ASSESSMENT/PLAN:  1. Abnormal urine odor  UA ordered  Reviewed urine from recent ED visit and urine was normal at that time.    -

## 2020-06-22 NOTE — PROGRESS NOTES
[facility] was read the following message We want to confirm that, for purposes of billing, this is a virtual visit with your provider for which we will submit a claim for reimbursement with your insurance company. You will be responsible for any copays, coinsurance amounts or other amounts not covered by your insurance company. If you do not accept this, unfortunately we will not be able to schedule a virtual visit with the provider. Do you accept?  Pramod Rob responded YES

## 2020-07-06 ENCOUNTER — TELEPHONE (OUTPATIENT)
Dept: CARDIOLOGY | Age: 50
End: 2020-07-06

## 2020-07-06 NOTE — TELEPHONE ENCOUNTER
Left message with pt reminding her of her echo for 7/9/2020, told her to bring a mask and to take her temp the day of the test.

## 2020-07-13 ENCOUNTER — TELEPHONE (OUTPATIENT)
Dept: CARDIOLOGY | Age: 50
End: 2020-07-13

## 2020-07-15 ENCOUNTER — HOSPITAL ENCOUNTER (OUTPATIENT)
Age: 50
Discharge: HOME | End: 2020-07-15
Payer: MEDICAID

## 2020-07-15 LAB
BACTERIA: NORMAL /HPF
BILIRUBIN URINE: NEGATIVE
BLOOD, URINE: NEGATIVE
CLARITY: CLEAR
COLOR: YELLOW
EPITHELIAL CELLS, UA: NORMAL /HPF
GLUCOSE URINE: NEGATIVE MG/DL
KETONES, URINE: NEGATIVE MG/DL
LEUKOCYTE ESTERASE, URINE: NEGATIVE
NITRITE, URINE: NEGATIVE
PH UA: 7.5 (ref 5–9)
PROTEIN UA: 30 MG/DL
RBC UA: NORMAL /HPF (ref 0–2)
SPECIFIC GRAVITY UA: 1.02 (ref 1–1.03)
UROBILINOGEN, URINE: 0.2 E.U./DL
WBC UA: NORMAL /HPF (ref 0–5)

## 2020-07-15 PROCEDURE — 81001 URINALYSIS AUTO W/SCOPE: CPT

## 2020-07-15 PROCEDURE — 87186 SC STD MICRODIL/AGAR DIL: CPT

## 2020-07-15 PROCEDURE — 87077 CULTURE AEROBIC IDENTIFY: CPT

## 2020-07-15 PROCEDURE — 87088 URINE BACTERIA CULTURE: CPT

## 2020-07-17 ENCOUNTER — TELEPHONE (OUTPATIENT)
Dept: FAMILY MEDICINE CLINIC | Age: 50
End: 2020-07-17

## 2020-07-17 LAB
ORGANISM: ABNORMAL
URINE CULTURE, ROUTINE: ABNORMAL

## 2020-07-17 RX ORDER — NITROFURANTOIN 25; 75 MG/1; MG/1
100 CAPSULE ORAL 2 TIMES DAILY
Qty: 10 CAPSULE | Refills: 0 | Status: SHIPPED | OUTPATIENT
Start: 2020-07-17 | End: 2020-07-22

## 2020-07-17 NOTE — TELEPHONE ENCOUNTER
Please call Yesi Remy, her urine culture grew e coli. I have sent macrobid BID for 5 days to the pharmacy. If symptoms worsen or fail to improve, would recommend reevaluation.

## 2020-07-18 ENCOUNTER — TELEPHONE (OUTPATIENT)
Dept: CARDIOLOGY | Age: 50
End: 2020-07-18

## 2020-07-18 ENCOUNTER — APPOINTMENT (OUTPATIENT)
Dept: GENERAL RADIOLOGY | Age: 50
End: 2020-07-18
Payer: MEDICAID

## 2020-07-18 ENCOUNTER — HOSPITAL ENCOUNTER (EMERGENCY)
Age: 50
Discharge: HOME | End: 2020-07-18
Attending: EMERGENCY MEDICINE
Payer: MEDICAID

## 2020-07-18 ENCOUNTER — APPOINTMENT (OUTPATIENT)
Dept: ULTRASOUND IMAGING | Age: 50
End: 2020-07-18
Payer: MEDICAID

## 2020-07-18 VITALS
OXYGEN SATURATION: 96 % | RESPIRATION RATE: 16 BRPM | HEIGHT: 63 IN | SYSTOLIC BLOOD PRESSURE: 127 MMHG | DIASTOLIC BLOOD PRESSURE: 73 MMHG | BODY MASS INDEX: 34.55 KG/M2 | TEMPERATURE: 98 F | WEIGHT: 195 LBS | HEART RATE: 71 BPM

## 2020-07-18 LAB
ALBUMIN SERPL-MCNC: 4.2 G/DL (ref 3.5–5.2)
ALP BLD-CCNC: 142 U/L (ref 35–104)
ALT SERPL-CCNC: 33 U/L (ref 0–32)
ANION GAP SERPL CALCULATED.3IONS-SCNC: 12 MMOL/L (ref 7–16)
AST SERPL-CCNC: 32 U/L (ref 0–31)
BACTERIA: ABNORMAL /HPF
BASOPHILS ABSOLUTE: 0.01 E9/L (ref 0–0.2)
BASOPHILS RELATIVE PERCENT: 0.2 % (ref 0–2)
BILIRUB SERPL-MCNC: 0.2 MG/DL (ref 0–1.2)
BILIRUBIN URINE: NEGATIVE
BLOOD, URINE: NEGATIVE
BUN BLDV-MCNC: 7 MG/DL (ref 6–20)
CALCIUM SERPL-MCNC: 9.2 MG/DL (ref 8.6–10.2)
CHLORIDE BLD-SCNC: 97 MMOL/L (ref 98–107)
CLARITY: ABNORMAL
CO2: 27 MMOL/L (ref 22–29)
COLOR: YELLOW
CREAT SERPL-MCNC: 0.7 MG/DL (ref 0.5–1)
EOSINOPHILS ABSOLUTE: 0.03 E9/L (ref 0.05–0.5)
EOSINOPHILS RELATIVE PERCENT: 0.5 % (ref 0–6)
EPITHELIAL CELLS, UA: ABNORMAL /HPF
GFR AFRICAN AMERICAN: >60
GFR NON-AFRICAN AMERICAN: >60 ML/MIN/1.73
GLUCOSE BLD-MCNC: 91 MG/DL (ref 74–99)
GLUCOSE URINE: NEGATIVE MG/DL
HCT VFR BLD CALC: 37.7 % (ref 34–48)
HEMOGLOBIN: 11.6 G/DL (ref 11.5–15.5)
IMMATURE GRANULOCYTES #: 0.02 E9/L
IMMATURE GRANULOCYTES %: 0.3 % (ref 0–5)
KETONES, URINE: NEGATIVE MG/DL
LEUKOCYTE ESTERASE, URINE: ABNORMAL
LYMPHOCYTES ABSOLUTE: 2.78 E9/L (ref 1.5–4)
LYMPHOCYTES RELATIVE PERCENT: 43.1 % (ref 20–42)
MCH RBC QN AUTO: 26.5 PG (ref 26–35)
MCHC RBC AUTO-ENTMCNC: 30.8 % (ref 32–34.5)
MCV RBC AUTO: 86.1 FL (ref 80–99.9)
MONOCYTES ABSOLUTE: 0.22 E9/L (ref 0.1–0.95)
MONOCYTES RELATIVE PERCENT: 3.4 % (ref 2–12)
NEUTROPHILS ABSOLUTE: 3.39 E9/L (ref 1.8–7.3)
NEUTROPHILS RELATIVE PERCENT: 52.5 % (ref 43–80)
NITRITE, URINE: POSITIVE
PDW BLD-RTO: 15.7 FL (ref 11.5–15)
PH UA: 7 (ref 5–9)
PLATELET # BLD: 346 E9/L (ref 130–450)
PMV BLD AUTO: 8.7 FL (ref 7–12)
POTASSIUM REFLEX MAGNESIUM: 4.2 MMOL/L (ref 3.5–5)
PRO-BNP: 8 PG/ML (ref 0–125)
PROTEIN UA: NEGATIVE MG/DL
RBC # BLD: 4.38 E12/L (ref 3.5–5.5)
RBC UA: ABNORMAL /HPF (ref 0–2)
SODIUM BLD-SCNC: 136 MMOL/L (ref 132–146)
SPECIFIC GRAVITY UA: 1.01 (ref 1–1.03)
TOTAL PROTEIN: 7 G/DL (ref 6.4–8.3)
TROPONIN: <0.01 NG/ML (ref 0–0.03)
UROBILINOGEN, URINE: 0.2 E.U./DL
WBC # BLD: 6.5 E9/L (ref 4.5–11.5)
WBC UA: ABNORMAL /HPF (ref 0–5)

## 2020-07-18 PROCEDURE — 2580000003 HC RX 258: Performed by: EMERGENCY MEDICINE

## 2020-07-18 PROCEDURE — 85025 COMPLETE CBC W/AUTO DIFF WBC: CPT

## 2020-07-18 PROCEDURE — 99285 EMERGENCY DEPT VISIT HI MDM: CPT

## 2020-07-18 PROCEDURE — 84484 ASSAY OF TROPONIN QUANT: CPT

## 2020-07-18 PROCEDURE — 96374 THER/PROPH/DIAG INJ IV PUSH: CPT

## 2020-07-18 PROCEDURE — 71045 X-RAY EXAM CHEST 1 VIEW: CPT

## 2020-07-18 PROCEDURE — 6360000002 HC RX W HCPCS: Performed by: EMERGENCY MEDICINE

## 2020-07-18 PROCEDURE — 81001 URINALYSIS AUTO W/SCOPE: CPT

## 2020-07-18 PROCEDURE — 93005 ELECTROCARDIOGRAM TRACING: CPT | Performed by: EMERGENCY MEDICINE

## 2020-07-18 PROCEDURE — 93970 EXTREMITY STUDY: CPT

## 2020-07-18 PROCEDURE — 80053 COMPREHEN METABOLIC PANEL: CPT

## 2020-07-18 PROCEDURE — 83880 ASSAY OF NATRIURETIC PEPTIDE: CPT

## 2020-07-18 RX ORDER — CEFDINIR 300 MG/1
300 CAPSULE ORAL 2 TIMES DAILY
Qty: 20 CAPSULE | Refills: 0 | Status: SHIPPED | OUTPATIENT
Start: 2020-07-18 | End: 2020-07-28

## 2020-07-18 RX ORDER — ALBUTEROL SULFATE 90 UG/1
2 AEROSOL, METERED RESPIRATORY (INHALATION) EVERY 4 HOURS PRN
Qty: 1 INHALER | Refills: 1 | Status: SHIPPED | OUTPATIENT
Start: 2020-07-18 | End: 2020-08-20 | Stop reason: SDUPTHER

## 2020-07-18 RX ADMIN — WATER 1 G: 1 INJECTION INTRAMUSCULAR; INTRAVENOUS; SUBCUTANEOUS at 17:23

## 2020-07-18 ASSESSMENT — ENCOUNTER SYMPTOMS
SHORTNESS OF BREATH: 1
EYE REDNESS: 0
ABDOMINAL PAIN: 0
NAUSEA: 0
VOMITING: 0

## 2020-07-18 NOTE — ED NOTES
Pt ambulated to restroom and back to bed with portable pulse ox. SpO2 97-99%, pt reported feeling SOB with that amount of ambulation.      Jemal Slater RN  07/18/20 [id number]

## 2020-07-18 NOTE — ED PROVIDER NOTES
Chief complaint: Shortness of breath and edema      HPI:  7/18/20, Time: 1:15 PM EDT         Clint Thomas is a 52 y.o. female presenting to the ED for shortness of breath and edema. History is obtained from combination patients of the patient's medical record. The patient reports that she began with lower extremity edema and shortness of breath present over the last few weeks. She states that the shortness of breath is worse with activity and exertion. Mildly relieved with rest.  The patient was evaluated in our emergency department within the last 2 weeks and to follow-up with her cardiologist.  He did give her Lasix to take as needed. The patient reports she has now been taking the Lasix 2 times daily and is still having worsening of her edema and shortness of breath. She denies any chest pain. She has any fevers, chills, nausea, vomiting, abdominal pain, dysuria, diarrhea constipation. The patient does have a history of hypertension hyperlipidemia. She is no history of diabetes. She is not a smoker. She has no history of congestive heart failure. Patient does admit to a long bus ride approximately 2 months ago and states that she feels that this might have precipitated her lower extremity edema. She has no history of DVT or PE, not on any hormone replacement therapy, denies any active malignancy. ROS:   Review of Systems   Constitutional: Negative for chills and fatigue. HENT: Negative for congestion. Eyes: Negative for redness. Respiratory: Positive for shortness of breath. Cardiovascular: Positive for leg swelling. Negative for chest pain. Gastrointestinal: Negative for abdominal pain, nausea and vomiting. Genitourinary: Negative for dysuria. Musculoskeletal: Negative for arthralgias. Skin: Negative for rash. Neurological: Negative for light-headedness. Psychiatric/Behavioral: Negative for confusion.    All other systems reviewed and are negative.      --------------------------------------------- PAST HISTORY ---------------------------------------------  Past Medical History:  has a past medical history of Back pain, Depression, Essential hypertension, Gastroparesis, Hepatitis, History of cardiovascular stress test, Hyperlipidemia, Medically noncompliant, Pancreatitis, acute, Pyloric stenosis, Seizures ([facility] [address] and Vasovagal syncope. Past Surgical History:  has a past surgical history that includes Salpingo-oophorectomy (Left); Cholecystectomy, laparoscopic; Pyloroplasty (3/30/2012); ECHO Compl W Dop Color Flow (7/1/2012); Cardiac catheterization; Upper gastrointestinal endoscopy (3/15/13); Abdomen surgery; Endometrial ablation; and Cardiac catheterization (11/04/2019). Social History:  reports that she has never smoked. She has never used smokeless tobacco. She reports that she does not drink alcohol or use drugs. Family History: family history includes Depression in her paternal grandmother; High Blood Pressure in her mother; No Known Problems in her brother, brother, brother, brother, daughter, sister, sister, sister, son, and son; Other in her father. The patients home medications have been reviewed. Allergies: Ketorolac tromethamine; Naproxen; Nsaids; Prochlorperazine edisylate; Reglan [metoclopramide]; Codeine; Levofloxacin; and Percocet [oxycodone-acetaminophen]    ---------------------------------------------------PHYSICAL EXAM--------------------------------------    Constitutional/General: Alert and oriented x3, well appearing, non toxic in NAD  Head: Normocephalic and atraumatic  Mouth: Oropharynx clear, handling secretions, no trismus  Neck: Supple, full ROM,  Pulmonary: Lungs clear to auscultation bilaterally, no wheezes, rales, or rhonchi. Not in respiratory distress  Cardiovascular:  Regular rate. Regular rhythm. No murmurs  Chest: no chest wall tenderness  Abdomen: Soft. Non tender. Non distended. +BS.   No rebound, guarding, or rigidity. No pulsatile masses appreciated. Musculoskeletal: Moves all extremities x 4. Warm and well perfused, no clubbing, cyanosis. Patient does have 2+ pitting edema in the bilateral lower extremities. Capillary refill <3 seconds  Skin: warm and dry. No rashes. Neurologic: GCS 15, no gross focal neurologic deficits  Psych: Normal Affect    -------------------------------------------------- RESULTS -------------------------------------------------  I have personally reviewed all laboratory and imaging results for this patient. Results are listed below.      LABS:  Results for orders placed or performed during the hospital encounter of 07/18/20   CBC Auto Differential   Result Value Ref Range    WBC 6.5 4.5 - 11.5 E9/L    RBC 4.38 3.50 - 5.50 E12/L    Hemoglobin 11.6 11.5 - 15.5 g/dL    Hematocrit 37.7 34.0 - 48.0 %    MCV 86.1 80.0 - 99.9 fL    MCH 26.5 26.0 - 35.0 pg    MCHC 30.8 (L) 32.0 - 34.5 %    RDW 15.7 (H) 11.5 - 15.0 fL    Platelets 943 020 - 579 E9/L    MPV 8.7 7.0 - 12.0 fL    Neutrophils % 52.5 43.0 - 80.0 %    Immature Granulocytes % 0.3 0.0 - 5.0 %    Lymphocytes % 43.1 (H) 20.0 - 42.0 %    Monocytes % 3.4 2.0 - 12.0 %    Eosinophils % 0.5 0.0 - 6.0 %    Basophils % 0.2 0.0 - 2.0 %    Neutrophils Absolute 3.39 1.80 - 7.30 E9/L    Immature Granulocytes # 0.02 E9/L    Lymphocytes Absolute 2.78 1.50 - 4.00 E9/L    Monocytes Absolute 0.22 0.10 - 0.95 E9/L    Eosinophils Absolute 0.03 (L) 0.05 - 0.50 E9/L    Basophils Absolute 0.01 0.00 - 0.20 E9/L   Comprehensive Metabolic Panel w/ Reflex to MG   Result Value Ref Range    Sodium 136 132 - 146 mmol/L    Potassium reflex Magnesium 4.2 3.5 - 5.0 mmol/L    Chloride 97 (L) 98 - 107 mmol/L    CO2 27 22 - 29 mmol/L    Anion Gap 12 7 - 16 mmol/L    Glucose 91 74 - 99 mg/dL    BUN 7 6 - 20 mg/dL    CREATININE 0.7 0.5 - 1.0 mg/dL    GFR Non-African American >60 >=60 mL/min/1.73    GFR African American >60     Calcium 9.2 8.6 - 10.2 mg/dL    Total Protein 7.0 6.4 - 8.3 g/dL    Alb 4.2 3.5 - 5.2 g/dL    Total Bilirubin 0.2 0.0 - 1.2 mg/dL    Alkaline Phosphatase 142 (H) 35 - 104 U/L    ALT 33 (H) 0 - 32 U/L    AST 32 (H) 0 - 31 U/L   Troponin   Result Value Ref Range    Troponin <0.01 0.00 - 0.03 ng/mL   Brain Natriuretic Peptide   Result Value Ref Range    Pro-BNP 8 0 - 125 pg/mL   Urinalysis   Result Value Ref Range    Color, UA Yellow Straw/Yellow    Clarity, UA SLCLOUDY Clear    Glucose, Ur Negative Negative mg/dL    Bilirubin Urine Negative Negative    Ketones, Urine Negative Negative mg/dL    Specific Gravity, UA 1.015 1.005 - 1.030    Blood, Urine Negative Negative    pH, UA 7.0 5.0 - 9.0    Protein, UA Negative Negative mg/dL    Urobilinogen, Urine 0.2 <2.0 E.U./dL    Nitrite, Urine POSITIVE (A) Negative    Leukocyte Esterase, Urine TRACE (A) Negative   Microscopic Urinalysis   Result Value Ref Range    WBC, UA 1-3 0 - 5 /HPF    RBC, UA NONE 0 - 2 /HPF    Epithelial Cells, UA RARE /HPF    Bacteria, UA MANY (A) None Seen /HPF   EKG 12 Lead   Result Value Ref Range    Ventricular Rate 97 BPM    Atrial Rate 97 BPM    P-R Interval 160 ms    QRS Duration 76 ms    Q-T Interval 372 ms    QTc Calculation (Bazett) 472 ms    P Axis 47 degrees    R Axis 39 degrees    T Axis -4 degrees       RADIOLOGY:  Interpreted by Radiologist.  XR CHEST 1 VW   Final Result   1. Probable bi-basal atelectasis. 2. Mild cardiomegaly. US DUP LOWER EXTREMITIES BILATERAL VENOUS   Final Result   No evidence of DVT in either lower extremity. EKG:  This EKG is signed and interpreted by me. Normal sinus rhythm, rate of 98, no ST segment elevation or depression, ID interval 160 MS, QRS 76 MS,  MS, stable compared to patient's prior EKG. Interpreted by me      ------------------------- NURSING NOTES AND VITALS REVIEWED ---------------------------   The nursing notes within the ED encounter and vital signs as below have been reviewed by myself.   /73 Pulse 71   Temp 98 °F (36.7 °C) (Temporal)   Resp 16   Ht 5' 2.5\" (1.588 m)   Wt 195 lb (88.5 kg)   SpO2 96%   BMI 35.10 kg/m²   Oxygen Saturation Interpretation: Normal    The patients available past medical records and past encounters were reviewed. ------------------------------ ED COURSE/MEDICAL DECISION MAKING----------------------  Medications   cefTRIAXone (ROCEPHIN) 1 g in sterile water 10 mL IV syringe (0 g Intravenous Stopped 7/18/20 1728)             Medical Decision Making:   Patient is a 80-year-old female presenting emergency department with a chief complaint of lower extremity edema as well as shortness of breath. The patient did have labs and imaging which were reviewed. No acute indication for hospitalization, patient was able to ambulate without difficulty. The patient did have cardiology consultation. Patient will follow-up outpatient. Patient was found to have urinary tract infection. Given antibiotics. Re-Evaluations/Consultations:             ED Course as of Jul 19 1149   Sat Jul 18, 2020   1805 Spoke with Dr Roc Roman discussed the case. The patient is able to call the office for close outpatient follow-up. [MT]      ED Course User Index  [MT] Chapincito Cantor,          Critical Care: Please note that the withdrawal or failure to initiate urgent interventions for this patient would likely result in a life threatening deterioration or permanent disability. Accordingly this patient received 0 minutes of critical care time, excluding separately billable procedures. This patient's ED course included: History, physical examination, reevaluation prior to disposition, labs, imaging, telemetry monitoring, EKG, IV antibiotics      This patient has remained hemodynamically stable during their ED course. Counseling:    The emergency provider has spoken with the patient and discussed todays results, in addition to providing specific details for the plan of care

## 2020-07-18 NOTE — ED NOTES
While discharging pt, pt requested to speak with provider again.  Provider at bedside     [facility] [place], [facility]  07/18/20 9187

## 2020-07-18 NOTE — TELEPHONE ENCOUNTER
Patient called in stating she is severely short of breath and has had worsening lower extremity edema, she cannot walk because of both of these issues. I instructed her to present to the ER for further evaluation.

## 2020-07-19 LAB
EKG ATRIAL RATE: 97 BPM
EKG P AXIS: 47 DEGREES
EKG P-R INTERVAL: 160 MS
EKG Q-T INTERVAL: 372 MS
EKG QRS DURATION: 76 MS
EKG QTC CALCULATION (BAZETT): 472 MS
EKG R AXIS: 39 DEGREES
EKG T AXIS: -4 DEGREES
EKG VENTRICULAR RATE: 97 BPM

## 2020-07-19 PROCEDURE — 93010 ELECTROCARDIOGRAM REPORT: CPT | Performed by: INTERNAL MEDICINE

## 2020-08-03 ENCOUNTER — TELEPHONE (OUTPATIENT)
Dept: CARDIOLOGY | Age: 50
End: 2020-08-03

## 2020-08-05 ENCOUNTER — NURSE TRIAGE (OUTPATIENT)
Dept: OTHER | Facility: CLINIC | Age: 50
End: 2020-08-05

## 2020-08-05 ENCOUNTER — HOSPITAL ENCOUNTER (OUTPATIENT)
Dept: CARDIOLOGY | Age: 50
Discharge: HOME | End: 2020-08-05
Payer: MEDICAID

## 2020-08-05 ENCOUNTER — TELEPHONE (OUTPATIENT)
Dept: ADMINISTRATIVE | Age: 50
End: 2020-08-05

## 2020-08-05 LAB
LV EF: 55 %
LVEF MODALITY: NORMAL

## 2020-08-05 PROCEDURE — 93306 TTE W/DOPPLER COMPLETE: CPT

## 2020-08-05 NOTE — TELEPHONE ENCOUNTER
Patient stated she is having trouble with breathing and her legs are swelling. She cant speak a whole sentence with out getting short of breath and had an xray of her chest but has no knowledge of results.

## 2020-08-05 NOTE — TELEPHONE ENCOUNTER
VM message received concerning this patient from [facility]. This writer was unable to reach the patient by phone and did leave a VM message on an identified line. No triage and no contact.      Reason for Disposition   Message left on identified voicemail    Protocols used: NO CONTACT OR DUPLICATE CONTACT CALL-ADULT-[place]

## 2020-08-20 ENCOUNTER — NURSE TRIAGE (OUTPATIENT)
Dept: OTHER | Facility: CLINIC | Age: 50
End: 2020-08-20

## 2020-08-20 RX ORDER — LEVETIRACETAM 750 MG/1
750 TABLET ORAL 2 TIMES DAILY
Qty: 60 TABLET | Refills: 5 | Status: SHIPPED
Start: 2020-08-20 | End: 2021-02-09 | Stop reason: SDUPTHER

## 2020-08-20 RX ORDER — ALBUTEROL SULFATE 90 UG/1
2 AEROSOL, METERED RESPIRATORY (INHALATION) EVERY 4 HOURS PRN
Qty: 1 INHALER | Refills: 1 | Status: SHIPPED
Start: 2020-08-20 | End: 2021-08-30 | Stop reason: SDUPTHER

## 2020-08-20 NOTE — TELEPHONE ENCOUNTER
Received call from [facility] in [facility]. She started with SOB 3 weeks ago. Went ER and was given Albuterol. She does not feel SOB with the Albuterol, uses twice a day. Sometimes needs to use more. She is not SOB now and feels fine. She states she already has appt scheduled for tomorrow at 1PM  Denies known covid 19 exposures. Please do not reply to the triage nurse through this encounter. Any subsequent communication should be directly with the patient.      Reason for Disposition   Patient wants to be seen    Protocols used: BREATHING DIFFICULTY-ADULT-[place]

## 2020-08-20 NOTE — TELEPHONE ENCOUNTER
Patient called asking to schedule for shortness of breath and pet mal seizures. I transferred patient to nurse Mak Avila in triage after scheduling an appt with Jessica Rainey.

## 2020-08-21 ENCOUNTER — OFFICE VISIT (OUTPATIENT)
Dept: FAMILY MEDICINE CLINIC | Age: 50
End: 2020-08-21
Payer: MEDICAID

## 2020-08-21 VITALS
BODY MASS INDEX: 34.62 KG/M2 | TEMPERATURE: 96.6 F | HEART RATE: 103 BPM | DIASTOLIC BLOOD PRESSURE: 88 MMHG | RESPIRATION RATE: 16 BRPM | WEIGHT: 195.4 LBS | HEIGHT: 63 IN | SYSTOLIC BLOOD PRESSURE: 122 MMHG | OXYGEN SATURATION: 96 %

## 2020-08-21 PROCEDURE — G8417 CALC BMI ABV UP PARAM F/U: HCPCS | Performed by: PHYSICIAN ASSISTANT

## 2020-08-21 PROCEDURE — G8427 DOCREV CUR MEDS BY ELIG CLIN: HCPCS | Performed by: PHYSICIAN ASSISTANT

## 2020-08-21 PROCEDURE — 1036F TOBACCO NON-USER: CPT | Performed by: PHYSICIAN ASSISTANT

## 2020-08-21 PROCEDURE — 99213 OFFICE O/P EST LOW 20 MIN: CPT | Performed by: PHYSICIAN ASSISTANT

## 2020-08-21 RX ORDER — PRIMIDONE 50 MG/1
50 TABLET ORAL NIGHTLY
Qty: 30 TABLET | Refills: 0 | Status: SHIPPED
Start: 2020-08-21 | End: 2020-10-26 | Stop reason: SDUPTHER

## 2020-08-21 NOTE — PROGRESS NOTES
Chief Complaint   Patient presents with    Shortness of Breath       HPI:  Patient is here for shortness of breath. Patient complains of swelling in both legs. Patient went to the ED and she had a ct to rule out a blood clot. She was put on lasix due to the swelling. Was told to follow up with pcp. Patient's past medical, surgical, social and/or family history reviewed, updated in chart, and are non-contributory (unless otherwise stated). Medications and allergies also reviewed and updated in chart.     Review of Systems:  Constitutional:  No fever, no fatigue, no chills, no headaches, no weight change  Dermatology:  No rash, no mole, no dry or sensitive skin  ENT:  No cough, no sore throat, no sinus pain, no runny nose, no ear pain  Cardiology:  No chest pain, no palpitations, no leg edema, no shortness of breath, no PND  Gastroenterology:  No dysphagia, no abdominal pain, no nausea, no vomiting, no constipation, no diarrhea, no heartburn  Musculoskeletal:  No joint pain, no leg cramps, no back pain, no muscle aches  Respiratory:  No shortness of breath, no orthopnea, no wheezing, no RAINES, no hemoptysis  Urology:  No blood in the urine, no urinary frequency, no urinary incontinence, no urinary urgency, no nocturia, no dysuria

## 2020-08-21 NOTE — PROGRESS NOTES
Chief Complaint   Patient presents with    Shortness of Breath       HPI:  Patient is here for shortness of breath. Patient complains of swelling in both legs. Patient went to the ED and she had an u/s to rule out a DVT. It was reviewed and negative. She was put on lasix due to the swelling of le. She had an echo on 8/5  That showed EF 55% and trivial pericardial effusion. She was established for the pericardial effusion earlier in the year. It has decreased. She has no cp or palps. She denies pnd or orthopnea. She has no hx of asthma or copd. Her inhaler isnt helping. She was never tested for covid. Was told to follow up with pcp. Patient's past medical, surgical, social and/or family history reviewed, updated in chart, and are non-contributory (unless otherwise stated). Medications and allergies also reviewed and updated in chart. Review of Systems:  Constitutional:  No fever, no fatigue, no chills, no headaches, no weight change  Dermatology:  No rash, no mole, no dry or sensitive skin  ENT:  No cough, no sore throat, no sinus pain, no runny nose, no ear pain  Cardiology:  No chest pain, no palpitations, no leg edema, + shortness of breath, no PND  Gastroenterology:  No dysphagia, no abdominal pain, no nausea, no vomiting, no constipation, no diarrhea, no heartburn  Musculoskeletal:  No joint pain, no leg cramps, no back pain, no muscle aches  Respiratory: +shortness of breath, no orthopnea, no wheezing, no RAINES, no hemoptysis  Urology:  No blood in the urine, no urinary frequency, no urinary incontinence, no urinary urgency, no nocturia, no dysuria    Vitals:    08/21/20 1324   BP: 122/88   Pulse: 103   Resp: 16   Temp: 96.6 °F (35.9 °C)   TempSrc: Infrared   SpO2: 96%   Weight: 195 lb 6.4 oz (88.6 kg)   Height: 5' 2.5\" (1.588 m)       Physical Exam  Constitutional:       General: She is not in acute distress. Appearance: She is well-developed.    HENT:      Head: Normocephalic and atraumatic. Right Ear: External ear normal.      Left Ear: External ear normal.      Nose: Nose normal.   Eyes:      General: No scleral icterus. Conjunctiva/sclera: Conjunctivae normal.      Pupils: Pupils are equal, round, and reactive to light. Neck:      Musculoskeletal: Normal range of motion and neck supple. Thyroid: No thyromegaly. Cardiovascular:      Rate and Rhythm: Normal rate and regular rhythm. Heart sounds: Normal heart sounds. No murmur. Pulmonary:      Effort: Pulmonary effort is normal. No accessory muscle usage or respiratory distress. Breath sounds: Normal breath sounds. No wheezing. Musculoskeletal: Normal range of motion. Right lower leg: No edema. Left lower leg: No edema. Skin:     General: Skin is warm and dry. Findings: No rash. Neurological:      Mental Status: She is alert and oriented to person, place, and time. Deep Tendon Reflexes: Reflexes are normal and symmetric. Psychiatric:         Speech: Speech normal.         Behavior: Behavior normal.         Assessment/Plan:      Monserrat Carmen was seen today for shortness of breath. Diagnoses and all orders for this visit:    Shortness of breath  -     COVID-19 Ambulatory; Future    Other orders  -     primidone (MYSOLINE) 50 MG tablet; Take 1 tablet by mouth nightly      As above. Call or go to ED immediately if symptoms worsen or persist.  Return if symptoms worsen or fail to improve. , or sooner if necessary. Educational materials and/or home exercises printed for patient's review and were included in patient instructions on his/her After Visit Summary and given to patient at the end of visit. Counseled regarding above diagnosis, including possible risks and complications,  especially if left uncontrolled.     Counseled regarding the possible side effects, risks, benefits and alternatives to treatment; patient and/or guardian verbalizes understanding, agrees, feels comfortable with and wishes to proceed with above treatment plan. Advised patient to call with any new medication issues, and read all Rx info from pharmacy to assure aware of all possible risks and side effects of medication before taking. Reviewed age and gender appropriate health screening exams and vaccinations. Advised patient regarding importance of keeping up with recommended health maintenance and to schedule as soon as possible if overdue, as this is important in assessing for undiagnosed pathology, especially cancer, as well as protecting against potentially harmful/life threatening disease. Patient and/or guardian verbalizes understanding and agrees with above counseling, assessment and plan. All questions answered. Clifton Marrero PA-C  8/21/2020    I have personally reviewed and updated the chief complaint, HPI, Past Medical, Family and Social History, as well as the above Review of Systems.

## 2020-08-25 ENCOUNTER — TELEPHONE (OUTPATIENT)
Dept: NEUROLOGY | Age: 50
End: 2020-08-25

## 2020-09-03 ENCOUNTER — TELEPHONE (OUTPATIENT)
Dept: SLEEP MEDICINE | Age: 50
End: 2020-09-03

## 2020-09-11 ENCOUNTER — TELEPHONE (OUTPATIENT)
Dept: SLEEP MEDICINE | Age: 50
End: 2020-09-11

## 2020-09-11 NOTE — TELEPHONE ENCOUNTER
Pt called in stating she has an eye dr appt at 1130 on 9/15 and was asking if she can come to office earlier in morning--appt moved to 0830

## 2020-10-26 ENCOUNTER — VIRTUAL VISIT (OUTPATIENT)
Dept: FAMILY MEDICINE CLINIC | Age: 50
End: 2020-10-26
Payer: MEDICAID

## 2020-10-26 PROCEDURE — 99214 OFFICE O/P EST MOD 30 MIN: CPT | Performed by: FAMILY MEDICINE

## 2020-10-26 PROCEDURE — 3017F COLORECTAL CA SCREEN DOC REV: CPT | Performed by: FAMILY MEDICINE

## 2020-10-26 PROCEDURE — G8427 DOCREV CUR MEDS BY ELIG CLIN: HCPCS | Performed by: FAMILY MEDICINE

## 2020-10-26 RX ORDER — CYCLOBENZAPRINE HCL 5 MG
5 TABLET ORAL NIGHTLY PRN
Qty: 30 TABLET | Refills: 0 | Status: SHIPPED
Start: 2020-10-26 | End: 2020-11-12 | Stop reason: SDUPTHER

## 2020-10-26 RX ORDER — PRIMIDONE 50 MG/1
50 TABLET ORAL NIGHTLY
Qty: 30 TABLET | Refills: 0 | Status: SHIPPED
Start: 2020-10-26 | End: 2021-02-15 | Stop reason: SDUPTHER

## 2020-10-26 RX ORDER — PRAVASTATIN SODIUM 20 MG
20 TABLET ORAL DAILY
Qty: 30 TABLET | Refills: 2 | Status: SHIPPED
Start: 2020-10-26 | End: 2021-02-19 | Stop reason: SDUPTHER

## 2020-10-26 RX ORDER — LIDOCAINE 4 G/G
1 PATCH TOPICAL DAILY
Qty: 30 PATCH | Refills: 0 | Status: SHIPPED | OUTPATIENT
Start: 2020-10-26 | End: 2020-11-25

## 2020-10-26 RX ORDER — METHYLPREDNISOLONE 4 MG/1
TABLET ORAL
Qty: 1 KIT | Refills: 0 | Status: SHIPPED | OUTPATIENT
Start: 2020-10-26 | End: 2020-11-01

## 2020-10-26 ASSESSMENT — ENCOUNTER SYMPTOMS
ABDOMINAL PAIN: 0
COUGH: 0
VOMITING: 0
NAUSEA: 0
CONSTIPATION: 0
DIARRHEA: 0
SINUS PRESSURE: 0
BACK PAIN: 1
SORE THROAT: 0
WHEEZING: 0
RHINORRHEA: 0
SHORTNESS OF BREATH: 0

## 2020-10-26 NOTE — PATIENT INSTRUCTIONS
Patient Education        cyclobenzaprine  Pronunciation:  sye kloe QIAN za preen  Brand:  Amrix, Comfort Pac with Cyclobenzaprine, Fexmid  What is the most important information I should know about cyclobenzaprine? You should not use cyclobenzaprine if you have a thyroid disorder, heart block, congestive heart failure, a heart rhythm disorder, or you have recently had a heart attack. Do not use cyclobenzaprine if you have taken an MAO inhibitor in the past 14 days, such as isocarboxazid, linezolid, phenelzine, rasagiline, selegiline, or tranylcypromine. What is cyclobenzaprine? Cyclobenzaprine is a muscle relaxant. It works by blocking nerve impulses (or pain sensations) that are sent to your brain. Cyclobenzaprine is used together with rest and physical therapy to relieve muscle spasms caused by painful conditions such as an injury. Cyclobenzaprine may also be used for purposes not listed in this medication guide. What should I discuss with my healthcare provider before taking cyclobenzaprine? You should not use cyclobenzaprine if you are allergic to it, or if you have:  · a thyroid disorder;  · heart block, heart rhythm disorder, congestive heart failure; or  · if you have recently had a heart attack. Cyclobenzaprine is not approved for use by anyone younger than 13years old. Do not use cyclobenzaprine if you have taken an MAO inhibitor in the past 14 days. A dangerous drug interaction could occur. MAO inhibitors include isocarboxazid, linezolid, phenelzine, rasagiline, selegiline, and tranylcypromine. Some medicines can interact with cyclobenzaprine and cause a serious condition called serotonin syndrome. Be sure your doctor knows if you also take stimulant medicine, opioid medicine, herbal products, or medicine for depression, mental illness, Parkinson's disease, migraine headaches, serious infections, or prevention of nausea and vomiting.  Ask your doctor before making any changes in how or when you take your medications. Tell your doctor if you have ever had:  · liver disease;  · glaucoma;  · enlarged prostate; or  · problems with urination. It is not known whether this medicine will harm an unborn baby. Tell your doctor if you are pregnant or plan to become pregnant. It may not be safe to breast-feed while using this medicine. Ask your doctor about any risk. Older adults may be more sensitive to the effects of this medicine. How should I take cyclobenzaprine? Follow all directions on your prescription label and read all medication guides or instruction sheets. Your doctor may occasionally change your dose. Use the medicine exactly as directed. Cyclobenzaprine is usually taken once daily for only 2 or 3 weeks. Follow your doctor's dosing instructions very carefully. Swallow the capsule whole and do not crush, chew, break, or open it. Take the medicine at the same time each day. Call your doctor if your symptoms do not improve after 3 weeks, or if they get worse. Store at room temperature away from moisture, heat, and light. What happens if I miss a dose? Take the medicine as soon as you can, but skip the missed dose if it is almost time for your next dose. Do not take two doses at one time. What happens if I overdose? Seek emergency medical attention or call the Poison Help line at 1-834.299.5592. An overdose of cyclobenzaprine can be fatal.  Overdose symptoms may include severe drowsiness, vomiting, fast heartbeats, tremors, agitation, or hallucinations. What should I avoid while taking cyclobenzaprine? Avoid driving or hazardous activity until you know how this medicine will affect you. Your reactions could be impaired. Avoid drinking alcohol. Dangerous side effects could occur. What are the possible side effects of cyclobenzaprine? Get emergency medical help if you have signs of an allergic reaction: hives; difficult breathing; swelling of your face, lips, tongue, or throat.   Stop using cyclobenzaprine and call your doctor at once if you have:  · fast or irregular heartbeats;  · chest pain or pressure, pain spreading to your jaw or shoulder; or  · sudden numbness or weakness (especially on one side of the body), slurred speech, balance problems. Seek medical attention right away if you have symptoms of serotonin syndrome, such as: agitation, hallucinations, fever, sweating, shivering, fast heart rate, muscle stiffness, twitching, loss of coordination, nausea, vomiting, or diarrhea. Serious side effects may be more likely in older adults. Common side effects may include:  · drowsiness, tiredness;  · headache, dizziness;  · dry mouth; or  · upset stomach, nausea, constipation. This is not a complete list of side effects and others may occur. Call your doctor for medical advice about side effects. You may report side effects to FDA at 6-010-FDA-0433. What other drugs will affect cyclobenzaprine? Using cyclobenzaprine with other drugs that make you drowsy can worsen this effect. Ask your doctor before using opioid medication, a sleeping pill, a muscle relaxer, or medicine for anxiety or seizures. Tell your doctor about all your other medicines, especially:  · bupropion (Zyban, for smoking cessation);  · meperidine;  · tramadol;  · verapamil;  · cold or allergy medicine that contains an antihistamine (Benadryl and others);  · medicine to treat Parkinson's disease;  · medicine to treat excess stomach acid, stomach ulcer, motion sickness, or irritable bowel syndrome;  · medicine to treat overactive bladder; or  · bronchodilator asthma medication. This list is not complete. Other drugs may affect cyclobenzaprine, including prescription and over-the-counter medicines, vitamins, and herbal products. Not all possible drug interactions are listed here. Where can I get more information? Your pharmacist can provide more information about cyclobenzaprine.   Remember, keep this and all other medicines out

## 2020-10-26 NOTE — PROGRESS NOTES
[facility] was read the following message We want to confirm that, for purposes of billing, this is a virtual visit with your provider for which we will submit a claim for reimbursement with your insurance company. You will be responsible for any copays, coinsurance amounts or other amounts not covered by your insurance company. If you do not accept this, unfortunately we will not be able to schedule a virtual visit with the provider. Do you accept?  Evan Leigh responded YES

## 2020-10-26 NOTE — PROGRESS NOTES
TeleMedicine Patient Consent    This visit was performed as a virtual video visit using a synchronous, two-way, audio-video telehealth technology platform. Patient identification was verified at the start of the visit, including the patient's telephone number and physical location. I discussed with the patient the nature of our telehealth visits, that:     1. Due to the nature of an audio- video modality, the only components of a physical exam that could be done are the elements supported by direct observation. 2. I would evaluate the patient and recommend diagnostics and treatments based on my assessment. 3. If it was felt that the patient should be evaluated in clinic or an emergency room setting, then they would be directed there. 4. Our sessions are not being recorded and that personal health information is protected. 5. Our team would provide follow up care in person if/when the patient needs it. Patient does agree to proceed with telemedicine consultation. Patient's location: home address in [place]. Is there anyone else present for this visit: No  This visit was completed virtually using [email or web address]    Physician Location:   [facility] [address] [id number]    Time spent: Greater than Not billed by time    10/26/2020    TELEHEALTH EVALUATION -- Audio or Visual (During FCXUX-12 public health emergency)    HPI:    Xiang Polk (:  1970) has requested an audio/video evaluation for the following concern(s):    Patient states that for the last month, she has been dealing with sciatica in her right leg. She states pain radiates down into her foot and her whole leg goes numb. She states that she has some swelling and inflammation. She states that she feels like the muscles are tightening in her thigh. Pain is achiness and tightness. Pain is 10 out of 10. She has been using OTC back/muscle pain relief and lidocaine patches.   Alleviating factors: sitting down.  Exacerbating factors: standing. No recent x-ray or MRI of her lower back. She had a CT scan done 1/13/2018 that showed degenerative discuss disease at L2-L3 and L4-L5. It also showed small central/right posterior disc protrusion in L5-s1. She has never done physical therapy. Review of Systems   Constitutional: Negative for chills, fatigue and fever. HENT: Negative for congestion, ear discharge, ear pain, postnasal drip, rhinorrhea, sinus pressure, sneezing and sore throat. Respiratory: Negative for cough, shortness of breath and wheezing. Cardiovascular: Negative for chest pain, palpitations and leg swelling. Gastrointestinal: Negative for abdominal pain, constipation, diarrhea, nausea and vomiting. Genitourinary: Negative for dysuria, frequency and hematuria. Musculoskeletal: Positive for arthralgias and back pain. Negative for myalgias. Skin: Negative for rash. Neurological: Positive for numbness. Negative for dizziness, light-headedness and headaches. Prior to Visit Medications    Medication Sig Taking? Authorizing Provider   methylPREDNISolone (MEDROL DOSEPACK) 4 MG tablet Take by mouth.  Yes Soheila Harvey,    lidocaine 4 % external patch Place 1 patch onto the skin daily Yes Soheila Harvey DO   cyclobenzaprine (FLEXERIL) 5 MG tablet Take 1 tablet by mouth nightly as needed for Muscle spasms Yes Angel Espitia DO   pravastatin (PRAVACHOL) 20 MG tablet Take 1 tablet by mouth daily Yes Soheila Harvey DO   primidone (MYSOLINE) 50 MG tablet Take 1 tablet by mouth nightly Yes Mari Espitia DO   albuterol sulfate HFA (PROVENTIL HFA) 108 (90 Base) MCG/ACT inhaler Inhale 2 puffs into the lungs every 4 hours as needed for Wheezing  Mari Espitia DO   levETIRAcetam (KEPPRA) 750 MG tablet Take 1 tablet by mouth 2 times daily  Mari Espitia DO   furosemide (LASIX) 40 MG tablet Take 1 tablet by mouth daily as needed (legs swelling)  Jacinta Aquino MD   potassium chloride (KLOR-CON M) 10 MEQ extended release tablet Take 1 tablet by mouth daily Take with lasix  Marquis Karen MD   amLODIPine (NORVASC) 10 MG tablet Take 1 tablet by mouth daily  Marquis Karen MD   melatonin 3 MG TABS tablet Take 3 tablets by mouth daily  Mari Espitia DO   omeprazole (PRILOSEC) 20 MG delayed release capsule Take 1 capsule by mouth 2 times daily  Mari Espitia DO   mirtazapine (REMERON) 30 MG tablet Take 1 tablet by mouth nightly for 14 days  TITI Flynn CNP   PARoxetine (PAXIL) 40 MG tablet Take 1 tablet by mouth daily for 14 days  TITI Flynn CNP   traZODone (DESYREL) 100 MG tablet Take 1 tablet by mouth nightly as needed for Sleep  Artist TITI Maravilla CNP   buprenorphine-naloxone (SUBOXONE) 8-2 MG SUBL SL tablet Place 2 tablets under the tongue daily.    Historical Provider, MD       Social History     Tobacco Use    Smoking status: Never Smoker    Smokeless tobacco: Never Used   Substance Use Topics    Alcohol use: No     Comment: three times yearly;rare caffeine    Drug use: No        Allergies   Allergen Reactions    Ketorolac Tromethamine Nausea Only     Can't take because abd pain    Naproxen Nausea Only     abd pain    Nsaids Other (See Comments)     Gastroparesis, has had 9 abdominal surgeries, and pancreatitis    Prochlorperazine Edisylate Other (See Comments)     Makes me \"crazy, about to jump out the window\"    Reglan [Metoclopramide] Other (See Comments)     seizure    Codeine Nausea And Vomiting     Stomach pain    Levofloxacin Nausea Only    Percocet [Oxycodone-Acetaminophen] Itching and Nausea And Vomiting   ,   Past Medical History:   Diagnosis Date    Back pain     Depression 11/30/2016    Essential hypertension 8/7/2019    Gastroparesis     Hepatitis     History of cardiovascular stress test 7/1/2012    EXERCISE NUCLEAR    Hyperlipidemia     Medically noncompliant 2/15/2019    Pancreatitis, acute     Pyloric stenosis     Seizures ([facility] [address]     Vasovagal syncope 2/15/2019   ,   Past Surgical History:   Procedure Laterality Date    ABDOMEN SURGERY      Patient has had 9 surgeries    CARDIAC CATHETERIZATION      had 7 - 5 - 2012    CARDIAC CATHETERIZATION  11/04/2019    Dr. Kelly Barajas, LAPAROSCOPIC      ECHO COMPL W DOP COLOR FLOW  7/1/2012         ENDOMETRIAL ABLATION      PYLOROPLASTY  3/30/2012    lap plylorplasty open upper endoscopy lysis of adhesions    SALPINGO-OOPHORECTOMY Left     left    UPPER GASTROINTESTINAL ENDOSCOPY  3/15/13    balloon opened pyloric sphincter   ,   Social History     Tobacco Use    Smoking status: Never Smoker    Smokeless tobacco: Never Used   Substance Use Topics    Alcohol use: No     Comment: three times yearly;rare caffeine    Drug use: No   ,   Family History   Problem Relation Age of Onset    High Blood Pressure Mother     Other Father         BPH    No Known Problems Sister     No Known Problems Brother     No Known Problems Sister     No Known Problems Sister     No Known Problems Brother     No Known Problems Brother     No Known Problems Brother     Depression Paternal Grandmother     No Known Problems Daughter     No Known Problems Son     No Known Problems Son    ,   Immunization History   Administered Date(s) Administered    Influenza, Quadv, IM, PF (6 mo and older Fluzone, Flulaval, Fluarix, and 3 yrs and older Afluria) 11/05/2019   ,   Health Maintenance   Topic Date Due    HIV screen  09/28/1985    DTaP/Tdap/Td vaccine (1 - Tdap) 09/28/1989    Cervical cancer screen  09/24/2017    Flu vaccine (1) 09/01/2020    Shingles Vaccine (1 of 2) 09/28/2020    Colon cancer screen colonoscopy  09/28/2020    A1C test (Diabetic or Prediabetic)  02/02/2021    Lipid screen  02/02/2021    Potassium monitoring  07/18/2021    Creatinine monitoring  07/18/2021    Breast cancer screen  02/28/2022    Hepatitis A vaccine  Aged Out    Hepatitis B vaccine Aged Out    Hib vaccine  Aged Out    Meningococcal (ACWY) vaccine  Aged Out    Pneumococcal 0-64 years Vaccine  Aged Out       PHYSICAL EXAMINATION:  [ INSTRUCTIONS:  \"[x]\" Indicates a positive item  \"[]\" Indicates a negative item  -- DELETE ALL ITEMS NOT EXAMINED]  Vital Signs: (As obtained by patient/caregiver or practitioner observation)    Blood pressure-  Heart rate-    Respiratory rate-    Temperature-  Pulse oximetry-   Vitals unable to be obtained    Constitutional: [x] Appears well-developed and well-nourished [x] No apparent distress      [] Abnormal-   Mental status  [x] Alert and awake  [x] Oriented to person/place/time [x]Able to follow commands      Eyes:  EOM    [x]  Normal  [] Abnormal-  Sclera  [x]  Normal  [] Abnormal -         Discharge [x]  None visible  [] Abnormal -    HENT:   [x] Normocephalic, atraumatic. [] Abnormal   [x] Mouth/Throat: Mucous membranes are moist.     External Ears [x] Normal  [] Abnormal-     Neck: [x] No visualized mass     Pulmonary/Chest: [x] Respiratory effort normal.  [x] No visualized signs of difficulty breathing or respiratory distress        [] Abnormal-      Musculoskeletal:   [] Normal gait with no signs of ataxia         [x] Normal range of motion of neck        [] Abnormal-       Neurological:        [x] No Facial Asymmetry (Cranial nerve 7 motor function) (limited exam to video visit)          [] No gaze palsy        [] Abnormal-         Skin:        [x] No significant exanthematous lesions or discoloration noted on facial skin         [] Abnormal-            Psychiatric:       [x] Normal Affect [x] No Hallucinations        [] Abnormal-       Other pertinent observable physical exam findings-     Due to this being a TeleHealth encounter, evaluation of the following organ systems is limited: Vitals/Constitutional/EENT/Resp/CV/GI//MS/Neuro/Skin/Heme-Lymph-Imm. ASSESSMENT/PLAN:  Graeme Tamayo was seen today for medication refill.     Diagnoses and all orders for this visit:    Chronic bilateral low back pain with right-sided sciatica  -     XR LUMBAR SPINE (MIN 4 VIEWS); Future  -     [facility], Dusty Dunlap  -     methylPREDNISolone (MEDROL DOSEPACK) 4 MG tablet; Take by mouth.  -     lidocaine 4 % external patch; Place 1 patch onto the skin daily  -     cyclobenzaprine (FLEXERIL) 5 MG tablet; Take 1 tablet by mouth nightly as needed for Muscle spasms    Mixed hyperlipidemia  -     pravastatin (PRAVACHOL) 20 MG tablet; Take 1 tablet by mouth daily    Colon cancer screening  -     Carla Mcneil MD, General Surgery, [place]    Other orders  -     primidone (MYSOLINE) 50 MG tablet; Take 1 tablet by mouth nightly        Return in about 2 weeks (around 11/9/2020) for back pain. An  electronic signature was used to authenticate this note. --Debra Gomez DO on 10/26/2020 at 11:28 }    Pursuant to the emergency declaration under the [facility], [facility] waiver authority and the [employer] Act, this Virtual  Visit was conducted, with patient's consent, to reduce the patient's risk of exposure to COVID-19 and provide continuity of care for an established patient. Services were provided through a video synchronous discussion virtually to substitute for in-person clinic visit.

## 2020-11-12 ENCOUNTER — OFFICE VISIT (OUTPATIENT)
Dept: FAMILY MEDICINE CLINIC | Age: 50
End: 2020-11-12
Payer: MEDICAID

## 2020-11-12 VITALS
TEMPERATURE: 97 F | SYSTOLIC BLOOD PRESSURE: 118 MMHG | HEIGHT: 62 IN | DIASTOLIC BLOOD PRESSURE: 80 MMHG | OXYGEN SATURATION: 97 % | HEART RATE: 118 BPM | BODY MASS INDEX: 37.69 KG/M2 | WEIGHT: 204.8 LBS

## 2020-11-12 DIAGNOSIS — E78.2 MIXED HYPERLIPIDEMIA: ICD-10-CM

## 2020-11-12 DIAGNOSIS — R73.03 PREDIABETES: ICD-10-CM

## 2020-11-12 DIAGNOSIS — I10 ESSENTIAL HYPERTENSION: ICD-10-CM

## 2020-11-12 LAB
ALBUMIN SERPL-MCNC: 4.7 G/DL (ref 3.5–5.2)
ALP BLD-CCNC: 143 U/L (ref 35–104)
ALT SERPL-CCNC: 61 U/L (ref 0–32)
ANION GAP SERPL CALCULATED.3IONS-SCNC: 14 MMOL/L (ref 7–16)
AST SERPL-CCNC: 28 U/L (ref 0–31)
BASOPHILS ABSOLUTE: 0.02 E9/L (ref 0–0.2)
BASOPHILS RELATIVE PERCENT: 0.3 % (ref 0–2)
BILIRUB SERPL-MCNC: <0.2 MG/DL (ref 0–1.2)
BUN BLDV-MCNC: 9 MG/DL (ref 6–20)
CALCIUM SERPL-MCNC: 9.8 MG/DL (ref 8.6–10.2)
CHLORIDE BLD-SCNC: 105 MMOL/L (ref 98–107)
CHOLESTEROL, TOTAL: 224 MG/DL (ref 0–199)
CO2: 25 MMOL/L (ref 22–29)
CREAT SERPL-MCNC: 0.7 MG/DL (ref 0.5–1)
EOSINOPHILS ABSOLUTE: 0.05 E9/L (ref 0.05–0.5)
EOSINOPHILS RELATIVE PERCENT: 0.8 % (ref 0–6)
GFR AFRICAN AMERICAN: >60
GFR NON-AFRICAN AMERICAN: >60 ML/MIN/1.73
GLUCOSE BLD-MCNC: 102 MG/DL (ref 74–99)
HBA1C MFR BLD: 5.8 % (ref 4–5.6)
HCT VFR BLD CALC: 44.7 % (ref 34–48)
HDLC SERPL-MCNC: 92 MG/DL
HEMOGLOBIN: 13.7 G/DL (ref 11.5–15.5)
IMMATURE GRANULOCYTES #: 0.01 E9/L
IMMATURE GRANULOCYTES %: 0.2 % (ref 0–5)
LDL CHOLESTEROL CALCULATED: 111 MG/DL (ref 0–99)
LYMPHOCYTES ABSOLUTE: 2.02 E9/L (ref 1.5–4)
LYMPHOCYTES RELATIVE PERCENT: 30.5 % (ref 20–42)
MCH RBC QN AUTO: 26.6 PG (ref 26–35)
MCHC RBC AUTO-ENTMCNC: 30.6 % (ref 32–34.5)
MCV RBC AUTO: 86.6 FL (ref 80–99.9)
MONOCYTES ABSOLUTE: 0.19 E9/L (ref 0.1–0.95)
MONOCYTES RELATIVE PERCENT: 2.9 % (ref 2–12)
NEUTROPHILS ABSOLUTE: 4.33 E9/L (ref 1.8–7.3)
NEUTROPHILS RELATIVE PERCENT: 65.3 % (ref 43–80)
PDW BLD-RTO: 15.9 FL (ref 11.5–15)
PLATELET # BLD: 428 E9/L (ref 130–450)
PMV BLD AUTO: 9.4 FL (ref 7–12)
POTASSIUM SERPL-SCNC: 4.3 MMOL/L (ref 3.5–5)
RBC # BLD: 5.16 E12/L (ref 3.5–5.5)
SODIUM BLD-SCNC: 144 MMOL/L (ref 132–146)
TOTAL PROTEIN: 7.9 G/DL (ref 6.4–8.3)
TRIGL SERPL-MCNC: 104 MG/DL (ref 0–149)
VLDLC SERPL CALC-MCNC: 21 MG/DL
WBC # BLD: 6.6 E9/L (ref 4.5–11.5)

## 2020-11-12 PROCEDURE — G8427 DOCREV CUR MEDS BY ELIG CLIN: HCPCS | Performed by: FAMILY MEDICINE

## 2020-11-12 PROCEDURE — 3017F COLORECTAL CA SCREEN DOC REV: CPT | Performed by: FAMILY MEDICINE

## 2020-11-12 PROCEDURE — 1036F TOBACCO NON-USER: CPT | Performed by: FAMILY MEDICINE

## 2020-11-12 PROCEDURE — 99214 OFFICE O/P EST MOD 30 MIN: CPT | Performed by: FAMILY MEDICINE

## 2020-11-12 PROCEDURE — G8484 FLU IMMUNIZE NO ADMIN: HCPCS | Performed by: FAMILY MEDICINE

## 2020-11-12 PROCEDURE — G8417 CALC BMI ABV UP PARAM F/U: HCPCS | Performed by: FAMILY MEDICINE

## 2020-11-12 RX ORDER — CYCLOBENZAPRINE HCL 5 MG
5 TABLET ORAL 2 TIMES DAILY PRN
Qty: 60 TABLET | Refills: 1 | Status: SHIPPED
Start: 2020-11-12 | End: 2021-02-15 | Stop reason: SDUPTHER

## 2020-11-12 RX ORDER — ACETAMINOPHEN 500 MG
1000 TABLET ORAL EVERY 8 HOURS
Qty: 120 TABLET | Refills: 3 | Status: SHIPPED
Start: 2020-11-12 | End: 2022-01-05

## 2020-11-12 RX ORDER — OMEPRAZOLE 20 MG/1
20 CAPSULE, DELAYED RELEASE ORAL 2 TIMES DAILY
Qty: 60 CAPSULE | Refills: 1 | Status: SHIPPED
Start: 2020-11-12 | End: 2021-02-15 | Stop reason: SDUPTHER

## 2020-11-12 RX ORDER — SELENIUM SULFIDE 1 G/100ML
SHAMPOO TOPICAL DAILY PRN
Qty: 1 BOTTLE | Refills: 0 | Status: SHIPPED
Start: 2020-11-12 | End: 2021-02-24

## 2020-11-12 RX ORDER — LIDOCAINE 4 G/G
1 PATCH TOPICAL DAILY
Qty: 30 PATCH | Refills: 0 | Status: SHIPPED | OUTPATIENT
Start: 2020-11-12 | End: 2020-12-12

## 2020-11-12 NOTE — PATIENT INSTRUCTIONS
Patient Education        Sciatica: Exercises  Introduction  Here are some examples of typical rehabilitation exercises for your condition. Start each exercise slowly. Ease off the exercise if you start to have pain. Your doctor or physical therapist will tell you when you can start these exercises and which ones will work best for you. When you are not being active, find a comfortable position for rest. Some people are comfortable on the floor or a medium-firm bed with a small pillow under their head and another under their knees. Some people prefer to lie on their side with a pillow between their knees. Don't stay in one position for too long. Take short walks (10 to 20 minutes) every 2 to 3 hours. Avoid slopes, hills, and stairs until you feel better. Walk only distances you can manage without pain, especially leg pain. How to do the exercises  Back stretches   1. Get down on your hands and knees on the floor. 2. Relax your head and allow it to droop. Round your back up toward the ceiling until you feel a nice stretch in your upper, middle, and lower back. Hold this stretch for as long as it feels comfortable, or about 15 to 30 seconds. 3. Return to the starting position with a flat back while you are on your hands and knees. 4. Let your back sway by pressing your stomach toward the floor. Lift your buttocks toward the ceiling. 5. Hold this position for 15 to 30 seconds. 6. Repeat 2 to 4 times. Follow-up care is a key part of your treatment and safety. Be sure to make and go to all appointments, and call your doctor if you are having problems. It's also a good idea to know your test results and keep a list of the medicines you take. Where can you learn more? Go to [email or web address] org and sign in to your [employer] account. Enter Z377 in the [employer] box to learn more about \"Sciatica: Exercises. \"     If you do not have an account, please click on the \"Sign Up Now\" link.  Current as of: March 2, 2020               Content Version: 12.6  © 9854-7937 [employer]. Care instructions adapted under license by [facility] ([facility]). If you have questions about a medical condition or this instruction, always ask your healthcare professional. [address] any warranty or liability for your use of this information.

## 2020-11-12 NOTE — PROGRESS NOTES
Pain:  Patient is here today with complaints of sciatic pain. This is a/an chronic problem. This has been going on for about 3 weeks now. Exacerbating factors include standing. Alleviating factors include stiting,laying. Pain is tightness,sharp,numbness  in nature. 5/10. Pain is  Radiating down her right leg. This has happen to patient before. Imaging to date includes x-ray was ordered but she has not had it done yet. Therapy to date includes she has an appointment with therapy but has not started yet. She has numbness down her right foot. She has been taking flexeril twice a day. She is requesting blood work to be done    She is also concerned about hypopigmentation of the skin on her leg. Patient's past medical, surgical, social and/or family history reviewed, updated in chart, and are non-contributory (unless otherwise stated). Medications and allergies also reviewed and updated in chart.       Review of Systems:  Constitutional:  No fever, no fatigue, no chills, no headaches, no weight change  Dermatology:  No rash, no mole, no dry or sensitive skin  ENT:  No cough, no sore throat, no sinus pain, no runny nose, no ear pain  Cardiology:  No chest pain, no palpitations, no leg edema, no shortness of breath, no PND  Gastroenterology:  No dysphagia, no abdominal pain, no nausea, no vomiting, no constipation, no diarrhea, no heartburn  Musculoskeletal:  No joint pain, no leg cramps, + back pain, no muscle aches  Respiratory:  No shortness of breath, no orthopnea, no wheezing, no RAINES, no hemoptysis  Urology:  No blood in the urine, no urinary frequency, no urinary incontinence, no urinary urgency, no nocturia, no dysuria      Vitals:    11/12/20 1150   BP: 118/80   Site: Left Upper Arm   Position: Sitting   Cuff Size: Large Adult   Pulse: 118   Temp: 97 °F (36.1 °C)   SpO2: 97%   Weight: 204 lb 12.8 oz (92.9 kg)   Height: 5' 2\" (1.575 m)       Physical Exam  Vitals signs and nursing note reviewed. Constitutional:       Appearance: She is well-developed. HENT:      Head: Normocephalic and atraumatic. Right Ear: External ear normal.      Left Ear: External ear normal.      Nose: Nose normal.   Eyes:      Conjunctiva/sclera: Conjunctivae normal.      Pupils: Pupils are equal, round, and reactive to light. Neck:      Musculoskeletal: Normal range of motion and neck supple. Thyroid: No thyromegaly. Cardiovascular:      Rate and Rhythm: Normal rate and regular rhythm. Heart sounds: Normal heart sounds. Pulmonary:      Effort: Pulmonary effort is normal.      Breath sounds: Normal breath sounds. No wheezing. Abdominal:      General: Bowel sounds are normal.      Palpations: Abdomen is soft. Tenderness: There is no abdominal tenderness. Musculoskeletal:      Lumbar back: She exhibits decreased range of motion, tenderness, pain and spasm. Skin:     General: Skin is warm and dry. Findings: Rash (decreased pigmentation of lower extremities) present. Neurological:      Mental Status: She is alert and oriented to person, place, and time. Deep Tendon Reflexes: Reflexes are normal and symmetric. Psychiatric:         Behavior: Behavior normal.         Assessment/Plan:      Tarah Ace was seen today for back pain. Diagnoses and all orders for this visit:    Chronic bilateral low back pain with right-sided sciatica  -     lidocaine 4 % external patch; Place 1 patch onto the skin daily  -     acetaminophen (APAP EXTRA STRENGTH) 500 MG tablet; Take 2 tablets by mouth every 8 hours  -     cyclobenzaprine (FLEXERIL) 5 MG tablet; Take 1 tablet by mouth 2 times daily as needed for Muscle spasms    Gastroparalysis  -     omeprazole (PRILOSEC) 20 MG delayed release capsule; Take 1 capsule by mouth 2 times daily    Essential hypertension  -     Comprehensive Metabolic Panel;  Future  -     CBC Auto Differential; Future    Mixed hyperlipidemia  -     Lipid Panel; Future    Prediabetes  -     Hemoglobin A1C; Future    Tinea versicolor  -     selenium sulfide (SELSUN BLUE) 1 % shampoo; Apply topically daily as needed for Itching      As above. Call or go to ED immediately if symptoms worsen or persist.  Return in about 6 weeks (around 12/24/2020) for back pain. , or sooner if necessary. Educational materials and/or home exercises printed for patient's review and were included in patient instructions on his/her After Visit Summary and given to patient at the end of visit. Counseled regarding above diagnosis, including possible risks and complications,  especially if left uncontrolled. Counseled regarding the possible side effects, risks, benefits and alternatives to treatment; patient and/or guardian verbalizes understanding, agrees, feels comfortable with and wishes to proceed with above treatment plan. Advised patient to call with any new medication issues, and read all Rx info from pharmacy to assure aware of all possible risks and side effects of medication before taking. Reviewed age and gender appropriate health screening exams and vaccinations. Advised patient regarding importance of keeping up with recommended health maintenance and to schedule as soon as possible if overdue, as this is important in assessing for undiagnosed pathology, especially cancer, as well as protecting against potentially harmful/life threatening disease. Patient and/or guardian verbalizes understanding and agrees with above counseling, assessment and plan. All questions answered. Nasra Davis DO  11/12/2020    I have personally reviewed and updated the chief complaint, HPI, Past Medical, Family and Social History, as well as the above Review of Systems.

## 2021-01-05 NOTE — PROGRESS NOTES
[facility] Cardiology Progress Note  Dr. Katelynn Carrillo      Referring Physician: Bong Toscano DO  CHIEF COMPLAINT:   Chief Complaint   Patient presents with    Hypertension     6 month check. Patient has chest discomfort every now and then. Patient has been out of her Norvasc for 1 1/2 months. It occurs in the middle of her chest.       HISTORY OF PRESENT ILLNESS:   Patient is a 48 year old female with a medical history of pericardial effusion, hypertension, hyperlipidemia, is here for follow-up appointment. Occasional chest pain, comes and goes, mostly at rest, no alleviating or aggravating factors, complaining of pedal edema, shortness of breath, occasional palpitations, denies any  lightheadedness or dizziness, no PND, no orthopnea, no syncope, no presyncopal episodes.         Past Medical History:   Diagnosis Date    Back pain     Depression 11/30/2016    Essential hypertension 8/7/2019    Gastroparesis     Hepatitis     History of cardiovascular stress test 7/1/2012    EXERCISE NUCLEAR    Hyperlipidemia     Medically noncompliant 2/15/2019    Pancreatitis, acute     Pyloric stenosis     Seizures ([address]     Vasovagal syncope 2/15/2019         Past Surgical History:   Procedure Laterality Date    ABDOMEN SURGERY      Patient has had 9 surgeries   [address]      had 7 - 5 - 2012    CARDIAC CATHETERIZATION  11/04/2019    Dr. Tommy Cartwright, LAPAROSCOPIC      ECHO COMPL W DOP COLOR FLOW  7/1/2012         ENDOMETRIAL ABLATION      PYLOROPLASTY  3/30/2012    lap plylorplasty open upper endoscopy lysis of adhesions    SALPINGO-OOPHORECTOMY Left     left    UPPER GASTROINTESTINAL ENDOSCOPY  3/15/13    balloon opened pyloric sphincter         Current Outpatient Medications   Medication Sig Dispense Refill    amitriptyline (ELAVIL) 50 MG tablet take 1 tablet by mouth once daily      ARIPiprazole (ABILIFY) 15 MG tablet take 1 tablet by mouth once daily      carvedilol Allergen Reaction Noted    Ketorolac tromethamine Nausea Only 03/25/2013    Naproxen Nausea Only 03/25/2013    Nsaids Other (See Comments) 09/17/2011    Prochlorperazine edisylate Other (See Comments) 09/17/2011    Reglan [metoclopramide] Other (See Comments) 11/15/2012    Codeine Nausea And Vomiting 09/17/2011    Levofloxacin Nausea Only 10/03/2012    Percocet [oxycodone-acetaminophen] Itching and Nausea And Vomiting 03/14/2016       Social History     Socioeconomic History    Marital status:      Spouse name: Not on file    Number of children: 3    Years of education: Not on file    Highest education level: Not on file   Occupational History    Not on file   Social Needs    Financial resource strain: Not on file    Food insecurity     Worry: Not on file     Inability: Not on file   [employer] needs     Medical: Not on file     Non-medical: Not on file   Tobacco Use    Smoking status: Never Smoker    Smokeless tobacco: Never Used   Substance and Sexual Activity    Alcohol use: No     Comment: three times yearly;rare caffeine    Drug use: No    Sexual activity: Not Currently     Comment: not assessed   Lifestyle    Physical activity     Days per week: Not on file     Minutes per session: Not on file    Stress: Not on file   Relationships    Social connections     Talks on phone: Not on file     Gets together: Not on file     Attends [employer] service: Not on file     Active member of club or organization: Not on file     Attends meetings of clubs or organizations: Not on file     Relationship status: Not on file    Intimate partner violence     Fear of current or ex partner: Not on file     Emotionally abused: Not on file     Physically abused: Not on file     Forced sexual activity: Not on file   Other Topics Concern    Not on file   Social History Narrative    Not on file       Family History   Problem Relation Age of Onset    High Blood Pressure Mother    [facility] Other Father BPH    No Known Problems Sister     No Known Problems Brother     No Known Problems Sister     No Known Problems Sister     No Known Problems Brother     No Known Problems Brother     No Known Problems Brother     Depression Paternal Grandmother     No Known Problems Daughter     No Known Problems Son     No Known Problems Son        REVIEW OF SYSTEMS:     CONSTITUTIONAL:  negative for  fevers, chills, sweats and fatigue  HEENT:  negative for  tinnitus, earaches, nasal congestion and epistaxis  RESPIRATORY:  negative for  dry cough, cough with sputum,, wheezing and hemoptysis  GASTROINTESTINAL:  negative for nausea, vomiting, diarrhea, constipation, pruritus and jaundice  HEMATOLOGIC/LYMPHATIC:  negative for easy bruising, bleeding, lymphadenopathy and petechiae  ENDOCRINE:  negative for heat intolerance, cold intolerance, tremor, hair loss and diabetic symptoms including neither polyuria nor polydipsia nor blurred vision  MUSCULOSKELETAL:  negative for  myalgias, arthralgias, joint swelling, stiff joints and decreased range of motion  NEUROLOGICAL:  negative for memory problems, speech problems, visual disturbance, dysphagia, weakness and numbness      PHYSICAL EXAM:   CONSTITUTIONAL:  awake, alert, cooperative, no apparent distress, and appears stated age  EYES:  lids and lashes normal and pupils equal, round and reactive to light, anicteric sclerae  HEAD:  normocepalic, without obvious abnormality, atraumatic, pink, moist mucous membranes.   NECK:  Supple, symmetrical, trachea midline, no adenopathy, thyroid symmetric, not enlarged and no tenderness, skin normal  HEMATOLOGIC/LYMPHATICS:  no cervical lymphadenopathy and no supraclavicular lymphadenopathy  LUNGS:  No increased work of breathing, good air exchange, clear to auscultation bilaterally, no crackles or wheezing  CARDIOVASCULAR:  Normal apical impulse, regular rate and rhythm, normal S1 and S2, no S3 or S4, and no murmur noted and no JVD, no carotid bruit, no pedal edema, good carotid upstroke bilaterally. ABDOMEN:  Soft, nontender, no masses, no hepatomegaly or splenomegaly, BS+  CHEST: nontender to palpation, expands symmetrically  MUSCULOSKELETAL:  No clubbing no cyanosis. there is no redness, warmth, or swelling of the joints  full range of motion noted  NEUROLOGIC:  Alert, awake,oriented x3, no focal neurologic deficit was appreciated. Seizures   SKIN:  no bruising or bleeding, normal skin color, texture, turgor and no redness, warmth, or swelling         /80 (Site: Right Upper Arm, Position: Sitting, Cuff Size: Large Adult)   Pulse 120   Ht 5' 2\" (1.575 m)   Wt 210 lb (95.3 kg)   BMI 38.41 kg/m²     DATA:   I personally reviewed the visit EKG with the following interpretation: Sinus tachycardia, nonspecific ST-T changes    EKG 7/18/20 Normal sinus rhythm  Nonspecific T wave abnormality  Prolonged QT  Abnormal ECG  When compared with ECG of 11-JUN-2020 22:49,  Criteria for Septal infarct are no longer present  Non-specific change in ST segment in Lateral leads  Nonspecific T wave abnormality now evident in Lateral leads    ECHO: 8/5/20  Summary   Compared to prior echo, no significant changes noted. Technically adequate   study. Normal left ventricular wall thickness. Ejection fraction is visually estimated at 55%. No regional wall motion abnormalities seen. Normal left ventricular diastolic filling pattern for age. Physiologic and/or trace tricuspid regurgitation. RVSP is normal.   There is a trivial pericardial effusion. Stress Test: 10/26/19   FINDINGS:   Perfusion images demonstrate small anterior wall reversible defect   Wall motion is within normal limits. The end diastolic volume is 54 ml. The end systolic volume is 17 ml. The estimated ejection fraction is 69 %.         Impression   1. There is a small reversible defect in the inferior wall   2. Ejection fraction is 69 %.    3. No significant wall motion abnormality       Angiography: 11/4/19 IMPRESSION:  1. Angiographically normal coronary arteries. 2.  Successful deployment of 6-[place] Angio-Seal in the right common  femoral artery. Cardiology Labs: BMP:    Lab Results   Component Value Date     11/12/2020    K 4.3 11/12/2020    K 4.2 07/18/2020     11/12/2020    CO2 25 11/12/2020    BUN 9 11/12/2020    CREATININE 0.7 11/12/2020     CMP:    Lab Results   Component Value Date     11/12/2020    K 4.3 11/12/2020    K 4.2 07/18/2020     11/12/2020    CO2 25 11/12/2020    BUN 9 11/12/2020    CREATININE 0.7 11/12/2020    PROT 7.9 11/12/2020     CBC:    Lab Results   Component Value Date    WBC 6.6 11/12/2020    RBC 5.16 11/12/2020    HGB 13.7 11/12/2020    HCT 44.7 11/12/2020    MCV 86.6 11/12/2020    RDW 15.9 11/12/2020     11/12/2020     PT/INR:  No results found for: PTINR  PT/INR Warfarin:  No components found for: PTPATWAR, PTINRWAR  PTT:    Lab Results   Component Value Date    APTT 32.4 10/24/2019     PTT Heparin:  No components found for: APTTHEP  Magnesium:    Lab Results   Component Value Date    MG 1.9 11/02/2019     TSH:    Lab Results   Component Value Date    TSH 1.360 01/28/2020     TROPONIN:  No components found for: TROP  BNP:  No results found for: BNP  FASTING LIPID PANEL:    Lab Results   Component Value Date    CHOL 224 11/12/2020    HDL 92 11/12/2020    TRIG 104 11/12/2020     No orders to display     I have personally reviewed the laboratory, cardiac diagnostic and radiographic testing as outlined above:      IMPRESSION:  1. Sinus tachycardia: Etiology?,  Will check echocardiogram, check TSH, will start on carvedilol  2. Pericardial effusion: Will repeat echocardiogram   3. acute exacerbation of CHF: Diastolic, decompensated, will start Lasix  4. Hypertension  5. Hyperlipidemia  6. Seizures: will follow neurology. 7. Depression  8.  Noncompliance      RECOMMENDATIONS:   1.  Carvedilol 6.25 mg 1 by mouth twice

## 2021-01-11 ENCOUNTER — OFFICE VISIT (OUTPATIENT)
Dept: CARDIOLOGY CLINIC | Age: 51
End: 2021-01-11
Payer: MEDICAID

## 2021-01-11 ENCOUNTER — OFFICE VISIT (OUTPATIENT)
Dept: SURGERY | Age: 51
End: 2021-01-11

## 2021-01-11 VITALS
OXYGEN SATURATION: 97 % | BODY MASS INDEX: 37.39 KG/M2 | TEMPERATURE: 97.7 F | HEIGHT: 63 IN | RESPIRATION RATE: 16 BRPM | HEART RATE: 116 BPM | WEIGHT: 211 LBS | SYSTOLIC BLOOD PRESSURE: 144 MMHG | DIASTOLIC BLOOD PRESSURE: 86 MMHG

## 2021-01-11 VITALS
WEIGHT: 210 LBS | DIASTOLIC BLOOD PRESSURE: 80 MMHG | HEART RATE: 120 BPM | SYSTOLIC BLOOD PRESSURE: 132 MMHG | BODY MASS INDEX: 38.64 KG/M2 | HEIGHT: 62 IN

## 2021-01-11 DIAGNOSIS — I31.39 PERICARDIAL EFFUSION: Primary | ICD-10-CM

## 2021-01-11 DIAGNOSIS — Z12.11 ENCOUNTER FOR SCREENING COLONOSCOPY: Primary | ICD-10-CM

## 2021-01-11 DIAGNOSIS — R00.0 SINUS TACHYCARDIA: ICD-10-CM

## 2021-01-11 PROCEDURE — 99024 POSTOP FOLLOW-UP VISIT: CPT | Performed by: SURGERY

## 2021-01-11 PROCEDURE — 1036F TOBACCO NON-USER: CPT | Performed by: INTERNAL MEDICINE

## 2021-01-11 PROCEDURE — 3017F COLORECTAL CA SCREEN DOC REV: CPT | Performed by: INTERNAL MEDICINE

## 2021-01-11 PROCEDURE — G8484 FLU IMMUNIZE NO ADMIN: HCPCS | Performed by: INTERNAL MEDICINE

## 2021-01-11 PROCEDURE — G8417 CALC BMI ABV UP PARAM F/U: HCPCS | Performed by: INTERNAL MEDICINE

## 2021-01-11 PROCEDURE — 99214 OFFICE O/P EST MOD 30 MIN: CPT | Performed by: INTERNAL MEDICINE

## 2021-01-11 PROCEDURE — G8427 DOCREV CUR MEDS BY ELIG CLIN: HCPCS | Performed by: INTERNAL MEDICINE

## 2021-01-11 PROCEDURE — 93000 ELECTROCARDIOGRAM COMPLETE: CPT | Performed by: INTERNAL MEDICINE

## 2021-01-11 RX ORDER — AMITRIPTYLINE HYDROCHLORIDE 50 MG/1
TABLET, FILM COATED ORAL
COMMUNITY
Start: 2020-12-18

## 2021-01-11 RX ORDER — ARIPIPRAZOLE 15 MG/1
TABLET ORAL
COMMUNITY
Start: 2020-12-28

## 2021-01-11 RX ORDER — SODIUM CHLORIDE 9 MG/ML
INJECTION, SOLUTION INTRAVENOUS CONTINUOUS
Status: CANCELLED | OUTPATIENT
Start: 2021-01-11

## 2021-01-11 RX ORDER — CARVEDILOL 6.25 MG/1
6.25 TABLET ORAL 2 TIMES DAILY WITH MEALS
Qty: 180 TABLET | Refills: 3 | Status: SHIPPED
Start: 2021-01-11 | End: 2021-08-30

## 2021-01-11 NOTE — PROGRESS NOTES
General Surgery History and Physical    Patient's Name/Date of Birth: Yohana Shoemaker / 1970    Date: 1/11/2021    PCP: Jey Hart DO    Referring Physician:   Genoveva Albrecht DO  [phone]    CHIEF COMPLAINT:    Chief Complaint   Patient presents with    New Patient     screening colonoscopy          HISTORY OF PRESENT ILLNESS:    Yohana Shoemaker is an 48 y.o. female who presents for a colonoscopy. The patient denies any symptoms. No nausea, vomiting, diarrhea, constipation. No changes in stool caliber. No bloody or black stools. No abdominal pain. No unintentional weight loss. No family history of colon cancer. The patient has a known history of: no known risk factors. The patient has had a colonoscopy before - her last was over 15 years ago. She was being worked up for gastroparesis at the time. She has a history of vagus nerve injury. She has since had a pyloroplastic and botox injections to helps with her symptoms. She said she has been told she has diverticulosis as well.      Past Medical History:   Past Medical History:   Diagnosis Date    Back pain     Depression 11/30/2016    Essential hypertension 8/7/2019    Gastroparesis     Hepatitis     History of cardiovascular stress test 7/1/2012    EXERCISE NUCLEAR    Hyperlipidemia     Medically noncompliant 2/15/2019    Pancreatitis, acute     Pyloric stenosis     Seizures ([address]     Vasovagal syncope 2/15/2019        Past Surgical History:   Past Surgical History:   Procedure Laterality Date    ABDOMEN SURGERY      Patient has had 9 surgeries   [address]      had 7 - 5 - 2012    CARDIAC CATHETERIZATION  11/04/2019    Dr. Sabine Lund, LAPAROSCOPIC      ECHO COMPL W DOP COLOR FLOW  7/1/2012         ENDOMETRIAL ABLATION      PYLOROPLASTY  3/30/2012    lap plylorplasty open upper endoscopy lysis of adhesions    SALPINGO-OOPHORECTOMY Left     left    UPPER GASTROINTESTINAL ENDOSCOPY  3/15/13 balloon opened pyloric sphincter        Allergies: Ketorolac tromethamine, Naproxen, Nsaids, Prochlorperazine edisylate, Reglan [metoclopramide], Codeine, Levofloxacin, and Percocet [oxycodone-acetaminophen]     Medications:   Current Outpatient Medications   Medication Sig Dispense Refill    sertraline (ZOLOFT) 50 MG tablet take 1 tablet by mouth every morning      amitriptyline (ELAVIL) 50 MG tablet take 1 tablet by mouth once daily      ARIPiprazole (ABILIFY) 15 MG tablet take 1 tablet by mouth once daily      carvedilol (COREG) 6.25 MG tablet Take 1 tablet by mouth 2 times daily (with meals) 180 tablet 3    omeprazole (PRILOSEC) 20 MG delayed release capsule Take 1 capsule by mouth 2 times daily 60 capsule 1    acetaminophen (APAP EXTRA STRENGTH) 500 MG tablet Take 2 tablets by mouth every 8 hours 120 tablet 3    cyclobenzaprine (FLEXERIL) 5 MG tablet Take 1 tablet by mouth 2 times daily as needed for Muscle spasms 60 tablet 1    selenium sulfide (SELSUN BLUE) 1 % shampoo Apply topically daily as needed for Itching 1 Bottle 0    pravastatin (PRAVACHOL) 20 MG tablet Take 1 tablet by mouth daily 30 tablet 2    primidone (MYSOLINE) 50 MG tablet Take 1 tablet by mouth nightly 30 tablet 0    albuterol sulfate HFA (PROVENTIL HFA) 108 (90 Base) MCG/ACT inhaler Inhale 2 puffs into the lungs every 4 hours as needed for Wheezing 1 Inhaler 1    levETIRAcetam (KEPPRA) 750 MG tablet Take 1 tablet by mouth 2 times daily 60 tablet 5    furosemide (LASIX) 40 MG tablet Take 1 tablet by mouth daily as needed (legs swelling) 60 tablet 2    potassium chloride (KLOR-CON M) 10 MEQ extended release tablet Take 1 tablet by mouth daily Take with lasix 60 tablet 1    melatonin 3 MG TABS tablet Take 3 tablets by mouth daily 30 tablet 2    mirtazapine (REMERON) 30 MG tablet Take 1 tablet by mouth nightly for 14 days 14 tablet 0    PARoxetine (PAXIL) 40 MG tablet Take 1 tablet by mouth daily for 14 days 14 tablet 0    traZODone (DESYREL) 100 MG tablet Take 1 tablet by mouth nightly as needed for Sleep 14 tablet 0    buprenorphine-naloxone (SUBOXONE) 8-2 MG SUBL SL tablet Place 2 tablets under the tongue daily.  amLODIPine (NORVASC) 10 MG tablet Take 1 tablet by mouth daily (Patient not taking: Reported on 1/11/2021) 90 tablet 1     No current facility-administered medications for this visit. Social History:   Social History     Tobacco Use    Smoking status: Never Smoker    Smokeless tobacco: Never Used   Substance Use Topics    Alcohol use: No     Comment: three times yearly;rare caffeine        Family History:   Family History   Problem Relation Age of Onset    High Blood Pressure Mother     Other Father         BPH    No Known Problems Sister     No Known Problems Brother     No Known Problems Sister     No Known Problems Sister     No Known Problems Brother     No Known Problems Brother     No Known Problems Brother     Depression Paternal Grandmother     No Known Problems Daughter     No Known Problems Son     No Known Problems Son        REVIEW OF SYSTEMS:    Constitutional: negative  Eyes: negative  Ears, nose, mouth, throat, and face: negative  Respiratory: negative  Cardiovascular: negative  Gastrointestinal: as in HPI  Genitourinary:negative  Integument/breast: negative  Hematologic/lymphatic: negative  Musculoskeletal:negative  Neurological: negative  Allergic/Immunologic: negative    PHYSICAL EXAM   BP (!) 144/86   Pulse 116   Temp 97.7 °F (36.5 °C) (Temporal)   Resp 16   Ht 5' 2.5\" (1.588 m)   Wt 211 lb (95.7 kg)   SpO2 97%   BMI 37.98 kg/m²     General appearance: alert, cooperative and in no acute distress. Eyes: Grossly normal   Lungs: normal work of breathing  Heart: regular rate  Abdomen:  soft, non-tender; bowel sounds normal; no masses,  no organomegaly  Skin: No skin abnormalities  Neurologic: Alert and oriented x 3.  Grossly normal  Musculoskeletal: No edema.      ASSESSMENT AND PLAN:       Assessment: Maki Land is an 48 y.o. female who presents for a colonoscopy for screening    Plan: I will set the patient up for a colonoscopy, possible biopsy, possible polypectomy. I explained the risks including but not limited to bleeding, perforation leading to possible surgery, or infection. The benefits, alternatives, and potential complications associated with the above procedure to be performed and transfusions when applicable with the patient/responsible person prior to the procedure. I discussed the risk of bowel peroration, postoperative bleeding, post-polypectomy syndrome, as well as the possibility of needing emergency surgery or another colonoscopy. All of the patient's questions were answered. The patient understands and agrees to the procedure.      Physician Signature: Electronically signed by Cat Maldonado MD, General Surgery    Send copy of H&P to PCP, Viviana Stauffer DO and referring physician, Laisha Rivas DO

## 2021-01-12 ENCOUNTER — TELEPHONE (OUTPATIENT)
Dept: SURGERY | Age: 51
End: 2021-01-12

## 2021-01-12 NOTE — TELEPHONE ENCOUNTER
Prior Authorization Form:      DEMOGRAPHICS:                     Patient Name:  Miley Carrillo  Patient :  1970            Insurance:  Payor: Shasha Ortega / Plan: [employer] Maria Ines / Product Type: *No Product type* /   Insurance ID Number:    Payor/Plan Subscr  Sex Relation Sub. Ins. ID Effective Group Num   1.  [place] [facility] [place] 1970 [place] Self [id number]                                   [address] [facility]         DIAGNOSIS & PROCEDURE:                       Procedure/Operation: Colonoscopy Screening            CPT Code: 90448    Diagnosis:  Screening     ICD10 Code: Z12.11    Location:  [facility]    Surgeon:  Holly Navarro INFORMATION:                          Date: 3/1/2021   Time:11:00a            Anesthesia:  MAC/TIVA                                                       Status:  Outpatient        Special Comments:  RS from 21 to 3/1/21       Electronically signed by Estevan Mitchell MA on 2021 at 2:28 PM

## 2021-01-13 ENCOUNTER — VIRTUAL VISIT (OUTPATIENT)
Dept: FAMILY MEDICINE CLINIC | Age: 51
End: 2021-01-13
Payer: MEDICAID

## 2021-01-13 ENCOUNTER — TELEPHONE (OUTPATIENT)
Dept: ADMINISTRATIVE | Age: 51
End: 2021-01-13

## 2021-01-13 DIAGNOSIS — M54.41 CHRONIC BILATERAL LOW BACK PAIN WITH RIGHT-SIDED SCIATICA: Primary | ICD-10-CM

## 2021-01-13 DIAGNOSIS — G89.29 CHRONIC BILATERAL LOW BACK PAIN WITH RIGHT-SIDED SCIATICA: Primary | ICD-10-CM

## 2021-01-13 PROCEDURE — 3017F COLORECTAL CA SCREEN DOC REV: CPT | Performed by: FAMILY MEDICINE

## 2021-01-13 PROCEDURE — G8427 DOCREV CUR MEDS BY ELIG CLIN: HCPCS | Performed by: FAMILY MEDICINE

## 2021-01-13 PROCEDURE — 99214 OFFICE O/P EST MOD 30 MIN: CPT | Performed by: FAMILY MEDICINE

## 2021-01-13 ASSESSMENT — ENCOUNTER SYMPTOMS
SORE THROAT: 0
WHEEZING: 0
CONSTIPATION: 0
DIARRHEA: 0
RHINORRHEA: 0
SHORTNESS OF BREATH: 0
BACK PAIN: 1
NAUSEA: 0
COUGH: 0
SINUS PRESSURE: 0
ABDOMINAL PAIN: 0
VOMITING: 0

## 2021-01-13 NOTE — PATIENT INSTRUCTIONS

## 2021-01-13 NOTE — PROGRESS NOTES
TeleMedicine Patient Consent    This visit was performed as a virtual video visit using a synchronous, two-way, audio-video telehealth technology platform. Patient identification was verified at the start of the visit, including the patient's telephone number and physical location. I discussed with the patient the nature of our telehealth visits, that:     1. Due to the nature of an audio- video modality, the only components of a physical exam that could be done are the elements supported by direct observation. 2. I would evaluate the patient and recommend diagnostics and treatments based on my assessment. 3. If it was felt that the patient should be evaluated in clinic or an emergency room setting, then they would be directed there. 4. Our sessions are not being recorded and that personal health information is protected. 5. Our team would provide follow up care in person if/when the patient needs it. Patient does agree to proceed with telemedicine consultation. Patient's location: home address in [place]. Is there anyone else present for this visit: No  This visit was completed virtually using [email or web address] me    Physician Location:   [facility] [address] [place] [address]    Time spent: Greater than Not billed by time    2021    TELEHEALTH EVALUATION -- Audio or Visual (During PXDEP-81 public health emergency)    HPI:    Brandon Belcher (:  1970) has requested an audio/video evaluation for the following concern(s): Patient is presenting today for follow up of back pain. She states it improved with the medrol dose pack. She states that the pain is mostly on the right side of her back. hse states she has had epidurals in the past.  An x-ray was ordered when she was seen in October but she never had it done. She was also referred to physical therapy but did not participate in that either. We do not have any previous MRIs. She states it seems like sciatica. She states pain is sharp. She states when she stands up for long periods of time. She states that she has to sit down and sit back wards which relieves almost all of the pain. Last CT scan was in 2018 and it showed small disc protrusion of L5-S1. She thinks the pain has worsened due to weight gain. She thinks her weigh is the problem with her back. Last MRI was in 2016. She does not recall who she saw. She states that she just wants to get injections again. She does have numbness and tingling in her right leg. Patient states that she was seen by cardiology and she asked about the discoloration in her lower extremities. They did not think that this was due to a vascular issue. She is requesting to see dermatology. She is going to call insurance for a list of dermatologist that are covered by her insurance. Review of Systems   Constitutional: Negative for chills, fatigue and fever. HENT: Negative for congestion, ear discharge, ear pain, postnasal drip, rhinorrhea, sinus pressure, sneezing and sore throat. Respiratory: Negative for cough, shortness of breath and wheezing. Cardiovascular: Negative for chest pain, palpitations and leg swelling. Gastrointestinal: Negative for abdominal pain, constipation, diarrhea, nausea and vomiting. Genitourinary: Negative for dysuria, frequency and hematuria. Musculoskeletal: Positive for back pain. Negative for arthralgias and myalgias. Skin: Negative for rash. Neurological: Positive for numbness. Negative for dizziness, light-headedness and headaches. Prior to Visit Medications    Medication Sig Taking?  Authorizing Provider   sertraline (ZOLOFT) 50 MG tablet take 1 tablet by mouth every morning  Historical Provider, MD   amitriptyline (ELAVIL) 50 MG tablet take 1 tablet by mouth once daily  Historical Provider, MD   ARIPiprazole (ABILIFY) 15 MG tablet take 1 tablet by mouth once daily  Historical Provider, MD   carvedilol (COREG) 6.25 MG tablet Take 1 tablet by mouth 2 times daily (with meals)  Meagan Meeks MD   omeprazole (PRILOSEC) 20 MG delayed release capsule Take 1 capsule by mouth 2 times daily  Mari Espitia DO   acetaminophen (APAP EXTRA STRENGTH) 500 MG tablet Take 2 tablets by mouth every 8 hours  Mari Espitia DO   cyclobenzaprine (FLEXERIL) 5 MG tablet Take 1 tablet by mouth 2 times daily as needed for Muscle spasms  Shalonda Pickdennis, DO   selenium sulfide (SELSUN BLUE) 1 % shampoo Apply topically daily as needed for Itching  Mari Espitia DO   pravastatin (PRAVACHOL) 20 MG tablet Take 1 tablet by mouth daily  Mari Espitia DO   primidone (MYSOLINE) 50 MG tablet Take 1 tablet by mouth nightly  Mari Espitia DO   albuterol sulfate HFA (PROVENTIL HFA) 108 (90 Base) MCG/ACT inhaler Inhale 2 puffs into the lungs every 4 hours as needed for Wheezing  Mari Espitia DO   levETIRAcetam (KEPPRA) 750 MG tablet Take 1 tablet by mouth 2 times daily  Mari Espitia DO   furosemide (LASIX) 40 MG tablet Take 1 tablet by mouth daily as needed (legs swelling)  Meagan Meeks MD   potassium chloride (KLOR-CON M) 10 MEQ extended release tablet Take 1 tablet by mouth daily Take with lasix  Meagan Meeks MD   amLODIPine (NORVASC) 10 MG tablet Take 1 tablet by mouth daily  Patient not taking: Reported on 1/11/2021  Meagan Meeks MD   melatonin 3 MG TABS tablet Take 3 tablets by mouth daily  Shalonda Tia, DO mirtazapine (REMERON) 30 MG tablet Take 1 tablet by mouth nightly for 14 days  TITI Rojo - CNP   PARoxetine (PAXIL) 40 MG tablet Take 1 tablet by mouth daily for 14 days  TITI Rojo - CNP   traZODone (DESYREL) 100 MG tablet Take 1 tablet by mouth nightly as needed for Sleep  Wilfrid Wood APRN - [facility]   buprenorphine-naloxone (SUBOXONE) 8-2 MG SUBL SL tablet Place 2 tablets under the tongue daily.    Historical Provider, MD       Social History     Tobacco Use    Smoking status: Never Smoker    Smokeless tobacco: Never Used   Substance Use Topics    Alcohol use: No     Comment: three times yearly;rare caffeine    Drug use: No        Allergies   Allergen Reactions    Ketorolac Tromethamine Nausea Only     Can't take because abd pain    Naproxen Nausea Only     abd pain    Nsaids Other (See Comments)     Gastroparesis, has had 9 abdominal surgeries, and pancreatitis    Prochlorperazine Edisylate Other (See Comments)     Makes me \"crazy, about to jump out the window\"    Reglan [Metoclopramide] Other (See Comments)     seizure    Codeine Nausea And Vomiting     Stomach pain    Levofloxacin Nausea Only    Percocet [Oxycodone-Acetaminophen] Itching and Nausea And Vomiting   ,   Past Medical History:   Diagnosis Date    Back pain     Depression 11/30/2016    Essential hypertension 8/7/2019    Gastroparesis     Hepatitis     History of cardiovascular stress test 7/1/2012    EXERCISE NUCLEAR    Hyperlipidemia     Medically noncompliant 2/15/2019    Pancreatitis, acute     Pyloric stenosis     Seizures ([facility] [address]     Vasovagal syncope 2/15/2019   ,   Past Surgical History:   Procedure Laterality Date    ABDOMEN SURGERY      Patient has had 9 surgeries    CARDIAC CATHETERIZATION      had 7 - 5 - 2012    CARDIAC CATHETERIZATION  11/04/2019    Dr. Laya Christine, LAPAROSCOPIC      ECHO COMPL W DOP COLOR FLOW  7/1/2012         ENDOMETRIAL ABLATION  PYLOROPLASTY  3/30/2012    lap plylorplasty open upper endoscopy lysis of adhesions    SALPINGO-OOPHORECTOMY Left     left    UPPER GASTROINTESTINAL ENDOSCOPY  3/15/13    balloon opened pyloric sphincter   ,   Social History     Tobacco Use    Smoking status: Never Smoker    Smokeless tobacco: Never Used   Substance Use Topics    Alcohol use: No     Comment: three times yearly;rare caffeine    Drug use: No   ,   Family History   Problem Relation Age of Onset    High Blood Pressure Mother     Other Father         BPH    No Known Problems Sister     No Known Problems Brother     No Known Problems Sister     No Known Problems Sister     No Known Problems Brother     No Known Problems Brother     No Known Problems Brother     Depression Paternal Grandmother     No Known Problems Daughter     No Known Problems Son     No Known Problems Son    ,   Immunization History   Administered Date(s) Administered    Influenza, Quadv, IM, PF (6 mo and older Fluzone, Flulaval, Fluarix, and 3 yrs and older Afluria) 11/05/2019   ,   Health Maintenance   Topic Date Due    HIV screen  09/28/1985    DTaP/Tdap/Td vaccine (1 - Tdap) 09/28/1989    Cervical cancer screen  09/24/2017    Flu vaccine (1) 09/01/2020    Shingles Vaccine (1 of 2) 09/28/2020    Colon cancer screen colonoscopy  09/28/2020    A1C test (Diabetic or Prediabetic)  11/12/2021    Lipid screen  11/12/2021    Potassium monitoring  11/12/2021    Creatinine monitoring  11/12/2021    Breast cancer screen  02/28/2022    Hepatitis C screen  Completed    Hepatitis A vaccine  Aged Out    Hepatitis B vaccine  Aged Out    Hib vaccine  Aged Out    Meningococcal (ACWY) vaccine  Aged Out    Pneumococcal 0-64 years Vaccine  Aged Out       PHYSICAL EXAMINATION:  [ INSTRUCTIONS:  \"[x]\" Indicates a positive item  \"[]\" Indicates a negative item  -- DELETE ALL ITEMS NOT EXAMINED] Patient agreeable to going to get the x-ray that was ordered 3 months ago. She is also agreeable to retrying PT. Will refer to PMR since she had improvement in the past with injections and would like to be evaluated for injections again. Return in about 2 months (around 3/13/2021). An  electronic signature was used to authenticate this note. --Andi Hubbard, DO on 1/13/2021 at 3:44 }    Pursuant to the emergency declaration under the [facility], [facility] waiver authority and the [employer] and [employer] Act, this Virtual  Visit was conducted, with patient's consent, to reduce the patient's risk of exposure to COVID-19 and provide continuity of care for an established patient. Services were provided through a video synchronous discussion virtually to substitute for in-person clinic visit.

## 2021-01-28 ENCOUNTER — TELEPHONE (OUTPATIENT)
Dept: PHYSICAL MEDICINE AND REHAB | Age: 51
End: 2021-01-28

## 2021-01-29 ENCOUNTER — TELEPHONE (OUTPATIENT)
Dept: SURGERY | Age: 51
End: 2021-01-29

## 2021-01-29 NOTE — TELEPHONE ENCOUNTER
Left a message for Michelle Godinez to call office to confirm her covid test - PAT called and pt did not go for covid testing - canceled colonoscopy for 2-1-2021

## 2021-02-03 ENCOUNTER — TELEPHONE (OUTPATIENT)
Dept: CARDIOLOGY | Age: 51
End: 2021-02-03

## 2021-02-08 DIAGNOSIS — G40.909 SEIZURE DISORDER (HCC): ICD-10-CM

## 2021-02-09 RX ORDER — LEVETIRACETAM 750 MG/1
750 TABLET ORAL 2 TIMES DAILY
Qty: 60 TABLET | Refills: 5 | Status: SHIPPED
Start: 2021-02-09 | End: 2022-08-16 | Stop reason: SDUPTHER

## 2021-02-15 DIAGNOSIS — M54.41 CHRONIC BILATERAL LOW BACK PAIN WITH RIGHT-SIDED SCIATICA: ICD-10-CM

## 2021-02-15 DIAGNOSIS — G89.29 CHRONIC BILATERAL LOW BACK PAIN WITH RIGHT-SIDED SCIATICA: ICD-10-CM

## 2021-02-15 DIAGNOSIS — K31.84 GASTROPARALYSIS: ICD-10-CM

## 2021-02-15 RX ORDER — OMEPRAZOLE 20 MG/1
20 CAPSULE, DELAYED RELEASE ORAL 2 TIMES DAILY
Qty: 60 CAPSULE | Refills: 1 | Status: SHIPPED
Start: 2021-02-15 | End: 2021-06-24 | Stop reason: SDUPTHER

## 2021-02-15 RX ORDER — POTASSIUM CHLORIDE 750 MG/1
10 TABLET, EXTENDED RELEASE ORAL DAILY
Qty: 60 TABLET | Refills: 1 | Status: SHIPPED
Start: 2021-02-15 | End: 2021-05-08

## 2021-02-15 RX ORDER — CYCLOBENZAPRINE HCL 5 MG
5 TABLET ORAL 2 TIMES DAILY PRN
Qty: 60 TABLET | Refills: 1 | Status: SHIPPED
Start: 2021-02-15 | End: 2021-08-12 | Stop reason: SDUPTHER

## 2021-02-15 RX ORDER — PRIMIDONE 50 MG/1
50 TABLET ORAL NIGHTLY
Qty: 30 TABLET | Refills: 0 | Status: SHIPPED
Start: 2021-02-15 | End: 2021-02-24 | Stop reason: SDUPTHER

## 2021-02-19 DIAGNOSIS — E78.2 MIXED HYPERLIPIDEMIA: ICD-10-CM

## 2021-02-19 RX ORDER — PRAVASTATIN SODIUM 40 MG
40 TABLET ORAL DAILY
Qty: 30 TABLET | Refills: 2 | Status: SHIPPED
Start: 2021-02-19 | End: 2021-06-24 | Stop reason: SDUPTHER

## 2021-02-24 ENCOUNTER — HOSPITAL ENCOUNTER (OUTPATIENT)
Age: 51
Discharge: HOME | End: 2021-02-26
Payer: MEDICAID

## 2021-02-24 ENCOUNTER — OFFICE VISIT (OUTPATIENT)
Dept: FAMILY MEDICINE CLINIC | Age: 51
End: 2021-02-24
Payer: MEDICAID

## 2021-02-24 VITALS
RESPIRATION RATE: 18 BRPM | HEART RATE: 91 BPM | DIASTOLIC BLOOD PRESSURE: 60 MMHG | BODY MASS INDEX: 38.02 KG/M2 | WEIGHT: 214.6 LBS | HEIGHT: 63 IN | OXYGEN SATURATION: 94 % | TEMPERATURE: 97.2 F | SYSTOLIC BLOOD PRESSURE: 98 MMHG

## 2021-02-24 DIAGNOSIS — E78.2 MIXED HYPERLIPIDEMIA: ICD-10-CM

## 2021-02-24 DIAGNOSIS — Z01.818 PREOP TESTING: ICD-10-CM

## 2021-02-24 DIAGNOSIS — H91.92 HEARING LOSS OF LEFT EAR, UNSPECIFIED HEARING LOSS TYPE: ICD-10-CM

## 2021-02-24 DIAGNOSIS — I10 ESSENTIAL HYPERTENSION: Primary | ICD-10-CM

## 2021-02-24 PROCEDURE — G8484 FLU IMMUNIZE NO ADMIN: HCPCS | Performed by: FAMILY MEDICINE

## 2021-02-24 PROCEDURE — 3017F COLORECTAL CA SCREEN DOC REV: CPT | Performed by: FAMILY MEDICINE

## 2021-02-24 PROCEDURE — 1036F TOBACCO NON-USER: CPT | Performed by: FAMILY MEDICINE

## 2021-02-24 PROCEDURE — 99214 OFFICE O/P EST MOD 30 MIN: CPT | Performed by: FAMILY MEDICINE

## 2021-02-24 PROCEDURE — G8427 DOCREV CUR MEDS BY ELIG CLIN: HCPCS | Performed by: FAMILY MEDICINE

## 2021-02-24 PROCEDURE — G8417 CALC BMI ABV UP PARAM F/U: HCPCS | Performed by: FAMILY MEDICINE

## 2021-02-24 PROCEDURE — U0003 INFECTIOUS AGENT DETECTION BY NUCLEIC ACID (DNA OR RNA); SEVERE ACUTE RESPIRATORY SYNDROME CORONAVIRUS 2 (SARS-COV-2) (CORONAVIRUS DISEASE [COVID-19]), AMPLIFIED PROBE TECHNIQUE, MAKING USE OF HIGH THROUGHPUT TECHNOLOGIES AS DESCRIBED BY CMS-2020-01-R: HCPCS

## 2021-02-24 RX ORDER — PRIMIDONE 50 MG/1
50 TABLET ORAL NIGHTLY
Qty: 30 TABLET | Refills: 2 | Status: SHIPPED
Start: 2021-02-24 | End: 2021-03-05 | Stop reason: DRUGHIGH

## 2021-02-24 NOTE — PROGRESS NOTES
Hypertension:  Patient is here for follow up chronic hypertension. This is  generally controlled on current medication regimen. Takes meds as directed and tolerates them well. Most recent labs reviewed with patient and are not remarkable. No symptoms from htn standpoint per ROS. Patient is  compliant with lifestyle modifications. Patient does smoke. Comorbid conditions include hyperlipidemia. Hyperlipidemia:  Patient presents with hyperlipidemia. Is asymptomatic. This is a chronic problem. Patient is  well controlled, as reviewed and seen on most recent labs. Is currently on pravachol 40 mg daily. Compliance with treatment thus far has been fair. No adverse effects. The patient does not use medications that may worsen dyslipidemias (corticosteroids, progestins, anabolic steroids, diuretics, beta-blockers, amiodarone, cyclosporine, olanzapine). The patient exercises never. Patient is not a smoker. Patient states that she has hearing loss in her left ear that is 80%. She is requesting a referral to ENT. She states that she has been forgetful. She states that people are talking to her and she can't talk to them. Her last hearing test was in 2010. She states that the [facility] tried to do a surgery on her ear but it was a failure. She would like a second opinion from a new ENT. Patient's past medical, surgical, social and/or family history reviewed, updated in chart, and are non-contributory (unless otherwise stated). Medications and allergies also reviewed and updated in chart.         Review of Systems:  Constitutional:  No fever, no fatigue, no chills, no headaches, no weight change  Dermatology:  No rash, no mole, no dry or sensitive skin  ENT:  No cough, no sore throat, no sinus pain, no runny nose, no ear pain + hearing loss  Cardiology:  No chest pain, no palpitations, no leg edema, no shortness of breath, no PND  Gastroenterology:  No dysphagia, no abdominal pain, no nausea, no vomiting, no constipation, no diarrhea, no heartburn  Musculoskeletal:  No joint pain, no leg cramps, no back pain, no muscle aches  Respiratory:  No shortness of breath, no orthopnea, no wheezing, no RAINES, no hemoptysis  Urology:  No blood in the urine, no urinary frequency, no urinary incontinence, no urinary urgency, no nocturia, no dysuria        Vitals:    02/24/21 1331   BP: 98/60   Pulse: 91   Resp: 18   Temp: 97.2 °F (36.2 °C)   SpO2: 94%   Weight: 214 lb 9.6 oz (97.3 kg)   Height: 5' 2.5\" (1.588 m)       Physical Exam  Vitals signs and nursing note reviewed. Constitutional:       Appearance: She is well-developed. HENT:      Head: Normocephalic and atraumatic. Right Ear: External ear normal.      Left Ear: External ear normal.      Nose: Nose normal.   Eyes:      Conjunctiva/sclera: Conjunctivae normal.      Pupils: Pupils are equal, round, and reactive to light. Neck:      Musculoskeletal: Normal range of motion and neck supple. Thyroid: No thyromegaly. Cardiovascular:      Rate and Rhythm: Normal rate and regular rhythm. Heart sounds: Normal heart sounds. Pulmonary:      Effort: Pulmonary effort is normal.      Breath sounds: Normal breath sounds. No wheezing. Abdominal:      General: Bowel sounds are normal.      Palpations: Abdomen is soft. Tenderness: There is no abdominal tenderness. Musculoskeletal: Normal range of motion. Skin:     General: Skin is warm and dry. Findings: No rash. Neurological:      Mental Status: She is alert and oriented to person, place, and time. Deep Tendon Reflexes: Reflexes are normal and symmetric. Psychiatric:         Behavior: Behavior normal.         Assessment/Plan:      Irena Hicks was seen today for hypertension.     Diagnoses and all orders for this visit:    Essential hypertension  Blood pressure well controlled  Continue current medications    Mixed hyperlipidemia  Cholesterol well controlled  Continue current medications    Hearing loss of left ear, unspecified hearing loss type  -     Dwaine Quezada MD, Ear, Nose, Throat, [place]  Referral to ENT  Will await additional recommendations. Other orders  -     primidone (MYSOLINE) 50 MG tablet; Take 1 tablet by mouth nightly      As above. Call or go to ED immediately if symptoms worsen or persist.  Return in about 4 months (around 6/24/2021). , or sooner if necessary. Educational materials and/or home exercises printed for patient's review and were included in patient instructions on his/her After Visit Summary and given to patient at the end of visit. Counseled regarding above diagnosis, including possible risks and complications,  especially if left uncontrolled. Counseled regarding the possible side effects, risks, benefits and alternatives to treatment; patient and/or guardian verbalizes understanding, agrees, feels comfortable with and wishes to proceed with above treatment plan. Advised patient to call with any new medication issues, and read all Rx info from pharmacy to assure aware of all possible risks and side effects of medication before taking. Reviewed age and gender appropriate health screening exams and vaccinations. Advised patient regarding importance of keeping up with recommended health maintenance and to schedule as soon as possible if overdue, as this is important in assessing for undiagnosed pathology, especially cancer, as well as protecting against potentially harmful/life threatening disease. Patient and/or guardian verbalizes understanding and agrees with above counseling, assessment and plan. All questions answered. Rena Cervantes DO  2/24/2021    I have personally reviewed and updated the chief complaint, HPI, Past Medical, Family and Social History, as well as the above Review of Systems.

## 2021-02-24 NOTE — PROGRESS NOTES
[facility] PRE-ADMISSION TESTING INSTRUCTIONS    The Preadmission Testing patient is instructed accordingly using the following criteria (check applicable):    ARRIVAL INSTRUCTIONS:  [x] Parking the day of Surgery is located in the Main Entrance lot. Upon entering the door, make an immediate right to the surgery reception desk    [x] Bring photo ID and insurance card    [] Bring in a copy of Living will or Durable Power of  papers. [x] Please be sure to arrange transportation to and from the hospital    [x] Please arrange for someone to be with you the remainder of the day due to having anesthesia      GENERAL INSTRUCTIONS:    [x] Nothing by mouth after midnight, including gum, candy, mints or water    [x] You may brush your teeth, but do not swallow any water    [x] Take medications as instructed with 1-2 oz of water    [x] Stop herbal supplements and vitamins 5 days prior to procedure    [] Follow preop dosing of blood thinners per physician instructions    [] Do not take insulin or oral diabetic medications    [] If diabetic and have low blood sugar or feel symptomatic, take 1-2oz apple juice or glucose tablets    [] Bring inhalers day of surgery    [] Bring C-PAP/ Bi-Pap day of surgery    [x] Bring urine specimen day of surgery    [x] Antibacterial Soap shower or bath AM of Surgery, no lotion, powders or creams to surgical site    [] Follow bowel prep as instructed per surgeon    [x] No tobacco products within 24 hours of surgery     [x] No alcohol or illegal drug use within 24 hours of surgery.     [x] Jewelry, body piercing's, eyeglasses, contact lenses and dentures are not permitted into surgery (bring cases)      [x] Please do not wear any nail polish or make up on the day of surgery    [x] If not already done, you can expect a call from registration    [x] If surgeon requests a time change you will be notified the day prior to surgery    [x] If you receive a survey after surgery we would greatly appreciate your comments    [] Parent/guardian of a minor must accompany their child and remain on the premises  the entire time they are under our care     [] Pediatric patients may bring favorite toy, blanket or comfort item with them    [] A caregiver or family member must remain with the patient during their stay if they are mentally handicapped, have dementia, disoriented or unable to use a call light or would be a safety concern if left unattended    [x] Please notify surgeon if you develop any illness between now and time of surgery (cold, cough, sore throat, fever, nausea, vomiting) or any signs of infections  including skin, wounds, and dental.    [] Other instructions    EDUCATIONAL MATERIALS PROVIDED:    [] PAT Preoperative Education Packet/Booklet     [] Medication List    [] Fluoroscopy Information Pamphlet    [] Transfusion bracelet applied with instructions    [] Joint replacement video reviewed    [] Shower with antibacterial soap and use CHG wipes provided the evening before surgery as instructed

## 2021-02-24 NOTE — PATIENT INSTRUCTIONS
Patient Education        Hearing Loss: Care Instructions  Your Care Instructions     Hearing loss is a sudden or slow decrease in how well you hear. It can range from mild to severe. Permanent hearing loss can occur with aging. It also can happen when you are exposed long-term to loud noise. Examples include listening to loud music, riding motorcycles, or being around other loud machines. Hearing loss can affect your work and home life. It can make you feel lonely or depressed. You may feel that you have lost your independence. But hearing aids and other devices can help you hear better and feel connected to others. Follow-up care is a key part of your treatment and safety. Be sure to make and go to all appointments, and call your doctor if you are having problems. It's also a good idea to know your test results and keep a list of the medicines you take. How can you care for yourself at home? · Avoid loud noises whenever possible. This helps keep your hearing from getting worse. · Always wear hearing protection around loud noises. · Wear a hearing aid as directed. See a person who can help you pick a hearing aid that fits you. · Have hearing tests as your doctor suggests. They can show whether your hearing has changed. Your hearing aid may need to be adjusted. · Use other devices as needed. These may include:  ? Telephone amplifiers and hearing aids that can connect to a television, stereo, radio, or microphone. ? Devices that use lights or vibrations. These alert you to the doorbell, a ringing telephone, or a baby monitor. ? Television closed-captioning. This shows the words at the bottom of the screen. Most new TVs can do this. ? TTY (text telephone). This lets you type messages back and forth on the telephone instead of talking or listening. These devices are also called TDD. When messages are typed on the keyboard, they are sent over the phone line to a receiving TTY.  The message is shown on a monitor. · Use pagers, fax machines, and email if it is hard for you to communicate by telephone. · Try to learn a listening technique called speech-reading. It is not lip-reading. You pay attention to people's gestures, expressions, posture, and tone of voice. These clues can help you understand what a person is saying. Face the person you are talking to, and have him or her face you. Make sure the lighting is good. You need to see the other person's face clearly. · Think about counseling if you need help to adjust to your hearing loss. When should you call for help? Watch closely for changes in your health, and be sure to contact your doctor if:    · You think your hearing is getting worse.     · You have new symptoms, such as dizziness or nausea. Where can you learn more? Go to [email or web address] and sign in to your [email or web address] account. Enter I205 in the Vinveli box to learn more about \"Hearing Loss: Care Instructions. \"     If you do not have an account, please click on the \"Sign Up Now\" link. Current as of: April 15, 2020               Content Version: 12.6  © 6947-8028 [employer]. Care instructions adapted under license by [facility] ([facility]). If you have questions about a medical condition or this instruction, always ask your healthcare professional. [address] any warranty or liability for your use of this information.

## 2021-02-25 LAB
SARS-COV-2: NOT DETECTED
SOURCE: NORMAL

## 2021-03-01 ENCOUNTER — ANESTHESIA (OUTPATIENT)
Dept: ENDOSCOPY | Age: 51
End: 2021-03-01
Payer: MEDICAID

## 2021-03-01 ENCOUNTER — ANESTHESIA EVENT (OUTPATIENT)
Dept: ENDOSCOPY | Age: 51
End: 2021-03-01
Payer: MEDICAID

## 2021-03-01 ENCOUNTER — HOSPITAL ENCOUNTER (OUTPATIENT)
Age: 51
Setting detail: OUTPATIENT SURGERY
Discharge: HOME | End: 2021-03-01
Attending: SURGERY | Admitting: SURGERY
Payer: MEDICAID

## 2021-03-01 VITALS
SYSTOLIC BLOOD PRESSURE: 125 MMHG | RESPIRATION RATE: 35 BRPM | DIASTOLIC BLOOD PRESSURE: 61 MMHG | OXYGEN SATURATION: 92 %

## 2021-03-01 VITALS
OXYGEN SATURATION: 96 % | TEMPERATURE: 97 F | BODY MASS INDEX: 36.32 KG/M2 | SYSTOLIC BLOOD PRESSURE: 149 MMHG | HEIGHT: 63 IN | RESPIRATION RATE: 14 BRPM | WEIGHT: 205 LBS | DIASTOLIC BLOOD PRESSURE: 68 MMHG | HEART RATE: 97 BPM

## 2021-03-01 DIAGNOSIS — Z01.818 PREOP TESTING: Primary | ICD-10-CM

## 2021-03-01 LAB
HCG, URINE, POC: NEGATIVE
Lab: NORMAL
NEGATIVE QC PASS/FAIL: NORMAL
POSITIVE QC PASS/FAIL: NORMAL

## 2021-03-01 PROCEDURE — 7100000011 HC PHASE II RECOVERY - ADDTL 15 MIN: Performed by: SURGERY

## 2021-03-01 PROCEDURE — 6360000002 HC RX W HCPCS: Performed by: ANESTHESIOLOGY

## 2021-03-01 PROCEDURE — 45378 DIAGNOSTIC COLONOSCOPY: CPT | Performed by: SURGERY

## 2021-03-01 PROCEDURE — 3609027000 HC COLONOSCOPY: Performed by: SURGERY

## 2021-03-01 PROCEDURE — 6360000002 HC RX W HCPCS: Performed by: NURSE ANESTHETIST, CERTIFIED REGISTERED

## 2021-03-01 PROCEDURE — 3700000001 HC ADD 15 MINUTES (ANESTHESIA): Performed by: SURGERY

## 2021-03-01 PROCEDURE — 3700000000 HC ANESTHESIA ATTENDED CARE: Performed by: SURGERY

## 2021-03-01 PROCEDURE — 94664 DEMO&/EVAL PT USE INHALER: CPT

## 2021-03-01 PROCEDURE — 7100000010 HC PHASE II RECOVERY - FIRST 15 MIN: Performed by: SURGERY

## 2021-03-01 PROCEDURE — 2709999900 HC NON-CHARGEABLE SUPPLY: Performed by: SURGERY

## 2021-03-01 PROCEDURE — 2580000003 HC RX 258: Performed by: NURSE ANESTHETIST, CERTIFIED REGISTERED

## 2021-03-01 RX ORDER — ALBUTEROL SULFATE 2.5 MG/3ML
2.5 SOLUTION RESPIRATORY (INHALATION) ONCE
Status: COMPLETED | OUTPATIENT
Start: 2021-03-01 | End: 2021-03-01

## 2021-03-01 RX ORDER — SODIUM CHLORIDE 9 MG/ML
INJECTION, SOLUTION INTRAVENOUS CONTINUOUS PRN
Status: DISCONTINUED | OUTPATIENT
Start: 2021-03-01 | End: 2021-03-01 | Stop reason: SDUPTHER

## 2021-03-01 RX ORDER — SODIUM CHLORIDE 9 MG/ML
INJECTION, SOLUTION INTRAVENOUS CONTINUOUS
Status: DISCONTINUED | OUTPATIENT
Start: 2021-03-01 | End: 2021-03-01 | Stop reason: HOSPADM

## 2021-03-01 RX ORDER — PROPOFOL 10 MG/ML
INJECTION, EMULSION INTRAVENOUS PRN
Status: DISCONTINUED | OUTPATIENT
Start: 2021-03-01 | End: 2021-03-01 | Stop reason: SDUPTHER

## 2021-03-01 RX ADMIN — SODIUM CHLORIDE: 9 INJECTION, SOLUTION INTRAVENOUS at 11:18

## 2021-03-01 RX ADMIN — PROPOFOL 500 MG: 10 INJECTION, EMULSION INTRAVENOUS at 11:20

## 2021-03-01 RX ADMIN — ALBUTEROL SULFATE 2.5 MG: 2.5 SOLUTION RESPIRATORY (INHALATION) at 12:02

## 2021-03-01 ASSESSMENT — PAIN SCALES - GENERAL: PAINLEVEL_OUTOF10: 0

## 2021-03-01 ASSESSMENT — ENCOUNTER SYMPTOMS: SHORTNESS OF BREATH: 1

## 2021-03-01 NOTE — OP NOTE
Operative Note: Colonoscopy    Susana Marion     DATE OF PROCEDURE: 3/1/2021  SURGEON: Dr. Eladia Tee M.D. PREOPERATIVE DIAGNOSES:    Screening     POSTOPERATIVE DIAGNOSES:  Poor prep    SPECIMENS:  * No specimens in log *     OPERATION:   Colonoscopy to the cecum      ANESTHESIA: LMAC    COMPLICATIONS: None. BLOOD LOSS: Minimal    Procedure Note:    CONSENT AND INDICATIONS:  This is a 48y.o. year old female who is having a screening colonoscopy today. I have discussed with the patient and/or the patient representative the indication, alternatives, and the possible risks and/or complications of the planned procedure and the anesthesia methods. The patient and/or patient representative appear to understand and agree to proceed. OPERATIONS: Bowel prep was done yesterday until the bowels were clear. The patient was placed on the table and sedated by anesthesia. A rectal exam was performed and no mass was felt. A lubricated scope was passed into the rectum which looked normal.  The scope was passed all the way around through the sigmoid, descending, transverse and ascending colon to the cecum. The bowel prep was poor with stool throughout the colon obscuring the mucosa. No obvious abnormalities were seen. The cecum was identified by the appendiceal orifice, ileocecal valve, and light reflex in the RLQ. The scope was then slowly withdrawn, each area was examined again on the way out. .  The scope was retroflexed in the rectum and it was normal . The patient tolerated the procedure well. PLAN:     Follow up colonoscopy in 1 year secondary to poor prep. Will need two day prep. Physician Signature: Electronically signed by Dr. Eladia Tee M.D.  FACS    Send copy of H&P to PCP, Jori Armando DO and referring physician, Atilio Craven DO

## 2021-03-01 NOTE — ANESTHESIA PRE PROCEDURE
Department of Anesthesiology  Preprocedure Note       Name:  Jossy Orlando   Age:  48 y.o.  :  1970                                          MRN:  [id number]         Date:  3/1/2021      Surgeon: Ian Driscoll):  Eliseo Osborne MD    Procedure: Procedure(s):  COLORECTAL CANCER SCREENING, NOT HIGH RISK    Medications prior to admission:   Prior to Admission medications    Medication Sig Start Date End Date Taking?  Authorizing Provider   pravastatin (PRAVACHOL) 40 MG tablet Take 1 tablet by mouth daily 21  Yes Rebecca Lala PA-C   potassium chloride (KLOR-CON M) 10 MEQ extended release tablet Take 1 tablet by mouth daily Take with lasix 2/15/21  Yes Osmani Christine DO   omeprazole (PRILOSEC) 20 MG delayed release capsule Take 1 capsule by mouth 2 times daily 2/15/21  Yes Osmani Christine DO   cyclobenzaprine (FLEXERIL) 5 MG tablet Take 1 tablet by mouth 2 times daily as needed for Muscle spasms 2/15/21  Yes Osmani Christine DO   levETIRAcetam (KEPPRA) 750 MG tablet Take 1 tablet by mouth 2 times daily 21  Yes ZACARIAS Meyers   sertraline (ZOLOFT) 50 MG tablet 100 mg  20  Yes Historical Provider, MD   amitriptyline (ELAVIL) 50 MG tablet take 1 tablet by mouth once daily 20  Yes Historical Provider, MD   ARIPiprazole (ABILIFY) 15 MG tablet take 1 tablet by mouth once daily 20  Yes Historical Provider, MD   carvedilol (COREG) 6.25 MG tablet Take 1 tablet by mouth 2 times daily (with meals) 21  Yes Josie Skaggs MD   acetaminophen (APAP EXTRA STRENGTH) 500 MG tablet Take 2 tablets by mouth every 8 hours 20  Yes Osmani Christine DO   albuterol sulfate HFA (PROVENTIL HFA) 108 (90 Base) MCG/ACT inhaler Inhale 2 puffs into the lungs every 4 hours as needed for Wheezing 20 Yes Mari Espitia DO   furosemide (LASIX) 40 MG tablet Take 1 tablet by mouth daily as needed (legs swelling) 20  Yes Josie Skaggs MD   melatonin 3 MG TABS tablet Take 3 Functional diarrhea K59.1    Weight loss R63.4    Pain of upper abdomen R10.10    Colitis K52.9    Vasovagal syncope R55    Medically noncompliant Z91.19    Suicide ideation R45.851    Seizure disorder ([address] G40.909    Essential hypertension I10    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 Leanor Moan    Malingering Z76.5    Chest pain R07.9    Seizure-like activity ([address] R56.9    Depression with suicidal ideation F32.9, R45.851    Major depression, recurrent ([address] F33.9       Past Medical History:        Diagnosis Date    Back pain     Depression 11/30/2016    Essential hypertension 8/7/2019    Gastroparesis     Hepatitis     Hep C- no treatment.     Hyperlipidemia     Medically noncompliant 2/15/2019    Seizures ([address] 2019       Past Surgical History:        Procedure Laterality Date    ABDOMEN SURGERY      Patient has had 9 surgeries    CARDIAC CATHETERIZATION      had 7 - 5 - 2012    CARDIAC CATHETERIZATION  11/04/2019    Dr. Lin Kerns, LAPAROSCOPIC      ECHO COMPL W DOP COLOR FLOW  7/1/2012         ENDOMETRIAL ABLATION      PYLOROPLASTY  3/30/2012    lap plylorplasty open upper endoscopy lysis of adhesions    SALPINGO-OOPHORECTOMY Left     left    UPPER GASTROINTESTINAL ENDOSCOPY  3/15/13    balloon opened pyloric sphincter       Social History:    Social History     Tobacco Use    Smoking status: Never Smoker    Smokeless tobacco: Never Used   Substance Use Topics    Alcohol use: Not Currently                                Counseling given: Not Answered      Vital Signs (Current):   Vitals:    02/24/21 1152 03/01/21 1035   BP:  127/81   Pulse:  110   Resp:  14   Temp:  96.4 °F (35.8 °C)   TempSrc:  Temporal   SpO2:  95%   Weight: 205 lb (93 kg) 205 lb (93 kg)   Height: 5' 2.5\" (1.588 m) 5' 2.5\" (1.588 m) BP Readings from Last 3 Encounters:   03/01/21 127/81   02/24/21 98/60   01/11/21 (!) 144/86       NPO Status:                                                                                 BMI:   Wt Readings from Last 3 Encounters:   03/01/21 205 lb (93 kg)   02/24/21 214 lb 9.6 oz (97.3 kg)   01/11/21 211 lb (95.7 kg)     Body mass index is 36.9 kg/m². CBC:   Lab Results   Component Value Date    WBC 6.6 11/12/2020    RBC 5.16 11/12/2020    HGB 13.7 11/12/2020    HCT 44.7 11/12/2020    MCV 86.6 11/12/2020    RDW 15.9 11/12/2020     11/12/2020       CMP:   Lab Results   Component Value Date     11/12/2020    K 4.3 11/12/2020    K 4.2 07/18/2020     11/12/2020    CO2 25 11/12/2020    BUN 9 11/12/2020    CREATININE 0.7 11/12/2020    GFRAA >60 11/12/2020    LABGLOM >60 11/12/2020    GLUCOSE 102 11/12/2020    GLUCOSE 115 03/31/2012    PROT 7.9 11/12/2020    CALCIUM 9.8 11/12/2020    BILITOT <0.2 11/12/2020    ALKPHOS 143 11/12/2020    AST 28 11/12/2020    ALT 61 11/12/2020       POC Tests: No results for input(s): POCGLU, POCNA, POCK, POCCL, POCBUN, POCHEMO, POCHCT in the last 72 hours. Coags:   Lab Results   Component Value Date    PROTIME 11.1 10/24/2019    INR 1.0 10/24/2019    APTT 32.4 10/24/2019       HCG (If Applicable):   Lab Results   Component Value Date    PREGTESTUR NEGATIVE 06/11/2020    PREGSERUM NEGATIVE 10/02/2012        ABGs:   Lab Results   Component Value Date    PO2ART 68.5 10/25/2019    NWI9YJA 42.3 10/25/2019    MHE6DGW 25.2 10/25/2019        Type & Screen (If Applicable):  No results found for: LABABO, LABRH    Drug/Infectious Status (If Applicable):  No results found for: HIV, HEPCAB    COVID-19 Screening (If Applicable):   Lab Results   Component Value Date    COVID19 Not Detected 02/24/2021         Anesthesia Evaluation  Patient summary reviewed and Nursing notes reviewed no history of anesthetic complications (\"I am hard to sedate\"):    Airway: Mallampati: II  TM distance: >3 FB   Neck ROM: full  Mouth opening: > = 3 FB Dental: normal exam         Pulmonary: breath sounds clear to auscultation  (+) shortness of breath:                            ROS comment: Reactive airway- inhaler prn   Cardiovascular:  Exercise tolerance: good (>4 METS),   (+) hypertension:,         Rhythm: regular  Rate: normal                 ROS comment: Vasovagal syncope     Neuro/Psych:   (+) seizures:, psychiatric history:depression/anxiety             GI/Hepatic/Renal:   (+) GERD:, hepatitis: C, liver disease:,           Endo/Other: Negative Endo/Other ROS                    Abdominal:           Vascular:                                      Anesthesia Plan      MAC     ASA 3       Induction: intravenous. Anesthetic plan and risks discussed with patient.       Plan discussed with CRNA and surgical team.                  Susanne Centeno MD   3/1/2021

## 2021-03-01 NOTE — PROGRESS NOTES
Discharge instructions gone over, follow up discussed. Pt verbalized understanding of discharge instructions, all questions answered. As well as post anesthesia instructions. Pt states has a ride home and responsible adult there. Pt states she will stay at her daughters house.

## 2021-03-01 NOTE — ANESTHESIA POSTPROCEDURE EVALUATION
Department of Anesthesiology  Postprocedure Note    Patient: Jeanette Harvey  MRN: [id number]  YOB: 1970  Date of evaluation: 3/1/2021  Time:  1:04 PM     Procedure Summary     Date: 03/01/21 Room / Location: [address] [place] [address]    Anesthesia Start: 1118 Anesthesia Stop: 1138    Procedure: COLORECTAL CANCER SCREENING, NOT HIGH RISK (N/A ) Diagnosis: (SCREENING)    Surgeons: Michael Larose MD Responsible Provider: Ky Mock MD    Anesthesia Type: MAC ASA Status: 3          Anesthesia Type: MAC    Fariba Phase I: Fariba Score: 10    Fariba Phase II: Fariba Score: 10    Last vitals: Reviewed and per EMR flowsheets.        Anesthesia Post Evaluation    Patient location during evaluation: PACU  Patient participation: complete - patient participated  Level of consciousness: awake and alert  Pain score: 0  Airway patency: patent  Nausea & Vomiting: no vomiting and no nausea  Complications: no  Cardiovascular status: hemodynamically stable  Respiratory status: spontaneous ventilation  Hydration status: stable

## 2021-03-01 NOTE — PROGRESS NOTES
Anesthesia notified pt 88% on RA, pt placed on 2L NC, SpO2 91% on 2L NC. Breathing treatment ordered.

## 2021-03-01 NOTE — PROGRESS NOTES
Patient admitted to stage 2 from Endo. No distress notes. O2 88% on RA, pt placed on 2L NC. Will notify anesthesia.

## 2021-03-01 NOTE — H&P
Patient's office history and physical was reviewed. Patient examined. There has been no change in the patient's history and physical.      Physician Signature: Electronically signed by Dr. Kamille Camp Surgery History and Physical     Patient's Name/Date of Birth: Cornelius Oliveira / 1970     Date: 3/1/21     PCP: Seun Gallo DO     Referring Physician:   Vivian Chu DO  [phone]     CHIEF COMPLAINT:         Chief Complaint   Patient presents with    New Patient       screening colonoscopy             HISTORY OF PRESENT ILLNESS:     Cornelius Oliveira is an 48 y.o. female who presents for a colonoscopy. The patient denies any symptoms. No nausea, vomiting, diarrhea, constipation. No changes in stool caliber. No bloody or black stools. No abdominal pain. No unintentional weight loss. No family history of colon cancer. The patient has a known history of: no known risk factors. The patient has had a colonoscopy before - her last was over 15 years ago. She was being worked up for gastroparesis at the time. She has a history of vagus nerve injury. She has since had a pyloroplastic and botox injections to helps with her symptoms.  She said she has been told she has diverticulosis as well.      Past Medical History:   Past Medical History        Past Medical History:   Diagnosis Date    Back pain      Depression 11/30/2016    Essential hypertension 8/7/2019    Gastroparesis      Hepatitis      History of cardiovascular stress test 7/1/2012     EXERCISE NUCLEAR    Hyperlipidemia      Medically noncompliant 2/15/2019    Pancreatitis, acute      Pyloric stenosis      Seizures ([address]      Vasovagal syncope 2/15/2019            Past Surgical History:   Past Surgical History         Past Surgical History:   Procedure Laterality Date    ABDOMEN SURGERY         Patient has had 9 surgeries    CARDIAC CATHETERIZATION         had 7 - 5 - 2012    CARDIAC CATHETERIZATION   11/04/2019     Dr. Walter Johnson, LAPAROSCOPIC        ECHO COMPL W DOP COLOR FLOW   7/1/2012          ENDOMETRIAL ABLATION        PYLOROPLASTY   3/30/2012     lap plylorplasty open upper endoscopy lysis of adhesions    SALPINGO-OOPHORECTOMY Left       left    UPPER GASTROINTESTINAL ENDOSCOPY   3/15/13     balloon opened pyloric sphincter            Allergies: Ketorolac tromethamine, Naproxen, Nsaids, Prochlorperazine edisylate, Reglan [metoclopramide], Codeine, Levofloxacin, and Percocet [oxycodone-acetaminophen]      Medications:   Current Facility-Administered Medications          Current Outpatient Medications   Medication Sig Dispense Refill    sertraline (ZOLOFT) 50 MG tablet take 1 tablet by mouth every morning        amitriptyline (ELAVIL) 50 MG tablet take 1 tablet by mouth once daily        ARIPiprazole (ABILIFY) 15 MG tablet take 1 tablet by mouth once daily        carvedilol (COREG) 6.25 MG tablet Take 1 tablet by mouth 2 times daily (with meals) 180 tablet 3    omeprazole (PRILOSEC) 20 MG delayed release capsule Take 1 capsule by mouth 2 times daily 60 capsule 1    acetaminophen (APAP EXTRA STRENGTH) 500 MG tablet Take 2 tablets by mouth every 8 hours 120 tablet 3    cyclobenzaprine (FLEXERIL) 5 MG tablet Take 1 tablet by mouth 2 times daily as needed for Muscle spasms 60 tablet 1    selenium sulfide (SELSUN BLUE) 1 % shampoo Apply topically daily as needed for Itching 1 Bottle 0    pravastatin (PRAVACHOL) 20 MG tablet Take 1 tablet by mouth daily 30 tablet 2    primidone (MYSOLINE) 50 MG tablet Take 1 tablet by mouth nightly 30 tablet 0    albuterol sulfate HFA (PROVENTIL HFA) 108 (90 Base) MCG/ACT inhaler Inhale 2 puffs into the lungs every 4 hours as needed for Wheezing 1 Inhaler 1    levETIRAcetam (KEPPRA) 750 MG tablet Take 1 tablet by mouth 2 times daily 60 tablet 5    furosemide (LASIX) 40 MG tablet Take 1 tablet by mouth daily as needed (legs swelling) 60 tablet 2  potassium chloride (KLOR-CON M) 10 MEQ extended release tablet Take 1 tablet by mouth daily Take with lasix 60 tablet 1    melatonin 3 MG TABS tablet Take 3 tablets by mouth daily 30 tablet 2    mirtazapine (REMERON) 30 MG tablet Take 1 tablet by mouth nightly for 14 days 14 tablet 0    PARoxetine (PAXIL) 40 MG tablet Take 1 tablet by mouth daily for 14 days 14 tablet 0    traZODone (DESYREL) 100 MG tablet Take 1 tablet by mouth nightly as needed for Sleep 14 tablet 0    buprenorphine-naloxone (SUBOXONE) 8-2 MG SUBL SL tablet Place 2 tablets under the tongue daily.         amLODIPine (NORVASC) 10 MG tablet Take 1 tablet by mouth daily (Patient not taking: Reported on 1/11/2021) 90 tablet 1      No current facility-administered medications for this visit.              Social History:   Social History            Tobacco Use    Smoking status: Never Smoker    Smokeless tobacco: Never Used   Substance Use Topics    Alcohol use:  No       Comment: three times yearly;rare caffeine         Family History:   Family History         Family History   Problem Relation Age of Onset    High Blood Pressure Mother      Other Father           BPH    No Known Problems Sister      No Known Problems Brother      No Known Problems Sister      No Known Problems Sister      No Known Problems Brother      No Known Problems Brother      No Known Problems Brother      Depression Paternal Grandmother      No Known Problems Daughter      No Known Problems Son      No Known Problems Son              REVIEW OF SYSTEMS:    Constitutional: negative  Eyes: negative  Ears, nose, mouth, throat, and face: negative  Respiratory: negative  Cardiovascular: negative  Gastrointestinal: as in HPI  Genitourinary:negative  Integument/breast: negative  Hematologic/lymphatic: negative  Musculoskeletal:negative  Neurological: negative  Allergic/Immunologic: negative     PHYSICAL EXAM   BP (!) 144/86   Pulse 116   Temp 97.7 °F (36.5 °C) (Temporal)   Resp 16   Ht 5' 2.5\" (1.588 m)   Wt 211 lb (95.7 kg)   SpO2 97%   BMI 37.98 kg/m²      General appearance: alert, cooperative and in no acute distress. Eyes: Grossly normal   Lungs: normal work of breathing  Heart: regular rate  Abdomen:  soft, non-tender; bowel sounds normal; no masses,  no organomegaly  Skin: No skin abnormalities  Neurologic: Alert and oriented x 3. Grossly normal  Musculoskeletal: No edema.        ASSESSMENT AND PLAN:       Assessment: Brayan Han is an 48 y.o. female who presents for a colonoscopy for screening    Plan: I will set the patient up for a colonoscopy, possible biopsy, possible polypectomy. I explained the risks including but not limited to bleeding, perforation leading to possible surgery, or infection. The benefits, alternatives, and potential complications associated with the above procedure to be performed and transfusions when applicable with the patient/responsible person prior to the procedure. I discussed the risk of bowel peroration, postoperative bleeding, post-polypectomy syndrome, as well as the possibility of needing emergency surgery or another colonoscopy. All of the patient's questions were answered.  The patient understands and agrees to the procedure.      Physician Signature: Electronically signed by Rocio Sanchez MD, General Surgery     Send copy of H&P to PCP, Contreras Hassan DO and referring physician, Bryn Funez DO

## 2021-03-03 ENCOUNTER — TELEPHONE (OUTPATIENT)
Dept: CARDIOLOGY | Age: 51
End: 2021-03-03

## 2021-03-04 ENCOUNTER — HOSPITAL ENCOUNTER (OUTPATIENT)
Dept: CARDIOLOGY | Age: 51
Discharge: HOME | End: 2021-03-04
Payer: MEDICAID

## 2021-03-04 DIAGNOSIS — I31.39 PERICARDIAL EFFUSION: ICD-10-CM

## 2021-03-04 LAB
LV EF: 55 %
LVEF MODALITY: NORMAL

## 2021-03-04 PROCEDURE — 93306 TTE W/DOPPLER COMPLETE: CPT

## 2021-03-05 ENCOUNTER — OFFICE VISIT (OUTPATIENT)
Dept: NEUROLOGY | Age: 51
End: 2021-03-05
Payer: MEDICAID

## 2021-03-05 VITALS
RESPIRATION RATE: 20 BRPM | DIASTOLIC BLOOD PRESSURE: 79 MMHG | WEIGHT: 205 LBS | HEIGHT: 62 IN | BODY MASS INDEX: 37.73 KG/M2 | OXYGEN SATURATION: 99 % | HEART RATE: 93 BPM | TEMPERATURE: 98.2 F | SYSTOLIC BLOOD PRESSURE: 116 MMHG

## 2021-03-05 DIAGNOSIS — F44.5 PSYCHOGENIC NONEPILEPTIC SEIZURE: ICD-10-CM

## 2021-03-05 DIAGNOSIS — R06.81 APNEIC SPELL: Primary | ICD-10-CM

## 2021-03-05 PROCEDURE — 1036F TOBACCO NON-USER: CPT | Performed by: PHYSICIAN ASSISTANT

## 2021-03-05 PROCEDURE — G8417 CALC BMI ABV UP PARAM F/U: HCPCS | Performed by: PHYSICIAN ASSISTANT

## 2021-03-05 PROCEDURE — 99213 OFFICE O/P EST LOW 20 MIN: CPT | Performed by: PHYSICIAN ASSISTANT

## 2021-03-05 PROCEDURE — 3017F COLORECTAL CA SCREEN DOC REV: CPT | Performed by: PHYSICIAN ASSISTANT

## 2021-03-05 PROCEDURE — G8484 FLU IMMUNIZE NO ADMIN: HCPCS | Performed by: PHYSICIAN ASSISTANT

## 2021-03-05 PROCEDURE — G8427 DOCREV CUR MEDS BY ELIG CLIN: HCPCS | Performed by: PHYSICIAN ASSISTANT

## 2021-03-05 RX ORDER — MIRTAZAPINE 15 MG/1
TABLET, FILM COATED ORAL
COMMUNITY
Start: 2021-02-26

## 2021-03-05 RX ORDER — PRIMIDONE 50 MG/1
50 TABLET ORAL 2 TIMES DAILY
Qty: 60 TABLET | Refills: 2 | Status: SHIPPED
Start: 2021-03-05 | End: 2021-03-08 | Stop reason: SDUPTHER

## 2021-03-05 NOTE — PROGRESS NOTES
[address] [place] [address]. Daniel Dos Santos M.D., F.A.C.P. Juana Butt, DNP, APRN, ACNS-BC  Angeles Gray. Airam Cleveland, MSN, APRN-FNP-C  SMILEY Nagel, PA-C  Prema Quinones, MSN, APRN-FNP-C  [address] [facility] [place] [address]  [place], [address]  Phone: [phone]  Fax: [phone]       Brayden Vaughan is a 48 y.o. right handed female     Presents for follow-up of seizures. She presents alone and is a fair historian. 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 At that time she had lysis adhesion surgery and she reports that the surgeon \"nicked my vagus nerve\". Her daughter reports that she had several spells almost daily after this time. Most of which are described as staring, jumbled up words, impaired awareness followed by confusion. Prior to these episodes she states that her head feels \"funny\"-- but cannot further describe this. She reports \"grand mal\" seizures, but denies tonic-clonic movements. She does report becoming extremely tense. She describes impaired memories surrounding all of the above events. She reports loss of consciousness, bruises all over her body and tongue biting. She denies incontinence. She was seen in August 2019 by Dr. Derik Strickland for seizure-like activity. At that time she presented with overdose on multiple neuroleptic medications. EEG in August 2019 was normal.  It was felt that these events were probable nonepileptic spells in addition to conversion disorder. Keppra was to be discontinued at that time and switched to Lamictal for its mood stabilizing properties.      In October 2019 she was seen in the hospital for possible breakthrough seizures after running out of her Keppra and taking leftover Lamictal.  She had several RRT's on that admission for decreased responsiveness and rigidity. AEDs were titrated and she did not have any response. MRI of the brain revealed minimal asymmetry and slight volume loss of the amygdala and hippocampus on the right suggesting the possibility of mesial temporal sclerosis. Also showed a stable left anterior temporal fossa arachnoid cysts. As her events did not increase or decrease with titration and reduction of AEDs during that admission and her ability to follow commands during some of the events she was discharged on Keppra 500 mg twice daily    Patient was evaluated at [facility] epilepsy monitoring unit in December 2019. A 5-day video EEG was consistent with a diagnosis of psychogenic nonepileptic seizures. 2 events were recorded which the patient reported were typical without any EEG change to suggest these were of epileptic etiology. The events captured were that typical of her feeling foggy, apparent unresponsiveness and not following commands and sitting with eyes closed. The second event was similar and in which she was actively hyperventilating. Unfortunately an episode with lipsmacking and convulsions was not captured and that part is inconclusive. Due to this it was recommended to continue Keppra 750 mg twice daily. On her last admission she had became angry with 1 of the nurses and was code violet due to becoming verbally abusive and threatening staff members. She did not follow-up with any of the recommendations in regards to cognitive behavioral therapy or sleep study that was recommended due to apneic episodes during the EEG recording. She requests being referred to a different epilepsy monitoring unit for further classification as she is unsettled that the one event was not captured. The patient denies any convulsive seizures since that time. She does admit to 2 of the above-mentioned nonepileptic seizures.   She continues to take Keppra 750 mg twice daily without ill effect. She complains of tremor in her hands bilaterally. She states that it is difficult for her to hold her phone and that she drops it several times a day. She also reports difficulty using utensils and cooking in the kitchen due to the shaking. She is very bothered by this. Interval history   Since last visit, she has had two of the above mentioned nonepileptic spells. She did not follow up with [place] for CBT due to distance from home and difficulty getting there  She reports not receiving calls for appointments for EMU monitoring or sleep medicine. Her tremor has improved greatly with Primidone. She reports recent weight gain due to inactivity.      Overall she feels she is doing much better and has no complaints at today's visit     No chest pain or palpitations  No SOB  No vertigo, lightheadedness or loss of consciousness  No falls, tripping or stumbling  No incontinence of bowels or bladder  No itching or bruising appreciated  No numbness, tingling or focal arm/leg weakness    ROS otherwise negative     Objective:       /79 (Site: Right Upper Arm, Position: Sitting, Cuff Size: Medium Adult)   Pulse 93   Temp 98.2 °F (36.8 °C)   Resp 20   Ht 5' 2\" (1.575 m)   Wt 205 lb (93 kg)   SpO2 99%   BMI 37.49 kg/m²      General appearance: alert, appears younger than stated age and cooperative  Head: Normocephalic, without obvious abnormality, atraumatic  Eyes: Conjunctiva/corneas clear  Neck: no adenopathy, no carotid bruit on the supple  Lungs: clear to auscultation bilaterally  Heart: regular rate and rhythm, no murmur  Extremities: no cyanosis or edema  Pulses: 2+ and symmetric  Skin: no rashes or lesions     Mental Status: Alert, oriented, following commands     Appropriate attention and concentration  Intact fundus of knowledge  Repetition intact  Memories intact     Speech: clear  Language: appropriate      Cranial Nerves:  I: smell     II: visual acuity      II: visual fields Full to finger counting    II: pupils JR   III,VII: ptosis None   III,IV,VI: extraocular muscles  EOMI without nystagmus    V: mastication Normal   V: facial light touch sensation  Normal   V,VII: corneal reflex      VII: facial muscle function - upper  Normal    VII: facial muscle function - lower Normal   VIII: hearing Normal   IX: soft palate elevation      IX,X: gag reflex     XI: trapezius strength  5/5   XI: sternocleidomastoid strength 5/5   XI: neck extension strength  5/5   XII: tongue strength  Normal      Motor:  5/5 throughout  No drift  Normal bulk and tone  No abnormal movements      Sensory:  Normal to LT   Vibration normal     Coordination:   FN, FFM intact bilaterally   Heel-to-shin normal    Gait:   Normal     DTR:   +2 throughout  No Babinski    Laboratory/Radiology:     CBC with Differential:    Lab Results   Component Value Date    WBC 6.6 11/12/2020    RBC 5.16 11/12/2020    HGB 13.7 11/12/2020    HCT 44.7 11/12/2020     11/12/2020    MCV 86.6 11/12/2020    MCH 26.6 11/12/2020    MCHC 30.6 11/12/2020    RDW 15.9 11/12/2020    SEGSPCT 70 11/30/2013    LYMPHOPCT 30.5 11/12/2020    MONOPCT 2.9 11/12/2020    BASOPCT 0.3 11/12/2020    MONOSABS 0.19 11/12/2020    LYMPHSABS 2.02 11/12/2020    EOSABS 0.05 11/12/2020    BASOSABS 0.02 11/12/2020     CMP:    Lab Results   Component Value Date     11/12/2020    K 4.3 11/12/2020    K 4.2 07/18/2020     11/12/2020    CO2 25 11/12/2020    BUN 9 11/12/2020    CREATININE 0.7 11/12/2020    GFRAA >60 11/12/2020    LABGLOM >60 11/12/2020    GLUCOSE 102 11/12/2020    GLUCOSE 115 03/31/2012    PROT 7.9 11/12/2020    LABALBU 4.7 11/12/2020    LABALBU 4.3 03/31/2012    CALCIUM 9.8 11/12/2020    BILITOT <0.2 11/12/2020    ALKPHOS 143 11/12/2020    AST 28 11/12/2020    ALT 61 11/12/2020     Mri brain WWO  Minimal asymmetry with slight volume loss of the amygdala and  hippocampus on the right suggesting the possibility of mesial temporal  sclerosis.     Stable left anterior temporal fossa arachnoid cyst.      Mild diffuse mucosal thickening within the paranasal sinuses. EEG 8/9/19  Normal     EEG 10/28/2019  This 9 hours continuous EEG shows epliptogenicity arising from   left central area in addition to mild diffuse encephalopathy. EEG 11/1/19   This more than 4 hours video EEG shows a structural lesion in   left temporal area. No epileptiform activities are seen. EEG report from [facility] EMU   This 5-day video EEG evaluation between 12/17/2019 and 12/21/2019 was consistent  with a diagnosis of psychogenic non-epileptic seizures. Despite activation  procedures including photic stimulation, sleep deprivation, and holding home  anticonvulsant (levetiracetam) on admission, no evidence of comorbid epilepsy  was seen during this evaluation. However, given episode type with lip smacking and convulsions was not captured,  this evaluation was in part in conclusive. Because a prior outside EEG was read  as epileptiform (not available for review) and the uncaptured episode types  sound clinically concerning, we agreed to continue anticonvulsant treatment, but  that this should not be uptitrated for the frequent \"foggy\" episodes as above. 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 Unfortunately, an episode with lip smacking and convulsions was not captured and in part inconclusive. Due to a previous EEG with epileptogenicity arising from the left central area and description of event being clinically concerning for epileptic seizure it was decided to continue on AED therapy. Patient requests referral to another EMU due to the above event not being captured and she would like a definitive answer in regards to her seizures.    Will be sent to [facility] for reevaluation with EMU     Risk factors for seizures: possible mesial temporal sclerosis on MRI    She will remain on Keppra 750 mg twice daily    Essential tremor versus enhanced physiologic tremor   Worsened by stress and anxiety   Will try primidone 50 mg at night. We can titrate  this based on response. Plan:     Continue Keppra 750 mg twice daily    EMU referral to [facility] Daniel  referral to psych for CBT for management of her PNES    Referral to sleep medicine that was recommended from [facility] for apneic episodes    Increase Primidone to 50 mg BID     No driving     Seizure precautions    It is important to continue to try and achieve seizure control because of the potential for injury and illness due to seizures. In a very small minority of patients with generalized tonic clonic seizures (\"grand mal\"), breathing or heart function can stop during a seizure and result in demise (sudden unexpected death in epilepsy or SUDEP). [place] from seizures prevents this kind of outcome.     Please follow seizure precautions:  Please do not engage in any high risk activities such as, but not limited to, driving, bathing in tubs, swimming alone, climbing ladders, working at heights, near open flames or machinery with moving parts etc.  For patients with epilepsy it is recommended to stay seizure free for 6 months on medication before resume driving.     These will be re-assessed at your next appointment. Eduardo Mendiola PA-C  1:55 PM  3/5/2021     I spent 18 minutes with this patient obtaining the HPI and discussing the exam with greater than 50% of the time providing counseling and education on medications and other treatment plans. All questions were answered prior to leaving my office.

## 2021-03-08 RX ORDER — PRIMIDONE 50 MG/1
50 TABLET ORAL 2 TIMES DAILY
Qty: 60 TABLET | Refills: 2 | Status: SHIPPED
Start: 2021-03-08 | End: 2021-09-23 | Stop reason: SDUPTHER

## 2021-03-08 NOTE — TELEPHONE ENCOUNTER
Pt left voicemail stating pharmacy did not have Mysoline Rx that was to be sent last week. In Epic, Rx is saying sig was adjusted and was not sent electronically. Rx pending for provider signature.   Electronically signed by Brandon Gutiérrez on 3/8/21 at 10:54 AM EST

## 2021-03-10 ENCOUNTER — TELEPHONE (OUTPATIENT)
Dept: CARDIOLOGY CLINIC | Age: 51
End: 2021-03-10

## 2021-04-13 ENCOUNTER — TELEPHONE (OUTPATIENT)
Dept: FAMILY MEDICINE CLINIC | Age: 51
End: 2021-04-13

## 2021-04-13 DIAGNOSIS — Z12.31 ENCOUNTER FOR SCREENING MAMMOGRAM FOR MALIGNANT NEOPLASM OF BREAST: Primary | ICD-10-CM

## 2021-04-30 NOTE — PROGRESS NOTES
[facility] Progress Note  Dr. Gustavo Stuart      Referring Physician: Jose Armando Hernandez DO  CHIEF COMPLAINT:   Chief Complaint   Patient presents with    Shortness of Breath     3 month check        HISTORY OF PRESENT ILLNESS:   Patient is a 48 year old female with a medical history of pericardial effusion, hypertension, hyperlipidemia, is here for follow-up appointment. Occasional chest pain, comes and goes, mostly at rest, no alleviating or aggravating factors, complaining of pedal edema, shortness of breath, occasional palpitations, denies any  lightheadedness or dizziness, no PND, no orthopnea, no syncope, no presyncopal episodes. Past Medical History:   Diagnosis Date    Back pain     Depression 11/30/2016    Essential hypertension 8/7/2019    Gastroparesis     Hepatitis     Hep C- no treatment.     Hyperlipidemia     Medically noncompliant 2/15/2019    Seizures ([address] 2019         Past Surgical History:   Procedure Laterality Date    ABDOMEN SURGERY      Patient has had 9 surgeries   [address]      had 7 - 5 - 2012    CARDIAC CATHETERIZATION  11/04/2019    Dr. Brielle Hamm, LAPAROSCOPIC      COLONOSCOPY N/A 3/1/2021    COLORECTAL CANCER SCREENING, NOT HIGH RISK performed by Hernandez Bernal MD at [address] [facility] ECHO COMPL W DOP COLOR FLOW  7/1/2012         ENDOMETRIAL ABLATION      PYLOROPLASTY  3/30/2012    lap plylorplasty open upper endoscopy lysis of adhesions    SALPINGO-OOPHORECTOMY Left     left    UPPER GASTROINTESTINAL ENDOSCOPY  3/15/13    balloon opened pyloric sphincter         Current Outpatient Medications   Medication Sig Dispense Refill    hydrOXYzine (VISTARIL) 50 MG capsule take 1 capsule by mouth four times a day if needed for anxiety      VIMPAT 100 MG TABS tablet       primidone (MYSOLINE) 50 MG tablet Take 1 tablet by mouth 2 times daily 60 tablet 2    mirtazapine (REMERON) 15 MG tablet take 1 tablet by mouth every evening      pravastatin (PRAVACHOL) 40 MG tablet Take 1 tablet by mouth daily 30 tablet 2    omeprazole (PRILOSEC) 20 MG delayed release capsule Take 1 capsule by mouth 2 times daily 60 capsule 1    cyclobenzaprine (FLEXERIL) 5 MG tablet Take 1 tablet by mouth 2 times daily as needed for Muscle spasms 60 tablet 1    levETIRAcetam (KEPPRA) 750 MG tablet Take 1 tablet by mouth 2 times daily 60 tablet 5    sertraline (ZOLOFT) 50 MG tablet 100 mg       amitriptyline (ELAVIL) 50 MG tablet take 1 tablet by mouth once daily      ARIPiprazole (ABILIFY) 15 MG tablet take 1 tablet by mouth once daily      carvedilol (COREG) 6.25 MG tablet Take 1 tablet by mouth 2 times daily (with meals) 180 tablet 3    acetaminophen (APAP EXTRA STRENGTH) 500 MG tablet Take 2 tablets by mouth every 8 hours 120 tablet 3    albuterol sulfate HFA (PROVENTIL HFA) 108 (90 Base) MCG/ACT inhaler Inhale 2 puffs into the lungs every 4 hours as needed for Wheezing 1 Inhaler 1    melatonin 3 MG TABS tablet Take 3 tablets by mouth daily (Patient taking differently: Take 9 mg by mouth nightly ) 30 tablet 2    buprenorphine-naloxone (SUBOXONE) 8-2 MG SUBL SL tablet Place 1 tablet under the tongue daily.  potassium chloride (KLOR-CON M) 10 MEQ extended release tablet Take 1 tablet by mouth daily Take with lasix (Patient not taking: Reported on 5/3/2021) 60 tablet 1    furosemide (LASIX) 40 MG tablet Take 1 tablet by mouth daily as needed (legs swelling) (Patient not taking: Reported on 5/3/2021) 60 tablet 2    traZODone (DESYREL) 100 MG tablet Take 1 tablet by mouth nightly as needed for Sleep 14 tablet 0     No current facility-administered medications for this visit.           Allergies as of 05/03/2021 - Review Complete 05/03/2021   Allergen Reaction Noted    Prochlorperazine edisylate Other (See Comments) 09/17/2011    Codeine Nausea And Vomiting 09/17/2011    Ketorolac tromethamine Other (See Comments) 03/25/2013    Levofloxacin Nausea Only 10/03/2012    Naproxen Nausea Only 03/25/2013    Nsaids Other (See Comments) 09/17/2011    Percocet [oxycodone-acetaminophen] Itching and Nausea And Vomiting 03/14/2016    Reglan [metoclopramide] Other (See Comments) 11/15/2012       Social History     Socioeconomic History    Marital status:      Spouse name: Not on file    Number of children: 3    Years of education: Not on file    Highest education level: Not on file   Occupational History    Not on file   Social Needs    Financial resource strain: Not on file    Food insecurity     Worry: Not on file     Inability: Not on file   [employer] needs     Medical: Not on file     Non-medical: Not on file   Tobacco Use    Smoking status: Never Smoker    Smokeless tobacco: Never Used   Substance and Sexual Activity    Alcohol use: Not Currently    Drug use: Yes     Types: Marijuana     Comment: medical [employer] card LD 1-2021    Sexual activity: Not on file   Lifestyle    Physical activity     Days per week: Not on file     Minutes per session: Not on file    Stress: Not on file   Relationships    Social connections     Talks on phone: Not on file     Gets together: Not on file     Attends Nondenominational service: Not on file     Active member of club or organization: Not on file     Attends meetings of clubs or organizations: Not on file     Relationship status: Not on file    Intimate partner violence     Fear of current or ex partner: Not on file     Emotionally abused: Not on file     Physically abused: Not on file     Forced sexual activity: Not on file   Other Topics Concern    Not on file   Social History Narrative    Not on file       Family History   Problem Relation Age of Onset    High Blood Pressure Mother     Other Father         BPH    No Known Problems Sister     No Known Problems Brother     No Known Problems Sister     No Known Problems Sister     No Known Problems Brother     No Known Problems Brother     No Known Problems Brother     Depression Paternal Grandmother     No Known Problems Daughter     No Known Problems Son     No Known Problems Son        REVIEW OF SYSTEMS:     CONSTITUTIONAL:  negative for  fevers, chills, sweats and fatigue  HEENT:  negative for  tinnitus, earaches, nasal congestion and epistaxis  RESPIRATORY:  negative for  dry cough, cough with sputum,, wheezing and hemoptysis  GASTROINTESTINAL:  negative for nausea, vomiting, diarrhea, constipation, pruritus and jaundice  HEMATOLOGIC/LYMPHATIC:  negative for easy bruising, bleeding, lymphadenopathy and petechiae  ENDOCRINE:  negative for heat intolerance, cold intolerance, tremor, hair loss and diabetic symptoms including neither polyuria nor polydipsia nor blurred vision  MUSCULOSKELETAL:  negative for  myalgias, arthralgias, joint swelling, stiff joints and decreased range of motion  NEUROLOGICAL:  negative for memory problems, speech problems, visual disturbance, dysphagia, weakness and numbness      PHYSICAL EXAM:   CONSTITUTIONAL:  awake, alert, cooperative, no apparent distress, and appears stated age  HEAD:  normocepalic, without obvious abnormality, atraumatic, pink, moist mucous membranes. NECK:  Supple, symmetrical, trachea midline, no adenopathy, thyroid symmetric, not enlarged and no tenderness, skin normal  LUNGS:  No increased work of breathing, good air exchange, clear to auscultation bilaterally, no crackles or wheezing  CARDIOVASCULAR:  Normal apical impulse, regular rate and rhythm, normal S1 and S2, no S3 or S4, and no murmur noted and no JVD, no carotid bruit, no pedal edema, good carotid upstroke bilaterally. ABDOMEN:  Soft, nontender, no masses, no hepatomegaly or splenomegaly, BS+  CHEST: nontender to palpation, expands symmetrically  MUSCULOSKELETAL:  No clubbing no cyanosis. there is no redness, warmth, or swelling of the joints  full range of motion noted  NEUROLOGIC:  Alert, awake,oriented x3  SKIN:  no bruising or bleeding, normal skin color, texture, turgor and no redness, warmth, or swelling     /84   Pulse 104   Resp 16   Ht 5' 2\" (1.575 m)   Wt 213 lb (96.6 kg)   BMI 38.96 kg/m²     DATA:   I personally reviewed the visit EKG with the following interpretation: Sinus tachycardia, diffuse nonspecific T wave changes, no significant changes when compared to previous    EKG 1/11/21  Sinus tachycardia, nonspecific ST-T changes    ECHO: 3/4/21 . Summary   Compared to prior echo, no significant changes noted. Technically adequate   study. Left ventricle size is normal.   Normal left ventricular wall thickness. Ejection fraction is visually estimated at 55%. No regional wall motion abnormalities seen. There is doppler evidence of stage II diastolic dysfunction. Physiologic and/or trace tricuspid regurgitation. There is a small pericardial effusion noted. Stress Test: 10/26/19   FINDINGS:   Perfusion images demonstrate small anterior wall reversible defect   Wall motion is within normal limits. The end diastolic volume is 54 ml. The end systolic volume is 17 ml. The estimated ejection fraction is 69 %.         Impression   1. There is a small reversible defect in the inferior wall   2. Ejection fraction is 69 %. 3. No significant wall motion abnormality       Angiography: 11/4/19 IMPRESSION:  1. Angiographically normal coronary arteries. 2.  Successful deployment of 6-[place] Angio-Seal in the right common  femoral artery.   Cardiology Labs: BMP:    Lab Results   Component Value Date     04/18/2021    K 4.4 04/18/2021    K 4.2 07/18/2020     04/18/2021    CO2 25 11/12/2020    BUN 9 11/12/2020    CREATININE 0.67 04/18/2021     CMP:    Lab Results   Component Value Date     04/18/2021    K 4.4 04/18/2021    K 4.2 07/18/2020     04/18/2021    CO2 25 11/12/2020    BUN 9 11/12/2020    CREATININE 0.67 04/18/2021    PROT 6.5 04/18/2021     CBC:    Lab Results   Component Value Date    WBC 5.8 04/18/2021    WBC 6.6 11/12/2020    RBC 4.61 04/18/2021    HGB 12.1 04/18/2021    HCT 38.8 04/18/2021    MCV 84 04/18/2021    RDW 15.9 11/12/2020     04/18/2021     PT/INR:  No results found for: PTINR  PT/INR Warfarin:  No components found for: PTPATWAR, PTINRWAR  PTT:    Lab Results   Component Value Date    APTT 32.4 10/24/2019     PTT Heparin:  No components found for: APTTHEP  Magnesium:    Lab Results   Component Value Date    MG 1.9 11/02/2019     TSH:    Lab Results   Component Value Date    TSH 1.360 01/28/2020     TROPONIN:  No components found for: TROP  BNP:  No results found for: BNP  FASTING LIPID PANEL:    Lab Results   Component Value Date    CHOL 224 11/12/2020    HDL 92 11/12/2020    TRIG 104 11/12/2020     No orders to display     I have personally reviewed the laboratory, cardiac diagnostic and radiographic testing as outlined above:      IMPRESSION:  1. Sinus tachycardia: Etiology?,  Chronic  2. Pericardial effusion: Small on an echocardiogram done in March 2021, will repeat in 6 months for reevaluation  3. Chronic congestive heart failure: Diastolic, compensated, continue current treatment  4. Hypertension  5. Hyperlipidemia  6. Seizures: will follow neurology. 7. Depression  8. History of noncompliance      RECOMMENDATIONS:   1. Increase carvedilol to 12.5 mg by mouth twice daily  2. Continue the rest of medications  3. CHF: Daily weight, take an extra Lasix for weight gain of more than 2-3 pounds in 24 hours, compliance with diuretics, low-salt diet were all advised. 5.  Follow-up with Dr. Freya Herrera as scheduled  6. Follow-up with Dr. Izabel Rodriguez in 6 months, sooner if symptomatic for any reason    I have reviewed my findings and recommendations with patient    Electronically signed by Juan Jose Elliott MD on 5/3/2021 at 2:34 PM  NOTE: This report was transcribed using voice recognition software.  Every effort was made to ensure accuracy; however, inadvertent computerized transcription errors may be present

## 2021-05-03 ENCOUNTER — OFFICE VISIT (OUTPATIENT)
Dept: CARDIOLOGY CLINIC | Age: 51
End: 2021-05-03
Payer: MEDICARE

## 2021-05-03 VITALS
SYSTOLIC BLOOD PRESSURE: 120 MMHG | BODY MASS INDEX: 39.2 KG/M2 | HEART RATE: 104 BPM | HEIGHT: 62 IN | RESPIRATION RATE: 16 BRPM | WEIGHT: 213 LBS | DIASTOLIC BLOOD PRESSURE: 84 MMHG

## 2021-05-03 DIAGNOSIS — R00.0 SINUS TACHYCARDIA: Primary | ICD-10-CM

## 2021-05-03 PROCEDURE — 1036F TOBACCO NON-USER: CPT | Performed by: INTERNAL MEDICINE

## 2021-05-03 PROCEDURE — 99214 OFFICE O/P EST MOD 30 MIN: CPT | Performed by: INTERNAL MEDICINE

## 2021-05-03 PROCEDURE — G8417 CALC BMI ABV UP PARAM F/U: HCPCS | Performed by: INTERNAL MEDICINE

## 2021-05-03 PROCEDURE — 93000 ELECTROCARDIOGRAM COMPLETE: CPT | Performed by: INTERNAL MEDICINE

## 2021-05-03 PROCEDURE — G8427 DOCREV CUR MEDS BY ELIG CLIN: HCPCS | Performed by: INTERNAL MEDICINE

## 2021-05-03 PROCEDURE — 3017F COLORECTAL CA SCREEN DOC REV: CPT | Performed by: INTERNAL MEDICINE

## 2021-05-03 RX ORDER — HYDROXYZINE PAMOATE 50 MG/1
CAPSULE ORAL
COMMUNITY
Start: 2021-04-01

## 2021-05-03 RX ORDER — LACOSAMIDE 100 MG/1
TABLET, FILM COATED ORAL
COMMUNITY
Start: 2021-04-23 | End: 2021-08-30

## 2021-05-06 ENCOUNTER — HOSPITAL ENCOUNTER (OUTPATIENT)
Dept: GENERAL RADIOLOGY | Age: 51
Discharge: HOME | End: 2021-05-08
Payer: MEDICARE

## 2021-05-06 DIAGNOSIS — Z12.31 ENCOUNTER FOR SCREENING MAMMOGRAM FOR MALIGNANT NEOPLASM OF BREAST: ICD-10-CM

## 2021-05-06 PROCEDURE — 77063 BREAST TOMOSYNTHESIS BI: CPT

## 2021-05-08 ENCOUNTER — HOSPITAL ENCOUNTER (EMERGENCY)
Age: 51
Discharge: HOME | End: 2021-05-08
Attending: EMERGENCY MEDICINE
Payer: MEDICARE

## 2021-05-08 VITALS
BODY MASS INDEX: 38.64 KG/M2 | TEMPERATURE: 97.3 F | WEIGHT: 210 LBS | DIASTOLIC BLOOD PRESSURE: 85 MMHG | RESPIRATION RATE: 18 BRPM | HEIGHT: 62 IN | SYSTOLIC BLOOD PRESSURE: 140 MMHG | HEART RATE: 88 BPM | OXYGEN SATURATION: 97 %

## 2021-05-08 DIAGNOSIS — M54.31 SCIATICA OF RIGHT SIDE: Primary | ICD-10-CM

## 2021-05-08 PROCEDURE — 96372 THER/PROPH/DIAG INJ SC/IM: CPT

## 2021-05-08 PROCEDURE — 6360000002 HC RX W HCPCS: Performed by: EMERGENCY MEDICINE

## 2021-05-08 PROCEDURE — 99282 EMERGENCY DEPT VISIT SF MDM: CPT

## 2021-05-08 RX ORDER — DEXAMETHASONE SODIUM PHOSPHATE 10 MG/ML
10 INJECTION, SOLUTION INTRAMUSCULAR; INTRAVENOUS ONCE
Status: COMPLETED | OUTPATIENT
Start: 2021-05-08 | End: 2021-05-08

## 2021-05-08 RX ORDER — METHYLPREDNISOLONE 4 MG/1
TABLET ORAL
Qty: 1 KIT | Refills: 0 | Status: SHIPPED | OUTPATIENT
Start: 2021-05-08 | End: 2021-05-14

## 2021-05-08 RX ADMIN — DEXAMETHASONE SODIUM PHOSPHATE 10 MG: 10 INJECTION, SOLUTION INTRAMUSCULAR; INTRAVENOUS at 10:35

## 2021-05-08 ASSESSMENT — PAIN SCALES - GENERAL: PAINLEVEL_OUTOF10: 7

## 2021-05-08 ASSESSMENT — PAIN DESCRIPTION - LOCATION: LOCATION: BACK;LEG

## 2021-05-08 ASSESSMENT — ENCOUNTER SYMPTOMS
SORE THROAT: 0
DIARRHEA: 0
COUGH: 0
EYE PAIN: 0
BACK PAIN: 1
SHORTNESS OF BREATH: 0
EYE DISCHARGE: 0
SINUS PRESSURE: 0
ABDOMINAL DISTENTION: 0
NAUSEA: 0
EYE REDNESS: 0
VOMITING: 0
WHEEZING: 0

## 2021-05-08 ASSESSMENT — PAIN DESCRIPTION - DESCRIPTORS: DESCRIPTORS: SHARP;SHOOTING

## 2021-05-08 ASSESSMENT — PAIN DESCRIPTION - PROGRESSION: CLINICAL_PROGRESSION: GRADUALLY WORSENING

## 2021-05-08 ASSESSMENT — PAIN DESCRIPTION - FREQUENCY: FREQUENCY: CONTINUOUS

## 2021-05-08 NOTE — ED PROVIDER NOTES
The history is provided by the patient. Back Pain  Location:  Lumbar spine  Quality:  Shooting  Radiates to:  R posterior upper leg  Pain severity:  Severe  Onset quality:  Gradual  Duration:  1 week  Chronicity:  New  Associated symptoms: leg pain    Associated symptoms: no chest pain, no dysuria, no fever, no headaches and no weakness         Review of Systems   Constitutional: Negative for chills and fever. HENT: Negative for ear pain, sinus pressure and sore throat. Eyes: Negative for pain, discharge and redness. Respiratory: Negative for cough, shortness of breath and wheezing. Cardiovascular: Negative for chest pain. Gastrointestinal: Negative for abdominal distention, diarrhea, nausea and vomiting. Genitourinary: Negative for dysuria and frequency. Musculoskeletal: Positive for back pain. Negative for arthralgias. Skin: Negative for rash and wound. Neurological: Negative for weakness and headaches. Hematological: Negative for adenopathy. All other systems reviewed and are negative. Physical Exam  Vitals signs and nursing note reviewed. Constitutional:       Appearance: She is well-developed. HENT:      Head: Normocephalic and atraumatic. Right Ear: Hearing and external ear normal.      Left Ear: Hearing and external ear normal.      Nose: Nose normal.      Mouth/Throat:      Pharynx: Uvula midline. Eyes:      General: Lids are normal.      Conjunctiva/sclera: Conjunctivae normal.      Pupils: Pupils are equal, round, and reactive to light. Neck:      Musculoskeletal: Normal range of motion and neck supple. Cardiovascular:      Rate and Rhythm: Normal rate and regular rhythm. Heart sounds: Normal heart sounds. No murmur. Pulmonary:      Effort: Pulmonary effort is normal. No respiratory distress. Breath sounds: Normal breath sounds. No wheezing or rales. Abdominal:      General: Bowel sounds are normal.      Palpations: Abdomen is soft.  Abdomen is not rigid. Tenderness: There is no abdominal tenderness. There is no guarding or rebound. Musculoskeletal:      Lumbar back: She exhibits decreased range of motion, tenderness and pain. Back:    Skin:     General: Skin is warm and dry. Findings: No abrasion or rash. Neurological:      Mental Status: She is alert and oriented to person, place, and time. GCS: GCS eye subscore is 4. GCS verbal subscore is 5. GCS motor subscore is 6. Cranial Nerves: No cranial nerve deficit. Sensory: No sensory deficit. Coordination: Coordination normal.      Gait: Gait normal.          Procedures     MDM          --------------------------------------------- PAST HISTORY ---------------------------------------------  Past Medical History:  has a past medical history of Back pain, Depression, Essential hypertension, Gastroparesis, Hepatitis, Hyperlipidemia, Medically noncompliant, and Seizures ([facility] [address] Past Surgical History:  has a past surgical history that includes Salpingo-oophorectomy (Left); Cholecystectomy, laparoscopic; Pyloroplasty (3/30/2012); ECHO Compl W Dop Color Flow (7/1/2012); Cardiac catheterization; Upper gastrointestinal endoscopy (3/15/13); Abdomen surgery; Endometrial ablation; Cardiac catheterization (11/04/2019); and Colonoscopy (N/A, 3/1/2021). Social History:  reports that she has never smoked. She has never used smokeless tobacco. She reports previous alcohol use. She reports current drug use. Drug: Marijuana. Family History: family history includes Depression in her paternal grandmother; High Blood Pressure in her mother; No Known Problems in her brother, brother, brother, brother, daughter, sister, sister, sister, son, and son; Other in her father. The patients home medications have been reviewed.     Allergies: Prochlorperazine edisylate, Codeine, Ketorolac tromethamine, Levofloxacin, Naproxen, Nsaids, Percocet [oxycodone-acetaminophen], and Reglan METHYLPREDNISOLONE (MEDROL, KAVIN,) 4 MG TABLET    Take by mouth. Previous Medications    ACETAMINOPHEN (APAP EXTRA STRENGTH) 500 MG TABLET    Take 2 tablets by mouth every 8 hours    ALBUTEROL SULFATE HFA (PROVENTIL HFA) 108 (90 BASE) MCG/ACT INHALER    Inhale 2 puffs into the lungs every 4 hours as needed for Wheezing    AMITRIPTYLINE (ELAVIL) 50 MG TABLET    take 1 tablet by mouth once daily    ARIPIPRAZOLE (ABILIFY) 15 MG TABLET    take 1 tablet by mouth once daily    BUPRENORPHINE-NALOXONE (SUBOXONE) 8-2 MG SUBL SL TABLET    Place 1 tablet under the tongue daily. CARVEDILOL (COREG) 6.25 MG TABLET    Take 1 tablet by mouth 2 times daily (with meals)    CYCLOBENZAPRINE (FLEXERIL) 5 MG TABLET    Take 1 tablet by mouth 2 times daily as needed for Muscle spasms    HYDROXYZINE (VISTARIL) 50 MG CAPSULE    take 1 capsule by mouth four times a day if needed for anxiety    LEVETIRACETAM (KEPPRA) 750 MG TABLET    Take 1 tablet by mouth 2 times daily    MELATONIN 3 MG TABS TABLET    Take 3 tablets by mouth daily    MIRTAZAPINE (REMERON) 15 MG TABLET    take 1 tablet by mouth every evening    OMEPRAZOLE (PRILOSEC) 20 MG DELAYED RELEASE CAPSULE    Take 1 capsule by mouth 2 times daily    PRAVASTATIN (PRAVACHOL) 40 MG TABLET    Take 1 tablet by mouth daily    PRIMIDONE (MYSOLINE) 50 MG TABLET    Take 1 tablet by mouth 2 times daily    SERTRALINE (ZOLOFT) 50 MG TABLET    100 mg     TRAZODONE (DESYREL) 100 MG TABLET    Take 1 tablet by mouth nightly as needed for Sleep    VIMPAT 100 MG TABS TABLET       Modified Medications    No medications on file   Discontinued Medications    FUROSEMIDE (LASIX) 40 MG TABLET    Take 1 tablet by mouth daily as needed (legs swelling)    POTASSIUM CHLORIDE (KLOR-CON M) 10 MEQ EXTENDED RELEASE TABLET    Take 1 tablet by mouth daily Take with lasix         Diagnosis:  1. Sciatica of right side        Disposition:  Patient's disposition: Discharge to home  Patient's condition is stable. Gerson Fishman MD  05/08/21 [id number]

## 2021-05-18 ENCOUNTER — OFFICE VISIT (OUTPATIENT)
Dept: SLEEP MEDICINE | Age: 51
End: 2021-05-18
Payer: MEDICARE

## 2021-05-18 VITALS
RESPIRATION RATE: 16 BRPM | HEART RATE: 80 BPM | SYSTOLIC BLOOD PRESSURE: 128 MMHG | BODY MASS INDEX: 38.68 KG/M2 | OXYGEN SATURATION: 95 % | DIASTOLIC BLOOD PRESSURE: 72 MMHG | WEIGHT: 210.2 LBS | HEIGHT: 62 IN

## 2021-05-18 DIAGNOSIS — E66.9 OBESITY, UNSPECIFIED CLASSIFICATION, UNSPECIFIED OBESITY TYPE, UNSPECIFIED WHETHER SERIOUS COMORBIDITY PRESENT: ICD-10-CM

## 2021-05-18 DIAGNOSIS — G47.00 INSOMNIA, UNSPECIFIED TYPE: ICD-10-CM

## 2021-05-18 DIAGNOSIS — G47.33 OBSTRUCTIVE SLEEP APNEA: Primary | ICD-10-CM

## 2021-05-18 PROCEDURE — 99204 OFFICE O/P NEW MOD 45 MIN: CPT | Performed by: STUDENT IN AN ORGANIZED HEALTH CARE EDUCATION/TRAINING PROGRAM

## 2021-05-18 PROCEDURE — G8427 DOCREV CUR MEDS BY ELIG CLIN: HCPCS | Performed by: STUDENT IN AN ORGANIZED HEALTH CARE EDUCATION/TRAINING PROGRAM

## 2021-05-18 PROCEDURE — G8417 CALC BMI ABV UP PARAM F/U: HCPCS | Performed by: STUDENT IN AN ORGANIZED HEALTH CARE EDUCATION/TRAINING PROGRAM

## 2021-05-18 ASSESSMENT — SLEEP AND FATIGUE QUESTIONNAIRES
ESS TOTAL SCORE: 12
HOW LIKELY ARE YOU TO NOD OFF OR FALL ASLEEP WHILE SITTING INACTIVE IN A PUBLIC PLACE: 2
HOW LIKELY ARE YOU TO NOD OFF OR FALL ASLEEP WHILE SITTING QUIETLY AFTER LUNCH WITHOUT ALCOHOL: 0
HOW LIKELY ARE YOU TO NOD OFF OR FALL ASLEEP WHILE LYING DOWN TO REST IN THE AFTERNOON WHEN CIRCUMSTANCES PERMIT: 3

## 2021-05-18 NOTE — PATIENT INSTRUCTIONS
It was a pleasure seeing you today Erick Encarnacion! Summary of Today's Visit           -Please do not drive when you are sleepy   -Please sign a request of records consent form, so that we may receive all of your previous sleep study reports and clinical notes, if they were not available today. - Please schedule a 2 month follow up today, to go over results     COVID-19  Due to the COVID-19 Pandemic a COVID test will be required at this point. The test should be preformed exactly 3 days prior to your sleep study. There is the risk for COVID-19 in all outpatient centers. Please review the CDC website for risk in your area and to review all current precautions. If you would like to switch to a home sleep study, please call our office at the number below. COVID-19 Testing Sites - Please note, these sites will not test anyone without a physician order. (As of October 2020)  [address] [place], [place], [place]. Hours of Operation - Monday - Friday 6:00am - 2:30pm.   [facility] [address] [place] [address]. Hours of Operation - Monday - Friday 6:00am - 2:30pm.    [address] [facility] [address]. Hours of Operation - Monday - Friday 6:00am - 2:30pm.        It is always advised that you check with your insurance company to determine how your study will be covered. For general questions or scheduling issues, call our [address] [place], [place] [phone] option 2  f- [phone]           The general categories for the treatment of Sleep Apnea include:     1. Supportive Care  -Elevate the head of the bed   -Avoid sleeping on your back      2. Weight loss  -Start a healthy weight loss program  -Consider a referral to Weight Loss Medicine Clinic     3. Oral Appliance \"Mouth Piece\"  (Mandibular Advancement Device)     4.  Positive pressure therapy with a machine and a mask (CPAP or BiPAP)     5. Different kinds of surgeries, including nasal surgery, surgery of the throat/windpipe, and nerve stimulation therapy (INSPIRE). Helpful Web Sites: For patients with ALL SLEEP DISORDERS: American Academy of Sleep Medicine http://sleepeducation. org; or [place]: https://sleepfoundation. org  For patients with MYNOR: American Sleep Apnea Association: http://zavala.org/. org  For patients with RLS: RLS Foundation: [place]anda.  For patients with INSOMNIA: https://www.acharya.org/. org/articles/sleep/insomnia-causes-and-cures. htm  For patients with DEPRESSION: SyndicatePlus.com.Ruckus/depression         Sleep Medicine Terms     CPAP - Continuous Positive Airway Pressure - 1 set pressure (i.e. 7 cwp)  APAP - Automatic Positive Airway Pressure - PAP device determines in real time what pressure will work best confined to certain settings. (I.e. 4-15 cwp)  BiPAP - Bilevel Positive Airway Pressure - Higher pressure on taking a breath and lower pressure upon breathing out (i.e. 10/6 cwp)  CWP - Centimeters of water pressure   DME - [facility] who sends you your CPAP/APAP/BiPAP and supplies. PSG - Polysomnogram - Overnight Sleep study  Titration Study - Overnight sleep study with the PAP device tired at different settings  Split Night study - PSG and titration study          Sleep Studies: About This Test  What is it? Sleep studies are tests that watch what happens to your body during sleep. These studies usually are done in a sleep lab. Sleep labs are often located in hospitals. Sleep studies you do at home can be done with portable equipment. But they may not give the same results as a sleep lab. Why is this test done? Sleep studies are done for people who say that sleep isn't restful or that they are tired all day. These studies can help find sleep problems, such as:  · Sleep apnea.  This means that an adult regularly stops breathing during sleep for 10 seconds or longer. · Excessive snoring. · Problems with nighttime behaviors. These include sleepwalking, night terrors, bed-wetting, and REM behavior disorders (RBD). · Conditions such as periodic limb movement disorder. This is repeated muscle twitching of the feet, arms, or legs during sleep. · Seizures that occur at night (nocturnal seizures). How do you prepare for the test?  · Take a shower or bath before your test, but don't use sprays, oils, or gels on your hair. Don't wear makeup, fingernail polish, or fake nails. · Pack and take along a small overnight bag with personal items, such as a toothbrush, a comb, favorite pillows or blankets, and a book. You can wear your own nightclothes. · If you will have portable sleep monitoring, your doctor will explain how to use the equipment at home. How is the test done? · In the sleep lab, you will be in a private room, much like a hotel room. · Small pads or patches called electrodes will be placed on your head and body with a small amount of glue and tape. These will record things like brain activity, eye movement, oxygen levels, and snoring. · Soft elastic belts will be placed around your chest and belly to measure your breathing. · Your blood oxygen levels will be checked by a small clip (oximeter) placed either on the tip of your index finger or on your earlobe. · If you have sleep apnea, you may wear a mask that is connected to a continuous positive airway pressure (CPAP) machine. · Depending on the type of test, you will be allowed to sleep through the night or you'll be awakened periodically and asked to stay awake for a while. · If you use portable sleep monitoring, follow the instructions your doctor gave you. How long does the test take? · You will stay in the sleep lab overnight. For some tests, you will also stay part of the next day.   What happens after the test?  · You may not sleep

## 2021-05-18 NOTE — PROGRESS NOTES
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 N  Tonsillectomy? N  Have you lost or gained weight recently? Stable       PARASOMNIAS/ NARCOLEPSY:  Hypnogogic/Hynopompic Hallucinations: N   Sleep paralysis: N  Cataplexy: N   REM behavior symptoms: N  Nightmare: N   Sleep Walking: N  Sleep Talking: Y, witnessed  RLS Symptoms: N           PMH:  Past Medical History:   Diagnosis Date    Back pain     Depression 11/30/2016    Essential hypertension 8/7/2019    Gastroparesis     Hepatitis     Hep C- no treatment.     Hyperlipidemia     Medically noncompliant 2/15/2019    Seizures ([address] 2019        PSH:  Past Surgical History:   Procedure Laterality Date    ABDOMEN SURGERY      Patient has had 9 surgeries   [address]      had 7 - 5 - 2012    CARDIAC CATHETERIZATION  11/04/2019    Dr. Espinoza Doctor, LAPAROSCOPIC      COLONOSCOPY N/A 3/1/2021    COLORECTAL CANCER SCREENING, NOT HIGH RISK performed by Ele Case MD at [address] [facility] ECHO COMPL W DOP COLOR FLOW  7/1/2012         ENDOMETRIAL ABLATION      PYLOROPLASTY  3/30/2012    lap plylorplasty open upper endoscopy lysis of adhesions    SALPINGO-OOPHORECTOMY Left     left    UPPER GASTROINTESTINAL ENDOSCOPY  3/15/13    balloon opened pyloric sphincter          Soc Hx:  Social History     Tobacco Use    Smoking status: Never Smoker    Smokeless tobacco: Never Used   Vaping Use    Vaping Use: Never used   Substance Use Topics    Alcohol use: Not Currently    Drug use: Yes     Types: Marijuana     Comment: medical  card  1-2021        [facility] Hx:  Family History   Problem Relation Age of Onset    High Blood Pressure Mother     Other Father         BPH    No Known Problems Sister     No Known Problems Brother     No Known Problems Sister     No Known Problems Sister     No Known Problems Brother     No Known Problems Brother     No Known Problems Brother     Depression Paternal Grandmother     No Known Problems Daughter     No Known Problems Son     No Known Problems Son         Current Outpatient Medications   Medication Sig Dispense Refill    hydrOXYzine (VISTARIL) 50 MG capsule take 1 capsule by mouth four times a day if needed for anxiety      primidone (MYSOLINE) 50 MG tablet Take 1 tablet by mouth 2 times daily (Patient taking differently: Take 50 mg by mouth 2 times daily as needed For seizure activity) 60 tablet 2    mirtazapine (REMERON) 15 MG tablet take 1 tablet by mouth every evening      pravastatin (PRAVACHOL) 40 MG tablet Take 1 tablet by mouth daily 30 tablet 2    omeprazole (PRILOSEC) 20 MG delayed release capsule Take 1 capsule by mouth 2 times daily 60 capsule 1    cyclobenzaprine (FLEXERIL) 5 MG tablet Take 1 tablet by mouth 2 times daily as needed for Muscle spasms 60 tablet 1    levETIRAcetam (KEPPRA) 750 MG tablet Take 1 tablet by mouth 2 times daily 60 tablet 5    sertraline (ZOLOFT) 50 MG tablet Take 100 mg by mouth daily       amitriptyline (ELAVIL) 50 MG tablet take 1 tablet by mouth once daily      ARIPiprazole (ABILIFY) 15 MG tablet take 1 tablet by mouth once daily      carvedilol (COREG) 6.25 MG tablet Take 1 tablet by mouth 2 times daily (with meals) 180 tablet 3    acetaminophen (APAP EXTRA STRENGTH) 500 MG tablet Take 2 tablets by mouth every 8 hours (Patient taking differently: Take 1,000 mg by mouth every 8 hours as needed ) 120 tablet 3    albuterol sulfate HFA (PROVENTIL HFA) 108 (90 Base) MCG/ACT inhaler Inhale 2 puffs into the lungs every 4 hours as needed for wheezes or crackles  Cardiac: Regular rate and rhythm. No murmurs appreciated. Neurologic: Normal gait. Balance intact. Psychiatric: Alert and oriented. Appropriate affect. Extremities: No clubbing or no peripheral edema  Skin: No lesions or rashes  Hematologic/lymphatic/immunologic: No bruises or bleeding    Sleep Medicine 5/18/2021   Sitting and reading 3   Watching TV 2   Sitting, inactive in a public place (e.g. a theatre or a meeting) 2   As a passenger in a car for an hour without a break 2   Lying down to rest in the afternoon when circumstances permit 3   Sitting and talking to someone 0   Sitting quietly after a lunch without alcohol 0   In a car, while stopped for a few minutes in traffic 0   Total score 12   Neck circumference 16       SLEEP STUDY HISTORY      No specialty comments available. PERTINENT LAB RESULTS  No results found for: FERRITIN       Assessment:      Cade Ibrahim was seen today for sleep apnea. Diagnoses and all orders for this visit:    Obstructive sleep apnea  -     Baseline Diagnostic Sleep Study; Future  -     Covid-19 Ambulatory; Future    Obesity, unspecified classification, unspecified obesity type, unspecified whether serious comorbidity present  -     Ambulatory referral to Bariatrics    Insomnia, unspecified type  -     Amb External Referral To Psychology    ·    Plan:      1. MYNOR  - Will order PSG due to recent nocturnal EEG findings and symptoms consistent with MYNOR. Home versus in lab options discussed, patient opted for in lab. Aware of COVID-19 test that needs to be performed 7 days prior to study. 2.  Insomnia  - May be combination of MYNOR, anxiety symptoms, as well as previous long term night shift work. -Will proceed with sleep study to rule out MYNOR. - Sleep hygiene discussed, risks of multiple sleep medications discussed, including risk of falls with use benadryl.   - Briefly discussed CBT-I.    3.  Obesity. Body mass index is 38.45 kg/m². Lucy Neighbours       - Healthy

## 2021-05-19 ENCOUNTER — TELEPHONE (OUTPATIENT)
Dept: SLEEP CENTER | Age: 51
End: 2021-05-19

## 2021-05-20 ENCOUNTER — HOSPITAL ENCOUNTER (OUTPATIENT)
Age: 51
Discharge: HOME | End: 2021-05-22
Payer: MEDICARE

## 2021-05-20 DIAGNOSIS — G47.33 OBSTRUCTIVE SLEEP APNEA: ICD-10-CM

## 2021-05-20 PROCEDURE — U0003 INFECTIOUS AGENT DETECTION BY NUCLEIC ACID (DNA OR RNA); SEVERE ACUTE RESPIRATORY SYNDROME CORONAVIRUS 2 (SARS-COV-2) (CORONAVIRUS DISEASE [COVID-19]), AMPLIFIED PROBE TECHNIQUE, MAKING USE OF HIGH THROUGHPUT TECHNOLOGIES AS DESCRIBED BY CMS-2020-01-R: HCPCS

## 2021-05-20 PROCEDURE — U0005 INFEC AGEN DETEC AMPLI PROBE: HCPCS

## 2021-05-21 ENCOUNTER — TELEPHONE (OUTPATIENT)
Dept: BARIATRICS/WEIGHT MGMT | Age: 51
End: 2021-05-21

## 2021-05-22 LAB
SARS-COV-2: NOT DETECTED
SOURCE: NORMAL

## 2021-05-24 ENCOUNTER — TELEPHONE (OUTPATIENT)
Dept: SLEEP MEDICINE | Age: 51
End: 2021-05-24

## 2021-05-26 ENCOUNTER — TELEPHONE (OUTPATIENT)
Dept: SLEEP CENTER | Age: 51
End: 2021-05-26

## 2021-05-27 ENCOUNTER — HOSPITAL ENCOUNTER (OUTPATIENT)
Dept: SLEEP CENTER | Age: 51
Discharge: HOME | End: 2021-05-27
Payer: MEDICARE

## 2021-05-27 DIAGNOSIS — G47.33 OSA (OBSTRUCTIVE SLEEP APNEA): Primary | ICD-10-CM

## 2021-05-27 PROCEDURE — 95810 POLYSOM 6/> YRS 4/> PARAM: CPT

## 2021-05-27 PROCEDURE — 95810 POLYSOM 6/> YRS 4/> PARAM: CPT | Performed by: STUDENT IN AN ORGANIZED HEALTH CARE EDUCATION/TRAINING PROGRAM

## 2021-06-08 NOTE — TELEPHONE ENCOUNTER
Goal Outcome Evaluation:  Plan of Care Reviewed With: patient           Outcome Summary: OT eval completed. Pt is A&Ox4. He was Independent for bed mobility. SBA for sit <> stand t/f from EOB, toileting with urinal, and all functional mobility with 6L O2/NC. Pt O2 sats maintained to 88%-98% throuhgout activity. He does not display deficits which require skilled OT services to address these deficits. Recommend d/c home with assist.   MA called patient and left message. She was advised to reschedule sleep study in [place]. She eas a no show 8/19/20.   Electronically signed by Jessie Bob MA on 8/25/20 at 10:50 AM EDT

## 2021-06-11 LAB — STATUS: NORMAL

## 2021-06-11 NOTE — RESULT ENCOUNTER NOTE
Please notify the patient that her sleep study results are available and confirm that the patient has a follow up to review the results and go over treatment options.     Thank you

## 2021-06-24 ENCOUNTER — OFFICE VISIT (OUTPATIENT)
Dept: FAMILY MEDICINE CLINIC | Age: 51
End: 2021-06-24
Payer: MEDICARE

## 2021-06-24 VITALS
SYSTOLIC BLOOD PRESSURE: 130 MMHG | TEMPERATURE: 97.9 F | OXYGEN SATURATION: 94 % | BODY MASS INDEX: 39.93 KG/M2 | HEART RATE: 101 BPM | DIASTOLIC BLOOD PRESSURE: 78 MMHG | HEIGHT: 62 IN | RESPIRATION RATE: 18 BRPM | WEIGHT: 217 LBS

## 2021-06-24 DIAGNOSIS — M54.41 CHRONIC BILATERAL LOW BACK PAIN WITH RIGHT-SIDED SCIATICA: ICD-10-CM

## 2021-06-24 DIAGNOSIS — F33.2 SEVERE EPISODE OF RECURRENT MAJOR DEPRESSIVE DISORDER, WITHOUT PSYCHOTIC FEATURES (HCC): ICD-10-CM

## 2021-06-24 DIAGNOSIS — E78.2 MIXED HYPERLIPIDEMIA: ICD-10-CM

## 2021-06-24 DIAGNOSIS — G89.29 CHRONIC BILATERAL LOW BACK PAIN WITH RIGHT-SIDED SCIATICA: ICD-10-CM

## 2021-06-24 DIAGNOSIS — G40.909 SEIZURE DISORDER (HCC): ICD-10-CM

## 2021-06-24 DIAGNOSIS — I10 ESSENTIAL HYPERTENSION: Primary | ICD-10-CM

## 2021-06-24 DIAGNOSIS — K31.84 GASTROPARALYSIS: ICD-10-CM

## 2021-06-24 PROCEDURE — 99214 OFFICE O/P EST MOD 30 MIN: CPT | Performed by: FAMILY MEDICINE

## 2021-06-24 PROCEDURE — 1036F TOBACCO NON-USER: CPT | Performed by: FAMILY MEDICINE

## 2021-06-24 PROCEDURE — 3017F COLORECTAL CA SCREEN DOC REV: CPT | Performed by: FAMILY MEDICINE

## 2021-06-24 PROCEDURE — G8427 DOCREV CUR MEDS BY ELIG CLIN: HCPCS | Performed by: FAMILY MEDICINE

## 2021-06-24 PROCEDURE — G8417 CALC BMI ABV UP PARAM F/U: HCPCS | Performed by: FAMILY MEDICINE

## 2021-06-24 RX ORDER — MELOXICAM 7.5 MG/1
7.5 TABLET ORAL DAILY PRN
Qty: 30 TABLET | Refills: 0 | Status: SHIPPED
Start: 2021-06-24 | End: 2021-07-07 | Stop reason: SDUPTHER

## 2021-06-24 RX ORDER — OMEPRAZOLE 20 MG/1
20 CAPSULE, DELAYED RELEASE ORAL 2 TIMES DAILY
Qty: 60 CAPSULE | Refills: 1 | Status: SHIPPED
Start: 2021-06-24 | End: 2021-09-23 | Stop reason: SDUPTHER

## 2021-06-24 RX ORDER — PRAVASTATIN SODIUM 40 MG
40 TABLET ORAL DAILY
Qty: 30 TABLET | Refills: 2 | Status: SHIPPED
Start: 2021-06-24 | End: 2021-09-23 | Stop reason: SDUPTHER

## 2021-06-24 RX ORDER — BUSPIRONE HYDROCHLORIDE 30 MG/1
TABLET ORAL
COMMUNITY
Start: 2021-06-19

## 2021-06-24 RX ORDER — SERTRALINE HYDROCHLORIDE 100 MG/1
TABLET, FILM COATED ORAL
COMMUNITY
Start: 2021-06-03

## 2021-06-24 SDOH — ECONOMIC STABILITY: FOOD INSECURITY: WITHIN THE PAST 12 MONTHS, THE FOOD YOU BOUGHT JUST DIDN'T LAST AND YOU DIDN'T HAVE MONEY TO GET MORE.: NEVER TRUE

## 2021-06-24 SDOH — ECONOMIC STABILITY: FOOD INSECURITY: WITHIN THE PAST 12 MONTHS, YOU WORRIED THAT YOUR FOOD WOULD RUN OUT BEFORE YOU GOT MONEY TO BUY MORE.: NEVER TRUE

## 2021-06-24 ASSESSMENT — SOCIAL DETERMINANTS OF HEALTH (SDOH): HOW HARD IS IT FOR YOU TO PAY FOR THE VERY BASICS LIKE FOOD, HOUSING, MEDICAL CARE, AND HEATING?: NOT HARD AT ALL

## 2021-06-24 NOTE — PATIENT INSTRUCTIONS
Patient Education        meloxicam (oral/injection)  Pronunciation:  cindy OKS i winnie  Brand:  Anjeso, Mobic, Qmiiz ODT, Vivlodex  What is the most important information I should know about meloxicam?  Meloxicam can increase your risk of fatal heart attack or stroke. Do not use this medicine just before or after heart bypass surgery (coronary artery bypass graft, or CABG). Meloxicam may also cause stomach or intestinal bleeding, which can be fatal.  What is meloxicam?  Meloxicam is a nonsteroidal anti-inflammatory drug (NSAID) that is used to treat osteoarthritis or rheumatoid arthritis in adults. Meloxicam is also used to treat juvenile rheumatoid arthritis in children who are at least 3years old. The Anjeso brand of meloxicam is used to treat moderate to severe pain in adults. Vivlodex is for use only in adults. Sera Marus is for adults and children weighing at least 132 pounds (60 kilograms). Meloxicam may also be used for purposes not listed in this medication guide. What should I discuss with my healthcare provider before taking meloxicam?  Meloxicam can increase your risk of fatal heart attack or stroke, even if you don't have any risk factors. Do not use this medicine just before or after heart bypass surgery (coronary artery bypass graft, or CABG). Meloxicam may also cause stomach or intestinal bleeding, which can be fatal. These conditions can occur without warning while you are using meloxicam, especially in older adults. You should not use meloxicam if you are allergic to it, or if you have ever had an asthma attack or severe allergic reaction after taking aspirin or an NSAID. You should not take meloxicam disintegrating tablets (Qmiiz ODT) if you have phenylketonuria (PKU). This form of meloxicam contains phenylalanine.   Tell your doctor if you have ever had:  · heart disease, high blood pressure, high cholesterol, diabetes, or if you smoke;  · a heart attack, stroke, or blood clot;  · ulcers or bleeding in your stomach;  · asthma;  · kidney disease (or if you are on dialysis);  · liver disease; or  · fluid retention. If you are pregnant, you should not take meloxicam unless your doctor tells you to. Taking an NSAID during the last 20 weeks of pregnancy can cause serious heart or kidney problems in the unborn baby and possible complications with your pregnancy. Meloxicam may cause a delay in ovulation (the release of an egg from an ovary). You should not take meloxicam if you are undergoing fertility treatment, or are otherwise trying to get pregnant. It may not be safe to breastfeed while using this medicine. Ask your doctor about any risk. Meloxicam is not approved for use by anyone younger than 3years old. How should I take meloxicam?  Follow all directions on your prescription label and read all medication guides. Use the lowest dose that is effective in treating your condition. Meloxicam oral is taken by mouth. Meloxicam injection is given as an infusion into a vein. A healthcare provider will give you this injection. You may take meloxicam oral with or without food. Remove an orally disintegrating tablet from the package only when you are ready to take the medicine. Place the tablet in your mouth and allow it to dissolve, without chewing. Swallow several times as the tablet dissolves. Your dose needs may change if you switch to a different brand, strength, or form of this medicine. Avoid medication errors by using only the form and strength your doctor prescribes. Meloxicam doses are based on weight (especially in children and teenagers). Your dose needs may change if you gain or lose weight. If you use this medicine long-term, you may need frequent medical tests. Store meloxicam tablets or capsules at room temperature, away from moisture and heat. Keep the bottle tightly closed when not in use. What happens if I miss a dose?   Take the medicine as soon as you can, but skip the missed dose if it is almost time for your next dose. Do not take two doses at one time. What happens if I overdose? Seek emergency medical attention or call the Poison Help line at 1-535.146.6606. What should I avoid while taking meloxicam?  Avoid alcohol. Heavy drinking can increase your risk of stomach bleeding. Avoid taking aspirin while you are taking meloxicam, unless your doctor tells you to. Ask a doctor or pharmacist before using other medicines for pain, fever, swelling, or cold/flu symptoms. They may contain ingredients similar to meloxicam (such as aspirin, ibuprofen, ketoprofen, or naproxen). What are the possible side effects of meloxicam?  Get emergency medical help if you have signs of an allergic reaction (hives, difficult breathing, swelling in your face or throat) or a severe skin reaction (fever, sore throat, burning eyes, skin pain, red or purple skin rash with blistering and peeling). Get emergency medical help if you have signs of a heart attack or stroke: chest pain spreading to your jaw or shoulder, sudden numbness or weakness on one side of the body, slurred speech, leg swelling, feeling short of breath. Stop using meloxicam and call your doctor at once if you have:  · the first sign of any skin rash, no matter how mild;  · shortness of breath (even with mild exertion); · swelling or rapid weight gain;  · signs of stomach bleeding --bloody or tarry stools, coughing up blood or vomit that looks like coffee grounds;  · liver problems --nausea, upper stomach pain, itching, tired feeling, flu-like symptoms, loss of appetite, dark urine, delmy-colored stools, jaundice (yellowing of the skin or eyes);  · low red blood cells (anemia) --pale skin, unusual tiredness, feeling light-headed, cold hands and feet; or  · kidney problems --little or no urination, swelling in your feet or ankles, feeling tired or short of breath.   Common side effects may include:  · stomach pain, nausea, vomiting, heartburn;  · diarrhea, constipation, gas;  · dizziness; or  · cold symptoms, flu symptoms. This is not a complete list of side effects and others may occur. Call your doctor for medical advice about side effects. You may report side effects to FDA at 3-998-FDA-7392. What other drugs will affect meloxicam?  Ask your doctor before using meloxicam if you take an antidepressant. Taking certain antidepressants with an NSAID may cause you to bruise or bleed easily. Tell your doctor about all your other medicines, especially:  · cyclosporine;  · lithium;  · methotrexate;  · pemetrexed;  · sodium polystyrene sulfonate (Kayexalate);  · a blood thinner (warfarin, Coumadin, Jantoven);  · heart or blood pressure medication, including a diuretic or \"water pill\"; or  · steroid medicine (such as prednisone). This list is not complete. Other drugs may affect meloxicam, including prescription and over-the-counter medicines, vitamins, and herbal products. Not all possible drug interactions are listed here. Where can I get more information? Your pharmacist can provide more information about meloxicam.  Remember, keep this and all other medicines out of the reach of children, never share your medicines with others, and use this medication only for the indication prescribed. Every effort has been made to ensure that the information provided by [address] is accurate, up-to-date, and complete, but no guarantee is made to that effect. Drug information contained herein may be time sensitive. [facility] information has been compiled for use by healthcare practitioners and consumers in the [place] and therefore [facility] does not warrant that uses outside of the [place] are appropriate, unless specifically indicated otherwise. [facility]'s drug information does not endorse drugs, diagnose patients or recommend therapy.  [facility]'s drug information is an informational resource designed to assist licensed healthcare practitioners in caring for their patients and/or to serve consumers viewing this service as a supplement to, and not a substitute for, the expertise, skill, knowledge and judgment of healthcare practitioners. The absence of a warning for a given drug or drug combination in no way should be construed to indicate that the drug or drug combination is safe, effective or appropriate for any given patient. [facility] does not assume any responsibility for any aspect of healthcare administered with the aid of information [facility] provides. The information contained herein is not intended to cover all possible uses, directions, precautions, warnings, drug interactions, allergic reactions, or adverse effects. If you have questions about the drugs you are taking, check with your doctor, nurse or pharmacist.  Copyright 4818-2788 [address]: 14.01. Revision date: 11/3/2020. Care instructions adapted under license by [facility] ([facility]). If you have questions about a medical condition or this instruction, always ask your healthcare professional. [place] [address] any warranty or liability for your use of this information.

## 2021-06-24 NOTE — PROGRESS NOTES
Hypertension:  Patient is here for follow up chronic hypertension. This is  generally controlled on current medication regimen. Takes meds as directed and tolerates them well. Most recent labs reviewed with patient and are not remarkable. No symptoms from htn standpoint per ROS. Patient is  compliant with lifestyle modifications. Patient does not smoke. Comorbid conditions include htn, chf.      Pain:  Patient is here today with complaints of lower back pain. This is a/an recent problem. This has been going on for 4 month(s). Exacerbating factors include standing. Alleviating factors include laying down and taking pressure off of her leg. Pain is aching in nature. 10/10. Pain is  Radiating into her right leg. She states that she gets numbness in her right leg. This has happen to patient before. Imaging to date includes none. Therapy to date includes none. Patient states that she was seen in the urgent care for this and was started on a medrol dose pack which did help. She was referred to physical therapy and to PMR. She has not followed with either. She had a CT scan done in 2018 of her lumbar spine that showed small central/right posterior disc protrusion in L5-S1. Patient's past medical, surgical, social and/or family history reviewed, updated in chart, and are non-contributory (unless otherwise stated). Medications and allergies also reviewed and updated in chart.       Review of Systems:  Constitutional:  No fever, no fatigue, no chills, no headaches, no weight change  Dermatology:  No rash, no mole, no dry or sensitive skin  ENT:  No cough, no sore throat, no sinus pain, no runny nose, no ear pain  Cardiology:  No chest pain, no palpitations, no leg edema, no shortness of breath, no PND  Gastroenterology:  No dysphagia, no abdominal pain, no nausea, no vomiting, no constipation, no diarrhea, no heartburn  Musculoskeletal: no joint pain, no leg cramps, + back pain, no muscle aches  Respiratory:  No shortness of breath, no orthopnea, no wheezing, no RAINES, no hemoptysis  Urology:  No blood in the urine, no urinary frequency, no urinary incontinence, no urinary urgency, no nocturia, no dysuria      Vitals:    06/24/21 1517   BP: 130/78   Pulse: 101   Resp: 18   Temp: 97.9 °F (36.6 °C)   SpO2: 94%   Weight: 217 lb (98.4 kg)   Height: 5' 2\" (1.575 m)       Physical Exam  Vitals and nursing note reviewed. Constitutional:       Appearance: She is well-developed. HENT:      Head: Normocephalic and atraumatic. Right Ear: External ear normal.      Left Ear: External ear normal.      Nose: Nose normal.   Eyes:      Conjunctiva/sclera: Conjunctivae normal.      Pupils: Pupils are equal, round, and reactive to light. Neck:      Thyroid: No thyromegaly. Cardiovascular:      Rate and Rhythm: Normal rate and regular rhythm. Heart sounds: Normal heart sounds. Pulmonary:      Effort: Pulmonary effort is normal.      Breath sounds: Normal breath sounds. No wheezing. Abdominal:      General: Bowel sounds are normal.      Palpations: Abdomen is soft. Tenderness: There is no abdominal tenderness. Musculoskeletal:      Cervical back: Normal range of motion and neck supple. Lumbar back: Tenderness present. Decreased range of motion. Skin:     General: Skin is warm and dry. Findings: No rash. Neurological:      Mental Status: She is alert and oriented to person, place, and time. Deep Tendon Reflexes: Reflexes are normal and symmetric. Psychiatric:         Behavior: Behavior normal.         Assessment/Plan:      Miko Prather was seen today for 3 month follow-up. Diagnoses and all orders for this visit:    Essential hypertension  -     Comprehensive Metabolic Panel; Future  -     CBC Auto Differential; Future  -     TSH without Reflex; Future  Blood pressure well controlled  Labs ordered  Diet and exercise were discussed and recommended to the patient.      Severe episode of recurrent major depressive disorder, without psychotic features ([facility] [address]  Follows with psychiatry  Medications as prescribed by their office. Seizure disorder [facility])  Following with neurology  Medications being adjusted. Mixed hyperlipidemia  -     Comprehensive Metabolic Panel; Future  -     Lipid Panel; Future  -     pravastatin (PRAVACHOL) 40 MG tablet; Take 1 tablet by mouth daily  Labs ordered  Diet and exercise were discussed and recommended to the patient. Continue pravachol    Chronic bilateral low back pain with right-sided sciatica  -     XR LUMBAR SPINE (MIN 4 VIEWS); Future  -     Justo Lynch MD, Physical Medicine and Rehabilitation, [id number]  27  -     meloxicam (MOBIC) 7.5 MG tablet; Take 1 tablet by mouth daily as needed for Pain  RICE Therapy discussed in detail  X-ray order  Mobic will be started  Referral back to PMR  Side effects reviewed  Follow up in 2 weeks to see if any side effects from mobic. Gastroparalysis  -     omeprazole (PRILOSEC) 20 MG delayed release capsule; Take 1 capsule by mouth 2 times daily      As above. Call or go to ED immediately if symptoms worsen or persist.  Return in about 2 weeks (around 7/8/2021) for back pain. , or sooner if necessary. Educational materials and/or home exercises printed for patient's review and were included in patient instructions on his/her After Visit Summary and given to patient at the end of visit. Counseled regarding above diagnosis, including possible risks and complications,  especially if left uncontrolled. Counseled regarding the possible side effects, risks, benefits and alternatives to treatment; patient and/or guardian verbalizes understanding, agrees, feels comfortable with and wishes to proceed with above treatment plan.     Advised patient to call with any new medication issues, and read all Rx info from pharmacy to assure aware of all possible risks and side effects of medication

## 2021-07-05 ENCOUNTER — HOSPITAL ENCOUNTER (OUTPATIENT)
Age: 51
Discharge: HOME | End: 2021-07-05
Payer: MEDICARE

## 2021-07-05 ENCOUNTER — HOSPITAL ENCOUNTER (OUTPATIENT)
Age: 51
Discharge: HOME | End: 2021-07-07
Payer: MEDICARE

## 2021-07-05 ENCOUNTER — HOSPITAL ENCOUNTER (OUTPATIENT)
Dept: GENERAL RADIOLOGY | Age: 51
Discharge: HOME | End: 2021-07-07
Payer: MEDICARE

## 2021-07-05 DIAGNOSIS — M54.41 CHRONIC BILATERAL LOW BACK PAIN WITH RIGHT-SIDED SCIATICA: ICD-10-CM

## 2021-07-05 DIAGNOSIS — G89.29 CHRONIC BILATERAL LOW BACK PAIN WITH RIGHT-SIDED SCIATICA: ICD-10-CM

## 2021-07-05 DIAGNOSIS — E78.2 MIXED HYPERLIPIDEMIA: ICD-10-CM

## 2021-07-05 DIAGNOSIS — I10 ESSENTIAL HYPERTENSION: ICD-10-CM

## 2021-07-05 LAB
ALBUMIN SERPL-MCNC: 4.2 G/DL (ref 3.5–5.2)
ALP BLD-CCNC: 139 U/L (ref 35–104)
ALT SERPL-CCNC: 24 U/L (ref 0–32)
ANION GAP SERPL CALCULATED.3IONS-SCNC: 12 MMOL/L (ref 7–16)
AST SERPL-CCNC: 16 U/L (ref 0–31)
BASOPHILS ABSOLUTE: 0.02 E9/L (ref 0–0.2)
BASOPHILS RELATIVE PERCENT: 0.3 % (ref 0–2)
BILIRUB SERPL-MCNC: 0.2 MG/DL (ref 0–1.2)
BUN BLDV-MCNC: 11 MG/DL (ref 6–20)
CALCIUM SERPL-MCNC: 9.2 MG/DL (ref 8.6–10.2)
CHLORIDE BLD-SCNC: 106 MMOL/L (ref 98–107)
CHOLESTEROL, TOTAL: 219 MG/DL (ref 0–199)
CO2: 23 MMOL/L (ref 22–29)
CREAT SERPL-MCNC: 0.7 MG/DL (ref 0.5–1)
EOSINOPHILS ABSOLUTE: 0.07 E9/L (ref 0.05–0.5)
EOSINOPHILS RELATIVE PERCENT: 1.2 % (ref 0–6)
GFR AFRICAN AMERICAN: >60
GFR NON-AFRICAN AMERICAN: >60 ML/MIN/1.73
GLUCOSE BLD-MCNC: 100 MG/DL (ref 74–99)
HCT VFR BLD CALC: 43.2 % (ref 34–48)
HDLC SERPL-MCNC: 81 MG/DL
HEMOGLOBIN: 13.7 G/DL (ref 11.5–15.5)
IMMATURE GRANULOCYTES #: 0.01 E9/L
IMMATURE GRANULOCYTES %: 0.2 % (ref 0–5)
LDL CHOLESTEROL CALCULATED: 112 MG/DL (ref 0–99)
LYMPHOCYTES ABSOLUTE: 2.62 E9/L (ref 1.5–4)
LYMPHOCYTES RELATIVE PERCENT: 44.1 % (ref 20–42)
MCH RBC QN AUTO: 26.7 PG (ref 26–35)
MCHC RBC AUTO-ENTMCNC: 31.7 % (ref 32–34.5)
MCV RBC AUTO: 84 FL (ref 80–99.9)
MONOCYTES ABSOLUTE: 0.2 E9/L (ref 0.1–0.95)
MONOCYTES RELATIVE PERCENT: 3.4 % (ref 2–12)
NEUTROPHILS ABSOLUTE: 3.02 E9/L (ref 1.8–7.3)
NEUTROPHILS RELATIVE PERCENT: 50.8 % (ref 43–80)
PDW BLD-RTO: 14.5 FL (ref 11.5–15)
PLATELET # BLD: 369 E9/L (ref 130–450)
PMV BLD AUTO: 9.1 FL (ref 7–12)
POTASSIUM SERPL-SCNC: 4.7 MMOL/L (ref 3.5–5)
RBC # BLD: 5.14 E12/L (ref 3.5–5.5)
SODIUM BLD-SCNC: 141 MMOL/L (ref 132–146)
TOTAL PROTEIN: 6.9 G/DL (ref 6.4–8.3)
TRIGL SERPL-MCNC: 129 MG/DL (ref 0–149)
TSH SERPL DL<=0.05 MIU/L-ACNC: 0.43 UIU/ML (ref 0.27–4.2)
VLDLC SERPL CALC-MCNC: 26 MG/DL
WBC # BLD: 5.9 E9/L (ref 4.5–11.5)

## 2021-07-05 PROCEDURE — 85025 COMPLETE CBC W/AUTO DIFF WBC: CPT

## 2021-07-05 PROCEDURE — 36415 COLL VENOUS BLD VENIPUNCTURE: CPT

## 2021-07-05 PROCEDURE — 80061 LIPID PANEL: CPT

## 2021-07-05 PROCEDURE — 84443 ASSAY THYROID STIM HORMONE: CPT

## 2021-07-05 PROCEDURE — 80053 COMPREHEN METABOLIC PANEL: CPT

## 2021-07-05 PROCEDURE — 72100 X-RAY EXAM L-S SPINE 2/3 VWS: CPT

## 2021-07-07 ENCOUNTER — OFFICE VISIT (OUTPATIENT)
Dept: FAMILY MEDICINE CLINIC | Age: 51
End: 2021-07-07
Payer: MEDICARE

## 2021-07-07 VITALS
HEART RATE: 89 BPM | TEMPERATURE: 97.1 F | DIASTOLIC BLOOD PRESSURE: 82 MMHG | SYSTOLIC BLOOD PRESSURE: 132 MMHG | BODY MASS INDEX: 38.7 KG/M2 | RESPIRATION RATE: 16 BRPM | WEIGHT: 218.4 LBS | HEIGHT: 63 IN | OXYGEN SATURATION: 97 %

## 2021-07-07 DIAGNOSIS — G89.29 CHRONIC BILATERAL LOW BACK PAIN WITH RIGHT-SIDED SCIATICA: Primary | ICD-10-CM

## 2021-07-07 DIAGNOSIS — M54.41 CHRONIC BILATERAL LOW BACK PAIN WITH RIGHT-SIDED SCIATICA: Primary | ICD-10-CM

## 2021-07-07 PROCEDURE — 99214 OFFICE O/P EST MOD 30 MIN: CPT | Performed by: FAMILY MEDICINE

## 2021-07-07 PROCEDURE — G8427 DOCREV CUR MEDS BY ELIG CLIN: HCPCS | Performed by: FAMILY MEDICINE

## 2021-07-07 PROCEDURE — 3017F COLORECTAL CA SCREEN DOC REV: CPT | Performed by: FAMILY MEDICINE

## 2021-07-07 PROCEDURE — 1036F TOBACCO NON-USER: CPT | Performed by: FAMILY MEDICINE

## 2021-07-07 PROCEDURE — G8417 CALC BMI ABV UP PARAM F/U: HCPCS | Performed by: FAMILY MEDICINE

## 2021-07-07 RX ORDER — MELOXICAM 15 MG/1
15 TABLET ORAL DAILY PRN
Qty: 30 TABLET | Refills: 2 | Status: SHIPPED
Start: 2021-07-07 | End: 2021-08-12 | Stop reason: ALTCHOICE

## 2021-07-07 RX ORDER — METHYLPREDNISOLONE 4 MG/1
TABLET ORAL
Qty: 1 KIT | Refills: 0 | Status: SHIPPED | OUTPATIENT
Start: 2021-07-07 | End: 2021-07-13

## 2021-07-07 NOTE — PROGRESS NOTES
Hypertension:  Patient is here for follow up chronic hypertension. This is  generally controlled on current medication regimen. Takes meds as directed and tolerates them well. Most recent labs reviewed with patient and are notare not remarkable. No symptoms from htn standpoint per ROS. Patient is  compliant with lifestyle modifications. Patient does not smoke. Comorbid conditions include HTN, obesity, hyperlipidemia. Patient continues to have issues with her right leg. She states that she has severe limited mobility. She states that she can't stand for longer than 10 minutes because of pain and numbness into her foot. Pain is starting in her back nad radiating down her leg. Pain is shooting and aching in nature. Pain is 4 out of 10 if sitting. Pain is 10 out of 10 if standing. She states that her knee gives out on her. She had an x-ray done that showed arthritis. She has been taking mobic which does help her pain but isn't enough. She was referred to PMR but can't be seen until November. Alleviating factors: sitting. Exacerbating: stnading, walking. Patient's past medical, surgical, social and/or family history reviewed, updated in chart, and are non-contributory (unless otherwise stated). Medications and allergies also reviewed and updated in chart.         Review of Systems:  Constitutional:  No fever, no fatigue, no chills, no headaches, no weight change  Dermatology:  No rash, no mole, no dry or sensitive skin  ENT:  No cough, no sore throat, no sinus pain, no runny nose, no ear pain  Cardiology:  No chest pain, no palpitations, no leg edema, no shortness of breath, no PND  Gastroenterology:  No dysphagia, no abdominal pain, no nausea, no vomiting, no constipation, no diarrhea, no heartburn  Musculoskeletal:  No joint pain, no leg cramps, + back pain, no muscle aches  Respiratory:  No shortness of breath, no orthopnea, no wheezing, no RAINES, no hemoptysis  Urology:  No blood in the urine, no urinary frequency, no urinary incontinence, no urinary urgency, no nocturia, no dysuria      Vitals:    07/07/21 1515   BP: 132/82   Pulse: 89   Resp: 16   Temp: 97.1 °F (36.2 °C)   SpO2: 97%   Weight: 218 lb 6.4 oz (99.1 kg)   Height: 5' 2.5\" (1.588 m)       Physical Exam  Vitals and nursing note reviewed. Constitutional:       Appearance: She is well-developed. HENT:      Head: Normocephalic and atraumatic. Right Ear: External ear normal.      Left Ear: External ear normal.      Nose: Nose normal.   Eyes:      Conjunctiva/sclera: Conjunctivae normal.      Pupils: Pupils are equal, round, and reactive to light. Neck:      Thyroid: No thyromegaly. Cardiovascular:      Rate and Rhythm: Normal rate and regular rhythm. Heart sounds: Normal heart sounds. Pulmonary:      Effort: Pulmonary effort is normal.      Breath sounds: Normal breath sounds. No wheezing. Abdominal:      General: Bowel sounds are normal.      Palpations: Abdomen is soft. Tenderness: There is no abdominal tenderness. Musculoskeletal:      Cervical back: Normal range of motion and neck supple. Lumbar back: Tenderness and bony tenderness present. Decreased range of motion. Skin:     General: Skin is warm and dry. Findings: No rash. Neurological:      Mental Status: She is alert and oriented to person, place, and time. Deep Tendon Reflexes: Reflexes are normal and symmetric. Psychiatric:         Behavior: Behavior normal.         Assessment/Plan:      Belen Buerger was seen today for hypertension. Diagnoses and all orders for this visit:    Chronic bilateral low back pain with right-sided sciatica  -     [facility]  -     methylPREDNISolone (MEDROL DOSEPACK) 4 MG tablet; Take by mouth. -     meloxicam (MOBIC) 15 MG tablet;  Take 1 tablet by mouth daily as needed for Pain  Referral to PT ordered  Will start another medrol dose pack  Side effects

## 2021-07-07 NOTE — PATIENT INSTRUCTIONS
Patient Education        methylprednisolone (injection)  Pronunciation:  METH il pred NIS oh lone  Brand:  A-Methapred, DEPO-Medrol, SOLU-Medrol  What is the most important information I should know about methylprednisolone? You may not be able to receive a methylprednisolone injection if you have a fungal infection. What is methylprednisolone? Methylprednisolone is a steroid that prevents the release of substances in the body that cause inflammation. Methylprednisolone is used to treat many different inflammatory conditions such as arthritis, lupus, psoriasis, ulcerative colitis, allergic disorders, gland (endocrine) disorders, and conditions that affect the skin, eyes, lungs, stomach, nervous system, or blood cells. Methylprednisolone may also be used for purposes not listed in this medication guide. What should I discuss with my healthcare provider before receiving methylprednisolone? You should not be treated with methylprednisolone if you are allergic to it. You may not be able to receive a methylprednisolone injection if you have a fungal infection. Methylprednisolone can weaken your immune system, making it easier for you to get an infection. Steroids can also worsen an infection you already have, or reactivate an infection you recently had. Tell your doctor about any illness or infection you have had within the past several weeks.   Tell your doctor if you have ever had:  · heart disease, high blood pressure;  · a thyroid disorder;  · diabetes;  · glaucoma or cataracts;  · kidney disease;  · cirrhosis or other liver disease;  · seizures, epilepsy or recent head injury;  · past or present tuberculosis;  · herpes infection of the eyes;  · a condition called scleroderma;  · stomach ulcers, ulcerative colitis, diverticulitis, or recent intestinal surgery;  · a parasite infection that causes diarrhea (such as threadworms);  · mental illness or psychosis;  · osteoporosis or low bone mineral density (steroid medication can increase your risk of bone loss);  · a muscle disorder such as myasthenia gravis; or  · an electrolyte imbalance (such as low levels of potassium in your blood). It is not known whether this medicine will harm an unborn baby. Tell your doctor if you are pregnant or plan to become pregnant. You should not breast-feed while using methylprednisolone. How is methylprednisolone given? Methylprednisolone is injected into a muscle or soft tissue, into a skin lesion, into the space around a joint, or given as an infusion into a vein. A healthcare provider will give you this injection. Steroid medication can weaken your immune system, making it easier for you to get an infection. Call your doctor if you have any signs of infection (fever, chills, body aches). If you have major surgery or a severe injury or infection, your methylprednisolone dose needs may change. Make sure any doctor caring for you knows you are using this medicine. If you use this medicine long-term, you may need medical tests and vision exams. What happens if I miss a dose? Call your doctor for instructions if you miss an appointment for your methylprednisolone injection. What happens if I overdose? Seek emergency medical attention or call the Poison Help line at 1-590.579.5186. What should I avoid while receiving methylprednisolone? Do not receive a \"live\" vaccine while using methylprednisolone. Live vaccines include measles, mumps, rubella (MMR), rotavirus, typhoid, yellow fever, varicella (chickenpox), zoster (shingles), and nasal flu (influenza) vaccine. Avoid being near people who are sick or have infections. Call your doctor for preventive treatment if you are exposed to chickenpox or measles. These conditions can be serious or even fatal in people who are using methylprednisolone. What are the possible side effects of methylprednisolone?   Get emergency medical help if you have signs of an allergic reaction: hives; difficulty breathing; swelling of your face, lips, tongue, or throat. Call your doctor at once if you have:  · blurred vision, tunnel vision, eye pain, or seeing halos around lights;  · shortness of breath (even with mild exertion), swelling, rapid weight gain;  · severe depression, changes in personality, unusual thoughts or behavior;  · new or unusual pain in an arm or leg or in your back;  · severe pain in your upper stomach spreading to your back, nausea and vomiting;  · bloody or tarry stools, coughing up blood or vomit that looks like coffee grounds;  · a seizure (convulsions); or  · low potassium --leg cramps, constipation, irregular heartbeats, fluttering in your chest, increased thirst or urination, numbness or tingling, muscle weakness or limp feeling. Methylprednisolone can affect growth in children. Tell your doctor if your child is not growing at a normal rate while using this medicine. Common side effects may include:  · weight gain (especially in your face or your upper back and torso);  · slow wound healing;  · muscle pain or weakness;  · thinning skin, increased sweating;  · stomach discomfort, bloating;  · headache; or  · changes in your menstrual periods. This is not a complete list of side effects and others may occur. Call your doctor for medical advice about side effects. You may report side effects to FDA at 9-780-FDA-7481. What other drugs will affect methylprednisolone? Sometimes it is not safe to use certain medications at the same time. Some drugs can affect your blood levels of other drugs you take, which may increase side effects or make the medications less effective. Many drugs can affect methylprednisolone. This includes prescription and over-the-counter medicines, vitamins, and herbal products. Not all possible interactions are listed here. Tell your doctor about all your current medicines and any medicine you start or stop using. Where can I get more information?   Your pharmacist can provide more information about methylprednisolone. Remember, keep this and all other medicines out of the reach of children, never share your medicines with others, and use this medication only for the indication prescribed. Every effort has been made to ensure that the information provided by [address] is accurate, up-to-date, and complete, but no guarantee is made to that effect. Drug information contained herein may be time sensitive. [facility] information has been compiled for use by healthcare practitioners and consumers in the [place] and therefore [facility] does not warrant that uses outside of the [place] are appropriate, unless specifically indicated otherwise. [facility]'s drug information does not endorse drugs, diagnose patients or recommend therapy. [facility]'s drug information is an informational resource designed to assist licensed healthcare practitioners in caring for their patients and/or to serve consumers viewing this service as a supplement to, and not a substitute for, the expertise, skill, knowledge and judgment of healthcare practitioners. The absence of a warning for a given drug or drug combination in no way should be construed to indicate that the drug or drug combination is safe, effective or appropriate for any given patient. [facility] does not assume any responsibility for any aspect of healthcare administered with the aid of information [facility] provides. The information contained herein is not intended to cover all possible uses, directions, precautions, warnings, drug interactions, allergic reactions, or adverse effects. If you have questions about the drugs you are taking, check with your doctor, nurse or pharmacist.  Copyright 3094-7873 [address]. Version: 1.04. Revision date: 9/25/2018. Care instructions adapted under license by [facility] ([facility]).  If you have questions about a medical condition or this instruction, always ask your healthcare professional. [address] any warranty or liability for your use of this information.

## 2021-07-13 ENCOUNTER — TELEPHONE (OUTPATIENT)
Dept: AUDIOLOGY | Age: 51
End: 2021-07-13

## 2021-07-13 NOTE — TELEPHONE ENCOUNTER
Patient called and left a message for a hearing eval appointment. Called her back and she will have order sent to us prior to scheduling.    Electronically signed by Leana Wright on 7/13/2021 at 3:30 PM

## 2021-07-14 ENCOUNTER — HOSPITAL ENCOUNTER (OUTPATIENT)
Dept: PHYSICAL THERAPY | Age: 51
Setting detail: THERAPIES SERIES
Discharge: HOME | End: 2021-07-14
Payer: MEDICARE

## 2021-07-14 PROCEDURE — 97162 PT EVAL MOD COMPLEX 30 MIN: CPT | Performed by: PHYSICAL THERAPIST

## 2021-07-14 PROCEDURE — 97110 THERAPEUTIC EXERCISES: CPT | Performed by: PHYSICAL THERAPIST

## 2021-07-14 NOTE — PROGRESS NOTES
[facility] OUTPATIENT REHABILITATION  PHYSICAL THERAPY INITIAL EVALUATION         Date:  2021   Patient: Willard Alvarez  : 1970  MRN: [id number]  Referring Provider: Manjinder Elise DO  [address] [facility],  [address] [place] [address].     Medical Diagnosis: Chronic bilateral low back pain with right-sided sciatica (M54.41,G89.29         SUBJECTIVE:     Onset date: 6 months ago    Onset: Insidious onset    Mechanism of Injury: Insidious onset    Previous PT: none    Medical Management for Current Problem: medications    Chief complaint: Pain in L/S . Numbness and tingling of right , L5 neuropath    Behavior: condition is getting worse    Pain: constant  Current: 3/10     Best: 3/10     Worst:10/10    Symptom Type/Quality: shooting, cramping  Location[de-identified] Back: lumbar region radiates down the lateral right leg         Provoking Activities/Positions: standing, walking, climbing stairs                 Relieving Activitie/Positions: sitting, medication    Disturbed Sleep: no  Bladder Dysfunction: no  Bowel Dysfunction: no     Imaging results: XR LUMBAR SPINE (2-3 VIEWS)    Result Date: 2021  EXAMINATION: THREE XRAY VIEWS OF THE LUMBAR SPINE 2021 3:13 pm COMPARISON: 2015 HISTORY: ORDERING SYSTEM PROVIDED HISTORY: Chronic bilateral low back pain with right-sided sciatica TECHNOLOGIST PROVIDED HISTORY: Reason for exam:->pain FINDINGS: No fracture or dislocation. Moderate degenerative facet arthropathy from L3-S1. Mild degenerative disc disease involves multiple levels. Normal soft tissues. Degenerative spondylotic changes as noted. Past Medical History:  Past Medical History:   Diagnosis Date    Back pain     Depression 2016    Essential hypertension 2019    Gastroparesis     Hepatitis     Hep C- no treatment.     Hyperlipidemia     Medically noncompliant 2/15/2019    Seizures ([address]      Past Surgical History:   Procedure Laterality Date [address]      Patient has had 9 surgeries   [address]      had 7 - 5 - 2012    CARDIAC CATHETERIZATION  11/04/2019    Dr. Sarah Bradshaw, LAPAROSCOPIC      COLONOSCOPY N/A 3/1/2021    COLORECTAL CANCER SCREENING, NOT HIGH RISK performed by Sam Luong MD at [address] ECHO COMPL W DOP COLOR FLOW  7/1/2012         ENDOMETRIAL ABLATION      PYLOROPLASTY  3/30/2012    lap plylorplasty open upper endoscopy lysis of adhesions    SALPINGO-OOPHORECTOMY Left     left    UPPER GASTROINTESTINAL ENDOSCOPY  3/15/13    balloon opened pyloric sphincter       Medications:   Current Outpatient Medications   Medication Sig Dispense Refill    meloxicam (MOBIC) 15 MG tablet Take 1 tablet by mouth daily as needed for Pain 30 tablet 2    busPIRone (BUSPAR) 30 MG tablet take 1 tablet by mouth twice a day      sertraline (ZOLOFT) 100 MG tablet take 1 tablet by mouth every morning      omeprazole (PRILOSEC) 20 MG delayed release capsule Take 1 capsule by mouth 2 times daily 60 capsule 1    pravastatin (PRAVACHOL) 40 MG tablet Take 1 tablet by mouth daily 30 tablet 2    hydrOXYzine (VISTARIL) 50 MG capsule take 1 capsule by mouth four times a day if needed for anxiety      VIMPAT 100 MG TABS tablet       primidone (MYSOLINE) 50 MG tablet Take 1 tablet by mouth 2 times daily (Patient taking differently: Take 50 mg by mouth 2 times daily as needed For seizure activity) 60 tablet 2    mirtazapine (REMERON) 15 MG tablet take 1 tablet by mouth every evening      cyclobenzaprine (FLEXERIL) 5 MG tablet Take 1 tablet by mouth 2 times daily as needed for Muscle spasms 60 tablet 1    levETIRAcetam (KEPPRA) 750 MG tablet Take 1 tablet by mouth 2 times daily 60 tablet 5    sertraline (ZOLOFT) 50 MG tablet Take 100 mg by mouth daily       amitriptyline (ELAVIL) 50 MG tablet take 1 tablet by mouth once daily      ARIPiprazole (ABILIFY) 15 MG tablet take 1 tablet by mouth once daily      carvedilol (COREG) 6.25 MG tablet Take 1 tablet by mouth 2 times daily (with meals) 180 tablet 3    acetaminophen (APAP EXTRA STRENGTH) 500 MG tablet Take 2 tablets by mouth every 8 hours (Patient taking differently: Take 1,000 mg by mouth every 8 hours as needed ) 120 tablet 3    albuterol sulfate HFA (PROVENTIL HFA) 108 (90 Base) MCG/ACT inhaler Inhale 2 puffs into the lungs every 4 hours as needed for Wheezing 1 Inhaler 1    melatonin 3 MG TABS tablet Take 3 tablets by mouth daily (Patient taking differently: Take 9 mg by mouth nightly ) 30 tablet 2    traZODone (DESYREL) 100 MG tablet Take 1 tablet by mouth nightly as needed for Sleep (Patient taking differently: Take 200 mg by mouth nightly ) 14 tablet 0    buprenorphine-naloxone (SUBOXONE) 8-2 MG SUBL SL tablet Place 1 tablet under the tongue daily. No current facility-administered medications for this encounter. Occupation: disability. Exercise regimen: none    Hobbies:  Watching TV    Patient Goals: pain relief    Contraindications/Precautions: none    OBJECTIVE:     Inspection:  Standing    Cervical: Forward Head   [x] Normal   []Mild   [] Moderate   []Severe      Thoracic:         [x] Normal   [] Increased Kyphosis  [] Decreased Kyphosis    Lumbar:   [x] Normal   [] Increased Lordosis   [] Decreased Lordosis           Observations: well nourished female, normal affect    Gait: limp R LE    Functional Strength:    Able to toe walk, heel walk, and squat.     Range of Motion:       Trunk:   Flexion:  [] Normal   [x] Limited 50%   Extension:  [x] Normal   [] Limited     Right Rotation:  [x] Normal   [] Limited    Left Rotation:  [x] Normal   [] Limited    Right Side Bending: [x] Normal   [] Limited    Left Side Bending: [x] Normal   [] Limited         Lower Extremity:   Right:   [x] Normal   [] Limited    Left:   [x] Normal   [] Limited       Strength:     Trunk flex: 3-/5   R LE :      HIP              Flex= 5/5 Knee    flex = 5/5    / =  5/5     Ankle    pf=      5/5    df=     4/5       L LE:      HIP              Flex= 5/5              Abd= 5/5              Add= 5/5      Knee    flex = 5/5    / =  5/5      Ankle    pf=      5/5    df=     5/5         Palpation: Non-tender to palpation      Sensation: Pt reports numbness of RLE and digits 3,4,5    Special Tests:      [x] Slump           Right [x]+ / [] -    Left []+ / [x] -  [x] SLR           Right []+ / [x] -    Left []+ / [x] -     [x] HIEN          Right []+ / [x] -    Left []+ / [x] -           Special Test Comments:     ASSESSMENT     Patient has limited trunk ROM and weakness of trunk leading to lumbar instability and resulting in pain with functional mobility and work/home tasks. Consequently lumbar stabilization and gentle ROM exercises a will be initial treatment progressing to functional exercise to facilitate return to prior level of function and mobility. Outcome Measure: Oswestry= 40%    Problems:   1. Pain reported 3-5/10  2. ROM decreased   3. Strength decreased  4. Decreased functional ability with walking, stairs, standing    [x] There are no barriers affecting plan of care or recovery    [] Barriers to this patient's plan of care or recovery include. Domestic Concerns:  [x] No  [] Yes:        Short Term goals (3 weeks)  1. Decrease reported pain to 5/10  2. Increase ROM to trunk flex= 75% WNL  3. Increase Strength by 1/3 mm grade  4. Able to perform/complete the following functions/tasks: walking, stairs, standing  5. Oswestry Low Back Disability Questionnaire 25% disability    Long Term goals (6 weeks)  1. Decrease reported pain to 0-3/10  2. Increase ROM to WNL  3. Increase Strength to WNL trunk   4. Able to perform/complete the following functions/tasks: walking, stairs, standing   5. Oswestry Low Back Disability Questionnaire 10% disability   6.  Independent with Home Exercise Programs    Rehab Potential: [x] Good  [] Fair  [] Poor    PLAN [address] [address]  Dept: [phone]  Dept Fax: [phone]  Loc: [phone] Certification / Comments     Frequency/Duration 2 days per week for 6 weeks. Certification period from 7/14/2021  to 9/14/2021. I have reviewed the Plan of Care established for skilled therapy services and certify that the services are required and that they will be provided while the patient is under my care.     Physician's Comments/Revisions:               Physician's Printed Name:                                           [de-identified] Signature:                                                               Date:

## 2021-07-14 NOTE — PROGRESS NOTES
[facility]  Physical Therapy Treatment Note    Date: 2021  Patient Name: Rudy Rincon  : 1970   MRN: [id number]     Referring Provider: Eduardo Mccann DO  [address] [facility],  [address] [place] [address].     Medical Diagnosis: Chronic bilateral low back pain with right-sided sciatica (M54.41,G89.29   Outcome Measure:  Oswestry= 40%    S: See eval  O: Pt given written HEP  Time 13:00-13:38 2x/wk, 6 weeks    Visit      Pain 3/10     ROM      Modalities      MH + ES L/S                                               THERAPEUTIC ACTIVITIES  ALL EXERCISE DONE WITH DRAW-IN TECHNIQUE Large, functional, dynamic, global movements used to build strength, balance, endurance, and flexibility and to improve physical performance. ROWS: H  \"    ROWS: M  \"    ROWS: L  \"    Obliques - high  \"    Obliques - low  \"          THEREX      Nustep        Punches      Lat pulldowns      Triceps ext standing      Marching            Trunk ext TB      Trunk flex TB      Hip abd      Hip EXT      TG Squats                  A:  Tolerated well.     P: Continue with rehab plan  Karo Burnette PT    Treatment Charges: Mins Units   Initial Evaluation 30 1   Re-Evaluation     Ther Exercise         TE 8 1   Manual Therapy     MT     Ther Activities        TA     Gait Training          GT     Neuro Re-education NR     Modalities     Non-Billable Service Time     Other     Total Time/Units 38 2

## 2021-07-16 ENCOUNTER — HOSPITAL ENCOUNTER (OUTPATIENT)
Dept: PHYSICAL THERAPY | Age: 51
Setting detail: THERAPIES SERIES
Discharge: HOME | End: 2021-07-16
Payer: MEDICARE

## 2021-07-16 PROCEDURE — G0283 ELEC STIM OTHER THAN WOUND: HCPCS | Performed by: PHYSICAL THERAPIST

## 2021-07-16 PROCEDURE — 97110 THERAPEUTIC EXERCISES: CPT | Performed by: PHYSICAL THERAPIST

## 2021-07-16 NOTE — PROGRESS NOTES
[facility]  Physical Therapy Treatment Note    Date: 2021  Patient Name: Gregor Ortega  : 1970   MRN: [id number]     Referring Provider: Jeffrey Abrams DO  [address] [facility],  [address] [place] [address].     Medical Diagnosis: Chronic bilateral low back pain with right-sided sciatica (M54.41,G89.29   Outcome Measure:  Oswestry= 40%    S: Pt reports compliance with HEP. Pain is better today, but numbness in LE is worse today. O:    Time 16:02-16:52 2x/wk, 6 weeks    Visit      Pain 3/10     ROM      Modalities      MH + ES L/S seated X 15 min                                             THERAPEUTIC ACTIVITIES  ALL EXERCISE DONE WITH DRAW-IN TECHNIQUE Large, functional, dynamic, global movements used to build strength, balance, endurance, and flexibility and to improve physical performance. ROWS: H R x 20 \"    ROWS: M R x 20 \"    ROWS: L  \"    Obliques - high  \"    Obliques - low  \"          THEREX      Nustep   L5 x 5 min     Punches      Lat pulldowns R x 20     Triceps ext standing R x 20     Marching            Trunk ext TB      Trunk flex TB R x 20     Hip abd alt 2# x 2 min     Hip EXT alt 2# x 2 min     TG Squats                  A:  Tolerated well.     P: Continue with rehab plan  Enid Amaya PT    Treatment Charges: Mins Units   Initial Evaluation       Re-Evaluation     Ther Exercise         TE  35 2    Manual Therapy     MT     Ther Activities        TA     Gait Training          GT     Neuro Re-education NR     Modalities 15 1   Non-Billable Service Time     Other     Total Time/Units 50 3

## 2021-08-10 ENCOUNTER — OFFICE VISIT (OUTPATIENT)
Dept: SLEEP MEDICINE | Age: 51
End: 2021-08-10
Payer: MEDICARE

## 2021-08-10 VITALS
SYSTOLIC BLOOD PRESSURE: 131 MMHG | WEIGHT: 218.8 LBS | RESPIRATION RATE: 16 BRPM | HEIGHT: 62 IN | HEART RATE: 96 BPM | DIASTOLIC BLOOD PRESSURE: 88 MMHG | BODY MASS INDEX: 40.27 KG/M2 | OXYGEN SATURATION: 95 %

## 2021-08-10 DIAGNOSIS — E66.01 OBESITY, CLASS III, BMI 40-49.9 (MORBID OBESITY) (HCC): ICD-10-CM

## 2021-08-10 DIAGNOSIS — G47.09 OTHER INSOMNIA: ICD-10-CM

## 2021-08-10 DIAGNOSIS — G47.33 OBSTRUCTIVE SLEEP APNEA: Primary | ICD-10-CM

## 2021-08-10 PROCEDURE — G8417 CALC BMI ABV UP PARAM F/U: HCPCS | Performed by: NURSE PRACTITIONER

## 2021-08-10 PROCEDURE — 99213 OFFICE O/P EST LOW 20 MIN: CPT | Performed by: NURSE PRACTITIONER

## 2021-08-10 PROCEDURE — 1036F TOBACCO NON-USER: CPT | Performed by: NURSE PRACTITIONER

## 2021-08-10 PROCEDURE — 3017F COLORECTAL CA SCREEN DOC REV: CPT | Performed by: NURSE PRACTITIONER

## 2021-08-10 PROCEDURE — G8427 DOCREV CUR MEDS BY ELIG CLIN: HCPCS | Performed by: NURSE PRACTITIONER

## 2021-08-10 ASSESSMENT — SLEEP AND FATIGUE QUESTIONNAIRES
ESS TOTAL SCORE: 21
HOW LIKELY ARE YOU TO NOD OFF OR FALL ASLEEP WHILE LYING DOWN TO REST IN THE AFTERNOON WHEN CIRCUMSTANCES PERMIT: 3
HOW LIKELY ARE YOU TO NOD OFF OR FALL ASLEEP WHEN YOU ARE A PASSENGER IN A CAR FOR AN HOUR WITHOUT A BREAK: 3
HOW LIKELY ARE YOU TO NOD OFF OR FALL ASLEEP WHILE SITTING AND TALKING TO SOMEONE: 0
HOW LIKELY ARE YOU TO NOD OFF OR FALL ASLEEP WHILE SITTING QUIETLY AFTER LUNCH WITHOUT ALCOHOL: 3
HOW LIKELY ARE YOU TO NOD OFF OR FALL ASLEEP WHILE WATCHING TV: 3
HOW LIKELY ARE YOU TO NOD OFF OR FALL ASLEEP WHILE SITTING INACTIVE IN A PUBLIC PLACE: 3
HOW LIKELY ARE YOU TO NOD OFF OR FALL ASLEEP IN A CAR, WHILE STOPPED FOR A FEW MINUTES IN TRAFFIC: 3
HOW LIKELY ARE YOU TO NOD OFF OR FALL ASLEEP WHILE SITTING AND READING: 3

## 2021-08-10 NOTE — PROGRESS NOTES
[facility] Sleep Medicine    Patient Name: Ashkan Wick  Age: 48 y.o.   : 1970    Date of Visit: 8/10/21        Review of Last Visit Summary:    The patient was last seen on 2021 by Dr. Tonja Blanton for  Obstructive Sleep Apnea and Obesity. Interim History:     Ashkan Wick is a 48 y.o. female that  has a past medical history of Back pain, Depression (2016), Essential hypertension (2019), Gastroparesis, Hepatitis, Hyperlipidemia, Medically noncompliant (2/15/2019), and Seizures ([facility] [address] (2019). She presents as follow up to Sleep Clinic to review sleep study results. Patient is doing well. She completed an in lab sleep study on 2021 that showed mild obstructive sleep apnea with an AHI of 11.6 . There was a predilection for REM sleep with a REM AHI of 54.1. Time spent with oxygen saturations below or equal to 88% was only 1.8 minutes of the time spent sleeping. She was able to reach a good amount of supine sleep as well during the study. She states that she actually slept better during the sleep study than she typically does at home, as she did not wake up as much as she usually does. She typically is a back sleeper. Patient continues to have multiple awakenings through the night with a fairly easy time returning to sleep. There are times that she drives slightly drowsy, but has never been in a car accident or truly falling asleep at the wheel. Patient did not end up following up with [place] [facility] as referred to from her last visit. Sleep Study History: Diagnostic Sleep Study:     Study date: 2021     AHI: 11.6      Oxygen Timbo: 83.0%   REM AHI 54.1  PLMs Index: 8.1   T88%-1.8 min of TST. Past Medical History:  Past Medical History:   Diagnosis Date    Back pain     Depression 2016    Essential hypertension 2019    Gastroparesis     Hepatitis     Hep C- no treatment.     Hyperlipidemia     Medically noncompliant 2/15/2019  Seizures ([facility] [address] 2019       Past Surgical History:        Procedure Laterality Date    ABDOMEN SURGERY      Patient has had 9 surgeries   [address]      had 7 - 5 - 2012    CARDIAC CATHETERIZATION  11/04/2019    Dr. Kelly Barajas, LAPAROSCOPIC      COLONOSCOPY N/A 3/1/2021    COLORECTAL CANCER SCREENING, NOT HIGH RISK performed by Reena Castillo MD at [address] [facility] ECHO COMPL W DOP COLOR FLOW  7/1/2012         ENDOMETRIAL ABLATION      PYLOROPLASTY  3/30/2012    lap plylorplasty open upper endoscopy lysis of adhesions    SALPINGO-OOPHORECTOMY Left     left    UPPER GASTROINTESTINAL ENDOSCOPY  3/15/13    balloon opened pyloric sphincter       Allergies:  is allergic to prochlorperazine edisylate, codeine, ketorolac tromethamine, levofloxacin, naproxen, nsaids, percocet [oxycodone-acetaminophen], and reglan [metoclopramide].   Social History:    Social History     Tobacco Use    Smoking status: Never Smoker    Smokeless tobacco: Never Used   Vaping Use    Vaping Use: Never used   Substance Use Topics    Alcohol use: Not Currently    Drug use: Yes     Types: Marijuana     Comment: medical MJ card LD 1-2021        Family History:       Problem Relation Age of Onset    High Blood Pressure Mother     Other Father         BPH    No Known Problems Sister     No Known Problems Brother     No Known Problems Sister     No Known Problems Sister     No Known Problems Brother     No Known Problems Brother     No Known Problems Brother     Depression Paternal Grandmother     No Known Problems Daughter     No Known Problems Son     No Known Problems Son        Current Medications:    Current Outpatient Medications:     meloxicam (MOBIC) 15 MG tablet, Take 1 tablet by mouth daily as needed for Pain, Disp: 30 tablet, Rfl: 2    busPIRone (BUSPAR) 30 MG tablet, take 1 tablet by mouth twice a day, Disp: , Rfl:     sertraline (ZOLOFT) 100 MG tablet, take 1 tablet by mouth every morning, Disp: , Rfl:     omeprazole (PRILOSEC) 20 MG delayed release capsule, Take 1 capsule by mouth 2 times daily, Disp: 60 capsule, Rfl: 1    pravastatin (PRAVACHOL) 40 MG tablet, Take 1 tablet by mouth daily, Disp: 30 tablet, Rfl: 2    hydrOXYzine (VISTARIL) 50 MG capsule, take 1 capsule by mouth four times a day if needed for anxiety, Disp: , Rfl:     VIMPAT 100 MG TABS tablet, , Disp: , Rfl:     primidone (MYSOLINE) 50 MG tablet, Take 1 tablet by mouth 2 times daily (Patient taking differently: Take 50 mg by mouth 2 times daily as needed For seizure activity), Disp: 60 tablet, Rfl: 2    mirtazapine (REMERON) 15 MG tablet, take 1 tablet by mouth every evening, Disp: , Rfl:     cyclobenzaprine (FLEXERIL) 5 MG tablet, Take 1 tablet by mouth 2 times daily as needed for Muscle spasms, Disp: 60 tablet, Rfl: 1    levETIRAcetam (KEPPRA) 750 MG tablet, Take 1 tablet by mouth 2 times daily, Disp: 60 tablet, Rfl: 5    sertraline (ZOLOFT) 50 MG tablet, Take 100 mg by mouth daily , Disp: , Rfl:     amitriptyline (ELAVIL) 50 MG tablet, take 1 tablet by mouth once daily, Disp: , Rfl:     ARIPiprazole (ABILIFY) 15 MG tablet, take 1 tablet by mouth once daily, Disp: , Rfl:     carvedilol (COREG) 6.25 MG tablet, Take 1 tablet by mouth 2 times daily (with meals), Disp: 180 tablet, Rfl: 3    acetaminophen (APAP EXTRA STRENGTH) 500 MG tablet, Take 2 tablets by mouth every 8 hours (Patient taking differently: Take 1,000 mg by mouth every 8 hours as needed ), Disp: 120 tablet, Rfl: 3    albuterol sulfate HFA (PROVENTIL HFA) 108 (90 Base) MCG/ACT inhaler, Inhale 2 puffs into the lungs every 4 hours as needed for Wheezing, Disp: 1 Inhaler, Rfl: 1    melatonin 3 MG TABS tablet, Take 3 tablets by mouth daily (Patient taking differently: Take 9 mg by mouth nightly ), Disp: 30 tablet, Rfl: 2    traZODone (DESYREL) 100 MG tablet, Take 1 tablet by mouth nightly as needed for Sleep (Patient taking differently: Take 200 mg by mouth nightly ), Disp: 14 tablet, Rfl: 0    buprenorphine-naloxone (SUBOXONE) 8-2 MG SUBL SL tablet, Place 1 tablet under the tongue daily. , Disp: , Rfl:     Sleep Medicine 8/10/2021 5/18/2021   Sitting and reading 3 3   Watching TV 3 2   Sitting, inactive in a public place (e.g. a theatre or a meeting) 3 2   As a passenger in a car for an hour without a break 3 2   Lying down to rest in the afternoon when circumstances permit 3 3   Sitting and talking to someone 0 0   Sitting quietly after a lunch without alcohol 3 0   In a car, while stopped for a few minutes in traffic 3 0   Total score 21 12   Neck circumference - 16       Review of Systems:    Constitutional: no chills, no fever   Eyes: no blurred vision and no eyesight problems. ENT: no hoarseness and no sore throat. Cardiovascular: no chest pain,   Respiratory: no cough, no shortness of breath   Gastrointestinal:  no nausea,  no vomiting, no diarrhea. Musculoskeletal: no arthralgias, no back pain and no myalgias. Integumentary: no rashes or lacerations. Neurological:  no diplopia, no dizziness,  no headache, no memory changes,  and no tingling. Endocrine: No chills    Objective:   PHYSICAL EXAM:    /88 (Site: Left Upper Arm, Position: Sitting, Cuff Size: Large Adult)   Pulse 96   Resp 16   Ht 5' 2\" (1.575 m)   Wt 218 lb 12.8 oz (99.2 kg)   SpO2 95%   BMI 40.02 kg/m²     Physical exam:  Gen: No acute distress. BMI of Body mass index is 40.02 kg/m². Eyes: PERRL. No sclera icterus. No conjunctival injection. ENT: No nasal/oral discharge. Pharynx clear. Neck: Trachea midline. No obvious mass. Neck circumference     Resp: No accessory muscle use. No crackles. No wheezes. No rhonchi. CV: Regular rate. Regular rhythm. No murmur or rub. Skin: Warm and dry. No bleeding observed   M/S: No cyanosis. No obvious joint deformity. Neuro: Awake. Alert. Moves all four extremities. Psych: Alert and oriented. No anxiety. Assessment:      Hans Blue was seen today for sleep apnea. Diagnoses and all orders for this visit:    Obstructive sleep apnea    Other insomnia    Obesity, unspecified classification, unspecified obesity type, unspecified whether serious comorbidity present    ·    Plan:       1. Obstructive Sleep Apnea    -Reviewed sleep study in detail with patient.  -Discussed pathophysiology of MYNOR and its impact on daily well-being, as well as cardiometabolic and neurocognitive health (particularly in moderate-severe cases). -Discussed CPAP as first-line and gold-standard therapy for MYNOR. Patient understands that CPAP should be worn every night for the duration of the night (in order to not miss therapy during early-morning REM period) for maximum benefit.  -Additional treatment options were discussed with the patient, including weight loss, CPAP titration, oral appliance evaluation, and possible surgical options. After discussion, patient elected to proceed with APAP trial. Will start APAP 4-15 cwp and follow up in 2-3 months. --Reviewed new PAP therapy instructions to be compliant with most insurance coverage:  -Use PAP device for at least 4 hours nightly. -PAP therapy must be used for 70% of nights (5/7 nights per week)  -Patient must follow up in the clinic within 30-90 days from getting the PAP device.   -Provided handouts on MYNOR, CPAP, and sleep hygiene.  -Counseled on risks of driving while drowsy. Recommended a short, 10-15 min power nap (in a safe location, with car doors locked) as most effective tool if experiencing drowsiness while driving. 2. Chronic Sleep Maintenance Insomnia     -May be secondary to untreated MYNOR. -Will assess for improvement with PAP therapy and consider Cognitive Behavioral Therapy for Insomnia if persistent.      3. Obesity (Body mass index is 40.02 kg/m².)     -Discussed impact of weight gain on MYNOR severity.   -Healthy weight loss encouraged.  -Patient not interested in following with [place] [facility] at this time. Patient will follow up No follow-ups on file.     Rebecca Stanford, APRN-CNP  [address] [facility]  P -[phone] option 2  F- [phone]

## 2021-08-10 NOTE — PATIENT INSTRUCTIONS
It was a pleasure seeing you today Joanna Son! Summary of Today's Visitf     -We will start APAP today, please contact office if you do not hear from them in 1 week. 1. Start APAP 4-15 cwp  There are several supply companies in the area, please let us know which company you would like to use for your CPAP and supplies. New PAP Therapy instructions to be compliant with most insurance coverage:    - Use PAP device for atleast 4 hours nightly. - PAP therapy must be used for 70% of nights (5/7 nights per week)    - Patient must follow up in the clinic within 30-90 days from getting the PAP device. 2. Contact your DME company about supplies or issues with your machine. As a general guideline, please replace your:  -CPAP mask every 3-6 months  -CPAP hose  every 3-6 months  -Filter every 2-4 weeks  -CPAP/BiPAP/ASV replacements every 5-7+ years     3. Continue healthy weight loss/stabilization, as this is recommended as a long-term intervention. 4. Please do not drive or operate machinery when you feel fatigued/sleepy/drowsy      5. Please try to sleep between 6-8 hours nightly with the CPAP mask    6. Please avoid sedatives, alcohol and caffeinated drinks at/near bed time. 7. Please call your supply (DME) company with any issues with your PAP device. Please call our office with any issues pertaining to the therapy.   ----Please note that complications of MYNOR if not treated can increase risk for: systemic hypertension, pulmonary hypertension, cardiovascular morbidities (heart attack and stroke), car accidents and all cause mortality.            For general questions or scheduling issues, call my nurse at [phone] [address], [place] [phone] option 2  F- [phone]        ----------------------------------------------------------    Please note that complications of MYNOR if not treated can increase risk for: systemic hypertension , pulmonary hypertension (high blood pressure in the lungs), cardiovascular morbidities (heart attack and stroke), car accidents and all cause mortality (increased risk of death).    ----------------------------------------------------------  Common Issues and Solutions     Pillow is dislodging mask - Consider getting a CPAP pillow or switching to a mask with the hose at the top. Dry Mouth - Adjust Humidity and/or try Biotene Gel. (Excessive leak can also cause this)     Leak - Please call your equipment provider  as they may need to adjust your mask. Difficulty tolerating the PAP mask - Contact your equipment company to try a different mask, we can order a \"mini sleep study\"with the sleep tech to try different masks/settings of pressure, also we have a sleep psychologist to help with anxiety related to wearing the PAP mask      ----------------------------------------------------------     For Questions and Concerns: In case of difficulty with your PAP device or mask interface, please FIRST contact your [address] [facility] [facility] (The company who provides you the CPAP/BiPAP/ASV machine/supplies). If you need the number for any other [employer] company, please call my Nurse at [phone] option 2     For questions concerning your [place] appointment: Call [phone] option 2  SLEEP LAB SCHEDULING:        ----------------------------------------------------------     Helpful Web Sites: For patients with ALL SLEEP DISORDERS: American Academy of Sleep Medicine http://sleepeducation. org; or [employer]: https://sleepfoundation. org  For patients with MYNOR: American Sleep Apnea Association: AdminParking.[employer] Alli. org  For patients with RLS: RLS Foundation: Yolanda.es  For patients with INSOMNIA: https://www.acharya.org/. org/articles/sleep/insomnia-causes-and-cures. htm  For patients with DEPRESSION: [employer].com.[employer]/depression            Learning About CPAP for Sleep Apnea  What is CPAP? CPAP/Bi-PAP is a small machine that you use at home every night while you sleep. It increases air pressure in your throat to keep your airway open. When you have sleep apnea, this can help you sleep better so you feel much better. CPAP stands for \"continuous positive airway pressure. \" Bi-PAP is a different PAP setting that allows for different pressures when you breathe in and out. The CPAP/Bi-PAP machine will have one of the following:  · A mask that covers your nose and mouth  · Prongs that fit into your nose  · A mask that covers your nose only, the most common type. This type is called NCPAP. The N stands for \"nasal.\"  Why is it done? CPAP is a common/effective treatment for obstructive sleep apnea. How does it help? · CPAP can help you have more normal sleep, so you feel less sleepy and more alert during the daytime through the treatment of sleep apnea. · CPAP may help keep heart failure or other heart problems from getting worse. · CPAP may help lower your blood pressure. · If you use CPAP, your bed partner may also sleep better because you are snoring less. What are the side effects? Some people who use CPAP have:  · A dry or stuffy nose and a sore throat. · Irritated skin on the face. · Sore eyes. · Bloating. If you have any of these problems, work with your doctor to fix them. Here are some things you can try:  · Be sure the mask or nasal prongs fit well. · See if your doctor can adjust the pressure of your CPAP. · If your nose is dry, try a humidifier. If these things do not help, you might try a different type of machine. Some machines have air pressure that adjusts on its own. Others have air pressures that are different when you breathe in than when you breathe out. This may reduce discomfort caused by too much pressure in your nose. Where can you learn more? Go to https://chchayo.[employer]. org and sign in to your [employer] account.

## 2021-08-12 ENCOUNTER — VIRTUAL VISIT (OUTPATIENT)
Dept: FAMILY MEDICINE CLINIC | Age: 51
End: 2021-08-12
Payer: MEDICARE

## 2021-08-12 DIAGNOSIS — G89.29 CHRONIC BILATERAL LOW BACK PAIN WITH RIGHT-SIDED SCIATICA: Primary | ICD-10-CM

## 2021-08-12 DIAGNOSIS — M54.41 CHRONIC BILATERAL LOW BACK PAIN WITH RIGHT-SIDED SCIATICA: Primary | ICD-10-CM

## 2021-08-12 PROCEDURE — 3017F COLORECTAL CA SCREEN DOC REV: CPT | Performed by: FAMILY MEDICINE

## 2021-08-12 PROCEDURE — 99213 OFFICE O/P EST LOW 20 MIN: CPT | Performed by: FAMILY MEDICINE

## 2021-08-12 PROCEDURE — G8427 DOCREV CUR MEDS BY ELIG CLIN: HCPCS | Performed by: FAMILY MEDICINE

## 2021-08-12 RX ORDER — CYCLOBENZAPRINE HCL 5 MG
5 TABLET ORAL 2 TIMES DAILY PRN
Qty: 60 TABLET | Refills: 1 | Status: SHIPPED
Start: 2021-08-12 | End: 2021-09-23 | Stop reason: SDUPTHER

## 2021-08-12 ASSESSMENT — ENCOUNTER SYMPTOMS
VOMITING: 0
CONSTIPATION: 0
SHORTNESS OF BREATH: 0
BACK PAIN: 1
COUGH: 0
WHEEZING: 0
RHINORRHEA: 0
SINUS PRESSURE: 0
NAUSEA: 0
SORE THROAT: 0
DIARRHEA: 0
ABDOMINAL PAIN: 0

## 2021-08-12 NOTE — PROGRESS NOTES
TeleMedicine Patient Consent    This visit was performed as a virtual video visit using a synchronous, two-way, audio-video telehealth technology platform. Patient identification was verified at the start of the visit, including the patient's telephone number and physical location. I discussed with the patient the nature of our telehealth visits, that:     1. Due to the nature of an audio- video modality, the only components of a physical exam that could be done are the elements supported by direct observation. 2. I would evaluate the patient and recommend diagnostics and treatments based on my assessment. 3. If it was felt that the patient should be evaluated in clinic or an emergency room setting, then they would be directed there. 4. Our sessions are not being recorded and that personal health information is protected. 5. Our team would provide follow up care in person if/when the patient needs it. Patient does agree to proceed with telemedicine consultation. Patient's location: home address in [place]. Is there anyone else present for this visit: No  This visit was completed virtually using [email or web address] me    Physician Location:   [facility] [address] [place] [id number]    Time spent: Greater than Not billed by time    2021    TELEHEALTH EVALUATION -- Audio or Visual (During XORVX-26 public health emergency)    HPI:    Tez Leong (:  1970) has requested an audio/video evaluation for the following concern(s):    Pain:  Patient is here today with complaints of chronic back pain with right leg pain. This is a/an chronic problem. This has been going on for many years. Exacerbating factors include standing. Alleviating factors include ibuprofen and tylenol. Pain is sharp, dull, aching in nature. 10/10. Pain is  radiating. This has happen to patient before. Imaging to date includes x-ray showed arthritis.   Therapy to date includes recent physical therapy but it made it worse so she only attended 2 sessions. She is taking 600 mg of motrin and 2 extra strength tylenol twice a day. She is also taking her mobic 15 mg daily. She states that she can't stand on it without her right leg going numb. She states numbness is on the outside of her leg down into her toes. Review of Systems   Constitutional: Negative for chills, fatigue and fever. HENT: Negative for congestion, ear discharge, ear pain, postnasal drip, rhinorrhea, sinus pressure, sneezing and sore throat. Respiratory: Negative for cough, shortness of breath and wheezing. Cardiovascular: Negative for chest pain, palpitations and leg swelling. Gastrointestinal: Negative for abdominal pain, constipation, diarrhea, nausea and vomiting. Genitourinary: Negative for dysuria, frequency and hematuria. Musculoskeletal: Positive for arthralgias and back pain. Negative for myalgias. Skin: Negative for rash. Neurological: Positive for weakness and numbness. Negative for dizziness, light-headedness and headaches. Prior to Visit Medications    Medication Sig Taking?  Authorizing Provider   cyclobenzaprine (FLEXERIL) 5 MG tablet Take 1 tablet by mouth 2 times daily as needed for Muscle spasms Yes Ada Alexis, DO   busPIRone (BUSPAR) 30 MG tablet take 1 tablet by mouth twice a day Yes Historical Provider, MD   sertraline (ZOLOFT) 100 MG tablet take 1 tablet by mouth every morning Yes Historical Provider, MD   omeprazole (PRILOSEC) 20 MG delayed release capsule Take 1 capsule by mouth 2 times daily Yes Ada Alexis DO   pravastatin (PRAVACHOL) 40 MG tablet Take 1 tablet by mouth daily Yes Ada Alexis DO   hydrOXYzine (VISTARIL) 50 MG capsule take 1 capsule by mouth four times a day if needed for anxiety Yes Historical Provider, MD   primidone (MYSOLINE) 50 MG tablet Take 1 tablet by mouth 2 times daily  Patient taking differently: Take 50 mg by mouth 2 times daily as needed For seizure activity Yes ZACARIAS Buck   mirtazapine (REMERON) 15 MG tablet take 1 tablet by mouth every evening Yes Historical Provider, MD   levETIRAcetam (KEPPRA) 750 MG tablet Take 1 tablet by mouth 2 times daily  Patient taking differently: Take 750 mg by mouth 2 times daily Take 1.5 tablets BID Yes ZACARIAS Buck   amitriptyline (ELAVIL) 50 MG tablet take 1 tablet by mouth once daily Yes Historical Provider, MD   ARIPiprazole (ABILIFY) 15 MG tablet take 1 tablet by mouth once daily Yes Historical Provider, MD   carvedilol (COREG) 6.25 MG tablet Take 1 tablet by mouth 2 times daily (with meals)  Patient taking differently: Take 12.5 mg by mouth 2 times daily (with meals)  Yes Felicia Sullivan MD   acetaminophen (APAP EXTRA STRENGTH) 500 MG tablet Take 2 tablets by mouth every 8 hours  Patient taking differently: Take 1,000 mg by mouth every 8 hours as needed  Yes Hannah Alfaro,    albuterol sulfate HFA (PROVENTIL HFA) 108 (90 Base) MCG/ACT inhaler Inhale 2 puffs into the lungs every 4 hours as needed for Wheezing Yes Mari Espitia DO   melatonin 3 MG TABS tablet Take 3 tablets by mouth daily  Patient taking differently: Take 9 mg by mouth nightly  Yes Mari Espitia DO   buprenorphine-naloxone (SUBOXONE) 8-2 MG SUBL SL tablet Place 1 tablet under the tongue daily.   Yes Historical Provider, MD   VIMPAT 100 MG TABS tablet   Historical Provider, MD   sertraline (ZOLOFT) 50 MG tablet Take 100 mg by mouth daily   Patient not taking: Reported on 8/12/2021  Historical Provider, MD   traZODone (DESYREL) 100 MG tablet Take 1 tablet by mouth nightly as needed for Sleep  Patient taking differently: Take 200 mg by mouth nightly   Shannon Bashir APRN - CNP       Social History     Tobacco Use    Smoking status: Never Smoker    Smokeless tobacco: Never Used   Vaping Use    Vaping Use: Never used   Substance Use Topics    Alcohol use: Not Currently    Drug use: Yes     Types: Marijuana     Comment: medical MJ card LD 1-2021        Allergies   Allergen Reactions    Prochlorperazine Edisylate Other (See Comments)     Makes me \"crazy, about to jump out the window\"    Penicillins     Codeine Nausea And Vomiting     Stomach pain    Ketorolac Tromethamine Other (See Comments)     Can't take because abd pain    Levofloxacin Nausea Only    Naproxen Nausea Only     abd pain    Nsaids Other (See Comments)     Gastroparesis, has had 9 abdominal surgeries, and pancreatitis    Percocet [Oxycodone-Acetaminophen] Itching and Nausea And Vomiting    Reglan [Metoclopramide] Other (See Comments)   ,   Past Medical History:   Diagnosis Date    Back pain     Depression 11/30/2016    Essential hypertension 8/7/2019    Gastroparesis     Hepatitis     Hep C- no treatment.     Hyperlipidemia     Medically noncompliant 2/15/2019    Seizures ([address] 2019   ,   Past Surgical History:   Procedure Laterality Date    ABDOMEN SURGERY      Patient has had 9 surgeries   [address]      had 7 - 5 - 2012    CARDIAC CATHETERIZATION  11/04/2019    Dr. Cherelle Alexandra, LAPAROSCOPIC      COLONOSCOPY N/A 3/1/2021    COLORECTAL CANCER SCREENING, NOT HIGH RISK performed by Chari Wyman MD at [address] [facility] ECHO COMPL W DOP COLOR FLOW  7/1/2012         ENDOMETRIAL ABLATION      PYLOROPLASTY  3/30/2012    lap plylorplasty open upper endoscopy lysis of adhesions    SALPINGO-OOPHORECTOMY Left     left    UPPER GASTROINTESTINAL ENDOSCOPY  3/15/13    balloon opened pyloric sphincter   ,   Social History     Tobacco Use    Smoking status: Never Smoker    Smokeless tobacco: Never Used   Vaping Use    Vaping Use: Never used   Substance Use Topics    Alcohol use: Not Currently    Drug use: Yes     Types: Marijuana     Comment: medical MJ card LD 1-2021   ,   Family History   Problem Relation Age of Onset    High Blood Pressure Mother     Other Father         BPH    No Known Problems Sister     No Known Problems Brother     No Known Problems Sister     No Known Problems Sister     No Known Problems Brother     No Known Problems Brother     No Known Problems Brother     Depression Paternal Grandmother     No Known Problems Daughter     No Known Problems Son     No Known Problems Son    ,   Immunization History   Administered Date(s) Administered    Influenza, Quadv, IM, PF (6 mo and older Fluzone, Flulaval, Fluarix, and 3 yrs and older Afluria) 11/05/2019   ,   Health Maintenance   Topic Date Due    Pneumococcal 0-64 years Vaccine (1 of 2 - PPSV23) Never done    COVID-19 Vaccine (1) Never done    HIV screen  Never done    DTaP/Tdap/Td vaccine (1 - Tdap) Never done    Cervical cancer screen  09/24/2017    Shingles Vaccine (1 of 2) Never done    Annual Wellness Visit (AWV)  Never done    Flu vaccine (1) 09/01/2021    A1C test (Diabetic or Prediabetic)  11/12/2021    Colon cancer screen colonoscopy  03/01/2022    Lipid screen  07/05/2022    Potassium monitoring  07/05/2022    Creatinine monitoring  07/05/2022    Breast cancer screen  05/06/2023    Hepatitis C screen  Completed    Hepatitis A vaccine  Aged Out    Hepatitis B vaccine  Aged Out    Hib vaccine  Aged Out    Meningococcal (ACWY) vaccine  Aged Out       PHYSICAL EXAMINATION:  [ INSTRUCTIONS:  \"[x]\" Indicates a positive item  \"[]\" Indicates a negative item  -- DELETE ALL ITEMS NOT EXAMINED]  Vital Signs: (As obtained by patient/caregiver or practitioner observation)    Blood pressure-  Heart rate-    Respiratory rate-    Temperature-  Pulse oximetry-     Constitutional: [x] Appears well-developed and well-nourished [x] No apparent distress      [] Abnormal-   Mental status  [x] Alert and awake  [x] Oriented to person/place/time [x]Able to follow commands      Eyes:  EOM    [x]  Normal  [] Abnormal-  Sclera  [x]  Normal  [] Abnormal -         Discharge [x]  None visible  [] Abnormal -    HENT:   [x] Normocephalic, atraumatic.   [] Abnormal   [x] Mouth/Throat: Mucous membranes are moist.     External Ears [x] Normal  [] Abnormal-     Neck: [x] No visualized mass     Pulmonary/Chest: [x] Respiratory effort normal.  [x] No visualized signs of difficulty breathing or respiratory distress        [] Abnormal-      Musculoskeletal:   [] Normal gait with no signs of ataxia         [x] Normal range of motion of neck        [] Abnormal-       Neurological:        [x] No Facial Asymmetry (Cranial nerve 7 motor function) (limited exam to video visit)          [] No gaze palsy        [] Abnormal-         Skin:        [x] No significant exanthematous lesions or discoloration noted on facial skin         [] Abnormal-            Psychiatric:       [x] Normal Affect [x] No Hallucinations        [] Abnormal-       Other pertinent observable physical exam findings-     Due to this being a TeleHealth encounter, evaluation of the following organ systems is limited: Vitals/Constitutional/EENT/Resp/CV/GI//MS/Neuro/Skin/Heme-Lymph-Imm. ASSESSMENT/PLAN:  Nai Dominguez was seen today for other. Diagnoses and all orders for this visit:    Chronic bilateral low back pain with right-sided sciatica  -     cyclobenzaprine (FLEXERIL) 5 MG tablet; Take 1 tablet by mouth 2 times daily as needed for Muscle spasms  -     MRI LUMBAR SPINE WO CONTRAST; Future        Return in about 2 weeks (around 8/26/2021) for AWV. An  electronic signature was used to authenticate this note. --Debra Gomez DO on 8/12/2021 at 12:38 }    Pursuant to the emergency declaration under the [facility] [place]note 2[place] waiver authority and the [employer] Act, this Virtual  Visit was conducted, with patient's consent, to reduce the patient's risk of exposure to COVID-19 and provide continuity of care for an established patient.     Services were provided through a video synchronous discussion virtually to substitute for in-person clinic visit.

## 2021-08-12 NOTE — PATIENT INSTRUCTIONS
Patient Education        cyclobenzaprine  Pronunciation:  sye kloe QIAN za preen  Brand:  Amrix, Comfort Pac with Cyclobenzaprine, Fexmid  What is the most important information I should know about cyclobenzaprine? You should not use cyclobenzaprine if you have a thyroid disorder, heart block, congestive heart failure, a heart rhythm disorder, or you have recently had a heart attack. Do not use cyclobenzaprine if you have taken an MAO inhibitor in the past 14 days, such as isocarboxazid, linezolid, phenelzine, rasagiline, selegiline, or tranylcypromine. What is cyclobenzaprine? Cyclobenzaprine is a muscle relaxant. It works by blocking nerve impulses (or pain sensations) that are sent to your brain. Cyclobenzaprine is used together with rest and physical therapy to relieve muscle spasms caused by painful conditions such as an injury. Cyclobenzaprine may also be used for purposes not listed in this medication guide. What should I discuss with my healthcare provider before taking cyclobenzaprine? You should not use cyclobenzaprine if you are allergic to it, or if you have:  · a thyroid disorder;  · heart block, heart rhythm disorder, congestive heart failure; or  · if you have recently had a heart attack. Cyclobenzaprine is not approved for use by anyone younger than 13years old. Do not use cyclobenzaprine if you have taken an MAO inhibitor in the past 14 days. A dangerous drug interaction could occur. MAO inhibitors include isocarboxazid, linezolid, phenelzine, rasagiline, selegiline, and tranylcypromine. Some medicines can interact with cyclobenzaprine and cause a serious condition called serotonin syndrome. Be sure your doctor knows if you also take stimulant medicine, opioid medicine, herbal products, or medicine for depression, mental illness, Parkinson's disease, migraine headaches, serious infections, or prevention of nausea and vomiting.  Ask your doctor before making any changes in how or when you take your medications. Tell your doctor if you have ever had:  · liver disease;  · glaucoma;  · enlarged prostate; or  · problems with urination. It is not known whether this medicine will harm an unborn baby. Tell your doctor if you are pregnant or plan to become pregnant. It may not be safe to breast-feed while using this medicine. Ask your doctor about any risk. Older adults may be more sensitive to the effects of this medicine. How should I take cyclobenzaprine? Follow all directions on your prescription label and read all medication guides or instruction sheets. Your doctor may occasionally change your dose. Use the medicine exactly as directed. Cyclobenzaprine is usually taken once daily for only 2 or 3 weeks. Follow your doctor's dosing instructions very carefully. Swallow the capsule whole and do not crush, chew, break, or open it. Take the medicine at the same time each day. Call your doctor if your symptoms do not improve after 3 weeks, or if they get worse. Store at room temperature away from moisture, heat, and light. What happens if I miss a dose? Take the medicine as soon as you can, but skip the missed dose if it is almost time for your next dose. Do not take two doses at one time. What happens if I overdose? Seek emergency medical attention or call the Poison Help line at 1-258.566.5870. An overdose of cyclobenzaprine can be fatal.  Overdose symptoms may include severe drowsiness, vomiting, fast heartbeats, tremors, agitation, or hallucinations. What should I avoid while taking cyclobenzaprine? Avoid driving or hazardous activity until you know how this medicine will affect you. Your reactions could be impaired. Avoid drinking alcohol. Dangerous side effects could occur. What are the possible side effects of cyclobenzaprine? Get emergency medical help if you have signs of an allergic reaction: hives; difficult breathing; swelling of your face, lips, tongue, or throat.   Stop using cyclobenzaprine and call your doctor at once if you have:  · fast or irregular heartbeats;  · chest pain or pressure, pain spreading to your jaw or shoulder; or  · sudden numbness or weakness (especially on one side of the body), slurred speech, balance problems. Seek medical attention right away if you have symptoms of serotonin syndrome, such as: agitation, hallucinations, fever, sweating, shivering, fast heart rate, muscle stiffness, twitching, loss of coordination, nausea, vomiting, or diarrhea. Serious side effects may be more likely in older adults. Common side effects may include:  · drowsiness, tiredness;  · headache, dizziness;  · dry mouth; or  · upset stomach, nausea, constipation. This is not a complete list of side effects and others may occur. Call your doctor for medical advice about side effects. You may report side effects to FDA at 4-090-FDA-1509. What other drugs will affect cyclobenzaprine? Using cyclobenzaprine with other drugs that make you drowsy can worsen this effect. Ask your doctor before using opioid medication, a sleeping pill, a muscle relaxer, or medicine for anxiety or seizures. Tell your doctor about all your other medicines, especially:  · bupropion (Zyban, for smoking cessation);  · meperidine;  · tramadol;  · verapamil;  · cold or allergy medicine that contains an antihistamine (Benadryl and others);  · medicine to treat Parkinson's disease;  · medicine to treat excess stomach acid, stomach ulcer, motion sickness, or irritable bowel syndrome;  · medicine to treat overactive bladder; or  · bronchodilator asthma medication. This list is not complete. Other drugs may affect cyclobenzaprine, including prescription and over-the-counter medicines, vitamins, and herbal products. Not all possible drug interactions are listed here. Where can I get more information? Your pharmacist can provide more information about cyclobenzaprine.   Remember, keep this and all other medicines out of the reach of children, never share your medicines with others, and use this medication only for the indication prescribed. Every effort has been made to ensure that the information provided by [address] is accurate, up-to-date, and complete, but no guarantee is made to that effect. Drug information contained herein may be time sensitive. [facility] information has been compiled for use by healthcare practitioners and consumers in the [place] and therefore [facility] does not warrant that uses outside of the [place] are appropriate, unless specifically indicated otherwise. [facility]'s drug information does not endorse drugs, diagnose patients or recommend therapy. [facility]'s drug information is an informational resource designed to assist licensed healthcare practitioners in caring for their patients and/or to serve consumers viewing this service as a supplement to, and not a substitute for, the expertise, skill, knowledge and judgment of healthcare practitioners. The absence of a warning for a given drug or drug combination in no way should be construed to indicate that the drug or drug combination is safe, effective or appropriate for any given patient. [facility] does not assume any responsibility for any aspect of healthcare administered with the aid of information [facility] provides. The information contained herein is not intended to cover all possible uses, directions, precautions, warnings, drug interactions, allergic reactions, or adverse effects. If you have questions about the drugs you are taking, check with your doctor, nurse or pharmacist.  Copyright 7363-9398 [address]. Version: 5.01. Revision date: 9/26/2018. Care instructions adapted under license by [facility] ([facility]). If you have questions about a medical condition or this instruction, always ask your healthcare professional. [place] [address] any warranty or liability for your use of this information.

## 2021-08-13 RX ORDER — CARVEDILOL 12.5 MG/1
TABLET ORAL
Qty: 60 TABLET | Refills: 5 | Status: SHIPPED
Start: 2021-08-13 | End: 2022-06-12

## 2021-08-28 ENCOUNTER — HOSPITAL ENCOUNTER (OUTPATIENT)
Dept: MRI IMAGING | Age: 51
Discharge: HOME | End: 2021-08-27
Payer: MEDICARE

## 2021-08-28 DIAGNOSIS — G89.29 CHRONIC BILATERAL LOW BACK PAIN WITH RIGHT-SIDED SCIATICA: ICD-10-CM

## 2021-08-28 DIAGNOSIS — M54.41 CHRONIC BILATERAL LOW BACK PAIN WITH RIGHT-SIDED SCIATICA: ICD-10-CM

## 2021-08-28 PROCEDURE — 72148 MRI LUMBAR SPINE W/O DYE: CPT

## 2021-08-30 ENCOUNTER — OFFICE VISIT (OUTPATIENT)
Dept: FAMILY MEDICINE CLINIC | Age: 51
End: 2021-08-30
Payer: MEDICARE

## 2021-08-30 VITALS
HEART RATE: 87 BPM | TEMPERATURE: 98.5 F | HEIGHT: 63 IN | OXYGEN SATURATION: 95 % | DIASTOLIC BLOOD PRESSURE: 66 MMHG | RESPIRATION RATE: 18 BRPM | SYSTOLIC BLOOD PRESSURE: 106 MMHG | BODY MASS INDEX: 38.59 KG/M2 | WEIGHT: 217.8 LBS

## 2021-08-30 DIAGNOSIS — H91.92 HEARING LOSS OF LEFT EAR, UNSPECIFIED HEARING LOSS TYPE: ICD-10-CM

## 2021-08-30 DIAGNOSIS — G89.29 CHRONIC BILATERAL LOW BACK PAIN WITH RIGHT-SIDED SCIATICA: Primary | ICD-10-CM

## 2021-08-30 DIAGNOSIS — R06.02 SHORTNESS OF BREATH: ICD-10-CM

## 2021-08-30 DIAGNOSIS — M54.41 CHRONIC BILATERAL LOW BACK PAIN WITH RIGHT-SIDED SCIATICA: Primary | ICD-10-CM

## 2021-08-30 PROCEDURE — 3017F COLORECTAL CA SCREEN DOC REV: CPT | Performed by: FAMILY MEDICINE

## 2021-08-30 PROCEDURE — 99214 OFFICE O/P EST MOD 30 MIN: CPT | Performed by: FAMILY MEDICINE

## 2021-08-30 PROCEDURE — G8417 CALC BMI ABV UP PARAM F/U: HCPCS | Performed by: FAMILY MEDICINE

## 2021-08-30 PROCEDURE — 1036F TOBACCO NON-USER: CPT | Performed by: FAMILY MEDICINE

## 2021-08-30 PROCEDURE — G8427 DOCREV CUR MEDS BY ELIG CLIN: HCPCS | Performed by: FAMILY MEDICINE

## 2021-08-30 RX ORDER — ALBUTEROL SULFATE 90 UG/1
2 AEROSOL, METERED RESPIRATORY (INHALATION) EVERY 4 HOURS PRN
Qty: 1 INHALER | Refills: 1 | Status: SHIPPED
Start: 2021-08-30 | End: 2021-10-08

## 2021-08-30 NOTE — PATIENT INSTRUCTIONS

## 2021-08-30 NOTE — PROGRESS NOTES
Temporal   SpO2: 95%   Weight: 217 lb 12.8 oz (98.8 kg)   Height: 5' 2.5\" (1.588 m)       Physical Exam  Vitals and nursing note reviewed. Constitutional:       Appearance: She is well-developed. HENT:      Head: Normocephalic and atraumatic. Right Ear: External ear normal.      Left Ear: External ear normal.      Nose: Nose normal.   Eyes:      Conjunctiva/sclera: Conjunctivae normal.      Pupils: Pupils are equal, round, and reactive to light. Neck:      Thyroid: No thyromegaly. Cardiovascular:      Rate and Rhythm: Normal rate and regular rhythm. Heart sounds: Normal heart sounds. Pulmonary:      Effort: Pulmonary effort is normal.      Breath sounds: Normal breath sounds. No wheezing. Abdominal:      General: Bowel sounds are normal.      Palpations: Abdomen is soft. Tenderness: There is no abdominal tenderness. Musculoskeletal:      Cervical back: Normal range of motion and neck supple. Lumbar back: Tenderness and bony tenderness present. Decreased range of motion. Skin:     General: Skin is warm and dry. Findings: No rash. Neurological:      Mental Status: She is alert and oriented to person, place, and time. Deep Tendon Reflexes: Reflexes are normal and symmetric. Psychiatric:         Behavior: Behavior normal.         Assessment/Plan:      Nika Ventura was seen today for results. Diagnoses and all orders for this visit:    Chronic bilateral low back pain with right-sided sciatica  -     [address] Li DO, Pain Medicine, [place]  Reviewed MRI with patient  Will refer to pain management  Will await additional recommendations    Shortness of breath  -     albuterol sulfate HFA (PROVENTIL HFA) 108 (90 Base) MCG/ACT inhaler; Inhale 2 puffs into the lungs every 4 hours as needed for Wheezing    Hearing loss of left ear, unspecified hearing loss type  -     External Referral To Audiology      As above.   Call or go to ED immediately if symptoms worsen or persist.  Return in about 3 months (around 11/30/2021). , or sooner if necessary. Educational materials and/or home exercises printed for patient's review and were included in patient instructions on his/her After Visit Summary and given to patient at the end of visit. Counseled regarding above diagnosis, including possible risks and complications,  especially if left uncontrolled. Counseled regarding the possible side effects, risks, benefits and alternatives to treatment; patient and/or guardian verbalizes understanding, agrees, feels comfortable with and wishes to proceed with above treatment plan. Advised patient to call with any new medication issues, and read all Rx info from pharmacy to assure aware of all possible risks and side effects of medication before taking. Reviewed age and gender appropriate health screening exams and vaccinations. Advised patient regarding importance of keeping up with recommended health maintenance and to schedule as soon as possible if overdue, as this is important in assessing for undiagnosed pathology, especially cancer, as well as protecting against potentially harmful/life threatening disease. Patient and/or guardian verbalizes understanding and agrees with above counseling, assessment and plan. All questions answered. Nasra Davis DO  8/30/2021    I have personally reviewed and updated the chief complaint, HPI, Past Medical, Family and Social History, as well as the above Review of Systems.

## 2021-08-31 ENCOUNTER — OFFICE VISIT (OUTPATIENT)
Dept: NEUROLOGY | Age: 51
End: 2021-08-31
Payer: MEDICARE

## 2021-08-31 VITALS
BODY MASS INDEX: 38.52 KG/M2 | SYSTOLIC BLOOD PRESSURE: 123 MMHG | HEART RATE: 85 BPM | OXYGEN SATURATION: 95 % | TEMPERATURE: 97.3 F | DIASTOLIC BLOOD PRESSURE: 81 MMHG | WEIGHT: 217.4 LBS | HEIGHT: 63 IN | RESPIRATION RATE: 18 BRPM

## 2021-08-31 DIAGNOSIS — G40.909 SEIZURE DISORDER (HCC): ICD-10-CM

## 2021-08-31 DIAGNOSIS — F44.5 PSYCHOGENIC NONEPILEPTIC SEIZURE: Primary | ICD-10-CM

## 2021-08-31 PROCEDURE — 1036F TOBACCO NON-USER: CPT | Performed by: PHYSICIAN ASSISTANT

## 2021-08-31 PROCEDURE — 99213 OFFICE O/P EST LOW 20 MIN: CPT | Performed by: PHYSICIAN ASSISTANT

## 2021-08-31 PROCEDURE — G8417 CALC BMI ABV UP PARAM F/U: HCPCS | Performed by: PHYSICIAN ASSISTANT

## 2021-08-31 PROCEDURE — 3017F COLORECTAL CA SCREEN DOC REV: CPT | Performed by: PHYSICIAN ASSISTANT

## 2021-08-31 PROCEDURE — G8427 DOCREV CUR MEDS BY ELIG CLIN: HCPCS | Performed by: PHYSICIAN ASSISTANT

## 2021-08-31 NOTE — PROGRESS NOTES
[address] [facility]. Jerson Cifuentes M.D., F.A.C.P. Rachid Hoyos, DNP, APRN, ACNS-BC  [facility]. Adam Pereira, MSN, APRN-FNP-C  SMILEY Altamirano, PA-C  Juan Mclain, MSN, APRN-FNP-C  [address], [facility]  [place], [address] [address]  Phone: [phone]  Fax: [phone]       Ronel Jennings is a 48 y.o. right handed female     Presents for follow-up of seizures. She presents alone and is a fair historian. 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 At that time she had lysis adhesion surgery and she reports that the surgeon \"nicked my vagus nerve\". Her daughter reports that she had several spells almost daily after this time. Most of which are described as staring, jumbled up words, impaired awareness followed by confusion. Prior to these episodes she states that her head feels \"funny\"-- but cannot further describe this. She reports \"grand mal\" seizures, but denies tonic-clonic movements. She does report becoming extremely tense. She describes impaired memories surrounding all of the above events. She reports loss of consciousness, bruises all over her body and tongue biting. She denies incontinence. She was seen in August 2019 by Dr. Adrien Kovacs for seizure-like activity. At that time she presented with overdose on multiple neuroleptic medications. EEG in August 2019 was normal.  It was felt that these events were probable nonepileptic spells in addition to conversion disorder. Keppra was to be discontinued at that time and switched to Lamictal for its mood stabilizing properties.      In October 2019 she was seen in the hospital for possible breakthrough seizures after running out of her Keppra and taking leftover Lamictal.  She had 07/05/2021    RBC 5.14 07/05/2021    HGB 13.7 07/05/2021    HCT 43.2 07/05/2021     07/05/2021    MCV 84.0 07/05/2021    MCH 26.7 07/05/2021    MCHC 31.7 07/05/2021    RDW 14.5 07/05/2021    NRBC 0.0 04/18/2021    SEGSPCT 70 11/30/2013    LYMPHOPCT 44.1 07/05/2021    MONOPCT 3.4 07/05/2021    BASOPCT 0.3 07/05/2021    MONOSABS 0.20 07/05/2021    LYMPHSABS 2.62 07/05/2021    EOSABS 0.07 07/05/2021    BASOSABS 0.02 07/05/2021     CMP:    Lab Results   Component Value Date     07/05/2021    K 4.7 07/05/2021    K 4.2 07/18/2020     07/05/2021    CO2 23 07/05/2021    BUN 11 07/05/2021    CREATININE 0.7 07/05/2021    GFRAA >60 07/05/2021    LABGLOM >60 07/05/2021    GLUCOSE 100 07/05/2021    GLUCOSE 91 04/18/2021    PROT 6.9 07/05/2021    LABALBU 4.2 07/05/2021    LABALBU 3.9 04/18/2021    CALCIUM 9.2 07/05/2021    BILITOT 0.2 07/05/2021    ALKPHOS 139 07/05/2021    AST 16 07/05/2021    ALT 24 07/05/2021     Mri brain WWO  Minimal asymmetry with slight volume loss of the amygdala and  hippocampus on the right suggesting the possibility of mesial temporal  sclerosis.     Stable left anterior temporal fossa arachnoid cyst.      Mild diffuse mucosal thickening within the paranasal sinuses. EEG 8/9/19  Normal     EEG 10/28/2019  This 9 hours continuous EEG shows epliptogenicity arising from   left central area in addition to mild diffuse encephalopathy. EEG 11/1/19   This more than 4 hours video EEG shows a structural lesion in   left temporal area. No epileptiform activities are seen. EEG report from [facility] EMU   This 5-day video EEG evaluation between 12/17/2019 and 12/21/2019 was consistent  with a diagnosis of psychogenic non-epileptic seizures. Despite activation  procedures including photic stimulation, sleep deprivation, and holding home  anticonvulsant (levetiracetam) on admission, no evidence of comorbid epilepsy  was seen during this evaluation.   However, given episode type with lip smacking and convulsions was not captured,  this evaluation was in part in conclusive. Because a prior outside EEG was read  as epileptiform (not available for review) and the uncaptured episode types  sound clinically concerning, we agreed to continue anticonvulsant treatment, but  that this should not be uptitrated for the frequent \"foggy\" episodes as above. 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 Unfortunately, an episode with lip smacking and convulsions was not captured and in part inconclusive. Due to a previous EEG with epileptogenicity arising from the left central area and description of event being clinically concerning for epileptic seizure it was decided to continue on AED therapy. Reportedly, seizure activity has been confirmed by [facility] EMU admission-- I am waiting for these records to review admission. She is now stable on Keppra 750 mg 1.5 tablets twice daily without seizures since dose increase     Risk factors for seizures: possible mesial temporal sclerosis on MRI    Essential tremor versus enhanced physiologic tremor   Worsened by stress and anxiety   Controlled with Primidone     Plan:     Obtain records from [facility] EMU admission     Continue Keppra 750 mg 1.5 tablets twice daily    Recommend CBT for management of her PNES    Continue Primidone     No driving     Seizure precautions    It is important to continue to try and achieve seizure control because of the potential for injury and illness due to seizures. In a very small minority of patients with generalized tonic clonic seizures (\"grand mal\"), breathing or heart function can stop during a seizure and result in demise (sudden unexpected death in epilepsy or SUDEP).  [place] from seizures prevents this kind of outcome.     Please follow seizure precautions:  Please do not engage in any high risk activities such as, but not limited to, driving, bathing in tubs, swimming alone, climbing ladders, working at heights, near open flames or machinery with moving parts etc.  For patients with epilepsy it is recommended to stay seizure free for 6 months on medication before resume driving.     These will be re-assessed at your next appointment. ZACARIAS Pedroza, PAElieserC  11:32 AM  8/31/2021     I spent 20 minutes with this patient obtaining the HPI and discussing the exam with greater than 50% of the time providing counseling and education on medications and other treatment plans. All questions were answered prior to leaving my office.

## 2021-09-01 ENCOUNTER — OFFICE VISIT (OUTPATIENT)
Dept: PAIN MANAGEMENT | Age: 51
End: 2021-09-01
Payer: MEDICARE

## 2021-09-01 ENCOUNTER — PREP FOR PROCEDURE (OUTPATIENT)
Dept: PAIN MANAGEMENT | Age: 51
End: 2021-09-01

## 2021-09-01 VITALS
HEIGHT: 63 IN | SYSTOLIC BLOOD PRESSURE: 110 MMHG | TEMPERATURE: 96.9 F | WEIGHT: 218 LBS | DIASTOLIC BLOOD PRESSURE: 72 MMHG | BODY MASS INDEX: 38.62 KG/M2 | OXYGEN SATURATION: 93 % | RESPIRATION RATE: 16 BRPM | HEART RATE: 94 BPM

## 2021-09-01 DIAGNOSIS — M47.817 LUMBOSACRAL SPONDYLOSIS WITHOUT MYELOPATHY: ICD-10-CM

## 2021-09-01 DIAGNOSIS — M51.36 DDD (DEGENERATIVE DISC DISEASE), LUMBAR: ICD-10-CM

## 2021-09-01 DIAGNOSIS — M51.36 DDD (DEGENERATIVE DISC DISEASE), LUMBAR: Primary | ICD-10-CM

## 2021-09-01 DIAGNOSIS — E66.9 OBESITY, UNSPECIFIED CLASSIFICATION, UNSPECIFIED OBESITY TYPE, UNSPECIFIED WHETHER SERIOUS COMORBIDITY PRESENT: ICD-10-CM

## 2021-09-01 DIAGNOSIS — M54.16 LUMBAR RADICULOPATHY: ICD-10-CM

## 2021-09-01 DIAGNOSIS — M54.16 LUMBAR RADICULOPATHY: Primary | ICD-10-CM

## 2021-09-01 PROBLEM — M51.369 DDD (DEGENERATIVE DISC DISEASE), LUMBAR: Status: ACTIVE | Noted: 2021-09-01

## 2021-09-01 PROCEDURE — G8417 CALC BMI ABV UP PARAM F/U: HCPCS | Performed by: ANESTHESIOLOGY

## 2021-09-01 PROCEDURE — 1036F TOBACCO NON-USER: CPT | Performed by: ANESTHESIOLOGY

## 2021-09-01 PROCEDURE — 99204 OFFICE O/P NEW MOD 45 MIN: CPT | Performed by: ANESTHESIOLOGY

## 2021-09-01 PROCEDURE — 3017F COLORECTAL CA SCREEN DOC REV: CPT | Performed by: ANESTHESIOLOGY

## 2021-09-01 PROCEDURE — G8427 DOCREV CUR MEDS BY ELIG CLIN: HCPCS | Performed by: ANESTHESIOLOGY

## 2021-09-01 NOTE — PROGRESS NOTES
[facility]        [address] [facility], [address] [facility]      [phone]                  Consult Note      Patient:  MIGULE Payne 1970    Date of Service:  21     Requesting Physician:  Pina Rea DO    Reason for Consult:      Patient presents with complaints of low back and right LE pain    HISTORY OF PRESENT ILLNESS:      Ms. Xiang Polk is a 48 y.o. female [facility] for evaluation of  Low back and right LE pain. Had acute exacerbation of pain about 4 months ago. Low back and right LE pain upto the feet, tingling +, numbness+,    Treated conservatively: Oral steroids/ analgesics/ Attempted PT recently which worsened the pain- tried HEP/ stretch but it worsens the pain. Has been on Suboxone for H/o opioid addiction. Pain is constant and is described as aching, stabbing and throbbing. Patient does not have new bladder or bowel dysfunction. Alleviating factors include: rest.  Aggravating factors include: movement, sitting, lifting. Pain causes functional limitations/ limits Adl's : Yes     Nursing notes and details of the pain history reviewed. Please see intake notes for details. 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 Surgeries: no LS spine surgery. Interventional Pain procedures/ nerve blocks: no    She has not been on anticoagulation medications. She has not been on herbal supplements. She is not diabetic.     H/O Smoking: no  H/O alcohol abuse : denies  H/O Illicit drug use : denies - Has H/o opioid addiction- currently on Suboxone/ Also has Medical Marijuana card- uses medical marijuana. Employment: disability    Imaging:   MRI fo LS spine: 8/28/2021:  Impression   1. Right paracentral disc extrusion at L5-S1 with moderate spinal canal   stenosis, severe narrowing of the right lateral recess, and severe right   neural foraminal stenosis. 2. Additional multilevel degenerative changes with mild spinal canal   narrowing at L4-5.   3. Minimal anterolisthesis at L4-5. Xray LS spine: 7/5/2021:     FINDINGS:   No fracture or dislocation.  Moderate degenerative facet arthropathy from   L3-S1.  Mild degenerative disc disease involves multiple levels.  Normal soft   tissues.           Impression   Degenerative spondylotic changes as noted. Past Medical History:   Diagnosis Date    Back pain     Depression 11/30/2016    Essential hypertension 8/7/2019    Gastroparesis     Hepatitis     Hep C- no treatment.  Hyperlipidemia     Medically noncompliant 2/15/2019    Seizures ([address] 2019    Sleep apnea        Past Surgical History:   Procedure Laterality Date    ABDOMEN SURGERY      Patient has had 9 surgeries   [address]      had 7 - 5 - 2012    CARDIAC CATHETERIZATION  11/04/2019    Dr. Robert Reese, LAPAROSCOPIC      COLONOSCOPY N/A 3/1/2021    COLORECTAL CANCER SCREENING, NOT HIGH RISK performed by Thee Bui MD at [address] ECHO COMPL W DOP COLOR FLOW  7/1/2012         ENDOMETRIAL ABLATION      PYLOROPLASTY  3/30/2012    lap plylorplasty open upper endoscopy lysis of adhesions    SALPINGO-OOPHORECTOMY Left     left    UPPER GASTROINTESTINAL ENDOSCOPY  3/15/13    balloon opened pyloric sphincter       Prior to Admission medications    Medication Sig Start Date End Date Taking?  Authorizing Provider   albuterol sulfate HFA (PROVENTIL HFA) 108 (90 Base) MCG/ACT inhaler Inhale 2 puffs into the lungs every 4 hours as needed for Wheezing 8/30/21 8/30/22 Yes Mari Espitia, DO   carvedilol (COREG) 12.5 MG tablet Take one Historical Provider, MD   traZODone (DESYREL) 100 MG tablet Take 1 tablet by mouth nightly as needed for Sleep  Patient taking differently: Take 200 mg by mouth nightly  2/9/20 9/61/01  TITI Jennings - CNP       Allergies   Allergen Reactions    Prochlorperazine Edisylate Other (See Comments)     Makes me \"crazy, about to jump out the window\"    Penicillins     Codeine Nausea And Vomiting     Stomach pain    Ketorolac Tromethamine Other (See Comments)     Can't take because abd pain    Levofloxacin Nausea Only    Naproxen Nausea Only     abd pain    Nsaids Other (See Comments)     Gastroparesis, has had 9 abdominal surgeries, and pancreatitis    Percocet [Oxycodone-Acetaminophen] Itching and Nausea And Vomiting    Reglan [Metoclopramide] Other (See Comments)       Social History     Socioeconomic History    Marital status:      Spouse name: Not on file    Number of children: 3    Years of education: Not on file    Highest education level: Not on file   Occupational History    Not on file   Tobacco Use    Smoking status: Never Smoker    Smokeless tobacco: Never Used   Vaping Use    Vaping Use: Never used   Substance and Sexual Activity    Alcohol use: Not Currently    Drug use: Yes     Types: Marijuana     Comment: medical MJ card LD 1-2021    Sexual activity: Not on file   Other Topics Concern    Not on file   Social History Narrative    Not on file     Social Determinants of Health     Financial Resource Strain: Low Risk     Difficulty of Paying Living Expenses: Not hard at all   Food Insecurity: No Food Insecurity    Worried About Running Out of Food in the Last Year: Never true    Aries of Food in the Last Year: Never true   Transportation Needs:     Lack of Transportation (Medical):      Lack of Transportation (Non-Medical):    Physical Activity:     Days of Exercise per Week:     Minutes of Exercise per Session:    Stress:     Feeling of Stress :    Social Connections:     Frequency of Communication with Friends and Family:     Frequency of Social Gatherings with Friends and Family:     Attends Islam Services:     Active Member of Clubs or Organizations:     Attends Club or Organization Meetings:     Marital Status:    Intimate Partner Violence:     Fear of Current or Ex-Partner:     Emotionally Abused:     Physically Abused:     Sexually Abused:        Family History   Problem Relation Age of Onset    High Blood Pressure Mother     Other Father         BPH    No Known Problems Sister     No Known Problems Brother     No Known Problems Sister     No Known Problems Sister     No Known Problems Brother     No Known Problems Brother     No Known Problems Brother     Depression Paternal Grandmother     No Known Problems Daughter     No Known Problems Son     No Known Problems Son        REVIEW OF SYSTEMS:     Patient specifically denies fever/chills, chest pain, shortness of breath, new bowel or bladder complaints. All other review of systems was negative. Review of Systems - documented reviewed    PHYSICAL EXAMINATION:      /72   Pulse 94   Temp 96.9 °F (36.1 °C) (Infrared)   Resp 16   Ht 5' 2.5\" (1.588 m)   Wt 218 lb (98.9 kg)   SpO2 93%   BMI 39.24 kg/m²     General:      General appearance:  Pleasant and well-hydrated, in no distress and A & O x 3  Build:Obese  Function: Rises from seated position easily and Moves about room without difficulty    HEENT:    Head:normocephalic, atraumatic    Lungs:    Breathing:normal breathing pattern     CVS:     RRR    Abdomen:    Shape:non-distended and normal    Cervical spine:    Inspection:normal  Palpation:tenderness paravertebral muscles, tenderness trapezium, left, right and positive. Range of motion:Normal flexion, extension, rotation bilaterally and is not painful.     Thoracic spine:     Spine inspection:normal   Palpation:No tenderness over the midline and paraspinal area, bilaterally  Range of motion:normal in flexion, extension rotation bilateral and is not painful. Lumbar spine:    Spine inspection: Normal   Palpation: Tenderness paravertebral muscles Yes bilaterally  Range of motion: Decreased, flexion Decreased, Lateral bending, extension and rotation bilaterally reduced is somewhat painful. Sacroiliac joint tenderness No bilaterally  HIEN test: negative bilaterally  Gaenslen's test:negative bilaterally   Piriformis tenderness: negative bilaterally  SLR : negative bilaterally    Musculoskeletal:    Trigger points no    Extremities:    Tremors:None bilaterally upper and lower  Edema:none x all 4 extremities    Neurological:    Sensory: Normal to light touch     Motor:                Right Quadriceps 5/5          Left Quadriceps 5/5           Right Gastrocnemius 5/5    Left Gastrocnemius 5/5  Right Ant Tibialis 5/5  Left Ant Tibialis 5/5    Reflexes:    B/l equal - diminished    Gait:normal Yes    Dermatology:    Skin:no rashes or lesions noted    Assessment/Plan:     Diagnosis Orders   1. DDD (degenerative disc disease), lumbar     2. Lumbar radiculopathy     3. Lumbosacral spondylosis without myelopathy     4. Obesity, unspecified classification, unspecified obesity type, unspecified whether serious comorbidity present         48 y.o. female with H/o low back and right LE pain- acute exacerbation. Failed conservative treatment: PT/ HEP/ oral steroids/ analgesics etc.    PT worsened the pain. MRI: reviewed personally: L5-S1 right paracentral disc extrusion. H/o Opioid addiction in the past- currently on Suboxone. Uses medical marijuana. Plan:  Right lumbar TFESI L5 and S1 under fluoroscopy. RBA discussed patient agreed. (if technical difficulty change to interlaminar). PT after pain control    Weight loss. If no relief for ETHAN, consider spine surgery eval.    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     Caution with controlled meds in view of prior H/o opioids addiction/ ongoing THC use. Is on Suboxone. Counseling : Patient encouraged to stay active and to watch/lose weight and to continue Regular home exercise program as tolerated - stretching / strengthening. Treatment plan discussed with the patient including medication and procedure side effects.     Controlled Substances Monitoring:   [place] reviewed    Rosanne Mckeon MD    CC:    Jordyn Falcon, [address] [place] [address].

## 2021-09-01 NOTE — PATIENT INSTRUCTIONS
    Pain Management Department      Epidural Spinal Steroid Injection:  Epidural steroid injections can be an effective treatment for nerve root pain. Pinched or irritated nerves that exit the spine can cause shooting pain, burning, tingling, numbness, and muscle weakness. This is often caused by a bulging disc, herniated disc (slipped disc), trauma or aging of the spine. Epidural injections can help by placing a steroid medication which is an anti- inflammatory medicine around the irritated nerves to reduce inflammation. What are the different types of epidural spinal injections;  Caudal epidural steroid injections, if symptoms are in the low back and lower extremities. Mostly done for patients who had low back surgery. Lumbar lnterlaminar epidural injections, if symptoms are in the lower back and lower extremities. Thoracic lnterlaminar epidural injections, if symptoms are in the mid back and rib cage. Cervical lnterlaminar epidural injections, if symptoms are in the neck and upper extremities. Pre-procedure Instructions:  1- Your current medications will be reviewed and you will be instructed on which medications to discontinue before the procedure. 2- lf sedation is administered you will be required to fast before the procedure (eight hours) 3- A  will be required if sedation is administered. Procedure:  A local anesthetic will be used to numb your skin. A needle will be advanced under X-ray to the target area. Placement will be confirmed by dye injection after which medicine will be placed, then the needle will be removed and Band-Aid will be applied. Post-procedure: You will be monitored in the recovery room for up to 30 minutes after the injection, when you are ready to leave, the staff will give you the discharge instructions. The extent and duration of pain relief may depend on the amount of disc or nerve root irritation. Other coexisting factors may contribute to your pain. Sometimes an injection brings several weeks to months of pain relief  and then further treatment is needed.

## 2021-09-01 NOTE — PROGRESS NOTES
Patient:  Joanna Son,  1970  Date of Service:  21      Do you currently have any of the following:    Fever: No  Headache:  No  Cough: No  Shortness of breath: No  Exposed to anyone with these symptoms: No       Patient presents with complaints of lower back and down right leg pain that started 4 months ago and has been getting unchanged. She states the pain began following Repetitive strain    Pain is constant and is described as aching and numb. She rates the pain as a 10/10 on her worst day , 3/10 on her best day, and a 6/10 on average on the VAS scale. Pain does not radiate to right leg. She  has numbness, tingling, weakness of the right leg. Alleviating factors include: OTC NSAIDS. Aggravating factors include:  movement, sitting. She states that the pain does not keep her from sleeping at night. She took her last dose of Motrin and fexeril today  . She is  on NSAIDS and  is not on anticoagulation medications to include none      Previous treatments: Physical Therapy and medications. .      Personal Expectations from this treatment: elida relief    /72   Pulse 94   Temp 96.9 °F (36.1 °C) (Infrared)   Resp 16   Ht 5' 2.5\" (1.588 m)   Wt 218 lb (98.9 kg)   SpO2 93%   BMI 39.24 kg/m²     No LMP recorded. Patient has had an ablation.

## 2021-09-15 ENCOUNTER — TELEPHONE (OUTPATIENT)
Dept: PAIN MANAGEMENT | Age: 51
End: 2021-09-15

## 2021-09-15 NOTE — TELEPHONE ENCOUNTER
Call to [facility] and heath left that procedure was approved for 9/28/2021 and that the surgery center should call her a few days before for the pre op call and after 3:00 PM the business day before with the arrival time. Instructed [facility] to hold ibuprofen for 24 hours, naprosyn for 4 days and any aspirin containing products for 7 days. Instructed to call office back if any questions.

## 2021-09-23 DIAGNOSIS — E78.2 MIXED HYPERLIPIDEMIA: ICD-10-CM

## 2021-09-23 DIAGNOSIS — G89.29 CHRONIC BILATERAL LOW BACK PAIN WITH RIGHT-SIDED SCIATICA: ICD-10-CM

## 2021-09-23 DIAGNOSIS — K31.84 GASTROPARALYSIS: ICD-10-CM

## 2021-09-23 DIAGNOSIS — M54.41 CHRONIC BILATERAL LOW BACK PAIN WITH RIGHT-SIDED SCIATICA: ICD-10-CM

## 2021-09-23 RX ORDER — PRIMIDONE 50 MG/1
50 TABLET ORAL 2 TIMES DAILY
Qty: 60 TABLET | Refills: 2 | Status: SHIPPED
Start: 2021-09-23 | End: 2021-12-01 | Stop reason: SDUPTHER

## 2021-09-23 RX ORDER — CYCLOBENZAPRINE HCL 5 MG
5 TABLET ORAL 2 TIMES DAILY PRN
Qty: 60 TABLET | Refills: 1 | Status: SHIPPED
Start: 2021-09-23 | End: 2021-12-06 | Stop reason: SDUPTHER

## 2021-09-23 RX ORDER — PRAVASTATIN SODIUM 40 MG
40 TABLET ORAL DAILY
Qty: 30 TABLET | Refills: 2 | Status: SHIPPED
Start: 2021-09-23 | End: 2021-12-13 | Stop reason: SDUPTHER

## 2021-09-23 RX ORDER — OMEPRAZOLE 20 MG/1
20 CAPSULE, DELAYED RELEASE ORAL 2 TIMES DAILY
Qty: 60 CAPSULE | Refills: 1 | Status: SHIPPED
Start: 2021-09-23 | End: 2021-11-22

## 2021-10-12 ENCOUNTER — TELEPHONE (OUTPATIENT)
Dept: PAIN MANAGEMENT | Age: 51
End: 2021-10-12

## 2021-10-12 NOTE — TELEPHONE ENCOUNTER
10-12-21-received a call from [facility] that [address] cancelled her procedure this morning with Dr Wade Merlin. Message to the surgery center and surgery scheduling. This is the second time she has cancelled her procedure.      Daniella Parekh, RN  Pain Management

## 2021-11-04 ENCOUNTER — TELEPHONE (OUTPATIENT)
Dept: SLEEP MEDICINE | Age: 51
End: 2021-11-04

## 2021-11-09 ENCOUNTER — PREP FOR PROCEDURE (OUTPATIENT)
Dept: PAIN MANAGEMENT | Age: 51
End: 2021-11-09

## 2021-11-09 ENCOUNTER — OFFICE VISIT (OUTPATIENT)
Dept: PHYSICAL MEDICINE AND REHAB | Age: 51
End: 2021-11-09
Payer: MEDICARE

## 2021-11-09 VITALS
OXYGEN SATURATION: 98 % | TEMPERATURE: 96.7 F | BODY MASS INDEX: 38.62 KG/M2 | HEART RATE: 80 BPM | DIASTOLIC BLOOD PRESSURE: 76 MMHG | SYSTOLIC BLOOD PRESSURE: 118 MMHG | HEIGHT: 63 IN | WEIGHT: 218 LBS

## 2021-11-09 DIAGNOSIS — M51.26 LUMBAR DISC HERNIATION: ICD-10-CM

## 2021-11-09 DIAGNOSIS — M54.41 RIGHT-SIDED LOW BACK PAIN WITH RIGHT-SIDED SCIATICA, UNSPECIFIED CHRONICITY: ICD-10-CM

## 2021-11-09 DIAGNOSIS — M54.16 RIGHT LUMBAR RADICULOPATHY: Primary | ICD-10-CM

## 2021-11-09 DIAGNOSIS — M51.36 DDD (DEGENERATIVE DISC DISEASE), LUMBAR: ICD-10-CM

## 2021-11-09 DIAGNOSIS — M47.816 LUMBAR SPONDYLOSIS: ICD-10-CM

## 2021-11-09 PROCEDURE — 1036F TOBACCO NON-USER: CPT | Performed by: PHYSICAL MEDICINE & REHABILITATION

## 2021-11-09 PROCEDURE — G8427 DOCREV CUR MEDS BY ELIG CLIN: HCPCS | Performed by: PHYSICAL MEDICINE & REHABILITATION

## 2021-11-09 PROCEDURE — G8417 CALC BMI ABV UP PARAM F/U: HCPCS | Performed by: PHYSICAL MEDICINE & REHABILITATION

## 2021-11-09 PROCEDURE — 99204 OFFICE O/P NEW MOD 45 MIN: CPT | Performed by: PHYSICAL MEDICINE & REHABILITATION

## 2021-11-09 PROCEDURE — 3017F COLORECTAL CA SCREEN DOC REV: CPT | Performed by: PHYSICAL MEDICINE & REHABILITATION

## 2021-11-09 PROCEDURE — G8484 FLU IMMUNIZE NO ADMIN: HCPCS | Performed by: PHYSICAL MEDICINE & REHABILITATION
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
## 2021-11-18 ENCOUNTER — ANESTHESIA EVENT (OUTPATIENT)
Dept: OPERATING ROOM | Age: 51
End: 2021-11-18
Payer: MEDICARE

## 2021-11-18 ENCOUNTER — OFFICE VISIT (OUTPATIENT)
Dept: SLEEP MEDICINE | Age: 51
End: 2021-11-18
Payer: MEDICARE

## 2021-11-18 VITALS
WEIGHT: 215.8 LBS | OXYGEN SATURATION: 98 % | HEART RATE: 98 BPM | RESPIRATION RATE: 18 BRPM | HEIGHT: 63 IN | SYSTOLIC BLOOD PRESSURE: 144 MMHG | BODY MASS INDEX: 38.24 KG/M2 | DIASTOLIC BLOOD PRESSURE: 90 MMHG

## 2021-11-18 DIAGNOSIS — G47.09 OTHER INSOMNIA: ICD-10-CM

## 2021-11-18 DIAGNOSIS — E66.9 OBESITY, UNSPECIFIED CLASSIFICATION, UNSPECIFIED OBESITY TYPE, UNSPECIFIED WHETHER SERIOUS COMORBIDITY PRESENT: ICD-10-CM

## 2021-11-18 DIAGNOSIS — G47.33 OBSTRUCTIVE SLEEP APNEA: Primary | ICD-10-CM

## 2021-11-18 PROCEDURE — G8427 DOCREV CUR MEDS BY ELIG CLIN: HCPCS | Performed by: STUDENT IN AN ORGANIZED HEALTH CARE EDUCATION/TRAINING PROGRAM

## 2021-11-18 PROCEDURE — 99214 OFFICE O/P EST MOD 30 MIN: CPT | Performed by: STUDENT IN AN ORGANIZED HEALTH CARE EDUCATION/TRAINING PROGRAM

## 2021-11-18 PROCEDURE — 1036F TOBACCO NON-USER: CPT | Performed by: STUDENT IN AN ORGANIZED HEALTH CARE EDUCATION/TRAINING PROGRAM

## 2021-11-18 PROCEDURE — G8417 CALC BMI ABV UP PARAM F/U: HCPCS | Performed by: STUDENT IN AN ORGANIZED HEALTH CARE EDUCATION/TRAINING PROGRAM

## 2021-11-18 PROCEDURE — G8484 FLU IMMUNIZE NO ADMIN: HCPCS | Performed by: STUDENT IN AN ORGANIZED HEALTH CARE EDUCATION/TRAINING PROGRAM

## 2021-11-18 PROCEDURE — 3017F COLORECTAL CA SCREEN DOC REV: CPT | Performed by: STUDENT IN AN ORGANIZED HEALTH CARE EDUCATION/TRAINING PROGRAM

## 2021-11-18 ASSESSMENT — SLEEP AND FATIGUE QUESTIONNAIRES
HOW LIKELY ARE YOU TO NOD OFF OR FALL ASLEEP WHILE SITTING AND TALKING TO SOMEONE: 0
HOW LIKELY ARE YOU TO NOD OFF OR FALL ASLEEP WHILE SITTING QUIETLY AFTER LUNCH WITHOUT ALCOHOL: 0
HOW LIKELY ARE YOU TO NOD OFF OR FALL ASLEEP WHILE LYING DOWN TO REST IN THE AFTERNOON WHEN CIRCUMSTANCES PERMIT: 3
HOW LIKELY ARE YOU TO NOD OFF OR FALL ASLEEP IN A CAR, WHILE STOPPED FOR A FEW MINUTES IN TRAFFIC: 0
HOW LIKELY ARE YOU TO NOD OFF OR FALL ASLEEP WHILE SITTING AND READING: 3
HOW LIKELY ARE YOU TO NOD OFF OR FALL ASLEEP WHEN YOU ARE A PASSENGER IN A CAR FOR AN HOUR WITHOUT A BREAK: 3
ESS TOTAL SCORE: 12
HOW LIKELY ARE YOU TO NOD OFF OR FALL ASLEEP WHILE SITTING INACTIVE IN A PUBLIC PLACE: 1
HOW LIKELY ARE YOU TO NOD OFF OR FALL ASLEEP WHILE WATCHING TV: 2

## 2021-11-18 ASSESSMENT — ENCOUNTER SYMPTOMS: SHORTNESS OF BREATH: 1

## 2021-11-18 NOTE — PROGRESS NOTES
[facility] Sleep Medicine    Patient Name: Margo Gipson  Age: 46 y.o.   : 1970    Date of Visit: 21        Review of Last Visit Summary:    The patient was last seen on 8/10/2021, by Amanda Newell CNP  for  Obstructive Sleep Apnea, insomnia, and Obesity. Interim History:     Margo Gipson is a 46 y.o. female in office for follow up. Patient states that she is not currently using her device, as she broke the power cord to her Pap device. Patient states that prior to this she was having difficulty with her first Pap mask, however she noted improvement with her second mask prior to being unable to use her Pap device. Patient notes her return of her symptoms since she stopped using her Pap device. Benefits: Better quality sleep, when using Pap device. DME: Medical service company    Compliance download report reviewed today by me demonstrated the following:    Dates 10-20 21 till 10/31/2021 not a current data download    Settings -APAP 4-15  Days used -8 out of 30  >4 hours-0%  AHI -16.1  Leak -100.2    Past Medical History:  Past Medical History:   Diagnosis Date    Acid reflux     Back pain     Depression 2016    Essential hypertension 2019    Gastroparesis     Hepatitis      no treatment. after having gastroparesis, liver enzymes back to normal    Hyperlipidemia     Medically noncompliant 02/15/2019    Seizures ([address] 2019    in hospital ventilated after grand mal seizure.  None since 2019    Sleep apnea     Tachycardia     Dr. Kailee Caputo 2021       Past Surgical History:        Procedure Laterality Date    ABDOMEN SURGERY      Lysis of adhession x2    CARDIAC CATHETERIZATION      had 2012    CARDIAC CATHETERIZATION  2019    Dr. Tiffanie Evans, LAPAROSCOPIC      COLONOSCOPY N/A 3/1/2021    COLORECTAL CANCER SCREENING, NOT HIGH RISK performed by Bonnie Dumont MD at [address]  7/1/2012         ENDOMETRIAL ABLATION      PYLOROPLASTY  3/30/2012    lap plylorplasty open upper endoscopy lysis of adhesions    SALPINGO-OOPHORECTOMY Left     left    UPPER GASTROINTESTINAL ENDOSCOPY  3/15/13    balloon opened pyloric sphincter       Allergies:  is allergic to prochlorperazine edisylate, compazine [prochlorperazine], penicillins, codeine, ketorolac tromethamine, levofloxacin, naproxen, nsaids, percocet [oxycodone-acetaminophen], and reglan [metoclopramide].   Social History:    Social History     Tobacco Use    Smoking status: Never Smoker    Smokeless tobacco: Never Used   Vaping Use    Vaping Use: Never used   Substance Use Topics    Alcohol use: Not Currently    Drug use: Yes     Types: Marijuana Dave Semen)     Comment: medical MJ card LD 1-2021        Family History:       Problem Relation Age of Onset    High Blood Pressure Mother     Other Father         BPH    No Known Problems Sister     No Known Problems Brother     No Known Problems Sister     No Known Problems Sister     No Known Problems Brother     No Known Problems Brother     No Known Problems Brother     Depression Paternal Grandmother     No Known Problems Daughter     No Known Problems Son     No Known Problems Son        Current Medications:    Current Outpatient Medications:     Tens Unit MISC, by Does not apply route, Disp: 1 each, Rfl: 0    VENTOLIN  (90 Base) MCG/ACT inhaler, inhale 2 puffs by mouth every 4 hours if needed for wheezing, Disp: 18 g, Rfl: 5    pravastatin (PRAVACHOL) 40 MG tablet, Take 1 tablet by mouth daily, Disp: 30 tablet, Rfl: 2    omeprazole (PRILOSEC) 20 MG delayed release capsule, Take 1 capsule by mouth 2 times daily, Disp: 60 capsule, Rfl: 1    cyclobenzaprine (FLEXERIL) 5 MG tablet, Take 1 tablet by mouth 2 times daily as needed for Muscle spasms, Disp: 60 tablet, Rfl: 1    primidone (MYSOLINE) 50 MG tablet, Take 1 tablet by mouth 2 times daily (Patient taking differently: Take 50 mg by mouth as needed ), Disp: 60 tablet, Rfl: 2    carvedilol (COREG) 12.5 MG tablet, Take one tablet twice daily, Disp: 60 tablet, Rfl: 5    busPIRone (BUSPAR) 30 MG tablet, take 1 tablet by mouth twice a day, Disp: , Rfl:     sertraline (ZOLOFT) 100 MG tablet, take 1 tablet by mouth every morning, Disp: , Rfl:     hydrOXYzine (VISTARIL) 50 MG capsule, take 1 capsule by mouth four times a day if needed for anxiety, Disp: , Rfl:     mirtazapine (REMERON) 15 MG tablet, take 1 tablet by mouth every evening, Disp: , Rfl:     levETIRAcetam (KEPPRA) 750 MG tablet, Take 1 tablet by mouth 2 times daily (Patient taking differently: Take 750 mg by mouth 2 times daily Take 1.5 tablets BID), Disp: 60 tablet, Rfl: 5    amitriptyline (ELAVIL) 50 MG tablet, take 1 tablet by mouth once daily, Disp: , Rfl:     ARIPiprazole (ABILIFY) 15 MG tablet, take 1 tablet by mouth once daily, Disp: , Rfl:     acetaminophen (APAP EXTRA STRENGTH) 500 MG tablet, Take 2 tablets by mouth every 8 hours (Patient taking differently: Take 1,000 mg by mouth every 8 hours as needed ), Disp: 120 tablet, Rfl: 3    melatonin 3 MG TABS tablet, Take 3 tablets by mouth daily (Patient taking differently: Take 9 mg by mouth nightly ), Disp: 30 tablet, Rfl: 2    traZODone (DESYREL) 100 MG tablet, Take 1 tablet by mouth nightly as needed for Sleep (Patient taking differently: Take 200 mg by mouth nightly ), Disp: 14 tablet, Rfl: 0    buprenorphine-naloxone (SUBOXONE) 8-2 MG SUBL SL tablet, Place 1 tablet under the tongue daily. , Disp: , Rfl:           Objective:   PHYSICAL EXAM:    BP (!) 144/90 (Site: Left Upper Arm, Position: Sitting, Cuff Size: Large Adult)   Pulse 98   Resp 18   Ht 5' 2.5\" (1.588 m)   Wt 215 lb 12.8 oz (97.9 kg)   SpO2 98%   Breastfeeding Yes   BMI 38.84 kg/m²       (Sleep ROS above)     Constitutional: no chills, no fever   Eyes: no blurred vision and no eyesight problems.    ENT: no hoarseness and no sore throat. Cardiovascular: no chest pain,   Respiratory: no cough, no shortness of breath   Gastrointestinal:  no nausea,  no vomiting, no diarrhea. Musculoskeletal: no arthralgias, no back pain and no myalgias. Integumentary: no rashes or lacerations. Neurological:  no diplopia, no dizziness,  no headache, no memory changes,  and no tingling. Endocrine: No chills      Physical exam:  Gen: No acute distress. BMI of Body mass index is 38.84 kg/m². Eyes: PERRL. No sclera icterus. No conjunctival injection. ENT: No nasal/oral discharge. Pharynx clear. Neck: Trachea midline. No obvious mass. Neck circumference     Resp: No accessory muscle use. No crackles. No wheezes. No rhonchi. CV: Regular rate. Regular rhythm. No murmur or rub. Skin: Warm and dry. No bleeding observed   M/S: No cyanosis. No obvious joint deformity. Neuro: Awake. Alert. Moves all four extremities. Psych: Alert and oriented. No anxiety. Sleep Medicine 11/18/2021 8/10/2021 5/18/2021   Sitting and reading 3 3 3   Watching TV 2 3 2   Sitting, inactive in a public place (e.g. a theatre or a meeting) 1 3 2   As a passenger in a car for an hour without a break 3 3 2   Lying down to rest in the afternoon when circumstances permit 3 3 3   Sitting and talking to someone 0 0 0   Sitting quietly after a lunch without alcohol 0 3 0   In a car, while stopped for a few minutes in traffic 0 3 0   Total score 12 21 12   Neck circumference - - 16       DATA:     Diagnostic Sleep Study:     Study date: 5/27/2021     AHI: 11.6      Oxygen [place]: 83.0%   REM AHI 54.1  PLMs Index: 8.1   T88%-1.8 min of TST. Assessment:      Susie Boudreaux was seen today for sleep apnea. Diagnoses and all orders for this visit:    Obstructive sleep apnea  -     DME Order for CPAP as OP    Other insomnia    Obesity, unspecified classification, unspecified obesity type, unspecified whether serious comorbidity present    ·    Plan:       1.  Obstructive Sleep Apnea. Patient is not currently using Pap therapy as her device is nonfunctional due to a broken power cord. Patient was advised to contact her [employer] company about getting a new power cord for her Pap device. Patient was recommended to restart therapy as soon as possible, to ensure she meets her compliance. Pap insurance guidelines provided in her handout.  - Continue current PAP settings    2. Insomnia. she notes improvement with difficulty falling and staying asleep since starting PAP therapy. We will follow-up at next visit, once PAP therapy reinitiated. Cautions provided in handout. 3. Obesity. Body mass index is 38.84 kg/m².   -Healthy weight loss advised        Follow up: Return in about 3 months (around 2/18/2022) for Follow up [facility].

## 2021-11-18 NOTE — ANESTHESIA PRE PROCEDURE
Department of Anesthesiology  Preprocedure Note       Name:  Ulises Moctezuma   Age:  46 y.o.  :  1970                                          MRN:  [id number]         Date:  2021      Surgeon: Giorgio Gutiérrez):  Lexis Christine MD    Procedure: Procedure(s):  LUMBAR TRANFORAMINAL EPIDURAL STEROID INJECTION RIGHT L 5 AND S1 WITH XRAY (52628) (SEDATION)    Medications prior to admission:   Prior to Admission medications    Medication Sig Start Date End Date Taking?  Authorizing Provider   Tens Unit MISC by Does not apply route 21   Radha Goss MD   VENTOLIN  (99 Base) MCG/ACT inhaler inhale 2 puffs by mouth every 4 hours if needed for wheezing 10/8/21   Carrington Salinas DO   pravastatin (PRAVACHOL) 40 MG tablet Take 1 tablet by mouth daily 21   Carrington Salinas,    omeprazole (PRILOSEC) 20 MG delayed release capsule Take 1 capsule by mouth 2 times daily 21   Carrington Salinas DO   cyclobenzaprine (FLEXERIL) 5 MG tablet Take 1 tablet by mouth 2 times daily as needed for Muscle spasms 21   Carrington Salinas,    primidone (MYSOLINE) 50 MG tablet Take 1 tablet by mouth 2 times daily  Patient taking differently: Take 50 mg by mouth as needed  21   Carrington Salinas DO   carvedilol (COREG) 12.5 MG tablet Take one tablet twice daily 21   Owen Olivas MD   busPIRone (BUSPAR) 30 MG tablet take 1 tablet by mouth twice a day 21   Historical Provider, MD   sertraline (ZOLOFT) 100 MG tablet take 1 tablet by mouth every morning 6/3/21   Historical Provider, MD   hydrOXYzine (VISTARIL) 50 MG capsule take 1 capsule by mouth four times a day if needed for anxiety 21   Historical Provider, MD   mirtazapine (REMERON) 15 MG tablet take 1 tablet by mouth every evening 21   Historical Provider, MD   levETIRAcetam (KEPPRA) 750 MG tablet Take 1 tablet by mouth 2 times daily  Patient taking differently: Take 750 mg by mouth 2 times daily Take 1.5 tablets BID 21 ZACARIAS Esposito   amitriptyline (ELAVIL) 50 MG tablet take 1 tablet by mouth once daily 12/18/20   Historical Provider, MD   ARIPiprazole (ABILIFY) 15 MG tablet take 1 tablet by mouth once daily 12/28/20   Historical Provider, MD   acetaminophen (APAP EXTRA STRENGTH) 500 MG tablet Take 2 tablets by mouth every 8 hours  Patient taking differently: Take 1,000 mg by mouth every 8 hours as needed  11/12/20   Milly Dy, DO   melatonin 3 MG TABS tablet Take 3 tablets by mouth daily  Patient taking differently: Take 9 mg by mouth nightly  3/2/20   Milly Dy, DO   traZODone (DESYREL) 100 MG tablet Take 1 tablet by mouth nightly as needed for Sleep  Patient taking differently: Take 200 mg by mouth nightly  2/9/20 42/97/93  TITI Valdez - CNP   buprenorphine-naloxone (SUBOXONE) 8-2 MG SUBL SL tablet Place 1 tablet under the tongue daily.      Historical Provider, MD       Current medications:    Current Outpatient Medications   Medication Sig Dispense Refill    Tens Unit MISC by Does not apply route 1 each 0    VENTOLIN  (90 Base) MCG/ACT inhaler inhale 2 puffs by mouth every 4 hours if needed for wheezing 18 g 5    pravastatin (PRAVACHOL) 40 MG tablet Take 1 tablet by mouth daily 30 tablet 2    omeprazole (PRILOSEC) 20 MG delayed release capsule Take 1 capsule by mouth 2 times daily 60 capsule 1    cyclobenzaprine (FLEXERIL) 5 MG tablet Take 1 tablet by mouth 2 times daily as needed for Muscle spasms 60 tablet 1    primidone (MYSOLINE) 50 MG tablet Take 1 tablet by mouth 2 times daily (Patient taking differently: Take 50 mg by mouth as needed ) 60 tablet 2    carvedilol (COREG) 12.5 MG tablet Take one tablet twice daily 60 tablet 5    busPIRone (BUSPAR) 30 MG tablet take 1 tablet by mouth twice a day      sertraline (ZOLOFT) 100 MG tablet take 1 tablet by mouth every morning      hydrOXYzine (VISTARIL) 50 MG capsule take 1 capsule by mouth four times a day if needed for anxiety      ([address] F39    History of pericarditis Z86.79    Shortness of breath R06.02    Sinus tachycardia R00.0    Non-intractable cyclical vomiting with nausea R11.15    Functional diarrhea K59.1    Weight loss R63.4    Pain of upper abdomen R10.10    Colitis K52.9    Vasovagal syncope R55    Medically noncompliant Z91.19    Suicide ideation R45.851    Seizure disorder ([address] G40.909    Essential hypertension I10    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 Wallace Sandhoff    Malingering Z76.5    Chest pain R07.9    Seizure-like activity ([address] R56.9    Depression with suicidal ideation F32. A, R45.851    Major depression, recurrent ([address] F33.9    MYNOR (obstructive sleep apnea) G47.33    DDD (degenerative disc disease), lumbar M51.36    Lumbar radiculopathy M54.16    Lumbosacral spondylosis without myelopathy M47.817       Past Medical History:        Diagnosis Date    Acid reflux     Back pain     Depression 11/30/2016    Essential hypertension 08/07/2019    Gastroparesis     Hepatitis      no treatment. after having gastroparesis, liver enzymes back to normal    Hyperlipidemia     Medically noncompliant 02/15/2019    Seizures ([address] 2019    in hospital ventilated after grand mal seizure.  None since nov 2019    Sleep apnea     Tachycardia     Dr. Kiley Ramos 8/2021       Past Surgical History:        Procedure Laterality Date    ABDOMEN SURGERY      Lysis of adhession x2    CARDIAC CATHETERIZATION      had 7 - 5 - 2012    CARDIAC CATHETERIZATION  11/04/2019    Dr. Jose M Villarreal, LAPAROSCOPIC      COLONOSCOPY N/A 3/1/2021    COLORECTAL CANCER SCREENING, NOT HIGH RISK performed by Flora Camacho MD at [address] ECHO COMPL W DOP COLOR FLOW  7/1/2012         ENDOMETRIAL ABLATION      PYLOROPLASTY  3/30/2012    lap plylorplasty open upper endoscopy lysis of adhesions    SALPINGO-OOPHORECTOMY Left     left    UPPER GASTROINTESTINAL ENDOSCOPY  3/15/13    balloon opened pyloric sphincter       Social History:    Social History     Tobacco Use    Smoking status: Never Smoker    Smokeless tobacco: Never Used   Substance Use Topics    Alcohol use: Not Currently                                Counseling given: Not Answered      Vital Signs (Current): There were no vitals filed for this visit. BP Readings from Last 3 Encounters:   11/18/21 (!) 144/90   11/09/21 118/76   09/01/21 110/72       NPO Status:  >8.H                                                                               BMI:   Wt Readings from Last 3 Encounters:   11/18/21 215 lb 12.8 oz (97.9 kg)   11/09/21 218 lb (98.9 kg)   09/01/21 218 lb (98.9 kg)     There is no height or weight on file to calculate BMI.    CBC:   Lab Results   Component Value Date    WBC 5.9 07/05/2021    RBC 5.14 07/05/2021    HGB 13.7 07/05/2021    HCT 43.2 07/05/2021    MCV 84.0 07/05/2021    RDW 14.5 07/05/2021     07/05/2021       CMP:   Lab Results   Component Value Date     07/05/2021    K 4.7 07/05/2021    K 4.2 07/18/2020     07/05/2021    CO2 23 07/05/2021    BUN 11 07/05/2021    CREATININE 0.7 07/05/2021    GFRAA >60 07/05/2021    LABGLOM >60 07/05/2021    GLUCOSE 100 07/05/2021    GLUCOSE 91 04/18/2021    PROT 6.9 07/05/2021    CALCIUM 9.2 07/05/2021    BILITOT 0.2 07/05/2021    ALKPHOS 139 07/05/2021    AST 16 07/05/2021    ALT 24 07/05/2021       POC Tests: No results for input(s): POCGLU, POCNA, POCK, POCCL, POCBUN, POCHEMO, POCHCT in the last 72 hours.     Coags:   Lab Results   Component Value Date    PROTIME 11.1 10/24/2019    INR 1.0 10/24/2019    APTT 32.4 10/24/2019       HCG (If Applicable):   Lab Results   Component Value Date    PREGTESTUR NEGATIVE 06/11/2020    PREGSERUM NEGATIVE 10/02/2012        ABGs:   Lab Results   Component Value Date    PO2ART 68.5 10/25/2019    NPB9YBZ 42.3 10/25/2019    YWQ9HYZ 25.2 10/25/2019        Type & Screen (If Applicable):  No results found for: LABABO, LABRH    Drug/Infectious Status (If Applicable):  No results found for: HIV, HEPCAB    COVID-19 Screening (If Applicable):   Lab Results   Component Value Date    COVID19 Not Detected 05/20/2021         Anesthesia Evaluation  Patient summary reviewed and Nursing notes reviewed history of anesthetic complications (\"I am hard to sedate\"low SPO2 post colonoscopy requiring nebulizer): Airway: Mallampati: II  TM distance: >3 FB   Neck ROM: full  Mouth opening: > = 3 FB Dental: normal exam         Pulmonary: breath sounds clear to auscultation  (+) shortness of breath:  sleep apnea:                            ROS comment: Reactive airway- inhaler prn   Cardiovascular:  Exercise tolerance: good (>4 METS),   (+) hypertension:,         Rhythm: regular                   ROS comment: Vasovagal syncope    PE comment: tachycardia   Neuro/Psych:   (+) seizures:, neuromuscular disease:, psychiatric history:depression/anxiety             GI/Hepatic/Renal:   (+) GERD:, hepatitis: C, liver disease:,           Endo/Other: Negative Endo/Other ROS                    Abdominal:   (+) obese,           Vascular: Other Findings:             Anesthesia Plan      MAC     ASA 3     (Hx low SpO2 post LMAC)  Induction: intravenous. Anesthetic plan and risks discussed with patient.       Plan discussed with CRNA and surgical team.                Kourtney Bautista MD   11/18/2021

## 2021-11-18 NOTE — PATIENT INSTRUCTIONS
.  ---------------------------------------------------  Blood Pressure Precautions    Your blood pressure Today:   BP Readings from Last 1 Encounters:   11/18/21 (!) 144/90       Your blood pressure at your last 3 visits:   BP Readings from Last 3 Encounters:   11/18/21 (!) 144/90   11/09/21 118/76   09/01/21 110/72        Your blood pressure was high today. Please discuss this with your primary care doctor as soon as possible. Elevated BP needs to be addressed. Please go to the Emergency room if you have any discomfort, new or worsening symptoms, or if any of the below symptoms present:       · Symptoms of a heart attack. These may include:  ? Chest pain or pressure, or a strange feeling in the chest.  ? Sweating. ? Shortness of breath. ? Nausea or vomiting. ? Pain, pressure, or a strange feeling in the back, neck, jaw, or upper belly or in one or both shoulders or arms. ? Lightheadedness or sudden weakness. ? A fast or irregular heartbeat. ? Dizziness   · Symptoms of a stroke. These may include:  ? Sudden numbness, tingling, weakness, or loss of movement in your face, arm, or leg, especially on only one side of your body. ? Sudden vision changes. ? Sudden trouble speaking. ? Facial droop  ? Sudden confusion or trouble understanding simple statements. ? Sudden problems with walking or balance. ? A sudden, severe headache that is different from past headaches. · You have severe back or belly pain.     ---------------------------------------------------    It was a pleasure seeing you today [place]! Summary of Today's Visit        Please call [facility] [facility] about getting a new cord. You can also check online, after verifying which type you may need. Please call us when you get your new cord with an update. New PAP Therapy instructions to be compliant with most insurance coverage:  1. Use PAP device for atleast 4 hours nightly.   2. PAP therapy must be used for 70% of nights (5/7 nights per week)  3. Patient must follow up in the clinic within 30-90 days from getting the PAP device. Please call our sleep nurses to schedule a follow up at - [phone] option 2              1. Continue using your machine nightly     ------------Please bring your machine/Data Card to every visit. -------------     -Request all data downloads be sent to my nurse        2. Contact your DME company about supplies or issues with your machine. As a general guideline, please replace your:  -CPAP mask every 3-6 months  -CPAP hose  every 3-6 months  -Filter every 2-4 weeks  -CPAP/BiPAP/ASV replacements every 5-7+ years     3. Continue healthy weight loss/stabilization, as this is recommended as a long-term intervention. 4. Please do not drive or operate machinery when you feel fatigued/sleepy/drowsy      5. Please try to sleep between 6-8 hours nightly with the CPAP mask    6. Please avoid sedatives, alcohol and caffeinated drinks at/near bed time. 7. Please call your supply (DME) company with any issues with your PAP device. Please call our office with any issues pertaining to the therapy.     ----Please note that complications of MYNOR if not treated can increase risk for: systemic hypertension, pulmonary hypertension, cardiovascular morbidities (heart attack and stroke), car accidents and all cause mortality. For general questions or scheduling issues, call my nurse at [phone] option [phone] [phone] option 2  F- [phone]           ----------------------------------------------------------  Common Issues and Solutions     Pillow is dislodging mask - Consider getting a CPAP pillow or switching to a mask with the hose at the top. Dry Mouth - Adjust Humidity and/or try Biotene Gel. (Excessive leak can also cause this)     Leak - Please call your equipment provider  as they may need to adjust your mask. Difficulty tolerating the PAP mask - Contact your equipment company to try a different mask, we can order a \"mini sleep study\"with the sleep tech to try different masks/settings of pressure, also we have a sleep psychologist to help with anxiety related to wearing the PAP mask      ----------------------------------------------------------     For Questions and Concerns: In case of difficulty with your PAP device or mask interface, please FIRST contact your [address] (The company who provides you the CPAP/BiPAP/ASV machine/supplies). If you need the number for any other DME company, please call my Nurse at [phone] option 2     For questions concerning your [place] appointment: Call [phone] option 2  SLEEP LAB SCHEDULING:        ----------------------------------------------------------     Helpful Web Sites: For patients with ALL SLEEP DISORDERS: American Academy of Sleep Medicine http://sleepeducation. org; or [employer]: https://sleepfoundation. org  For patients with MYNOR: American Sleep Apnea Association: http://zavala.org/. org  For patients with RLS: RLS Foundation: Yolanda.jean carlos  For patients with INSOMNIA: https://www.acharya.org/. org/articles/sleep/insomnia-causes-and-cures. htm  For patients with DEPRESSION: Dropbox.com.Aphios. GLO/depression            Learning About CPAP for Sleep Apnea  What is CPAP? CPAP/Bi-PAP is a small machine that you use at home every night while you sleep. It increases air pressure in your throat to keep your airway open. When you have sleep apnea, this can help you sleep better so you feel much better. CPAP stands for \"continuous positive airway pressure. \" Bi-PAP is a different PAP setting that allows for different pressures when you breathe in and out.    The CPAP/Bi-PAP machine will have one of the following:  · A mask that covers your nose and mouth  · Prongs that fit into your nose  · A mask that covers your nose only, the most common type. This type is called NCPAP. The N stands for \"nasal.\"  Why is it done? CPAP is a common/effective treatment for obstructive sleep apnea. How does it help? · CPAP can help you have more normal sleep, so you feel less sleepy and more alert during the daytime through the treatment of sleep apnea. · CPAP may help keep heart failure or other heart problems from getting worse. · CPAP may help lower your blood pressure. · If you use CPAP, your bed partner may also sleep better because you are snoring less. What are the side effects? Some people who use CPAP have:  · A dry or stuffy nose and a sore throat. · Irritated skin on the face. · Sore eyes. · Bloating. If you have any of these problems, work with your doctor to fix them. Here are some things you can try:  · Be sure the mask or nasal prongs fit well. · See if your doctor can adjust the pressure of your CPAP. · If your nose is dry, try a humidifier. If these things do not help, you might try a different type of machine. Some machines have air pressure that adjusts on its own. Others have air pressures that are different when you breathe in than when you breathe out. This may reduce discomfort caused by too much pressure in your nose. Where can you learn more? Go to https://Campanjapecatrachito1010data.Your Last Chance. org and sign in to your [employer] account. Enter H741 in the [employer] box to learn more about \"Learning About CPAP for Sleep Apnea. \"     If you do not have an account, please click on the \"Sign Up Now\" link. Current as of: February 24, 2020               Content Version: 12.5  © 2698-4801 Healthwise, Incorporated. Care instructions adapted under license by [facility] ([facility]). If you have questions about a medical condition or this instruction, always ask your healthcare professional. [address] any warranty or liability for your use of this information.       The general categories activity, try to move it up a bit earlier in the evening. The TV should be in a separate room - NOT your bedroom. 2. Caffeine: Avoid Caffeine 6-8 Hours Before Bedtime   Caffeine disturbs sleep, even in people who dont think they experience a stimulation effect.  Individuals with insomnia are often more sensitive to mild stimulants than are normal sleepers.  Caffeine is found in items such as coffee, tea, soda, chocolate, and many over-the-counter medications (e.g., Excedrin).  Caffeine should be avoided in the afternoon and evening, preferably taken no later than 1pm. You might consider a trial period of no caffeine at all. 3. Nicotine: Avoid Nicotine Before Bedtime   Although some smokers claim that smoking helps them relax, nicotine is a stimulant.  The initial relaxing effects occur with the initial entry of the nicotine, but as the nicotine builds in the body, it produces an effect similar to caffeine.  Nicotine should be avoided near bedtime and during the night. Dont smoke to get yourself back to sleep. 4. Alcohol: Avoid Alcohol After Dinner   Alcohol often promotes the onset of sleep, but as alcohol is metabolized during the night, sleep becomes disturbed and fragmented, leading to poor sleep quality.  Limit alcohol use to 1 beer or glass of wine per day for women, 2  drinks per day for men. 5. Sleeping Pills: Sleep Medications are Effective Only Temporarily   Research has shown that sleep medications lose their effectiveness in about 2 - 4 weeks when taken regularly.  Over time, sleeping pills actually can make sleep problems worse. When sleeping pills have been used over a long period, withdrawal from the medication can lead to an insomnia rebound. Thus, after long- term use, many individuals incorrectly conclude that they need sleeping pills in order to sleep normally.  Keep use of sleep meds infrequent, but dont worry if you need to use one on an occasional basis. (And always consult with your doctor first if you decide to make changes to your medication regimen.)     6. Regular Exercise   Exercise has been shown to aid sleep, although the positive effect often takes several weeks to become noticeable.  However, exercise earlier in the day if possible. Exercise within 2 hours of bedtime may elevate nervous system activity and interfere with sleep onset.  Get regular exercise, preferably at least 20 minutes each day of an activity that causes sweating. 7. Hot Baths   Spending 20 minutes in a tub of hot water an hour or two prior to bedtime may promote sleep. 8.Bedroom Environment: Moderate Temperature, Quiet, and Dark   Extremes of heat or cold can disrupt sleep. Keep your room at a comfortable temperature.  Noises can be masked with background white noise (such as the noise of a fan) or with earplugs.  Bedrooms may be darkened with black-out shades or sleep masks can be worn.  Position clocks out-of-sight since clock-watching can increase worry about the effects of lack of sleep. 9. Eating   A light bedtime snack, such a glass of warm milk, cheese, or a bowl of cereal can promote sleep.  Avoid heavy or spicy meals before bedtime and any caffeinated foods (e.g., chocolate).  Avoid snacks in the middle of the night since awakening may become associated with hunger.  Do not go to bed too hungry or too full. 10. Avoid/Reduce Naps   The sleep you obtain during the day takes away from your sleep need at night - resulting in lighter, more restless sleep, difficulty falling asleep, and/or early morning awakening.  Avoid naps. If you must nap, keep it brief. It is best to set an alarm to ensure you dont sleep more than 15 minutes. 11. Regular Sleep Schedule   Keep a regular time each day, 7 days a week, to get out of bed.  Keeping a regular awakening time helps set your circadian rhythm so that your body learns to sleep at the desired

## 2021-11-22 DIAGNOSIS — K31.84 GASTROPARALYSIS: ICD-10-CM

## 2021-11-22 DIAGNOSIS — M54.16 LUMBAR RADICULOPATHY: Primary | ICD-10-CM

## 2021-11-22 LAB
Lab: NORMAL
PERFORMING INSTRUMENT: NORMAL
QC PASS/FAIL: NORMAL
SARS-COV-2, POC: NORMAL

## 2021-11-22 RX ORDER — OMEPRAZOLE 20 MG/1
CAPSULE, DELAYED RELEASE ORAL
Qty: 60 CAPSULE | Refills: 1 | Status: SHIPPED
Start: 2021-11-22 | End: 2021-12-13 | Stop reason: SDUPTHER

## 2021-11-23 ENCOUNTER — HOSPITAL ENCOUNTER (OUTPATIENT)
Age: 51
Setting detail: OUTPATIENT SURGERY
Discharge: HOME | End: 2021-11-23
Attending: ANESTHESIOLOGY | Admitting: ANESTHESIOLOGY
Payer: MEDICARE

## 2021-11-23 ENCOUNTER — ANESTHESIA (OUTPATIENT)
Dept: OPERATING ROOM | Age: 51
End: 2021-11-23
Payer: MEDICARE

## 2021-11-23 ENCOUNTER — HOSPITAL ENCOUNTER (OUTPATIENT)
Dept: OPERATING ROOM | Age: 51
Setting detail: OUTPATIENT SURGERY
Discharge: HOME | End: 2021-11-23
Attending: ANESTHESIOLOGY
Payer: MEDICARE

## 2021-11-23 VITALS
SYSTOLIC BLOOD PRESSURE: 130 MMHG | WEIGHT: 215.6 LBS | TEMPERATURE: 98 F | HEART RATE: 88 BPM | HEIGHT: 63 IN | BODY MASS INDEX: 38.2 KG/M2 | DIASTOLIC BLOOD PRESSURE: 65 MMHG | RESPIRATION RATE: 18 BRPM | OXYGEN SATURATION: 95 %

## 2021-11-23 VITALS
SYSTOLIC BLOOD PRESSURE: 145 MMHG | TEMPERATURE: 98.6 F | DIASTOLIC BLOOD PRESSURE: 102 MMHG | RESPIRATION RATE: 16 BRPM | OXYGEN SATURATION: 99 %

## 2021-11-23 DIAGNOSIS — M51.9 LUMBAR DISC DISEASE: ICD-10-CM

## 2021-11-23 DIAGNOSIS — M54.16 LUMBAR RADICULOPATHY: Primary | ICD-10-CM

## 2021-11-23 PROCEDURE — 6360000004 HC RX CONTRAST MEDICATION: Performed by: ANESTHESIOLOGY

## 2021-11-23 PROCEDURE — 6360000002 HC RX W HCPCS: Performed by: ANESTHESIOLOGY

## 2021-11-23 PROCEDURE — 6360000002 HC RX W HCPCS: Performed by: NURSE ANESTHETIST, CERTIFIED REGISTERED

## 2021-11-23 PROCEDURE — 7100000011 HC PHASE II RECOVERY - ADDTL 15 MIN: Performed by: ANESTHESIOLOGY

## 2021-11-23 PROCEDURE — 3209999900 FLUORO FOR SURGICAL PROCEDURES

## 2021-11-23 PROCEDURE — 64483 NJX AA&/STRD TFRM EPI L/S 1: CPT | Performed by: ANESTHESIOLOGY

## 2021-11-23 PROCEDURE — 3600000015 HC SURGERY LEVEL 5 ADDTL 15MIN: Performed by: ANESTHESIOLOGY

## 2021-11-23 PROCEDURE — 2709999900 HC NON-CHARGEABLE SUPPLY: Performed by: ANESTHESIOLOGY

## 2021-11-23 PROCEDURE — 3700000000 HC ANESTHESIA ATTENDED CARE: Performed by: ANESTHESIOLOGY

## 2021-11-23 PROCEDURE — 3700000001 HC ADD 15 MINUTES (ANESTHESIA): Performed by: ANESTHESIOLOGY

## 2021-11-23 PROCEDURE — 64484 NJX AA&/STRD TFRM EPI L/S EA: CPT | Performed by: ANESTHESIOLOGY

## 2021-11-23 PROCEDURE — 2580000003 HC RX 258: Performed by: ANESTHESIOLOGY

## 2021-11-23 PROCEDURE — 2500000003 HC RX 250 WO HCPCS: Performed by: ANESTHESIOLOGY

## 2021-11-23 PROCEDURE — 3600000005 HC SURGERY LEVEL 5 BASE: Performed by: ANESTHESIOLOGY

## 2021-11-23 PROCEDURE — 7100000010 HC PHASE II RECOVERY - FIRST 15 MIN: Performed by: ANESTHESIOLOGY

## 2021-11-23 RX ORDER — METHYLPREDNISOLONE ACETATE 40 MG/ML
INJECTION, SUSPENSION INTRA-ARTICULAR; INTRALESIONAL; INTRAMUSCULAR; SOFT TISSUE PRN
Status: DISCONTINUED | OUTPATIENT
Start: 2021-11-23 | End: 2021-11-23 | Stop reason: ALTCHOICE

## 2021-11-23 RX ORDER — LABETALOL HYDROCHLORIDE 5 MG/ML
5 INJECTION, SOLUTION INTRAVENOUS EVERY 10 MIN PRN
Status: DISCONTINUED | OUTPATIENT
Start: 2021-11-23 | End: 2021-11-23 | Stop reason: HOSPADM

## 2021-11-23 RX ORDER — HYDRALAZINE HYDROCHLORIDE 20 MG/ML
5 INJECTION INTRAMUSCULAR; INTRAVENOUS EVERY 10 MIN PRN
Status: DISCONTINUED | OUTPATIENT
Start: 2021-11-23 | End: 2021-11-23 | Stop reason: HOSPADM

## 2021-11-23 RX ORDER — DIPHENHYDRAMINE HYDROCHLORIDE 50 MG/ML
12.5 INJECTION INTRAMUSCULAR; INTRAVENOUS
Status: DISCONTINUED | OUTPATIENT
Start: 2021-11-23 | End: 2021-11-23 | Stop reason: HOSPADM

## 2021-11-23 RX ORDER — FENTANYL CITRATE 50 UG/ML
INJECTION, SOLUTION INTRAMUSCULAR; INTRAVENOUS PRN
Status: DISCONTINUED | OUTPATIENT
Start: 2021-11-23 | End: 2021-11-23 | Stop reason: SDUPTHER

## 2021-11-23 RX ORDER — LIDOCAINE HYDROCHLORIDE 5 MG/ML
INJECTION, SOLUTION INFILTRATION; INTRAVENOUS PRN
Status: DISCONTINUED | OUTPATIENT
Start: 2021-11-23 | End: 2021-11-23 | Stop reason: ALTCHOICE

## 2021-11-23 RX ORDER — SODIUM CHLORIDE, SODIUM LACTATE, POTASSIUM CHLORIDE, CALCIUM CHLORIDE 600; 310; 30; 20 MG/100ML; MG/100ML; MG/100ML; MG/100ML
INJECTION, SOLUTION INTRAVENOUS CONTINUOUS
Status: DISCONTINUED | OUTPATIENT
Start: 2021-11-23 | End: 2021-11-23 | Stop reason: HOSPADM

## 2021-11-23 RX ORDER — MIDAZOLAM HYDROCHLORIDE 1 MG/ML
INJECTION INTRAMUSCULAR; INTRAVENOUS PRN
Status: DISCONTINUED | OUTPATIENT
Start: 2021-11-23 | End: 2021-11-23 | Stop reason: SDUPTHER

## 2021-11-23 RX ORDER — MEPERIDINE HYDROCHLORIDE 25 MG/ML
12.5 INJECTION INTRAMUSCULAR; INTRAVENOUS; SUBCUTANEOUS EVERY 5 MIN PRN
Status: DISCONTINUED | OUTPATIENT
Start: 2021-11-23 | End: 2021-11-23 | Stop reason: HOSPADM

## 2021-11-23 RX ADMIN — SODIUM CHLORIDE, POTASSIUM CHLORIDE, SODIUM LACTATE AND CALCIUM CHLORIDE: 600; 310; 30; 20 INJECTION, SOLUTION INTRAVENOUS at 08:21

## 2021-11-23 RX ADMIN — FENTANYL CITRATE 50 MCG: 50 INJECTION, SOLUTION INTRAMUSCULAR; INTRAVENOUS at 08:48

## 2021-11-23 RX ADMIN — MIDAZOLAM 2 MG: 1 INJECTION INTRAMUSCULAR; INTRAVENOUS at 08:33

## 2021-11-23 RX ADMIN — FENTANYL CITRATE 50 MCG: 50 INJECTION, SOLUTION INTRAMUSCULAR; INTRAVENOUS at 08:34

## 2021-11-23 RX ADMIN — FENTANYL CITRATE 50 MCG: 50 INJECTION, SOLUTION INTRAMUSCULAR; INTRAVENOUS at 08:35

## 2021-11-23 RX ADMIN — FENTANYL CITRATE 50 MCG: 50 INJECTION, SOLUTION INTRAMUSCULAR; INTRAVENOUS at 08:46

## 2021-11-23 ASSESSMENT — PAIN SCALES - GENERAL
PAINLEVEL_OUTOF10: 4
PAINLEVEL_OUTOF10: 0
PAINLEVEL_OUTOF10: 0

## 2021-11-23 ASSESSMENT — PAIN DESCRIPTION - LOCATION: LOCATION: BACK

## 2021-11-23 ASSESSMENT — PAIN DESCRIPTION - ORIENTATION: ORIENTATION: LOWER

## 2021-11-23 ASSESSMENT — PAIN DESCRIPTION - FREQUENCY: FREQUENCY: INTERMITTENT

## 2021-11-23 ASSESSMENT — PAIN DESCRIPTION - PAIN TYPE: TYPE: SURGICAL PAIN

## 2021-11-23 ASSESSMENT — PAIN - FUNCTIONAL ASSESSMENT: PAIN_FUNCTIONAL_ASSESSMENT: 0-10

## 2021-11-23 ASSESSMENT — PAIN DESCRIPTION - DESCRIPTORS: DESCRIPTORS: ACHING

## 2021-11-23 NOTE — ANESTHESIA POSTPROCEDURE EVALUATION
Department of Anesthesiology  Postprocedure Note    Patient: Jeannie Lr  MRN: [id number]  YOB: 1970  Date of evaluation: 11/23/2021  Time:  10:24 AM     Procedure Summary     Date: 11/23/21 Room / Location: [address] [place] [address] 04 / Erin [facility]    Anesthesia Start: 0831 Anesthesia Stop: 1746    Procedure: LUMBAR TRANFORAMINAL EPIDURAL STEROID INJECTION RIGHT L 5 AND S1 WITH XRAY (27328) (SEDATION) (Right ) Diagnosis: (LUMBAR RADICULOPATHY)    Surgeons: Josephine Mata MD Responsible Provider: Opal Wang MD    Anesthesia Type: MAC ASA Status: 3          Anesthesia Type: MAC    Fariba Phase I: Fariba Score: 10    Fariba Phase II: Fariba Score: 10    Last vitals: Reviewed and per EMR flowsheets.        Anesthesia Post Evaluation    Patient location during evaluation: PACU  Patient participation: complete - patient participated  Level of consciousness: awake and alert  Airway patency: patent  Nausea & Vomiting: no nausea and no vomiting  Complications: no  Cardiovascular status: blood pressure returned to baseline  Respiratory status: room air and spontaneous ventilation  Hydration status: stable

## 2021-11-23 NOTE — H&P
[address]  [facility] [facility], [address] [place] [address] Max  [phone]    Procedure History & Physical      Edita Palencia     HPI:    Patient  is here for Lumbar TFSI for low back/ LE pain. Labs/imaging studies reviewed   All question and concerns addressed including R/B/A associated with the procedure    Past Medical History:   Diagnosis Date    Acid reflux     Back pain     Depression 11/30/2016    Essential hypertension 08/07/2019    Gastroparesis     Hepatitis      no treatment. after having gastroparesis, liver enzymes back to normal    Hyperlipidemia     Medically noncompliant 02/15/2019    Seizures ([address] 2019    in hospital ventilated after grand mal seizure. None since nov 2019    Sleep apnea     Tachycardia     Dr. Carlota Reyna 8/2021       Past Surgical History:   Procedure Laterality Date    ABDOMEN SURGERY      Lysis of adhession x2    CARDIAC CATHETERIZATION      had 7 - 5 - 2012    CARDIAC CATHETERIZATION  11/04/2019    Dr. Kayden Deleon, LAPAROSCOPIC      COLONOSCOPY N/A 3/1/2021    COLORECTAL CANCER SCREENING, NOT HIGH RISK performed by Nyla Salcido MD at [address] ECHO COMPL W DOP COLOR FLOW  7/1/2012         ENDOMETRIAL ABLATION      PYLOROPLASTY  3/30/2012    lap plylorplasty open upper endoscopy lysis of adhesions    SALPINGO-OOPHORECTOMY Left     left    UPPER GASTROINTESTINAL ENDOSCOPY  3/15/13    balloon opened pyloric sphincter       Prior to Admission medications    Medication Sig Start Date End Date Taking?  Authorizing Provider   VENTOLIN  (90 Base) MCG/ACT inhaler inhale 2 puffs by mouth every 4 hours if needed for wheezing 10/8/21  Yes Peng Phan, DO   pravastatin (PRAVACHOL) 40 MG tablet Take 1 tablet by mouth daily 9/23/21  Yes Peng Phan, DO   cyclobenzaprine (FLEXERIL) 5 MG tablet Take 1 tablet by mouth 2 times daily as needed for Muscle spasms 9/23/21  Yes Peng Phan, DO primidone (MYSOLINE) 50 MG tablet Take 1 tablet by mouth 2 times daily  Patient taking differently: Take 50 mg by mouth as needed  9/23/21  Yes Mari Espitia DO   carvedilol (COREG) 12.5 MG tablet Take one tablet twice daily 8/13/21  Yes Kasia Schuler MD   busPIRone (BUSPAR) 30 MG tablet take 1 tablet by mouth twice a day 6/19/21  Yes Historical Provider, MD   sertraline (ZOLOFT) 100 MG tablet take 1 tablet by mouth every morning 6/3/21  Yes Historical Provider, MD   hydrOXYzine (VISTARIL) 50 MG capsule take 1 capsule by mouth four times a day if needed for anxiety 4/1/21  Yes Historical Provider, MD   mirtazapine (REMERON) 15 MG tablet take 1 tablet by mouth every evening 2/26/21  Yes Historical Provider, MD   levETIRAcetam (KEPPRA) 750 MG tablet Take 1 tablet by mouth 2 times daily  Patient taking differently: Take 750 mg by mouth 2 times daily Take 1.5 tablets BID 2/9/21  Yes ZACARIAS Roman   amitriptyline (ELAVIL) 50 MG tablet take 1 tablet by mouth once daily 12/18/20  Yes Historical Provider, MD   ARIPiprazole (ABILIFY) 15 MG tablet take 1 tablet by mouth once daily 12/28/20  Yes Historical Provider, MD   acetaminophen (APAP EXTRA STRENGTH) 500 MG tablet Take 2 tablets by mouth every 8 hours  Patient taking differently: Take 1,000 mg by mouth every 8 hours as needed  11/12/20  Yes Gena Khan DO   melatonin 3 MG TABS tablet Take 3 tablets by mouth daily  Patient taking differently: Take 9 mg by mouth nightly  3/2/20  Yes Gena Khan DO   traZODone (DESYREL) 100 MG tablet Take 1 tablet by mouth nightly as needed for Sleep  Patient taking differently: Take 200 mg by mouth nightly  2/9/20 77/71/15 Yes Ashia Wood APRN - CNP   buprenorphine-naloxone (SUBOXONE) 8-2 MG SUBL SL tablet Place 1 tablet under the tongue daily.     Yes Historical Provider, MD   omeprazole (PRILOSEC) 20 MG delayed release capsule take 1 capsule by mouth twice a day 11/22/21   Gena Khan DO   Tens Unit MISC by Does not apply route 11/9/21   Brigida Olszewski, MD       Allergies   Allergen Reactions    Prochlorperazine Edisylate Other (See Comments)     Makes me \"crazy, about to jump out the window\"    Compazine [Prochlorperazine]      seizure    Penicillins     Codeine Nausea And Vomiting     Stomach pain    Ketorolac Tromethamine Other (See Comments)     Can't take because abd pain    Levofloxacin Nausea Only    Naproxen Nausea Only     abd pain    Nsaids Other (See Comments)     Gastroparesis, has had 9 abdominal surgeries, and pancreatitis    Percocet [Oxycodone-Acetaminophen] Itching and Nausea And Vomiting    Reglan [Metoclopramide] Other (See Comments)       Social History     Socioeconomic History    Marital status:      Spouse name: Not on file    Number of children: 3    Years of education: Not on file    Highest education level: Not on file   Occupational History    Not on file   Tobacco Use    Smoking status: Never Smoker    Smokeless tobacco: Never Used   Vaping Use    Vaping Use: Never used   Substance and Sexual Activity    Alcohol use: Not Currently    Drug use: Yes     Types: Marijuana Ankit Augustina)     Comment: medical MJ card LD 1-2021    Sexual activity: Not on file   Other Topics Concern    Not on file   Social History Narrative    Not on file     Social Determinants of Health     Financial Resource Strain: Low Risk     Difficulty of Paying Living Expenses: Not hard at all   Food Insecurity: No Food Insecurity    Worried About Running Out of Food in the Last Year: Never true    Aries of Food in the Last Year: Never true   Transportation Needs:     Lack of Transportation (Medical): Not on file    Lack of Transportation (Non-Medical):  Not on file   Physical Activity:     Days of Exercise per Week: Not on file    Minutes of Exercise per Session: Not on file   Stress:     Feeling of Stress : Not on file   Social Connections:     Frequency of Communication with Friends and Family: Not on file    Frequency of Social Gatherings with Friends and Family: Not on file    Attends Latter day Services: Not on file    Active Member of Clubs or Organizations: Not on file    Attends Club or Organization Meetings: Not on file    Marital Status: Not on file   Intimate Partner Violence:     Fear of Current or Ex-Partner: Not on file    Emotionally Abused: Not on file    Physically Abused: Not on file    Sexually Abused: Not on file   Housing Stability:     Unable to Pay for Housing in the Last Year: Not on file    Number of [place] in the Last Year: Not on file    Unstable Housing in the Last Year: Not on file       Family History   Problem Relation Age of Onset    High Blood Pressure Mother     Other Father         BPH    No Known Problems Sister     No Known Problems Brother     No Known Problems Sister     No Known Problems Sister     No Known Problems Brother     No Known Problems Brother     No Known Problems Brother     Depression Paternal Grandmother     No Known Problems Daughter     No Known Problems Son     No Known Problems Son          REVIEW OF SYSTEMS:    CONSTITUTIONAL:  negative for  fevers, chills, sweats and fatigue    RESPIRATORY:  negative for  dry cough, cough with sputum, dyspnea, wheezing and chest pain    CARDIOVASCULAR:  negative for chest pain, dyspnea, palpitations, syncope    GASTROINTESTINAL:  negative for nausea, vomiting, change in bowel habits, diarrhea, constipation and abdominal pain    MUSCULOSKELETAL: negative for muscle weakness    SKIN: negative for itching or rashes.     BEHAVIOR/PSYCH:  negative for poor appetite, increased appetite, decreased sleep and poor concentration    All other systems negative      PHYSICAL EXAM:    VITALS:  /73   Pulse 96   Temp 98 °F (36.7 °C) (Skin)   Resp 18   Ht 5' 2.5\" (1.588 m)   Wt 215 lb 9.6 oz (97.8 kg)   LMP 05/03/2013   SpO2 95%   BMI 38.81 kg/m²     CONSTITUTIONAL:  awake, alert, cooperative, no apparent distress, and appears stated age    EYES: PERRLA, EOMI    LUNGS:  No increased work of breathing, no audible wheezing    CARDIOVASCULAR:  regular rate and rhythm    ABDOMEN:  Soft non tender non distended     EXTREMITIES: no signs of clubbing or cyanosis. MUSCULOSKELETAL: negative for flaccid muscle tone or spastic movements. SKIN: gross examination reveals no signs of rashes, or diaphoresis. NEURO: Cranial nerves II-XII grossly intact. No signs of agitated mood. Assessment/Plan:    Patient  is here for Lumbar TFSI for low back/ LE pain. The patient was counseled at length about the risks of minerva Covid-19 during their perioperative period and any recovery window from their procedure. The patient was made aware that minerva Covid-19  may worsen their prognosis for recovering from their procedure  and lend to a higher morbidity and/or mortality risk. All material risks, benefits, and reasonable alternatives including postponing the procedure were discussed. The patient does wish to proceed with the procedure at this time.       Kristen Mauricio MD

## 2021-11-23 NOTE — OP NOTE
Operative Note      Patient: Margo Bolanos  YOB: 1970  MRN: [id number]    Date of Procedure: 2021    Pre-Op Diagnosis: LUMBAR RADICULOPATHY, lumbar DDD    Post-Op Diagnosis: Same       Procedure(s):  LUMBAR TRANFORAMINAL EPIDURAL STEROID INJECTION RIGHT L 5 AND S1 WITH XRAY     Surgeon(s):  Cha Devine MD    Assistant:   * No surgical staff found *    Anesthesia: Monitor Anesthesia Care    Estimated Blood Loss (mL): Minimal    Complications: None    Specimens:   * No specimens in log *    Implants:  * No implants in log *      Drains: * No LDAs found *    Findings: good needle placement    Detailed Description of Procedure:   2021    Patient: Margo Bolanos  :  1970  Age:  46 y.o. Sex:  female     PRE-OPERATIVE DIAGNOSIS: Lumbar disc displacement, lumbar neural foraminal stenosis, lumbar radiculopathy. POST-OPERATIVE DIAGNOSIS: Same. PROCEDURE: Right Transforaminal epidural steroid injection under fluoroscopic guidance at foraminal level L5 and S1.    SURGEON: Cha Devine MD    ANESTHESIA: MAC    ESTIMATED BLOOD LOSS: None.  ______________________________________________________________________  BRIEF HISTORY: Margo Bolanos comes in today for the first Right transforaminal epidural steroid injection under fluoroscopic guidance at foraminal level L5 and S1 . The potential complications of this procedure were discussed with her again today. She has elected to undergo the aforementioned procedure. Shandra complete History & Physical examination were reviewed in depth, a copy of which is in the chart. DESCRIPTION OF PROCEDURE:    After confirming written and informed consent, a time-out was performed and Shandra name and date of birth, the procedure to be performed as well as the plan for the location of the needle insertion were confirmed.     The patient was brought into the procedure room and placed in the prone position on the fluoroscopy table. Standard monitors were placed and vital signs were observed throughout the procedure. The area of the lumbar spine was prepped with chloraprep and draped in a sterile manner. The vertebral body was identified with AP fluoroscopy. An oblique view was obtained to better visualize the inferior junction of the pedicle and transverse process . The 6 o'clock position of the pedicle was marked and identified. The skin and subcutaneous tissue were anesthetized with 0.5% lidocaine. TWO # 22 gauge 5 inch pencil point needle was directed toward the targeted point under fluoroscopy until bone was contacted. The needle was then walked inferiorly until the neural foramen was entered. A lateral fluoroscopic view was then used to place the needle tip in the middle of the foramen. Negative aspiration was confirmed for blood and CSF and 0.5-1 cc of Omnipaque 300 contrast was injected at each level under live fluoroscopy. Appropriate neurograms were observed under AP fluoroscopy. Then after negative aspiration, a solution of the 3 cc of 0.5% lidocaine and 30 mg DepoMedrol was easily injected at each level. The needles were removed with constant aspiration technique. The patient back was cleaned and a bandage was placed over the needle insertion points    Disposition the patient tolerated the procedure well and there were no complications . Vital signs remained stable throughout the procedure. The patient was escorted to the recovery area where they remained until discharge and written discharge instructions for the procedure were given. Plan: Eric Nolan will return to our pain management center as scheduled.      Marce Mojica MD

## 2021-12-01 ENCOUNTER — TELEPHONE (OUTPATIENT)
Dept: FAMILY MEDICINE CLINIC | Age: 51
End: 2021-12-01

## 2021-12-01 DIAGNOSIS — G89.29 CHRONIC BILATERAL LOW BACK PAIN WITH RIGHT-SIDED SCIATICA: ICD-10-CM

## 2021-12-01 DIAGNOSIS — M54.41 CHRONIC BILATERAL LOW BACK PAIN WITH RIGHT-SIDED SCIATICA: ICD-10-CM

## 2021-12-02 ENCOUNTER — TELEPHONE (OUTPATIENT)
Dept: FAMILY MEDICINE CLINIC | Age: 51
End: 2021-12-02

## 2021-12-02 NOTE — TELEPHONE ENCOUNTER
----- Message from Leyla Georges sent at 12/2/2021  3:34 PM EST -----  Subject: Message to Provider    QUESTIONS  Information for Provider? Roxy From [facility] states she   needs her current medication list. A fax was sent over today by the office   with her history of allergies and surgeries and diagnosis etc, but not a   list of current medications, which is what they specifically needed. Please fax over medication list [phone].  ---------------------------------------------------------------------------  --------------  Jackie LEDEZMA  What is the best way for the office to contact you? OK to leave message on   voicemail  Preferred Call Back Phone Number? [phone]  ---------------------------------------------------------------------------  --------------  SCRIPT ANSWERS  Relationship to Patient? Third Party  Representative Name?  Ragini Maya

## 2021-12-06 RX ORDER — PRIMIDONE 50 MG/1
50 TABLET ORAL 2 TIMES DAILY
Qty: 60 TABLET | Refills: 2 | Status: SHIPPED
Start: 2021-12-06 | End: 2022-07-06

## 2021-12-06 RX ORDER — CYCLOBENZAPRINE HCL 5 MG
5 TABLET ORAL 2 TIMES DAILY PRN
Qty: 60 TABLET | Refills: 1 | Status: SHIPPED
Start: 2021-12-06 | End: 2021-12-13 | Stop reason: SDUPTHER

## 2021-12-13 ENCOUNTER — OFFICE VISIT (OUTPATIENT)
Dept: FAMILY MEDICINE CLINIC | Age: 51
End: 2021-12-13
Payer: MEDICARE

## 2021-12-13 ENCOUNTER — CLINICAL DOCUMENTATION (OUTPATIENT)
Dept: SPIRITUAL SERVICES | Age: 51
End: 2021-12-13

## 2021-12-13 VITALS
DIASTOLIC BLOOD PRESSURE: 74 MMHG | SYSTOLIC BLOOD PRESSURE: 132 MMHG | HEIGHT: 63 IN | HEART RATE: 100 BPM | WEIGHT: 217.4 LBS | TEMPERATURE: 97.5 F | OXYGEN SATURATION: 96 % | RESPIRATION RATE: 16 BRPM | BODY MASS INDEX: 38.52 KG/M2

## 2021-12-13 DIAGNOSIS — Z00.00 ROUTINE GENERAL MEDICAL EXAMINATION AT A HEALTH CARE FACILITY: Primary | ICD-10-CM

## 2021-12-13 DIAGNOSIS — R73.03 PREDIABETES: ICD-10-CM

## 2021-12-13 DIAGNOSIS — G89.29 CHRONIC BILATERAL LOW BACK PAIN WITH RIGHT-SIDED SCIATICA: ICD-10-CM

## 2021-12-13 DIAGNOSIS — M54.41 CHRONIC BILATERAL LOW BACK PAIN WITH RIGHT-SIDED SCIATICA: ICD-10-CM

## 2021-12-13 DIAGNOSIS — E78.2 MIXED HYPERLIPIDEMIA: ICD-10-CM

## 2021-12-13 DIAGNOSIS — K31.84 GASTROPARALYSIS: ICD-10-CM

## 2021-12-13 PROCEDURE — 3017F COLORECTAL CA SCREEN DOC REV: CPT | Performed by: FAMILY MEDICINE

## 2021-12-13 PROCEDURE — G8484 FLU IMMUNIZE NO ADMIN: HCPCS | Performed by: FAMILY MEDICINE

## 2021-12-13 PROCEDURE — G0402 INITIAL PREVENTIVE EXAM: HCPCS | Performed by: FAMILY MEDICINE

## 2021-12-13 RX ORDER — PRIMIDONE 50 MG/1
50 TABLET ORAL 2 TIMES DAILY
Qty: 60 TABLET | Refills: 2 | Status: CANCELLED | OUTPATIENT
Start: 2021-12-13

## 2021-12-13 RX ORDER — PRAVASTATIN SODIUM 40 MG
40 TABLET ORAL DAILY
Qty: 30 TABLET | Refills: 2 | Status: SHIPPED
Start: 2021-12-13 | End: 2022-03-24 | Stop reason: SDUPTHER

## 2021-12-13 RX ORDER — OMEPRAZOLE 20 MG/1
CAPSULE, DELAYED RELEASE ORAL
Qty: 60 CAPSULE | Refills: 5 | Status: SHIPPED | OUTPATIENT
Start: 2021-12-13

## 2021-12-13 RX ORDER — CYCLOBENZAPRINE HCL 5 MG
5 TABLET ORAL 2 TIMES DAILY PRN
Qty: 60 TABLET | Refills: 1 | Status: SHIPPED | OUTPATIENT
Start: 2021-12-13

## 2021-12-13 ASSESSMENT — LIFESTYLE VARIABLES
HOW OFTEN DO YOU HAVE A DRINK CONTAINING ALCOHOL: NEVER
AUDIT-C TOTAL SCORE: INCOMPLETE
AUDIT TOTAL SCORE: INCOMPLETE
HOW OFTEN DO YOU HAVE A DRINK CONTAINING ALCOHOL: 0

## 2021-12-13 ASSESSMENT — PATIENT HEALTH QUESTIONNAIRE - PHQ9
SUM OF ALL RESPONSES TO PHQ QUESTIONS 1-9: 0
SUM OF ALL RESPONSES TO PHQ QUESTIONS 1-9: 0
2. FEELING DOWN, DEPRESSED OR HOPELESS: 0
SUM OF ALL RESPONSES TO PHQ9 QUESTIONS 1 & 2: 0
1. LITTLE INTEREST OR PLEASURE IN DOING THINGS: 0
SUM OF ALL RESPONSES TO PHQ QUESTIONS 1-9: 0

## 2021-12-13 NOTE — PATIENT INSTRUCTIONS
Personalized Preventive Plan for Daija Watson - 12/13/2021  Medicare offers a range of preventive health benefits. Some of the tests and screenings are paid in full while other may be subject to a deductible, co-insurance, and/or copay. Some of these benefits include a comprehensive review of your medical history including lifestyle, illnesses that may run in your family, and various assessments and screenings as appropriate. After reviewing your medical record and screening and assessments performed today your provider may have ordered immunizations, labs, imaging, and/or referrals for you. A list of these orders (if applicable) as well as your Preventive Care list are included within your After Visit Summary for your review. Other Preventive Recommendations:    · A preventive eye exam performed by an eye specialist is recommended every 1-2 years to screen for glaucoma; cataracts, macular degeneration, and other eye disorders. · A preventive dental visit is recommended every 6 months. · Try to get at least 150 minutes of exercise per week or 10,000 steps per day on a pedometer . · Order or download the FREE \"Exercise & Physical Activity: Your Everyday Guide\" from [employer]. Call 5-810.731.4876 or search [employer] online. · You need 9524-3087 mg of calcium and 4008-7814 IU of vitamin D per day. It is possible to meet your calcium requirement with diet alone, but a vitamin D supplement is usually necessary to meet this goal.  · When exposed to the sun, use a sunscreen that protects against both UVA and UVB radiation with an SPF of 30 or greater. Reapply every 2 to 3 hours or after sweating, drying off with a towel, or swimming. · Always wear a seat belt when traveling in a car. Always wear a helmet when riding a bicycle or motorcycle.

## 2021-12-13 NOTE — PROGRESS NOTES
Medicare Annual Wellness Visit  Name: Danna Herzog Date: 2021   MRN: [id number] Sex: Female   Age: 46 y.o. Ethnicity: Non- / Non    : 1970 Race: Black /       Reji is here for Medicare AWV    Screenings for behavioral, psychosocial and functional/safety risks, and cognitive dysfunction are all negative except as indicated below. These results, as well as other patient data from the [address] form, are documented in Flowsheets linked to this Encounter. Allergies   Allergen Reactions    Prochlorperazine Edisylate Other (See Comments)     Makes me \"crazy, about to jump out the window\"    Compazine [Prochlorperazine]      seizure    Penicillins     Codeine Nausea And Vomiting     Stomach pain    Ketorolac Tromethamine Other (See Comments)     Can't take because abd pain    Levofloxacin Nausea Only    Naproxen Nausea Only     abd pain    Nsaids Other (See Comments)     Gastroparesis, has had 9 abdominal surgeries, and pancreatitis    Percocet [Oxycodone-Acetaminophen] Itching and Nausea And Vomiting    Reglan [Metoclopramide] Other (See Comments)         Prior to Visit Medications    Medication Sig Taking?  Authorizing Provider   cyclobenzaprine (FLEXERIL) 5 MG tablet Take 1 tablet by mouth 2 times daily as needed for Muscle spasms Yes Irl Punt, DO   omeprazole (PRILOSEC) 20 MG delayed release capsule take 1 capsule by mouth twice a day Yes Irl Punt, DO   pravastatin (PRAVACHOL) 40 MG tablet Take 1 tablet by mouth daily Yes Irl Punt, DO   primidone (MYSOLINE) 50 MG tablet Take 1 tablet by mouth 2 times daily Yes Irl Punt, DO   Tens Unit MISC by Does not apply route Yes Sue Crarillo MD   VENTOLIN  (90 Base) MCG/ACT inhaler inhale 2 puffs by mouth every 4 hours if needed for wheezing Yes Mari Espitia,    carvedilol (COREG) 12.5 MG tablet Take one tablet twice daily Yes Rivka Minor MD   busPIRone (BUSPAR) 30 MG tablet take 1 tablet by mouth twice a day Yes Historical Provider, MD   sertraline (ZOLOFT) 100 MG tablet take 1 tablet by mouth every morning Yes Historical Provider, MD   hydrOXYzine (VISTARIL) 50 MG capsule take 1 capsule by mouth four times a day if needed for anxiety Yes Historical Provider, MD   mirtazapine (REMERON) 15 MG tablet take 1 tablet by mouth every evening Yes Historical Provider, MD   levETIRAcetam (KEPPRA) 750 MG tablet Take 1 tablet by mouth 2 times daily  Patient taking differently: Take 750 mg by mouth 2 times daily Take 1.5 tablets BID Yes ZACARIAS Amezquita   amitriptyline (ELAVIL) 50 MG tablet take 1 tablet by mouth once daily Yes Historical Provider, MD   ARIPiprazole (ABILIFY) 15 MG tablet take 1 tablet by mouth once daily Yes Historical Provider, MD   acetaminophen (APAP EXTRA STRENGTH) 500 MG tablet Take 2 tablets by mouth every 8 hours  Patient taking differently: Take 1,000 mg by mouth every 8 hours as needed  Yes Silviano Christine,    melatonin 3 MG TABS tablet Take 3 tablets by mouth daily  Patient taking differently: Take 9 mg by mouth nightly  Yes Mari Espitia,    buprenorphine-naloxone (SUBOXONE) 8-2 MG SUBL SL tablet Place 1 tablet under the tongue daily. Yes Historical Provider, MD   traZODone (DESYREL) 100 MG tablet Take 1 tablet by mouth nightly as needed for Sleep  Patient taking differently: Take 200 mg by mouth nightly   Karen Melara, APRN - CNP         Past Medical History:   Diagnosis Date    Acid reflux     Back pain     Depression 11/30/2016    Essential hypertension 08/07/2019    Gastroparesis     Hepatitis      no treatment. after having gastroparesis, liver enzymes back to normal    Hyperlipidemia     Medically noncompliant 02/15/2019    Seizures ([address] 2019    in hospital ventilated after grand mal seizure.  None since nov 2019    Sleep apnea     Tachycardia     Dr. Arsh Mari 8/2021       Past Surgical History:   Procedure Laterality Date    ABDOMEN SURGERY      Lysis of adhession x2    CARDIAC CATHETERIZATION      had 7 - 5 - 2012    CARDIAC CATHETERIZATION  11/04/2019    Dr. Jennifer Ortega, LAPAROSCOPIC      COLONOSCOPY N/A 3/1/2021    COLORECTAL CANCER SCREENING, NOT HIGH RISK performed by Augustina Queen MD at [address] ECHO COMPL W DOP COLOR FLOW  7/1/2012         ENDOMETRIAL ABLATION      PAIN MANAGEMENT PROCEDURE Right 11/23/2021    LUMBAR TRANFORAMINAL EPIDURAL STEROID INJECTION RIGHT L 5 AND S1 WITH XRAY (35297) (SEDATION) performed by Meagan Muniz MD at [address]  3/30/2012    lap plylorplasty open upper endoscopy lysis of adhesions    SALPINGO-OOPHORECTOMY Left     left    UPPER GASTROINTESTINAL ENDOSCOPY  3/15/13    balloon opened pyloric sphincter         Family History   Problem Relation Age of Onset    High Blood Pressure Mother     Other Father         BPH    No Known Problems Sister     No Known Problems Brother     No Known Problems Sister     No Known Problems Sister     No Known Problems Brother     No Known Problems Brother     No Known Problems Brother     Depression Paternal Grandmother     No Known Problems Daughter     No Known Problems Son     No Known Problems Son        CareTeam (Including outside providers/suppliers regularly involved in providing care):   Patient Care Team:  Alessandra García DO as PCP - General (Family Medicine)  Alessandra García DO as PCP - [facility] Empaneled Provider  Paige Hartley MD as Consulting Physician (Cardiology)    Wt Readings from Last 3 Encounters:   12/13/21 217 lb 6.4 oz (98.6 kg)   11/23/21 215 lb 9.6 oz (97.8 kg)   11/18/21 215 lb 12.8 oz (97.9 kg)     Vitals:    12/13/21 1340   BP: 132/74   Pulse: 100   Resp: 16   Temp: 97.5 °F (36.4 °C)   SpO2: 96%   Weight: 217 lb 6.4 oz (98.6 kg)   Height: 5' 2.5\" (1.588 m)     Body mass index is 39.13 kg/m².     Based upon direct observation of the patient, evaluation of cognition reveals recent and remote memory intact. General Appearance: alert and oriented to person, place and time, well developed and well- nourished, in no acute distress  Skin: warm and dry, no rash or erythema  Head: normocephalic and atraumatic  Eyes: pupils equal, round, and reactive to light, extraocular eye movements intact, conjunctivae normal  ENT: tympanic membrane, external ear and ear canal normal bilaterally, nose without deformity, nasal mucosa and turbinates normal without polyps  Neck: supple and non-tender without mass, no thyromegaly or thyroid nodules, no cervical lymphadenopathy  Pulmonary/Chest: clear to auscultation bilaterally- no wheezes, rales or rhonchi, normal air movement, no respiratory distress  Cardiovascular: normal rate, regular rhythm, normal S1 and S2, no murmurs, rubs, clicks, or gallops, distal pulses intact, no carotid bruits  Abdomen: soft, non-tender, non-distended, normal bowel sounds, no masses or organomegaly  Extremities: no cyanosis, clubbing or edema  Musculoskeletal: normal range of motion, no joint swelling, deformity or tenderness  Neurologic: reflexes normal and symmetric, no cranial nerve deficit, gait, coordination and speech normal    Patient's complete Health Risk Assessment and screening values have been reviewed and are found in Flowsheets. The following problems were reviewed today and where indicated follow up appointments were made and/or referrals ordered.     Positive Risk Factor Screenings with Interventions:         Substance History:  Social History     Tobacco History     Smoking Status  Never Smoker    Smokeless Tobacco Use  Never Used          Alcohol History     Alcohol Use Status  Not Currently          Drug Use     Drug Use Status  Yes Types  Marijuana (Weed) Comment  medical MJ card LD 1-2021          Sexual Activity     Sexually Active  Not Asked               Alcohol Screening:       A score of 8 or more is associated with harmful or hazardous drinking. A score of 13 or more in women, and 15 or more in men, is likely to indicate alcohol dependence. Substance Abuse Interventions:  · has medical marijuana card. General Health and ACP:  General  In general, how would you say your health is?: Good  In the past 7 days, have you experienced any of the following? New or Increased Pain, New or Increased Fatigue, Loneliness, Social Isolation, Stress or Anger?: (!) New or Increased Fatigue  Do you get the social and emotional support that you need?: Yes  Do you have a Living Will?: (!) No  Advance Directives     Power of  Living Will ACP-Advance Directive ACP-Power of     Not on File Filed on 07/13/15 Filed Not on File      General Health Risk Interventions:  · Fatigue: has been sleeping so much.  changed when she takes her medication. doses off easily.   · No Living Will: Patient referred to [place] Habits/Nutrition:  Health Habits/Nutrition  Do you exercise for at least 20 minutes 2-3 times per week?: (!) No  Have you lost any weight without trying in the past 3 months?: No  Do you eat only one meal per day?: No  Have you seen the dentist within the past year?: Yes  Body mass index: (!) 39.13  Health Habits/Nutrition Interventions:  · Inadequate physical activity:  educational materials provided to promote increased physical activity    Hearing/Vision:  No exam data present  Hearing/Vision  Do you or your family notice any trouble with your hearing that hasn't been managed with hearing aids?: (!) Yes  Do you have difficulty driving, watching TV, or doing any of your daily activities because of your eyesight?: No  Have you had an eye exam within the past year?: Yes  Hearing/Vision Interventions:  · Hearing concerns:  follows with audiology    Safety:  Safety  Do you have working smoke detectors?: Yes  Have all throw rugs been removed or fastened?: Yes  Do you have non-slip mats or surfaces in all bathtubs/showers?: Yes  Do all of your stairways have a railing or banister?: Yes  Are your doorways, halls and stairs free of clutter?: Yes  Do you always fasten your seatbelt when you are in a car?: (!) No  Safety Interventions:  · Home safety tips provided     Personalized Preventive Plan   Current Health Maintenance Status  Immunization History   Administered Date(s) Administered    Influenza, Quadv, IM, PF (6 mo and older Fluzone, Flulaval, Fluarix, and 3 yrs and older Afluria) 11/05/2019        Health Maintenance   Topic Date Due    Pneumococcal 0-64 years Vaccine (1 of 2 - PPSV23) Never done    COVID-19 Vaccine (1) Never done    HIV screen  Never done    DTaP/Tdap/Td vaccine (1 - Tdap) Never done    Cervical cancer screen  09/24/2017    Shingles Vaccine (1 of 2) Never done    Annual Wellness Visit (AWV)  Never done    Flu vaccine (1) 09/01/2021    A1C test (Diabetic or Prediabetic)  11/12/2021    Colon cancer screen colonoscopy  03/01/2022    Lipid screen  07/05/2022    Potassium monitoring  07/05/2022    Creatinine monitoring  07/05/2022    Breast cancer screen  05/06/2023    Hepatitis C screen  Completed    Hepatitis A vaccine  Aged Out    Hepatitis B vaccine  Aged Out    Hib vaccine  Aged Out    Meningococcal (ACWY) vaccine  Aged Out     Recommendations for ViFlux Due: see orders and patient instructions/AVS.  . Recommended screening schedule for the next 5-10 years is provided to the patient in written form: see Patient Instructions/AVS.    Ivis Acuna was seen today for medicare awv. Diagnoses and all orders for this visit:    Routine general medical examination at a health care facility  [facility] Referral to [facility] Clinical Specialist    Chronic bilateral low back pain with right-sided sciatica  -     cyclobenzaprine (FLEXERIL) 5 MG tablet;  Take 1 tablet by mouth 2 times daily as needed for Muscle spasms    Gastroparalysis  -     omeprazole (PRILOSEC) 20 MG delayed release capsule; take 1 capsule by mouth twice a day    Mixed hyperlipidemia  -     pravastatin (PRAVACHOL) 40 MG tablet; Take 1 tablet by mouth daily  -     Comprehensive Metabolic Panel; Future  -     Lipid Panel; Future    Prediabetes  -     CBC Auto Differential; Future  -     Comprehensive Metabolic Panel; Future  -     Hemoglobin A1C; Future  -     Lipid Panel; Future  -     TSH without Reflex;  Future

## 2021-12-13 NOTE — ACP (ADVANCE CARE PLANNING)
Advance Care Planning   Ambulatory ACP Specialist Patient Outreach    Date:  12/13/2021  ACP Specialist:  Adri Hawthorne    Outreach call to patient in follow-up to ACP Specialist referral from: Rhea Lizama DO    [x] PCP  [] Provider   [] Ambulatory Care Management [] Other for Reason:    [x] Advance Directive Assistance  [] Code Status Discussion  [] Complete Portable DNR Order  [] Discuss Goals of Care  [] Complete POST/MOST  [] Early ACP Decision-Making  [] Other    Date Referral Received: 12/13/2021    Today's Outreach:  [] First   [] Second  [] Third                               Third outreach made by []  phone  [] email []   [employer]     Intervention:  [] Spoke with Patient  [] Left VM requesting return call      Outcome: A person answered the phone listed however they could not speak [address] I am going to mail documents and attempt to follow up. Next Step:   [] ACP scheduled conversation  [] Outreach again in one week               [x] Email / Mail ACP Info Sheets  [x] Email / Mail Advance Directive            [] Close Referral. Routing closure to referring provider/staff and to ACP Specialist .      Thank you for this referral.

## 2022-01-04 NOTE — PROGRESS NOTES
[facility] Progress Note  Dr. Simran Bob      Referring Physician: Jenn Moya DO  CHIEF COMPLAINT:   Chief Complaint   Patient presents with    Tachycardia     6 month check . Patient denies any cp sob or dizziness. HISTORY OF PRESENT ILLNESS:   Patient is a 46year old female with a medical history of pericardial effusion, hypertension, hyperlipidemia, is here for follow-up appointment. Patient denies any chest pain, no shortness of breath, no lightheadedness, no dizziness, no pedal edema, no PND, no orthopnea, no syncope, no presyncopal episodes. Blood pressure and heart rate are still on the high side    Past Medical History:   Diagnosis Date    Acid reflux     Back pain     Depression 11/30/2016    Essential hypertension 08/07/2019    Gastroparesis     Hepatitis      no treatment. after having gastroparesis, liver enzymes back to normal    Hyperlipidemia     Medically noncompliant 02/15/2019    Seizures ([address] 2019    in hospital ventilated after grand mal seizure.  None since nov 2019    Sleep apnea     Tachycardia     Dr. Malachi Kamara 8/2021         Past Surgical History:   Procedure Laterality Date    ABDOMEN SURGERY      Lysis of adhession x2    CARDIAC CATHETERIZATION      had 7 - 5 - 2012    CARDIAC CATHETERIZATION  11/04/2019    Dr. Christiana Moyer, LAPAROSCOPIC      COLONOSCOPY N/A 3/1/2021    COLORECTAL CANCER SCREENING, NOT HIGH RISK performed by Kelton Austin MD at [id number] [facility] ECHO COMPL W DOP COLOR FLOW  7/1/2012         ENDOMETRIAL ABLATION      PAIN MANAGEMENT PROCEDURE Right 11/23/2021    LUMBAR TRANFORAMINAL EPIDURAL STEROID INJECTION RIGHT L 5 AND S1 WITH XRAY (18588) (SEDATION) performed by Pernell Lanes, MD at [address]  3/30/2012    lap plylorplasty open upper endoscopy lysis of adhesions    SALPINGO-OOPHORECTOMY Left     left    UPPER GASTROINTESTINAL ENDOSCOPY  3/15/13    balloon opened pyloric sphincter         Current Outpatient Medications   Medication Sig Dispense Refill    cyclobenzaprine (FLEXERIL) 5 MG tablet Take 1 tablet by mouth 2 times daily as needed for Muscle spasms 60 tablet 1    omeprazole (PRILOSEC) 20 MG delayed release capsule take 1 capsule by mouth twice a day 60 capsule 5    pravastatin (PRAVACHOL) 40 MG tablet Take 1 tablet by mouth daily 30 tablet 2    primidone (MYSOLINE) 50 MG tablet Take 1 tablet by mouth 2 times daily 60 tablet 2    Tens Unit MISC by Does not apply route 1 each 0    VENTOLIN  (90 Base) MCG/ACT inhaler inhale 2 puffs by mouth every 4 hours if needed for wheezing 18 g 5    carvedilol (COREG) 12.5 MG tablet Take one tablet twice daily 60 tablet 5    busPIRone (BUSPAR) 30 MG tablet take 1 tablet by mouth twice a day      sertraline (ZOLOFT) 100 MG tablet take 1 tablet by mouth every morning      hydrOXYzine (VISTARIL) 50 MG capsule take 1 capsule by mouth four times a day if needed for anxiety      mirtazapine (REMERON) 15 MG tablet take 1 tablet by mouth every evening      levETIRAcetam (KEPPRA) 750 MG tablet Take 1 tablet by mouth 2 times daily (Patient taking differently: Take 750 mg by mouth 2 times daily Take 1.5 tablets BID) 60 tablet 5    amitriptyline (ELAVIL) 50 MG tablet take 1 tablet by mouth once daily      ARIPiprazole (ABILIFY) 15 MG tablet take 1 tablet by mouth once daily      melatonin 3 MG TABS tablet Take 3 tablets by mouth daily (Patient taking differently: Take 9 mg by mouth nightly ) 30 tablet 2    buprenorphine-naloxone (SUBOXONE) 8-2 MG SUBL SL tablet Place 1 tablet under the tongue daily. No current facility-administered medications for this visit.          Allergies as of 01/05/2022 - Fully Reviewed 01/05/2022   Allergen Reaction Noted    Prochlorperazine edisylate Other (See Comments) 09/17/2011    Compazine [prochlorperazine]  11/16/2021    Penicillins  08/12/2021    Codeine Nausea And Vomiting 09/17/2011  Ketorolac tromethamine Other (See Comments) 03/25/2013    Levofloxacin Nausea Only 10/03/2012    Naproxen Nausea Only 03/25/2013    Nsaids Other (See Comments) 09/17/2011    Percocet [oxycodone-acetaminophen] Itching and Nausea And Vomiting 03/14/2016    Reglan [metoclopramide] Other (See Comments) 11/15/2012       Social History     Socioeconomic History    Marital status:      Spouse name: Not on file    Number of children: 3    Years of education: Not on file    Highest education level: Not on file   Occupational History    Not on file   Tobacco Use    Smoking status: Never Smoker    Smokeless tobacco: Never Used   Vaping Use    Vaping Use: Never used   Substance and Sexual Activity    Alcohol use: Not Currently     Comment: No caffeine    Drug use: Yes     Types: Marijuana Breann Bachelor)     Comment: medical MJ card LD 1-2021    Sexual activity: Not on file   Other Topics Concern    Not on file   Social History Narrative    Not on file     Social Determinants of Health     Financial Resource Strain: Low Risk     Difficulty of Paying Living Expenses: Not hard at all   Food Insecurity: No Food Insecurity    Worried About Running Out of Food in the Last Year: Never true    Aries of Food in the Last Year: Never true   Transportation Needs:     Lack of Transportation (Medical): Not on file    Lack of Transportation (Non-Medical):  Not on file   Physical Activity:     Days of Exercise per Week: Not on file    Minutes of Exercise per Session: Not on file   Stress:     Feeling of Stress : Not on file   Social Connections:     Frequency of Communication with Friends and Family: Not on file    Frequency of Social Gatherings with Friends and Family: Not on file    Attends Anabaptist Services: Not on file    Active Member of Clubs or Organizations: Not on file    Attends Club or Organization Meetings: Not on file    Marital Status: Not on file   Intimate Partner Violence:     Fear of Current or Ex-Partner: Not on file    Emotionally Abused: Not on file    Physically Abused: Not on file    Sexually Abused: Not on file   Housing Stability:     Unable to Pay for Housing in the Last Year: Not on file    Number of Places Lived in the Last Year: Not on file    Unstable Housing in the Last Year: Not on file       Family History   Problem Relation Age of Onset    High Blood Pressure Mother     Other Father         BPH    No Known Problems Sister     No Known Problems Brother     No Known Problems Sister     No Known Problems Sister     No Known Problems Brother     No Known Problems Brother     No Known Problems Brother     Depression Paternal Grandmother     No Known Problems Daughter     No Known Problems Son     No Known Problems Son        REVIEW OF SYSTEMS:     CONSTITUTIONAL:  negative for  fevers, chills, sweats and fatigue  HEENT:  negative for  tinnitus, earaches, nasal congestion and epistaxis  RESPIRATORY:  negative for  dry cough, cough with sputum,, wheezing and hemoptysis  GASTROINTESTINAL:  negative for nausea, vomiting, diarrhea, constipation, pruritus and jaundice  HEMATOLOGIC/LYMPHATIC:  negative for easy bruising, bleeding, lymphadenopathy and petechiae  ENDOCRINE:  negative for heat intolerance, cold intolerance, tremor, hair loss and diabetic symptoms including neither polyuria nor polydipsia nor blurred vision  MUSCULOSKELETAL:  negative for  myalgias, arthralgias, joint swelling, stiff joints and decreased range of motion  NEUROLOGICAL:  negative for memory problems, speech problems, visual disturbance, dysphagia, weakness and numbness      PHYSICAL EXAM:   CONSTITUTIONAL:  awake, alert, cooperative, no apparent distress, and appears stated age  HEAD:  normocepalic, without obvious abnormality, atraumatic, pink, moist mucous membranes.   NECK:  Supple, symmetrical, trachea midline, no adenopathy, thyroid symmetric, not enlarged and no tenderness, skin normal  LUNGS:  No increased work of breathing, good air exchange, clear to auscultation bilaterally, no crackles or wheezing  CARDIOVASCULAR:  Normal apical impulse, regular rate and rhythm, normal S1 and S2, no S3 or S4, and no murmur noted and no JVD, no carotid bruit, no pedal edema, good carotid upstroke bilaterally. ABDOMEN:  Soft, nontender, no masses, no hepatomegaly or splenomegaly, BS+  CHEST: nontender to palpation, expands symmetrically  MUSCULOSKELETAL:  No clubbing no cyanosis. there is no redness, warmth, or swelling of the joints  full range of motion noted  NEUROLOGIC:  Alert, awake,oriented x3  SKIN:  no bruising or bleeding, normal skin color, texture, turgor and no redness, warmth, or swelling     /68 (Site: Right Upper Arm, Position: Sitting, Cuff Size: Large Adult)   Pulse 94   Ht 5' 2\" (1.575 m)   Wt 216 lb (98 kg)   LMP 05/03/2013   BMI 39.51 kg/m²     DATA:   I personally reviewed the visit EKG with the following interpretation: Sinus rhythm, possible old anteroseptal wall MI age undetermined, diffuse T wave changes    EKG 5/3/21  Sinus tachycardia, diffuse nonspecific T wave changes, no significant changes when compared to previous      ECHO: 3/4/21 . Summary   Compared to prior echo, no significant changes noted. Technically adequate   study. Left ventricle size is normal.   Normal left ventricular wall thickness. Ejection fraction is visually estimated at 55%. No regional wall motion abnormalities seen. There is doppler evidence of stage II diastolic dysfunction. Physiologic and/or trace tricuspid regurgitation. There is a small pericardial effusion noted. Stress Test: 10/26/19   FINDINGS:   Perfusion images demonstrate small anterior wall reversible defect   Wall motion is within normal limits. The end diastolic volume is 54 ml. The end systolic volume is 17 ml. The estimated ejection fraction is 69 %.         Impression   1.  There is a small reversible defect in the inferior wall   2. Ejection fraction is 69 %. 3. No significant wall motion abnormality       Angiography: 11/4/19 IMPRESSION:  1. Angiographically normal coronary arteries. 2.  Successful deployment of 6-Sinhala Angio-Seal in the right common  femoral artery. Cardiology Labs: BMP:    Lab Results   Component Value Date     07/05/2021    K 4.7 07/05/2021    K 4.2 07/18/2020     07/05/2021    CO2 23 07/05/2021    BUN 11 07/05/2021    CREATININE 0.7 07/05/2021     CMP:    Lab Results   Component Value Date     07/05/2021    K 4.7 07/05/2021    K 4.2 07/18/2020     07/05/2021    CO2 23 07/05/2021    BUN 11 07/05/2021    CREATININE 0.7 07/05/2021    PROT 6.9 07/05/2021     CBC:    Lab Results   Component Value Date    WBC 5.9 07/05/2021    RBC 5.14 07/05/2021    HGB 13.7 07/05/2021    HCT 43.2 07/05/2021    MCV 84.0 07/05/2021    RDW 14.5 07/05/2021     07/05/2021     PT/INR:  No results found for: PTINR  PT/INR Warfarin:  No components found for: PTPATWAR, PTINRWAR  PTT:    Lab Results   Component Value Date    APTT 32.4 10/24/2019     PTT Heparin:  No components found for: APTTHEP  Magnesium:    Lab Results   Component Value Date    MG 1.9 11/02/2019     TSH:    Lab Results   Component Value Date    TSH 0.431 07/05/2021     TROPONIN:  No components found for: TROP  BNP:  No results found for: BNP  FASTING LIPID PANEL:    Lab Results   Component Value Date    CHOL 219 07/05/2021    HDL 81 07/05/2021    TRIG 129 07/05/2021     No orders to display     I have personally reviewed the laboratory, cardiac diagnostic and radiographic testing as outlined above:      IMPRESSION:  1. Pericardial effusion: Small on an echocardiogram done in March 2021, will repeat in 6 months for reevaluation  2. Chronic congestive heart failure: Diastolic, compensated, continue current treatment  3.  Hypertension: Not well controlled as per her report, will adjust Coreg dose  4. Hyperlipidemia  5. Seizures:   6. Depression     RECOMMENDATIONS:   1. Increase carvedilol to 18.75 mg by mouth twice daily  2. Continue the rest of medications  3. CHF: Daily weight, take an extra Lasix for weight gain of more than 2-3 pounds in 24 hours, compliance with diuretics, low-salt diet were all advised. 5.  Follow-up with Dr. Isi Patrick as scheduled  6. Follow-up with Dr. Carrie Babb in 6 months, sooner if symptomatic for any reason    I have reviewed my findings and recommendations with patient    Electronically signed by Kamilla Rogers MD on 1/5/2022 at 3:09 PM  NOTE: This report was transcribed using voice recognition software.  Every effort was made to ensure accuracy; however, inadvertent computerized transcription errors may be present

## 2022-01-05 ENCOUNTER — OFFICE VISIT (OUTPATIENT)
Dept: CARDIOLOGY CLINIC | Age: 52
End: 2022-01-05
Payer: MEDICARE

## 2022-01-05 VITALS
HEIGHT: 62 IN | DIASTOLIC BLOOD PRESSURE: 68 MMHG | SYSTOLIC BLOOD PRESSURE: 130 MMHG | HEART RATE: 94 BPM | BODY MASS INDEX: 39.75 KG/M2 | WEIGHT: 216 LBS

## 2022-01-05 DIAGNOSIS — R00.0 SINUS TACHYCARDIA: Primary | ICD-10-CM

## 2022-01-05 PROCEDURE — G8427 DOCREV CUR MEDS BY ELIG CLIN: HCPCS | Performed by: INTERNAL MEDICINE

## 2022-01-05 PROCEDURE — G8484 FLU IMMUNIZE NO ADMIN: HCPCS | Performed by: INTERNAL MEDICINE

## 2022-01-05 PROCEDURE — 1036F TOBACCO NON-USER: CPT | Performed by: INTERNAL MEDICINE

## 2022-01-05 PROCEDURE — 99214 OFFICE O/P EST MOD 30 MIN: CPT | Performed by: INTERNAL MEDICINE

## 2022-01-05 PROCEDURE — 93000 ELECTROCARDIOGRAM COMPLETE: CPT | Performed by: INTERNAL MEDICINE

## 2022-01-05 PROCEDURE — G8417 CALC BMI ABV UP PARAM F/U: HCPCS | Performed by: INTERNAL MEDICINE

## 2022-01-05 PROCEDURE — 3017F COLORECTAL CA SCREEN DOC REV: CPT | Performed by: INTERNAL MEDICINE

## 2022-01-10 NOTE — ACP (ADVANCE CARE PLANNING)
Advance Care Planning   Ambulatory ACP Specialist Patient Outreach    Date:  12/13/2021  ACP Specialist:  Theo Harris    Outreach call to patient in follow-up to ACP Specialist referral from: Chas Anne DO    [x] PCP  [] Provider   [] Ambulatory Care Management [] Other for Reason:    [x] Advance Directive Assistance  [] Code Status Discussion  [] Complete Portable DNR Order  [] Discuss Goals of Care  [] Complete POST/MOST  [] Early ACP Decision-Making  [] Other    Date Referral Received: 12/13/2021    Today's Outreach:  [] First   [] Second  [x] Third                               Third outreach made by []  phone  [] email []   [email or web address]     Intervention:  [] Spoke with Patient  [x] Left VM requesting return call      Outcome: I have made several attempts to contact. I have mailed documents for her. At this time I am closing referral but available if needed to complete documents. Next Step:   [] ACP scheduled conversation  [] Outreach again in one week               [] Email / Mail ACP Info Sheets  [] Email / Mail Advance Directive            [x] Close Referral. Routing closure to referring provider/staff and to ACP Specialist .      Thank you for this referral.

## 2022-01-18 ENCOUNTER — TELEPHONE (OUTPATIENT)
Dept: ADMINISTRATIVE | Age: 52
End: 2022-01-18

## 2022-01-18 RX ORDER — CARVEDILOL 6.25 MG/1
TABLET ORAL
Qty: 180 TABLET | Refills: 3 | Status: SHIPPED
Start: 2022-01-18 | End: 2022-04-26

## 2022-01-25 DIAGNOSIS — R06.02 SHORTNESS OF BREATH: ICD-10-CM

## 2022-02-25 ENCOUNTER — TELEPHONE (OUTPATIENT)
Dept: FAMILY MEDICINE CLINIC | Age: 52
End: 2022-02-25

## 2022-02-25 ENCOUNTER — NURSE TRIAGE (OUTPATIENT)
Dept: OTHER | Facility: CLINIC | Age: 52
End: 2022-02-25

## 2022-02-25 NOTE — TELEPHONE ENCOUNTER
----- Message from Anuel Tavares sent at 2/25/2022  2:56 PM EST -----  Subject: Message to Provider    QUESTIONS  Information for Provider? pt would like a call back from office in regards   to needing new lab orders. ---------------------------------------------------------------------------  --------------  Rodrick LEDEZMA  What is the best way for the office to contact you? OK to leave message on   voicemail  Preferred Call Back Phone Number? [phone]  ---------------------------------------------------------------------------  --------------  SCRIPT ANSWERS  Relationship to Patient?  Self

## 2022-02-25 NOTE — TELEPHONE ENCOUNTER
Received call from [facility] [address] at [facility], caller not on line. Complaint: swelling    Practice Name: Lily Huang    Caller's telephone number verified as [phone]    Connected with caller via phone, please see below triage    Subjective: swelling in feet, this happens every so often. Current Symptoms: swelling in feet. Onset: 2 weeks ago; unchanged    Associated Symptoms: NA    Pain Severity: 0/10; N/A; none    Temperature: denies fever by unknown method    What has been tried: nothing    LMP: NA Pregnant: NA    Recommended disposition: See PCP within 24 Hours    Care advice provided, patient verbalizes understanding; denies any other questions or concerns; instructed to call back for any new or worsening symptoms. Pt states that she has appt for Tuesday and she is going to keep that appt. Advsied her to call back if develops any sob, chest pain or calf pain. Verbalizes understanding. Attention Provider: Thank you for allowing me to participate in the care of your patient. The patient was connected to triage in response to information provided to the ECC/PSC. Please do not respond through this encounter as the response is not directed to a shared pool.                 Reason for Disposition   [1] Very swollen ankle AND [2] no fever    Protocols used: ANKLE SWELLING-ADULT-AH

## 2022-02-25 NOTE — TELEPHONE ENCOUNTER
Pt notified that she does not need a req if she goes to a [facility] facility.

## 2022-03-01 ENCOUNTER — TELEPHONE (OUTPATIENT)
Dept: ADMINISTRATIVE | Age: 52
End: 2022-03-01

## 2022-03-24 ENCOUNTER — OFFICE VISIT (OUTPATIENT)
Dept: FAMILY MEDICINE CLINIC | Age: 52
End: 2022-03-24
Payer: MEDICARE

## 2022-03-24 VITALS
TEMPERATURE: 96.7 F | SYSTOLIC BLOOD PRESSURE: 120 MMHG | BODY MASS INDEX: 38.57 KG/M2 | DIASTOLIC BLOOD PRESSURE: 76 MMHG | OXYGEN SATURATION: 97 % | WEIGHT: 209.6 LBS | RESPIRATION RATE: 16 BRPM | HEIGHT: 62 IN | HEART RATE: 86 BPM

## 2022-03-24 DIAGNOSIS — G40.909 SEIZURE DISORDER (HCC): ICD-10-CM

## 2022-03-24 DIAGNOSIS — R73.03 PREDIABETES: ICD-10-CM

## 2022-03-24 DIAGNOSIS — R60.0 LOWER EXTREMITY EDEMA: Primary | ICD-10-CM

## 2022-03-24 DIAGNOSIS — E66.01 SEVERE OBESITY (BMI 35.0-39.9) WITH COMORBIDITY (HCC): ICD-10-CM

## 2022-03-24 DIAGNOSIS — E78.2 MIXED HYPERLIPIDEMIA: ICD-10-CM

## 2022-03-24 DIAGNOSIS — F33.2 SEVERE EPISODE OF RECURRENT MAJOR DEPRESSIVE DISORDER, WITHOUT PSYCHOTIC FEATURES (HCC): ICD-10-CM

## 2022-03-24 DIAGNOSIS — Z12.31 ENCOUNTER FOR SCREENING MAMMOGRAM FOR MALIGNANT NEOPLASM OF BREAST: ICD-10-CM

## 2022-03-24 DIAGNOSIS — H91.92 HEARING LOSS OF LEFT EAR, UNSPECIFIED HEARING LOSS TYPE: ICD-10-CM

## 2022-03-24 DIAGNOSIS — Z12.11 COLON CANCER SCREENING: ICD-10-CM

## 2022-03-24 PROCEDURE — 1036F TOBACCO NON-USER: CPT | Performed by: FAMILY MEDICINE

## 2022-03-24 PROCEDURE — 99214 OFFICE O/P EST MOD 30 MIN: CPT | Performed by: FAMILY MEDICINE

## 2022-03-24 PROCEDURE — G8417 CALC BMI ABV UP PARAM F/U: HCPCS | Performed by: FAMILY MEDICINE

## 2022-03-24 PROCEDURE — G8484 FLU IMMUNIZE NO ADMIN: HCPCS | Performed by: FAMILY MEDICINE

## 2022-03-24 PROCEDURE — 3017F COLORECTAL CA SCREEN DOC REV: CPT | Performed by: FAMILY MEDICINE

## 2022-03-24 PROCEDURE — G8427 DOCREV CUR MEDS BY ELIG CLIN: HCPCS | Performed by: FAMILY MEDICINE

## 2022-03-24 RX ORDER — TRAZODONE HYDROCHLORIDE 100 MG/1
TABLET ORAL NIGHTLY PRN
COMMUNITY
Start: 2022-03-16

## 2022-03-24 RX ORDER — PRAVASTATIN SODIUM 40 MG
40 TABLET ORAL DAILY
Qty: 30 TABLET | Refills: 2 | Status: SHIPPED
Start: 2022-03-24 | End: 2022-06-30

## 2022-03-24 NOTE — PROGRESS NOTES
Swelling   Patient is concerned about swelling in both of her feet for the last few months. She denies any pain in her feet. She states that she can't barely get shoes and socks on. She has been elevating her legs. She states that she has been buying her shoes up a size and continues to have issues getting her shoes on. She denies any shortness of breath, chest pain, palpitations. Hyperlipidemia:  Patient presents with hyperlipidemia. Is asymptomatic. This is a chronic problem. Patient is  well controlled, as reviewed and seen on most recent labs. Is currently on pravachol. Compliance with treatment thus far has been fair. No adverse effects. The patient does not use medications that may worsen dyslipidemias (corticosteroids, progestins, anabolic steroids, diuretics, beta-blockers, amiodarone, cyclosporine, olanzapine). The patient exercises intermittently. Patient is not a smoker. She was seen by her OB/GYN for her yearly physical and they insisted that her abdomen was distended and she ordered a vaginal ultrasound but she has not had it done yet. She states that she needs a referral to a new ENT. [facility] but was discharged due to no shows. She has hearing loss and cerumen impaction that she wants to see them for. Patient's past medical, surgical, social and/or family history reviewed, updated in chart, and are non-contributory (unless otherwise stated). Medications and allergies also reviewed and updated in chart.       Review of Systems:  Constitutional:  No fever, no fatigue, no chills, no headaches, no weight change  Dermatology:  No rash, no mole, no dry or sensitive skin  ENT:  No cough, no sore throat, no sinus pain, no runny nose, no ear pain  Cardiology:  No chest pain, no palpitations, no leg edema, no shortness of breath, no PND  Gastroenterology:  No dysphagia, no abdominal pain, no nausea, no vomiting, no constipation, no diarrhea, no heartburn  Musculoskeletal:  No joint pain, no leg cramps, no back pain, no muscle aches  Respiratory:  No shortness of breath, no orthopnea, no wheezing, no RAINES, no hemoptysis  Urology:  No blood in the urine, no urinary frequency, no urinary incontinence, no urinary urgency, no nocturia, no dysuria      Vitals:    03/24/22 1235   BP: 120/76   Pulse: 86   Resp: 16   Temp: 96.7 °F (35.9 °C)   TempSrc: Temporal   SpO2: 97%   Weight: 209 lb 9.6 oz (95.1 kg)   Height: 5' 2\" (1.575 m)       Physical Exam  Vitals and nursing note reviewed. Constitutional:       Appearance: She is well-developed. HENT:      Head: Normocephalic and atraumatic. Right Ear: External ear normal.      Left Ear: External ear normal.      Nose: Nose normal.   Eyes:      Conjunctiva/sclera: Conjunctivae normal.      Pupils: Pupils are equal, round, and reactive to light. Neck:      Thyroid: No thyromegaly. Cardiovascular:      Rate and Rhythm: Normal rate and regular rhythm. Heart sounds: Normal heart sounds. Pulmonary:      Effort: Pulmonary effort is normal.      Breath sounds: Normal breath sounds. No wheezing. Abdominal:      General: Bowel sounds are normal. There is distension. Palpations: Abdomen is soft. Tenderness: There is no abdominal tenderness. Musculoskeletal:         General: Normal range of motion. Cervical back: Normal range of motion and neck supple. Skin:     General: Skin is warm and dry. Findings: No rash. Neurological:      Mental Status: She is alert and oriented to person, place, and time. Deep Tendon Reflexes: Reflexes are normal and symmetric. Psychiatric:         Behavior: Behavior normal.         Assessment/Plan:      Saintclair Honey was seen today for joint pain and swelling. Diagnoses and all orders for this visit:    Lower extremity edema  conservative management discussed and recommended  Use of compression stockings.    No swelling on exam.     Mixed hyperlipidemia  -     pravastatin (PRAVACHOL) 40 MG tablet; Take 1 tablet by mouth daily  -     Comprehensive Metabolic Panel; Future  -     Lipid Panel; Future  Labs ordered  Diet and exercise were discussed and recommended to the patient. Hearing loss of left ear, unspecified hearing loss type  -     Gee Bales MD, Otolaryngology, [place] ([facility])    Encounter for screening mammogram for malignant neoplasm of breast  -     HERNESTO DIGITAL SCREEN BILATERAL PER PROTOCOL; Future    Severe episode of recurrent major depressive disorder, without psychotic features ([facility] [address]  Follows with psychiatry  Not well controlled at this time  Unexpectedly lost her  in December and has been struggling  Following with a counselor. Seizure disorder ([address]  Stable at this time  Sees neurology    Colon cancer screening  -     Shannan Garcia MD, General Surgery, [place]    Severe obesity (BMI 35.0-39. 9) with comorbidity ([facility] [address]  Diet and exercise were discussed and recommended to the patient. Prediabetes  -     Comprehensive Metabolic Panel; Future  -     CBC with Auto Differential; Future  -     Lipid Panel; Future  -     Hemoglobin A1C; Future  -     TSH; Future      As above. Call or go to ED immediately if symptoms worsen or persist.  Return in about 3 months (around 6/24/2022), or if symptoms worsen or fail to improve. , or sooner if necessary. Educational materials and/or home exercises printed for patient's review and were included in patient instructions on his/her After Visit Summary and given to patient at the end of visit. Counseled regarding above diagnosis, including possible risks and complications,  especially if left uncontrolled. Counseled regarding the possible side effects, risks, benefits and alternatives to treatment; patient and/or guardian verbalizes understanding, agrees, feels comfortable with and wishes to proceed with above treatment plan.     Advised patient to call with any new medication issues, and read all Rx info from pharmacy to assure aware of all possible risks and side effects of medication before taking. Reviewed age and gender appropriate health screening exams and vaccinations. Advised patient regarding importance of keeping up with recommended health maintenance and to schedule as soon as possible if overdue, as this is important in assessing for undiagnosed pathology, especially cancer, as well as protecting against potentially harmful/life threatening disease. Patient and/or guardian verbalizes understanding and agrees with above counseling, assessment and plan. All questions answered. Ish Lara,   3/24/2022    I have personally reviewed and updated the chief complaint, HPI, Past Medical, Family and Social History, as well as the above Review of Systems.

## 2022-03-24 NOTE — PATIENT INSTRUCTIONS
Patient Education        Leg and Ankle Edema: Care Instructions  Your Care Instructions  Swelling in the legs, ankles, and feet is called edema. It is common after you sit or stand for a while. Long plane flights or car rides often cause swelling in the legs and feet. You may also have swelling if you have to stand for long periods of time at your job. Problems with the veins in the legs (varicose veins) and changes in hormones can also cause swelling. Sometimes the swelling in the ankles and feet is caused by a more serious problem, such as heart failure, infection, blood clots, or liver or kidney disease. Follow-up care is a key part of your treatment and safety. Be sure to make and go to all appointments, and call your doctor if you are having problems. It's also a good idea to know your test results and keep a list of the medicines you take. How can you care for yourself at home? · If your doctor gave you medicine, take it as prescribed. Call your doctor if you think you are having a problem with your medicine. · Whenever you are resting, raise your legs up. Try to keep the swollen area higher than the level of your heart. · Take breaks from standing or sitting in one position. ? Walk around to increase the blood flow in your lower legs. ? Move your feet and ankles often while you stand, or tighten and relax your leg muscles. · Wear support stockings. Put them on in the morning, before swelling gets worse. · Eat a balanced diet. Lose weight if you need to. · Limit the amount of salt (sodium) in your diet. Salt holds fluid in the body and may increase swelling. When should you call for help? Call 911 anytime you think you may need emergency care. For example, call if:    · You have symptoms of a blood clot in your lung (called a pulmonary embolism). These may include:  ? Sudden chest pain. ? Trouble breathing. ? Coughing up blood.    Call your doctor now or seek immediate medical care if:    · You have signs of a blood clot, such as:  ? Pain in your calf, back of the knee, thigh, or groin. ? Redness and swelling in your leg or groin.     · You have symptoms of infection, such as:  ? Increased pain, swelling, warmth, or redness. ? Red streaks or pus. ? A fever. Watch closely for changes in your health, and be sure to contact your doctor if:    · Your swelling is getting worse.     · You have new or worsening pain in your legs.     · You do not get better as expected. Where can you learn more? Go to [email or web address]://[email or web address].[email or web address] org and sign in to your [employer] account. Enter N074 in the [facility] box to learn more about \"Leg and Ankle Edema: Care Instructions. \"     If you do not have an account, please click on the \"Sign Up Now\" link. Current as of: July 1, 2021               Content Version: 13.1  © 2006-2021 Healthwise, [facility]. Care instructions adapted under license by [facility] ([facility]). If you have questions about a medical condition or this instruction, always ask your healthcare professional. [place] [address] any warranty or liability for your use of this information.

## 2022-03-28 ENCOUNTER — OFFICE VISIT (OUTPATIENT)
Dept: PHYSICAL MEDICINE AND REHAB | Age: 52
End: 2022-03-28
Payer: MEDICARE

## 2022-03-28 VITALS
HEIGHT: 63 IN | OXYGEN SATURATION: 94 % | SYSTOLIC BLOOD PRESSURE: 124 MMHG | BODY MASS INDEX: 38.98 KG/M2 | TEMPERATURE: 98.8 F | HEART RATE: 97 BPM | WEIGHT: 220 LBS | DIASTOLIC BLOOD PRESSURE: 60 MMHG

## 2022-03-28 DIAGNOSIS — M51.36 DDD (DEGENERATIVE DISC DISEASE), LUMBAR: ICD-10-CM

## 2022-03-28 DIAGNOSIS — M47.816 LUMBAR SPONDYLOSIS: ICD-10-CM

## 2022-03-28 DIAGNOSIS — M54.16 RIGHT LUMBAR RADICULOPATHY: Primary | ICD-10-CM

## 2022-03-28 DIAGNOSIS — M51.26 LUMBAR DISC HERNIATION: ICD-10-CM

## 2022-03-28 DIAGNOSIS — M54.41 RIGHT-SIDED LOW BACK PAIN WITH RIGHT-SIDED SCIATICA, UNSPECIFIED CHRONICITY: ICD-10-CM

## 2022-03-28 PROCEDURE — G8428 CUR MEDS NOT DOCUMENT: HCPCS | Performed by: PHYSICAL MEDICINE & REHABILITATION

## 2022-03-28 PROCEDURE — 99214 OFFICE O/P EST MOD 30 MIN: CPT | Performed by: PHYSICAL MEDICINE & REHABILITATION

## 2022-03-28 PROCEDURE — G8484 FLU IMMUNIZE NO ADMIN: HCPCS | Performed by: PHYSICAL MEDICINE & REHABILITATION

## 2022-03-28 PROCEDURE — 1036F TOBACCO NON-USER: CPT | Performed by: PHYSICAL MEDICINE & REHABILITATION

## 2022-03-28 PROCEDURE — 3017F COLORECTAL CA SCREEN DOC REV: CPT | Performed by: PHYSICAL MEDICINE & REHABILITATION

## 2022-03-28 PROCEDURE — G8417 CALC BMI ABV UP PARAM F/U: HCPCS | Performed by: PHYSICAL MEDICINE & REHABILITATION
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
## 2022-04-04 ENCOUNTER — OFFICE VISIT (OUTPATIENT)
Dept: SURGERY | Age: 52
End: 2022-04-04
Payer: MEDICARE

## 2022-04-04 VITALS
DIASTOLIC BLOOD PRESSURE: 77 MMHG | RESPIRATION RATE: 18 BRPM | HEART RATE: 84 BPM | HEIGHT: 63 IN | OXYGEN SATURATION: 96 % | SYSTOLIC BLOOD PRESSURE: 123 MMHG | TEMPERATURE: 97.3 F | BODY MASS INDEX: 38.8 KG/M2 | WEIGHT: 219 LBS

## 2022-04-04 DIAGNOSIS — Z12.11 COLON CANCER SCREENING: ICD-10-CM

## 2022-04-04 DIAGNOSIS — K31.84 GASTROPARESIS: Primary | ICD-10-CM

## 2022-04-04 DIAGNOSIS — K21.9 GASTROESOPHAGEAL REFLUX DISEASE WITHOUT ESOPHAGITIS: ICD-10-CM

## 2022-04-04 PROCEDURE — G8417 CALC BMI ABV UP PARAM F/U: HCPCS | Performed by: SURGERY

## 2022-04-04 PROCEDURE — 99213 OFFICE O/P EST LOW 20 MIN: CPT | Performed by: SURGERY

## 2022-04-04 PROCEDURE — 1036F TOBACCO NON-USER: CPT | Performed by: SURGERY

## 2022-04-04 PROCEDURE — G8427 DOCREV CUR MEDS BY ELIG CLIN: HCPCS | Performed by: SURGERY

## 2022-04-04 PROCEDURE — 3017F COLORECTAL CA SCREEN DOC REV: CPT | Performed by: SURGERY

## 2022-04-04 NOTE — PROGRESS NOTES
Progress Note - Follow up    Patient's Name/Date of Birth: Carissa Fisher / 1970    Date: 4/4/2022    PCP: Sis Duran DO    Referring Physician:   Jc Hamilton    Chief Complaint   Patient presents with    Colonoscopy     consultation        HPI:    The patient said she is feeling acid/fluid coming up her esophagus. It is worse when she lies down. This happens when she hasn't eaten for a few hours. She said she is taking prilosec BID. She doesn't think that has helped at all. The patient said she has gastroparesis and has had multiple EGDs. 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 She has had it for 20+ years. She said she has had several botox injections. She hasn't had a botox injection in 10 years. She is on a gastroparesis diet. She said this happens at night when she is lying flat. She said this happens a couple times a week. Reglan gave her seizures. Patient's medications, allergies, past medical, surgical, social and family histories were reviewed and updated as appropriate. Review of Systems  Constitutional: negative  Respiratory: negative  Cardiovascular: negative  Gastrointestinal: as in hpi  Genitourinary:negative  Integument/breast: negative    Physical Exam:  /77   Pulse 84   Temp 97.3 °F (36.3 °C) (Infrared)   Resp 18   Ht 5' 2.5\" (1.588 m)   Wt 219 lb (99.3 kg)   LMP 05/03/2013   SpO2 96%   BMI 39.42 kg/m²   General appearance: alert, cooperative and in no acute distress. Lungs: normal work of breathing  Heart: regular rate  Abdomen: soft, nontender, nondistended  Musculoskeletal: symmetrical without edema. Skin: normal       Impression/Plan:  46y.o. year old female with gastroparesis, poor colonoscopy prep last year    Golytely two day prep. I will set the patient up for an EGD and colonoscopy, possible biopsy, possible polypectomy.     I explained the risks including but not limited to bowel perforation, postoperative bleeding, post-polypectomy syndrome, as well as the possibility of needing emergency surgery or another colonoscopy. The benefits, alternatives, and potential complications associated with the above procedure to be performed and transfusions when applicable with the patient/responsible person prior to the procedure. All of the patient's questions were answered. The patient understands and agrees to the procedure.          Electronically by Mo Payan MD, General Surgery  on 4/4/2022 at 2:40 PM      Send copy of H&P to PCP, Ludwig Guerrero DO and referring physician, Jenifer Pope DO      4/4/2022

## 2022-04-05 ENCOUNTER — TELEPHONE (OUTPATIENT)
Dept: SURGERY | Age: 52
End: 2022-04-05

## 2022-04-05 RX ORDER — POLYETHYLENE GLYCOL 3350, SODIUM SULFATE ANHYDROUS, SODIUM BICARBONATE, SODIUM CHLORIDE, POTASSIUM CHLORIDE 236; 22.74; 6.74; 5.86; 2.97 G/4L; G/4L; G/4L; G/4L; G/4L
4 POWDER, FOR SOLUTION ORAL ONCE
Qty: 4000 ML | Refills: 0 | Status: SHIPPED | OUTPATIENT
Start: 2022-04-05 | End: 2022-04-05

## 2022-04-11 ENCOUNTER — HOSPITAL ENCOUNTER (OUTPATIENT)
Age: 52
Discharge: HOME | End: 2022-04-11
Payer: MEDICARE

## 2022-04-11 ENCOUNTER — HOSPITAL ENCOUNTER (OUTPATIENT)
Dept: ULTRASOUND IMAGING | Age: 52
Discharge: HOME | End: 2022-04-11
Payer: MEDICARE

## 2022-04-11 DIAGNOSIS — E78.2 MIXED HYPERLIPIDEMIA: ICD-10-CM

## 2022-04-11 DIAGNOSIS — R73.03 PREDIABETES: ICD-10-CM

## 2022-04-11 DIAGNOSIS — R10.2 PELVIC PAIN IN FEMALE: ICD-10-CM

## 2022-04-11 LAB
ALBUMIN SERPL-MCNC: 4 G/DL (ref 3.5–5.2)
ALP BLD-CCNC: 144 U/L (ref 35–104)
ALT SERPL-CCNC: 37 U/L (ref 0–32)
ANION GAP SERPL CALCULATED.3IONS-SCNC: 9 MMOL/L (ref 7–16)
AST SERPL-CCNC: 21 U/L (ref 0–31)
BASOPHILS ABSOLUTE: 0.02 E9/L (ref 0–0.2)
BASOPHILS RELATIVE PERCENT: 0.4 % (ref 0–2)
BILIRUB SERPL-MCNC: <0.2 MG/DL (ref 0–1.2)
BUN BLDV-MCNC: 12 MG/DL (ref 6–20)
CALCIUM SERPL-MCNC: 8.9 MG/DL (ref 8.6–10.2)
CHLORIDE BLD-SCNC: 101 MMOL/L (ref 98–107)
CHOLESTEROL, TOTAL: 202 MG/DL (ref 0–199)
CO2: 28 MMOL/L (ref 22–29)
CREAT SERPL-MCNC: 0.7 MG/DL (ref 0.5–1)
EOSINOPHILS ABSOLUTE: 0.04 E9/L (ref 0.05–0.5)
EOSINOPHILS RELATIVE PERCENT: 0.8 % (ref 0–6)
GFR AFRICAN AMERICAN: >60
GFR NON-AFRICAN AMERICAN: >60 ML/MIN/1.73
GLUCOSE BLD-MCNC: 109 MG/DL (ref 74–99)
HBA1C MFR BLD: 5.8 % (ref 4–5.6)
HCT VFR BLD CALC: 41.8 % (ref 34–48)
HDLC SERPL-MCNC: 69 MG/DL
HEMOGLOBIN: 12.6 G/DL (ref 11.5–15.5)
IMMATURE GRANULOCYTES #: 0.01 E9/L
IMMATURE GRANULOCYTES %: 0.2 % (ref 0–5)
LDL CHOLESTEROL CALCULATED: 93 MG/DL (ref 0–99)
LYMPHOCYTES ABSOLUTE: 2.02 E9/L (ref 1.5–4)
LYMPHOCYTES RELATIVE PERCENT: 40.3 % (ref 20–42)
MCH RBC QN AUTO: 26.4 PG (ref 26–35)
MCHC RBC AUTO-ENTMCNC: 30.1 % (ref 32–34.5)
MCV RBC AUTO: 87.6 FL (ref 80–99.9)
MONOCYTES ABSOLUTE: 0.21 E9/L (ref 0.1–0.95)
MONOCYTES RELATIVE PERCENT: 4.2 % (ref 2–12)
NEUTROPHILS ABSOLUTE: 2.71 E9/L (ref 1.8–7.3)
NEUTROPHILS RELATIVE PERCENT: 54.1 % (ref 43–80)
PDW BLD-RTO: 13.8 FL (ref 11.5–15)
PLATELET # BLD: 352 E9/L (ref 130–450)
PMV BLD AUTO: 8.6 FL (ref 7–12)
POTASSIUM SERPL-SCNC: 4.4 MMOL/L (ref 3.5–5)
RBC # BLD: 4.77 E12/L (ref 3.5–5.5)
SODIUM BLD-SCNC: 138 MMOL/L (ref 132–146)
TOTAL PROTEIN: 7 G/DL (ref 6.4–8.3)
TRIGL SERPL-MCNC: 200 MG/DL (ref 0–149)
TSH SERPL DL<=0.05 MIU/L-ACNC: 0.49 UIU/ML (ref 0.27–4.2)
VLDLC SERPL CALC-MCNC: 40 MG/DL
WBC # BLD: 5 E9/L (ref 4.5–11.5)

## 2022-04-11 PROCEDURE — 36415 COLL VENOUS BLD VENIPUNCTURE: CPT

## 2022-04-11 PROCEDURE — 80053 COMPREHEN METABOLIC PANEL: CPT

## 2022-04-11 PROCEDURE — 84443 ASSAY THYROID STIM HORMONE: CPT

## 2022-04-11 PROCEDURE — 85025 COMPLETE CBC W/AUTO DIFF WBC: CPT

## 2022-04-11 PROCEDURE — 76830 TRANSVAGINAL US NON-OB: CPT

## 2022-04-11 PROCEDURE — 80061 LIPID PANEL: CPT

## 2022-04-11 PROCEDURE — 83036 HEMOGLOBIN GLYCOSYLATED A1C: CPT

## 2022-04-14 ENCOUNTER — OFFICE VISIT (OUTPATIENT)
Dept: NEUROLOGY | Age: 52
End: 2022-04-14
Payer: MEDICARE

## 2022-04-14 VITALS
HEART RATE: 90 BPM | DIASTOLIC BLOOD PRESSURE: 85 MMHG | WEIGHT: 219 LBS | BODY MASS INDEX: 40.3 KG/M2 | OXYGEN SATURATION: 95 % | HEIGHT: 62 IN | RESPIRATION RATE: 18 BRPM | TEMPERATURE: 97.4 F | SYSTOLIC BLOOD PRESSURE: 137 MMHG

## 2022-04-14 DIAGNOSIS — G47.33 OSA (OBSTRUCTIVE SLEEP APNEA): ICD-10-CM

## 2022-04-14 DIAGNOSIS — F44.5 PSYCHOGENIC NONEPILEPTIC SEIZURE: Primary | ICD-10-CM

## 2022-04-14 DIAGNOSIS — G40.909 SEIZURE DISORDER (HCC): ICD-10-CM

## 2022-04-14 PROCEDURE — 1036F TOBACCO NON-USER: CPT | Performed by: PHYSICIAN ASSISTANT

## 2022-04-14 PROCEDURE — 3017F COLORECTAL CA SCREEN DOC REV: CPT | Performed by: PHYSICIAN ASSISTANT

## 2022-04-14 PROCEDURE — G8427 DOCREV CUR MEDS BY ELIG CLIN: HCPCS | Performed by: PHYSICIAN ASSISTANT

## 2022-04-14 PROCEDURE — G8417 CALC BMI ABV UP PARAM F/U: HCPCS | Performed by: PHYSICIAN ASSISTANT

## 2022-04-14 PROCEDURE — 99214 OFFICE O/P EST MOD 30 MIN: CPT | Performed by: PHYSICIAN ASSISTANT

## 2022-04-14 NOTE — PROGRESS NOTES
[address] [facility]. Brent Kaur M.D., F.A.C.P. Yoanna Mesa, OBED, APRN, ACNS-BC  Jessica Golden. Hero Lo, MSN, APRN-FNP-C  SMILEY Jules, PAElieserC  Marichuy Madrid, MSN, APRN-FNP-C  [address], [place], [address] [address]  Phone: [phone]  Fax: [phone]       Francesco Mata is a 46 y.o. right handed female     Presents for follow-up of seizures. She presents alone and is a fair historian. 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 At that time she had lysis adhesion surgery and she reports that the surgeon \"nicked my vagus nerve\". Her daughter reports that she had several spells almost daily after this time. Most of which are described as staring, jumbled up words, impaired awareness followed by confusion. Prior to these episodes she states that her head feels \"funny\"-- but cannot further describe this. She reports \"grand mal\" seizures, but denies tonic-clonic movements. She does report becoming extremely tense. She describes impaired memories surrounding all of the above events. She reports loss of consciousness, bruises all over her body and tongue biting. She denies incontinence. She was seen in August 2019 by Dr. Cata Guerrero for seizure-like activity. At that time she presented with overdose on multiple neuroleptic medications. EEG in August 2019 was normal.  It was felt that these events were probable nonepileptic spells in addition to conversion disorder. Keppra was to be discontinued at that time and switched to Lamictal for its mood stabilizing properties.      In October 2019 she was seen in the hospital for possible breakthrough seizures after running out of her Keppra and taking leftover Lamictal.  She had several RRT's on that admission for decreased responsiveness and rigidity. AEDs were titrated and she did not have any response. MRI of the brain revealed minimal asymmetry and slight volume loss of the amygdala and hippocampus on the right suggesting the possibility of mesial temporal sclerosis. Also showed a stable left anterior temporal fossa arachnoid cysts. As her events did not increase or decrease with titration and reduction of AEDs during that admission and her ability to follow commands during some of the events she was discharged on Keppra 500 mg twice daily    Patient was evaluated at [facility] epilepsy monitoring unit in December 2019. A 5-day video EEG was consistent with a diagnosis of psychogenic nonepileptic seizures. 2 events were recorded which the patient reported were typical without any EEG change to suggest these were of epileptic etiology. The events captured were that typical of her feeling foggy, apparent unresponsiveness and not following commands and sitting with eyes closed. The second event was similar and in which she was actively hyperventilating. Unfortunately an episode with lipsmacking and convulsions was not captured and that part is inconclusive. Due to this it was recommended to continue Keppra 750 mg twice daily. She has seen been evaluated at [facility] in their EMU   States that they were able to capture one of her seizures by inducing it with sleep depravation. She was d/c home on Vimpat, but states that this caused more seizures and was placed back on Keppra and is taking 750 mg 1.5 tablets twice daily and has been seizure free on this regimen. She has not had any further seizures since her EMU visit. Overall she feels she is doing much better. She is slightly concerned about her short term memory today-- states she will forget recent conversations, where she placed items, dates and times of things. Denies difficulties in ADLs or IADLs.  No dangerous behaviors, changes in personality etc.     She has MYNOR and is non compliant with CPAP.      No chest pain or palpitations  No SOB  No vertigo, lightheadedness or loss of consciousness  No falls, tripping or stumbling  No incontinence of bowels or bladder  No itching or bruising appreciated  No numbness, tingling or focal arm/leg weakness    ROS otherwise negative     Objective:       /85 (Site: Left Upper Arm)   Pulse 90   Temp 97.4 °F (36.3 °C)   Resp 18   Ht 5' 2\" (1.575 m)   Wt 219 lb (99.3 kg)   LMP 05/03/2013   SpO2 95%   BMI 40.06 kg/m²      General appearance: alert, appears younger than stated age and cooperative  Head: Normocephalic, without obvious abnormality, atraumatic  Eyes: Conjunctiva/corneas clear  Neck: no adenopathy, no carotid bruit on the supple  Lungs: clear to auscultation bilaterally  Heart: regular rate and rhythm, no murmur  Extremities: no cyanosis or edema  Pulses: 2+ and symmetric  Skin: no rashes or lesions     Mental Status: Alert, oriented- pleasant as always     Appropriate attention and concentration  Intact fundus of knowledge  Repetition intact  Memories intact     Speech: clear  Language: appropriate      Cranial Nerves:  I: smell     II: visual acuity      II: visual fields Full to finger counting    II: pupils JR   III,VII: ptosis None   III,IV,VI: extraocular muscles  EOMI without nystagmus    V: mastication Normal   V: facial light touch sensation  Normal   V,VII: corneal reflex      VII: facial muscle function - upper  Normal    VII: facial muscle function - lower Normal   VIII: hearing Normal   IX: soft palate elevation      IX,X: gag reflex     XI: trapezius strength  5/5   XI: sternocleidomastoid strength 5/5   XI: neck extension strength  5/5   XII: tongue strength  Normal      Motor:  5/5 throughout  No drift  Normal bulk and tone  No abnormal movements      Sensory:  Normal to LT      Coordination:   FN, FFM intact bilaterally   Heel-to-shin normal    Gait: Normal     DTR:     No Almodovar's     Laboratory/Radiology:     CBC with Differential:    Lab Results   Component Value Date    WBC 5.0 04/11/2022    RBC 4.77 04/11/2022    HGB 12.6 04/11/2022    HCT 41.8 04/11/2022     04/11/2022    MCV 87.6 04/11/2022    MCH 26.4 04/11/2022    MCHC 30.1 04/11/2022    RDW 13.8 04/11/2022    NRBC 0.0 04/18/2021    SEGSPCT 70 11/30/2013    LYMPHOPCT 40.3 04/11/2022    MONOPCT 4.2 04/11/2022    BASOPCT 0.4 04/11/2022    MONOSABS 0.21 04/11/2022    LYMPHSABS 2.02 04/11/2022    EOSABS 0.04 04/11/2022    BASOSABS 0.02 04/11/2022     CMP:    Lab Results   Component Value Date     04/11/2022    K 4.4 04/11/2022    K 4.2 07/18/2020     04/11/2022    CO2 28 04/11/2022    BUN 12 04/11/2022    CREATININE 0.7 04/11/2022    GFRAA >60 04/11/2022    LABGLOM >60 04/11/2022    GLUCOSE 109 04/11/2022    GLUCOSE 91 04/18/2021    PROT 7.0 04/11/2022    LABALBU 4.0 04/11/2022    LABALBU 3.9 04/18/2021    CALCIUM 8.9 04/11/2022    BILITOT <0.2 04/11/2022    ALKPHOS 144 04/11/2022    AST 21 04/11/2022    ALT 37 04/11/2022     Mri brain WWO  Minimal asymmetry with slight volume loss of the amygdala and  hippocampus on the right suggesting the possibility of mesial temporal  sclerosis.     Stable left anterior temporal fossa arachnoid cyst.      Mild diffuse mucosal thickening within the paranasal sinuses. EEG 8/9/19  Normal     EEG 10/28/2019  This 9 hours continuous EEG shows epliptogenicity arising from   left central area in addition to mild diffuse encephalopathy. EEG 11/1/19   This more than 4 hours video EEG shows a structural lesion in   left temporal area. No epileptiform activities are seen. EEG report from [facility] EMU   This 5-day video EEG evaluation between 12/17/2019 and 12/21/2019 was consistent  with a diagnosis of psychogenic non-epileptic seizures.  Despite activation  procedures including photic stimulation, sleep deprivation, and holding home  anticonvulsant (levetiracetam) on admission, no evidence of comorbid epilepsy  was seen during this evaluation. However, given episode type with lip smacking and convulsions was not captured,  this evaluation was in part in conclusive. Because a prior outside EEG was read  as epileptiform (not available for review) and the uncaptured episode types  sound clinically concerning, we agreed to continue anticonvulsant treatment, but  that this should not be uptitrated for the frequent \"foggy\" episodes as above. 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 Unfortunately, an episode with lip smacking and convulsions was not captured and in part inconclusive. Due to a previous EEG with epileptogenicity arising from the left central area and description of event being clinically concerning for epileptic seizure it was decided to continue on AED therapy. Reportedly, seizure activity has been confirmed by [facility] EMU admission. She is now stable on Keppra 750 mg 1.5 tablets twice daily without seizures since dose increase     Risk factors for seizures: possible mesial temporal sclerosis on MRI    Essential tremor versus enhanced physiologic tremor   Worsened by stress and anxiety   Controlled with Primidone     Subjective short term memory complaints are related to untreated sleep apnea and polypharmacy.      Plan:     Continue Keppra 750 mg 1.5 tablets twice daily    Recommend CBT for management of her PNES    Continue Primidone     CPAP compliance recommended     Okay for driving at this time as she has been > 6 months seizure free    RTO 6 months or sooner prn     Call with questions       ZACARIAS Bolivar PA-C  8:00 AM  4/14/2022

## 2022-04-26 RX ORDER — M-VIT,TX,IRON,MINS/CALC/FOLIC 27MG-0.4MG
1 TABLET ORAL DAILY
COMMUNITY

## 2022-04-26 NOTE — PROGRESS NOTES
[facility] PRE-ADMISSION TESTING INSTRUCTIONS      Have you been tested for COVID  No           Have you been told you were positive for COVID No  Have you had any known exposure to someone that is positive for COVID No  Do you have a cough                   No             Do you have shortness of breath No                 Do you have a sore throat            No                Are you having chills                    No                Are you having muscle aches. No                    Please come to the hospital wearing a mask and have your significant other wear a mask as well. Both of you should check your temperature before leaving to come here,  if it is 100 or higher please call [phone] for instruction. ARRIVAL INSTRUCTIONS:  [x] Parking the day of Surgery is located in the [facility]. Upon entering the main door make an immediate right to the surgery reception desk. [x] Bring photo ID and insurance card    [] Bring in a copy of Living will or Durable Power of  papers.     [x] Please be sure to arrange for responsible adult to provide transportation to and from the hospital    [x] Please arrange for responsible adult to be with you for the 24 hour period post procedure due to having anesthesia      GENERAL INSTRUCTIONS:    [x] Nothing by mouth after midnight, including gum, candy, mints or water    [x] You may brush your teeth, but do not swallow any water    [x] Take medications as instructed with 1-2 oz of water    [x] Stop herbal supplements and vitamins 5 days prior to procedure    [x] Follow preop dosing of blood thinners per physician instructions    [] Take 1/2 dose of evening insulin, but no insulin after midnight    [] No oral diabetic medications after midnight    [] If diabetic and have low blood sugar or feel symptomatic, take 1-2oz apple juice only    [] Bring inhalers day of surgery    [] Bring C-PAP/ Bi-Pap day of surgery    [x] Bring urine specimen day of surgery    [x] Shower or bath with soap, lather and rinse well, AM of Surgery, no lotion, powders or creams to surgical site    [x] Follow bowel prep as instructed per surgeon    [x] No tobacco products within 24 hours of surgery     [x] No alcohol or illegal drug use within 24 hours of surgery.     [x] Jewelry, body piercing's, eyeglasses, contact lenses and dentures are not permitted into surgery (bring cases)      [x] Please do not wear any nail polish, make up or hair products on the day of surgery    [x] You can expect a call the business day prior to procedure to notify you if your arrival time changes    [x] If you receive a survey after surgery we would greatly appreciate your comments    [x] Please notify surgeon if you develop any illness between now and time of surgery (cold, cough, sore throat, fever, nausea, vomiting) or any signs of infections  including skin, wounds, and dental.    []  The Outpatient Pharmacy is available to fill your prescription here on your day of surgery, ask your preop nurse for details

## 2022-05-02 ENCOUNTER — ANESTHESIA EVENT (OUTPATIENT)
Dept: ENDOSCOPY | Age: 52
End: 2022-05-02
Payer: MEDICARE

## 2022-05-02 ENCOUNTER — HOSPITAL ENCOUNTER (OUTPATIENT)
Age: 52
Setting detail: OUTPATIENT SURGERY
Discharge: HOME | End: 2022-05-02
Attending: SURGERY | Admitting: SURGERY
Payer: MEDICARE

## 2022-05-02 ENCOUNTER — HOSPITAL ENCOUNTER (EMERGENCY)
Age: 52
Discharge: HOME | End: 2022-05-02
Attending: EMERGENCY MEDICINE
Payer: MEDICARE

## 2022-05-02 ENCOUNTER — ANESTHESIA (OUTPATIENT)
Dept: ENDOSCOPY | Age: 52
End: 2022-05-02
Payer: MEDICARE

## 2022-05-02 VITALS
SYSTOLIC BLOOD PRESSURE: 134 MMHG | TEMPERATURE: 96.6 F | OXYGEN SATURATION: 97 % | RESPIRATION RATE: 18 BRPM | HEART RATE: 92 BPM | WEIGHT: 217 LBS | BODY MASS INDEX: 39.06 KG/M2 | DIASTOLIC BLOOD PRESSURE: 74 MMHG

## 2022-05-02 VITALS
DIASTOLIC BLOOD PRESSURE: 77 MMHG | TEMPERATURE: 98 F | BODY MASS INDEX: 38.98 KG/M2 | WEIGHT: 220 LBS | OXYGEN SATURATION: 99 % | RESPIRATION RATE: 16 BRPM | HEART RATE: 77 BPM | HEIGHT: 63 IN | SYSTOLIC BLOOD PRESSURE: 132 MMHG

## 2022-05-02 VITALS — DIASTOLIC BLOOD PRESSURE: 66 MMHG | SYSTOLIC BLOOD PRESSURE: 132 MMHG | OXYGEN SATURATION: 100 %

## 2022-05-02 DIAGNOSIS — K31.84 GASTROPARALYSIS: ICD-10-CM

## 2022-05-02 DIAGNOSIS — N39.0 URINARY TRACT INFECTION WITHOUT HEMATURIA, SITE UNSPECIFIED: Primary | ICD-10-CM

## 2022-05-02 LAB
BACTERIA: ABNORMAL /HPF
BILIRUBIN URINE: ABNORMAL
BLOOD, URINE: ABNORMAL
CLARITY: ABNORMAL
COLOR: YELLOW
EPITHELIAL CELLS, UA: ABNORMAL /HPF
GLUCOSE URINE: NEGATIVE MG/DL
KETONES, URINE: NEGATIVE MG/DL
LEUKOCYTE ESTERASE, URINE: ABNORMAL
MUCUS: PRESENT /LPF
NITRITE, URINE: NEGATIVE
PH UA: 6 (ref 5–9)
PROTEIN UA: 100 MG/DL
RBC UA: ABNORMAL /HPF (ref 0–2)
SPECIFIC GRAVITY UA: 1.02 (ref 1–1.03)
UROBILINOGEN, URINE: 0.2 E.U./DL
WBC UA: ABNORMAL /HPF (ref 0–5)

## 2022-05-02 PROCEDURE — 2580000003 HC RX 258: Performed by: NURSE ANESTHETIST, CERTIFIED REGISTERED

## 2022-05-02 PROCEDURE — 88305 TISSUE EXAM BY PATHOLOGIST: CPT

## 2022-05-02 PROCEDURE — 99283 EMERGENCY DEPT VISIT LOW MDM: CPT

## 2022-05-02 PROCEDURE — 7100000011 HC PHASE II RECOVERY - ADDTL 15 MIN: Performed by: SURGERY

## 2022-05-02 PROCEDURE — 3609027000 HC COLONOSCOPY: Performed by: SURGERY

## 2022-05-02 PROCEDURE — 88342 IMHCHEM/IMCYTCHM 1ST ANTB: CPT

## 2022-05-02 PROCEDURE — 81001 URINALYSIS AUTO W/SCOPE: CPT

## 2022-05-02 PROCEDURE — 3700000000 HC ANESTHESIA ATTENDED CARE: Performed by: SURGERY

## 2022-05-02 PROCEDURE — 6370000000 HC RX 637 (ALT 250 FOR IP): Performed by: NURSE ANESTHETIST, CERTIFIED REGISTERED

## 2022-05-02 PROCEDURE — 3609012400 HC EGD TRANSORAL BIOPSY SINGLE/MULTIPLE: Performed by: SURGERY

## 2022-05-02 PROCEDURE — 7100000010 HC PHASE II RECOVERY - FIRST 15 MIN: Performed by: SURGERY

## 2022-05-02 PROCEDURE — 6360000002 HC RX W HCPCS: Performed by: NURSE ANESTHETIST, CERTIFIED REGISTERED

## 2022-05-02 PROCEDURE — 43239 EGD BIOPSY SINGLE/MULTIPLE: CPT | Performed by: SURGERY

## 2022-05-02 PROCEDURE — 3700000001 HC ADD 15 MINUTES (ANESTHESIA): Performed by: SURGERY

## 2022-05-02 PROCEDURE — G0121 COLON CA SCRN NOT HI RSK IND: HCPCS | Performed by: SURGERY

## 2022-05-02 PROCEDURE — 2709999900 HC NON-CHARGEABLE SUPPLY: Performed by: SURGERY

## 2022-05-02 PROCEDURE — 2500000003 HC RX 250 WO HCPCS: Performed by: NURSE ANESTHETIST, CERTIFIED REGISTERED

## 2022-05-02 RX ORDER — LIDOCAINE HYDROCHLORIDE 10 MG/ML
INJECTION, SOLUTION INFILTRATION; PERINEURAL PRN
Status: DISCONTINUED | OUTPATIENT
Start: 2022-05-02 | End: 2022-05-02 | Stop reason: SDUPTHER

## 2022-05-02 RX ORDER — SODIUM CHLORIDE 9 MG/ML
INJECTION, SOLUTION INTRAVENOUS CONTINUOUS PRN
Status: DISCONTINUED | OUTPATIENT
Start: 2022-05-02 | End: 2022-05-02 | Stop reason: SDUPTHER

## 2022-05-02 RX ORDER — PROPOFOL 10 MG/ML
INJECTION, EMULSION INTRAVENOUS PRN
Status: DISCONTINUED | OUTPATIENT
Start: 2022-05-02 | End: 2022-05-02 | Stop reason: SDUPTHER

## 2022-05-02 RX ORDER — SULFAMETHOXAZOLE AND TRIMETHOPRIM 800; 160 MG/1; MG/1
1 TABLET ORAL 2 TIMES DAILY
Qty: 20 TABLET | Refills: 0 | Status: SHIPPED | OUTPATIENT
Start: 2022-05-02 | End: 2022-05-12

## 2022-05-02 RX ORDER — LIDOCAINE HYDROCHLORIDE 20 MG/ML
INJECTION, SOLUTION INTRAVENOUS PRN
Status: DISCONTINUED | OUTPATIENT
Start: 2022-05-02 | End: 2022-05-02 | Stop reason: SDUPTHER

## 2022-05-02 RX ORDER — GLYCOPYRROLATE 1 MG/5 ML
SYRINGE (ML) INTRAVENOUS PRN
Status: DISCONTINUED | OUTPATIENT
Start: 2022-05-02 | End: 2022-05-02 | Stop reason: SDUPTHER

## 2022-05-02 RX ORDER — ALBUTEROL SULFATE 90 UG/1
AEROSOL, METERED RESPIRATORY (INHALATION) PRN
Status: DISCONTINUED | OUTPATIENT
Start: 2022-05-02 | End: 2022-05-02 | Stop reason: SDUPTHER

## 2022-05-02 RX ORDER — PHENAZOPYRIDINE HYDROCHLORIDE 100 MG/1
100 TABLET, FILM COATED ORAL 3 TIMES DAILY PRN
Qty: 6 TABLET | Refills: 0 | Status: SHIPPED | OUTPATIENT
Start: 2022-05-02 | End: 2022-05-05

## 2022-05-02 RX ADMIN — PROPOFOL 50 MG: 10 INJECTION, EMULSION INTRAVENOUS at 11:36

## 2022-05-02 RX ADMIN — PROPOFOL 50 MG: 10 INJECTION, EMULSION INTRAVENOUS at 11:39

## 2022-05-02 RX ADMIN — LIDOCAINE HYDROCHLORIDE 20 MG: 10 INJECTION, SOLUTION INFILTRATION; PERINEURAL at 11:28

## 2022-05-02 RX ADMIN — PROPOFOL 50 MG: 10 INJECTION, EMULSION INTRAVENOUS at 11:34

## 2022-05-02 RX ADMIN — PROPOFOL 50 MG: 10 INJECTION, EMULSION INTRAVENOUS at 11:42

## 2022-05-02 RX ADMIN — PROPOFOL 50 MG: 10 INJECTION, EMULSION INTRAVENOUS at 11:46

## 2022-05-02 RX ADMIN — LIDOCAINE HYDROCHLORIDE 50 MG: 20 INJECTION, SOLUTION INTRAVENOUS at 11:28

## 2022-05-02 RX ADMIN — Medication 0.1 MG: at 11:23

## 2022-05-02 RX ADMIN — ALBUTEROL SULFATE 3 PUFF: 90 AEROSOL, METERED RESPIRATORY (INHALATION) at 11:24

## 2022-05-02 RX ADMIN — PROPOFOL 150 MG: 10 INJECTION, EMULSION INTRAVENOUS at 11:28

## 2022-05-02 RX ADMIN — PROPOFOL 50 MG: 10 INJECTION, EMULSION INTRAVENOUS at 11:32

## 2022-05-02 RX ADMIN — SODIUM CHLORIDE: 9 INJECTION, SOLUTION INTRAVENOUS at 11:19

## 2022-05-02 ASSESSMENT — PAIN - FUNCTIONAL ASSESSMENT
PAIN_FUNCTIONAL_ASSESSMENT: NONE - DENIES PAIN
PAIN_FUNCTIONAL_ASSESSMENT: 0-10

## 2022-05-02 ASSESSMENT — ENCOUNTER SYMPTOMS: SHORTNESS OF BREATH: 1

## 2022-05-02 NOTE — OP NOTE
Operative Note: EGD and Colonoscopy    Susana Marion     DATE OF PROCEDURE: 5/2/2022  SURGEON: Dr. Lynne Key MD, M.D. PREOPERATIVE DIAGNOSES:   Reflux    Screening colonoscopy         POSTOPERATIVE DIAGNOSES:   GERD  Moderate gastritis with shallow linear erosions in the body    Moderately poor colon prep    OPERATION:    EGD esophagogastroduodenoscopy With antral biopsies                   Colonoscopy to the cecum    SPECIMENS:  ID Type Source Tests Collected by Time Destination   A : Antral Bx Tissue Stomach SURGICAL PATHOLOGY Brittney Garcia MD 5/2/2022 1133        BLOOD LOSS: Minimal    ANESTHESIA: LMAC    CONSENT AND INDICATIONS:  This is a 46y.o. year old female who is having the above issues. I have discussed with the patient and/or the patient representative the indication, alternatives, and the possible risks and/or complications of the planned procedure and the anesthesia methods. The patient and/or patient representative appear to understand and agree to proceed. OPERATIONS: The patient was placed on the table and sedated via LMAC. Bite block was placed. A lubricated scope was easily passed into the upper esophagus which looked normal. The distal esophagus looked abnormal: GERD. The gastroesophageal junction was at 36 cm. The scope was passed into the stomach and retroflexed. There was no hiatal hernia. The scope was passed down toward the pylorus. The antral mucosa all looked abnormal: moderate gastritis with multiple small linear erosions. Biopsy was taken to check for H. pylori. She had bile reflux as well. The scope was then passed through the pylorus, into the duodenal bulb which looked normal, then around to the distal duodenum which looked normal, and the scope was then withdrawn. The patient was then placed in left lateral decubitus position. A rectal exam was done and no masses were felt.   A lubricated scope was passed into the rectum which looked normal.  The scope was passed all the way around to the cecum. No obvious was found. The bowel prep was moderately poor with some semi solid stool in the colon. The TI and appendiceal orifice were identified. The scope was then slowly withdrawn, each area was examined again on the way out. The patient tolerated the procedure well. PLAN: Follow up biopsies. Repeat colonoscopy in 3 years secondary to poor prep. Physician Signature: Electronically signed by Dr. Damian Cristina copy of H&P to PCP, Gonzalez Kahn DO and referring physician, No ref.  provider found

## 2022-05-02 NOTE — ED PROVIDER NOTES
HPI:  5/2/22,   Time: 5:43 PM EDT         Tanesha Gracia is a 46 y.o. female presenting to the ED for patient had upper and lower GI scope done today morning, at which point in time the nursing staff told her that her urine had a strong odor and suggested her getting checked. Patient does admit to frequency of urination. She denies fever headache chest pain nausea vomiting or diarrhea    ROS:   Pertinent positives and negatives are stated within HPI, all other systems reviewed and are negative.  --------------------------------------------- PAST HISTORY ---------------------------------------------  Past Medical History:  has a past medical history of Acid reflux, Back pain, Depression, Essential hypertension, Gastroparesis, Hepatitis, Hyperlipidemia, Seizures ([facility] [address] Sleep apnea, and Tachycardia. Past Surgical History:  has a past surgical history that includes Salpingo-oophorectomy (Left); Cholecystectomy, laparoscopic; Pyloroplasty (3/30/2012); ECHO Compl W Dop Color Flow (7/1/2012); Upper gastrointestinal endoscopy (3/15/13); Endometrial ablation; Colonoscopy (N/A, 3/1/2021); Abdomen surgery; Cardiac catheterization; Cardiac catheterization (11/04/2019); Pain management procedure (Right, 11/23/2021); Upper gastrointestinal endoscopy (N/A, 5/2/2022); and Colonoscopy (N/A, 5/2/2022). Social History:  reports that she has never smoked. She has never used smokeless tobacco. She reports previous alcohol use. She reports current drug use. Drug: Marijuana [place] De [place]ente). Family History: family history includes Depression in her paternal grandmother; High Blood Pressure in her mother; No Known Problems in her brother, brother, brother, brother, daughter, sister, sister, sister, son, and son; Other in her father. The patients home medications have been reviewed.     Allergies: Compazine [prochlorperazine], Prochlorperazine edisylate, Reglan [metoclopramide], Penicillins, Codeine, Ketorolac tromethamine, Levofloxacin, Naproxen, Nsaids, and Percocet [oxycodone-acetaminophen]    -------------------------------------------------- RESULTS -------------------------------------------------  All laboratory and radiology results have been personally reviewed by myself   LABS:  Results for orders placed or performed during the hospital encounter of 05/02/22   Urinalysis   Result Value Ref Range    Color, UA Yellow Straw/Yellow    Clarity, UA SL CLOUDY Clear    Glucose, Ur Negative Negative mg/dL    Bilirubin Urine SMALL (A) Negative    Ketones, Urine Negative Negative mg/dL    Specific Gravity, UA 1.025 1.005 - 1.030    Blood, Urine TRACE (A) Negative    pH, UA 6.0 5.0 - 9.0    Protein,  (A) Negative mg/dL    Urobilinogen, Urine 0.2 <2.0 E.U./dL    Nitrite, Urine Negative Negative    Leukocyte Esterase, Urine MODERATE (A) Negative   Microscopic Urinalysis   Result Value Ref Range    Mucus, UA Present (A) None Seen /LPF    WBC, UA 10-20 (A) 0 - 5 /HPF    RBC, UA 2-5 0 - 2 /HPF    Epithelial Cells, UA MODERATE /HPF    Bacteria, UA MANY (A) None Seen /HPF       RADIOLOGY:  Interpreted by Radiologist.  No orders to display       ------------------------- NURSING NOTES AND VITALS REVIEWED ---------------------------   The nursing notes within the ED encounter and vital signs as below have been reviewed. /74   Pulse 92   Temp 96.6 °F (35.9 °C) (Temporal)   Resp 18   Wt 217 lb (98.4 kg)   LMP 05/03/2013   SpO2 97%   BMI 39.06 kg/m²   Oxygen Saturation Interpretation: Normal      ---------------------------------------------------PHYSICAL EXAM--------------------------------------      Constitutional/General: Alert and oriented x3, well appearing, non toxic in NAD  Head: NC/AT  Eyes: PERRL, EOMI  Mouth: Oropharynx clear, handling secretions, no trismus  Neck: Supple, full ROM, no meningeal signs  Pulmonary: Lungs clear to auscultation bilaterally, no wheezes, rales, or rhonchi.  Not in respiratory distress  Cardiovascular:  Regular rate and rhythm, no murmurs, gallops, or rubs. 2+ distal pulses  Abdomen: Soft, non tender, non distended,   Extremities: Moves all extremities x 4. Warm and well perfused  Skin: warm and dry without rash  Neurologic: GCS 15,  Psych: Normal Affect      ------------------------------ ED COURSE/MEDICAL DECISION MAKING----------------------  Medications - No data to display      Medical Decision Making:    Results explained to the patient. Drink plenty of fluids. Follow-up with PCP. Patient's Medications   New Prescriptions    PHENAZOPYRIDINE (PYRIDIUM) 100 MG TABLET    Take 1 tablet by mouth 3 times daily as needed for Pain    SULFAMETHOXAZOLE-TRIMETHOPRIM (BACTRIM DS) 800-160 MG PER TABLET    Take 1 tablet by mouth 2 times daily for 10 days   Previous Medications    AMITRIPTYLINE (ELAVIL) 50 MG TABLET    take 1 tablet by mouth once daily    ARIPIPRAZOLE (ABILIFY) 15 MG TABLET    take 1 tablet by mouth once daily    BUPRENORPHINE-NALOXONE (SUBOXONE) 8-2 MG SUBL SL TABLET    Place 1 tablet under the tongue daily.      BUSPIRONE (BUSPAR) 30 MG TABLET    take 1 tablet by mouth twice a day    CARVEDILOL (COREG) 12.5 MG TABLET    Take one tablet twice daily    CYCLOBENZAPRINE (FLEXERIL) 5 MG TABLET    Take 1 tablet by mouth 2 times daily as needed for Muscle spasms    HYDROXYZINE (VISTARIL) 50 MG CAPSULE    take 1 capsule by mouth four times a day if needed for anxiety    LEVETIRACETAM (KEPPRA) 750 MG TABLET    Take 1 tablet by mouth 2 times daily    MELATONIN 3 MG TABS TABLET    Take 3 tablets by mouth daily    MIRTAZAPINE (REMERON) 15 MG TABLET    take 1 tablet by mouth every evening    MULTIPLE VITAMINS-MINERALS (THERAPEUTIC MULTIVITAMIN-MINERALS) TABLET    Take 1 tablet by mouth daily    OMEPRAZOLE (PRILOSEC) 20 MG DELAYED RELEASE CAPSULE    take 1 capsule by mouth twice a day    PRAVASTATIN (PRAVACHOL) 40 MG TABLET    Take 1 tablet by mouth daily    PRIMIDONE (MYSOLINE) 50 MG TABLET    Take 1 tablet by mouth 2 times daily    SERTRALINE (ZOLOFT) 100 MG TABLET    take 1 tablet by mouth every morning    TENS UNIT MISC    by Does not apply route    TRAZODONE (DESYREL) 100 MG TABLET    nightly as needed     VENTOLIN  (90 BASE) MCG/ACT INHALER    inhale 2 puffs by mouth every 4 hours if needed for wheezing   Modified Medications    No medications on file   Discontinued Medications    No medications on file         Counseling: The emergency provider has spoken with the patient and discussed todays results, in addition to providing specific details for the plan of care and counseling regarding the diagnosis and prognosis. Questions are answered at this time and they are agreeable with the plan.      --------------------------------- IMPRESSION AND DISPOSITION ---------------------------------    IMPRESSION  1.  Urinary tract infection without hematuria, site unspecified        DISPOSITION  Disposition: Discharge to home  Patient condition is good                  Darnell Marin MD  05/02/22 [id number]

## 2022-05-02 NOTE — PROGRESS NOTES
Patient verifies that a responsible adult will be with them for the next 24 hours. Patient has a ride to go home. Discharge instructions reviewed with patient, copy given. Anesthesia instructions reviewed and copy given. Denies questions or concerns.

## 2022-05-02 NOTE — H&P
Patient's office history and physical was reviewed. Patient examined. There has been no change in the patient's history and physical.      Physician Signature: Electronically signed by Dr. Massimo Hopkins    Patient's Name/Date of Birth: Ramesh Almeida / 1970    Date: 4/5/2022    PCP: Madai Farrar DO    Referring Physician:   No ref. provider found  N/A    No chief complaint on file. HPI:    The patient said she is feeling acid/fluid coming up her esophagus. It is worse when she lies down. This happens when she hasn't eaten for a few hours. She said she is taking prilosec BID. She doesn't think that has helped at all. The patient said she has gastroparesis and has had multiple EGDs. 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 She has had it for 20+ years. She said she has had several botox injections. She hasn't had a botox injection in 10 years. She is on a gastroparesis diet. She said this happens at night when she is lying flat. She said this happens a couple times a week. Reglan gave her seizures. Patient's medications, allergies, past medical, surgical, social and family histories were reviewed and updated as appropriate. Review of Systems  Constitutional: negative  Respiratory: negative  Cardiovascular: negative  Gastrointestinal: as in hpi  Genitourinary:negative  Integument/breast: negative    Physical Exam:  /70   Pulse 89   Temp 97.8 °F (36.6 °C) (Temporal)   Resp 18   Ht 5' 2.5\" (1.588 m)   Wt 220 lb (99.8 kg)   LMP 05/03/2007   SpO2 95%   BMI 39.60 kg/m²   General appearance: alert, cooperative and in no acute distress. Lungs: normal work of breathing  Heart: regular rate  Abdomen: soft, nontender, nondistended  Musculoskeletal: symmetrical without edema.   Skin: normal       Impression/Plan:  46y.o. year old female with gastroparesis, poor colonoscopy prep last year    Golytely two day prep.     I will set the patient up for an EGD and colonoscopy, possible biopsy, possible polypectomy. I explained the risks including but not limited to bowel perforation, postoperative bleeding, post-polypectomy syndrome, as well as the possibility of needing emergency surgery or another colonoscopy. The benefits, alternatives, and potential complications associated with the above procedure to be performed and transfusions when applicable with the patient/responsible person prior to the procedure. All of the patient's questions were answered. The patient understands and agrees to the procedure. Electronically by Mustapha Pappas MD, General Surgery  on 5/2/2022 at 11:20 AM      Send copy of H&P to PCP, Jeffrey Abrams DO and referring physician, No ref.  provider found      4/5/2022

## 2022-05-02 NOTE — ANESTHESIA POSTPROCEDURE EVALUATION
Department of Anesthesiology  Postprocedure Note    Patient: Xiang Polk  MRN: [id number]  YOB: 1970  Date of evaluation: 5/2/2022  Time:  12:40 PM     Procedure Summary     Date: 05/02/22 Room / Location: [facility] / [facility]    Anesthesia Start: 1119 Anesthesia Stop: 1526    Procedures:       EGD BIOPSY (N/A )      COLORECTAL CANCER SCREENING, NOT HIGH RISK (N/A ) Diagnosis: (GASTROPARESIS AND SCREENING)    Surgeons: Linh Jameson MD Responsible Provider: Devon Beach MD    Anesthesia Type: MAC ASA Status: 4          Anesthesia Type: MAC    Fariba Phase I: Fariba Score: 10    Fariba Phase II:      Last vitals: Reviewed and per EMR flowsheets.        Anesthesia Post Evaluation    Patient location during evaluation: PACU  Patient participation: complete - patient participated  Level of consciousness: awake and alert  Airway patency: patent  Nausea & Vomiting: no nausea and no vomiting  Complications: no  Cardiovascular status: hemodynamically stable  Respiratory status: acceptable  Hydration status: euvolemic

## 2022-05-02 NOTE — ANESTHESIA PRE PROCEDURE
Department of Anesthesiology  Preprocedure Note       Name:  Keyur Kent   Age:  46 y.o.  :  1970                                          MRN:  [id number]         Date:  2022      Surgeon: Dea Nair):  Madeline Pierson MD    Procedure: Procedure(s):  EGD ESOPHAGOGASTRODUODENOSCOPY  COLORECTAL CANCER SCREENING, NOT HIGH RISK    Medications prior to admission:   Prior to Admission medications    Medication Sig Start Date End Date Taking?  Authorizing Provider   Multiple Vitamins-Minerals (THERAPEUTIC MULTIVITAMIN-MINERALS) tablet Take 1 tablet by mouth daily    Historical Provider, MD   traZODone (DESYREL) 100 MG tablet nightly as needed  3/16/22   Historical Provider, MD   pravastatin (PRAVACHOL) 40 MG tablet Take 1 tablet by mouth daily  Patient taking differently: Take 40 mg by mouth at bedtime  3/24/22   Mari Espitia,    VENTOLIN  (90 Base) MCG/ACT inhaler inhale 2 puffs by mouth every 4 hours if needed for wheezing 22   Gonzalez Kahn DO   cyclobenzaprine (FLEXERIL) 5 MG tablet Take 1 tablet by mouth 2 times daily as needed for Muscle spasms 21   Gonzalez Kahn DO   omeprazole (PRILOSEC) 20 MG delayed release capsule take 1 capsule by mouth twice a day 21   Gonzalez Kahn DO   primidone (MYSOLINE) 50 MG tablet Take 1 tablet by mouth 2 times daily  Patient taking differently: Take 50 mg by mouth 2 times daily as needed  21   Gonzalez Kahn DO   Tens Unit MISC by Does not apply route  Patient not taking: Reported on 2022   Mirta Solis MD   carvedilol (COREG) 12.5 MG tablet Take one tablet twice daily  Patient taking differently: 12.5 mg Indications: pt takes 1.5 tablets twice a day Take one tablet twice daily 21   Nga Breen MD   busPIRone (BUSPAR) 30 MG tablet take 1 tablet by mouth twice a day 21   Historical Provider, MD   sertraline (ZOLOFT) 100 MG tablet take 1 tablet by mouth every morning 6/3/21   Historical Provider, MD   hydrOXYzine (VISTARIL) 50 MG capsule take 1 capsule by mouth four times a day if needed for anxiety 4/1/21   Historical Provider, MD   mirtazapine (REMERON) 15 MG tablet take 1 tablet by mouth every evening 2/26/21   Historical Provider, MD   levETIRAcetam (KEPPRA) 750 MG tablet Take 1 tablet by mouth 2 times daily  Patient taking differently: Take 750 mg by mouth 2 times daily Take 1.5 tablets BID 2/9/21   ZACARIAS Johnson   amitriptyline (ELAVIL) 50 MG tablet take 1 tablet by mouth once daily 12/18/20   Historical Provider, MD   ARIPiprazole (ABILIFY) 15 MG tablet take 1 tablet by mouth once daily 12/28/20   Historical Provider, MD   melatonin 3 MG TABS tablet Take 3 tablets by mouth daily  Patient taking differently: Take 9 mg by mouth nightly  3/2/20   Taryn Espitia,    buprenorphine-naloxone (SUBOXONE) 8-2 MG SUBL SL tablet Place 1 tablet under the tongue daily. Historical Provider, MD       Current medications:    No current outpatient medications on file. No current facility-administered medications for this visit. Allergies:     Allergies   Allergen Reactions    Compazine [Prochlorperazine]      seizure    Prochlorperazine Edisylate Other (See Comments)     Makes me \"crazy, about to jump out the window\"    Reglan [Metoclopramide] Other (See Comments)     seizures    Penicillins      Occurred during childhood pt does not remember reaction    Codeine Nausea And Vomiting     Stomach pain    Ketorolac Tromethamine Other (See Comments)     Can't take because abd pain    Levofloxacin Nausea Only    Naproxen Nausea Only     abd pain    Nsaids Other (See Comments)     Gastroparesis, has had 9 abdominal surgeries, and pancreatitis    Percocet [Oxycodone-Acetaminophen] Itching and Nausea Only       Problem List:    Patient Active Problem List   Diagnosis Code    Nausea & vomiting R11.2    Gastroparesis K31.84    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    Precordial pain R07.2    Chronic abdominal pain R10.9, G89.29    Chronic low back pain M54.50, G89.29    Mood disorder (HCC) F39    History of pericarditis Z86.79    Shortness of breath R06.02    Sinus tachycardia R00.0    Non-intractable cyclical vomiting with nausea R11.15    Functional diarrhea K59.1    Weight loss R63.4    Pain of upper abdomen R10.10    Colitis K52.9    Vasovagal syncope R55    Medically noncompliant Z91.19    Suicide ideation R45.851    Seizure disorder ([address] G40.909    Essential hypertension I10    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 Cunningham Baer    Malingering Z76.5    Chest pain R07.9    Seizure-like activity ([address] R56.9    Depression with suicidal ideation F32. A, R45.851    Major depression, recurrent ([address] F33.9    MYNOR (obstructive sleep apnea) G47.33    DDD (degenerative disc disease), lumbar M51.36    Lumbar radiculopathy M54.16    Lumbosacral spondylosis without myelopathy M47.817    Hearing loss of left ear H91.92    Mixed hyperlipidemia E78.2    Severe obesity (BMI 35.0-39. 9) with comorbidity ([address] E66.01    Prediabetes R73.03       Past Medical History:        Diagnosis Date    Acid reflux     Back pain     Depression 11/30/2016    Essential hypertension 08/07/2019    Gastroparesis     Hepatitis 2003     no treatment. after having gastroparesis, liver enzymes back to normal    Hyperlipidemia     Seizures ([address] 2019    in hospital ventilated after grand mal seizure.  None since April 2021    Sleep apnea     uses cpcpa nightly    Tachycardia     Dr. Sabrina Garza 1/2022       Past Surgical History:        Procedure Laterality Date    ABDOMEN SURGERY      Lysis of adhession x2    CARDIAC CATHETERIZATION      had 7 - 5 - 2012    CARDIAC CATHETERIZATION  11/04/2019    Dr. Cherelle Alexandra, LAPAROSCOPIC      COLONOSCOPY N/A 3/1/2021 COLORECTAL CANCER SCREENING, NOT HIGH RISK performed by Gloria Canada MD at [id number] [facility] ECHO COMPL W DOP COLOR FLOW  7/1/2012         ENDOMETRIAL ABLATION      PAIN MANAGEMENT PROCEDURE Right 11/23/2021    LUMBAR TRANFORAMINAL EPIDURAL STEROID INJECTION RIGHT L 5 AND S1 WITH XRAY (41743) (SEDATION) performed by Victor M Acharya MD at [address]  3/30/2012    lap plylorplasty open upper endoscopy lysis of adhesions    SALPINGO-OOPHORECTOMY Left     left    UPPER GASTROINTESTINAL ENDOSCOPY  3/15/13    balloon opened pyloric sphincter       Social History:    Social History     Tobacco Use    Smoking status: Never Smoker    Smokeless tobacco: Never Used   Substance Use Topics    Alcohol use: Not Currently     Comment: No caffeine                                Counseling given: Not Answered      Vital Signs (Current): There were no vitals filed for this visit.                                            BP Readings from Last 3 Encounters:   04/14/22 137/85   04/04/22 123/77   03/28/22 124/60       NPO Status:                                                                                 BMI:   Wt Readings from Last 3 Encounters:   04/26/22 220 lb (99.8 kg)   04/14/22 219 lb (99.3 kg)   04/04/22 219 lb (99.3 kg)     There is no height or weight on file to calculate BMI.    CBC:   Lab Results   Component Value Date    WBC 5.0 04/11/2022    RBC 4.77 04/11/2022    HGB 12.6 04/11/2022    HCT 41.8 04/11/2022    MCV 87.6 04/11/2022    RDW 13.8 04/11/2022     04/11/2022       CMP:   Lab Results   Component Value Date     04/11/2022    K 4.4 04/11/2022    K 4.2 07/18/2020     04/11/2022    CO2 28 04/11/2022    BUN 12 04/11/2022    CREATININE 0.7 04/11/2022    GFRAA >60 04/11/2022    LABGLOM >60 04/11/2022    GLUCOSE 109 04/11/2022    GLUCOSE 91 04/18/2021    PROT 7.0 04/11/2022    CALCIUM 8.9 04/11/2022    BILITOT <0.2 04/11/2022    ALKPHOS 144 04/11/2022 AST 21 04/11/2022    ALT 37 04/11/2022       POC Tests: No results for input(s): POCGLU, POCNA, POCK, POCCL, POCBUN, POCHEMO, POCHCT in the last 72 hours. Coags:   Lab Results   Component Value Date    PROTIME 11.1 10/24/2019    INR 1.0 10/24/2019    APTT 32.4 10/24/2019       HCG (If Applicable):   Lab Results   Component Value Date    PREGTESTUR NEGATIVE 06/11/2020    PREGSERUM NEGATIVE 10/02/2012        ABGs:   Lab Results   Component Value Date    PO2ART 68.5 10/25/2019    CGB3LDJ 42.3 10/25/2019    WON6YID 25.2 10/25/2019        Type & Screen (If Applicable):  No results found for: LABABO, [address]    Drug/Infectious Status (If Applicable):  No results found for: HIV, HEPCAB    COVID-19 Screening (If Applicable):   Lab Results   Component Value Date    COVID19 Not-Detected 11/22/2021    COVID19 Not Detected 05/20/2021         Anesthesia Evaluation  Patient summary reviewed and Nursing notes reviewed no history of anesthetic complications:   Airway: Mallampati: II  TM distance: >3 FB   Neck ROM: full  Mouth opening: > = 3 FB Dental: normal exam         Pulmonary: breath sounds clear to auscultation  (+) shortness of breath:  sleep apnea: on CPAP,                            ROS comment: Reactive airway- inhaler prn   Cardiovascular:  Exercise tolerance: good (>4 METS),   (+) hypertension:, CHF: diastolic, hyperlipidemia        Rhythm: regular  Rate: normal           Beta Blocker:  Dose within 24 Hrs      ROS comment: Vasovagal syncope     Neuro/Psych:   (+) seizures:, psychiatric history (Pseudoseizure):depression/anxiety  (Depression with suicidal ideation)            GI/Hepatic/Renal:   (+) GERD:, hepatitis: C, liver disease:, bowel prep,          ROS comment: GASTROPARESIS. Endo/Other:                      ROS comment: Obese  MJ abuse  Prediabetes Abdominal:             Vascular: negative vascular ROS.          Other Findings:             Anesthesia Plan      MAC     ASA 4       Induction: intravenous. Anesthetic plan and risks discussed with patient. Plan discussed with CRNA and surgical team.                  Sherri Head MD   5/2/2022      DOS STAFF ADDENDUM:    Patient seen and examined, physical exam updated as needed, chart reviewed. NPO status confirmed. Anesthetic options and risks discussed with patient/legal guardian. Patient/legal guardian verbalized understanding and agrees to proceed.      TITI Mclean - CRNA  Staff CRNA  May 2, 2022  11:36 AM

## 2022-05-09 ENCOUNTER — HOSPITAL ENCOUNTER (OUTPATIENT)
Dept: MAMMOGRAPHY | Age: 52
Discharge: HOME | End: 2022-05-11
Payer: MEDICARE

## 2022-05-09 VITALS — HEIGHT: 63 IN | BODY MASS INDEX: 38.8 KG/M2 | WEIGHT: 219 LBS

## 2022-05-09 DIAGNOSIS — Z12.31 ENCOUNTER FOR SCREENING MAMMOGRAM FOR MALIGNANT NEOPLASM OF BREAST: ICD-10-CM

## 2022-05-09 PROCEDURE — 77067 SCR MAMMO BI INCL CAD: CPT

## 2022-05-17 ENCOUNTER — HOSPITAL ENCOUNTER (OUTPATIENT)
Dept: AUDIOLOGY | Age: 52
Discharge: HOME | End: 2022-05-17

## 2022-05-17 PROCEDURE — 9990000010 HC NO CHARGE VISIT: Performed by: AUDIOLOGIST

## 2022-05-17 NOTE — PROGRESS NOTES
No charge hearing aid consult from Dr Abr[facility] Applying for [employer], dark brown. Ear mold impression taken without incident. Will update patient as warranted.   Electronically signed by Leana Briggs on 5/17/2022 at 3:29 PM

## 2022-06-02 ENCOUNTER — OFFICE VISIT (OUTPATIENT)
Dept: SURGERY | Age: 52
End: 2022-06-02
Payer: MEDICARE

## 2022-06-02 VITALS
WEIGHT: 218 LBS | BODY MASS INDEX: 38.62 KG/M2 | DIASTOLIC BLOOD PRESSURE: 77 MMHG | HEIGHT: 63 IN | SYSTOLIC BLOOD PRESSURE: 131 MMHG | HEART RATE: 80 BPM | RESPIRATION RATE: 18 BRPM

## 2022-06-02 DIAGNOSIS — K21.9 GASTROESOPHAGEAL REFLUX DISEASE WITHOUT ESOPHAGITIS: Primary | ICD-10-CM

## 2022-06-02 DIAGNOSIS — K29.30 CHRONIC SUPERFICIAL GASTRITIS WITHOUT BLEEDING: ICD-10-CM

## 2022-06-02 DIAGNOSIS — K31.84 GASTROPARESIS: ICD-10-CM

## 2022-06-02 PROCEDURE — G8427 DOCREV CUR MEDS BY ELIG CLIN: HCPCS | Performed by: SURGERY

## 2022-06-02 PROCEDURE — 99213 OFFICE O/P EST LOW 20 MIN: CPT | Performed by: SURGERY

## 2022-06-02 PROCEDURE — 3017F COLORECTAL CA SCREEN DOC REV: CPT | Performed by: SURGERY

## 2022-06-02 PROCEDURE — G8417 CALC BMI ABV UP PARAM F/U: HCPCS | Performed by: SURGERY

## 2022-06-02 PROCEDURE — 1036F TOBACCO NON-USER: CPT | Performed by: SURGERY

## 2022-06-02 RX ORDER — PANTOPRAZOLE SODIUM 40 MG/1
40 TABLET, DELAYED RELEASE ORAL DAILY
Qty: 60 TABLET | Refills: 3 | Status: SHIPPED | OUTPATIENT
Start: 2022-06-02

## 2022-06-02 NOTE — PROGRESS NOTES
Progress Note - Follow up    Patient's Name/Date of Birth: Gonzalo Carbone / 1970    Date: 6/2/2022    PCP: Kaylynn Cabello DO    Referring Physician:   Jc Shelton    Chief Complaint   Patient presents with    Results     EGD       HPI:    The patient said she still has reflux issues despite her omeprazole. She had seizures with reglan. She has never tried erythromycin. She is on a gastroparesis diet. She said she feels nauseous and bloated, exacerbated by eating large meals, improved with fasting. Patient's medications, allergies, past medical, surgical, social and family histories were reviewed and updated as appropriate. Review of Systems  Constitutional: negative  Respiratory: negative  Cardiovascular: negative  Gastrointestinal: as in hpi  Genitourinary:negative  Integument/breast: negative    Physical Exam:  /77   Pulse 80   Resp 18   Ht 5' 2.5\" (1.588 m)   Wt 218 lb (98.9 kg)   LMP 05/03/2013   BMI 39.24 kg/m²   General appearance: alert, cooperative and in no acute distress. Lungs: normal work of breathing  Heart: regular rate  Abdomen: soft, nontender, nondistended  Musculoskeletal: symmetrical without edema. Skin: normal     Data Reviewed:   Pathology: Diagnosis:   Stomach, biopsy: Mild chronic gastritis; negative for intestinal   metaplasia   Immunostain negative for Helicobacter pylori organisms     Impression/Plan:  46y.o. year old female with gastroparesis, GERD, gastritis, constipatoin    GERD  Moderate gastritis with shallow linear erosions in the body - protonix trial once a day, if no improvement increase to twice a day    Gastroparesis   - continue diet     Moderately poor colon prep - Repeat colonoscopy in 3 years secondary to poor prep.     Follow up in 1 month    On this date 6/2/2022 I have spent 20 minutes reviewing previous notes, test results and face to face with the patient discussing the diagnosis and importance of compliance with the treatment plan as well as documenting on the day of the visit. Greater than 50% of the time was spent face-to-face with the patient. This time is exclusive of procedures performed. I answered all of  the patient's questions to his/her satisfaction.      Electronically by Linh Jameson MD, General Surgery  on 6/2/2022 at 10:54 AM      Send copy of H&P to PCP, Jacob Alicea DO and referring physician, Pina Rea DO      6/2/2022

## 2022-06-08 ENCOUNTER — OFFICE VISIT (OUTPATIENT)
Dept: FAMILY MEDICINE CLINIC | Age: 52
End: 2022-06-08
Payer: MEDICARE

## 2022-06-08 VITALS
HEART RATE: 83 BPM | OXYGEN SATURATION: 96 % | WEIGHT: 221.8 LBS | DIASTOLIC BLOOD PRESSURE: 78 MMHG | BODY MASS INDEX: 39.3 KG/M2 | HEIGHT: 63 IN | TEMPERATURE: 98.2 F | RESPIRATION RATE: 18 BRPM | SYSTOLIC BLOOD PRESSURE: 126 MMHG

## 2022-06-08 DIAGNOSIS — E78.2 MIXED HYPERLIPIDEMIA: Primary | ICD-10-CM

## 2022-06-08 DIAGNOSIS — R60.0 LOWER EXTREMITY EDEMA: ICD-10-CM

## 2022-06-08 DIAGNOSIS — R73.03 PREDIABETES: ICD-10-CM

## 2022-06-08 PROBLEM — Z76.5 MALINGERING: Status: RESOLVED | Noted: 2019-08-09 | Resolved: 2022-06-08

## 2022-06-08 PROBLEM — K52.9 COLITIS: Status: RESOLVED | Noted: 2018-01-13 | Resolved: 2022-06-08

## 2022-06-08 PROCEDURE — G8427 DOCREV CUR MEDS BY ELIG CLIN: HCPCS | Performed by: FAMILY MEDICINE

## 2022-06-08 PROCEDURE — 1036F TOBACCO NON-USER: CPT | Performed by: FAMILY MEDICINE

## 2022-06-08 PROCEDURE — 3017F COLORECTAL CA SCREEN DOC REV: CPT | Performed by: FAMILY MEDICINE

## 2022-06-08 PROCEDURE — G8417 CALC BMI ABV UP PARAM F/U: HCPCS | Performed by: FAMILY MEDICINE

## 2022-06-08 PROCEDURE — 99213 OFFICE O/P EST LOW 20 MIN: CPT | Performed by: FAMILY MEDICINE

## 2022-06-08 ASSESSMENT — PATIENT HEALTH QUESTIONNAIRE - PHQ9
2. FEELING DOWN, DEPRESSED OR HOPELESS: 0
SUM OF ALL RESPONSES TO PHQ QUESTIONS 1-9: 0
SUM OF ALL RESPONSES TO PHQ9 QUESTIONS 1 & 2: 0
3. TROUBLE FALLING OR STAYING ASLEEP: 0
9. THOUGHTS THAT YOU WOULD BE BETTER OFF DEAD, OR OF HURTING YOURSELF: 0
SUM OF ALL RESPONSES TO PHQ QUESTIONS 1-9: 0
SUM OF ALL RESPONSES TO PHQ9 QUESTIONS 1 & 2: 0
SUM OF ALL RESPONSES TO PHQ QUESTIONS 1-9: 0
6. FEELING BAD ABOUT YOURSELF - OR THAT YOU ARE A FAILURE OR HAVE LET YOURSELF OR YOUR FAMILY DOWN: 0
1. LITTLE INTEREST OR PLEASURE IN DOING THINGS: 0
SUM OF ALL RESPONSES TO PHQ QUESTIONS 1-9: 0
1. LITTLE INTEREST OR PLEASURE IN DOING THINGS: 0
7. TROUBLE CONCENTRATING ON THINGS, SUCH AS READING THE NEWSPAPER OR WATCHING TELEVISION: 0
5. POOR APPETITE OR OVEREATING: 0
4. FEELING TIRED OR HAVING LITTLE ENERGY: 0
SUM OF ALL RESPONSES TO PHQ QUESTIONS 1-9: 0
SUM OF ALL RESPONSES TO PHQ QUESTIONS 1-9: 0
2. FEELING DOWN, DEPRESSED OR HOPELESS: 0
SUM OF ALL RESPONSES TO PHQ QUESTIONS 1-9: 0
8. MOVING OR SPEAKING SO SLOWLY THAT OTHER PEOPLE COULD HAVE NOTICED. OR THE OPPOSITE, BEING SO FIGETY OR RESTLESS THAT YOU HAVE BEEN MOVING AROUND A LOT MORE THAN USUAL: 0
SUM OF ALL RESPONSES TO PHQ QUESTIONS 1-9: 0

## 2022-06-08 ASSESSMENT — LIFESTYLE VARIABLES: HOW OFTEN DO YOU HAVE A DRINK CONTAINING ALCOHOL: NEVER

## 2022-06-08 NOTE — PATIENT INSTRUCTIONS
Patient Education        High Cholesterol: Care Instructions  Overview     Cholesterol is a type of fat in your blood. It is needed for many body functions, such as making new cells. Cholesterol is made by your body. It also comes from food you eat. High cholesterol means that you have too much of thefat in your blood. This raises your risk of a heart attack and stroke. LDL and HDL are part of your total cholesterol. LDL is the \"bad\" cholesterol. High LDL can raise your risk for coronary artery disease, heart attack, and stroke. HDL is the \"good\" cholesterol. It helps clear bad cholesterol from the body. High HDL is linked with a lower risk of coronary artery disease, heartattack, and stroke. Your cholesterol levels help your doctor find out your risk for having a heart attack or stroke. You and your doctor can talk about whether you need to loweryour risk and what treatment is best for you. Treatment options include a heart-healthy lifestyle and medicine. Both options can help lower your cholesterol and your risk. The way you choose to lower your risk will depend on how high your risk is for heart attack and stroke. It willalso depend on how you feel about taking medicines. Follow-up care is a key part of your treatment and safety. Be sure to make and go to all appointments, and call your doctor if you are having problems. It's also a good idea to know your test results and keep alist of the medicines you take. How can you care for yourself at home?  Eat heart-healthy foods. ? Eat fruits, vegetables, whole grains, beans, and other high-fiber foods. ? Eat lean proteins, such as seafood, lean meats, beans, nuts, and soy products. ? Eat healthy fats, such as canola and olive oil. ? Choose foods that are low in saturated fat. ? Limit sodium and alcohol. ? Limit drinks and foods with added sugar.  Be physically active. Try to do moderate activity at least 2½ hours a week.  Or try to do vigorous activity at least 1¼ hours a week. You may want to walk or try other activities, such as running, swimming, cycling, or playing tennis or team sports.  Stay at a healthy weight or lose weight by making the changes in eating and physical activity listed above. Losing just a small amount of weight, even 5 to 10 pounds, can help reduce your risk for having a heart attack or stroke.  Do not smoke.  Manage other health problems. These include diabetes and high blood pressure. If you think you may have a problem with alcohol or drug use, talk to your doctor.  If you take medicine, take it exactly as prescribed. Call your doctor if you think you are having a problem with your medicine.  Check with your doctor or pharmacist before you use any other medicines, including over-the-counter medicines. Make sure your doctor knows all of the medicines, vitamins, herbal products, and supplements you take. Taking some medicines together can cause problems. When should you call for help? Watch closely for changes in your health, and be sure to contact your doctor if:     You need help making lifestyle changes.      You have questions about your medicine. Where can you learn more? Go to [email or web address] org and sign in to your [employer] account. Enter T059 in the [employer] box to learn more about \"High Cholesterol: Care Instructions. \"     If you do not have an account, please click on the \"Sign Up Now\" link. Current as of: January 10, 2022               Content Version: 13.2  © 3138-5088 [employer]. Care instructions adapted under license by [facility] ([facility]). If you have questions about a medical condition or this instruction, always ask your healthcare professional. [address] any warranty or liability for your use of this information.

## 2022-06-08 NOTE — PROGRESS NOTES
Hyperlipidemia:  Patient is here to follow up regarding chronic hyperlipidemia. This is  generally controlled. Treatment includes pravachol. Patient is  compliant with lifestyle modifications. Patient is not a smoker. Most recent labs reviewed with patient today and are not remarkable. Comorbid conditions include prediabetes. Patient states that her swelling in her feet has been good. Lab Results   Component Value Date    [address] 04/11/2022       Patient's past medical, surgical, social and/or family history reviewed, updated in chart, and are non-contributory (unless otherwise stated). Medications and allergies also reviewed and updated in chart. Review of Systems:  Constitutional:  No fever, no fatigue, no chills, no headaches, no weight change  Dermatology:  No rash, no mole, no dry or sensitive skin  ENT:  No cough, no sore throat, no sinus pain, no runny nose, no ear pain  Cardiology:  No chest pain, no palpitations, no leg edema, no shortness of breath, no PND  Gastroenterology:  No dysphagia, no abdominal pain, no nausea, no vomiting, no constipation, no diarrhea, no heartburn  Musculoskeletal:  No joint pain, no leg cramps, no back pain, no muscle aches  Respiratory:  No shortness of breath, no orthopnea, no wheezing, no RAINES, no hemoptysis  Urology:  No blood in the urine, no urinary frequency, no urinary incontinence, no urinary urgency, no nocturia, no dysuria      Vitals:    06/08/22 1308 06/08/22 1315   BP: (!) 132/98 126/78   Pulse: 83    Resp: 18    Temp: 98.2 °F (36.8 °C)    TempSrc: Temporal    SpO2: 96%    Weight: 221 lb 12.8 oz (100.6 kg)    Height: 5' 2.5\" (1.588 m)        Physical Exam  Vitals and nursing note reviewed. Constitutional:       Appearance: She is well-developed. HENT:      Head: Normocephalic and atraumatic.       Right Ear: External ear normal.      Left Ear: External ear normal.      Nose: Nose normal.   Eyes:      Conjunctiva/sclera: Conjunctivae normal.      Pupils: Pupils are equal, round, and reactive to light. Neck:      Thyroid: No thyromegaly. Cardiovascular:      Rate and Rhythm: Normal rate and regular rhythm. Heart sounds: Normal heart sounds. Pulmonary:      Effort: Pulmonary effort is normal.      Breath sounds: Normal breath sounds. No wheezing. Abdominal:      General: Bowel sounds are normal.      Palpations: Abdomen is soft. Tenderness: There is no abdominal tenderness. Musculoskeletal:         General: Normal range of motion. Cervical back: Normal range of motion and neck supple. Skin:     General: Skin is warm and dry. Findings: No rash. Neurological:      Mental Status: She is alert and oriented to person, place, and time. Deep Tendon Reflexes: Reflexes are normal and symmetric. Psychiatric:         Behavior: Behavior normal.         Assessment/Plan:      Marisabel Tabor was seen today for follow-up. Diagnoses and all orders for this visit:    Mixed hyperlipidemia  Well controlled  Continue pravachol as prescribed. Diet and exercise were discussed and recommended to the patient. Lower extremity edema  Improved. Prediabetes  Diet and exercise were discussed and recommended to the patient. As above. Call or go to ED immediately if symptoms worsen or persist.  Return in about 4 months (around 10/8/2022) for hyperlipidemia, prediabetes. , or sooner if necessary. Educational materials and/or home exercises printed for patient's review and were included in patient instructions on his/her After Visit Summary and given to patient at the end of visit. Counseled regarding above diagnosis, including possible risks and complications,  especially if left uncontrolled.     Counseled regarding the possible side effects, risks, benefits and alternatives to treatment; patient and/or guardian verbalizes understanding, agrees, feels comfortable with and wishes to proceed with above treatment plan.    Advised patient to call with any new medication issues, and read all Rx info from pharmacy to assure aware of all possible risks and side effects of medication before taking. Reviewed age and gender appropriate health screening exams and vaccinations. Advised patient regarding importance of keeping up with recommended health maintenance and to schedule as soon as possible if overdue, as this is important in assessing for undiagnosed pathology, especially cancer, as well as protecting against potentially harmful/life threatening disease. Patient and/or guardian verbalizes understanding and agrees with above counseling, assessment and plan. All questions answered. Fuentes Ambrose DO  6/8/2022    I have personally reviewed and updated the chief complaint, HPI, Past Medical, Family and Social History, as well as the above Review of Systems.

## 2022-06-12 RX ORDER — CARVEDILOL 12.5 MG/1
12.5 TABLET ORAL 2 TIMES DAILY WITH MEALS
Qty: 270 TABLET | Refills: 3 | Status: SHIPPED
Start: 2022-06-12 | End: 2022-06-13 | Stop reason: SDUPTHER

## 2022-06-15 RX ORDER — CARVEDILOL 12.5 MG/1
TABLET ORAL
Qty: 270 TABLET | Refills: 3 | Status: SHIPPED
Start: 2022-06-15 | End: 2022-08-08 | Stop reason: SDUPTHER

## 2022-06-30 DIAGNOSIS — E78.2 MIXED HYPERLIPIDEMIA: ICD-10-CM

## 2022-06-30 RX ORDER — PRAVASTATIN SODIUM 40 MG
TABLET ORAL
Qty: 30 TABLET | Refills: 2 | Status: SHIPPED
Start: 2022-06-30 | End: 2022-10-04 | Stop reason: SDUPTHER

## 2022-07-06 RX ORDER — PRIMIDONE 50 MG/1
TABLET ORAL
Qty: 60 TABLET | Refills: 2 | Status: SHIPPED
Start: 2022-07-06 | End: 2022-10-04 | Stop reason: SDUPTHER

## 2022-07-11 ENCOUNTER — TELEPHONE (OUTPATIENT)
Dept: SURGERY | Age: 52
End: 2022-07-11

## 2022-07-15 ENCOUNTER — HOSPITAL ENCOUNTER (OUTPATIENT)
Dept: AUDIOLOGY | Age: 52
Discharge: HOME | End: 2022-07-15

## 2022-07-15 PROCEDURE — 9990000010 HC NO CHARGE VISIT: Performed by: AUDIOLOGIST

## 2022-07-19 ENCOUNTER — FOLLOWUP TELEPHONE ENCOUNTER (OUTPATIENT)
Dept: AUDIOLOGY | Age: 52
End: 2022-07-19

## 2022-07-19 NOTE — PROGRESS NOTES
Patient called h/a cutting out , had her change wax guard. She said it was working and she will try it out. She has follow up in august.  She will call as needed.  Electronically signed by Leana Walden on 7/19/2022 at 2:53 PM

## 2022-08-08 ENCOUNTER — OFFICE VISIT (OUTPATIENT)
Dept: CARDIOLOGY CLINIC | Age: 52
End: 2022-08-08
Payer: MEDICARE

## 2022-08-08 VITALS
WEIGHT: 220 LBS | HEIGHT: 63 IN | HEART RATE: 96 BPM | SYSTOLIC BLOOD PRESSURE: 112 MMHG | DIASTOLIC BLOOD PRESSURE: 70 MMHG | RESPIRATION RATE: 16 BRPM | BODY MASS INDEX: 38.98 KG/M2

## 2022-08-08 DIAGNOSIS — R00.0 SINUS TACHYCARDIA: Primary | ICD-10-CM

## 2022-08-08 PROCEDURE — 99214 OFFICE O/P EST MOD 30 MIN: CPT | Performed by: INTERNAL MEDICINE

## 2022-08-08 PROCEDURE — G8427 DOCREV CUR MEDS BY ELIG CLIN: HCPCS | Performed by: INTERNAL MEDICINE

## 2022-08-08 PROCEDURE — G8417 CALC BMI ABV UP PARAM F/U: HCPCS | Performed by: INTERNAL MEDICINE

## 2022-08-08 PROCEDURE — 93000 ELECTROCARDIOGRAM COMPLETE: CPT | Performed by: INTERNAL MEDICINE

## 2022-08-08 PROCEDURE — 1036F TOBACCO NON-USER: CPT | Performed by: INTERNAL MEDICINE

## 2022-08-08 PROCEDURE — 3017F COLORECTAL CA SCREEN DOC REV: CPT | Performed by: INTERNAL MEDICINE

## 2022-08-08 RX ORDER — CARVEDILOL 12.5 MG/1
TABLET ORAL
Qty: 270 TABLET | Refills: 3 | Status: SHIPPED | OUTPATIENT
Start: 2022-08-08

## 2022-08-08 NOTE — PROGRESS NOTES
[facility] Cardiology Progress Note  Dr. Mckenzie Purcell      Referring Physician: Andressa العراقي DO  CHIEF COMPLAINT:   Chief Complaint   Patient presents with    Tachycardia     6 month         HISTORY OF PRESENT ILLNESS:   Patient is a 46year old female with a medical history of pericardial effusion, hypertension, hyperlipidemia, is here for follow-up appointment. Patient denies any chest pain, no shortness of breath, no lightheadedness, no dizziness, no pedal edema, no PND, no orthopnea, no syncope, no presyncopal episodes. Functional capacity is at baseline    Past Medical History:   Diagnosis Date    Acid reflux     Back pain     Depression 11/30/2016    Essential hypertension 08/07/2019    Gastroparesis     Hepatitis 2003     no treatment. after having gastroparesis, liver enzymes back to normal    Hyperlipidemia     Seizures ([address] 2019    in hospital ventilated after grand mal seizure.  None since April 2021    Sleep apnea     uses cpcpa nightly    Tachycardia     Dr. Sally Gooden 1/2022         Past Surgical History:   Procedure Laterality Date    ABDOMEN SURGERY      Lysis of adhession x2    CARDIAC CATHETERIZATION      had 7 - 5 - 2012    CARDIAC CATHETERIZATION  11/04/2019    Dr. Luther Alvarado, LAPAROSCOPIC      COLONOSCOPY N/A 3/1/2021    COLORECTAL CANCER SCREENING, NOT HIGH RISK performed by Ara Huston MD at [address] [facility] N/A 5/2/2022    COLORECTAL CANCER SCREENING, NOT HIGH RISK performed by Ara Huston MD at [facility] ENDOSCOPY    ECHO COMPL W DOP COLOR FLOW  7/1/2012         ENDOMETRIAL ABLATION      PAIN MANAGEMENT PROCEDURE Right 11/23/2021    LUMBAR TRANFORAMINAL EPIDURAL STEROID INJECTION RIGHT L 5 AND S1 WITH XRAY (99881) (SEDATION) performed by Kenyetta Louis MD at [address] [facility]  3/30/2012    lap plylorplasty open upper endoscopy lysis of adhesions    SALPINGO-OOPHORECTOMY Left     left    UPPER GASTROINTESTINAL ENDOSCOPY 3/15/13    balloon opened pyloric sphincter    UPPER GASTROINTESTINAL ENDOSCOPY N/A 5/2/2022    EGD BIOPSY performed by Aixa Vela MD at [facility] ENDOSCOPY         Current Outpatient Medications   Medication Sig Dispense Refill    primidone (MYSOLINE) 50 MG tablet take 1 tablet by mouth twice a day (Patient taking differently: as needed) 60 tablet 2    pravastatin (PRAVACHOL) 40 MG tablet take 1 tablet by mouth once daily 30 tablet 2    carvedilol (COREG) 12.5 MG tablet Indications: pt takes 1.5 tablets twice a day Take 1 1/2 tablets twice daily 270 tablet 3    pantoprazole (PROTONIX) 40 MG tablet Take 1 tablet by mouth daily 60 tablet 3    Multiple Vitamins-Minerals (THERAPEUTIC MULTIVITAMIN-MINERALS) tablet Take 1 tablet by mouth daily      traZODone (DESYREL) 100 MG tablet nightly as needed       VENTOLIN  (90 Base) MCG/ACT inhaler inhale 2 puffs by mouth every 4 hours if needed for wheezing 18 g 5    cyclobenzaprine (FLEXERIL) 5 MG tablet Take 1 tablet by mouth 2 times daily as needed for Muscle spasms 60 tablet 1    Tens Unit MISC by Does not apply route 1 each 0    busPIRone (BUSPAR) 30 MG tablet take 1 tablet by mouth twice a day      sertraline (ZOLOFT) 100 MG tablet take 1 tablet by mouth every morning      mirtazapine (REMERON) 15 MG tablet take 1 tablet by mouth every evening      levETIRAcetam (KEPPRA) 750 MG tablet Take 1 tablet by mouth 2 times daily (Patient taking differently: Take 750 mg by mouth in the morning and 750 mg before bedtime. Take 1.5 tablets BID.) 60 tablet 5    amitriptyline (ELAVIL) 50 MG tablet take 1 tablet by mouth once daily      ARIPiprazole (ABILIFY) 15 MG tablet take 1 tablet by mouth once daily      buprenorphine-naloxone (SUBOXONE) 8-2 MG SUBL SL tablet Place 1 tablet under the tongue daily.        omeprazole (PRILOSEC) 20 MG delayed release capsule take 1 capsule by mouth twice a day (Patient not taking: Reported on 8/8/2022) 60 capsule 5    hydrOXYzine (VISTARIL) 50 MG capsule take 1 capsule by mouth four times a day if needed for anxiety (Patient not taking: Reported on 8/8/2022)      melatonin 3 MG TABS tablet Take 3 tablets by mouth daily (Patient not taking: Reported on 8/8/2022) 30 tablet 2     No current facility-administered medications for this visit. Allergies as of 08/08/2022 - Fully Reviewed 08/08/2022   Allergen Reaction Noted    Compazine [prochlorperazine]  11/16/2021    Reglan [metoclopramide] Other (See Comments) 11/15/2012    Penicillins  08/12/2021    Codeine Nausea And Vomiting 09/17/2011    Ketorolac tromethamine Other (See Comments) 03/25/2013    Levofloxacin Nausea Only 10/03/2012    Naproxen Nausea Only 03/25/2013    Nsaids Other (See Comments) 09/17/2011    Percocet [oxycodone-acetaminophen] Itching and Nausea Only 03/14/2016       Social History     Socioeconomic History    Marital status:       Spouse name: Not on file    Number of children: 3    Years of education: Not on file    Highest education level: Not on file   Occupational History    Not on file   Tobacco Use    Smoking status: Never    Smokeless tobacco: Never   Vaping Use    Vaping Use: Never used   Substance and Sexual Activity    Alcohol use: Not Currently     Comment: No caffeine    Drug use: Yes     Types: Marijuana Candelma Ho)     Comment: medical MJ card LD 1-2021edibles 4xweek    Sexual activity: Not on file   Other Topics Concern    Not on file   Social History Narrative    Not on file     Social Determinants of Health     Financial Resource Strain: Not on file   Food Insecurity: Not on file   Transportation Needs: Not on file   Physical Activity: Not on file   Stress: Not on file   Social Connections: Not on file   Intimate Partner Violence: Not on file   Housing Stability: Not on file       Family History   Problem Relation Age of Onset    High Blood Pressure Mother     Other Father         BPH    No Known Problems Sister     No Known Problems Brother     No Known Problems Sister     No Known Problems Sister     No Known Problems Brother     No Known Problems Brother     No Known Problems Brother     Depression Paternal Grandmother     No Known Problems Daughter     No Known Problems Son     No Known Problems Son        REVIEW OF SYSTEMS:   CONSTITUTIONAL:  negative for  fevers, chills, sweats and fatigue  HEENT:  negative for  tinnitus, earaches, nasal congestion and epistaxis  RESPIRATORY:  negative for  dry cough, cough with sputum,, wheezing and hemoptysis  GASTROINTESTINAL:  negative for nausea, vomiting, diarrhea, constipation, pruritus and jaundice  HEMATOLOGIC/LYMPHATIC:  negative for easy bruising, bleeding, lymphadenopathy and petechiae  ENDOCRINE:  negative for heat intolerance, cold intolerance, tremor, hair loss and diabetic symptoms including neither polyuria nor polydipsia nor blurred vision  MUSCULOSKELETAL:  negative for  myalgias, arthralgias, joint swelling, stiff joints and decreased range of motion  NEUROLOGICAL:  negative for memory problems, speech problems, visual disturbance, dysphagia, weakness and numbness      PHYSICAL EXAM:   CONSTITUTIONAL:  awake, alert, cooperative, no apparent distress, and appears stated age  HEAD:  normocepalic, without obvious abnormality, atraumatic, pink, moist mucous membranes. NECK:  Supple, symmetrical, trachea midline, no adenopathy, thyroid symmetric, not enlarged and no tenderness, skin normal  LUNGS:  No increased work of breathing, good air exchange, clear to auscultation bilaterally, no crackles or wheezing  CARDIOVASCULAR:  Normal apical impulse, regular rate and rhythm, normal S1 and S2, no S3 or S4, and no murmur noted and no JVD, no carotid bruit, no pedal edema, good carotid upstroke bilaterally. ABDOMEN:  Soft, nontender, no masses, no hepatomegaly or splenomegaly, BS+  CHEST: nontender to palpation, expands symmetrically  MUSCULOSKELETAL:  No clubbing no cyanosis. there is no redness, warmth, or swelling of the joints  full range of motion noted  NEUROLOGIC:  Alert, awake,oriented x3  SKIN:  no bruising or bleeding, normal skin color, texture, turgor and no redness, warmth, or swelling     /70   Pulse 96   Resp 16   Ht 5' 2.5\" (1.588 m)   Wt 220 lb (99.8 kg)   LMP 05/03/2013   BMI 39.60 kg/m²     DATA:   I personally reviewed the visit EKG with the following interpretation: Sinus rhythm, diffuse nonspecific T wave changes, normal axis    1/5/2022, sinus rhythm, possible old anteroseptal wall MI age undetermined, diffuse T wave changes    EKG 5/3/21  Sinus tachycardia, diffuse nonspecific T wave changes, no significant changes when compared to previous      ECHO: 3/4/21 . Summary   Compared to prior echo, no significant changes noted. Technically adequate study. Left ventricle size is normal.   Normal left ventricular wall thickness. Ejection fraction is visually estimated at 55%. No regional wall motion abnormalities seen. There is doppler evidence of stage II diastolic dysfunction. Physiologic and/or trace tricuspid regurgitation. There is a small pericardial effusion noted. Stress Test: 10/26/19   FINDINGS:   Perfusion images demonstrate small anterior wall reversible defect   Wall motion is within normal limits. The end diastolic volume is 54 ml. The end systolic volume is 17 ml. The estimated ejection fraction is 69 %. Impression   1. There is a small reversible defect in the inferior wall   2. Ejection fraction is 69 %. 3. No significant wall motion abnormality       Angiography: 11/4/19 IMPRESSION:  1. Angiographically normal coronary arteries. 2.  Successful deployment of 6-[place] Angio-Seal in the right common  femoral artery.   Cardiology Labs: BMP:    Lab Results   Component Value Date/Time     04/11/2022 02:46 PM    K 4.4 04/11/2022 02:46 PM    K 4.2 07/18/2020 03:33 PM     04/11/2022 02:46 PM    CO2 28 04/11/2022 02:46 PM    BUN 12 04/11/2022 02:46 PM    CREATININE 0.7 04/11/2022 02:46 PM     CMP:    Lab Results   Component Value Date/Time     04/11/2022 02:46 PM    K 4.4 04/11/2022 02:46 PM    K 4.2 07/18/2020 03:33 PM     04/11/2022 02:46 PM    CO2 28 04/11/2022 02:46 PM    BUN 12 04/11/2022 02:46 PM    CREATININE 0.7 04/11/2022 02:46 PM    PROT 7.0 04/11/2022 02:46 PM     CBC:    Lab Results   Component Value Date/Time    WBC 5.0 04/11/2022 02:46 PM    RBC 4.77 04/11/2022 02:46 PM    HGB 12.6 04/11/2022 02:46 PM    HCT 41.8 04/11/2022 02:46 PM    MCV 87.6 04/11/2022 02:46 PM    RDW 13.8 04/11/2022 02:46 PM     04/11/2022 02:46 PM     PT/INR:  No results found for: PTINR  PT/INR Warfarin:  No components found for: PTPATWAR, PTINRWAR  PTT:    Lab Results   Component Value Date/Time    APTT 32.4 10/24/2019 09:30 PM     PTT Heparin:  No components found for: APTTHEP  Magnesium:    Lab Results   Component Value Date/Time    MG 1.9 11/02/2019 11:30 AM     TSH:    Lab Results   Component Value Date/Time    TSH 0.490 04/11/2022 02:46 PM     TROPONIN:  No components found for: TROP  BNP:  No results found for: BNP  FASTING LIPID PANEL:    Lab Results   Component Value Date/Time    CHOL 202 04/11/2022 02:46 PM    HDL 69 04/11/2022 02:46 PM    TRIG 200 04/11/2022 02:46 PM     No orders to display     I have personally reviewed the laboratory, cardiac diagnostic and radiographic testing as outlined above:      IMPRESSION:  Pericardial effusion: Small on an echocardiogram done in March 2021. Chronic congestive heart failure: Diastolic, compensated, continue current treatment  Hypertension: Controlled, continue current treatment  Abnormal EKG with nonspecific T wave changes: Chronic changes   hyperlipidemia  Seizures:      RECOMMENDATIONS:   1. Continue current treatment  2. CHF: Daily weight, take an extra Lasix for weight gain of more than 2-3 pounds in 24 hours, compliance with diuretics, low-salt diet were all advised.   3. Follow-up with Dr. Oren Henriquez as scheduled  4. Follow-up with Dr. Darren Davis in 1., sooner if symptomatic for any reason    I have reviewed my findings and recommendations with patient    Electronically signed by Dola Siemens, MD on 8/8/2022 at 2:51 PM  NOTE: This report was transcribed using voice recognition software.  Every effort was made to ensure accuracy; however, inadvertent computerized transcription errors may be present

## 2022-08-15 DIAGNOSIS — G40.909 SEIZURE DISORDER (HCC): ICD-10-CM

## 2022-08-16 ENCOUNTER — APPOINTMENT (OUTPATIENT)
Dept: GENERAL RADIOLOGY | Age: 52
End: 2022-08-16
Payer: COMMERCIAL

## 2022-08-16 ENCOUNTER — HOSPITAL ENCOUNTER (EMERGENCY)
Age: 52
Discharge: HOME | End: 2022-08-16
Payer: COMMERCIAL

## 2022-08-16 VITALS
HEART RATE: 95 BPM | BODY MASS INDEX: 39.6 KG/M2 | RESPIRATION RATE: 20 BRPM | TEMPERATURE: 97.9 F | SYSTOLIC BLOOD PRESSURE: 184 MMHG | WEIGHT: 220 LBS | OXYGEN SATURATION: 95 % | DIASTOLIC BLOOD PRESSURE: 105 MMHG

## 2022-08-16 DIAGNOSIS — S50.02XA CONTUSION OF LEFT ELBOW, INITIAL ENCOUNTER: ICD-10-CM
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
## 2022-08-16 PROCEDURE — 73070 X-RAY EXAM OF ELBOW: CPT

## 2022-08-16 PROCEDURE — 73030 X-RAY EXAM OF SHOULDER: CPT

## 2022-08-16 PROCEDURE — 99283 EMERGENCY DEPT VISIT LOW MDM: CPT

## 2022-08-16 RX ORDER — LEVETIRACETAM 750 MG/1
1125 TABLET ORAL 2 TIMES DAILY
Qty: 180 TABLET | Refills: 3 | Status: SHIPPED | OUTPATIENT
Start: 2022-08-16
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
## 2022-08-16 NOTE — ED NOTES
Assumed care of patient at this time. Sling in place on L. Arm from prior caregiver.      Rey Yi RN  08/16/22 [id number]

## 2022-08-29 ENCOUNTER — HOSPITAL ENCOUNTER (OUTPATIENT)
Dept: AUDIOLOGY | Age: 52
Discharge: HOME | End: 2022-08-29
Payer: MEDICAID

## 2022-08-29 PROCEDURE — V5256 HEARING AID, DIGIT, MON, ITE: HCPCS | Performed by: AUDIOLOGIST

## 2022-08-29 PROCEDURE — V5241 DISPENSING FEE, MONAURAL: HCPCS | Performed by: AUDIOLOGIST

## 2022-08-29 NOTE — PROGRESS NOTES
Hearing aid billing for [facility] [employer]. Billing to Medicaid. Documents previously scanned to Epic.  Electronically signed by Leana Hawley on 8/29/2022 at 2:19 PM

## 2022-09-28 RX ORDER — CARVEDILOL 6.25 MG/1
TABLET ORAL
Qty: 180 TABLET | Refills: 3 | Status: SHIPPED | OUTPATIENT
Start: 2022-09-28

## 2022-10-04 DIAGNOSIS — E78.2 MIXED HYPERLIPIDEMIA: ICD-10-CM

## 2022-10-04 RX ORDER — PRIMIDONE 50 MG/1
TABLET ORAL
Qty: 60 TABLET | Refills: 2 | Status: SHIPPED | OUTPATIENT
Start: 2022-10-04

## 2022-10-04 RX ORDER — PRAVASTATIN SODIUM 40 MG
TABLET ORAL
Qty: 30 TABLET | Refills: 2 | Status: SHIPPED | OUTPATIENT
Start: 2022-10-04

## 2022-11-15 ENCOUNTER — TELEPHONE (OUTPATIENT)
Dept: FAMILY MEDICINE CLINIC | Age: 52
End: 2022-11-15

## 2022-11-15 NOTE — TELEPHONE ENCOUNTER
Samina Alanis from the [address] called in stating that the paperwork that was faxed on 11/10/2022 is filled out wrong. Samina Alanis stating that all medication specifically prescribed by Zach Gupta needs to be documented and wrote down on the form. This form will need to be filled out, signed, and sent back filled out correctly. She also stated that the patient is required to also provide  with a 35 page court order from the [place] board of nursing also and is wondering if she provided us with that document? I do not see it in her chart. Samina Alanis stated we can call her with any questions at [phone].  Please advise

## 2022-11-16 NOTE — TELEPHONE ENCOUNTER
Lety Esquivel is going to re-fax the form so we can just put the medications we prescribe on there. Also she is going to have Prasanna May provide us with the board order as well.

## 2022-11-30 DIAGNOSIS — G89.29 CHRONIC BILATERAL LOW BACK PAIN WITH RIGHT-SIDED SCIATICA: ICD-10-CM

## 2022-11-30 DIAGNOSIS — M54.41 CHRONIC BILATERAL LOW BACK PAIN WITH RIGHT-SIDED SCIATICA: ICD-10-CM

## 2022-11-30 RX ORDER — CYCLOBENZAPRINE HCL 5 MG
5 TABLET ORAL 2 TIMES DAILY PRN
Qty: 60 TABLET | Refills: 1 | Status: SHIPPED | OUTPATIENT
Start: 2022-11-30

## 2022-12-02 ENCOUNTER — FOLLOWUP TELEPHONE ENCOUNTER (OUTPATIENT)
Dept: AUDIOLOGY | Age: 52
End: 2022-12-02

## 2022-12-21 ENCOUNTER — OFFICE VISIT (OUTPATIENT)
Dept: NEUROLOGY | Age: 52
End: 2022-12-21
Payer: MEDICARE

## 2022-12-21 VITALS
OXYGEN SATURATION: 97 % | BODY MASS INDEX: 38.59 KG/M2 | HEIGHT: 63 IN | WEIGHT: 217.8 LBS | RESPIRATION RATE: 20 BRPM | SYSTOLIC BLOOD PRESSURE: 110 MMHG | DIASTOLIC BLOOD PRESSURE: 75 MMHG | TEMPERATURE: 97 F | HEART RATE: 90 BPM

## 2022-12-21 DIAGNOSIS — F44.5 PSYCHOGENIC NONEPILEPTIC SEIZURE: Primary | ICD-10-CM

## 2022-12-21 DIAGNOSIS — G40.909 SEIZURE DISORDER (HCC): ICD-10-CM

## 2022-12-21 DIAGNOSIS — G47.33 OSA (OBSTRUCTIVE SLEEP APNEA): ICD-10-CM

## 2022-12-21 PROCEDURE — G8427 DOCREV CUR MEDS BY ELIG CLIN: HCPCS | Performed by: PHYSICIAN ASSISTANT

## 2022-12-21 PROCEDURE — 99214 OFFICE O/P EST MOD 30 MIN: CPT | Performed by: PHYSICIAN ASSISTANT

## 2022-12-21 PROCEDURE — G8417 CALC BMI ABV UP PARAM F/U: HCPCS | Performed by: PHYSICIAN ASSISTANT

## 2022-12-21 PROCEDURE — 3074F SYST BP LT 130 MM HG: CPT | Performed by: PHYSICIAN ASSISTANT

## 2022-12-21 PROCEDURE — G8484 FLU IMMUNIZE NO ADMIN: HCPCS | Performed by: PHYSICIAN ASSISTANT

## 2022-12-21 PROCEDURE — 3017F COLORECTAL CA SCREEN DOC REV: CPT | Performed by: PHYSICIAN ASSISTANT

## 2022-12-21 PROCEDURE — 3078F DIAST BP <80 MM HG: CPT | Performed by: PHYSICIAN ASSISTANT

## 2022-12-21 PROCEDURE — 1036F TOBACCO NON-USER: CPT | Performed by: PHYSICIAN ASSISTANT

## 2022-12-21 RX ORDER — MECLIZINE HCL 12.5 MG/1
12.5 TABLET ORAL 3 TIMES DAILY PRN
Qty: 15 TABLET | Refills: 0 | Status: SHIPPED | OUTPATIENT
Start: 2022-12-21 | End: 2022-12-31

## 2022-12-21 NOTE — PROGRESS NOTES
[facility]. Shawn Castanon M.D., F.A.C.P. Isa Bolton, OBED, APRN, ACNS-BC  Allyson Acharya. Yimi Bridges, MSN, APRN-FNP-C  Dama Nissen, MSPAS, PA-C  Allen Huggins, MSN, APRN-FNP-C  [address], [facility] [place], [address]  Phone: [phone]  Fax: [phone]       Adithya Conroy is a 46 y.o. right handed female     Presents for follow-up of seizures. She presents alone and is a fair historian. 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 At that time she had lysis adhesion surgery and she reports that the surgeon \"nicked my vagus nerve\". Her daughter reports that she had several spells almost daily after this time. Most of which are described as staring, jumbled up words, impaired awareness followed by confusion. Prior to these episodes she states that her head feels \"funny\"-- but cannot further describe this. She reports \"grand mal\" seizures, but denies tonic-clonic movements. She does report becoming extremely tense. She describes impaired memories surrounding all of the above events. She reports loss of consciousness, bruises all over her body and tongue biting. She denies incontinence. She was seen in August 2019 by Dr. Cristhian Mcgraw for seizure-like activity. At that time she presented with overdose on multiple neuroleptic medications. EEG in August 2019 was normal.  It was felt that these events were probable nonepileptic spells in addition to conversion disorder. Keppra was to be discontinued at that time and switched to Lamictal for its mood stabilizing properties.      In October 2019 she was seen in the hospital for possible breakthrough seizures after running out of her Keppra and taking leftover Lamictal.  She had several RRT's on that admission for decreased responsiveness and rigidity. AEDs were titrated and she did not have any response. MRI of the brain revealed minimal asymmetry and slight volume loss of the amygdala and hippocampus on the right suggesting the possibility of mesial temporal sclerosis. Also showed a stable left anterior temporal fossa arachnoid cysts. As her events did not increase or decrease with titration and reduction of AEDs during that admission and her ability to follow commands during some of the events she was discharged on Keppra 500 mg twice daily    Patient was evaluated at [facility] epilepsy monitoring unit in December 2019. A 5-day video EEG was consistent with a diagnosis of psychogenic nonepileptic seizures. 2 events were recorded which the patient reported were typical without any EEG change to suggest these were of epileptic etiology. The events captured were that typical of her feeling foggy, apparent unresponsiveness and not following commands and sitting with eyes closed. The second event was similar and in which she was actively hyperventilating. Unfortunately an episode with lipsmacking and convulsions was not captured and that part is inconclusive. Due to this it was recommended to continue Keppra 750 mg twice daily. She has seen been evaluated at [facility] in their EMU   States that they were able to capture one of her seizures by inducing it with sleep depravation. She was d/c home on Vimpat, but states that this caused more seizures and was placed back on Keppra and is taking 750 mg 1.5 tablets twice daily and has been seizure free on this regimen. She has not had any further seizures since her EMU visit. She has MYNOR and is non compliant with CPAP. Interval history   Since last visit she reports \"petit mal\" seizures daily for the last month described as feeling lightheaded and sitting down because the room is about to start spinning. No triggers for these.  She is aware Results   Component Value Date/Time    WBC 5.0 04/11/2022 02:46 PM    RBC 4.77 04/11/2022 02:46 PM    HGB 12.6 04/11/2022 02:46 PM    HCT 41.8 04/11/2022 02:46 PM     04/11/2022 02:46 PM    MCV 87.6 04/11/2022 02:46 PM    MCH 26.4 04/11/2022 02:46 PM    MCHC 30.1 04/11/2022 02:46 PM    RDW 13.8 04/11/2022 02:46 PM    NRBC 0.0 04/18/2021 08:16 PM    SEGSPCT 70 11/30/2013 10:20 PM    LYMPHOPCT 40.3 04/11/2022 02:46 PM    MONOPCT 4.2 04/11/2022 02:46 PM    BASOPCT 0.4 04/11/2022 02:46 PM    MONOSABS 0.21 04/11/2022 02:46 PM    LYMPHSABS 2.02 04/11/2022 02:46 PM    EOSABS 0.04 04/11/2022 02:46 PM    BASOSABS 0.02 04/11/2022 02:46 PM     CMP:    Lab Results   Component Value Date/Time     04/11/2022 02:46 PM    K 4.4 04/11/2022 02:46 PM    K 4.2 07/18/2020 03:33 PM     04/11/2022 02:46 PM    CO2 28 04/11/2022 02:46 PM    BUN 12 04/11/2022 02:46 PM    CREATININE 0.7 04/11/2022 02:46 PM    GFRAA >60 04/11/2022 02:46 PM    LABGLOM >60 04/11/2022 02:46 PM    GLUCOSE 109 04/11/2022 02:46 PM    GLUCOSE 91 04/18/2021 08:16 PM    PROT 7.0 04/11/2022 02:46 PM    LABALBU 4.0 04/11/2022 02:46 PM    LABALBU 3.9 04/18/2021 08:16 PM    CALCIUM 8.9 04/11/2022 02:46 PM    BILITOT <0.2 04/11/2022 02:46 PM    ALKPHOS 144 04/11/2022 02:46 PM    AST 21 04/11/2022 02:46 PM    ALT 37 04/11/2022 02:46 PM     Mri brain WWO  Minimal asymmetry with slight volume loss of the amygdala and  hippocampus on the right suggesting the possibility of mesial temporal  sclerosis. Stable left anterior temporal fossa arachnoid cyst.      Mild diffuse mucosal thickening within the paranasal sinuses. EEG 8/9/19  Normal     EEG 10/28/2019  This 9 hours continuous EEG shows epliptogenicity arising from   left central area in addition to mild diffuse encephalopathy. EEG 11/1/19   This more than 4 hours video EEG shows a structural lesion in   left temporal area. No epileptiform activities are seen.      EEG report from [facility] EMU   This 5-day video EEG evaluation between 12/17/2019 and 12/21/2019 was consistent  with a diagnosis of psychogenic non-epileptic seizures. Despite activation  procedures including photic stimulation, sleep deprivation, and holding home  anticonvulsant (levetiracetam) on admission, no evidence of comorbid epilepsy  was seen during this evaluation. However, given episode type with lip smacking and convulsions was not captured,  this evaluation was in part in conclusive. Because a prior outside EEG was read  as epileptiform (not available for review) and the uncaptured episode types  sound clinically concerning, we agreed to continue anticonvulsant treatment, but  that this should not be uptitrated for the frequent \"foggy\" episodes as above. 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 Unfortunately, an episode with lip smacking and convulsions was not captured and in part inconclusive. Due to a previous EEG with epileptogenicity arising from the left central area and description of event being clinically concerning for epileptic seizure it was decided to continue on AED therapy. Reportedly, seizure activity has been confirmed by [facility] EMU admission. She is now stable on Keppra 750 mg 1.5 tablets twice daily without seizures since dose increase     Risk factors for seizures: possible mesial temporal sclerosis on MRI    Reports daily \"petit mal\" seizures over the last month described as lightheaded and spinning. Sounds more consistent with BPV, however given her above hx, will obtain updated EEG monitoring to exclude     Essential tremor versus enhanced physiologic tremor   Worsened by stress and anxiety   Controlled with Primidone     Subjective short term memory complaints are related to untreated sleep apnea and polypharmacy.      Plan:     [facility] EEG 72 hours Meclizine 12.5 mg TID prn     Continue Keppra 750 mg 1.5 tablets twice daily    Recommend CBT for management of her PNES    Continue Primidone     CPAP compliance recommended     Refrain from driving at this time until updated EEG complete    RTO 6 weeks or sooner prn     Call with questions     Jacky Wright PA-C  1:51 PM  12/21/2022

## 2023-01-02 DIAGNOSIS — E78.2 MIXED HYPERLIPIDEMIA: ICD-10-CM

## 2023-01-03 RX ORDER — PRAVASTATIN SODIUM 40 MG
TABLET ORAL
Qty: 30 TABLET | Refills: 2 | Status: SHIPPED
Start: 2023-01-03 | End: 2023-01-04 | Stop reason: SDUPTHER

## 2023-01-03 RX ORDER — PRIMIDONE 50 MG/1
TABLET ORAL
Qty: 60 TABLET | Refills: 2 | Status: SHIPPED | OUTPATIENT
Start: 2023-01-03

## 2023-01-04 ENCOUNTER — OFFICE VISIT (OUTPATIENT)
Dept: FAMILY MEDICINE CLINIC | Age: 53
End: 2023-01-04
Payer: MEDICARE

## 2023-01-04 VITALS
HEART RATE: 81 BPM | OXYGEN SATURATION: 97 % | SYSTOLIC BLOOD PRESSURE: 122 MMHG | RESPIRATION RATE: 18 BRPM | HEIGHT: 64 IN | DIASTOLIC BLOOD PRESSURE: 84 MMHG | BODY MASS INDEX: 37.46 KG/M2 | WEIGHT: 219.4 LBS | TEMPERATURE: 96.5 F

## 2023-01-04 DIAGNOSIS — R73.03 PREDIABETES: ICD-10-CM

## 2023-01-04 DIAGNOSIS — R41.3 MEMORY DEFICIT: ICD-10-CM

## 2023-01-04 DIAGNOSIS — Z00.00 INITIAL MEDICARE ANNUAL WELLNESS VISIT: Primary | ICD-10-CM

## 2023-01-04 DIAGNOSIS — E78.2 MIXED HYPERLIPIDEMIA: ICD-10-CM

## 2023-01-04 PROCEDURE — 3079F DIAST BP 80-89 MM HG: CPT | Performed by: FAMILY MEDICINE

## 2023-01-04 PROCEDURE — G0438 PPPS, INITIAL VISIT: HCPCS | Performed by: FAMILY MEDICINE

## 2023-01-04 PROCEDURE — 3017F COLORECTAL CA SCREEN DOC REV: CPT | Performed by: FAMILY MEDICINE

## 2023-01-04 PROCEDURE — 3074F SYST BP LT 130 MM HG: CPT | Performed by: FAMILY MEDICINE

## 2023-01-04 PROCEDURE — G8484 FLU IMMUNIZE NO ADMIN: HCPCS | Performed by: FAMILY MEDICINE

## 2023-01-04 RX ORDER — PRAVASTATIN SODIUM 40 MG
TABLET ORAL
Qty: 30 TABLET | Refills: 2 | Status: SHIPPED | OUTPATIENT
Start: 2023-01-04

## 2023-01-04 SDOH — ECONOMIC STABILITY: FOOD INSECURITY: WITHIN THE PAST 12 MONTHS, YOU WORRIED THAT YOUR FOOD WOULD RUN OUT BEFORE YOU GOT MONEY TO BUY MORE.: NEVER TRUE

## 2023-01-04 SDOH — ECONOMIC STABILITY: FOOD INSECURITY: WITHIN THE PAST 12 MONTHS, THE FOOD YOU BOUGHT JUST DIDN'T LAST AND YOU DIDN'T HAVE MONEY TO GET MORE.: NEVER TRUE

## 2023-01-04 SDOH — HEALTH STABILITY: PHYSICAL HEALTH: ON AVERAGE, HOW MANY DAYS PER WEEK DO YOU ENGAGE IN MODERATE TO STRENUOUS EXERCISE (LIKE A BRISK WALK)?: 0 DAYS

## 2023-01-04 ASSESSMENT — PATIENT HEALTH QUESTIONNAIRE - PHQ9
9. THOUGHTS THAT YOU WOULD BE BETTER OFF DEAD, OR OF HURTING YOURSELF: 0
SUM OF ALL RESPONSES TO PHQ QUESTIONS 1-9: 2
SUM OF ALL RESPONSES TO PHQ QUESTIONS 1-9: 2
1. LITTLE INTEREST OR PLEASURE IN DOING THINGS: 0
7. TROUBLE CONCENTRATING ON THINGS, SUCH AS READING THE NEWSPAPER OR WATCHING TELEVISION: 3
3. TROUBLE FALLING OR STAYING ASLEEP: 0
6. FEELING BAD ABOUT YOURSELF - OR THAT YOU ARE A FAILURE OR HAVE LET YOURSELF OR YOUR FAMILY DOWN: 0
SUM OF ALL RESPONSES TO PHQ QUESTIONS 1-9: 7
4. FEELING TIRED OR HAVING LITTLE ENERGY: 3
10. IF YOU CHECKED OFF ANY PROBLEMS, HOW DIFFICULT HAVE THESE PROBLEMS MADE IT FOR YOU TO DO YOUR WORK, TAKE CARE OF THINGS AT HOME, OR GET ALONG WITH OTHER PEOPLE: 1
SUM OF ALL RESPONSES TO PHQ9 QUESTIONS 1 & 2: 2
SUM OF ALL RESPONSES TO PHQ QUESTIONS 1-9: 7
SUM OF ALL RESPONSES TO PHQ9 QUESTIONS 1 & 2: 0
SUM OF ALL RESPONSES TO PHQ QUESTIONS 1-9: 7
2. FEELING DOWN, DEPRESSED OR HOPELESS: 1
8. MOVING OR SPEAKING SO SLOWLY THAT OTHER PEOPLE COULD HAVE NOTICED. OR THE OPPOSITE, BEING SO FIGETY OR RESTLESS THAT YOU HAVE BEEN MOVING AROUND A LOT MORE THAN USUAL: 0
SUM OF ALL RESPONSES TO PHQ QUESTIONS 1-9: 7
SUM OF ALL RESPONSES TO PHQ QUESTIONS 1-9: 2
2. FEELING DOWN, DEPRESSED OR HOPELESS: 0
5. POOR APPETITE OR OVEREATING: 1
SUM OF ALL RESPONSES TO PHQ QUESTIONS 1-9: 2
1. LITTLE INTEREST OR PLEASURE IN DOING THINGS: 1

## 2023-01-04 ASSESSMENT — SOCIAL DETERMINANTS OF HEALTH (SDOH): HOW HARD IS IT FOR YOU TO PAY FOR THE VERY BASICS LIKE FOOD, HOUSING, MEDICAL CARE, AND HEATING?: NOT VERY HARD

## 2023-01-04 ASSESSMENT — LIFESTYLE VARIABLES
HOW OFTEN DO YOU HAVE A DRINK CONTAINING ALCOHOL: NEVER
HOW MANY STANDARD DRINKS CONTAINING ALCOHOL DO YOU HAVE ON A TYPICAL DAY: PATIENT DOES NOT DRINK
HOW OFTEN DO YOU HAVE SIX OR MORE DRINKS ON ONE OCCASION: 1
HOW MANY STANDARD DRINKS CONTAINING ALCOHOL DO YOU HAVE ON A TYPICAL DAY: 0
HOW OFTEN DO YOU HAVE A DRINK CONTAINING ALCOHOL: 1

## 2023-01-04 NOTE — PROGRESS NOTES
Medicare Annual Wellness Visit    Clara Boswell is here for Medicare Tamie [facility]   Initial Medicare annual wellness visit  -     [facility] Referral to [facility] Clinical Specialist  Mixed hyperlipidemia  -     pravastatin (PRAVACHOL) 40 MG tablet; take 1 tablet by mouth once daily, Disp-30 tablet, R-2Normal  Memory deficit  -     External Referral To Neuropsychology    Recommendations for Preventive Services Due: see orders and patient instructions/AVS.  Recommended screening schedule for the next 5-10 years is provided to the patient in written form: see Patient Instructions/AVS.     Return in 2 months (on 3/4/2023) for memory loss. Subjective   The following acute and/or chronic problems were also addressed today:  Patient passed her cognitive part of her memory test but she states that she has trouble remembering things. She could draw a clock but could only recall 1 of the 3 words. She states that she will be driving and she will forget where she is going or not know where she is. She will have to look around and then she will be able to figure out where she is. She states that her issues started after she started to have seizures. She follows with neurology and talked to them about it but she states that they just tried to make it seem to like everything was okay. Patient's complete Health Risk Assessment and screening values have been reviewed and are found in Flowsheets. The following problems were reviewed today and where indicated follow up appointments were made and/or referrals ordered. Positive Risk Factor Screenings with Interventions:          Drug Use:          Interpretation:  1-2: Low level - Monitor, re-assess at a later date  3-5: Moderate level - Further Investigation  6-8: Substantial level - Intensive Assessment  9-10: Severe level - Intensive Assessment    Interventions:  Patient comments: no longer uses drugs. She has been clean for 4 years.          Opioid Risk: (High risk score ?55) Opioid risk score: The patient doesn't have any registry metric data available    Last PDMP Billie Kevin as Reviewed:  Review User Review Instant Review Result   Yamilex Villalobos 9/5/2021  1:53 PM @   Reviewed PDMP [1]     Last Controlled Substance Monitoring Documentation      [address] Office Visit from 1/4/2023 in [facility]   Periodic Controlled Substance Monitoring No signs of potential drug abuse or diversion identified. filed at 01/04/2023 1539               Weight and Activity:  Physical Activity: Unknown    Days of Exercise per Week: 0 days    Minutes of Exercise per Session: Not on file     On average, how many days per week do you engage in moderate to strenuous exercise (like a brisk walk)?: 0 days  Have you lost any weight without trying in the past 3 months?: No  Body mass index: (!) 38.25  Obesity Interventions:  low carbohydrate diet           Hearing Screen:  Do you or your family notice any trouble with your hearing that hasn't been managed with hearing aids?: (!) Yes    Interventions:Patient comments: has a hearing aid. Vision Screen:  Do you have difficulty driving, watching TV, or doing any of your daily activities because of your eyesight?: (!) Yes  Have you had an eye exam within the past year?: Yes  No results found. Interventions:   Patient encouraged to make appointment with their eye specialist    Safety:  Do you have either shower bars, grab bars, non-slip mats or non-slip surfaces in your shower or bathtub?: (!) No  Do you always fasten your seatbelt when you are in a car?: (!) No  Interventions:  Patient comments: patient knows she should wear her seatbelt. She has both bath mats and grab bars.       Advanced Directives:  Do you have a Living Will?: (!) No    Intervention:  has NO advanced directive  - referred to ACP Coordinator                                Objective   Vitals:    01/04/23 1445   BP: 122/84   Pulse: 81   Resp: 18 Temp: (!) 96.5 °F (35.8 °C)   SpO2: 97%   Weight: 219 lb 6.4 oz (99.5 kg)   Height: 5' 3.5\" (1.613 m)      Body mass index is 38.26 kg/m². General Appearance: alert and oriented to person, place and time, well developed and well- nourished, in no acute distress  Skin: warm and dry, no rash or erythema  Head: normocephalic and atraumatic  Eyes: pupils equal, round, and reactive to light, extraocular eye movements intact, conjunctivae normal  ENT: tympanic membrane, external ear and ear canal normal bilaterally, nose without deformity, nasal mucosa and turbinates normal without polyps  Neck: supple and non-tender without mass, no thyromegaly or thyroid nodules, no cervical lymphadenopathy  Pulmonary/Chest: clear to auscultation bilaterally- no wheezes, rales or rhonchi, normal air movement, no respiratory distress  Cardiovascular: normal rate, regular rhythm, normal S1 and S2, no murmurs, rubs, clicks, or gallops, distal pulses intact, no carotid bruits  Abdomen: soft, non-tender, non-distended, normal bowel sounds, no masses or organomegaly  Extremities: no cyanosis, clubbing or edema  Musculoskeletal: normal range of motion, no joint swelling, deformity or tenderness  Neurologic: reflexes normal and symmetric, no cranial nerve deficit, gait, coordination and speech normal       Allergies   Allergen Reactions    Compazine [Prochlorperazine]      seizure    Reglan [Metoclopramide] Other (See Comments)     seizures    Penicillins      Occurred during childhood pt does not remember reaction    Codeine Nausea And Vomiting     Stomach pain    Ketorolac Tromethamine Other (See Comments)     Can't take because abd pain    Levofloxacin Nausea Only    Naproxen Nausea Only     abd pain    Nsaids Other (See Comments)     Gastroparesis, has had 9 abdominal surgeries, and pancreatitis    Percocet [Oxycodone-Acetaminophen] Itching and Nausea Only     Prior to Visit Medications    Medication Sig Taking?  Authorizing Provider pravastatin (PRAVACHOL) 40 MG tablet take 1 tablet by mouth once daily Yes Arielle Magaña, DO   primidone (MYSOLINE) 50 MG tablet take 1 tablet by mouth twice a day Yes Arielle Magaña DO   medical marijuana Take by mouth as needed. Patient has copy with her for proof. Issued 12/22/20- expires 1/31/23    [id number] Yes Historical Provider, MD   cyclobenzaprine (FLEXERIL) 5 MG tablet Take 1 tablet by mouth 2 times daily as needed for Muscle spasms Yes Arielle Magaña, DO   carvedilol (COREG) 6.25 MG tablet take 1 tablet by mouth twice a day with meals Yes Andria Lucas MD   levETIRAcetam (KEPPRA) 750 MG tablet Take 1.5 tablets by mouth in the morning and 1.5 tablets before bedtime. Take 1.5 tablets BID. Yes ZACARIAS Tripp   carvedilol (COREG) 12.5 MG tablet Indications: pt takes 1.5 tablets twice a day Take 1 1/2 tablets twice daily Yes Andria Lucas MD   pantoprazole (PROTONIX) 40 MG tablet Take 1 tablet by mouth daily Yes Pramod Rushing MD   Multiple Vitamins-Minerals (THERAPEUTIC MULTIVITAMIN-MINERALS) tablet Take 1 tablet by mouth daily Yes Historical Provider, MD   traZODone (DESYREL) 100 MG tablet nightly as needed  Yes Historical Provider, MD RIVERA  (90 Base) MCG/ACT inhaler inhale 2 puffs by mouth every 4 hours if needed for wheezing Yes Arielle Magaña DO   Tens Unit MISC by Does not apply route Yes Christine Gudino MD   sertraline (ZOLOFT) 100 MG tablet take 1 tablet by mouth every morning Yes Historical Provider, MD   mirtazapine (REMERON) 15 MG tablet take 1 tablet by mouth every evening Yes Historical Provider, MD   amitriptyline (ELAVIL) 50 MG tablet take 1 tablet by mouth once daily Yes Historical Provider, MD   ARIPiprazole (ABILIFY) 15 MG tablet take 1 tablet by mouth once daily Yes Historical Provider, MD   buprenorphine-naloxone (SUBOXONE) 8-2 MG SUBL SL tablet Place 1 tablet under the tongue daily.   Yes Historical Provider, MD       Memory Carolyn (Including outside providers/suppliers regularly involved in providing care):   Patient Care Team:  Deana Stevens DO as PCP - General (Family Medicine)  Deana Stevens DO as PCP - [facility] Empaneled Provider  Rivka Minor MD as Consulting Physician (Cardiology)     Reviewed and updated this visit:  Tobacco  Allergies  Meds  Problems  Med Hx  Surg Hx  Soc Hx  Fam Hx

## 2023-01-05 NOTE — PATIENT INSTRUCTIONS
Learning About Mindfulness for Stress  What are mindfulness and stress? Stress is what you feel when you have to handle more than you are used to. A lot of things can cause stress. You may feel stress when you go on a job interview, take a test, or run a race. This kind of short-term stress is normal and even useful. It can help you if you need to work hard or react quickly. Stress also can last a long time. Long-term stress is caused by stressful situations or events. Examples of long-term stress include long-term health problems, ongoing problems at work, and conflicts in your family. Long-term stress can harm your health. Mindfulness is a focus only on things happening in the present moment. It's a process of purposefully paying attention to and being aware of your surroundings, your emotions, your thoughts, and how your body feels. You are aware of these things, but you aren't judging these experiences as \"good\" or \"bad. \" Mindfulness can help you learn to calm your mind and body to help you cope with illness, pain, and stress. How does mindfulness help to relieve stress? Mindfulness can help quiet your mind and relax your body. Studies show that it can help some people sleep better, feel less anxious, and bring their blood pressure down. And it's been shown to help some people live and cope better with certain health problems like heart disease, depression, chronic pain, and cancer. How do you practice mindfulness? To be mindful is to pay attention, to be present, and to be accepting. When you're mindful, you do just one thing and you pay close attention to that one thing. For example, you may sit quietly and notice your emotions or how your food tastes and smells. When you're present, you focus on the things that are happening right now. You let go of your thoughts about the past and the future. When you dwell on the past or the future, you miss moments that can heal and strengthen you.  You may miss moments like hearing a child laugh or seeing a friendly face when you think you're all alone. When you're accepting, you don't  the present moment. Instead you accept your thoughts and feelings as they come. You can practice anytime, anywhere, and in any way you choose. You can practice in many ways. Here are a few ideas:  While doing your chores, like washing the dishes, let your mind focus on what's in your hand. What does the dish feel like? Is the water warm or cold? Go outside and take a few deep breaths. What is the air like? Is it warm or cold? When you can, take some time at the start of your day to sit alone and think. Take a slow walk by yourself. Count your steps while you breathe in and out. Try yoga breathing exercises, stretches, and poses to strengthen and relax your muscles. At work, if you can, try to stop for a few moments each hour. Note how your body feels. Let yourself regroup and let your mind settle before you return to what you were doing. If you struggle with anxiety or \"worry thoughts,\" imagine your mind as a blue delta and your worry thoughts as clouds. Now imagine those worry thoughts floating across your mind's delta. Just let them pass by as you watch. Follow-up care is a key part of your treatment and safety. Be sure to make and go to all appointments, and call your doctor if you are having problems. It's also a good idea to know your test results and keep a list of the medicines you take. Where can you learn more? Go to http://www.wilkinson.com/ and enter M676 to learn more about \"Learning About Mindfulness for Stress. \"  Current as of: February 9, 2022               Content Version: 13.5  © 2006-2022 Healthwise, Incorporated. Care instructions adapted under license by [facility] [employer] [facility] ([place] [facility]).  If you have questions about a medical condition or this instruction, always ask your healthcare professional. [address] any warranty or liability for your use of this information. Learning About Vision Tests  What are vision tests? The four most common vision tests are visual acuity tests, refraction, visual field tests, and color vision tests. Visual acuity (sharpness) tests  These tests are used: To see if you need glasses or contact lenses. To monitor an eye problem. To check an eye injury. Visual acuity tests are done as part of routine exams. You may also have this test when you get your 's license or apply for some types of jobs. Visual field tests  These tests are used: To check for vision loss in any area of your range of vision. To screen for certain eye diseases. To look for nerve damage after a stroke, head injury, or other problem that could reduce blood flow to the brain. Refraction and color tests  A refraction test is done to find the right prescription for glasses and contact lenses. A color vision test is done to check for color blindness. Color vision is often tested as part of a routine exam. You may also have this test when you apply for a job where recognizing different colors is important, such as , electronics, or the [employer]. How are vision tests done? Visual acuity test   You cover one eye at a time. You read aloud from a wall chart across the room. You read aloud from a small card that you hold in your hand. Refraction   You look into a special device. The device puts lenses of different strengths in front of each eye to see how strong your glasses or contact lenses need to be. Visual field tests   Your doctor may have you look through special machines. Or your doctor may simply have you stare straight ahead while they move a finger into and out of your field of vision. Color vision test   You look at pieces of printed test patterns in various colors. You say what number or symbol you see. Your doctor may have you trace the number or symbol using a pointer.   How do these tests feel?  There is very little chance of having a problem from this test. If dilating drops are used for a vision test, they may make the eyes sting and cause a medicine taste in the mouth. Follow-up care is a key part of your treatment and safety. Be sure to make and go to all appointments, and call your doctor if you are having problems. It's also a good idea to know your test results and keep a list of the medicines you take. Where can you learn more? Go to http://GoLive! Mobile.wilkinson.com/ and enter G551 to learn more about \"Learning About Vision Tests. \"  Current as of: October 12, 2022               Content Version: 13.5  © 2006-2022 [employer]. Care instructions adapted under license by [facility] ([facility]). If you have questions about a medical condition or this instruction, always ask your healthcare professional. [place] [address] any warranty or liability for your use of this information. Advance Directives: Care Instructions  Overview  An advance directive is a legal way to state your wishes at the end of your life. It tells your family and your doctor what to do if you can't say what you want. There are two main types of advance directives. You can change them any time your wishes change. Living will. This form tells your family and your doctor your wishes about life support and other treatment. The form is also called a declaration. Medical power of . This form lets you name a person to make treatment decisions for you when you can't speak for yourself. This person is called a health care agent (health care proxy, health care surrogate). The form is also called a durable power of  for health care. If you do not have an advance directive, decisions about your medical care may be made by a family member, or by a doctor or a  who doesn't know you. It may help to think of an advance directive as a gift to the people who care for you.  If you have one, they won't have to make tough decisions by themselves. For more information, including forms for your state, see the [address] website (www.caringinfo.org/planning/advance-directives/). Follow-up care is a key part of your treatment and safety. Be sure to make and go to all appointments, and call your doctor if you are having problems. It's also a good idea to know your test results and keep a list of the medicines you take. What should you include in an advance directive? Many states have a unique advance directive form. (It may ask you to address specific issues.) Or you might use a universal form that's approved by many states. If your form doesn't tell you what to address, it may be hard to know what to include in your advance directive. Use the questions below to help you get started. Who do you want to make decisions about your medical care if you are not able to? What life-support measures do you want if you have a serious illness that gets worse over time or can't be cured? What are you most afraid of that might happen? (Maybe you're afraid of having pain, losing your independence, or being kept alive by machines.)  Where would you prefer to die? (Your home? A hospital? A nursing home?)  Do you want to donate your organs when you die? Do you want certain Catholic practices performed before you die? When should you call for help? Be sure to contact your doctor if you have any questions. Where can you learn more? Go to http://www.Epivios.com/ and enter R264 to learn more about \"Advance Directives: Care Instructions. \"  Current as of: June 16, 2022               Content Version: 13.5  © 9113-4149 Healthwise, Incorporated. Care instructions adapted under license by [facility] ([facility]).  If you have questions about a medical condition or this instruction, always ask your healthcare professional. [address] any warranty or liability for your use of this information. Personalized Preventive Plan for Jeannie Lr - 1/4/2023  Medicare offers a range of preventive health benefits. Some of the tests and screenings are paid in full while other may be subject to a deductible, co-insurance, and/or copay. Some of these benefits include a comprehensive review of your medical history including lifestyle, illnesses that may run in your family, and various assessments and screenings as appropriate. After reviewing your medical record and screening and assessments performed today your provider may have ordered immunizations, labs, imaging, and/or referrals for you. A list of these orders (if applicable) as well as your Preventive Care list are included within your After Visit Summary for your review. Other Preventive Recommendations:    A preventive eye exam performed by an eye specialist is recommended every 1-2 years to screen for glaucoma; cataracts, macular degeneration, and other eye disorders. A preventive dental visit is recommended every 6 months. Try to get at least 150 minutes of exercise per week or 10,000 steps per day on a pedometer . Order or download the FREE \"Exercise & Physical Activity: Your Everyday Guide\" from [employer]. Call 7-669.244.4104 or search [employer] online. You need 1852-2842 mg of calcium and 6629-1775 IU of vitamin D per day. It is possible to meet your calcium requirement with diet alone, but a vitamin D supplement is usually necessary to meet this goal.  When exposed to the sun, use a sunscreen that protects against both UVA and UVB radiation with an SPF of 30 or greater. Reapply every 2 to 3 hours or after sweating, drying off with a towel, or swimming. Always wear a seat belt when traveling in a car. Always wear a helmet when riding a bicycle or motorcycle. High Dose Vitamin A Counseling: Side effects reviewed, pt to contact office should one occur.

## 2023-01-06 ENCOUNTER — CLINICAL DOCUMENTATION (OUTPATIENT)
Dept: SPIRITUAL SERVICES | Age: 53
End: 2023-01-06

## 2023-01-06 NOTE — ACP (ADVANCE CARE PLANNING)
Advance Care Planning   Ambulatory ACP Specialist Patient Outreach    Date:  1/6/2023  ACP Specialist:  Josh Rg    Outreach call to patient in follow-up to ACP Specialist referral from: Kristin Cazares DO    [x] PCP  [] Provider   [] Ambulatory Care Management [] Other for Reason:    [x] Advance Directive Assistance  [] Code Status Discussion  [] Complete Portable DNR Order  [] Discuss Goals of Care  [] Complete POST/MOST  [] Early ACP Decision-Making  [] Other    Date Referral Received: 01/05/2023    Today's Outreach:  [x] First   [] Second  [] Third                               Third outreach made by []  phone  [] email []   Unocoint     Intervention:  [x] Spoke with Patient  [] Left VM requesting return call      Outcome:  spoke to patient and scheduled ACP Appointment 1/11/2023    Next Step:   [x] ACP scheduled conversation  [] Outreach again in one week               [] Email / Mail [address] [place]  [] Email / Mail Advance Directive            [] Close Referral. Routing closure to referring provider/staff and to ACP Specialist . [] Closure Letter mailed to Patient with Invitation to Contact ACP Specialist if/when ready.     Thank you for this referral.

## 2023-01-10 DIAGNOSIS — E78.2 MIXED HYPERLIPIDEMIA: ICD-10-CM

## 2023-01-10 RX ORDER — PRAVASTATIN SODIUM 40 MG
TABLET ORAL
Qty: 90 TABLET | Refills: 1 | Status: SHIPPED | OUTPATIENT
Start: 2023-01-10

## 2023-01-11 ENCOUNTER — CLINICAL DOCUMENTATION (OUTPATIENT)
Dept: SPIRITUAL SERVICES | Age: 53
End: 2023-01-11

## 2023-01-12 ENCOUNTER — CLINICAL DOCUMENTATION (OUTPATIENT)
Dept: SPIRITUAL SERVICES | Age: 53
End: 2023-01-12

## 2023-01-12 NOTE — ACP (ADVANCE CARE PLANNING)
Advance Care Planning   Advance Care Planning Note  Ambulatory Spiritual Care Services    Date:  1/12/2023    Received request from [facility] Provider and patient. Consultation conversation participants:   Patient who understands ACP conversation     Goals of ACP Conversation:  Discuss advance care planning documents  Facilitate a discussion related to patient's goals of care as they align with the patient's values and beliefs. Health Care Decision Makers:      Primary Decision Maker: Levon Cat - Parent - [phone]    Secondary Decision Maker: Dc Maynard - Child - [phone]    Supplemental (Other) Decision Maker: Yodit Link - Child - [phone]   Summary:  Verified Documents  Completed [address]    Advance Care Planning Documents (Patient Wishes)  Currently on file:   Healthcare Power of /Advance Directive Appointment of [address]  Living Will/Advance Directive  Anatomical Gift/Organ Donation    Assessment:   Patient completed living will and POA documents. Interventions:  Provided education on documents for clarity and greater understanding  Discussed and provided education on state decision maker hierarchy  Assisted in the completion of documents according to patient's wishes at this time  Encouraged ongoing ACP conversation with future decision makers and loved ones    Care Preferences Communicated:   No    Outcomes:  ACP Discussion: Completed  New advance directive completed. Returned original document(s) to patient, as well as copies for distribution to appointed agents  Copy of advance directive given to staff to scan into medical record. Routed ACP note to attending provider or other IDT member.   Teach Back Method used to verify the patient's and/or Healthcare Decision Maker's understanding of key information in the advance directive documents    Patient / Healthcare Decision Maker Instructions:  Return a copy of Advance Directive Form(s) to medical office. Upload completed ACP document(s) in their [employer]unk[employer] ACP page.     Electronically signed by Josefa Subramanian on 1/12/2023 at 9:30 AM.

## 2023-02-01 RX ORDER — PANTOPRAZOLE SODIUM 40 MG/1
TABLET, DELAYED RELEASE ORAL
Qty: 60 TABLET | Refills: 5 | Status: SHIPPED | OUTPATIENT
Start: 2023-02-01

## 2023-02-07 ENCOUNTER — OFFICE VISIT (OUTPATIENT)
Dept: NEUROLOGY | Age: 53
End: 2023-02-07
Payer: MEDICARE

## 2023-02-07 VITALS
DIASTOLIC BLOOD PRESSURE: 85 MMHG | WEIGHT: 223.2 LBS | OXYGEN SATURATION: 95 % | RESPIRATION RATE: 16 BRPM | HEART RATE: 94 BPM | BODY MASS INDEX: 38.1 KG/M2 | TEMPERATURE: 97.2 F | HEIGHT: 64 IN | SYSTOLIC BLOOD PRESSURE: 132 MMHG

## 2023-02-07 DIAGNOSIS — F44.5 PSYCHOGENIC NONEPILEPTIC SEIZURE: ICD-10-CM

## 2023-02-07 DIAGNOSIS — G40.909 SEIZURE DISORDER (HCC): ICD-10-CM

## 2023-02-07 DIAGNOSIS — G47.33 OSA (OBSTRUCTIVE SLEEP APNEA): ICD-10-CM

## 2023-02-07 DIAGNOSIS — R41.841 COGNITIVE COMMUNICATION DEFICIT: Primary | ICD-10-CM

## 2023-02-07 PROCEDURE — G8427 DOCREV CUR MEDS BY ELIG CLIN: HCPCS | Performed by: PHYSICIAN ASSISTANT

## 2023-02-07 PROCEDURE — 3079F DIAST BP 80-89 MM HG: CPT | Performed by: PHYSICIAN ASSISTANT

## 2023-02-07 PROCEDURE — 1036F TOBACCO NON-USER: CPT | Performed by: PHYSICIAN ASSISTANT

## 2023-02-07 PROCEDURE — 3075F SYST BP GE 130 - 139MM HG: CPT | Performed by: PHYSICIAN ASSISTANT

## 2023-02-07 PROCEDURE — G8417 CALC BMI ABV UP PARAM F/U: HCPCS | Performed by: PHYSICIAN ASSISTANT

## 2023-02-07 PROCEDURE — 3017F COLORECTAL CA SCREEN DOC REV: CPT | Performed by: PHYSICIAN ASSISTANT

## 2023-02-07 PROCEDURE — 99214 OFFICE O/P EST MOD 30 MIN: CPT | Performed by: PHYSICIAN ASSISTANT

## 2023-02-07 PROCEDURE — G8484 FLU IMMUNIZE NO ADMIN: HCPCS | Performed by: PHYSICIAN ASSISTANT

## 2023-02-07 RX ORDER — MECLIZINE HYDROCHLORIDE 25 MG/1
25 TABLET ORAL 3 TIMES DAILY PRN
COMMUNITY

## 2023-02-07 NOTE — PROGRESS NOTES
[facility]. Saad Liang M.D., F.A.C.P. Stan Garza, DNP, APRN, ACNS-BC  Alex Lo. Shala Gamble, MSN, APRN-FNP-C  SMILEY Carvajal, PA-C  Anita Brantley, MSN, APRN-FNP-C  [address] [facility]  [place], [address] [address]  Phone: [phone]  Fax: [phone]       Pete Mccauley is a 46 y.o. right handed female     Presents for follow-up of seizures. She presents alone and is a fair historian. 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 At that time she had lysis adhesion surgery and she reports that the surgeon \"nicked my vagus nerve\". Her daughter reports that she had several spells almost daily after this time. Most of which are described as staring, jumbled up words, impaired awareness followed by confusion. Prior to these episodes she states that her head feels \"funny\"-- but cannot further describe this. She reports \"grand mal\" seizures, but denies tonic-clonic movements. She does report becoming extremely tense. She describes impaired memories surrounding all of the above events. She reports loss of consciousness, bruises all over her body and tongue biting. She denies incontinence. She was seen in August 2019 by Dr. Estuardo Yates for seizure-like activity. At that time she presented with overdose on multiple neuroleptic medications. EEG in August 2019 was normal.  It was felt that these events were probable nonepileptic spells in addition to conversion disorder. Keppra was to be discontinued at that time and switched to Lamictal for its mood stabilizing properties.      In October 2019 she was seen in the hospital for possible breakthrough seizures after running out of her Keppra and taking leftover Lamictal.  She had several RRT's on that admission for decreased responsiveness and rigidity. AEDs were titrated and she did not have any response. MRI of the brain revealed minimal asymmetry and slight volume loss of the amygdala and hippocampus on the right suggesting the possibility of mesial temporal sclerosis. Also showed a stable left anterior temporal fossa arachnoid cysts. As her events did not increase or decrease with titration and reduction of AEDs during that admission and her ability to follow commands during some of the events she was discharged on Keppra 500 mg twice daily    Patient was evaluated at [facility] epilepsy monitoring unit in December 2019. A 5-day video EEG was consistent with a diagnosis of psychogenic nonepileptic seizures. 2 events were recorded which the patient reported were typical without any EEG change to suggest these were of epileptic etiology. The events captured were that typical of her feeling foggy, apparent unresponsiveness and not following commands and sitting with eyes closed. The second event was similar and in which she was actively hyperventilating. Unfortunately an episode with lipsmacking and convulsions was not captured and that part is inconclusive. Due to this it was recommended to continue Keppra 750 mg twice daily. She has seen been evaluated at [facility] in their EMU   States that they were able to capture one of her seizures by inducing it with sleep depravation. She was d/c home on Vimpat, but states that this caused more seizures and was placed back on Keppra and is taking 750 mg 1.5 tablets twice daily and has been seizure free on this regimen. She has not had any further seizures since her EMU visit. She has MYNOR and is non compliant with CPAP.     12/2022 OV  Since last visit she reports \"petit mal\" seizures daily for the last month described as feeling lightheaded and sitting down because the room is about to start spinning. No triggers for these.  She is aware of the events. She is able to repsond to family. No TB or UI. Denies precipitating factors. Interval history   No seizure since last visit. The vertigo sensations have stopped. Did not need to use meclizine. Continues to report memory difficulties and states this is her biggest concern at the moment. She has neuropsych testing planned for this upcoming Friday. She still does not wear CPAP for her sleep apnea. She is still on several medications. No chest pain or palpitations  No SOB  No vertigo, lightheadedness or loss of consciousness  No falls, tripping or stumbling  No incontinence of bowels or bladder  No itching or bruising appreciated  No numbness, tingling or focal arm/leg weakness    ROS otherwise negative     Objective:       /85   Pulse 94   Temp 97.2 °F (36.2 °C) (Temporal)   Resp 16   Ht 5' 3.5\" (1.613 m)   Wt 223 lb 3.2 oz (101.2 kg)   LMP 05/03/2013   SpO2 95%   BMI 38.92 kg/m²      General appearance: alert, appears younger than stated age and cooperative  Head: Normocephalic, without obvious abnormality, atraumatic  Eyes: Conjunctiva/corneas clear  Neck: no adenopathy, no carotid bruit on the supple  Lungs: effort normal  Extremities: no cyanosis or edema  Skin: no rashes or lesions     Mental Status: Alert, oriented- pleasant as always. Oriented to self, place, year, date, month, president, city, state. Recall 2/3 after 3 minutes. Able to make abstract associations between a train and bicycle and apple and orange. Able to spell WORLD backwards and do simple calculations. Able to name objects and repeat. Able to draw a clock.      Appropriate attention and concentration  Intact fundus of knowledge  Repetition intact  Memories intact     Speech: clear  Language: appropriate      Cranial Nerves:  I: smell     II: visual acuity      II: visual fields Full to finger counting    II: pupils JR   III,VII: ptosis None   III,IV,VI: extraocular muscles  EOMI without nystagmus    V: mastication Normal   V: facial light touch sensation  Normal   V,VII: corneal reflex      VII: facial muscle function - upper  Normal    VII: facial muscle function - lower Normal   VIII: hearing Normal   IX: soft palate elevation      IX,X: gag reflex     XI: trapezius strength  5/5   XI: sternocleidomastoid strength 5/5   XI: neck extension strength  5/5   XII: tongue strength  Normal      Motor:  5/5 throughout  No drift  Normal bulk and tone  No abnormal movements      Sensory:  Normal to LT      Coordination:   FN, FFM intact bilaterally   Heel-to-shin normal    Gait:   Normal     DTR:     No Almodovar's     Laboratory/Radiology:     CBC with Differential:    Lab Results   Component Value Date/Time    WBC 5.0 04/11/2022 02:46 PM    RBC 4.77 04/11/2022 02:46 PM    HGB 12.6 04/11/2022 02:46 PM    HCT 41.8 04/11/2022 02:46 PM     04/11/2022 02:46 PM    MCV 87.6 04/11/2022 02:46 PM    MCH 26.4 04/11/2022 02:46 PM    MCHC 30.1 04/11/2022 02:46 PM    RDW 13.8 04/11/2022 02:46 PM    NRBC 0.0 04/18/2021 08:16 PM    SEGSPCT 70 11/30/2013 10:20 PM    LYMPHOPCT 40.3 04/11/2022 02:46 PM    MONOPCT 4.2 04/11/2022 02:46 PM    BASOPCT 0.4 04/11/2022 02:46 PM    MONOSABS 0.21 04/11/2022 02:46 PM    LYMPHSABS 2.02 04/11/2022 02:46 PM    EOSABS 0.04 04/11/2022 02:46 PM    BASOSABS 0.02 04/11/2022 02:46 PM     CMP:    Lab Results   Component Value Date/Time     04/11/2022 02:46 PM    K 4.4 04/11/2022 02:46 PM    K 4.2 07/18/2020 03:33 PM     04/11/2022 02:46 PM    CO2 28 04/11/2022 02:46 PM    BUN 12 04/11/2022 02:46 PM    CREATININE 0.7 04/11/2022 02:46 PM    GFRAA >60 04/11/2022 02:46 PM    LABGLOM >60 04/11/2022 02:46 PM    GLUCOSE 109 04/11/2022 02:46 PM    GLUCOSE 91 04/18/2021 08:16 PM    PROT 7.0 04/11/2022 02:46 PM    LABALBU 4.0 04/11/2022 02:46 PM    LABALBU 3.9 04/18/2021 08:16 PM    CALCIUM 8.9 04/11/2022 02:46 PM    BILITOT <0.2 04/11/2022 02:46 PM    ALKPHOS 144 04/11/2022 02:46 PM    AST 21 04/11/2022 02:46 PM    ALT 37 04/11/2022 02:46 PM     Mri brain WWO  Minimal asymmetry with slight volume loss of the amygdala and  hippocampus on the right suggesting the possibility of mesial temporal  sclerosis. Stable left anterior temporal fossa arachnoid cyst.      Mild diffuse mucosal thickening within the paranasal sinuses. EEG 8/9/19  Normal     EEG 10/28/2019  This 9 hours continuous EEG shows epliptogenicity arising from   left central area in addition to mild diffuse encephalopathy. EEG 11/1/19   This more than 4 hours video EEG shows a structural lesion in   left temporal area. No epileptiform activities are seen. EEG report from [facility] EMU   This 5-day video EEG evaluation between 12/17/2019 and 12/21/2019 was consistent  with a diagnosis of psychogenic non-epileptic seizures. Despite activation  procedures including photic stimulation, sleep deprivation, and holding home  anticonvulsant (levetiracetam) on admission, no evidence of comorbid epilepsy  was seen during this evaluation. However, given episode type with lip smacking and convulsions was not captured,  this evaluation was in part in conclusive. Because a prior outside EEG was read  as epileptiform (not available for review) and the uncaptured episode types  sound clinically concerning, we agreed to continue anticonvulsant treatment, but  that this should not be uptitrated for the frequent \"foggy\" episodes as above. 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. Unfortunately, an episode with lip smacking and convulsions was not captured and in part inconclusive. Due to a previous EEG with epileptogenicity arising from the left central area and description of event being clinically concerning for epileptic seizure it was decided to continue on AED therapy.  Reportedly, seizure activity has been confirmed by [facility] EMU admission. She is now stable on Keppra 750 mg 1.5 tablets twice daily without seizures since dose increase     Risk factors for seizures: possible mesial temporal sclerosis on MRI    No recent seizures since last visit     BPPV improved and not bothersome     Enhanced physiologic tremor   Worsened by stress and anxiety   Will d/c primidone at this time to try to limit polypharmacy     Subjective short term memory complaints are related to untreated sleep apnea and polypharmacy. Scores 28/30 on MMSE. Has neuropsych appt this Friday. Will check B12.      Plan:     Continue Keppra 750 mg 1.5 tablets twice daily    Recommend CBT for management of her PNES    Discontinue Primidone     Check B12    CPAP compliance recommended     Limit polypharmacy     RTO 3 months or sooner prn     Call with questions     Whitney Sanchez PA-C  1:50 PM  2/7/2023

## 2023-02-14 DIAGNOSIS — E78.2 MIXED HYPERLIPIDEMIA: ICD-10-CM

## 2023-02-14 RX ORDER — PRAVASTATIN SODIUM 40 MG
TABLET ORAL
Qty: 90 TABLET | Refills: 1 | Status: SHIPPED | OUTPATIENT
Start: 2023-02-14

## 2023-03-23 ENCOUNTER — HOSPITAL ENCOUNTER (OUTPATIENT)
Age: 53
Discharge: HOME | End: 2023-03-23
Payer: MEDICARE

## 2023-03-23 DIAGNOSIS — R73.03 PREDIABETES: ICD-10-CM

## 2023-03-23 DIAGNOSIS — R41.3 MEMORY DEFICIT: ICD-10-CM

## 2023-03-23 DIAGNOSIS — R41.841 COGNITIVE COMMUNICATION DEFICIT: ICD-10-CM

## 2023-03-23 DIAGNOSIS — E78.2 MIXED HYPERLIPIDEMIA: ICD-10-CM

## 2023-03-23 LAB
ALBUMIN SERPL-MCNC: 4.3 G/DL (ref 3.5–5.2)
ALP SERPL-CCNC: 156 U/L (ref 35–104)
ALT SERPL-CCNC: 33 U/L (ref 0–32)
ANION GAP SERPL CALCULATED.3IONS-SCNC: 10 MMOL/L (ref 7–16)
AST SERPL-CCNC: 21 U/L (ref 0–31)
BASOPHILS # BLD: 0.02 E9/L (ref 0–0.2)
BASOPHILS NFR BLD: 0.4 % (ref 0–2)
BILIRUB SERPL-MCNC: <0.2 MG/DL (ref 0–1.2)
BUN SERPL-MCNC: 13 MG/DL (ref 6–20)
CALCIUM SERPL-MCNC: 9.2 MG/DL (ref 8.6–10.2)
CHLORIDE SERPL-SCNC: 103 MMOL/L (ref 98–107)
CHOLESTEROL, TOTAL: 211 MG/DL (ref 0–199)
CO2 SERPL-SCNC: 30 MMOL/L (ref 22–29)
CREAT SERPL-MCNC: 0.7 MG/DL (ref 0.5–1)
EOSINOPHIL # BLD: 0.05 E9/L (ref 0.05–0.5)
EOSINOPHIL NFR BLD: 1.1 % (ref 0–6)
ERYTHROCYTE [DISTWIDTH] IN BLOOD BY AUTOMATED COUNT: 15.2 FL (ref 11.5–15)
FOLATE SERPL-MCNC: >20 NG/ML (ref 4.8–24.2)
GLUCOSE SERPL-MCNC: 111 MG/DL (ref 74–99)
HBA1C MFR BLD: 5.9 % (ref 4–5.6)
HCT VFR BLD AUTO: 41.2 % (ref 34–48)
HDLC SERPL-MCNC: 69 MG/DL
HGB BLD-MCNC: 12.4 G/DL (ref 11.5–15.5)
IMM GRANULOCYTES # BLD: 0.01 E9/L
IMM GRANULOCYTES NFR BLD: 0.2 % (ref 0–5)
LDLC SERPL CALC-MCNC: 118 MG/DL (ref 0–99)
LYMPHOCYTES # BLD: 2.2 E9/L (ref 1.5–4)
LYMPHOCYTES NFR BLD: 48.2 % (ref 20–42)
MCH RBC QN AUTO: 26.2 PG (ref 26–35)
MCHC RBC AUTO-ENTMCNC: 30.1 % (ref 32–34.5)
MCV RBC AUTO: 87.1 FL (ref 80–99.9)
MONOCYTES # BLD: 0.19 E9/L (ref 0.1–0.95)
MONOCYTES NFR BLD: 4.2 % (ref 2–12)
NEUTROPHILS # BLD: 2.09 E9/L (ref 1.8–7.3)
NEUTS SEG NFR BLD: 45.9 % (ref 43–80)
PLATELET # BLD AUTO: 344 E9/L (ref 130–450)
PMV BLD AUTO: 8.8 FL (ref 7–12)
POTASSIUM SERPL-SCNC: 4.1 MMOL/L (ref 3.5–5)
PROT SERPL-MCNC: 7.1 G/DL (ref 6.4–8.3)
RBC # BLD AUTO: 4.73 E12/L (ref 3.5–5.5)
SODIUM SERPL-SCNC: 143 MMOL/L (ref 132–146)
TRIGL SERPL-MCNC: 118 MG/DL (ref 0–149)
VIT B12 SERPL-MCNC: 716 PG/ML (ref 211–946)
VLDLC SERPL CALC-MCNC: 24 MG/DL
WBC # BLD: 4.6 E9/L (ref 4.5–11.5)

## 2023-03-23 PROCEDURE — 80061 LIPID PANEL: CPT

## 2023-03-23 PROCEDURE — 80053 COMPREHEN METABOLIC PANEL: CPT

## 2023-03-23 PROCEDURE — 82607 VITAMIN B-12: CPT

## 2023-03-23 PROCEDURE — 36415 COLL VENOUS BLD VENIPUNCTURE: CPT

## 2023-03-23 PROCEDURE — 82746 ASSAY OF FOLIC ACID SERUM: CPT

## 2023-03-23 PROCEDURE — 83036 HEMOGLOBIN GLYCOSYLATED A1C: CPT

## 2023-03-23 PROCEDURE — 85025 COMPLETE CBC W/AUTO DIFF WBC: CPT

## 2023-03-28 DIAGNOSIS — E78.2 MIXED HYPERLIPIDEMIA: ICD-10-CM

## 2023-03-28 RX ORDER — PRAVASTATIN SODIUM 80 MG/1
TABLET ORAL
Qty: 90 TABLET | Refills: 1 | Status: SHIPPED | OUTPATIENT
Start: 2023-03-28

## 2023-04-05 DIAGNOSIS — G40.909 SEIZURE DISORDER (HCC): ICD-10-CM

## 2023-04-05 RX ORDER — LEVETIRACETAM 750 MG/1
TABLET ORAL
Qty: 180 TABLET | Refills: 3 | Status: SHIPPED | OUTPATIENT
Start: 2023-04-05

## 2023-04-10 RX ORDER — PRIMIDONE 50 MG/1
TABLET ORAL
Qty: 60 TABLET | Refills: 2 | OUTPATIENT
Start: 2023-04-10

## 2023-04-24 ENCOUNTER — TELEPHONE (OUTPATIENT)
Dept: FAMILY MEDICINE CLINIC | Age: 53
End: 2023-04-24

## 2023-04-24 ENCOUNTER — OFFICE VISIT (OUTPATIENT)
Dept: FAMILY MEDICINE CLINIC | Age: 53
End: 2023-04-24
Payer: MEDICARE

## 2023-04-24 VITALS
HEIGHT: 64 IN | SYSTOLIC BLOOD PRESSURE: 118 MMHG | DIASTOLIC BLOOD PRESSURE: 80 MMHG | OXYGEN SATURATION: 95 % | HEART RATE: 97 BPM | RESPIRATION RATE: 18 BRPM | BODY MASS INDEX: 37.56 KG/M2 | WEIGHT: 220 LBS | TEMPERATURE: 96.9 F

## 2023-04-24 DIAGNOSIS — G31.84 MILD COGNITIVE IMPAIRMENT: ICD-10-CM

## 2023-04-24 DIAGNOSIS — E78.2 MIXED HYPERLIPIDEMIA: ICD-10-CM

## 2023-04-24 DIAGNOSIS — F33.2 SEVERE EPISODE OF RECURRENT MAJOR DEPRESSIVE DISORDER, WITHOUT PSYCHOTIC FEATURES (HCC): ICD-10-CM

## 2023-04-24 DIAGNOSIS — N39.0 URINARY TRACT INFECTION WITHOUT HEMATURIA, SITE UNSPECIFIED: ICD-10-CM

## 2023-04-24 DIAGNOSIS — R82.90 FOUL SMELLING URINE: Primary | ICD-10-CM

## 2023-04-24 LAB
BILIRUBIN, POC: NEGATIVE
BLOOD URINE, POC: NEGATIVE
CLARITY, POC: NORMAL
COLOR, POC: YELLOW
GLUCOSE URINE, POC: NEGATIVE
KETONES, POC: NEGATIVE
LEUKOCYTE EST, POC: NORMAL
NITRITE, POC: POSITIVE
PH, POC: 6
PROTEIN, POC: NEGATIVE
SPECIFIC GRAVITY, POC: 1.02
UROBILINOGEN, POC: 0.2

## 2023-04-24 PROCEDURE — 3079F DIAST BP 80-89 MM HG: CPT | Performed by: FAMILY MEDICINE

## 2023-04-24 PROCEDURE — 3074F SYST BP LT 130 MM HG: CPT | Performed by: FAMILY MEDICINE

## 2023-04-24 PROCEDURE — G8427 DOCREV CUR MEDS BY ELIG CLIN: HCPCS | Performed by: FAMILY MEDICINE

## 2023-04-24 PROCEDURE — 1036F TOBACCO NON-USER: CPT | Performed by: FAMILY MEDICINE

## 2023-04-24 PROCEDURE — 99214 OFFICE O/P EST MOD 30 MIN: CPT | Performed by: FAMILY MEDICINE

## 2023-04-24 PROCEDURE — 81002 URINALYSIS NONAUTO W/O SCOPE: CPT | Performed by: FAMILY MEDICINE

## 2023-04-24 PROCEDURE — G8417 CALC BMI ABV UP PARAM F/U: HCPCS | Performed by: FAMILY MEDICINE

## 2023-04-24 PROCEDURE — 3017F COLORECTAL CA SCREEN DOC REV: CPT | Performed by: FAMILY MEDICINE

## 2023-04-24 RX ORDER — NITROFURANTOIN 25; 75 MG/1; MG/1
100 CAPSULE ORAL 2 TIMES DAILY
Qty: 14 CAPSULE | Refills: 0 | Status: SHIPPED | OUTPATIENT
Start: 2023-04-24 | End: 2023-05-01

## 2023-04-24 RX ORDER — PRAVASTATIN SODIUM 80 MG/1
TABLET ORAL
Qty: 90 TABLET | Refills: 1 | Status: SHIPPED | OUTPATIENT
Start: 2023-04-24

## 2023-04-24 SDOH — ECONOMIC STABILITY: HOUSING INSECURITY
IN THE LAST 12 MONTHS, WAS THERE A TIME WHEN YOU DID NOT HAVE A STEADY PLACE TO SLEEP OR SLEPT IN A SHELTER (INCLUDING NOW)?: NO

## 2023-04-24 SDOH — ECONOMIC STABILITY: FOOD INSECURITY: WITHIN THE PAST 12 MONTHS, YOU WORRIED THAT YOUR FOOD WOULD RUN OUT BEFORE YOU GOT MONEY TO BUY MORE.: NEVER TRUE

## 2023-04-24 SDOH — ECONOMIC STABILITY: FOOD INSECURITY: WITHIN THE PAST 12 MONTHS, THE FOOD YOU BOUGHT JUST DIDN'T LAST AND YOU DIDN'T HAVE MONEY TO GET MORE.: NEVER TRUE

## 2023-04-24 SDOH — ECONOMIC STABILITY: INCOME INSECURITY: HOW HARD IS IT FOR YOU TO PAY FOR THE VERY BASICS LIKE FOOD, HOUSING, MEDICAL CARE, AND HEATING?: SOMEWHAT HARD

## 2023-04-24 NOTE — PROGRESS NOTES
and atraumatic. Right Ear: External ear normal.      Left Ear: External ear normal.      Nose: Nose normal.   Eyes:      Conjunctiva/sclera: Conjunctivae normal.      Pupils: Pupils are equal, round, and reactive to light. Neck:      Thyroid: No thyromegaly. Cardiovascular:      Rate and Rhythm: Normal rate and regular rhythm. Heart sounds: Normal heart sounds. Pulmonary:      Effort: Pulmonary effort is normal.      Breath sounds: Normal breath sounds. No wheezing. Abdominal:      General: Bowel sounds are normal.      Palpations: Abdomen is soft. Tenderness: There is no abdominal tenderness. Musculoskeletal:         General: Normal range of motion. Cervical back: Normal range of motion and neck supple. Skin:     General: Skin is warm and dry. Findings: No rash. Neurological:      Mental Status: She is alert and oriented to person, place, and time. Deep Tendon Reflexes: Reflexes are normal and symmetric. Psychiatric:         Behavior: Behavior normal.       Assessment/Plan:      Migel Maurice was seen today for other and results. Diagnoses and all orders for this visit:    Foul smelling urine  -     POCT Urinalysis no Micro  UA positive  Will start macrobid  Side effects reviewed. Severe episode of recurrent major depressive disorder, without psychotic features ([facility] [address]  Follows with psychiatry  Medications being adjusted. Urinary tract infection without hematuria, site unspecified  -     nitrofurantoin, macrocrystal-monohydrate, (MACROBID) 100 MG capsule; Take 1 capsule by mouth 2 times daily for 7 days  Will start macrobid  Side effects reviewed. Mild cognitive impairment  Reviewed [place] testing  Discussed speech therapy. She will let us know if she would like to do speech therapy    As above. Call or go to ED immediately if symptoms worsen or persist.  Return if symptoms worsen or fail to improve. , or sooner if necessary.       Educational materials and/or home

## 2023-05-17 ENCOUNTER — OFFICE VISIT (OUTPATIENT)
Dept: CARDIOLOGY CLINIC | Age: 53
End: 2023-05-17
Payer: MEDICARE

## 2023-05-17 VITALS
WEIGHT: 218 LBS | BODY MASS INDEX: 38.62 KG/M2 | DIASTOLIC BLOOD PRESSURE: 74 MMHG | RESPIRATION RATE: 16 BRPM | SYSTOLIC BLOOD PRESSURE: 136 MMHG | HEART RATE: 77 BPM | HEIGHT: 63 IN

## 2023-05-17 DIAGNOSIS — R00.0 SINUS TACHYCARDIA: Primary | ICD-10-CM

## 2023-05-17 PROCEDURE — 3017F COLORECTAL CA SCREEN DOC REV: CPT | Performed by: INTERNAL MEDICINE

## 2023-05-17 PROCEDURE — 3075F SYST BP GE 130 - 139MM HG: CPT | Performed by: INTERNAL MEDICINE

## 2023-05-17 PROCEDURE — 93000 ELECTROCARDIOGRAM COMPLETE: CPT | Performed by: INTERNAL MEDICINE

## 2023-05-17 PROCEDURE — 1036F TOBACCO NON-USER: CPT | Performed by: INTERNAL MEDICINE

## 2023-05-17 PROCEDURE — 99213 OFFICE O/P EST LOW 20 MIN: CPT | Performed by: INTERNAL MEDICINE

## 2023-05-17 PROCEDURE — G8417 CALC BMI ABV UP PARAM F/U: HCPCS | Performed by: INTERNAL MEDICINE

## 2023-05-17 PROCEDURE — 3078F DIAST BP <80 MM HG: CPT | Performed by: INTERNAL MEDICINE

## 2023-05-17 PROCEDURE — G8427 DOCREV CUR MEDS BY ELIG CLIN: HCPCS | Performed by: INTERNAL MEDICINE

## 2023-05-17 NOTE — PROGRESS NOTES
[facility] Cardiology Progress Note  Dr. Zeeshan Castillo      Referring Physician: Sam Boyd DO  CHIEF COMPLAINT:   Chief Complaint   Patient presents with    Tachycardia    Hypertension       HISTORY OF PRESENT ILLNESS:   Patient is a 46year old female with a medical history of pericardial effusion, hypertension, hyperlipidemia, is here for follow-up appointment. Occasional palpitations, patient denies any chest pain, no shortness of breath, no lightheadedness, no dizziness, no pedal edema, no PND, no orthopnea, no syncope, no presyncopal episodes. Functional capacity is at baseline    Past Medical History:   Diagnosis Date    Acid reflux     Back pain     Depression 11/30/2016    Essential hypertension 08/07/2019    Gastroparesis     Hepatitis 2003     no treatment. after having gastroparesis, liver enzymes back to normal    Hyperlipidemia     Seizures ([address] 2019    in hospital ventilated after grand mal seizure.  None since April 2021    Sleep apnea     uses cpcpa nightly    Tachycardia     Dr. Muniz Hint 1/2022         Past Surgical History:   Procedure Laterality Date    ABDOMEN SURGERY      Lysis of adhession x2    CARDIAC CATHETERIZATION      had 7 - 5 - 2012    CARDIAC CATHETERIZATION  11/04/2019    Dr. Anderson Bethesda, LAPAROSCOPIC      COLONOSCOPY N/A 3/1/2021    COLORECTAL CANCER SCREENING, NOT HIGH RISK performed by Maeve Vazquez MD at [facility] N/A 5/2/2022    COLORECTAL CANCER SCREENING, NOT HIGH RISK performed by Maeve Vazquez MD at [facility] ENDOSCOPY    ECHO COMPL W DOP COLOR FLOW  7/1/2012         ENDOMETRIAL ABLATION      PAIN MANAGEMENT PROCEDURE Right 11/23/2021    LUMBAR TRANFORAMINAL EPIDURAL STEROID INJECTION RIGHT L 5 AND S1 WITH XRAY (23076) (SEDATION) performed by Kristy Cabrera MD at [address]. [facility]  3/30/2012    lap plylorplasty open upper endoscopy lysis of adhesions    SALPINGO-OOPHORECTOMY Left     left    UPPER

## 2023-06-16 ENCOUNTER — APPOINTMENT (OUTPATIENT)
Dept: CT IMAGING | Age: 53
DRG: 101 | End: 2023-06-16
Payer: MEDICARE

## 2023-06-16 ENCOUNTER — HOSPITAL ENCOUNTER (INPATIENT)
Age: 53
LOS: 2 days | Discharge: HOME | DRG: 101 | End: 2023-06-18
Attending: STUDENT IN AN ORGANIZED HEALTH CARE EDUCATION/TRAINING PROGRAM | Admitting: INTERNAL MEDICINE
Payer: MEDICARE

## 2023-06-16 DIAGNOSIS — G40.919 BREAKTHROUGH SEIZURE (HCC): Primary | ICD-10-CM

## 2023-06-16 DIAGNOSIS — G40.909 SEIZURE DISORDER (HCC): ICD-10-CM

## 2023-06-16 PROBLEM — I50.32 CHRONIC HEART FAILURE WITH PRESERVED EJECTION FRACTION (HFPEF) (HCC): Chronic | Status: ACTIVE | Noted: 2023-06-16

## 2023-06-16 PROBLEM — I50.32 CHRONIC HEART FAILURE WITH PRESERVED EJECTION FRACTION (HFPEF) (HCC): Status: ACTIVE | Noted: 2023-06-16

## 2023-06-16 LAB
ALBUMIN SERPL-MCNC: 4.3 G/DL (ref 3.5–5.2)
ALP SERPL-CCNC: 144 U/L (ref 35–104)
ALT SERPL-CCNC: 37 U/L (ref 0–32)
ANION GAP SERPL CALCULATED.3IONS-SCNC: 12 MMOL/L (ref 7–16)
AST SERPL-CCNC: 28 U/L (ref 0–31)
BASOPHILS # BLD: 0.01 E9/L (ref 0–0.2)
BASOPHILS NFR BLD: 0.2 % (ref 0–2)
BILIRUB SERPL-MCNC: 0.2 MG/DL (ref 0–1.2)
BUN SERPL-MCNC: 13 MG/DL (ref 6–20)
CALCIUM SERPL-MCNC: 9 MG/DL (ref 8.6–10.2)
CHLORIDE SERPL-SCNC: 105 MMOL/L (ref 98–107)
CHP ED QC CHECK: NORMAL
CO2 SERPL-SCNC: 25 MMOL/L (ref 22–29)
CREAT SERPL-MCNC: 0.7 MG/DL (ref 0.5–1)
EKG ATRIAL RATE: 89 BPM
EKG P AXIS: 44 DEGREES
EKG P-R INTERVAL: 150 MS
EKG Q-T INTERVAL: 362 MS
EKG QRS DURATION: 70 MS
EKG QTC CALCULATION (BAZETT): 440 MS
EKG R AXIS: 54 DEGREES
EKG T AXIS: 8 DEGREES
EKG VENTRICULAR RATE: 89 BPM
EOSINOPHIL # BLD: 0.04 E9/L (ref 0.05–0.5)
EOSINOPHIL NFR BLD: 0.6 % (ref 0–6)
ERYTHROCYTE [DISTWIDTH] IN BLOOD BY AUTOMATED COUNT: 14.6 FL (ref 11.5–15)
GLUCOSE BLD-MCNC: 113 MG/DL
GLUCOSE SERPL-MCNC: 121 MG/DL (ref 74–99)
HCT VFR BLD AUTO: 41.5 % (ref 34–48)
HGB BLD-MCNC: 12.2 G/DL (ref 11.5–15.5)
IMM GRANULOCYTES # BLD: 0.02 E9/L
IMM GRANULOCYTES NFR BLD: 0.3 % (ref 0–5)
LYMPHOCYTES # BLD: 1.35 E9/L (ref 1.5–4)
LYMPHOCYTES NFR BLD: 20.7 % (ref 20–42)
MCH RBC QN AUTO: 26.5 PG (ref 26–35)
MCHC RBC AUTO-ENTMCNC: 29.4 % (ref 32–34.5)
MCV RBC AUTO: 90.2 FL (ref 80–99.9)
METER GLUCOSE: 113 MG/DL (ref 74–99)
METER GLUCOSE: 85 MG/DL (ref 74–99)
MONOCYTES # BLD: 0.27 E9/L (ref 0.1–0.95)
MONOCYTES NFR BLD: 4.1 % (ref 2–12)
NEUTROPHILS # BLD: 4.82 E9/L (ref 1.8–7.3)
NEUTS SEG NFR BLD: 74.1 % (ref 43–80)
PLATELET # BLD AUTO: 352 E9/L (ref 130–450)
PMV BLD AUTO: 9.3 FL (ref 7–12)
POTASSIUM SERPL-SCNC: 4.4 MMOL/L (ref 3.5–5)
PROT SERPL-MCNC: 7.4 G/DL (ref 6.4–8.3)
RBC # BLD AUTO: 4.6 E12/L (ref 3.5–5.5)
SODIUM SERPL-SCNC: 142 MMOL/L (ref 132–146)
WBC # BLD: 6.5 E9/L (ref 4.5–11.5)

## 2023-06-16 PROCEDURE — 6360000002 HC RX W HCPCS: Performed by: STUDENT IN AN ORGANIZED HEALTH CARE EDUCATION/TRAINING PROGRAM

## 2023-06-16 PROCEDURE — 99222 1ST HOSP IP/OBS MODERATE 55: CPT | Performed by: INTERNAL MEDICINE

## 2023-06-16 PROCEDURE — 93010 ELECTROCARDIOGRAM REPORT: CPT | Performed by: INTERNAL MEDICINE

## 2023-06-16 PROCEDURE — 1200000000 HC SEMI PRIVATE

## 2023-06-16 PROCEDURE — 93005 ELECTROCARDIOGRAM TRACING: CPT | Performed by: STUDENT IN AN ORGANIZED HEALTH CARE EDUCATION/TRAINING PROGRAM

## 2023-06-16 PROCEDURE — 70450 CT HEAD/BRAIN W/O DYE: CPT

## 2023-06-16 PROCEDURE — 36415 COLL VENOUS BLD VENIPUNCTURE: CPT

## 2023-06-16 PROCEDURE — 82962 GLUCOSE BLOOD TEST: CPT

## 2023-06-16 PROCEDURE — 85025 COMPLETE CBC W/AUTO DIFF WBC: CPT

## 2023-06-16 PROCEDURE — 99285 EMERGENCY DEPT VISIT HI MDM: CPT

## 2023-06-16 PROCEDURE — 80053 COMPREHEN METABOLIC PANEL: CPT

## 2023-06-16 PROCEDURE — 6370000000 HC RX 637 (ALT 250 FOR IP): Performed by: INTERNAL MEDICINE

## 2023-06-16 PROCEDURE — 96374 THER/PROPH/DIAG INJ IV PUSH: CPT

## 2023-06-16 PROCEDURE — 84146 ASSAY OF PROLACTIN: CPT

## 2023-06-16 RX ORDER — CARVEDILOL 6.25 MG/1
6.25 TABLET ORAL 2 TIMES DAILY
Status: DISCONTINUED | OUTPATIENT
Start: 2023-06-16 | End: 2023-06-18 | Stop reason: HOSPADM

## 2023-06-16 RX ORDER — CARVEDILOL 6.25 MG/1
6.25 TABLET ORAL 2 TIMES DAILY
COMMUNITY

## 2023-06-16 RX ORDER — BUPRENORPHINE HYDROCHLORIDE AND NALOXONE HYDROCHLORIDE DIHYDRATE 8; 2 MG/1; MG/1
0.5 TABLET SUBLINGUAL DAILY
Status: DISCONTINUED | OUTPATIENT
Start: 2023-06-17 | End: 2023-06-18 | Stop reason: HOSPADM

## 2023-06-16 RX ORDER — ENOXAPARIN SODIUM 100 MG/ML
40 INJECTION SUBCUTANEOUS DAILY
Status: DISCONTINUED | OUTPATIENT
Start: 2023-06-17 | End: 2023-06-18 | Stop reason: HOSPADM

## 2023-06-16 RX ORDER — CYCLOBENZAPRINE HCL 5 MG
5 TABLET ORAL 2 TIMES DAILY PRN
Status: DISCONTINUED | OUTPATIENT
Start: 2023-06-16 | End: 2023-06-18 | Stop reason: HOSPADM

## 2023-06-16 RX ORDER — LEVETIRACETAM 500 MG/1
750 TABLET ORAL 2 TIMES DAILY
Status: DISCONTINUED | OUTPATIENT
Start: 2023-06-16 | End: 2023-06-18 | Stop reason: HOSPADM

## 2023-06-16 RX ORDER — PRAVASTATIN SODIUM 20 MG
80 TABLET ORAL NIGHTLY
Status: DISCONTINUED | OUTPATIENT
Start: 2023-06-16 | End: 2023-06-18 | Stop reason: HOSPADM

## 2023-06-16 RX ORDER — M-VIT,TX,IRON,MINS/CALC/FOLIC 27MG-0.4MG
1 TABLET ORAL DAILY
Status: DISCONTINUED | OUTPATIENT
Start: 2023-06-16 | End: 2023-06-18 | Stop reason: HOSPADM

## 2023-06-16 RX ORDER — CARVEDILOL 6.25 MG/1
12.5 TABLET ORAL 2 TIMES DAILY
Status: DISCONTINUED | OUTPATIENT
Start: 2023-06-16 | End: 2023-06-18 | Stop reason: HOSPADM

## 2023-06-16 RX ORDER — MIRTAZAPINE 15 MG/1
15 TABLET, FILM COATED ORAL NIGHTLY
Status: DISCONTINUED | OUTPATIENT
Start: 2023-06-16 | End: 2023-06-18 | Stop reason: HOSPADM

## 2023-06-16 RX ORDER — ALBUTEROL SULFATE 90 UG/1
2 AEROSOL, METERED RESPIRATORY (INHALATION) 2 TIMES DAILY
COMMUNITY

## 2023-06-16 RX ORDER — PRAVASTATIN SODIUM 80 MG/1
80 TABLET ORAL NIGHTLY
COMMUNITY

## 2023-06-16 RX ORDER — PANTOPRAZOLE SODIUM 40 MG/1
40 TABLET, DELAYED RELEASE ORAL DAILY
Status: DISCONTINUED | OUTPATIENT
Start: 2023-06-16 | End: 2023-06-18 | Stop reason: HOSPADM

## 2023-06-16 RX ORDER — CARVEDILOL 12.5 MG/1
12.5 TABLET ORAL 2 TIMES DAILY
COMMUNITY

## 2023-06-16 RX ORDER — AMITRIPTYLINE HYDROCHLORIDE 25 MG/1
50 TABLET, FILM COATED ORAL NIGHTLY
Status: DISCONTINUED | OUTPATIENT
Start: 2023-06-16 | End: 2023-06-18 | Stop reason: HOSPADM

## 2023-06-16 RX ORDER — LEVETIRACETAM 500 MG/5ML
1000 INJECTION, SOLUTION, CONCENTRATE INTRAVENOUS ONCE
Status: COMPLETED | OUTPATIENT
Start: 2023-06-16 | End: 2023-06-16

## 2023-06-16 RX ADMIN — PANTOPRAZOLE SODIUM 40 MG: 40 TABLET, DELAYED RELEASE ORAL at 22:29

## 2023-06-16 RX ADMIN — PRAVASTATIN SODIUM 80 MG: 20 TABLET ORAL at 22:29

## 2023-06-16 RX ADMIN — CARVEDILOL 12.5 MG: 6.25 TABLET, FILM COATED ORAL at 22:38

## 2023-06-16 RX ADMIN — MIRTAZAPINE 15 MG: 15 TABLET, FILM COATED ORAL at 22:30

## 2023-06-16 RX ADMIN — MULTIPLE VITAMINS W/ MINERALS TAB 1 TABLET: TAB at 22:29

## 2023-06-16 RX ADMIN — CARVEDILOL 6.25 MG: 6.25 TABLET, FILM COATED ORAL at 22:29

## 2023-06-16 RX ADMIN — SERTRALINE 100 MG: 50 TABLET, FILM COATED ORAL at 22:29

## 2023-06-16 RX ADMIN — LEVETIRACETAM 750 MG: 500 TABLET, FILM COATED ORAL at 22:29

## 2023-06-16 RX ADMIN — LEVETIRACETAM 1000 MG: 100 INJECTION INTRAVENOUS at 13:05

## 2023-06-16 ASSESSMENT — ENCOUNTER SYMPTOMS
NAUSEA: 0
ABDOMINAL PAIN: 0
COUGH: 0
DIARRHEA: 0
VOMITING: 0
SHORTNESS OF BREATH: 0
CHEST TIGHTNESS: 0
PHOTOPHOBIA: 0
ABDOMINAL DISTENTION: 0

## 2023-06-16 ASSESSMENT — PAIN SCALES - GENERAL: PAINLEVEL_OUTOF10: 0

## 2023-06-17 LAB
ANION GAP SERPL CALCULATED.3IONS-SCNC: 11 MMOL/L (ref 7–16)
BASOPHILS # BLD: 0.02 E9/L (ref 0–0.2)
BASOPHILS NFR BLD: 0.3 % (ref 0–2)
BUN SERPL-MCNC: 11 MG/DL (ref 6–20)
CALCIUM SERPL-MCNC: 8.8 MG/DL (ref 8.6–10.2)
CHLORIDE SERPL-SCNC: 103 MMOL/L (ref 98–107)
CO2 SERPL-SCNC: 28 MMOL/L (ref 22–29)
CREAT SERPL-MCNC: 0.7 MG/DL (ref 0.5–1)
EOSINOPHIL # BLD: 0.07 E9/L (ref 0.05–0.5)
EOSINOPHIL NFR BLD: 1.2 % (ref 0–6)
ERYTHROCYTE [DISTWIDTH] IN BLOOD BY AUTOMATED COUNT: 14.6 FL (ref 11.5–15)
GLUCOSE SERPL-MCNC: 97 MG/DL (ref 74–99)
HCT VFR BLD AUTO: 38.1 % (ref 34–48)
HGB BLD-MCNC: 11.4 G/DL (ref 11.5–15.5)
IMM GRANULOCYTES # BLD: 0.01 E9/L
IMM GRANULOCYTES NFR BLD: 0.2 % (ref 0–5)
LYMPHOCYTES # BLD: 2.7 E9/L (ref 1.5–4)
LYMPHOCYTES NFR BLD: 46.1 % (ref 20–42)
MCH RBC QN AUTO: 26.6 PG (ref 26–35)
MCHC RBC AUTO-ENTMCNC: 29.9 % (ref 32–34.5)
MCV RBC AUTO: 88.8 FL (ref 80–99.9)
METER GLUCOSE: 121 MG/DL (ref 74–99)
MONOCYTES # BLD: 0.23 E9/L (ref 0.1–0.95)
MONOCYTES NFR BLD: 3.9 % (ref 2–12)
NEUTROPHILS # BLD: 2.83 E9/L (ref 1.8–7.3)
NEUTS SEG NFR BLD: 48.3 % (ref 43–80)
PLATELET # BLD AUTO: 299 E9/L (ref 130–450)
PMV BLD AUTO: 9 FL (ref 7–12)
POTASSIUM SERPL-SCNC: 3.5 MMOL/L (ref 3.5–5)
PROLACTIN SERPL-MCNC: 2.29 NG/ML
RBC # BLD AUTO: 4.29 E12/L (ref 3.5–5.5)
SODIUM SERPL-SCNC: 142 MMOL/L (ref 132–146)
WBC # BLD: 5.9 E9/L (ref 4.5–11.5)

## 2023-06-17 PROCEDURE — 6370000000 HC RX 637 (ALT 250 FOR IP): Performed by: INTERNAL MEDICINE

## 2023-06-17 PROCEDURE — 36415 COLL VENOUS BLD VENIPUNCTURE: CPT

## 2023-06-17 PROCEDURE — 1200000000 HC SEMI PRIVATE

## 2023-06-17 PROCEDURE — 85025 COMPLETE CBC W/AUTO DIFF WBC: CPT

## 2023-06-17 PROCEDURE — 80048 BASIC METABOLIC PNL TOTAL CA: CPT

## 2023-06-17 PROCEDURE — 6360000002 HC RX W HCPCS: Performed by: INTERNAL MEDICINE

## 2023-06-17 PROCEDURE — 99232 SBSQ HOSP IP/OBS MODERATE 35: CPT | Performed by: INTERNAL MEDICINE

## 2023-06-17 PROCEDURE — 82962 GLUCOSE BLOOD TEST: CPT

## 2023-06-17 RX ADMIN — PANTOPRAZOLE SODIUM 40 MG: 40 TABLET, DELAYED RELEASE ORAL at 08:51

## 2023-06-17 RX ADMIN — LEVETIRACETAM 750 MG: 500 TABLET, FILM COATED ORAL at 08:51

## 2023-06-17 RX ADMIN — CARVEDILOL 6.25 MG: 6.25 TABLET, FILM COATED ORAL at 08:52

## 2023-06-17 RX ADMIN — CARVEDILOL 6.25 MG: 6.25 TABLET, FILM COATED ORAL at 22:23

## 2023-06-17 RX ADMIN — MULTIPLE VITAMINS W/ MINERALS TAB 1 TABLET: TAB at 08:51

## 2023-06-17 RX ADMIN — SERTRALINE 100 MG: 50 TABLET, FILM COATED ORAL at 22:22

## 2023-06-17 RX ADMIN — MIRTAZAPINE 15 MG: 15 TABLET, FILM COATED ORAL at 22:23

## 2023-06-17 RX ADMIN — CARVEDILOL 12.5 MG: 6.25 TABLET, FILM COATED ORAL at 08:51

## 2023-06-17 RX ADMIN — PRAVASTATIN SODIUM 80 MG: 20 TABLET ORAL at 22:22

## 2023-06-17 RX ADMIN — ENOXAPARIN SODIUM 40 MG: 100 INJECTION SUBCUTANEOUS at 08:52

## 2023-06-17 RX ADMIN — LEVETIRACETAM 750 MG: 500 TABLET, FILM COATED ORAL at 22:23

## 2023-06-17 RX ADMIN — BUPRENORPHINE HYDROCHLORIDE AND NALOXONE HYDROCHLORIDE DIHYDRATE 0.5 TABLET: 8; 2 TABLET SUBLINGUAL at 08:52

## 2023-06-17 RX ADMIN — CARVEDILOL 12.5 MG: 6.25 TABLET, FILM COATED ORAL at 22:22

## 2023-06-17 RX ADMIN — AMITRIPTYLINE HYDROCHLORIDE 50 MG: 25 TABLET, FILM COATED ORAL at 22:22

## 2023-06-18 VITALS
RESPIRATION RATE: 18 BRPM | SYSTOLIC BLOOD PRESSURE: 139 MMHG | TEMPERATURE: 97.3 F | WEIGHT: 217 LBS | OXYGEN SATURATION: 92 % | DIASTOLIC BLOOD PRESSURE: 65 MMHG | BODY MASS INDEX: 39.06 KG/M2 | HEART RATE: 81 BPM

## 2023-06-18 PROBLEM — F32.A DEPRESSION: Status: ACTIVE | Noted: 2023-06-18

## 2023-06-18 PROCEDURE — 99238 HOSP IP/OBS DSCHRG MGMT 30/<: CPT | Performed by: INTERNAL MEDICINE

## 2023-06-18 PROCEDURE — 6370000000 HC RX 637 (ALT 250 FOR IP): Performed by: INTERNAL MEDICINE

## 2023-06-18 PROCEDURE — 6360000002 HC RX W HCPCS: Performed by: INTERNAL MEDICINE

## 2023-06-18 RX ADMIN — PANTOPRAZOLE SODIUM 40 MG: 40 TABLET, DELAYED RELEASE ORAL at 08:36

## 2023-06-18 RX ADMIN — BUPRENORPHINE HYDROCHLORIDE AND NALOXONE HYDROCHLORIDE DIHYDRATE 0.5 TABLET: 8; 2 TABLET SUBLINGUAL at 08:36

## 2023-06-18 RX ADMIN — CARVEDILOL 12.5 MG: 6.25 TABLET, FILM COATED ORAL at 08:35

## 2023-06-18 RX ADMIN — MULTIPLE VITAMINS W/ MINERALS TAB 1 TABLET: TAB at 08:36

## 2023-06-18 RX ADMIN — LEVETIRACETAM 750 MG: 500 TABLET, FILM COATED ORAL at 08:35

## 2023-06-18 RX ADMIN — ENOXAPARIN SODIUM 40 MG: 100 INJECTION SUBCUTANEOUS at 08:35

## 2023-06-18 RX ADMIN — CARVEDILOL 6.25 MG: 6.25 TABLET, FILM COATED ORAL at 10:59

## 2023-08-02 RX ORDER — CARVEDILOL 12.5 MG/1
TABLET ORAL
Qty: 270 TABLET | Refills: 3 | Status: SHIPPED | OUTPATIENT
Start: 2023-08-02

## 2023-09-06 ENCOUNTER — HOSPITAL ENCOUNTER (OUTPATIENT)
Dept: MAMMOGRAPHY | Age: 53
Discharge: HOME | End: 2023-09-08
Payer: MEDICARE

## 2023-09-06 VITALS — WEIGHT: 209 LBS | HEIGHT: 63 IN | BODY MASS INDEX: 37.03 KG/M2

## 2023-09-06 DIAGNOSIS — Z12.31 ENCOUNTER FOR SCREENING MAMMOGRAM FOR MALIGNANT NEOPLASM OF BREAST: ICD-10-CM

## 2023-09-06 PROCEDURE — 77067 SCR MAMMO BI INCL CAD: CPT

## 2023-09-07 ENCOUNTER — CLINICAL DOCUMENTATION (OUTPATIENT)
Dept: MAMMOGRAPHY | Age: 53
End: 2023-09-07

## 2023-10-04 RX ORDER — CARVEDILOL 6.25 MG/1
6.25 TABLET ORAL 2 TIMES DAILY WITH MEALS
Qty: 180 TABLET | Refills: 3 | Status: SHIPPED | OUTPATIENT
Start: 2023-10-04

## 2023-10-20 RX ORDER — PRAVASTATIN SODIUM 80 MG/1
80 TABLET ORAL DAILY
Qty: 90 TABLET | Refills: 0 | Status: SHIPPED | OUTPATIENT
Start: 2023-10-20

## 2023-10-23 ENCOUNTER — HOSPITAL ENCOUNTER (EMERGENCY)
Age: 53
Discharge: HOME | End: 2023-10-24
Attending: EMERGENCY MEDICINE
Payer: MEDICARE

## 2023-10-23 ENCOUNTER — APPOINTMENT (OUTPATIENT)
Dept: CT IMAGING | Age: 53
End: 2023-10-23
Payer: MEDICARE

## 2023-10-23 DIAGNOSIS — G40.919 BREAKTHROUGH SEIZURE (HCC): Primary | ICD-10-CM

## 2023-10-23 LAB
ALBUMIN SERPL-MCNC: 4.8 G/DL (ref 3.5–5.2)
ALP SERPL-CCNC: 165 U/L (ref 35–104)
ALT SERPL-CCNC: 87 U/L (ref 0–32)
ANION GAP SERPL CALCULATED.3IONS-SCNC: 11 MMOL/L (ref 7–16)
AST SERPL-CCNC: 42 U/L (ref 0–31)
BASOPHILS # BLD: 0.01 K/UL (ref 0–0.2)
BASOPHILS NFR BLD: 0 % (ref 0–2)
BILIRUB SERPL-MCNC: 0.3 MG/DL (ref 0–1.2)
BUN SERPL-MCNC: 9 MG/DL (ref 6–20)
CALCIUM SERPL-MCNC: 9.6 MG/DL (ref 8.6–10.2)
CHLORIDE SERPL-SCNC: 101 MMOL/L (ref 98–107)
CHP ED QC CHECK: YES
CO2 SERPL-SCNC: 27 MMOL/L (ref 22–29)
CREAT SERPL-MCNC: 0.7 MG/DL (ref 0.5–1)
EOSINOPHIL # BLD: 0.05 K/UL (ref 0.05–0.5)
EOSINOPHILS RELATIVE PERCENT: 1 % (ref 0–6)
ERYTHROCYTE [DISTWIDTH] IN BLOOD BY AUTOMATED COUNT: 14 % (ref 11.5–15)
GFR SERPL CREATININE-BSD FRML MDRD: >60 ML/MIN/1.73M2
GLUCOSE BLD-MCNC: 87 MG/DL
GLUCOSE BLD-MCNC: 87 MG/DL (ref 74–99)
GLUCOSE SERPL-MCNC: 94 MG/DL (ref 74–99)
HCT VFR BLD AUTO: 41.6 % (ref 34–48)
HGB BLD-MCNC: 13 G/DL (ref 11.5–15.5)
IMM GRANULOCYTES # BLD AUTO: <0.03 K/UL (ref 0–0.58)
IMM GRANULOCYTES NFR BLD: 0 % (ref 0–5)
LYMPHOCYTES NFR BLD: 2.39 K/UL (ref 1.5–4)
LYMPHOCYTES RELATIVE PERCENT: 37 % (ref 20–42)
MCH RBC QN AUTO: 27.4 PG (ref 26–35)
MCHC RBC AUTO-ENTMCNC: 31.3 G/DL (ref 32–34.5)
MCV RBC AUTO: 87.8 FL (ref 80–99.9)
MONOCYTES NFR BLD: 0.21 K/UL (ref 0.1–0.95)
MONOCYTES NFR BLD: 3 % (ref 2–12)
NEUTROPHILS NFR BLD: 58 % (ref 43–80)
NEUTS SEG NFR BLD: 3.72 K/UL (ref 1.8–7.3)
PLATELET # BLD AUTO: 412 K/UL (ref 130–450)
PMV BLD AUTO: 8.7 FL (ref 7–12)
POTASSIUM SERPL-SCNC: 4.3 MMOL/L (ref 3.5–5)
PROT SERPL-MCNC: 8 G/DL (ref 6.4–8.3)
RBC # BLD AUTO: 4.74 M/UL (ref 3.5–5.5)
SODIUM SERPL-SCNC: 139 MMOL/L (ref 132–146)
WBC OTHER # BLD: 6.4 K/UL (ref 4.5–11.5)

## 2023-10-23 PROCEDURE — 85025 COMPLETE CBC W/AUTO DIFF WBC: CPT

## 2023-10-23 PROCEDURE — 99284 EMERGENCY DEPT VISIT MOD MDM: CPT

## 2023-10-23 PROCEDURE — 93005 ELECTROCARDIOGRAM TRACING: CPT

## 2023-10-23 PROCEDURE — 82962 GLUCOSE BLOOD TEST: CPT

## 2023-10-23 PROCEDURE — 70450 CT HEAD/BRAIN W/O DYE: CPT

## 2023-10-23 PROCEDURE — 6360000002 HC RX W HCPCS

## 2023-10-23 PROCEDURE — 96374 THER/PROPH/DIAG INJ IV PUSH: CPT

## 2023-10-23 PROCEDURE — 2580000003 HC RX 258

## 2023-10-23 PROCEDURE — 6370000000 HC RX 637 (ALT 250 FOR IP)

## 2023-10-23 PROCEDURE — 80053 COMPREHEN METABOLIC PANEL: CPT

## 2023-10-23 RX ORDER — ONDANSETRON 2 MG/ML
4 INJECTION INTRAMUSCULAR; INTRAVENOUS ONCE
Status: COMPLETED | OUTPATIENT
Start: 2023-10-23 | End: 2023-10-23

## 2023-10-23 RX ORDER — 0.9 % SODIUM CHLORIDE 0.9 %
500 INTRAVENOUS SOLUTION INTRAVENOUS ONCE
Status: COMPLETED | OUTPATIENT
Start: 2023-10-23 | End: 2023-10-23

## 2023-10-23 RX ORDER — IBUPROFEN 400 MG/1
400 TABLET ORAL ONCE
Status: COMPLETED | OUTPATIENT
Start: 2023-10-23 | End: 2023-10-23

## 2023-10-23 RX ADMIN — SODIUM CHLORIDE 500 ML: 9 INJECTION, SOLUTION INTRAVENOUS at 22:52

## 2023-10-23 RX ADMIN — IBUPROFEN 400 MG: 400 TABLET, FILM COATED ORAL at 22:09

## 2023-10-23 RX ADMIN — ONDANSETRON 4 MG: 2 INJECTION INTRAMUSCULAR; INTRAVENOUS at 22:52

## 2023-10-23 ASSESSMENT — PAIN DESCRIPTION - DESCRIPTORS: DESCRIPTORS: ACHING

## 2023-10-23 ASSESSMENT — PAIN DESCRIPTION - LOCATION: LOCATION: HEAD

## 2023-10-23 ASSESSMENT — PAIN SCALES - GENERAL: PAINLEVEL_OUTOF10: 8

## 2023-10-23 ASSESSMENT — PAIN - FUNCTIONAL ASSESSMENT: PAIN_FUNCTIONAL_ASSESSMENT: 0-10

## 2023-10-23 NOTE — ED PROVIDER NOTES
Sande Councilman is a 48 y.o. female    HPI  Sande Councilman is a 48 y.o. female presenting to the ED for Seizures (Witnessed full body seizure, approx 5 min per family. Hx of seizures, takes Keppra and denies missing any doses; similar seizure a few months ago)    History comes primarily from the patient. Emergency department after reported seizure. The patient has a history of seizures and takes Keppra which she says she has not missed any doses of. The patient had been at [facility] and once she got up to the car she reportedly had seizure-like activity lasting approximately 5 minutes with full body tonic-clonic activity. Reportedly, her last seizure before this was 2 to 3 months ago. Upon arrival, the patient says she feels fine and does not want to stay in the hospital.  She seems mildly postictal and has some difficulty answering orientation questions, although she is able to come up with answers after several seconds. ROS  Full review of systems completed. Pertinent positives and negatives per the HPI, unless otherwise stated ROS is negative. Physical Exam  Vitals and nursing note reviewed. Constitutional:       General: She is not in acute distress. Appearance: Normal appearance. She is not ill-appearing. HENT:      Head: Normocephalic and atraumatic. Right Ear: External ear normal.      Left Ear: External ear normal.      Nose: Nose normal. No rhinorrhea. Mouth/Throat:      Mouth: Mucous membranes are moist.      Pharynx: Oropharynx is clear. Eyes:      Extraocular Movements: Extraocular movements intact. Conjunctiva/sclera: Conjunctivae normal.      Pupils: Pupils are equal, round, and reactive to light. Cardiovascular:      Rate and Rhythm: Normal rate and regular rhythm. Pulses: Normal pulses. Heart sounds: Normal heart sounds. No murmur heard. Pulmonary:      Effort: Pulmonary effort is normal. No respiratory distress.       Breath sounds:

## 2023-10-24 VITALS
DIASTOLIC BLOOD PRESSURE: 99 MMHG | OXYGEN SATURATION: 96 % | TEMPERATURE: 98.5 F | SYSTOLIC BLOOD PRESSURE: 166 MMHG | HEART RATE: 88 BPM | RESPIRATION RATE: 18 BRPM

## 2023-10-24 LAB
EKG ATRIAL RATE: 86 BPM
EKG P AXIS: 43 DEGREES
EKG P-R INTERVAL: 134 MS
EKG Q-T INTERVAL: 382 MS
EKG QRS DURATION: 76 MS
EKG QTC CALCULATION (BAZETT): 457 MS
EKG R AXIS: 52 DEGREES
EKG T AXIS: 8 DEGREES
EKG VENTRICULAR RATE: 86 BPM

## 2023-10-24 PROCEDURE — 93010 ELECTROCARDIOGRAM REPORT: CPT | Performed by: INTERNAL MEDICINE

## 2023-10-24 ASSESSMENT — LIFESTYLE VARIABLES
HOW MANY STANDARD DRINKS CONTAINING ALCOHOL DO YOU HAVE ON A TYPICAL DAY: PATIENT DOES NOT DRINK
HOW OFTEN DO YOU HAVE A DRINK CONTAINING ALCOHOL: NEVER

## 2023-10-24 NOTE — DISCHARGE INSTRUCTIONS
Please follow up with your neurologist and return to the ED if you develop any new or worsening symptoms.

## 2023-11-14 DIAGNOSIS — G40.909 SEIZURE DISORDER (HCC): ICD-10-CM

## 2023-11-14 RX ORDER — LEVETIRACETAM 750 MG/1
TABLET ORAL
Qty: 180 TABLET | Refills: 3 | Status: SHIPPED | OUTPATIENT
Start: 2023-11-14

## 2023-12-11 RX ORDER — ALBUTEROL SULFATE 90 UG/1
2 AEROSOL, METERED RESPIRATORY (INHALATION) 2 TIMES DAILY
Qty: 18 G | Refills: 3 | Status: SHIPPED | OUTPATIENT
Start: 2023-12-11

## 2024-01-11 ENCOUNTER — HOSPITAL ENCOUNTER (EMERGENCY)
Age: 54
Discharge: HOME | End: 2024-01-11
Attending: FAMILY MEDICINE
Payer: MEDICARE

## 2024-01-11 VITALS
DIASTOLIC BLOOD PRESSURE: 77 MMHG | TEMPERATURE: 97.2 F | OXYGEN SATURATION: 96 % | WEIGHT: 205 LBS | SYSTOLIC BLOOD PRESSURE: 140 MMHG | BODY MASS INDEX: 36.9 KG/M2 | RESPIRATION RATE: 16 BRPM | HEART RATE: 104 BPM

## 2024-01-11 DIAGNOSIS — N89.8 VAGINAL DISCHARGE: Primary | ICD-10-CM

## 2024-01-11 DIAGNOSIS — N34.2 URETHRITIS: ICD-10-CM

## 2024-01-11 DIAGNOSIS — N30.00 ACUTE CYSTITIS WITHOUT HEMATURIA: ICD-10-CM

## 2024-01-11 LAB
BACTERIA URNS QL MICRO: ABNORMAL
BILIRUB UR QL STRIP: NEGATIVE
CLARITY UR: ABNORMAL
COLOR UR: YELLOW
EPI CELLS #/AREA URNS HPF: ABNORMAL /HPF
GLUCOSE UR STRIP-MCNC: NEGATIVE MG/DL
HGB UR QL STRIP.AUTO: ABNORMAL
KETONES UR STRIP-MCNC: NEGATIVE MG/DL
LEUKOCYTE ESTERASE UR QL STRIP: ABNORMAL
NITRITE UR QL STRIP: POSITIVE
PH UR STRIP: 5.5 [PH] (ref 5–9)
PROT UR STRIP-MCNC: NEGATIVE MG/DL
RBC #/AREA URNS HPF: ABNORMAL /HPF
SP GR UR STRIP: >1.03 (ref 1–1.03)
UROBILINOGEN UR STRIP-ACNC: 0.2 EU/DL (ref 0–1)
WBC #/AREA URNS HPF: ABNORMAL /HPF

## 2024-01-11 PROCEDURE — 87491 CHLMYD TRACH DNA AMP PROBE: CPT

## 2024-01-11 PROCEDURE — 99284 EMERGENCY DEPT VISIT MOD MDM: CPT

## 2024-01-11 PROCEDURE — 87591 N.GONORRHOEAE DNA AMP PROB: CPT

## 2024-01-11 PROCEDURE — 6360000002 HC RX W HCPCS: Performed by: FAMILY MEDICINE

## 2024-01-11 PROCEDURE — 81001 URINALYSIS AUTO W/SCOPE: CPT

## 2024-01-11 PROCEDURE — 96372 THER/PROPH/DIAG INJ SC/IM: CPT

## 2024-01-11 RX ORDER — CEFTRIAXONE 500 MG/1
500 INJECTION, POWDER, FOR SOLUTION INTRAMUSCULAR; INTRAVENOUS ONCE
Status: COMPLETED | OUTPATIENT
Start: 2024-01-11 | End: 2024-01-11

## 2024-01-11 RX ORDER — NITROFURANTOIN 25; 75 MG/1; MG/1
100 CAPSULE ORAL 2 TIMES DAILY
Qty: 20 CAPSULE | Refills: 0 | Status: SHIPPED | OUTPATIENT
Start: 2024-01-11 | End: 2024-01-21

## 2024-01-11 RX ORDER — PHENAZOPYRIDINE HYDROCHLORIDE 200 MG/1
200 TABLET, FILM COATED ORAL 3 TIMES DAILY PRN
Qty: 6 TABLET | Refills: 0 | Status: SHIPPED | OUTPATIENT
Start: 2024-01-11 | End: 2024-01-14

## 2024-01-11 RX ORDER — DOXYCYCLINE HYCLATE 100 MG
100 TABLET ORAL 2 TIMES DAILY
Qty: 20 TABLET | Refills: 0 | Status: SHIPPED | OUTPATIENT
Start: 2024-01-11 | End: 2024-01-21

## 2024-01-11 RX ADMIN — CEFTRIAXONE SODIUM 500 MG: 500 INJECTION, POWDER, FOR SOLUTION INTRAMUSCULAR; INTRAVENOUS at 10:59

## 2024-01-11 ASSESSMENT — PAIN - FUNCTIONAL ASSESSMENT
PAIN_FUNCTIONAL_ASSESSMENT: 0-10
PAIN_FUNCTIONAL_ASSESSMENT: 0-10

## 2024-01-11 ASSESSMENT — PAIN SCALES - GENERAL
PAINLEVEL_OUTOF10: 7
PAINLEVEL_OUTOF10: 7

## 2024-01-11 ASSESSMENT — PAIN DESCRIPTION - DESCRIPTORS: DESCRIPTORS: BURNING

## 2024-01-11 ASSESSMENT — PAIN DESCRIPTION - PAIN TYPE: TYPE: ACUTE PAIN

## 2024-01-11 ASSESSMENT — PAIN DESCRIPTION - ONSET: ONSET: GRADUAL

## 2024-01-11 ASSESSMENT — PAIN DESCRIPTION - LOCATION: LOCATION: VAGINA

## 2024-01-11 NOTE — ED PROVIDER NOTES
HPI:  1/11/24,   Time: 10:54 AM DOROTA Tony is a 53 y.o. female presenting to the ED for a 2-day history of burning with urination, increased frequency of urine, associated with a vaginal discharge of clear to yellowish secretion that does not have a foul odor.  The symptoms started 1 day after she had unprotected intercourse with her boyfriend.  She also complains of some vaginal irritation and some extra vaginal irritation as well.  She complains that that the discharge is abundant and she is never experienced anything like this previously.  She denies vaginal itching.  She denies history of being on an antibiotic recently.          ROS:   Pertinent positives and negatives are stated within HPI, all other systems reviewed and are negative.  --------------------------------------------- PAST HISTORY ---------------------------------------------  Past Medical History:  has a past medical history of Acid reflux, Back pain, Depression, Essential hypertension, Gastroparesis, Hepatitis, Hyperlipidemia, Seizures (HCC), Sleep apnea, and Tachycardia.    Past Surgical History:  has a past surgical history that includes Salpingo-oophorectomy (Left); Cholecystectomy, laparoscopic; Pyloroplasty (3/30/2012); ECHO Compl W Dop Color Flow (7/1/2012); Upper gastrointestinal endoscopy (3/15/13); Endometrial ablation; Colonoscopy (N/A, 3/1/2021); Abdomen surgery; Cardiac catheterization; Cardiac catheterization (11/04/2019); Pain management procedure (Right, 11/23/2021); Upper gastrointestinal endoscopy (N/A, 5/2/2022); and Colonoscopy (N/A, 5/2/2022).    Social History:  reports that she has never smoked. She has never been exposed to tobacco smoke. She has never used smokeless tobacco. She reports that she does not currently use alcohol. She reports current drug use. Drug: Marijuana (Weed).    Family History: family history includes Depression in her paternal grandmother; High Blood Pressure in her mother; No

## 2024-01-15 LAB
CHLAMYDIA DNA UR QL NAA+PROBE: NEGATIVE
N GONORRHOEA DNA UR QL NAA+PROBE: NEGATIVE
SPECIMEN DESCRIPTION: NORMAL

## 2024-01-15 RX ORDER — PRAVASTATIN SODIUM 80 MG/1
80 TABLET ORAL DAILY
Qty: 90 TABLET | Refills: 1 | Status: SHIPPED | OUTPATIENT
Start: 2024-01-15

## 2024-01-29 ENCOUNTER — TELEPHONE (OUTPATIENT)
Dept: SURGERY | Age: 54
End: 2024-01-29

## 2024-01-29 RX ORDER — PANTOPRAZOLE SODIUM 40 MG/1
TABLET, DELAYED RELEASE ORAL
Qty: 60 TABLET | Refills: 5 | OUTPATIENT
Start: 2024-01-29

## 2024-01-29 NOTE — TELEPHONE ENCOUNTER
Received refill request from pharmacy for Protonix- pt has not been seen in 3 years and no showed last appt. Tried to contact patient and patient answered phone and said \"no thank you\" and hung up.

## 2024-02-29 NOTE — TELEPHONE ENCOUNTER
8/30/2021  Visit date not found Secondary to COPD exacerbation   BiPAP q.h.s.  - scheduled breathing Rx

## 2024-03-01 ENCOUNTER — SCHEDULED TELEPHONE ENCOUNTER (OUTPATIENT)
Dept: NEUROLOGY | Age: 54
End: 2024-03-01

## 2024-03-01 DIAGNOSIS — F44.5 PSYCHOGENIC NONEPILEPTIC SEIZURE: ICD-10-CM

## 2024-03-01 DIAGNOSIS — G40.909 SEIZURE DISORDER (HCC): Primary | ICD-10-CM

## 2024-03-01 NOTE — PROGRESS NOTES
Susana Tony was read the following message “We want to confirm that, for purposes of billing, this is a virtual visit with your provider for which we will submit a claim for reimbursement with your insurance company. You will be responsible for any copays, coinsurance amounts or other amounts not covered by your insurance company. If you do not accept this, unfortunately we will not be able to schedule or proceed with a virtual visit with the provider. Do you accept? Susana responded Susana Tony was read the following message “We want to confirm that, for purposes of billing, this is a virtual visit with your provider for which we will submit a claim for reimbursement with your insurance company. You will be responsible for any copays, coinsurance amounts or other amounts not covered by your insurance company. If you do not accept this, unfortunately we will not be able to schedule or proceed with a virtual visit with the provider. Do you accept? Susana responded Yes .  .   
DO   levETIRAcetam (KEPPRA) 750 MG tablet take 1 and 1/2 tablets by mouth every morning and take 1 and 1/2 tablets by mouth BEFORE BEDTIME 11/14/23  Yes Remedios Santiago PA   carvedilol (COREG) 6.25 MG tablet take 1 tablet by mouth twice a day with meals 10/4/23  Yes Lisandro Mulligan MD   carvedilol (COREG) 12.5 MG tablet take 1 and 1/2 tablets by mouth twice a day 8/2/23  Yes Lisandro Mulligan MD   CPAP Machine MISC by Does not apply route nightly   Yes Justino Dickson MD   pantoprazole (PROTONIX) 40 MG tablet take 1 tablet by mouth once daily 2/1/23  Yes Anny Jaramillo MD   medical marijuana Take 1 each by mouth as needed (Seizures/Pain). Patient has copy with her for proof.     Issued 12/22/20- expires 1/31/23    [id number]   Yes Justino Dickson MD   cyclobenzaprine (FLEXERIL) 5 MG tablet Take 1 tablet by mouth 2 times daily as needed for Muscle spasms 11/30/22  Yes Mari Espitia, DO   Multiple Vitamins-Minerals (THERAPEUTIC MULTIVITAMIN-MINERALS) tablet Take 1 tablet by mouth daily   Yes Justino Dickson MD   mirtazapine (REMERON) 15 MG tablet take 1 tablet by mouth every evening 2/26/21  Yes Justino Dickson MD   amitriptyline (ELAVIL) 50 MG tablet Take 1 tablet by mouth nightly   Yes Justino Dickson MD   buprenorphine-naloxone (SUBOXONE) 8-2 MG SUBL SL tablet Place 0.5 tablets under the tongue daily.   Yes Justino Dickson MD       Objective:     Mental Status Exam: sounds alert, oriented x4; thought processes appropriate    Speech sounds clear    Breathing Effort sounds normal    Laboratory/Radiology:     Lab Results   Component Value Date    WBC 6.4 10/23/2023    HGB 13.0 10/23/2023    HCT 41.6 10/23/2023    MCV 87.8 10/23/2023     10/23/2023     Lab Results   Component Value Date     10/23/2023    K 4.3 10/23/2023     10/23/2023    CO2 27 10/23/2023    BUN 9 10/23/2023    CREATININE 0.7 10/23/2023    GLUCOSE 87 10/23/2023    CALCIUM

## 2024-04-11 ENCOUNTER — OFFICE VISIT (OUTPATIENT)
Dept: FAMILY MEDICINE CLINIC | Age: 54
End: 2024-04-11
Payer: MEDICARE

## 2024-04-11 VITALS
RESPIRATION RATE: 20 BRPM | HEIGHT: 63 IN | OXYGEN SATURATION: 96 % | BODY MASS INDEX: 35.08 KG/M2 | TEMPERATURE: 97.6 F | DIASTOLIC BLOOD PRESSURE: 76 MMHG | SYSTOLIC BLOOD PRESSURE: 122 MMHG | WEIGHT: 198 LBS | HEART RATE: 92 BPM

## 2024-04-11 DIAGNOSIS — Z00.00 MEDICARE ANNUAL WELLNESS VISIT, SUBSEQUENT: Primary | ICD-10-CM

## 2024-04-11 DIAGNOSIS — I10 ESSENTIAL HYPERTENSION: ICD-10-CM

## 2024-04-11 DIAGNOSIS — I50.32 CHRONIC DIASTOLIC (CONGESTIVE) HEART FAILURE (HCC): ICD-10-CM

## 2024-04-11 DIAGNOSIS — F33.2 SEVERE EPISODE OF RECURRENT MAJOR DEPRESSIVE DISORDER, WITHOUT PSYCHOTIC FEATURES (HCC): ICD-10-CM

## 2024-04-11 DIAGNOSIS — J06.9 VIRAL URI: ICD-10-CM

## 2024-04-11 DIAGNOSIS — E66.01 SEVERE OBESITY (BMI 35.0-39.9) WITH COMORBIDITY (HCC): ICD-10-CM

## 2024-04-11 DIAGNOSIS — E55.9 VITAMIN D DEFICIENCY: ICD-10-CM

## 2024-04-11 DIAGNOSIS — E78.2 MIXED HYPERLIPIDEMIA: ICD-10-CM

## 2024-04-11 DIAGNOSIS — G40.919 BREAKTHROUGH SEIZURE (HCC): ICD-10-CM

## 2024-04-11 DIAGNOSIS — R73.03 PREDIABETES: ICD-10-CM

## 2024-04-11 PROBLEM — R11.15 NON-INTRACTABLE CYCLICAL VOMITING WITH NAUSEA: Status: RESOLVED | Noted: 2018-01-13 | Resolved: 2024-04-11

## 2024-04-11 LAB
ALBUMIN SERPL-MCNC: 4.3 G/DL (ref 3.5–5.2)
ALP BLD-CCNC: 129 U/L (ref 35–104)
ALT SERPL-CCNC: 18 U/L (ref 0–32)
ANION GAP SERPL CALCULATED.3IONS-SCNC: 16 MMOL/L (ref 7–16)
AST SERPL-CCNC: 14 U/L (ref 0–31)
BASOPHILS ABSOLUTE: 0.02 K/UL (ref 0–0.2)
BASOPHILS RELATIVE PERCENT: 0 % (ref 0–2)
BILIRUB SERPL-MCNC: 0.2 MG/DL (ref 0–1.2)
BUN BLDV-MCNC: 13 MG/DL (ref 6–20)
CALCIUM SERPL-MCNC: 9.8 MG/DL (ref 8.6–10.2)
CHLORIDE BLD-SCNC: 104 MMOL/L (ref 98–107)
CHOLESTEROL: 170 MG/DL
CO2: 23 MMOL/L (ref 22–29)
CREAT SERPL-MCNC: 0.7 MG/DL (ref 0.5–1)
EOSINOPHILS ABSOLUTE: 0.09 K/UL (ref 0.05–0.5)
EOSINOPHILS RELATIVE PERCENT: 2 % (ref 0–6)
GFR SERPL CREATININE-BSD FRML MDRD: >90 ML/MIN/1.73M2
GLUCOSE BLD-MCNC: 106 MG/DL (ref 74–99)
HBA1C MFR BLD: 6.2 % (ref 4–5.6)
HCT VFR BLD CALC: 39.5 % (ref 34–48)
HDLC SERPL-MCNC: 53 MG/DL
HEMOGLOBIN: 12.1 G/DL (ref 11.5–15.5)
IMMATURE GRANULOCYTES %: 0 % (ref 0–5)
IMMATURE GRANULOCYTES ABSOLUTE: <0.03 K/UL (ref 0–0.58)
LDL CHOLESTEROL: 95 MG/DL
LYMPHOCYTES ABSOLUTE: 2.61 K/UL (ref 1.5–4)
LYMPHOCYTES RELATIVE PERCENT: 45 % (ref 20–42)
MCH RBC QN AUTO: 27 PG (ref 26–35)
MCHC RBC AUTO-ENTMCNC: 30.6 G/DL (ref 32–34.5)
MCV RBC AUTO: 88.2 FL (ref 80–99.9)
MONOCYTES ABSOLUTE: 0.22 K/UL (ref 0.1–0.95)
MONOCYTES RELATIVE PERCENT: 4 % (ref 2–12)
NEUTROPHILS ABSOLUTE: 2.87 K/UL (ref 1.8–7.3)
NEUTROPHILS RELATIVE PERCENT: 49 % (ref 43–80)
PDW BLD-RTO: 13.5 % (ref 11.5–15)
PLATELET # BLD: 427 K/UL (ref 130–450)
PMV BLD AUTO: 9.6 FL (ref 7–12)
POTASSIUM SERPL-SCNC: 4.6 MMOL/L (ref 3.5–5)
RBC # BLD: 4.48 M/UL (ref 3.5–5.5)
SODIUM BLD-SCNC: 143 MMOL/L (ref 132–146)
TOTAL PROTEIN: 7.6 G/DL (ref 6.4–8.3)
TRIGL SERPL-MCNC: 109 MG/DL
TSH SERPL DL<=0.05 MIU/L-ACNC: 0.72 UIU/ML (ref 0.27–4.2)
VITAMIN D 25-HYDROXY: 22 NG/ML (ref 30–100)
VLDLC SERPL CALC-MCNC: 22 MG/DL
WBC # BLD: 5.8 K/UL (ref 4.5–11.5)

## 2024-04-11 PROCEDURE — 36415 COLL VENOUS BLD VENIPUNCTURE: CPT | Performed by: FAMILY MEDICINE

## 2024-04-11 PROCEDURE — 3074F SYST BP LT 130 MM HG: CPT | Performed by: FAMILY MEDICINE

## 2024-04-11 PROCEDURE — 3078F DIAST BP <80 MM HG: CPT | Performed by: FAMILY MEDICINE

## 2024-04-11 PROCEDURE — G0439 PPPS, SUBSEQ VISIT: HCPCS | Performed by: FAMILY MEDICINE

## 2024-04-11 RX ORDER — FLUTICASONE PROPIONATE 50 MCG
1 SPRAY, SUSPENSION (ML) NASAL DAILY
Qty: 16 G | Refills: 2 | Status: SHIPPED | OUTPATIENT
Start: 2024-04-11

## 2024-04-11 SDOH — HEALTH STABILITY: PHYSICAL HEALTH: ON AVERAGE, HOW MANY MINUTES DO YOU ENGAGE IN EXERCISE AT THIS LEVEL?: 0 MIN

## 2024-04-11 SDOH — HEALTH STABILITY: PHYSICAL HEALTH: ON AVERAGE, HOW MANY DAYS PER WEEK DO YOU ENGAGE IN MODERATE TO STRENUOUS EXERCISE (LIKE A BRISK WALK)?: 0 DAYS

## 2024-04-11 ASSESSMENT — PATIENT HEALTH QUESTIONNAIRE - PHQ9
3. TROUBLE FALLING OR STAYING ASLEEP: SEVERAL DAYS
SUM OF ALL RESPONSES TO PHQ QUESTIONS 1-9: 6
1. LITTLE INTEREST OR PLEASURE IN DOING THINGS: SEVERAL DAYS
SUM OF ALL RESPONSES TO PHQ9 QUESTIONS 1 & 2: 0
2. FEELING DOWN, DEPRESSED OR HOPELESS: SEVERAL DAYS
SUM OF ALL RESPONSES TO PHQ9 QUESTIONS 1 & 2: 2
SUM OF ALL RESPONSES TO PHQ QUESTIONS 1-9: 0
2. FEELING DOWN, DEPRESSED OR HOPELESS: NOT AT ALL
SUM OF ALL RESPONSES TO PHQ QUESTIONS 1-9: 0
5. POOR APPETITE OR OVEREATING: SEVERAL DAYS
SUM OF ALL RESPONSES TO PHQ QUESTIONS 1-9: 6
4. FEELING TIRED OR HAVING LITTLE ENERGY: SEVERAL DAYS
10. IF YOU CHECKED OFF ANY PROBLEMS, HOW DIFFICULT HAVE THESE PROBLEMS MADE IT FOR YOU TO DO YOUR WORK, TAKE CARE OF THINGS AT HOME, OR GET ALONG WITH OTHER PEOPLE: SOMEWHAT DIFFICULT
9. THOUGHTS THAT YOU WOULD BE BETTER OFF DEAD, OR OF HURTING YOURSELF: NOT AT ALL
7. TROUBLE CONCENTRATING ON THINGS, SUCH AS READING THE NEWSPAPER OR WATCHING TELEVISION: NOT AT ALL
10. IF YOU CHECKED OFF ANY PROBLEMS, HOW DIFFICULT HAVE THESE PROBLEMS MADE IT FOR YOU TO DO YOUR WORK, TAKE CARE OF THINGS AT HOME, OR GET ALONG WITH OTHER PEOPLE: SOMEWHAT DIFFICULT
1. LITTLE INTEREST OR PLEASURE IN DOING THINGS: NOT AT ALL
6. FEELING BAD ABOUT YOURSELF - OR THAT YOU ARE A FAILURE OR HAVE LET YOURSELF OR YOUR FAMILY DOWN: SEVERAL DAYS
4. FEELING TIRED OR HAVING LITTLE ENERGY: SEVERAL DAYS
8. MOVING OR SPEAKING SO SLOWLY THAT OTHER PEOPLE COULD HAVE NOTICED. OR THE OPPOSITE - BEING SO FIDGETY OR RESTLESS THAT YOU HAVE BEEN MOVING AROUND A LOT MORE THAN USUAL: NOT AT ALL
5. POOR APPETITE OR OVEREATING: SEVERAL DAYS
SUM OF ALL RESPONSES TO PHQ QUESTIONS 1-9: 0
1. LITTLE INTEREST OR PLEASURE IN DOING THINGS: SEVERAL DAYS
SUM OF ALL RESPONSES TO PHQ QUESTIONS 1-9: 6
SUM OF ALL RESPONSES TO PHQ QUESTIONS 1-9: 0
3. TROUBLE FALLING OR STAYING ASLEEP: SEVERAL DAYS
2. FEELING DOWN, DEPRESSED OR HOPELESS: SEVERAL DAYS
8. MOVING OR SPEAKING SO SLOWLY THAT OTHER PEOPLE COULD HAVE NOTICED. OR THE OPPOSITE, BEING SO FIGETY OR RESTLESS THAT YOU HAVE BEEN MOVING AROUND A LOT MORE THAN USUAL: NOT AT ALL
SUM OF ALL RESPONSES TO PHQ QUESTIONS 1-9: 6
SUM OF ALL RESPONSES TO PHQ QUESTIONS 1-9: 6
7. TROUBLE CONCENTRATING ON THINGS, SUCH AS READING THE NEWSPAPER OR WATCHING TELEVISION: NOT AT ALL
6. FEELING BAD ABOUT YOURSELF - OR THAT YOU ARE A FAILURE OR HAVE LET YOURSELF OR YOUR FAMILY DOWN: SEVERAL DAYS
9. THOUGHTS THAT YOU WOULD BE BETTER OFF DEAD, OR OF HURTING YOURSELF: NOT AT ALL

## 2024-04-11 ASSESSMENT — LIFESTYLE VARIABLES
HOW OFTEN DO YOU HAVE A DRINK CONTAINING ALCOHOL: NEVER
HOW OFTEN DO YOU HAVE A DRINK CONTAINING ALCOHOL: 1
HOW OFTEN DO YOU HAVE SIX OR MORE DRINKS ON ONE OCCASION: 1
HOW MANY STANDARD DRINKS CONTAINING ALCOHOL DO YOU HAVE ON A TYPICAL DAY: PATIENT DOES NOT DRINK
HOW MANY STANDARD DRINKS CONTAINING ALCOHOL DO YOU HAVE ON A TYPICAL DAY: 0

## 2024-04-11 NOTE — PROGRESS NOTES
Medicare Annual Wellness Visit    Susana Tony is here for Medicare AWV    Assessment & Plan   Medicare annual wellness visit, subsequent  Essential hypertension  -     Comprehensive Metabolic Panel  -     CBC with Auto Differential  -     TSH  Blood pressure well controlled  Labs ordered  Diet and exercise were discussed and recommended to the patient.    Mixed hyperlipidemia  -     Comprehensive Metabolic Panel  -     Lipid Panel  Labs ordered  Diet and exercise were discussed and recommended to the patient.  Continue pravachol    Prediabetes  -     Comprehensive Metabolic Panel  -     CBC with Auto Differential  -     Hemoglobin A1C  -     Lipid Panel  -     TSH  Labs ordered  Diet and exercise were discussed and recommended to the patient.    Vitamin D deficiency  -     Vitamin D 25 Hydroxy  Severe episode of recurrent major depressive disorder, without psychotic features (HCC)  Using medical marijuana    Severe obesity (BMI 35.0-39.9) with comorbidity (HCC)  Diet and exercise were discussed and recommended to the patient.    Chronic diastolic (congestive) heart failure (HCC)  Stable at this time  Follows with cardiology    Breakthrough seizure (HCC)  Follows with neurology and on medical marijuana    Viral URI  -     fluticasone (FLONASE) 50 MCG/ACT nasal spray; 1 spray by Each Nostril route daily, Disp-16 g, R-2Normal    Recommendations for Preventive Services Due: see orders and patient instructions/AVS.  Recommended screening schedule for the next 5-10 years is provided to the patient in written form: see Patient Instructions/AVS.     Return in 4 months (on 8/11/2024) for htn, hyperlipidemia.     Subjective   The following acute and/or chronic problems were also addressed today:  Patient states that she has nasal and chest congestion for 1 week.  She states that she has a cough that is productive.  She states sputum was clear yesterday but is now thick and yellow.  She has had sinus pressure.  She has

## 2024-04-11 NOTE — PATIENT INSTRUCTIONS
conditions, and trying to get a healthy amount of sleep.  Follow-up care is a key part of your treatment and safety. Be sure to make and go to all appointments, and call your doctor if you are having problems. It's also a good idea to know your test results and keep a list of the medicines you take.  How can you care for yourself at home?  Diet    Use less salt when you cook and eat. This helps lower your blood pressure. Taste food before salting. Add only a little salt when you think you need it. With time, your taste buds will adjust to less salt.     Eat fewer snack items, fast foods, canned soups, and other high-salt, high-fat, processed foods.     Read food labels and try to avoid saturated and trans fats. They increase your risk of heart disease by raising cholesterol levels.     Limit the amount of solid fat--butter, margarine, and shortening--you eat. Use olive, peanut, or canola oil when you cook. Bake, broil, and steam foods instead of frying them.     Eat a variety of fruit and vegetables every day. Dark green, deep orange, red, or yellow fruits and vegetables are especially good for you. Examples include spinach, carrots, peaches, and berries.     Foods high in fiber can reduce your cholesterol and provide important vitamins and minerals. High-fiber foods include whole-grain cereals and breads, oatmeal, beans, brown rice, citrus fruits, and apples.     Eat lean proteins. Heart-healthy proteins include seafood, lean meats and poultry, eggs, beans, peas, nuts, seeds, and soy products.     Limit drinks and foods with added sugar. These include candy, desserts, and soda pop.   Heart-healthy lifestyle    If your doctor recommends it, get more exercise. For many people, walking is a good choice. Or you may want to swim, bike, or do other activities. Bit by bit, increase the time you're active every day. Try for at least 30 minutes on most days of the week.     Try to quit or cut back on using tobacco and other

## 2024-04-15 DIAGNOSIS — R73.03 PREDIABETES: Primary | ICD-10-CM

## 2024-04-21 ENCOUNTER — HOSPITAL ENCOUNTER (EMERGENCY)
Age: 54
Discharge: HOME | End: 2024-04-21
Attending: EMERGENCY MEDICINE
Payer: MEDICARE

## 2024-04-21 ENCOUNTER — APPOINTMENT (OUTPATIENT)
Dept: GENERAL RADIOLOGY | Age: 54
End: 2024-04-21
Payer: MEDICARE

## 2024-04-21 VITALS
DIASTOLIC BLOOD PRESSURE: 77 MMHG | SYSTOLIC BLOOD PRESSURE: 137 MMHG | RESPIRATION RATE: 17 BRPM | HEART RATE: 75 BPM | OXYGEN SATURATION: 95 %

## 2024-04-21 DIAGNOSIS — R07.9 CHEST PAIN, UNSPECIFIED TYPE: Primary | ICD-10-CM

## 2024-04-21 LAB
ANION GAP SERPL CALCULATED.3IONS-SCNC: 13 MMOL/L (ref 7–16)
BASOPHILS # BLD: 0.03 K/UL (ref 0–0.2)
BASOPHILS NFR BLD: 1 % (ref 0–2)
BNP SERPL-MCNC: <36 PG/ML (ref 0–450)
BUN SERPL-MCNC: 12 MG/DL (ref 6–20)
CALCIUM SERPL-MCNC: 9.1 MG/DL (ref 8.6–10.2)
CHLORIDE SERPL-SCNC: 104 MMOL/L (ref 98–107)
CO2 SERPL-SCNC: 25 MMOL/L (ref 22–29)
CREAT SERPL-MCNC: 0.6 MG/DL (ref 0.5–1)
EOSINOPHIL # BLD: 0.04 K/UL (ref 0.05–0.5)
EOSINOPHILS RELATIVE PERCENT: 1 % (ref 0–6)
ERYTHROCYTE [DISTWIDTH] IN BLOOD BY AUTOMATED COUNT: 13.7 % (ref 11.5–15)
GFR SERPL CREATININE-BSD FRML MDRD: >90 ML/MIN/1.73M2
GLUCOSE BLD-MCNC: 128 MG/DL (ref 74–99)
GLUCOSE SERPL-MCNC: 125 MG/DL (ref 74–99)
HCT VFR BLD AUTO: 39.8 % (ref 34–48)
HGB BLD-MCNC: 12.8 G/DL (ref 11.5–15.5)
IMM GRANULOCYTES # BLD AUTO: <0.03 K/UL (ref 0–0.58)
IMM GRANULOCYTES NFR BLD: 0 % (ref 0–5)
LYMPHOCYTES NFR BLD: 2.15 K/UL (ref 1.5–4)
LYMPHOCYTES RELATIVE PERCENT: 33 % (ref 20–42)
MCH RBC QN AUTO: 27.6 PG (ref 26–35)
MCHC RBC AUTO-ENTMCNC: 32.2 G/DL (ref 32–34.5)
MCV RBC AUTO: 86 FL (ref 80–99.9)
MONOCYTES NFR BLD: 0.26 K/UL (ref 0.1–0.95)
MONOCYTES NFR BLD: 4 % (ref 2–12)
NEUTROPHILS NFR BLD: 62 % (ref 43–80)
NEUTS SEG NFR BLD: 4.1 K/UL (ref 1.8–7.3)
PLATELET # BLD AUTO: 456 K/UL (ref 130–450)
PMV BLD AUTO: 9.2 FL (ref 7–12)
POTASSIUM SERPL-SCNC: 4.2 MMOL/L (ref 3.5–5)
RBC # BLD AUTO: 4.63 M/UL (ref 3.5–5.5)
SODIUM SERPL-SCNC: 142 MMOL/L (ref 132–146)
TROPONIN I SERPL HS-MCNC: <6 NG/L (ref 0–9)
TROPONIN I SERPL HS-MCNC: <6 NG/L (ref 0–9)
WBC OTHER # BLD: 6.6 K/UL (ref 4.5–11.5)

## 2024-04-21 PROCEDURE — 85025 COMPLETE CBC W/AUTO DIFF WBC: CPT

## 2024-04-21 PROCEDURE — 80048 BASIC METABOLIC PNL TOTAL CA: CPT

## 2024-04-21 PROCEDURE — 93005 ELECTROCARDIOGRAM TRACING: CPT

## 2024-04-21 PROCEDURE — 99285 EMERGENCY DEPT VISIT HI MDM: CPT

## 2024-04-21 PROCEDURE — 71045 X-RAY EXAM CHEST 1 VIEW: CPT

## 2024-04-21 PROCEDURE — 2580000003 HC RX 258

## 2024-04-21 PROCEDURE — 82962 GLUCOSE BLOOD TEST: CPT

## 2024-04-21 PROCEDURE — 84484 ASSAY OF TROPONIN QUANT: CPT

## 2024-04-21 PROCEDURE — 83880 ASSAY OF NATRIURETIC PEPTIDE: CPT

## 2024-04-21 RX ORDER — 0.9 % SODIUM CHLORIDE 0.9 %
1000 INTRAVENOUS SOLUTION INTRAVENOUS ONCE
Status: COMPLETED | OUTPATIENT
Start: 2024-04-21 | End: 2024-04-21

## 2024-04-21 RX ADMIN — SODIUM CHLORIDE 1000 ML: 9 INJECTION, SOLUTION INTRAVENOUS at 18:17

## 2024-04-21 NOTE — ED PROVIDER NOTES
Department of Emergency Medicine     Written by: Stevie Mariscal MD  Patient Name: Susana Tony  Admit Date: 2024  5:43 PM  MRN: [id number]                   : 1970    HPI  Chief Complaint   Patient presents with    Chest Pain     Pt c/o chest pain, n/v/d that started around 0200 this morning. 324 mg aspirin per EMS. Pt sees Dr. Mulligan.       Susana Tony is a 53 y.o. female that presents to the ED with concerns for chest discomfort on the left side.  She says that yesterday symptoms started as diarrhea.  She then noticed some left-sided chest discomfort which has been ongoing for the last few hours.  Not reproducible to touch.  She feels like \"pressure\" on the left side.  She previously saw Dr. Mulligan, heart catheterization which was negative.  She says that she was pretty diagnosed pericarditis, however upon chart review, patient had trace pericardial effusion.  She received aspirin prior to arrival by EMS, she notes improvement in her symptoms.    Review of systems:  Pertinent positives and negatives mentioned in the HPI/MDM.    Physical Exam  Constitutional:       General: She is not in acute distress.     Appearance: Normal appearance.   Eyes:      Extraocular Movements: Extraocular movements intact.      Pupils: Pupils are equal, round, and reactive to light.   Cardiovascular:      Rate and Rhythm: Normal rate and regular rhythm.      Pulses: Normal pulses.      Heart sounds: Normal heart sounds.   Pulmonary:      Effort: Pulmonary effort is normal. No respiratory distress.      Breath sounds: Normal breath sounds. No stridor.   Abdominal:      Palpations: Abdomen is soft.      Tenderness: There is no abdominal tenderness.   Musculoskeletal:         General: No swelling or tenderness. Normal range of motion.   Skin:     General: Skin is warm and dry.      Coloration: Skin is not jaundiced or pale.   Neurological:      Mental Status: She is alert and oriented to person, place, and time.

## 2024-04-22 NOTE — DISCHARGE INSTRUCTIONS
Please return to ED if symptoms worsen, persist, or occur.  Please follow-up with PCP and cardiology.  Please take your medications as prescribed.    XR CHEST PORTABLE   Final Result   1. Enlarged cardiac silhouette.   2.  Mild central bronchial wall thickening and chronic interstitial   coarsening.  Bibasilar subsegmental atelectasis or scarring.   3. Otherwise unremarkable.

## 2024-04-24 LAB
EKG ATRIAL RATE: 71 BPM
EKG P AXIS: 50 DEGREES
EKG P-R INTERVAL: 104 MS
EKG Q-T INTERVAL: 400 MS
EKG QRS DURATION: 78 MS
EKG QTC CALCULATION (BAZETT): 434 MS
EKG R AXIS: 88 DEGREES
EKG T AXIS: 11 DEGREES
EKG VENTRICULAR RATE: 71 BPM

## 2024-04-24 PROCEDURE — 93010 ELECTROCARDIOGRAM REPORT: CPT | Performed by: INTERNAL MEDICINE

## 2024-05-06 ENCOUNTER — TELEMEDICINE (OUTPATIENT)
Dept: FAMILY MEDICINE CLINIC | Age: 54
End: 2024-05-06
Payer: MEDICARE

## 2024-05-06 DIAGNOSIS — K31.84 GASTROPARESIS: Chronic | ICD-10-CM

## 2024-05-06 DIAGNOSIS — R73.03 PREDIABETES: Primary | ICD-10-CM

## 2024-05-06 DIAGNOSIS — R05.9 COUGH, UNSPECIFIED TYPE: ICD-10-CM

## 2024-05-06 DIAGNOSIS — E78.2 MIXED HYPERLIPIDEMIA: ICD-10-CM

## 2024-05-06 PROCEDURE — 99214 OFFICE O/P EST MOD 30 MIN: CPT | Performed by: FAMILY MEDICINE

## 2024-05-06 RX ORDER — PRAVASTATIN SODIUM 80 MG/1
80 TABLET ORAL DAILY
Qty: 90 TABLET | Refills: 1 | Status: SHIPPED | OUTPATIENT
Start: 2024-05-06

## 2024-05-06 RX ORDER — PANTOPRAZOLE SODIUM 40 MG/1
40 TABLET, DELAYED RELEASE ORAL DAILY
Qty: 90 TABLET | Refills: 1 | Status: SHIPPED | OUTPATIENT
Start: 2024-05-06

## 2024-05-06 SDOH — ECONOMIC STABILITY: FOOD INSECURITY: WITHIN THE PAST 12 MONTHS, THE FOOD YOU BOUGHT JUST DIDN'T LAST AND YOU DIDN'T HAVE MONEY TO GET MORE.: NEVER TRUE

## 2024-05-06 SDOH — ECONOMIC STABILITY: INCOME INSECURITY: HOW HARD IS IT FOR YOU TO PAY FOR THE VERY BASICS LIKE FOOD, HOUSING, MEDICAL CARE, AND HEATING?: NOT VERY HARD

## 2024-05-06 SDOH — ECONOMIC STABILITY: TRANSPORTATION INSECURITY
IN THE PAST 12 MONTHS, HAS LACK OF TRANSPORTATION KEPT YOU FROM MEETINGS, WORK, OR FROM GETTING THINGS NEEDED FOR DAILY LIVING?: NO

## 2024-05-06 SDOH — ECONOMIC STABILITY: FOOD INSECURITY: WITHIN THE PAST 12 MONTHS, YOU WORRIED THAT YOUR FOOD WOULD RUN OUT BEFORE YOU GOT MONEY TO BUY MORE.: NEVER TRUE

## 2024-05-06 ASSESSMENT — ENCOUNTER SYMPTOMS
SHORTNESS OF BREATH: 0
CONSTIPATION: 0
COUGH: 0
WHEEZING: 0
DIARRHEA: 0
VOMITING: 0
ABDOMINAL PAIN: 0
SORE THROAT: 0
BACK PAIN: 0
NAUSEA: 0
RHINORRHEA: 0
SINUS PRESSURE: 0

## 2024-05-06 NOTE — PROGRESS NOTES
Susana Tony, was evaluated through a synchronous (real-time) audio-video encounter. The patient (or guardian if applicable) is aware that this is a billable service, which includes applicable co-pays. This Virtual Visit was conducted with patient's (and/or legal guardian's) consent. Patient identification was verified, and a caregiver was present when appropriate.   The patient was located at Home: [address] [place] [id number]  Provider was located at Facility (Appt Dept): [address] [place] [phone]  Confirm you are appropriately licensed, registered, or certified to deliver care in the [facility] where the patient is located as indicated above. If you are not or unsure, please re-schedule the visit: Yes, I confirm.     Susana Tony (:  1970) is a Established patient, presenting virtually for evaluation of the following:    Assessment & Plan   Below is the assessment and plan developed based on review of pertinent history, physical exam, labs, studies, and medications.  1. Prediabetes  Tolerating metformin  Continue current medication  Diet and exercise were discussed and recommended to the patient.    2. Cough, unspecified type  -     XR CHEST (2 VW); Future  3. Mixed hyperlipidemia  -     pravastatin (PRAVACHOL) 80 MG tablet; Take 1 tablet by mouth daily, Disp-90 tablet, R-1Normal  -     Comprehensive Metabolic Panel; Future  4. Gastroparesis  -     pantoprazole (PROTONIX) 40 MG tablet; Take 1 tablet by mouth daily, Disp-90 tablet, R-1Normal    No follow-ups on file.       Subjective   HPI  Patient is presenting today due to concerns about metformin.  She states that she was confused.  She was unsure of the reason for being put on the metformin.  She states that she has lost 20 pounds since the last time she was seen.  She states that she typically doesn't eat breakfast.  She will eat left overs for lunch.  Dinner is typically fried chicken, rice, potatoes. She will eat cake.

## 2024-05-09 RX ORDER — ALBUTEROL SULFATE 90 UG/1
AEROSOL, METERED RESPIRATORY (INHALATION)
Qty: 18 G | Refills: 3 | Status: SHIPPED | OUTPATIENT
Start: 2024-05-09

## 2024-05-21 ENCOUNTER — OFFICE VISIT (OUTPATIENT)
Dept: CARDIOLOGY CLINIC | Age: 54
End: 2024-05-21
Payer: MEDICARE

## 2024-05-21 VITALS
WEIGHT: 189 LBS | RESPIRATION RATE: 16 BRPM | HEIGHT: 62 IN | SYSTOLIC BLOOD PRESSURE: 110 MMHG | HEART RATE: 77 BPM | BODY MASS INDEX: 34.78 KG/M2 | DIASTOLIC BLOOD PRESSURE: 62 MMHG

## 2024-05-21 DIAGNOSIS — G40.909 SEIZURE DISORDER (HCC): ICD-10-CM

## 2024-05-21 DIAGNOSIS — R00.0 SINUS TACHYCARDIA: Primary | ICD-10-CM

## 2024-05-21 DIAGNOSIS — I31.39 PERICARDIAL EFFUSION: ICD-10-CM

## 2024-05-21 PROCEDURE — 93000 ELECTROCARDIOGRAM COMPLETE: CPT | Performed by: INTERNAL MEDICINE

## 2024-05-21 PROCEDURE — 99214 OFFICE O/P EST MOD 30 MIN: CPT | Performed by: INTERNAL MEDICINE

## 2024-05-21 PROCEDURE — 3074F SYST BP LT 130 MM HG: CPT | Performed by: INTERNAL MEDICINE

## 2024-05-21 PROCEDURE — 3078F DIAST BP <80 MM HG: CPT | Performed by: INTERNAL MEDICINE

## 2024-05-21 RX ORDER — LEVETIRACETAM 750 MG/1
TABLET ORAL
Qty: 180 TABLET | Refills: 3 | Status: SHIPPED | OUTPATIENT
Start: 2024-05-21

## 2024-05-21 NOTE — PROGRESS NOTES
[facility] Cardiology Progress Note  Dr. Lisandro Mulligan      Referring Physician: Mari Espitia DO  CHIEF COMPLAINT:   Chief Complaint   Patient presents with    Congestive Heart Failure     Annual        HISTORY OF PRESENT ILLNESS:   Patient is a 53 year old female with history of pericardial effusion, hypertension, hyperlipidemia, is here for follow-up appointment.  Patient denies any chest pain, no shortness of breath, no lightheadedness, no dizziness, no palpitations, no pedal edema, no PND, no orthopnea, no syncope, no presyncopal episodes.  Functional capacity is at baseline    Past Medical History:   Diagnosis Date    Acid reflux     Back pain     Depression 11/30/2016    Essential hypertension 08/07/2019    Gastroparesis     Hepatitis 2003     no treatment. after having gastroparesis, liver enzymes back to normal    Hyperlipidemia     Seizures (HCC) 2019    in hospital ventilated after grand mal seizure. None since April 2021    Sleep apnea     uses cpcpa nightly    Tachycardia     Dr. Frank 1/2022         Past Surgical History:   Procedure Laterality Date    ABDOMEN SURGERY      Lysis of adhession x2    CARDIAC CATHETERIZATION      had 7 - 5 - 2012    CARDIAC CATHETERIZATION  11/04/2019    Dr. Mulligan    CHOLECYSTECTOMY, LAPAROSCOPIC      COLONOSCOPY N/A 3/1/2021    COLORECTAL CANCER SCREENING, NOT HIGH RISK performed by Anny Jaramillo MD at [facility] ENDOSCOPY    COLONOSCOPY N/A 5/2/2022    COLORECTAL CANCER SCREENING, NOT HIGH RISK performed by Anny Jaramillo MD at [facility] ENDOSCOPY    ECHO COMPL W DOP COLOR FLOW  7/1/2012         ENDOMETRIAL ABLATION      PAIN MANAGEMENT PROCEDURE Right 11/23/2021    LUMBAR TRANFORAMINAL EPIDURAL STEROID INJECTION RIGHT L 5 AND S1 WITH XRAY (26545) (SEDATION) performed by Robin Beck MD at [facility] OR    PYLOROPLASTY  3/30/2012    lap plylorplasty open upper endoscopy lysis of adhesions    SALPINGO-OOPHORECTOMY Left     left    UPPER

## 2024-05-22 ENCOUNTER — TELEPHONE (OUTPATIENT)
Dept: CARDIOLOGY | Age: 54
End: 2024-05-22

## 2024-06-11 ENCOUNTER — HOSPITAL ENCOUNTER (EMERGENCY)
Age: 54
Discharge: HOME | End: 2024-06-11
Attending: FAMILY MEDICINE
Payer: MEDICARE

## 2024-06-11 VITALS
TEMPERATURE: 97.9 F | HEART RATE: 78 BPM | RESPIRATION RATE: 16 BRPM | SYSTOLIC BLOOD PRESSURE: 165 MMHG | DIASTOLIC BLOOD PRESSURE: 94 MMHG | HEIGHT: 62 IN | BODY MASS INDEX: 33.13 KG/M2 | WEIGHT: 180 LBS | OXYGEN SATURATION: 97 %

## 2024-06-11 DIAGNOSIS — N30.00 ACUTE CYSTITIS WITHOUT HEMATURIA: ICD-10-CM

## 2024-06-11 DIAGNOSIS — J30.9 ALLERGIC RHINITIS, UNSPECIFIED SEASONALITY, UNSPECIFIED TRIGGER: Primary | ICD-10-CM

## 2024-06-11 LAB
BACTERIA URNS QL MICRO: ABNORMAL
BILIRUB UR QL STRIP: NEGATIVE
CLARITY UR: ABNORMAL
COLOR UR: YELLOW
EPI CELLS #/AREA URNS HPF: ABNORMAL /HPF
GLUCOSE UR STRIP-MCNC: NEGATIVE MG/DL
HGB UR QL STRIP.AUTO: ABNORMAL
KETONES UR STRIP-MCNC: NEGATIVE MG/DL
LEUKOCYTE ESTERASE UR QL STRIP: ABNORMAL
NITRITE UR QL STRIP: POSITIVE
PH UR STRIP: 5.5 [PH] (ref 5–9)
PROT UR STRIP-MCNC: ABNORMAL MG/DL
RBC #/AREA URNS HPF: ABNORMAL /HPF
SP GR UR STRIP: 1.01 (ref 1–1.03)
UROBILINOGEN UR STRIP-ACNC: 0.2 EU/DL (ref 0–1)
WBC #/AREA URNS HPF: ABNORMAL /HPF

## 2024-06-11 PROCEDURE — 99283 EMERGENCY DEPT VISIT LOW MDM: CPT

## 2024-06-11 PROCEDURE — 81001 URINALYSIS AUTO W/SCOPE: CPT

## 2024-06-11 RX ORDER — CYPROHEPTADINE HYDROCHLORIDE 4 MG/1
4 TABLET ORAL 3 TIMES DAILY PRN
Qty: 10 TABLET | Refills: 0 | Status: SHIPPED | OUTPATIENT
Start: 2024-06-11

## 2024-06-11 RX ORDER — NITROFURANTOIN 25; 75 MG/1; MG/1
100 CAPSULE ORAL 2 TIMES DAILY
Qty: 20 CAPSULE | Refills: 0 | Status: SHIPPED | OUTPATIENT
Start: 2024-06-11 | End: 2024-06-21

## 2024-06-11 ASSESSMENT — PAIN DESCRIPTION - PAIN TYPE: TYPE: ACUTE PAIN

## 2024-06-11 ASSESSMENT — PAIN - FUNCTIONAL ASSESSMENT: PAIN_FUNCTIONAL_ASSESSMENT: 0-10

## 2024-06-11 ASSESSMENT — PAIN DESCRIPTION - DESCRIPTORS: DESCRIPTORS: ACHING

## 2024-06-11 ASSESSMENT — PAIN DESCRIPTION - LOCATION: LOCATION: HEAD

## 2024-06-11 ASSESSMENT — PAIN SCALES - GENERAL: PAINLEVEL_OUTOF10: 7

## 2024-06-11 ASSESSMENT — PAIN DESCRIPTION - FREQUENCY: FREQUENCY: CONTINUOUS

## 2024-06-12 NOTE — ED PROVIDER NOTES
EXAM--------------------------------------    Constitutional/General: Alert and oriented x3, well appearing, non toxic in NAD  Head: NC/AT  Eyes: PERRL, EOMI  Mouth: Oropharynx clear, handling secretions, no trismus  Neck: Supple, full ROM, no meningeal signs  Pulmonary: Lungs clear to auscultation bilaterally, no wheezes, rales, or rhonchi. Not in respiratory distress  Cardiovascular:  Regular rate and rhythm, no murmurs, gallops, or rubs. 2+ distal pulses  Abdomen: Soft, non tender, non distended,   Extremities: Moves all extremities x 4. Warm and well perfused  Skin: warm and dry without rash  Neurologic: GCS 15,  Psych: Normal Affect      ------------------------------ ED COURSE/MEDICAL DECISION MAKING----------------------  Medications - No data to display      Medical Decision Making:    Patient's that she states that she has Zofran at home to use as needed for nausea.    Counseling:   The emergency provider has spoken with the patient and discussed today’s results, in addition to providing specific details for the plan of care and counseling regarding the diagnosis and prognosis.  Questions are answered at this time and they are agreeable with the plan.      --------------------------------- IMPRESSION AND DISPOSITION ---------------------------------    IMPRESSION  1. Allergic rhinitis, unspecified seasonality, unspecified trigger    2. Acute cystitis without hematuria        DISPOSITION  Disposition: Discharge to home  Patient condition is stable                 Keegan Munson MD  06/11/24 2019

## 2024-07-02 ENCOUNTER — E-VISIT (OUTPATIENT)
Dept: FAMILY MEDICINE CLINIC | Age: 54
End: 2024-07-02
Payer: MEDICARE

## 2024-07-02 DIAGNOSIS — G43.919 INTRACTABLE MIGRAINE WITHOUT STATUS MIGRAINOSUS, UNSPECIFIED MIGRAINE TYPE: Primary | ICD-10-CM

## 2024-07-02 PROCEDURE — 99422 OL DIG E/M SVC 11-20 MIN: CPT | Performed by: FAMILY MEDICINE

## 2024-07-02 RX ORDER — SUMATRIPTAN 50 MG/1
50 TABLET, FILM COATED ORAL
Qty: 9 TABLET | Refills: 3 | Status: SHIPPED | OUTPATIENT
Start: 2024-07-02 | End: 2024-07-02

## 2024-07-02 NOTE — PROGRESS NOTES
HPI: as per patient provided history  Exam: N/A (electronic visit)  ASSESSMENT/PLAN:  1. Intractable migraine without status migrainosus, unspecified migraine type    - SUMAtriptan (IMITREX) 50 MG tablet; Take 1 tablet by mouth once as needed for Migraine  Dispense: 9 tablet; Refill: 3       Patient instructed to call the office if worsens, or fails to improve as anticipated.    11-20 minutes were spent on the digital evaluation and management of this patient.

## 2024-07-03 ENCOUNTER — TELEPHONE (OUTPATIENT)
Dept: FAMILY MEDICINE CLINIC | Age: 54
End: 2024-07-03

## 2024-07-03 DIAGNOSIS — G43.919 INTRACTABLE MIGRAINE WITHOUT STATUS MIGRAINOSUS, UNSPECIFIED MIGRAINE TYPE: Primary | ICD-10-CM

## 2024-07-03 RX ORDER — RIZATRIPTAN BENZOATE 5 MG/1
5 TABLET ORAL
Qty: 9 TABLET | Refills: 3 | Status: SHIPPED | OUTPATIENT
Start: 2024-07-03 | End: 2024-07-03

## 2024-07-13 ENCOUNTER — HOSPITAL ENCOUNTER (EMERGENCY)
Age: 54
Discharge: PSYCHIATRIC OR SUBSTANCE USE FACILITY | End: 2024-07-14
Attending: EMERGENCY MEDICINE
Payer: MEDICARE

## 2024-07-13 DIAGNOSIS — F32.A DEPRESSION WITH SUICIDAL IDEATION: Primary | ICD-10-CM

## 2024-07-13 DIAGNOSIS — N39.0 URINARY TRACT INFECTION WITHOUT HEMATURIA, SITE UNSPECIFIED: ICD-10-CM

## 2024-07-13 DIAGNOSIS — R45.851 DEPRESSION WITH SUICIDAL IDEATION: Primary | ICD-10-CM

## 2024-07-13 PROCEDURE — 85025 COMPLETE CBC W/AUTO DIFF WBC: CPT

## 2024-07-13 PROCEDURE — 80179 DRUG ASSAY SALICYLATE: CPT

## 2024-07-13 PROCEDURE — 84436 ASSAY OF TOTAL THYROXINE: CPT

## 2024-07-13 PROCEDURE — 80307 DRUG TEST PRSMV CHEM ANLYZR: CPT

## 2024-07-13 PROCEDURE — 80053 COMPREHEN METABOLIC PANEL: CPT

## 2024-07-13 PROCEDURE — 99285 EMERGENCY DEPT VISIT HI MDM: CPT

## 2024-07-13 PROCEDURE — 80143 DRUG ASSAY ACETAMINOPHEN: CPT

## 2024-07-13 PROCEDURE — G0480 DRUG TEST DEF 1-7 CLASSES: HCPCS

## 2024-07-13 PROCEDURE — 84443 ASSAY THYROID STIM HORMONE: CPT

## 2024-07-13 ASSESSMENT — PAIN DESCRIPTION - LOCATION: LOCATION: HEAD

## 2024-07-13 ASSESSMENT — PAIN SCALES - GENERAL: PAINLEVEL_OUTOF10: 5

## 2024-07-13 ASSESSMENT — LIFESTYLE VARIABLES: HOW OFTEN DO YOU HAVE A DRINK CONTAINING ALCOHOL: NEVER

## 2024-07-13 ASSESSMENT — PAIN - FUNCTIONAL ASSESSMENT: PAIN_FUNCTIONAL_ASSESSMENT: 0-10

## 2024-07-14 VITALS
WEIGHT: 175 LBS | OXYGEN SATURATION: 97 % | HEART RATE: 86 BPM | TEMPERATURE: 99.1 F | HEIGHT: 62 IN | DIASTOLIC BLOOD PRESSURE: 85 MMHG | SYSTOLIC BLOOD PRESSURE: 128 MMHG | RESPIRATION RATE: 16 BRPM | BODY MASS INDEX: 32.2 KG/M2

## 2024-07-14 LAB
ALBUMIN SERPL-MCNC: 4.6 G/DL (ref 3.5–5.2)
ALP SERPL-CCNC: 124 U/L (ref 35–104)
ALT SERPL-CCNC: 17 U/L (ref 0–32)
AMPHET UR QL SCN: NEGATIVE
ANION GAP SERPL CALCULATED.3IONS-SCNC: 13 MMOL/L (ref 7–16)
APAP SERPL-MCNC: <5 UG/ML (ref 10–30)
AST SERPL-CCNC: 13 U/L (ref 0–31)
BACTERIA URNS QL MICRO: ABNORMAL
BARBITURATES UR QL SCN: NEGATIVE
BASOPHILS # BLD: 0.02 K/UL (ref 0–0.2)
BASOPHILS NFR BLD: 0 % (ref 0–2)
BENZODIAZ UR QL: NEGATIVE
BILIRUB SERPL-MCNC: 0.3 MG/DL (ref 0–1.2)
BILIRUB UR QL STRIP: NEGATIVE
BUN SERPL-MCNC: 11 MG/DL (ref 6–20)
BUPRENORPHINE UR QL: POSITIVE
CALCIUM SERPL-MCNC: 9.6 MG/DL (ref 8.6–10.2)
CANNABINOIDS UR QL SCN: POSITIVE
CASTS #/AREA URNS LPF: ABNORMAL /LPF
CHLORIDE SERPL-SCNC: 101 MMOL/L (ref 98–107)
CLARITY UR: CLEAR
CO2 SERPL-SCNC: 26 MMOL/L (ref 22–29)
COCAINE UR QL SCN: NEGATIVE
COLOR UR: YELLOW
CREAT SERPL-MCNC: 0.7 MG/DL (ref 0.5–1)
CRYSTALS URNS MICRO: ABNORMAL /HPF
EKG ATRIAL RATE: 86 BPM
EKG P AXIS: 55 DEGREES
EKG P-R INTERVAL: 114 MS
EKG Q-T INTERVAL: 388 MS
EKG QRS DURATION: 78 MS
EKG QTC CALCULATION (BAZETT): 464 MS
EKG R AXIS: 46 DEGREES
EKG T AXIS: -3 DEGREES
EKG VENTRICULAR RATE: 86 BPM
EOSINOPHIL # BLD: 0.05 K/UL (ref 0.05–0.5)
EOSINOPHILS RELATIVE PERCENT: 1 % (ref 0–6)
ERYTHROCYTE [DISTWIDTH] IN BLOOD BY AUTOMATED COUNT: 13.5 % (ref 11.5–15)
ETHANOLAMINE SERPL-MCNC: <10 MG/DL (ref 0–0.08)
FENTANYL UR QL: NEGATIVE
GFR, ESTIMATED: >90 ML/MIN/1.73M2
GLUCOSE BLD-MCNC: 118 MG/DL (ref 74–99)
GLUCOSE SERPL-MCNC: 108 MG/DL (ref 74–99)
GLUCOSE UR STRIP-MCNC: NEGATIVE MG/DL
HCG UR QL: NEGATIVE
HCT VFR BLD AUTO: 43.1 % (ref 34–48)
HGB BLD-MCNC: 13.5 G/DL (ref 11.5–15.5)
HGB UR QL STRIP.AUTO: NEGATIVE
IMM GRANULOCYTES # BLD AUTO: 0.03 K/UL (ref 0–0.58)
IMM GRANULOCYTES NFR BLD: 0 % (ref 0–5)
KETONES UR STRIP-MCNC: NEGATIVE MG/DL
LEUKOCYTE ESTERASE UR QL STRIP: ABNORMAL
LYMPHOCYTES NFR BLD: 2.37 K/UL (ref 1.5–4)
LYMPHOCYTES RELATIVE PERCENT: 35 % (ref 20–42)
MCH RBC QN AUTO: 27.3 PG (ref 26–35)
MCHC RBC AUTO-ENTMCNC: 31.3 G/DL (ref 32–34.5)
MCV RBC AUTO: 87.1 FL (ref 80–99.9)
METHADONE UR QL: NEGATIVE
MONOCYTES NFR BLD: 0.27 K/UL (ref 0.1–0.95)
MONOCYTES NFR BLD: 4 % (ref 2–12)
MUCOUS THREADS URNS QL MICRO: PRESENT
NEUTROPHILS NFR BLD: 60 % (ref 43–80)
NEUTS SEG NFR BLD: 4.12 K/UL (ref 1.8–7.3)
NITRITE UR QL STRIP: NEGATIVE
OPIATES UR QL SCN: NEGATIVE
OXYCODONE UR QL SCN: NEGATIVE
PCP UR QL SCN: NEGATIVE
PH UR STRIP: 5.5 [PH] (ref 5–9)
PLATELET # BLD AUTO: 412 K/UL (ref 130–450)
PMV BLD AUTO: 9.1 FL (ref 7–12)
POTASSIUM SERPL-SCNC: 4.5 MMOL/L (ref 3.5–5)
PROT SERPL-MCNC: 8 G/DL (ref 6.4–8.3)
PROT UR STRIP-MCNC: NEGATIVE MG/DL
RBC # BLD AUTO: 4.95 M/UL (ref 3.5–5.5)
RBC #/AREA URNS HPF: ABNORMAL /HPF
SALICYLATES SERPL-MCNC: 1.2 MG/DL (ref 0–30)
SODIUM SERPL-SCNC: 140 MMOL/L (ref 132–146)
SP GR UR STRIP: >1.03 (ref 1–1.03)
T4 SERPL-MCNC: 7.9 UG/DL (ref 4.5–11.7)
TEST INFORMATION: ABNORMAL
TOXIC TRICYCLIC SC,BLOOD: NEGATIVE
TSH SERPL DL<=0.05 MIU/L-ACNC: 0.3 UIU/ML (ref 0.27–4.2)
UROBILINOGEN UR STRIP-ACNC: 0.2 EU/DL (ref 0–1)
WBC #/AREA URNS HPF: ABNORMAL /HPF
WBC OTHER # BLD: 6.9 K/UL (ref 4.5–11.5)

## 2024-07-14 PROCEDURE — 6370000000 HC RX 637 (ALT 250 FOR IP): Performed by: EMERGENCY MEDICINE

## 2024-07-14 PROCEDURE — 6370000000 HC RX 637 (ALT 250 FOR IP)

## 2024-07-14 PROCEDURE — 81001 URINALYSIS AUTO W/SCOPE: CPT

## 2024-07-14 PROCEDURE — 93005 ELECTROCARDIOGRAM TRACING: CPT

## 2024-07-14 PROCEDURE — 84703 CHORIONIC GONADOTROPIN ASSAY: CPT

## 2024-07-14 PROCEDURE — 93010 ELECTROCARDIOGRAM REPORT: CPT | Performed by: INTERNAL MEDICINE

## 2024-07-14 PROCEDURE — 82962 GLUCOSE BLOOD TEST: CPT

## 2024-07-14 PROCEDURE — 80307 DRUG TEST PRSMV CHEM ANLYZR: CPT

## 2024-07-14 RX ORDER — CEFDINIR 300 MG/1
300 CAPSULE ORAL ONCE
Status: COMPLETED | OUTPATIENT
Start: 2024-07-14 | End: 2024-07-14

## 2024-07-14 RX ORDER — CEFDINIR 300 MG/1
300 CAPSULE ORAL 2 TIMES DAILY
Qty: 14 CAPSULE | Refills: 0 | Status: SHIPPED | OUTPATIENT
Start: 2024-07-14 | End: 2024-07-21

## 2024-07-14 RX ORDER — LEVETIRACETAM 500 MG/1
750 TABLET ORAL ONCE
Status: COMPLETED | OUTPATIENT
Start: 2024-07-14 | End: 2024-07-14

## 2024-07-14 RX ORDER — LORAZEPAM 1 MG/1
1 TABLET ORAL ONCE
Status: COMPLETED | OUTPATIENT
Start: 2024-07-14 | End: 2024-07-14

## 2024-07-14 RX ORDER — ACETAMINOPHEN 325 MG/1
650 TABLET ORAL ONCE
Status: COMPLETED | OUTPATIENT
Start: 2024-07-14 | End: 2024-07-14

## 2024-07-14 RX ADMIN — LORAZEPAM 1 MG: 1 TABLET ORAL at 10:30

## 2024-07-14 RX ADMIN — LORAZEPAM 1 MG: 1 TABLET ORAL at 03:18

## 2024-07-14 RX ADMIN — CEFDINIR 300 MG: 300 CAPSULE ORAL at 01:58

## 2024-07-14 RX ADMIN — ACETAMINOPHEN 650 MG: 325 TABLET ORAL at 10:34

## 2024-07-14 RX ADMIN — LEVETIRACETAM 750 MG: 500 TABLET, FILM COATED ORAL at 03:24

## 2024-07-14 ASSESSMENT — PAIN - FUNCTIONAL ASSESSMENT
PAIN_FUNCTIONAL_ASSESSMENT: 0-10
PAIN_FUNCTIONAL_ASSESSMENT: NONE - DENIES PAIN

## 2024-07-14 ASSESSMENT — PAIN DESCRIPTION - LOCATION: LOCATION: HEAD

## 2024-07-14 ASSESSMENT — PAIN DESCRIPTION - DESCRIPTORS
DESCRIPTORS: ACHING
DESCRIPTORS: THROBBING;ACHING

## 2024-07-14 ASSESSMENT — PAIN SCALES - GENERAL
PAINLEVEL_OUTOF10: 5
PAINLEVEL_OUTOF10: 3

## 2024-07-14 ASSESSMENT — PAIN DESCRIPTION - ONSET: ONSET: GRADUAL

## 2024-07-14 ASSESSMENT — PAIN DESCRIPTION - PAIN TYPE: TYPE: ACUTE PAIN

## 2024-07-14 NOTE — ED NOTES
Patient to ED after experiencing suicidal thoughts, no plan. Denies HI. Patient has been experiencing SI since November. Patient states that she was started on an antidepressant in 2019 after being admitted to [facility] ICU. 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 Patient states that her  passed away in 2022 and that her Granddaughter (who is 17) recently ran away from home. Patient is currently on disability and lives at home alone. She feels that the antidepressants that she has taken since November have not been effective.

## 2024-07-14 NOTE — ED NOTES
Patients belongings are in a pink bag, 1 pt plastic bag. One wallet, pj's x 3, robe, white flip flops.Lotions, hair products, and hair brush. Items can be found  in cabinet in doctors office.

## 2024-07-14 NOTE — ED NOTES
Access center called. Pt accepted to [facility] by dr bennett. Requesting pink slip faxed to . Pt will go to [facility] for room assignment. Nurse to nurse # [phone]. [employer] requesting pregnancy test done on patient and faxed over. Lab notified of add on order.

## 2024-07-14 NOTE — ED NOTES
Patient is currently in ED observation waiting for screening by the  for behavioral health.  I did review patient's labs.  She has UTI.  Started on antibiotics.  She is medically cleared.  She is reporting increasing anxiety.  I did order Ativan.  The initial provider states that she is here for suicidal ideations.  Her plan was to shoot herself.  He reported that she has access to a gun.     Abdoulaye Strong, DO  07/14/24 1007

## 2024-07-31 ENCOUNTER — OFFICE VISIT (OUTPATIENT)
Dept: FAMILY MEDICINE CLINIC | Age: 54
End: 2024-07-31
Payer: MEDICARE

## 2024-07-31 ENCOUNTER — HOSPITAL ENCOUNTER (OUTPATIENT)
Dept: CARDIOLOGY | Age: 54
Discharge: HOME | End: 2024-08-02
Attending: INTERNAL MEDICINE
Payer: MEDICARE

## 2024-07-31 VITALS
DIASTOLIC BLOOD PRESSURE: 80 MMHG | OXYGEN SATURATION: 99 % | SYSTOLIC BLOOD PRESSURE: 114 MMHG | HEART RATE: 87 BPM | TEMPERATURE: 97.1 F | WEIGHT: 188 LBS | BODY MASS INDEX: 34.6 KG/M2 | RESPIRATION RATE: 18 BRPM | HEIGHT: 62 IN

## 2024-07-31 VITALS — WEIGHT: 175 LBS | BODY MASS INDEX: 32.2 KG/M2 | HEIGHT: 62 IN

## 2024-07-31 DIAGNOSIS — G89.29 CHRONIC BILATERAL LOW BACK PAIN WITH RIGHT-SIDED SCIATICA: ICD-10-CM

## 2024-07-31 DIAGNOSIS — R35.0 URINARY FREQUENCY: ICD-10-CM

## 2024-07-31 DIAGNOSIS — I31.39 PERICARDIAL EFFUSION: ICD-10-CM

## 2024-07-31 DIAGNOSIS — R73.03 PREDIABETES: Primary | ICD-10-CM

## 2024-07-31 DIAGNOSIS — M54.41 CHRONIC BILATERAL LOW BACK PAIN WITH RIGHT-SIDED SCIATICA: ICD-10-CM

## 2024-07-31 DIAGNOSIS — N39.0 URINARY TRACT INFECTION WITHOUT HEMATURIA, SITE UNSPECIFIED: ICD-10-CM

## 2024-07-31 PROBLEM — K59.1 FUNCTIONAL DIARRHEA: Status: RESOLVED | Noted: 2018-01-13 | Resolved: 2024-07-31

## 2024-07-31 LAB
BILIRUBIN, POC: NEGATIVE
BLOOD URINE, POC: NEGATIVE
CLARITY, POC: CLEAR
COLOR, POC: YELLOW
ECHO AO ASC DIAM: 2.6 CM
ECHO AO ASCENDING AORTA INDEX: 1.44 CM/M2
ECHO AV AREA PEAK VELOCITY: 2 CM2
ECHO AV AREA VTI: 2.3 CM2
ECHO AV AREA/BSA PEAK VELOCITY: 1.1 CM2/M2
ECHO AV AREA/BSA VTI: 1.3 CM2/M2
ECHO AV CUSP MM: 2.1 CM
ECHO AV MEAN GRADIENT: 4 MMHG
ECHO AV MEAN VELOCITY: 1 M/S
ECHO AV PEAK GRADIENT: 7 MMHG
ECHO AV PEAK VELOCITY: 1.4 M/S
ECHO AV VELOCITY RATIO: 0.64
ECHO AV VTI: 25.9 CM
ECHO BSA: 1.86 M2
ECHO LA DIAMETER INDEX: 1.82 CM/M2
ECHO LA DIAMETER: 3.3 CM
ECHO LA VOL A-L A2C: 65 ML (ref 22–52)
ECHO LA VOL A-L A4C: 55 ML (ref 22–52)
ECHO LA VOL MOD A2C: 61 ML (ref 22–52)
ECHO LA VOL MOD A4C: 50 ML (ref 22–52)
ECHO LA VOLUME AREA LENGTH: 61 ML
ECHO LA VOLUME INDEX A-L A2C: 36 ML/M2 (ref 16–34)
ECHO LA VOLUME INDEX A-L A4C: 30 ML/M2 (ref 16–34)
ECHO LA VOLUME INDEX AREA LENGTH: 34 ML/M2 (ref 16–34)
ECHO LA VOLUME INDEX MOD A2C: 34 ML/M2 (ref 16–34)
ECHO LA VOLUME INDEX MOD A4C: 28 ML/M2 (ref 16–34)
ECHO LV EF PHYSICIAN: 65 %
ECHO LV FRACTIONAL SHORTENING: 41 % (ref 28–44)
ECHO LV INTERNAL DIMENSION DIASTOLE INDEX: 2.43 CM/M2
ECHO LV INTERNAL DIMENSION DIASTOLIC: 4.4 CM (ref 3.9–5.3)
ECHO LV INTERNAL DIMENSION SYSTOLIC INDEX: 1.44 CM/M2
ECHO LV INTERNAL DIMENSION SYSTOLIC: 2.6 CM
ECHO LV ISOVOLUMETRIC RELAXATION TIME (IVRT): 86.5 MS
ECHO LV IVSD: 1.2 CM (ref 0.6–0.9)
ECHO LV IVSS: 1.4 CM
ECHO LV MASS 2D: 168.9 G (ref 67–162)
ECHO LV MASS INDEX 2D: 93.3 G/M2 (ref 43–95)
ECHO LV POSTERIOR WALL DIASTOLIC: 1 CM (ref 0.6–0.9)
ECHO LV POSTERIOR WALL SYSTOLIC: 1.4 CM
ECHO LV RELATIVE WALL THICKNESS RATIO: 0.45
ECHO LVOT AREA: 3.1 CM2
ECHO LVOT AV VTI INDEX: 0.74
ECHO LVOT DIAM: 2 CM
ECHO LVOT MEAN GRADIENT: 2 MMHG
ECHO LVOT PEAK GRADIENT: 3 MMHG
ECHO LVOT PEAK VELOCITY: 0.9 M/S
ECHO LVOT STROKE VOLUME INDEX: 33.1 ML/M2
ECHO LVOT SV: 60 ML
ECHO LVOT VTI: 19.1 CM
ECHO MV "A" WAVE DURATION: 128 MSEC
ECHO MV A VELOCITY: 0.63 M/S
ECHO MV AREA PHT: 4 CM2
ECHO MV AREA VTI: 2.9 CM2
ECHO MV E DECELERATION TIME (DT): 237.3 MS
ECHO MV E VELOCITY: 0.75 M/S
ECHO MV E/A RATIO: 1.19
ECHO MV LVOT VTI INDEX: 1.08
ECHO MV MAX VELOCITY: 1 M/S
ECHO MV MEAN GRADIENT: 2 MMHG
ECHO MV MEAN VELOCITY: 0.7 M/S
ECHO MV PEAK GRADIENT: 4 MMHG
ECHO MV PRESSURE HALF TIME (PHT): 54.9 MS
ECHO MV VTI: 20.6 CM
ECHO PV MAX VELOCITY: 1 M/S
ECHO PV MEAN GRADIENT: 3 MMHG
ECHO PV MEAN VELOCITY: 0.8 M/S
ECHO PV PEAK GRADIENT: 4 MMHG
ECHO PV VTI: 21.4 CM
ECHO PVEIN A DURATION: 86.5 MS
ECHO PVEIN A VELOCITY: 0.3 M/S
ECHO PVEIN PEAK D VELOCITY: 0.4 M/S
ECHO PVEIN PEAK S VELOCITY: 0.6 M/S
ECHO PVEIN S/D RATIO: 1.5
ECHO RV INTERNAL DIMENSION: 3 CM
ECHO TV REGURGITANT MAX VELOCITY: 2.65 M/S
ECHO TV REGURGITANT PEAK GRADIENT: 28 MMHG
GLUCOSE URINE, POC: NEGATIVE
HBA1C MFR BLD: 5.9 %
KETONES, POC: NEGATIVE
LEUKOCYTE EST, POC: NORMAL
NITRITE, POC: NEGATIVE
PH, POC: 6.5
PROTEIN, POC: NEGATIVE
SPECIFIC GRAVITY, POC: 1.02
UROBILINOGEN, POC: 0.2

## 2024-07-31 PROCEDURE — 93306 TTE W/DOPPLER COMPLETE: CPT | Performed by: INTERNAL MEDICINE

## 2024-07-31 PROCEDURE — 83036 HEMOGLOBIN GLYCOSYLATED A1C: CPT | Performed by: FAMILY MEDICINE

## 2024-07-31 PROCEDURE — 93306 TTE W/DOPPLER COMPLETE: CPT

## 2024-07-31 PROCEDURE — 81002 URINALYSIS NONAUTO W/O SCOPE: CPT | Performed by: FAMILY MEDICINE

## 2024-07-31 PROCEDURE — 99214 OFFICE O/P EST MOD 30 MIN: CPT | Performed by: FAMILY MEDICINE

## 2024-07-31 PROCEDURE — 3079F DIAST BP 80-89 MM HG: CPT | Performed by: FAMILY MEDICINE

## 2024-07-31 PROCEDURE — 3074F SYST BP LT 130 MM HG: CPT | Performed by: FAMILY MEDICINE

## 2024-07-31 RX ORDER — NITROFURANTOIN 25; 75 MG/1; MG/1
100 CAPSULE ORAL 2 TIMES DAILY
Qty: 10 CAPSULE | Refills: 0 | Status: SHIPPED | OUTPATIENT
Start: 2024-07-31 | End: 2024-08-05

## 2024-07-31 RX ORDER — LURASIDONE HYDROCHLORIDE 40 MG/1
40 TABLET, FILM COATED ORAL
COMMUNITY
Start: 2024-07-21

## 2024-07-31 RX ORDER — CYCLOBENZAPRINE HCL 5 MG
5 TABLET ORAL 2 TIMES DAILY PRN
Qty: 60 TABLET | Refills: 1 | Status: SHIPPED | OUTPATIENT
Start: 2024-07-31

## 2024-07-31 RX ORDER — BUSPIRONE HYDROCHLORIDE 10 MG/1
10 TABLET ORAL 2 TIMES DAILY
COMMUNITY
Start: 2024-07-21

## 2024-07-31 NOTE — PROGRESS NOTES
Systems:  Constitutional:  No fever, no fatigue, no chills, + headaches, no weight change  Dermatology:  No rash, no mole, no dry or sensitive skin  ENT:  No cough, no sore throat, no sinus pain, no runny nose, no ear pain  Cardiology:  No chest pain, no palpitations, no leg edema, no shortness of breath, no PND  Gastroenterology:  No dysphagia, no abdominal pain, no nausea, no vomiting, no constipation, no diarrhea, no heartburn  Musculoskeletal:  No joint pain, no leg cramps, + back pain, no muscle aches  Respiratory:  No shortness of breath, no orthopnea, no wheezing, no RAINES, no hemoptysis  Urology:  No blood in the urine, + urinary frequency, no urinary incontinence, no urinary urgency, no nocturia, no dysuria      Vitals:    07/31/24 1434   BP: 114/80   Pulse: 87   Resp: 18   Temp: 97.1 °F (36.2 °C)   SpO2: 99%   Weight: 85.3 kg (188 lb)   Height: 1.575 m (5' 2\")       Physical Exam  Vitals and nursing note reviewed.   Constitutional:       Appearance: She is well-developed.   HENT:      Head: Normocephalic and atraumatic.      Right Ear: External ear normal.      Left Ear: External ear normal.      Nose: Nose normal.   Eyes:      Conjunctiva/sclera: Conjunctivae normal.      Pupils: Pupils are equal, round, and reactive to light.   Neck:      Thyroid: No thyromegaly.   Cardiovascular:      Rate and Rhythm: Normal rate and regular rhythm.      Heart sounds: Normal heart sounds.   Pulmonary:      Effort: Pulmonary effort is normal.      Breath sounds: Normal breath sounds. No wheezing.   Abdominal:      General: Bowel sounds are normal.      Palpations: Abdomen is soft.      Tenderness: There is no abdominal tenderness.   Musculoskeletal:      Cervical back: Normal range of motion and neck supple.      Lumbar back: Spasms, tenderness and bony tenderness present. Decreased range of motion.   Skin:     General: Skin is warm and dry.      Findings: No rash.   Neurological:      Mental Status: She is alert and

## 2024-08-09 ENCOUNTER — TELEPHONE (OUTPATIENT)
Dept: FAMILY MEDICINE CLINIC | Age: 54
End: 2024-08-09

## 2024-08-09 ENCOUNTER — HOSPITAL ENCOUNTER (OUTPATIENT)
Dept: AUDIOLOGY | Age: 54
Discharge: HOME | End: 2024-08-09

## 2024-08-12 DIAGNOSIS — H90.3 SENSORY HEARING LOSS, BILATERAL: Primary | ICD-10-CM

## 2024-08-12 NOTE — TELEPHONE ENCOUNTER
Pt sees [facility] audiology Dr. Verónica frazier for hearing aids and they need hearing test order from PCP to test pt hearing due to her hearing loss. Please advise

## 2024-08-12 NOTE — TELEPHONE ENCOUNTER
I placed an order, not sure if correct or not I have never had to order the hearing test. If I have to change just please let me know!!

## 2024-08-23 ENCOUNTER — PREP FOR PROCEDURE (OUTPATIENT)
Dept: PAIN MANAGEMENT | Age: 54
End: 2024-08-23

## 2024-08-23 ENCOUNTER — OFFICE VISIT (OUTPATIENT)
Dept: PAIN MANAGEMENT | Age: 54
End: 2024-08-23
Payer: MEDICARE

## 2024-08-23 VITALS
HEART RATE: 80 BPM | SYSTOLIC BLOOD PRESSURE: 116 MMHG | OXYGEN SATURATION: 96 % | TEMPERATURE: 95.9 F | DIASTOLIC BLOOD PRESSURE: 70 MMHG | BODY MASS INDEX: 34.61 KG/M2 | HEIGHT: 62 IN | WEIGHT: 188.05 LBS | RESPIRATION RATE: 18 BRPM

## 2024-08-23 DIAGNOSIS — Z86.69 HISTORY OF SEIZURE DISORDER: ICD-10-CM

## 2024-08-23 DIAGNOSIS — M51.36 DDD (DEGENERATIVE DISC DISEASE), LUMBAR: Primary | ICD-10-CM

## 2024-08-23 DIAGNOSIS — M54.16 LUMBAR RADICULAR PAIN: Primary | ICD-10-CM

## 2024-08-23 DIAGNOSIS — M54.16 LUMBAR RADICULAR PAIN: ICD-10-CM

## 2024-08-23 DIAGNOSIS — F54 PSYCHOLOGICAL FACTORS AFFECTING MEDICAL CONDITION: ICD-10-CM

## 2024-08-23 DIAGNOSIS — M47.817 LUMBOSACRAL SPONDYLOSIS WITHOUT MYELOPATHY: ICD-10-CM

## 2024-08-23 DIAGNOSIS — Z79.899 MEDICAL MARIJUANA USE: ICD-10-CM

## 2024-08-23 PROCEDURE — 99215 OFFICE O/P EST HI 40 MIN: CPT | Performed by: ANESTHESIOLOGY

## 2024-08-23 PROCEDURE — 3074F SYST BP LT 130 MM HG: CPT | Performed by: ANESTHESIOLOGY

## 2024-08-23 PROCEDURE — 99213 OFFICE O/P EST LOW 20 MIN: CPT | Performed by: ANESTHESIOLOGY

## 2024-08-23 PROCEDURE — 3078F DIAST BP <80 MM HG: CPT | Performed by: ANESTHESIOLOGY

## 2024-08-23 RX ORDER — SODIUM CHLORIDE 0.9 % (FLUSH) 0.9 %
5-40 SYRINGE (ML) INJECTION EVERY 12 HOURS SCHEDULED
OUTPATIENT
Start: 2024-08-23

## 2024-08-23 RX ORDER — SODIUM CHLORIDE 9 MG/ML
INJECTION, SOLUTION INTRAVENOUS PRN
OUTPATIENT
Start: 2024-08-23

## 2024-08-23 RX ORDER — SODIUM CHLORIDE 0.9 % (FLUSH) 0.9 %
5-40 SYRINGE (ML) INJECTION PRN
OUTPATIENT
Start: 2024-08-23

## 2024-08-23 NOTE — PROGRESS NOTES
Susana Tony presents to the [facility] on 8/23/2024. Susana is complaining of pain in her lower back and down legs. The pain is constant. The pain is described as aching and burning. Pain is rated on her best day at a 4, on her worst day at a 10, and on average at a 6 on the VAS scale. She took her last dose of Motrin and Tylenol today.      Any procedures since your last visit: No    She is  on NSAIDS and  is not on anticoagulation medications to include none    Pacemaker or defibrillator: No .    Medication Contract and Consent for Opioid Use Documents Filed        No documents found                       Resp 18   Ht 1.575 m (5' 2.01\")   Wt 85.3 kg (188 lb 0.8 oz)   LMP 05/03/2007   BMI 34.39 kg/m²      Patient's last menstrual period was 05/03/2007.  
motion:Normal flexion, extension, rotation bilaterally and is not painful.    Thoracic spine:     Spine inspection:normal   Palpation:No tenderness over the midline and paraspinal area, bilaterally  Range of motion:normal in flexion, extension rotation bilateral and is not painful.    Lumbar spine:    Spine inspection: Normal   Palpation: Tenderness paravertebral muscles Yes bilaterally  Range of motion: Decreased, flexion Decreased, Lateral bending, extension and rotation bilaterally reduced is somewhat painful.  Sacroiliac joint tenderness No bilaterally  HIEN test: negative bilaterally  Gaenslen's test:negative bilaterally   Piriformis tenderness: negative bilaterally  SLR : negative bilaterally    Musculoskeletal:    Trigger points no    Extremities:    Tremors:None bilaterally upper and lower  Edema:no    Neurological:    Sensory: Normal to light touch     Motor:                Right Quadriceps 5/5          Left Quadriceps 5/5           Right Gastrocnemius 5/5    Left Gastrocnemius 5/5  Right Ant Tibialis 5/5  Left Ant Tibialis 5/5    Reflexes:    B/l equal - diminished    Gait:normal Yes    Dermatology:    Skin:no rashes or lesions noted    Assessment/Plan:     Diagnosis Orders   1. DDD (degenerative disc disease), lumbar        2. Lumbar radicular pain        3. Lumbosacral spondylosis without myelopathy        4. Medical marijuana use        5. History of seizure disorder        6. Psychological factors affecting medical condition          53 y.o. female with H/o low back and right LE pain- acute exacerbation.  Failed conservative treatment.    PT/ HEP worsened the pain.    MRI: reviewed personally: L5-S1 right paracentral disc extrusion.    H/o chronic opioid use in the past- was on Suboxone. Has Dc'd Suboxone since March 2024.    Uses medical marijuana.  S/p Lumbar TFESI in 2021 with almost complete relief of > 95% until recently.   Has noted recurrence of similar pain.    Last seen in 2021-Here for

## 2024-08-27 ENCOUNTER — APPOINTMENT (OUTPATIENT)
Dept: CT IMAGING | Age: 54
End: 2024-08-27
Payer: MEDICARE

## 2024-08-27 ENCOUNTER — APPOINTMENT (OUTPATIENT)
Dept: GENERAL RADIOLOGY | Age: 54
End: 2024-08-27
Payer: MEDICARE

## 2024-08-27 ENCOUNTER — HOSPITAL ENCOUNTER (INPATIENT)
Age: 54
LOS: 18 days | Discharge: NURSING, REHAB OR LONG-TERM CARE | End: 2024-09-14
Attending: EMERGENCY MEDICINE | Admitting: INTERNAL MEDICINE
Payer: MEDICARE

## 2024-08-27 DIAGNOSIS — I63.89 ACUTE ISCHEMIC MULTIFOCAL MULTIPLE VASCULAR TERRITORIES STROKE (HCC): ICD-10-CM

## 2024-08-27 DIAGNOSIS — N17.9 ACUTE KIDNEY INJURY (HCC): ICD-10-CM

## 2024-08-27 DIAGNOSIS — G93.40 ENCEPHALOPATHY ACUTE: ICD-10-CM

## 2024-08-27 DIAGNOSIS — I63.432 CEREBROVASCULAR ACCIDENT (CVA) DUE TO EMBOLISM OF LEFT POSTERIOR CEREBRAL ARTERY (HCC): ICD-10-CM

## 2024-08-27 DIAGNOSIS — I26.99 OTHER ACUTE PULMONARY EMBOLISM WITHOUT ACUTE COR PULMONALE (HCC): Primary | ICD-10-CM

## 2024-08-27 DIAGNOSIS — F05 DELIRIUM DUE TO MULTIPLE ETIOLOGIES: ICD-10-CM

## 2024-08-27 DIAGNOSIS — R06.02 SHORTNESS OF BREATH: ICD-10-CM

## 2024-08-27 DIAGNOSIS — R00.0 SINUS TACHYCARDIA: ICD-10-CM

## 2024-08-27 DIAGNOSIS — D72.829 LEUKOCYTOSIS, UNSPECIFIED TYPE: ICD-10-CM

## 2024-08-27 DIAGNOSIS — R79.89 ELEVATED TROPONIN: ICD-10-CM

## 2024-08-27 DIAGNOSIS — I42.9 CARDIOMYOPATHY, UNSPECIFIED TYPE (HCC): ICD-10-CM

## 2024-08-27 DIAGNOSIS — G93.41 METABOLIC ENCEPHALOPATHY: ICD-10-CM

## 2024-08-27 LAB
ALBUMIN SERPL-MCNC: 3.8 G/DL (ref 3.5–5.2)
ALBUMIN SERPL-MCNC: 4.6 G/DL (ref 3.5–5.2)
ALP SERPL-CCNC: 115 U/L (ref 35–104)
ALP SERPL-CCNC: 144 U/L (ref 35–104)
ALT SERPL-CCNC: 2089 U/L (ref 0–32)
ALT SERPL-CCNC: 2813 U/L (ref 0–32)
AMPHET UR QL SCN: NEGATIVE
ANION GAP SERPL CALCULATED.3IONS-SCNC: 25 MMOL/L (ref 7–16)
ANION GAP SERPL CALCULATED.3IONS-SCNC: 29 MMOL/L (ref 7–16)
APAP SERPL-MCNC: <5 UG/ML (ref 10–30)
AST SERPL-CCNC: 1223 U/L (ref 0–31)
AST SERPL-CCNC: 2035 U/L (ref 0–31)
B-OH-BUTYR SERPL-MCNC: 3.8 MMOL/L (ref 0.02–0.27)
B.E.: -8.9 MMOL/L (ref -3–3)
B.E.: -9.1 MMOL/L (ref -3–3)
BACTERIA URNS QL MICRO: ABNORMAL
BARBITURATES UR QL SCN: NEGATIVE
BASOPHILS # BLD: 0 K/UL (ref 0–0.2)
BASOPHILS NFR BLD: 0 % (ref 0–2)
BENZODIAZ UR QL: NEGATIVE
BILIRUB SERPL-MCNC: 0.5 MG/DL (ref 0–1.2)
BILIRUB SERPL-MCNC: 1 MG/DL (ref 0–1.2)
BILIRUB UR QL STRIP: ABNORMAL
BUN SERPL-MCNC: 116 MG/DL (ref 6–20)
BUN SERPL-MCNC: 119 MG/DL (ref 6–20)
BUPRENORPHINE UR QL: NEGATIVE
CALCIUM SERPL-MCNC: 7.7 MG/DL (ref 8.6–10.2)
CALCIUM SERPL-MCNC: 8.8 MG/DL (ref 8.6–10.2)
CANNABINOIDS UR QL SCN: POSITIVE
CHLORIDE SERPL-SCNC: 103 MMOL/L (ref 98–107)
CHLORIDE SERPL-SCNC: 109 MMOL/L (ref 98–107)
CK SERPL-CCNC: ABNORMAL U/L (ref 20–180)
CLARITY UR: CLEAR
CO2 SERPL-SCNC: 15 MMOL/L (ref 22–29)
CO2 SERPL-SCNC: 17 MMOL/L (ref 22–29)
COCAINE UR QL SCN: NEGATIVE
COHB: 0.7 % (ref 0–1.5)
COHB: 0.8 % (ref 0–1.5)
COLOR UR: YELLOW
CREAT SERPL-MCNC: 7.5 MG/DL (ref 0.5–1)
CREAT SERPL-MCNC: 9.1 MG/DL (ref 0.5–1)
CREAT UR-MCNC: 152.5 MG/DL (ref 29–226)
CREAT UR-MCNC: 154 MG/DL (ref 29–226)
CRITICAL: ABNORMAL
CRITICAL: ABNORMAL
DATE ANALYZED: ABNORMAL
DATE ANALYZED: ABNORMAL
DATE OF COLLECTION: ABNORMAL
DATE OF COLLECTION: ABNORMAL
EKG ATRIAL RATE: 138 BPM
EKG P AXIS: 52 DEGREES
EKG P-R INTERVAL: 122 MS
EKG Q-T INTERVAL: 288 MS
EKG QRS DURATION: 68 MS
EKG QTC CALCULATION (BAZETT): 436 MS
EKG R AXIS: 15 DEGREES
EKG T AXIS: 41 DEGREES
EKG VENTRICULAR RATE: 138 BPM
EOSINOPHIL # BLD: 0 K/UL (ref 0.05–0.5)
EOSINOPHILS RELATIVE PERCENT: 0 % (ref 0–6)
EPI CELLS #/AREA URNS HPF: ABNORMAL /HPF
ERYTHROCYTE [DISTWIDTH] IN BLOOD BY AUTOMATED COUNT: 15.8 % (ref 11.5–15)
ETHANOLAMINE SERPL-MCNC: <10 MG/DL (ref 0–0.08)
ETHANOLAMINE SERPL-MCNC: <10 MG/DL (ref 0–0.08)
FENTANYL UR QL: NEGATIVE
GFR, ESTIMATED: 5 ML/MIN/1.73M2
GFR, ESTIMATED: 6 ML/MIN/1.73M2
GLUCOSE SERPL-MCNC: 136 MG/DL (ref 74–99)
GLUCOSE SERPL-MCNC: 143 MG/DL (ref 74–99)
GLUCOSE UR STRIP-MCNC: NEGATIVE MG/DL
HCO3: 13 MMOL/L (ref 22–26)
HCO3: 13.8 MMOL/L (ref 22–26)
HCT VFR BLD AUTO: 49.6 % (ref 34–48)
HGB BLD-MCNC: 15.6 G/DL (ref 11.5–15.5)
HGB UR QL STRIP.AUTO: ABNORMAL
HHB: 6 % (ref 0–5)
HHB: 8.4 % (ref 0–5)
INR PPP: 1.2
KETONES UR STRIP-MCNC: ABNORMAL MG/DL
LAB: ABNORMAL
LAB: ABNORMAL
LACTATE BLDV-SCNC: 2 MMOL/L (ref 0.5–1.9)
LACTATE BLDV-SCNC: 2.5 MMOL/L (ref 0.5–1.9)
LEUKOCYTE ESTERASE UR QL STRIP: NEGATIVE
LYMPHOCYTES NFR BLD: 1.73 K/UL (ref 1.5–4)
LYMPHOCYTES RELATIVE PERCENT: 8 % (ref 20–42)
Lab: 1106
Lab: 1320
MCH RBC QN AUTO: 27.5 PG (ref 26–35)
MCHC RBC AUTO-ENTMCNC: 31.5 G/DL (ref 32–34.5)
MCV RBC AUTO: 87.5 FL (ref 80–99.9)
METHADONE UR QL: NEGATIVE
METHB: 0.2 % (ref 0–1.5)
METHB: 0.3 % (ref 0–1.5)
MODE: ABNORMAL
MODE: ABNORMAL
MONOCYTES NFR BLD: 0.58 K/UL (ref 0.1–0.95)
MONOCYTES NFR BLD: 3 % (ref 2–12)
NEUTROPHILS NFR BLD: 90 % (ref 43–80)
NEUTS SEG NFR BLD: 19.79 K/UL (ref 1.8–7.3)
NITRITE UR QL STRIP: NEGATIVE
O2 CONTENT: 19.1 ML/DL
O2 CONTENT: 19.2 ML/DL
O2 SATURATION: 91.5 % (ref 92–98.5)
O2 SATURATION: 93.9 % (ref 92–98.5)
O2HB: 90.6 % (ref 94–97)
O2HB: 93 % (ref 94–97)
OPERATOR ID: 1102
OPERATOR ID: 1102
OPIATES UR QL SCN: NEGATIVE
OSMOLALITY SERPL: 369 MOSM/KG (ref 285–310)
OXYCODONE UR QL SCN: NEGATIVE
PARTIAL THROMBOPLASTIN TIME: 35.8 SEC (ref 24.5–35.1)
PATIENT TEMP: 37 C
PATIENT TEMP: 37 C
PCO2: 20.4 MMHG (ref 35–45)
PCO2: 23.4 MMHG (ref 35–45)
PCP UR QL SCN: NEGATIVE
PH BLOOD GAS: 7.39 (ref 7.35–7.45)
PH BLOOD GAS: 7.42 (ref 7.35–7.45)
PH UR STRIP: 5.5 [PH] (ref 5–9)
PLATELET # BLD AUTO: 480 K/UL (ref 130–450)
PMV BLD AUTO: 9.5 FL (ref 7–12)
PO2: 64.1 MMHG (ref 75–100)
PO2: 75 MMHG (ref 75–100)
POTASSIUM SERPL-SCNC: 4.4 MMOL/L (ref 3.5–5)
POTASSIUM SERPL-SCNC: 4.8 MMOL/L (ref 3.5–5)
PROT SERPL-MCNC: 7.1 G/DL (ref 6.4–8.3)
PROT SERPL-MCNC: 8.8 G/DL (ref 6.4–8.3)
PROT UR STRIP-MCNC: 100 MG/DL
PROTHROMBIN TIME: 12.6 SEC (ref 9.3–12.4)
RBC # BLD AUTO: 5.67 M/UL (ref 3.5–5.5)
RBC # BLD: NORMAL 10*6/UL
RBC #/AREA URNS HPF: ABNORMAL /HPF
SALICYLATES SERPL-MCNC: <0.3 MG/DL (ref 0–30)
SODIUM SERPL-SCNC: 149 MMOL/L (ref 132–146)
SODIUM SERPL-SCNC: 149 MMOL/L (ref 132–146)
SODIUM UR-SCNC: 33 MMOL/L
SODIUM UR-SCNC: 38 MMOL/L
SOURCE, BLOOD GAS: ABNORMAL
SOURCE, BLOOD GAS: ABNORMAL
SP GR UR STRIP: >1.03 (ref 1–1.03)
TEST INFORMATION: ABNORMAL
THB: 14.7 G/DL (ref 11.5–16.5)
THB: 15 G/DL (ref 11.5–16.5)
TIME ANALYZED: 1110
TIME ANALYZED: 1321
TOTAL PROTEIN, URINE: 52 MG/DL (ref 0–12)
TOXIC TRICYCLIC SC,BLOOD: NEGATIVE
TROPONIN I SERPL HS-MCNC: 4547 NG/L (ref 0–9)
TROPONIN I SERPL HS-MCNC: 5783 NG/L (ref 0–9)
URINE TOTAL PROTEIN CREATININE RATIO: 0.34 (ref 0–0.2)
UROBILINOGEN UR STRIP-ACNC: 0.2 EU/DL (ref 0–1)
UUN UR-MCNC: 700 MG/DL (ref 800–1666)
WBC #/AREA URNS HPF: ABNORMAL /HPF
WBC OTHER # BLD: 22.1 K/UL (ref 4.5–11.5)

## 2024-08-27 PROCEDURE — 76937 US GUIDE VASCULAR ACCESS: CPT

## 2024-08-27 PROCEDURE — 84484 ASSAY OF TROPONIN QUANT: CPT

## 2024-08-27 PROCEDURE — 84540 ASSAY OF URINE/UREA-N: CPT

## 2024-08-27 PROCEDURE — 87077 CULTURE AEROBIC IDENTIFY: CPT

## 2024-08-27 PROCEDURE — 71045 X-RAY EXAM CHEST 1 VIEW: CPT

## 2024-08-27 PROCEDURE — G0480 DRUG TEST DEF 1-7 CLASSES: HCPCS

## 2024-08-27 PROCEDURE — 99223 1ST HOSP IP/OBS HIGH 75: CPT | Performed by: INTERNAL MEDICINE

## 2024-08-27 PROCEDURE — 83605 ASSAY OF LACTIC ACID: CPT

## 2024-08-27 PROCEDURE — 74176 CT ABD & PELVIS W/O CONTRAST: CPT

## 2024-08-27 PROCEDURE — 82805 BLOOD GASES W/O2 SATURATION: CPT

## 2024-08-27 PROCEDURE — 2500000003 HC RX 250 WO HCPCS: Performed by: INTERNAL MEDICINE

## 2024-08-27 PROCEDURE — 99285 EMERGENCY DEPT VISIT HI MDM: CPT

## 2024-08-27 PROCEDURE — 85730 THROMBOPLASTIN TIME PARTIAL: CPT

## 2024-08-27 PROCEDURE — 51702 INSERT TEMP BLADDER CATH: CPT

## 2024-08-27 PROCEDURE — 80053 COMPREHEN METABOLIC PANEL: CPT

## 2024-08-27 PROCEDURE — 99291 CRITICAL CARE FIRST HOUR: CPT | Performed by: INTERNAL MEDICINE

## 2024-08-27 PROCEDURE — 87040 BLOOD CULTURE FOR BACTERIA: CPT

## 2024-08-27 PROCEDURE — 82010 KETONE BODYS QUAN: CPT

## 2024-08-27 PROCEDURE — 80177 DRUG SCRN QUAN LEVETIRACETAM: CPT

## 2024-08-27 PROCEDURE — 2580000003 HC RX 258: Performed by: EMERGENCY MEDICINE

## 2024-08-27 PROCEDURE — 80074 ACUTE HEPATITIS PANEL: CPT

## 2024-08-27 PROCEDURE — 70450 CT HEAD/BRAIN W/O DYE: CPT

## 2024-08-27 PROCEDURE — 86789 WEST NILE VIRUS ANTIBODY: CPT

## 2024-08-27 PROCEDURE — 93005 ELECTROCARDIOGRAM TRACING: CPT | Performed by: EMERGENCY MEDICINE

## 2024-08-27 PROCEDURE — 82693 ASSAY OF ETHYLENE GLYCOL: CPT

## 2024-08-27 PROCEDURE — 96374 THER/PROPH/DIAG INJ IV PUSH: CPT

## 2024-08-27 PROCEDURE — 82570 ASSAY OF URINE CREATININE: CPT

## 2024-08-27 PROCEDURE — 82043 UR ALBUMIN QUANTITATIVE: CPT

## 2024-08-27 PROCEDURE — 86317 IMMUNOASSAY INFECTIOUS AGENT: CPT

## 2024-08-27 PROCEDURE — 84300 ASSAY OF URINE SODIUM: CPT

## 2024-08-27 PROCEDURE — 81001 URINALYSIS AUTO W/SCOPE: CPT

## 2024-08-27 PROCEDURE — 72125 CT NECK SPINE W/O DYE: CPT

## 2024-08-27 PROCEDURE — 83930 ASSAY OF BLOOD OSMOLALITY: CPT

## 2024-08-27 PROCEDURE — 2580000003 HC RX 258: Performed by: INTERNAL MEDICINE

## 2024-08-27 PROCEDURE — 84156 ASSAY OF PROTEIN URINE: CPT

## 2024-08-27 PROCEDURE — 2580000003 HC RX 258

## 2024-08-27 PROCEDURE — 2000000000 HC ICU R&B

## 2024-08-27 PROCEDURE — 36592 COLLECT BLOOD FROM PICC: CPT

## 2024-08-27 PROCEDURE — 93010 ELECTROCARDIOGRAM REPORT: CPT | Performed by: INTERNAL MEDICINE

## 2024-08-27 PROCEDURE — 6360000002 HC RX W HCPCS: Performed by: EMERGENCY MEDICINE

## 2024-08-27 PROCEDURE — 36415 COLL VENOUS BLD VENIPUNCTURE: CPT

## 2024-08-27 PROCEDURE — 80143 DRUG ASSAY ACETAMINOPHEN: CPT

## 2024-08-27 PROCEDURE — 90935 HEMODIALYSIS ONE EVALUATION: CPT

## 2024-08-27 PROCEDURE — 87086 URINE CULTURE/COLONY COUNT: CPT

## 2024-08-27 PROCEDURE — 80179 DRUG ASSAY SALICYLATE: CPT

## 2024-08-27 PROCEDURE — 71250 CT THORAX DX C-: CPT

## 2024-08-27 PROCEDURE — 5A1D70Z PERFORMANCE OF URINARY FILTRATION, INTERMITTENT, LESS THAN 6 HOURS PER DAY: ICD-10-PCS | Performed by: STUDENT IN AN ORGANIZED HEALTH CARE EDUCATION/TRAINING PROGRAM

## 2024-08-27 PROCEDURE — 87081 CULTURE SCREEN ONLY: CPT

## 2024-08-27 PROCEDURE — 85025 COMPLETE CBC W/AUTO DIFF WBC: CPT

## 2024-08-27 PROCEDURE — 6360000002 HC RX W HCPCS: Performed by: INTERNAL MEDICINE

## 2024-08-27 PROCEDURE — 82550 ASSAY OF CK (CPK): CPT

## 2024-08-27 PROCEDURE — 80307 DRUG TEST PRSMV CHEM ANLYZR: CPT

## 2024-08-27 PROCEDURE — 85610 PROTHROMBIN TIME: CPT

## 2024-08-27 PROCEDURE — 86788 WEST NILE VIRUS AB IGM: CPT

## 2024-08-27 PROCEDURE — 94660 CPAP INITIATION&MGMT: CPT

## 2024-08-27 RX ORDER — METRONIDAZOLE 500 MG/100ML
500 INJECTION, SOLUTION INTRAVENOUS EVERY 8 HOURS
Status: DISCONTINUED | OUTPATIENT
Start: 2024-08-27 | End: 2024-08-31

## 2024-08-27 RX ORDER — SODIUM CHLORIDE 0.9 % (FLUSH) 0.9 %
5-40 SYRINGE (ML) INJECTION PRN
Status: DISCONTINUED | OUTPATIENT
Start: 2024-08-27 | End: 2024-08-30

## 2024-08-27 RX ORDER — SODIUM CHLORIDE, SODIUM LACTATE, POTASSIUM CHLORIDE, CALCIUM CHLORIDE 600; 310; 30; 20 MG/100ML; MG/100ML; MG/100ML; MG/100ML
INJECTION, SOLUTION INTRAVENOUS CONTINUOUS
Status: DISCONTINUED | OUTPATIENT
Start: 2024-08-27 | End: 2024-08-27

## 2024-08-27 RX ORDER — SODIUM CHLORIDE, SODIUM LACTATE, POTASSIUM CHLORIDE, CALCIUM CHLORIDE 600; 310; 30; 20 MG/100ML; MG/100ML; MG/100ML; MG/100ML
INJECTION, SOLUTION INTRAVENOUS
Status: COMPLETED
Start: 2024-08-27 | End: 2024-08-27

## 2024-08-27 RX ORDER — SODIUM CHLORIDE 9 MG/ML
INJECTION, SOLUTION INTRAVENOUS PRN
Status: DISCONTINUED | OUTPATIENT
Start: 2024-08-27 | End: 2024-09-14 | Stop reason: HOSPADM

## 2024-08-27 RX ORDER — ACETAMINOPHEN 325 MG/1
650 TABLET ORAL EVERY 6 HOURS PRN
Status: DISCONTINUED | OUTPATIENT
Start: 2024-08-27 | End: 2024-09-14 | Stop reason: HOSPADM

## 2024-08-27 RX ORDER — ACETAMINOPHEN 650 MG/1
650 SUPPOSITORY RECTAL EVERY 6 HOURS PRN
Status: DISCONTINUED | OUTPATIENT
Start: 2024-08-27 | End: 2024-09-14 | Stop reason: HOSPADM

## 2024-08-27 RX ORDER — ONDANSETRON 2 MG/ML
4 INJECTION INTRAMUSCULAR; INTRAVENOUS EVERY 6 HOURS PRN
Status: DISCONTINUED | OUTPATIENT
Start: 2024-08-27 | End: 2024-09-14 | Stop reason: HOSPADM

## 2024-08-27 RX ORDER — HEPARIN SODIUM 5000 [USP'U]/ML
5000 INJECTION, SOLUTION INTRAVENOUS; SUBCUTANEOUS 2 TIMES DAILY
Status: DISCONTINUED | OUTPATIENT
Start: 2024-08-27 | End: 2024-09-06

## 2024-08-27 RX ORDER — POLYETHYLENE GLYCOL 3350 17 G/17G
17 POWDER, FOR SOLUTION ORAL DAILY PRN
Status: DISCONTINUED | OUTPATIENT
Start: 2024-08-27 | End: 2024-09-14 | Stop reason: HOSPADM

## 2024-08-27 RX ORDER — SODIUM CHLORIDE 0.9 % (FLUSH) 0.9 %
5-40 SYRINGE (ML) INJECTION EVERY 12 HOURS SCHEDULED
Status: DISCONTINUED | OUTPATIENT
Start: 2024-08-27 | End: 2024-08-30

## 2024-08-27 RX ORDER — ONDANSETRON 4 MG/1
4 TABLET, ORALLY DISINTEGRATING ORAL EVERY 8 HOURS PRN
Status: DISCONTINUED | OUTPATIENT
Start: 2024-08-27 | End: 2024-09-14 | Stop reason: HOSPADM

## 2024-08-27 RX ORDER — 0.9 % SODIUM CHLORIDE 0.9 %
1000 INTRAVENOUS SOLUTION INTRAVENOUS ONCE
Status: COMPLETED | OUTPATIENT
Start: 2024-08-27 | End: 2024-08-27

## 2024-08-27 RX ADMIN — HEPARIN SODIUM 5000 UNITS: 5000 INJECTION INTRAVENOUS; SUBCUTANEOUS at 20:13

## 2024-08-27 RX ADMIN — SODIUM CHLORIDE, SODIUM LACTATE, POTASSIUM CHLORIDE, CALCIUM CHLORIDE: 600; 310; 30; 20 INJECTION, SOLUTION INTRAVENOUS at 17:25

## 2024-08-27 RX ADMIN — WATER 1000 MG: 1 INJECTION INTRAMUSCULAR; INTRAVENOUS; SUBCUTANEOUS at 18:54

## 2024-08-27 RX ADMIN — HEPARIN SODIUM 5000 UNITS: 5000 INJECTION INTRAVENOUS; SUBCUTANEOUS at 15:08

## 2024-08-27 RX ADMIN — SODIUM BICARBONATE: 84 INJECTION, SOLUTION INTRAVENOUS at 20:12

## 2024-08-27 RX ADMIN — SODIUM CHLORIDE, PRESERVATIVE FREE 10 ML: 5 INJECTION INTRAVENOUS at 20:13

## 2024-08-27 RX ADMIN — WATER 2000 MG: 1 INJECTION INTRAMUSCULAR; INTRAVENOUS; SUBCUTANEOUS at 13:54

## 2024-08-27 RX ADMIN — METRONIDAZOLE 500 MG: 500 INJECTION, SOLUTION INTRAVENOUS at 19:02

## 2024-08-27 RX ADMIN — SODIUM CHLORIDE, POTASSIUM CHLORIDE, SODIUM LACTATE AND CALCIUM CHLORIDE: 600; 310; 30; 20 INJECTION, SOLUTION INTRAVENOUS at 17:25

## 2024-08-27 RX ADMIN — SODIUM CHLORIDE 1000 ML: 9 INJECTION, SOLUTION INTRAVENOUS at 13:05

## 2024-08-27 RX ADMIN — AZITHROMYCIN MONOHYDRATE 500 MG: 500 INJECTION, POWDER, LYOPHILIZED, FOR SOLUTION INTRAVENOUS at 18:57

## 2024-08-27 RX ADMIN — VANCOMYCIN HYDROCHLORIDE 1250 MG: 10 INJECTION, POWDER, LYOPHILIZED, FOR SOLUTION INTRAVENOUS at 14:28

## 2024-08-27 RX ADMIN — ACYCLOVIR SODIUM 250 MG: 50 INJECTION, SOLUTION INTRAVENOUS at 14:27

## 2024-08-27 ASSESSMENT — PAIN SCALES - GENERAL
PAINLEVEL_OUTOF10: 0

## 2024-08-28 ENCOUNTER — APPOINTMENT (OUTPATIENT)
Age: 54
End: 2024-08-28
Attending: INTERNAL MEDICINE
Payer: MEDICARE

## 2024-08-28 ENCOUNTER — APPOINTMENT (OUTPATIENT)
Dept: GENERAL RADIOLOGY | Age: 54
End: 2024-08-28
Payer: MEDICARE

## 2024-08-28 PROBLEM — G93.40 ENCEPHALOPATHY ACUTE: Status: ACTIVE | Noted: 2024-08-28

## 2024-08-28 LAB
ALBUMIN SERPL-MCNC: 3.4 G/DL (ref 3.5–5.2)
ALP SERPL-CCNC: 104 U/L (ref 35–104)
ALT SERPL-CCNC: 1606 U/L (ref 0–32)
ANION GAP SERPL CALCULATED.3IONS-SCNC: 18 MMOL/L (ref 7–16)
AST SERPL-CCNC: 672 U/L (ref 0–31)
B.E.: 2 MMOL/L (ref -3–3)
BASOPHILS # BLD: 0.03 K/UL (ref 0–0.2)
BASOPHILS NFR BLD: 0 % (ref 0–2)
BILIRUB SERPL-MCNC: 0.6 MG/DL (ref 0–1.2)
BUN SERPL-MCNC: 103 MG/DL (ref 6–20)
CALCIUM SERPL-MCNC: 7.8 MG/DL (ref 8.6–10.2)
CHLORIDE SERPL-SCNC: 112 MMOL/L (ref 98–107)
CK SERPL-CCNC: ABNORMAL U/L (ref 20–180)
CO2 SERPL-SCNC: 22 MMOL/L (ref 22–29)
COHB: 0.7 % (ref 0–1.5)
CREAT SERPL-MCNC: 4.3 MG/DL (ref 0.5–1)
CREAT UR-MCNC: 153.5 MG/DL (ref 29–226)
CRITICAL: ABNORMAL
DATE ANALYZED: ABNORMAL
DATE OF COLLECTION: ABNORMAL
ECHO AV AREA PEAK VELOCITY: 2.8 CM2
ECHO AV AREA VTI: 2.9 CM2
ECHO AV AREA/BSA PEAK VELOCITY: 1.5 CM2/M2
ECHO AV AREA/BSA VTI: 1.6 CM2/M2
ECHO AV CUSP MM: 2 CM
ECHO AV MEAN GRADIENT: 4 MMHG
ECHO AV MEAN VELOCITY: 0.9 M/S
ECHO AV PEAK GRADIENT: 6 MMHG
ECHO AV PEAK VELOCITY: 1.3 M/S
ECHO AV VELOCITY RATIO: 0.77
ECHO AV VTI: 17.7 CM
ECHO BSA: 1.87 M2
ECHO EST RA PRESSURE: 3 MMHG
ECHO LA DIAMETER INDEX: 1.47 CM/M2
ECHO LA DIAMETER: 2.7 CM
ECHO LA VOL A-L A2C: 50 ML (ref 22–52)
ECHO LA VOL A-L A4C: 27 ML (ref 22–52)
ECHO LA VOL BP: 37 ML (ref 22–52)
ECHO LA VOL MOD A2C: 49 ML (ref 22–52)
ECHO LA VOL MOD A4C: 24 ML (ref 22–52)
ECHO LA VOL/BSA BIPLANE: 20 ML/M2 (ref 16–34)
ECHO LA VOLUME AREA LENGTH: 40 ML
ECHO LA VOLUME INDEX A-L A2C: 27 ML/M2 (ref 16–34)
ECHO LA VOLUME INDEX A-L A4C: 15 ML/M2 (ref 16–34)
ECHO LA VOLUME INDEX AREA LENGTH: 22 ML/M2 (ref 16–34)
ECHO LA VOLUME INDEX MOD A2C: 27 ML/M2 (ref 16–34)
ECHO LA VOLUME INDEX MOD A4C: 13 ML/M2 (ref 16–34)
ECHO LV EDV A2C: 110 ML
ECHO LV EDV A4C: 103 ML
ECHO LV EDV BP: 108 ML (ref 56–104)
ECHO LV EDV INDEX A4C: 56 ML/M2
ECHO LV EDV INDEX BP: 59 ML/M2
ECHO LV EDV NDEX A2C: 60 ML/M2
ECHO LV EF PHYSICIAN: 25 %
ECHO LV EJECTION FRACTION A2C: 23 %
ECHO LV EJECTION FRACTION A4C: 26 %
ECHO LV EJECTION FRACTION BIPLANE: 26 % (ref 55–100)
ECHO LV ESV A2C: 84 ML
ECHO LV ESV A4C: 76 ML
ECHO LV ESV BP: 80 ML (ref 19–49)
ECHO LV ESV INDEX A2C: 46 ML/M2
ECHO LV ESV INDEX A4C: 41 ML/M2
ECHO LV ESV INDEX BP: 43 ML/M2
ECHO LV FRACTIONAL SHORTENING: 15 % (ref 28–44)
ECHO LV INTERNAL DIMENSION DIASTOLE INDEX: 2.5 CM/M2
ECHO LV INTERNAL DIMENSION DIASTOLIC: 4.6 CM (ref 3.9–5.3)
ECHO LV INTERNAL DIMENSION SYSTOLIC INDEX: 2.12 CM/M2
ECHO LV INTERNAL DIMENSION SYSTOLIC: 3.9 CM
ECHO LV ISOVOLUMETRIC RELAXATION TIME (IVRT): 71.4 MS
ECHO LV IVSD: 1.2 CM (ref 0.6–0.9)
ECHO LV MASS 2D: 205 G (ref 67–162)
ECHO LV MASS INDEX 2D: 111.4 G/M2 (ref 43–95)
ECHO LV POSTERIOR WALL DIASTOLIC: 1.2 CM (ref 0.6–0.9)
ECHO LV RELATIVE WALL THICKNESS RATIO: 0.52
ECHO LVOT AREA: 3.5 CM2
ECHO LVOT AV VTI INDEX: 0.82
ECHO LVOT DIAM: 2.1 CM
ECHO LVOT MEAN GRADIENT: 3 MMHG
ECHO LVOT PEAK GRADIENT: 4 MMHG
ECHO LVOT PEAK VELOCITY: 1 M/S
ECHO LVOT STROKE VOLUME INDEX: 27.5 ML/M2
ECHO LVOT SV: 50.5 ML
ECHO LVOT VTI: 14.6 CM
ECHO MV A VELOCITY: 0.82 M/S
ECHO MV AREA VTI: 5.4 CM2
ECHO MV E DECELERATION TIME (DT): 110.2 MS
ECHO MV E VELOCITY: 0.47 M/S
ECHO MV E/A RATIO: 0.57
ECHO MV LVOT VTI INDEX: 0.64
ECHO MV MAX VELOCITY: 0.9 M/S
ECHO MV MEAN GRADIENT: 1 MMHG
ECHO MV MEAN VELOCITY: 0.5 M/S
ECHO MV PEAK GRADIENT: 3 MMHG
ECHO MV VTI: 9.3 CM
ECHO PV MAX VELOCITY: 0.8 M/S
ECHO PV MEAN GRADIENT: 1 MMHG
ECHO PV MEAN VELOCITY: 0.5 M/S
ECHO PV PEAK GRADIENT: 3 MMHG
ECHO PV VTI: 9.5 CM
ECHO RIGHT VENTRICULAR SYSTOLIC PRESSURE (RVSP): 30 MMHG
ECHO RV INTERNAL DIMENSION: 2.6 CM
ECHO RV LONGITUDINAL DIMENSION: 7 CM
ECHO RV MID DIMENSION: 2.1 CM
ECHO TV REGURGITANT MAX VELOCITY: 2.59 M/S
ECHO TV REGURGITANT PEAK GRADIENT: 27 MMHG
EOSINOPHIL # BLD: 0 K/UL (ref 0.05–0.5)
EOSINOPHILS RELATIVE PERCENT: 0 % (ref 0–6)
ERYTHROCYTE [DISTWIDTH] IN BLOOD BY AUTOMATED COUNT: 15.4 % (ref 11.5–15)
GFR, ESTIMATED: 12 ML/MIN/1.73M2
GLUCOSE SERPL-MCNC: 200 MG/DL (ref 74–99)
HCO3: 24.3 MMOL/L (ref 22–26)
HCT VFR BLD AUTO: 39 % (ref 34–48)
HGB BLD-MCNC: 12.9 G/DL (ref 11.5–15.5)
HHB: 6 % (ref 0–5)
IMM GRANULOCYTES # BLD AUTO: 0.13 K/UL (ref 0–0.58)
IMM GRANULOCYTES NFR BLD: 1 % (ref 0–5)
LAB: ABNORMAL
LYMPHOCYTES NFR BLD: 1.17 K/UL (ref 1.5–4)
LYMPHOCYTES RELATIVE PERCENT: 6 % (ref 20–42)
Lab: 630
MAGNESIUM SERPL-MCNC: 3.2 MG/DL (ref 1.6–2.6)
MCH RBC QN AUTO: 28.4 PG (ref 26–35)
MCHC RBC AUTO-ENTMCNC: 33.1 G/DL (ref 32–34.5)
MCV RBC AUTO: 85.7 FL (ref 80–99.9)
METHB: 0.1 % (ref 0–1.5)
MICROALBUMIN UR-MCNC: 203 MG/L (ref 0–19)
MICROALBUMIN/CREAT UR-RTO: 132 MCG/MG CREAT (ref 0–30)
MODE: ABNORMAL
MONOCYTES NFR BLD: 0.66 K/UL (ref 0.1–0.95)
MONOCYTES NFR BLD: 4 % (ref 2–12)
NEUTROPHILS NFR BLD: 89 % (ref 43–80)
NEUTS SEG NFR BLD: 16.32 K/UL (ref 1.8–7.3)
O2 CONTENT: 17.7 ML/DL
O2 SATURATION: 94 % (ref 92–98.5)
O2HB: 93.2 % (ref 94–97)
OPERATOR ID: ABNORMAL
OSMOLALITY SERPL: 371 MOSM/KG (ref 285–310)
PATIENT TEMP: 37 C
PCO2: 31 MMHG (ref 35–45)
PH BLOOD GAS: 7.51 (ref 7.35–7.45)
PHOSPHATE SERPL-MCNC: 3 MG/DL (ref 2.5–4.5)
PLATELET # BLD AUTO: 412 K/UL (ref 130–450)
PMV BLD AUTO: 10 FL (ref 7–12)
PO2: 67.9 MMHG (ref 75–100)
POTASSIUM SERPL-SCNC: 4.4 MMOL/L (ref 3.5–5)
PROT SERPL-MCNC: 6.7 G/DL (ref 6.4–8.3)
RBC # BLD AUTO: 4.55 M/UL (ref 3.5–5.5)
RESULT STATUS: NORMAL
SODIUM SERPL-SCNC: 152 MMOL/L (ref 132–146)
SOURCE, BLOOD GAS: ABNORMAL
THB: 13.5 G/DL (ref 11.5–16.5)
TIME ANALYZED: 646
TROPONIN I SERPL HS-MCNC: 1648 NG/L (ref 0–9)
WBC OTHER # BLD: 18.3 K/UL (ref 4.5–11.5)

## 2024-08-28 PROCEDURE — 2580000003 HC RX 258: Performed by: INTERNAL MEDICINE

## 2024-08-28 PROCEDURE — 84484 ASSAY OF TROPONIN QUANT: CPT

## 2024-08-28 PROCEDURE — 6360000002 HC RX W HCPCS: Performed by: INTERNAL MEDICINE

## 2024-08-28 PROCEDURE — 80053 COMPREHEN METABOLIC PANEL: CPT

## 2024-08-28 PROCEDURE — 82805 BLOOD GASES W/O2 SATURATION: CPT

## 2024-08-28 PROCEDURE — 99291 CRITICAL CARE FIRST HOUR: CPT | Performed by: INTERNAL MEDICINE

## 2024-08-28 PROCEDURE — 6370000000 HC RX 637 (ALT 250 FOR IP): Performed by: INTERNAL MEDICINE

## 2024-08-28 PROCEDURE — 83735 ASSAY OF MAGNESIUM: CPT

## 2024-08-28 PROCEDURE — 82550 ASSAY OF CK (CPK): CPT

## 2024-08-28 PROCEDURE — 85025 COMPLETE CBC W/AUTO DIFF WBC: CPT

## 2024-08-28 PROCEDURE — 93306 TTE W/DOPPLER COMPLETE: CPT | Performed by: INTERNAL MEDICINE

## 2024-08-28 PROCEDURE — 2000000000 HC ICU R&B

## 2024-08-28 PROCEDURE — 94660 CPAP INITIATION&MGMT: CPT

## 2024-08-28 PROCEDURE — 71045 X-RAY EXAM CHEST 1 VIEW: CPT

## 2024-08-28 PROCEDURE — 36592 COLLECT BLOOD FROM PICC: CPT

## 2024-08-28 PROCEDURE — 99232 SBSQ HOSP IP/OBS MODERATE 35: CPT | Performed by: INTERNAL MEDICINE

## 2024-08-28 PROCEDURE — 93306 TTE W/DOPPLER COMPLETE: CPT

## 2024-08-28 PROCEDURE — 84100 ASSAY OF PHOSPHORUS: CPT

## 2024-08-28 PROCEDURE — 83930 ASSAY OF BLOOD OSMOLALITY: CPT

## 2024-08-28 RX ORDER — LEVETIRACETAM 500 MG/5ML
750 INJECTION, SOLUTION, CONCENTRATE INTRAVENOUS EVERY 12 HOURS
Status: DISCONTINUED | OUTPATIENT
Start: 2024-08-28 | End: 2024-08-29

## 2024-08-28 RX ORDER — LEVETIRACETAM 500 MG/1
750 TABLET ORAL 2 TIMES DAILY
Status: DISCONTINUED | OUTPATIENT
Start: 2024-08-28 | End: 2024-09-14 | Stop reason: HOSPADM

## 2024-08-28 RX ORDER — IPRATROPIUM BROMIDE AND ALBUTEROL SULFATE 2.5; .5 MG/3ML; MG/3ML
1 SOLUTION RESPIRATORY (INHALATION) EVERY 6 HOURS PRN
Status: DISCONTINUED | OUTPATIENT
Start: 2024-08-28 | End: 2024-09-14 | Stop reason: HOSPADM

## 2024-08-28 RX ORDER — CARVEDILOL 6.25 MG/1
6.25 TABLET ORAL 2 TIMES DAILY WITH MEALS
Status: DISCONTINUED | OUTPATIENT
Start: 2024-08-28 | End: 2024-09-01

## 2024-08-28 RX ORDER — PANTOPRAZOLE SODIUM 40 MG/1
40 TABLET, DELAYED RELEASE ORAL
Status: DISCONTINUED | OUTPATIENT
Start: 2024-08-29 | End: 2024-08-28

## 2024-08-28 RX ORDER — SODIUM CHLORIDE, SODIUM LACTATE, POTASSIUM CHLORIDE, CALCIUM CHLORIDE 600; 310; 30; 20 MG/100ML; MG/100ML; MG/100ML; MG/100ML
INJECTION, SOLUTION INTRAVENOUS CONTINUOUS
Status: DISCONTINUED | OUTPATIENT
Start: 2024-08-28 | End: 2024-08-29

## 2024-08-28 RX ADMIN — ACETAMINOPHEN 650 MG: 650 SUPPOSITORY RECTAL at 02:20

## 2024-08-28 RX ADMIN — HEPARIN SODIUM 5000 UNITS: 5000 INJECTION INTRAVENOUS; SUBCUTANEOUS at 09:13

## 2024-08-28 RX ADMIN — SODIUM CHLORIDE, POTASSIUM CHLORIDE, SODIUM LACTATE AND CALCIUM CHLORIDE: 600; 310; 30; 20 INJECTION, SOLUTION INTRAVENOUS at 17:07

## 2024-08-28 RX ADMIN — SODIUM CHLORIDE, PRESERVATIVE FREE 10 ML: 5 INJECTION INTRAVENOUS at 21:48

## 2024-08-28 RX ADMIN — ACETYLCYSTEINE 16640 MG: 200 INJECTION, SOLUTION INTRAVENOUS at 15:36

## 2024-08-28 RX ADMIN — AZITHROMYCIN MONOHYDRATE 500 MG: 500 INJECTION, POWDER, LYOPHILIZED, FOR SOLUTION INTRAVENOUS at 12:19

## 2024-08-28 RX ADMIN — WATER 1000 MG: 1 INJECTION INTRAMUSCULAR; INTRAVENOUS; SUBCUTANEOUS at 09:13

## 2024-08-28 RX ADMIN — LEVETIRACETAM 750 MG: 100 INJECTION INTRAVENOUS at 14:57

## 2024-08-28 RX ADMIN — SODIUM CHLORIDE, POTASSIUM CHLORIDE, SODIUM LACTATE AND CALCIUM CHLORIDE: 600; 310; 30; 20 INJECTION, SOLUTION INTRAVENOUS at 06:59

## 2024-08-28 RX ADMIN — ACYCLOVIR SODIUM 250 MG: 50 INJECTION, SOLUTION INTRAVENOUS at 10:33

## 2024-08-28 RX ADMIN — LEVETIRACETAM 750 MG: 500 TABLET, FILM COATED ORAL at 22:22

## 2024-08-28 RX ADMIN — SODIUM CHLORIDE, PRESERVATIVE FREE 40 MG: 5 INJECTION INTRAVENOUS at 14:57

## 2024-08-28 RX ADMIN — METRONIDAZOLE 500 MG: 500 INJECTION, SOLUTION INTRAVENOUS at 02:18

## 2024-08-28 RX ADMIN — METRONIDAZOLE 500 MG: 500 INJECTION, SOLUTION INTRAVENOUS at 10:30

## 2024-08-28 RX ADMIN — HEPARIN SODIUM 5000 UNITS: 5000 INJECTION INTRAVENOUS; SUBCUTANEOUS at 21:49

## 2024-08-28 RX ADMIN — ACETYLCYSTEINE 8320 MG: 200 INJECTION, SOLUTION INTRAVENOUS at 19:48

## 2024-08-28 RX ADMIN — METRONIDAZOLE 500 MG: 500 INJECTION, SOLUTION INTRAVENOUS at 18:10

## 2024-08-28 ASSESSMENT — PAIN SCALES - GENERAL
PAINLEVEL_OUTOF10: 0

## 2024-08-29 ENCOUNTER — APPOINTMENT (OUTPATIENT)
Dept: GENERAL RADIOLOGY | Age: 54
End: 2024-08-29
Payer: MEDICARE

## 2024-08-29 PROBLEM — I42.9 CARDIOMYOPATHY (HCC): Status: ACTIVE | Noted: 2024-08-29

## 2024-08-29 PROBLEM — T79.6XXA TRAUMATIC RHABDOMYOLYSIS (HCC): Status: ACTIVE | Noted: 2024-08-29

## 2024-08-29 PROBLEM — R74.01 ELEVATED TRANSAMINASE LEVEL: Status: ACTIVE | Noted: 2024-08-29

## 2024-08-29 PROBLEM — R79.89 ELEVATED TROPONIN: Status: ACTIVE | Noted: 2024-08-29

## 2024-08-29 PROBLEM — E66.8 MODERATE OBESITY: Status: ACTIVE | Noted: 2022-03-24

## 2024-08-29 PROBLEM — E66.9 MODERATE OBESITY: Status: ACTIVE | Noted: 2022-03-24

## 2024-08-29 PROBLEM — E78.00 PURE HYPERCHOLESTEROLEMIA: Status: ACTIVE | Noted: 2024-08-29

## 2024-08-29 PROBLEM — N17.9 ACUTE KIDNEY INJURY (HCC): Status: ACTIVE | Noted: 2024-08-29

## 2024-08-29 LAB
ALBUMIN SERPL-MCNC: 3.2 G/DL (ref 3.5–5.2)
ALP SERPL-CCNC: 95 U/L (ref 35–104)
ALT SERPL-CCNC: 1026 U/L (ref 0–32)
ANION GAP SERPL CALCULATED.3IONS-SCNC: 15 MMOL/L (ref 7–16)
AST SERPL-CCNC: 384 U/L (ref 0–31)
B.E.: 1.7 MMOL/L (ref -3–3)
BASOPHILS # BLD: 0.02 K/UL (ref 0–0.2)
BASOPHILS NFR BLD: 0 % (ref 0–2)
BILIRUB SERPL-MCNC: 0.5 MG/DL (ref 0–1.2)
BUN SERPL-MCNC: 58 MG/DL (ref 6–20)
CALCIUM SERPL-MCNC: 8.7 MG/DL (ref 8.6–10.2)
CHLORIDE SERPL-SCNC: 121 MMOL/L (ref 98–107)
CK SERPL-CCNC: ABNORMAL U/L (ref 20–180)
CO2 SERPL-SCNC: 23 MMOL/L (ref 22–29)
COHB: 0.6 % (ref 0–1.5)
CREAT SERPL-MCNC: 1.8 MG/DL (ref 0.5–1)
CRITICAL: ABNORMAL
DATE ANALYZED: ABNORMAL
DATE OF COLLECTION: ABNORMAL
EOSINOPHIL # BLD: 0 K/UL (ref 0.05–0.5)
EOSINOPHILS RELATIVE PERCENT: 0 % (ref 0–6)
ERYTHROCYTE [DISTWIDTH] IN BLOOD BY AUTOMATED COUNT: 15.5 % (ref 11.5–15)
GFR, ESTIMATED: 33 ML/MIN/1.73M2
GLUCOSE SERPL-MCNC: 174 MG/DL (ref 74–99)
HAV IGM SERPL QL IA: NONREACTIVE
HBV CORE IGM SERPL QL IA: NONREACTIVE
HBV SURFACE AB SERPL IA-ACNC: 4.34 MIU/ML (ref 0–9.99)
HBV SURFACE AG SERPL QL IA: NONREACTIVE
HCO3: 23.8 MMOL/L (ref 22–26)
HCT VFR BLD AUTO: 36.9 % (ref 34–48)
HCV AB SERPL QL IA: NONREACTIVE
HGB BLD-MCNC: 11.9 G/DL (ref 11.5–15.5)
HHB: 5.3 % (ref 0–5)
IMM GRANULOCYTES # BLD AUTO: 0.09 K/UL (ref 0–0.58)
IMM GRANULOCYTES NFR BLD: 1 % (ref 0–5)
LAB: ABNORMAL
LEVETIRACETAM SERPL-MCNC: 6 UG/ML
LYMPHOCYTES NFR BLD: 1.55 K/UL (ref 1.5–4)
LYMPHOCYTES RELATIVE PERCENT: 11 % (ref 20–42)
Lab: 430
MAGNESIUM SERPL-MCNC: 2.8 MG/DL (ref 1.6–2.6)
MCH RBC QN AUTO: 27.9 PG (ref 26–35)
MCHC RBC AUTO-ENTMCNC: 32.2 G/DL (ref 32–34.5)
MCV RBC AUTO: 86.4 FL (ref 80–99.9)
METHB: 0.1 % (ref 0–1.5)
MICROORGANISM SPEC CULT: NORMAL
MODE: ABNORMAL
MONOCYTES NFR BLD: 0.61 K/UL (ref 0.1–0.95)
MONOCYTES NFR BLD: 4 % (ref 2–12)
NEUTROPHILS NFR BLD: 84 % (ref 43–80)
NEUTS SEG NFR BLD: 11.65 K/UL (ref 1.8–7.3)
O2 CONTENT: 16.8 ML/DL
O2 SATURATION: 94.7 % (ref 92–98.5)
O2HB: 94 % (ref 94–97)
OPERATOR ID: 5523
PATIENT TEMP: 37 C
PCO2: 29.9 MMHG (ref 35–45)
PH BLOOD GAS: 7.52 (ref 7.35–7.45)
PHOSPHATE SERPL-MCNC: 2.4 MG/DL (ref 2.5–4.5)
PLATELET # BLD AUTO: 372 K/UL (ref 130–450)
PMV BLD AUTO: 10.1 FL (ref 7–12)
PO2: 72 MMHG (ref 75–100)
POTASSIUM SERPL-SCNC: 3.8 MMOL/L (ref 3.5–5)
PROT SERPL-MCNC: 6.1 G/DL (ref 6.4–8.3)
RBC # BLD AUTO: 4.27 M/UL (ref 3.5–5.5)
SODIUM SERPL-SCNC: 159 MMOL/L (ref 132–146)
SOURCE, BLOOD GAS: ABNORMAL
SPECIMEN DESCRIPTION: NORMAL
THB: 12.7 G/DL (ref 11.5–16.5)
TIME ANALYZED: 434
WBC OTHER # BLD: 13.9 K/UL (ref 4.5–11.5)

## 2024-08-29 PROCEDURE — 85025 COMPLETE CBC W/AUTO DIFF WBC: CPT

## 2024-08-29 PROCEDURE — 6360000002 HC RX W HCPCS: Performed by: INTERNAL MEDICINE

## 2024-08-29 PROCEDURE — 36592 COLLECT BLOOD FROM PICC: CPT

## 2024-08-29 PROCEDURE — 76937 US GUIDE VASCULAR ACCESS: CPT

## 2024-08-29 PROCEDURE — 2580000003 HC RX 258: Performed by: INTERNAL MEDICINE

## 2024-08-29 PROCEDURE — 99291 CRITICAL CARE FIRST HOUR: CPT | Performed by: INTERNAL MEDICINE

## 2024-08-29 PROCEDURE — 82550 ASSAY OF CK (CPK): CPT

## 2024-08-29 PROCEDURE — 2500000003 HC RX 250 WO HCPCS: Performed by: INTERNAL MEDICINE

## 2024-08-29 PROCEDURE — 83735 ASSAY OF MAGNESIUM: CPT

## 2024-08-29 PROCEDURE — 94660 CPAP INITIATION&MGMT: CPT

## 2024-08-29 PROCEDURE — 80053 COMPREHEN METABOLIC PANEL: CPT

## 2024-08-29 PROCEDURE — 2000000000 HC ICU R&B

## 2024-08-29 PROCEDURE — 99232 SBSQ HOSP IP/OBS MODERATE 35: CPT | Performed by: INTERNAL MEDICINE

## 2024-08-29 PROCEDURE — 5A09357 ASSISTANCE WITH RESPIRATORY VENTILATION, LESS THAN 24 CONSECUTIVE HOURS, CONTINUOUS POSITIVE AIRWAY PRESSURE: ICD-10-PCS | Performed by: INTERNAL MEDICINE

## 2024-08-29 PROCEDURE — 84100 ASSAY OF PHOSPHORUS: CPT

## 2024-08-29 PROCEDURE — 82805 BLOOD GASES W/O2 SATURATION: CPT

## 2024-08-29 PROCEDURE — 6370000000 HC RX 637 (ALT 250 FOR IP): Performed by: INTERNAL MEDICINE

## 2024-08-29 PROCEDURE — 99223 1ST HOSP IP/OBS HIGH 75: CPT | Performed by: INTERNAL MEDICINE

## 2024-08-29 PROCEDURE — 2580000003 HC RX 258: Performed by: STUDENT IN AN ORGANIZED HEALTH CARE EDUCATION/TRAINING PROGRAM

## 2024-08-29 PROCEDURE — 71045 X-RAY EXAM CHEST 1 VIEW: CPT

## 2024-08-29 PROCEDURE — 92610 EVALUATE SWALLOWING FUNCTION: CPT

## 2024-08-29 RX ORDER — SODIUM CHLORIDE 450 MG/100ML
INJECTION, SOLUTION INTRAVENOUS CONTINUOUS
Status: DISCONTINUED | OUTPATIENT
Start: 2024-08-29 | End: 2024-09-01

## 2024-08-29 RX ORDER — LEVETIRACETAM 500 MG/5ML
750 INJECTION, SOLUTION, CONCENTRATE INTRAVENOUS EVERY 12 HOURS
Status: DISCONTINUED | OUTPATIENT
Start: 2024-08-29 | End: 2024-09-01

## 2024-08-29 RX ORDER — METOPROLOL TARTRATE 1 MG/ML
2.5 INJECTION, SOLUTION INTRAVENOUS EVERY 6 HOURS
Status: DISCONTINUED | OUTPATIENT
Start: 2024-08-29 | End: 2024-09-01

## 2024-08-29 RX ADMIN — WATER 1000 MG: 1 INJECTION INTRAMUSCULAR; INTRAVENOUS; SUBCUTANEOUS at 08:57

## 2024-08-29 RX ADMIN — METRONIDAZOLE 500 MG: 500 INJECTION, SOLUTION INTRAVENOUS at 01:53

## 2024-08-29 RX ADMIN — METOPROLOL TARTRATE 2.5 MG: 5 INJECTION INTRAVENOUS at 18:45

## 2024-08-29 RX ADMIN — SODIUM CHLORIDE, PRESERVATIVE FREE 40 MG: 5 INJECTION INTRAVENOUS at 08:57

## 2024-08-29 RX ADMIN — HEPARIN SODIUM 5000 UNITS: 5000 INJECTION INTRAVENOUS; SUBCUTANEOUS at 08:56

## 2024-08-29 RX ADMIN — LEVETIRACETAM 750 MG: 100 INJECTION INTRAVENOUS at 08:57

## 2024-08-29 RX ADMIN — SODIUM CHLORIDE, PRESERVATIVE FREE 10 ML: 5 INJECTION INTRAVENOUS at 22:14

## 2024-08-29 RX ADMIN — SODIUM CHLORIDE, PRESERVATIVE FREE 10 ML: 5 INJECTION INTRAVENOUS at 09:10

## 2024-08-29 RX ADMIN — SODIUM CHLORIDE: 4.5 INJECTION, SOLUTION INTRAVENOUS at 08:06

## 2024-08-29 RX ADMIN — SODIUM CHLORIDE: 4.5 INJECTION, SOLUTION INTRAVENOUS at 20:21

## 2024-08-29 RX ADMIN — ACETAMINOPHEN 650 MG: 325 TABLET ORAL at 17:01

## 2024-08-29 RX ADMIN — METOPROLOL TARTRATE 2.5 MG: 5 INJECTION INTRAVENOUS at 13:32

## 2024-08-29 RX ADMIN — METRONIDAZOLE 500 MG: 500 INJECTION, SOLUTION INTRAVENOUS at 13:33

## 2024-08-29 RX ADMIN — METRONIDAZOLE 500 MG: 500 INJECTION, SOLUTION INTRAVENOUS at 22:11

## 2024-08-29 RX ADMIN — HEPARIN SODIUM 5000 UNITS: 5000 INJECTION INTRAVENOUS; SUBCUTANEOUS at 22:13

## 2024-08-29 RX ADMIN — LEVETIRACETAM 750 MG: 100 INJECTION INTRAVENOUS at 22:13

## 2024-08-29 RX ADMIN — AZITHROMYCIN MONOHYDRATE 500 MG: 500 INJECTION, POWDER, LYOPHILIZED, FOR SOLUTION INTRAVENOUS at 13:34

## 2024-08-29 RX ADMIN — SODIUM CHLORIDE, POTASSIUM CHLORIDE, SODIUM LACTATE AND CALCIUM CHLORIDE: 600; 310; 30; 20 INJECTION, SOLUTION INTRAVENOUS at 03:11

## 2024-08-29 ASSESSMENT — PAIN SCALES - GENERAL
PAINLEVEL_OUTOF10: 0

## 2024-08-29 ASSESSMENT — PAIN DESCRIPTION - LOCATION: LOCATION: GENERALIZED

## 2024-08-29 ASSESSMENT — PAIN DESCRIPTION - PAIN TYPE: TYPE: ACUTE PAIN

## 2024-08-29 ASSESSMENT — PAIN DESCRIPTION - ONSET: ONSET: GRADUAL

## 2024-08-29 ASSESSMENT — PAIN - FUNCTIONAL ASSESSMENT: PAIN_FUNCTIONAL_ASSESSMENT: ACTIVITIES ARE NOT PREVENTED

## 2024-08-29 ASSESSMENT — PAIN DESCRIPTION - DESCRIPTORS: DESCRIPTORS: DISCOMFORT

## 2024-08-30 ENCOUNTER — APPOINTMENT (OUTPATIENT)
Dept: GENERAL RADIOLOGY | Age: 54
End: 2024-08-30
Payer: MEDICARE

## 2024-08-30 PROBLEM — E44.1 MILD PROTEIN-CALORIE MALNUTRITION (HCC): Chronic | Status: ACTIVE | Noted: 2024-08-30

## 2024-08-30 PROBLEM — R41.0 DELIRIUM: Status: ACTIVE | Noted: 2024-08-30

## 2024-08-30 LAB
ALBUMIN SERPL-MCNC: 3.1 G/DL (ref 3.5–5.2)
ALP SERPL-CCNC: 89 U/L (ref 35–104)
ALT SERPL-CCNC: 635 U/L (ref 0–32)
ANION GAP SERPL CALCULATED.3IONS-SCNC: 9 MMOL/L (ref 7–16)
AST SERPL-CCNC: 264 U/L (ref 0–31)
BASOPHILS # BLD: 0.05 K/UL (ref 0–0.2)
BASOPHILS NFR BLD: 0 % (ref 0–2)
BILIRUB SERPL-MCNC: 0.4 MG/DL (ref 0–1.2)
BUN SERPL-MCNC: 35 MG/DL (ref 6–20)
CALCIUM SERPL-MCNC: 8.3 MG/DL (ref 8.6–10.2)
CHLORIDE SERPL-SCNC: 118 MMOL/L (ref 98–107)
CK SERPL-CCNC: ABNORMAL U/L (ref 20–180)
CO2 SERPL-SCNC: 27 MMOL/L (ref 22–29)
CREAT SERPL-MCNC: 1.4 MG/DL (ref 0.5–1)
EOSINOPHIL # BLD: 0.03 K/UL (ref 0.05–0.5)
EOSINOPHILS RELATIVE PERCENT: 0 % (ref 0–6)
ERYTHROCYTE [DISTWIDTH] IN BLOOD BY AUTOMATED COUNT: 15.8 % (ref 11.5–15)
GFR, ESTIMATED: 46 ML/MIN/1.73M2
GLUCOSE SERPL-MCNC: 113 MG/DL (ref 74–99)
HCT VFR BLD AUTO: 35.1 % (ref 34–48)
HGB BLD-MCNC: 10.8 G/DL (ref 11.5–15.5)
IMM GRANULOCYTES # BLD AUTO: 0.14 K/UL (ref 0–0.58)
IMM GRANULOCYTES NFR BLD: 1 % (ref 0–5)
LYMPHOCYTES NFR BLD: 2.11 K/UL (ref 1.5–4)
LYMPHOCYTES RELATIVE PERCENT: 18 % (ref 20–42)
MAGNESIUM SERPL-MCNC: 2.2 MG/DL (ref 1.6–2.6)
MCH RBC QN AUTO: 27.6 PG (ref 26–35)
MCHC RBC AUTO-ENTMCNC: 30.8 G/DL (ref 32–34.5)
MCV RBC AUTO: 89.5 FL (ref 80–99.9)
MICROORGANISM SPEC CULT: ABNORMAL
MONOCYTES NFR BLD: 0.62 K/UL (ref 0.1–0.95)
MONOCYTES NFR BLD: 5 % (ref 2–12)
NEUTROPHILS NFR BLD: 75 % (ref 43–80)
NEUTS SEG NFR BLD: 8.67 K/UL (ref 1.8–7.3)
PHOSPHATE SERPL-MCNC: 3.2 MG/DL (ref 2.5–4.5)
PLATELET # BLD AUTO: 309 K/UL (ref 130–450)
PMV BLD AUTO: 10.3 FL (ref 7–12)
POTASSIUM SERPL-SCNC: 3.8 MMOL/L (ref 3.5–5)
PROT SERPL-MCNC: 5.4 G/DL (ref 6.4–8.3)
RBC # BLD AUTO: 3.92 M/UL (ref 3.5–5.5)
SERVICE CMNT-IMP: ABNORMAL
SODIUM SERPL-SCNC: 154 MMOL/L (ref 132–146)
SPECIMEN DESCRIPTION: ABNORMAL
WBC OTHER # BLD: 11.6 K/UL (ref 4.5–11.5)
WEST NILE IGG: 0.09 IV
WEST NILE IGM: 0 IV

## 2024-08-30 PROCEDURE — 94660 CPAP INITIATION&MGMT: CPT

## 2024-08-30 PROCEDURE — 05HC33Z INSERTION OF INFUSION DEVICE INTO LEFT BASILIC VEIN, PERCUTANEOUS APPROACH: ICD-10-PCS | Performed by: INTERNAL MEDICINE

## 2024-08-30 PROCEDURE — 6370000000 HC RX 637 (ALT 250 FOR IP): Performed by: INTERNAL MEDICINE

## 2024-08-30 PROCEDURE — 80053 COMPREHEN METABOLIC PANEL: CPT

## 2024-08-30 PROCEDURE — C1751 CATH, INF, PER/CENT/MIDLINE: HCPCS

## 2024-08-30 PROCEDURE — 90792 PSYCH DIAG EVAL W/MED SRVCS: CPT

## 2024-08-30 PROCEDURE — 99232 SBSQ HOSP IP/OBS MODERATE 35: CPT | Performed by: INTERNAL MEDICINE

## 2024-08-30 PROCEDURE — 83735 ASSAY OF MAGNESIUM: CPT

## 2024-08-30 PROCEDURE — 2500000003 HC RX 250 WO HCPCS: Performed by: INTERNAL MEDICINE

## 2024-08-30 PROCEDURE — 99233 SBSQ HOSP IP/OBS HIGH 50: CPT | Performed by: INTERNAL MEDICINE

## 2024-08-30 PROCEDURE — 76937 US GUIDE VASCULAR ACCESS: CPT

## 2024-08-30 PROCEDURE — 36410 VNPNXR 3YR/> PHY/QHP DX/THER: CPT

## 2024-08-30 PROCEDURE — 99291 CRITICAL CARE FIRST HOUR: CPT | Performed by: INTERNAL MEDICINE

## 2024-08-30 PROCEDURE — 6360000002 HC RX W HCPCS: Performed by: INTERNAL MEDICINE

## 2024-08-30 PROCEDURE — 84100 ASSAY OF PHOSPHORUS: CPT

## 2024-08-30 PROCEDURE — 85025 COMPLETE CBC W/AUTO DIFF WBC: CPT

## 2024-08-30 PROCEDURE — 2580000003 HC RX 258: Performed by: INTERNAL MEDICINE

## 2024-08-30 PROCEDURE — 2580000003 HC RX 258: Performed by: STUDENT IN AN ORGANIZED HEALTH CARE EDUCATION/TRAINING PROGRAM

## 2024-08-30 PROCEDURE — 82550 ASSAY OF CK (CPK): CPT

## 2024-08-30 PROCEDURE — 2000000000 HC ICU R&B

## 2024-08-30 PROCEDURE — 71045 X-RAY EXAM CHEST 1 VIEW: CPT

## 2024-08-30 RX ORDER — SODIUM CHLORIDE 9 MG/ML
INJECTION, SOLUTION INTRAVENOUS PRN
Status: DISCONTINUED | OUTPATIENT
Start: 2024-08-30 | End: 2024-09-14 | Stop reason: HOSPADM

## 2024-08-30 RX ORDER — SODIUM CHLORIDE 0.9 % (FLUSH) 0.9 %
5-40 SYRINGE (ML) INJECTION PRN
Status: DISCONTINUED | OUTPATIENT
Start: 2024-08-30 | End: 2024-09-14 | Stop reason: HOSPADM

## 2024-08-30 RX ORDER — SODIUM CHLORIDE 0.9 % (FLUSH) 0.9 %
5-40 SYRINGE (ML) INJECTION EVERY 12 HOURS SCHEDULED
Status: DISCONTINUED | OUTPATIENT
Start: 2024-08-30 | End: 2024-09-14 | Stop reason: HOSPADM

## 2024-08-30 RX ORDER — LIDOCAINE HYDROCHLORIDE 10 MG/ML
50 INJECTION, SOLUTION EPIDURAL; INFILTRATION; INTRACAUDAL; PERINEURAL ONCE
Status: COMPLETED | OUTPATIENT
Start: 2024-08-30 | End: 2024-08-30

## 2024-08-30 RX ADMIN — SODIUM CHLORIDE: 4.5 INJECTION, SOLUTION INTRAVENOUS at 05:46

## 2024-08-30 RX ADMIN — Medication 10 ML: at 20:36

## 2024-08-30 RX ADMIN — METRONIDAZOLE 500 MG: 500 INJECTION, SOLUTION INTRAVENOUS at 05:49

## 2024-08-30 RX ADMIN — METRONIDAZOLE 500 MG: 500 INJECTION, SOLUTION INTRAVENOUS at 12:49

## 2024-08-30 RX ADMIN — HEPARIN SODIUM 5000 UNITS: 5000 INJECTION INTRAVENOUS; SUBCUTANEOUS at 08:41

## 2024-08-30 RX ADMIN — LIDOCAINE HYDROCHLORIDE 50 MG: 10 INJECTION, SOLUTION EPIDURAL; INFILTRATION; INTRACAUDAL at 16:05

## 2024-08-30 RX ADMIN — SODIUM CHLORIDE, PRESERVATIVE FREE 10 ML: 5 INJECTION INTRAVENOUS at 08:41

## 2024-08-30 RX ADMIN — METOPROLOL TARTRATE 2.5 MG: 5 INJECTION INTRAVENOUS at 20:29

## 2024-08-30 RX ADMIN — METRONIDAZOLE 500 MG: 500 INJECTION, SOLUTION INTRAVENOUS at 20:34

## 2024-08-30 RX ADMIN — METOPROLOL TARTRATE 2.5 MG: 5 INJECTION INTRAVENOUS at 02:13

## 2024-08-30 RX ADMIN — AZITHROMYCIN MONOHYDRATE 500 MG: 500 INJECTION, POWDER, LYOPHILIZED, FOR SOLUTION INTRAVENOUS at 12:50

## 2024-08-30 RX ADMIN — SODIUM CHLORIDE, PRESERVATIVE FREE 40 MG: 5 INJECTION INTRAVENOUS at 08:41

## 2024-08-30 RX ADMIN — WATER 1000 MG: 1 INJECTION INTRAMUSCULAR; INTRAVENOUS; SUBCUTANEOUS at 08:40

## 2024-08-30 RX ADMIN — LEVETIRACETAM 750 MG: 100 INJECTION INTRAVENOUS at 08:41

## 2024-08-30 RX ADMIN — HEPARIN SODIUM 5000 UNITS: 5000 INJECTION INTRAVENOUS; SUBCUTANEOUS at 20:28

## 2024-08-30 RX ADMIN — SODIUM CHLORIDE: 4.5 INJECTION, SOLUTION INTRAVENOUS at 16:00

## 2024-08-30 RX ADMIN — ACETAMINOPHEN 650 MG: 325 TABLET ORAL at 17:12

## 2024-08-30 RX ADMIN — METOPROLOL TARTRATE 2.5 MG: 5 INJECTION INTRAVENOUS at 12:50

## 2024-08-30 RX ADMIN — LEVETIRACETAM 750 MG: 100 INJECTION INTRAVENOUS at 20:28

## 2024-08-30 RX ADMIN — METOPROLOL TARTRATE 2.5 MG: 5 INJECTION INTRAVENOUS at 07:01

## 2024-08-30 ASSESSMENT — PAIN SCALES - GENERAL
PAINLEVEL_OUTOF10: 3
PAINLEVEL_OUTOF10: 0
PAINLEVEL_OUTOF10: 0

## 2024-08-30 ASSESSMENT — PAIN DESCRIPTION - DESCRIPTORS: DESCRIPTORS: ACHING

## 2024-08-30 ASSESSMENT — PAIN DESCRIPTION - LOCATION: LOCATION: GENERALIZED

## 2024-08-31 ENCOUNTER — APPOINTMENT (OUTPATIENT)
Dept: GENERAL RADIOLOGY | Age: 54
End: 2024-08-31
Payer: MEDICARE

## 2024-08-31 PROBLEM — F05 DELIRIUM DUE TO MULTIPLE ETIOLOGIES: Status: ACTIVE | Noted: 2024-08-31

## 2024-08-31 LAB
ALBUMIN SERPL-MCNC: 2.9 G/DL (ref 3.5–5.2)
ALP SERPL-CCNC: 79 U/L (ref 35–104)
ALT SERPL-CCNC: 442 U/L (ref 0–32)
ANION GAP SERPL CALCULATED.3IONS-SCNC: 12 MMOL/L (ref 7–16)
ANION GAP SERPL CALCULATED.3IONS-SCNC: 13 MMOL/L (ref 7–16)
ANION GAP SERPL CALCULATED.3IONS-SCNC: 7 MMOL/L (ref 7–16)
AST SERPL-CCNC: 246 U/L (ref 0–31)
BASOPHILS # BLD: 0.03 K/UL (ref 0–0.2)
BASOPHILS NFR BLD: 0 % (ref 0–2)
BILIRUB SERPL-MCNC: 0.3 MG/DL (ref 0–1.2)
BNP SERPL-MCNC: 4495 PG/ML (ref 0–450)
BUN SERPL-MCNC: 18 MG/DL (ref 6–20)
BUN SERPL-MCNC: 23 MG/DL (ref 6–20)
BUN SERPL-MCNC: 25 MG/DL (ref 6–20)
CALCIUM SERPL-MCNC: 8.1 MG/DL (ref 8.6–10.2)
CALCIUM SERPL-MCNC: 8.2 MG/DL (ref 8.6–10.2)
CALCIUM SERPL-MCNC: 8.4 MG/DL (ref 8.6–10.2)
CHLORIDE SERPL-SCNC: 115 MMOL/L (ref 98–107)
CHLORIDE SERPL-SCNC: 116 MMOL/L (ref 98–107)
CHLORIDE SERPL-SCNC: 117 MMOL/L (ref 98–107)
CK SERPL-CCNC: ABNORMAL U/L (ref 20–180)
CO2 SERPL-SCNC: 23 MMOL/L (ref 22–29)
CO2 SERPL-SCNC: 24 MMOL/L (ref 22–29)
CO2 SERPL-SCNC: 26 MMOL/L (ref 22–29)
CREAT SERPL-MCNC: 1 MG/DL (ref 0.5–1)
CREAT SERPL-MCNC: 1.1 MG/DL (ref 0.5–1)
CREAT SERPL-MCNC: 1.2 MG/DL (ref 0.5–1)
EKG ATRIAL RATE: 107 BPM
EKG P AXIS: 55 DEGREES
EKG P-R INTERVAL: 126 MS
EKG Q-T INTERVAL: 384 MS
EKG QRS DURATION: 70 MS
EKG QTC CALCULATION (BAZETT): 512 MS
EKG R AXIS: 79 DEGREES
EKG T AXIS: 120 DEGREES
EKG VENTRICULAR RATE: 107 BPM
EOSINOPHIL # BLD: 0.13 K/UL (ref 0.05–0.5)
EOSINOPHILS RELATIVE PERCENT: 1 % (ref 0–6)
ERYTHROCYTE [DISTWIDTH] IN BLOOD BY AUTOMATED COUNT: 15.5 % (ref 11.5–15)
GFR, ESTIMATED: 57 ML/MIN/1.73M2
GFR, ESTIMATED: 63 ML/MIN/1.73M2
GFR, ESTIMATED: 65 ML/MIN/1.73M2
GLUCOSE SERPL-MCNC: 107 MG/DL (ref 74–99)
GLUCOSE SERPL-MCNC: 166 MG/DL (ref 74–99)
GLUCOSE SERPL-MCNC: 166 MG/DL (ref 74–99)
HCT VFR BLD AUTO: 34.1 % (ref 34–48)
HGB BLD-MCNC: 10.6 G/DL (ref 11.5–15.5)
IMM GRANULOCYTES # BLD AUTO: 0.1 K/UL (ref 0–0.58)
IMM GRANULOCYTES NFR BLD: 1 % (ref 0–5)
LYMPHOCYTES NFR BLD: 2.28 K/UL (ref 1.5–4)
LYMPHOCYTES RELATIVE PERCENT: 19 % (ref 20–42)
MAGNESIUM SERPL-MCNC: 1.9 MG/DL (ref 1.6–2.6)
MCH RBC QN AUTO: 28.3 PG (ref 26–35)
MCHC RBC AUTO-ENTMCNC: 31.1 G/DL (ref 32–34.5)
MCV RBC AUTO: 90.9 FL (ref 80–99.9)
MONOCYTES NFR BLD: 0.58 K/UL (ref 0.1–0.95)
MONOCYTES NFR BLD: 5 % (ref 2–12)
NEUTROPHILS NFR BLD: 74 % (ref 43–80)
NEUTS SEG NFR BLD: 8.76 K/UL (ref 1.8–7.3)
PHOSPHATE SERPL-MCNC: 3 MG/DL (ref 2.5–4.5)
PLATELET # BLD AUTO: 312 K/UL (ref 130–450)
PMV BLD AUTO: 10.5 FL (ref 7–12)
POTASSIUM SERPL-SCNC: 3.5 MMOL/L (ref 3.5–5)
POTASSIUM SERPL-SCNC: 3.6 MMOL/L (ref 3.5–5)
POTASSIUM SERPL-SCNC: 3.7 MMOL/L (ref 3.5–5)
PROT SERPL-MCNC: 5.2 G/DL (ref 6.4–8.3)
RBC # BLD AUTO: 3.75 M/UL (ref 3.5–5.5)
SODIUM SERPL-SCNC: 150 MMOL/L (ref 132–146)
SODIUM SERPL-SCNC: 150 MMOL/L (ref 132–146)
SODIUM SERPL-SCNC: 153 MMOL/L (ref 132–146)
TROPONIN I SERPL HS-MCNC: 1050 NG/L (ref 0–9)
WBC OTHER # BLD: 11.9 K/UL (ref 4.5–11.5)

## 2024-08-31 PROCEDURE — 6360000002 HC RX W HCPCS

## 2024-08-31 PROCEDURE — 99232 SBSQ HOSP IP/OBS MODERATE 35: CPT | Performed by: INTERNAL MEDICINE

## 2024-08-31 PROCEDURE — 6360000002 HC RX W HCPCS: Performed by: INTERNAL MEDICINE

## 2024-08-31 PROCEDURE — 6360000002 HC RX W HCPCS: Performed by: PSYCHIATRY & NEUROLOGY

## 2024-08-31 PROCEDURE — 93005 ELECTROCARDIOGRAM TRACING: CPT | Performed by: INTERNAL MEDICINE

## 2024-08-31 PROCEDURE — 80053 COMPREHEN METABOLIC PANEL: CPT

## 2024-08-31 PROCEDURE — 51701 INSERT BLADDER CATHETER: CPT

## 2024-08-31 PROCEDURE — 94660 CPAP INITIATION&MGMT: CPT

## 2024-08-31 PROCEDURE — 6370000000 HC RX 637 (ALT 250 FOR IP): Performed by: INTERNAL MEDICINE

## 2024-08-31 PROCEDURE — 2500000003 HC RX 250 WO HCPCS: Performed by: INTERNAL MEDICINE

## 2024-08-31 PROCEDURE — 99231 SBSQ HOSP IP/OBS SF/LOW 25: CPT | Performed by: PSYCHIATRY & NEUROLOGY

## 2024-08-31 PROCEDURE — 99291 CRITICAL CARE FIRST HOUR: CPT | Performed by: INTERNAL MEDICINE

## 2024-08-31 PROCEDURE — 84100 ASSAY OF PHOSPHORUS: CPT

## 2024-08-31 PROCEDURE — 83880 ASSAY OF NATRIURETIC PEPTIDE: CPT

## 2024-08-31 PROCEDURE — 84484 ASSAY OF TROPONIN QUANT: CPT

## 2024-08-31 PROCEDURE — 80048 BASIC METABOLIC PNL TOTAL CA: CPT

## 2024-08-31 PROCEDURE — 2580000003 HC RX 258: Performed by: INTERNAL MEDICINE

## 2024-08-31 PROCEDURE — 2060000000 HC ICU INTERMEDIATE R&B

## 2024-08-31 PROCEDURE — 51798 US URINE CAPACITY MEASURE: CPT

## 2024-08-31 PROCEDURE — 93010 ELECTROCARDIOGRAM REPORT: CPT | Performed by: INTERNAL MEDICINE

## 2024-08-31 PROCEDURE — 2580000003 HC RX 258: Performed by: STUDENT IN AN ORGANIZED HEALTH CARE EDUCATION/TRAINING PROGRAM

## 2024-08-31 PROCEDURE — 85025 COMPLETE CBC W/AUTO DIFF WBC: CPT

## 2024-08-31 PROCEDURE — 83735 ASSAY OF MAGNESIUM: CPT

## 2024-08-31 PROCEDURE — 99233 SBSQ HOSP IP/OBS HIGH 50: CPT | Performed by: INTERNAL MEDICINE

## 2024-08-31 PROCEDURE — 82550 ASSAY OF CK (CPK): CPT

## 2024-08-31 RX ORDER — OLANZAPINE 10 MG/2ML
10 INJECTION, POWDER, FOR SOLUTION INTRAMUSCULAR 2 TIMES DAILY PRN
Status: DISCONTINUED | OUTPATIENT
Start: 2024-08-31 | End: 2024-09-01

## 2024-08-31 RX ORDER — MAGNESIUM SULFATE 1 G/100ML
1000 INJECTION INTRAVENOUS ONCE
Status: COMPLETED | OUTPATIENT
Start: 2024-08-31 | End: 2024-08-31

## 2024-08-31 RX ADMIN — SODIUM CHLORIDE: 4.5 INJECTION, SOLUTION INTRAVENOUS at 02:29

## 2024-08-31 RX ADMIN — METRONIDAZOLE 500 MG: 500 INJECTION, SOLUTION INTRAVENOUS at 05:19

## 2024-08-31 RX ADMIN — HEPARIN SODIUM 5000 UNITS: 5000 INJECTION INTRAVENOUS; SUBCUTANEOUS at 22:40

## 2024-08-31 RX ADMIN — MAGNESIUM SULFATE IN DEXTROSE 1000 MG: 10 INJECTION, SOLUTION INTRAVENOUS at 06:58

## 2024-08-31 RX ADMIN — LEVETIRACETAM 750 MG: 100 INJECTION INTRAVENOUS at 22:45

## 2024-08-31 RX ADMIN — METOPROLOL TARTRATE 2.5 MG: 5 INJECTION INTRAVENOUS at 18:58

## 2024-08-31 RX ADMIN — METOPROLOL TARTRATE 2.5 MG: 5 INJECTION INTRAVENOUS at 13:43

## 2024-08-31 RX ADMIN — SODIUM CHLORIDE: 4.5 INJECTION, SOLUTION INTRAVENOUS at 13:42

## 2024-08-31 RX ADMIN — METOPROLOL TARTRATE 2.5 MG: 5 INJECTION INTRAVENOUS at 06:53

## 2024-08-31 RX ADMIN — WATER 1000 MG: 1 INJECTION INTRAMUSCULAR; INTRAVENOUS; SUBCUTANEOUS at 08:22

## 2024-08-31 RX ADMIN — ACETAMINOPHEN 650 MG: 325 TABLET ORAL at 00:39

## 2024-08-31 RX ADMIN — SODIUM CHLORIDE, PRESERVATIVE FREE 40 MG: 5 INJECTION INTRAVENOUS at 08:23

## 2024-08-31 RX ADMIN — METOPROLOL TARTRATE 2.5 MG: 5 INJECTION INTRAVENOUS at 23:42

## 2024-08-31 RX ADMIN — SODIUM CHLORIDE: 4.5 INJECTION, SOLUTION INTRAVENOUS at 22:40

## 2024-08-31 RX ADMIN — Medication 10 ML: at 22:54

## 2024-08-31 RX ADMIN — METOPROLOL TARTRATE 2.5 MG: 5 INJECTION INTRAVENOUS at 02:10

## 2024-08-31 RX ADMIN — LEVETIRACETAM 750 MG: 100 INJECTION INTRAVENOUS at 08:22

## 2024-08-31 RX ADMIN — OLANZAPINE 10 MG: 10 INJECTION, POWDER, FOR SOLUTION INTRAMUSCULAR at 23:25

## 2024-08-31 RX ADMIN — Medication 10 ML: at 08:21

## 2024-08-31 RX ADMIN — HEPARIN SODIUM 5000 UNITS: 5000 INJECTION INTRAVENOUS; SUBCUTANEOUS at 08:22

## 2024-08-31 ASSESSMENT — PAIN DESCRIPTION - LOCATION: LOCATION: GENERALIZED

## 2024-08-31 ASSESSMENT — PAIN SCALES - GENERAL
PAINLEVEL_OUTOF10: 0
PAINLEVEL_OUTOF10: 3

## 2024-09-01 LAB
ALBUMIN SERPL-MCNC: 3 G/DL (ref 3.5–5.2)
ALP SERPL-CCNC: 75 U/L (ref 35–104)
ALT SERPL-CCNC: 333 U/L (ref 0–32)
ANION GAP SERPL CALCULATED.3IONS-SCNC: 12 MMOL/L (ref 7–16)
AST SERPL-CCNC: 227 U/L (ref 0–31)
BASOPHILS # BLD: 0.01 K/UL (ref 0–0.2)
BASOPHILS NFR BLD: 0 % (ref 0–2)
BILIRUB SERPL-MCNC: 0.3 MG/DL (ref 0–1.2)
BILIRUB UR QL STRIP: NEGATIVE
BUN SERPL-MCNC: 15 MG/DL (ref 6–20)
CALCIUM SERPL-MCNC: 8.5 MG/DL (ref 8.6–10.2)
CHLORIDE SERPL-SCNC: 114 MMOL/L (ref 98–107)
CK SERPL-CCNC: ABNORMAL U/L (ref 20–180)
CLARITY UR: CLEAR
CO2 SERPL-SCNC: 25 MMOL/L (ref 22–29)
COLOR UR: YELLOW
CREAT SERPL-MCNC: 1 MG/DL (ref 0.5–1)
EOSINOPHIL # BLD: 0.2 K/UL (ref 0.05–0.5)
EOSINOPHILS RELATIVE PERCENT: 2 % (ref 0–6)
ERYTHROCYTE [DISTWIDTH] IN BLOOD BY AUTOMATED COUNT: 14.9 % (ref 11.5–15)
GFR, ESTIMATED: 67 ML/MIN/1.73M2
GLUCOSE SERPL-MCNC: 108 MG/DL (ref 74–99)
GLUCOSE UR STRIP-MCNC: NEGATIVE MG/DL
HCT VFR BLD AUTO: 34 % (ref 34–48)
HGB BLD-MCNC: 10.6 G/DL (ref 11.5–15.5)
HGB UR QL STRIP.AUTO: ABNORMAL
IMM GRANULOCYTES # BLD AUTO: 0.06 K/UL (ref 0–0.58)
IMM GRANULOCYTES NFR BLD: 1 % (ref 0–5)
KETONES UR STRIP-MCNC: NEGATIVE MG/DL
LEUKOCYTE ESTERASE UR QL STRIP: NEGATIVE
LYMPHOCYTES NFR BLD: 2.3 K/UL (ref 1.5–4)
LYMPHOCYTES RELATIVE PERCENT: 22 % (ref 20–42)
MAGNESIUM SERPL-MCNC: 1.7 MG/DL (ref 1.6–2.6)
MCH RBC QN AUTO: 28.1 PG (ref 26–35)
MCHC RBC AUTO-ENTMCNC: 31.2 G/DL (ref 32–34.5)
MCV RBC AUTO: 90.2 FL (ref 80–99.9)
MICROORGANISM SPEC CULT: NORMAL
MICROORGANISM SPEC CULT: NORMAL
MONOCYTES NFR BLD: 0.45 K/UL (ref 0.1–0.95)
MONOCYTES NFR BLD: 4 % (ref 2–12)
NEUTROPHILS NFR BLD: 71 % (ref 43–80)
NEUTS SEG NFR BLD: 7.54 K/UL (ref 1.8–7.3)
NITRITE UR QL STRIP: NEGATIVE
PH UR STRIP: 6 [PH] (ref 5–9)
PHOSPHATE SERPL-MCNC: 3.4 MG/DL (ref 2.5–4.5)
PLATELET # BLD AUTO: 296 K/UL (ref 130–450)
PMV BLD AUTO: 10.5 FL (ref 7–12)
POTASSIUM SERPL-SCNC: 3.3 MMOL/L (ref 3.5–5)
PROT SERPL-MCNC: 5.4 G/DL (ref 6.4–8.3)
PROT UR STRIP-MCNC: NEGATIVE MG/DL
RBC # BLD AUTO: 3.77 M/UL (ref 3.5–5.5)
RBC #/AREA URNS HPF: ABNORMAL /HPF
SERVICE CMNT-IMP: NORMAL
SERVICE CMNT-IMP: NORMAL
SODIUM SERPL-SCNC: 151 MMOL/L (ref 132–146)
SP GR UR STRIP: 1.01 (ref 1–1.03)
SPECIMEN DESCRIPTION: NORMAL
SPECIMEN DESCRIPTION: NORMAL
UROBILINOGEN UR STRIP-ACNC: 0.2 EU/DL (ref 0–1)
WBC #/AREA URNS HPF: ABNORMAL /HPF
WBC OTHER # BLD: 10.6 K/UL (ref 4.5–11.5)

## 2024-09-01 PROCEDURE — 6370000000 HC RX 637 (ALT 250 FOR IP): Performed by: INTERNAL MEDICINE

## 2024-09-01 PROCEDURE — 99232 SBSQ HOSP IP/OBS MODERATE 35: CPT | Performed by: INTERNAL MEDICINE

## 2024-09-01 PROCEDURE — 94660 CPAP INITIATION&MGMT: CPT

## 2024-09-01 PROCEDURE — 84100 ASSAY OF PHOSPHORUS: CPT

## 2024-09-01 PROCEDURE — 85025 COMPLETE CBC W/AUTO DIFF WBC: CPT

## 2024-09-01 PROCEDURE — 83735 ASSAY OF MAGNESIUM: CPT

## 2024-09-01 PROCEDURE — 6360000002 HC RX W HCPCS: Performed by: INTERNAL MEDICINE

## 2024-09-01 PROCEDURE — 99233 SBSQ HOSP IP/OBS HIGH 50: CPT | Performed by: INTERNAL MEDICINE

## 2024-09-01 PROCEDURE — 51798 US URINE CAPACITY MEASURE: CPT

## 2024-09-01 PROCEDURE — 87086 URINE CULTURE/COLONY COUNT: CPT

## 2024-09-01 PROCEDURE — 2060000000 HC ICU INTERMEDIATE R&B

## 2024-09-01 PROCEDURE — 81001 URINALYSIS AUTO W/SCOPE: CPT

## 2024-09-01 PROCEDURE — 82550 ASSAY OF CK (CPK): CPT

## 2024-09-01 PROCEDURE — 2580000003 HC RX 258: Performed by: STUDENT IN AN ORGANIZED HEALTH CARE EDUCATION/TRAINING PROGRAM

## 2024-09-01 PROCEDURE — 2580000003 HC RX 258: Performed by: INTERNAL MEDICINE

## 2024-09-01 PROCEDURE — 51701 INSERT BLADDER CATHETER: CPT

## 2024-09-01 PROCEDURE — 80053 COMPREHEN METABOLIC PANEL: CPT

## 2024-09-01 RX ORDER — PANTOPRAZOLE SODIUM 40 MG/1
40 TABLET, DELAYED RELEASE ORAL
Status: DISCONTINUED | OUTPATIENT
Start: 2024-09-01 | End: 2024-09-14 | Stop reason: HOSPADM

## 2024-09-01 RX ORDER — METOPROLOL SUCCINATE 25 MG/1
25 TABLET, EXTENDED RELEASE ORAL DAILY
Status: DISCONTINUED | OUTPATIENT
Start: 2024-09-01 | End: 2024-09-02

## 2024-09-01 RX ORDER — DEXTROSE MONOHYDRATE 50 MG/ML
INJECTION, SOLUTION INTRAVENOUS CONTINUOUS
Status: DISCONTINUED | OUTPATIENT
Start: 2024-09-01 | End: 2024-09-02

## 2024-09-01 RX ORDER — ASPIRIN 81 MG/1
81 TABLET ORAL DAILY
Status: DISCONTINUED | OUTPATIENT
Start: 2024-09-01 | End: 2024-09-10

## 2024-09-01 RX ORDER — OLANZAPINE 5 MG/1
2.5 TABLET ORAL 2 TIMES DAILY PRN
Status: DISCONTINUED | OUTPATIENT
Start: 2024-09-01 | End: 2024-09-08

## 2024-09-01 RX ADMIN — POTASSIUM BICARBONATE 50 MEQ: 978 TABLET, EFFERVESCENT ORAL at 21:47

## 2024-09-01 RX ADMIN — METOPROLOL SUCCINATE 25 MG: 25 TABLET, EXTENDED RELEASE ORAL at 09:33

## 2024-09-01 RX ADMIN — HEPARIN SODIUM 5000 UNITS: 5000 INJECTION INTRAVENOUS; SUBCUTANEOUS at 09:33

## 2024-09-01 RX ADMIN — HEPARIN SODIUM 5000 UNITS: 5000 INJECTION INTRAVENOUS; SUBCUTANEOUS at 21:47

## 2024-09-01 RX ADMIN — SODIUM CHLORIDE: 4.5 INJECTION, SOLUTION INTRAVENOUS at 09:32

## 2024-09-01 RX ADMIN — WATER 1000 MG: 1 INJECTION INTRAMUSCULAR; INTRAVENOUS; SUBCUTANEOUS at 09:33

## 2024-09-01 RX ADMIN — LEVETIRACETAM 750 MG: 500 TABLET, FILM COATED ORAL at 21:46

## 2024-09-01 RX ADMIN — DEXTROSE MONOHYDRATE: 50 INJECTION, SOLUTION INTRAVENOUS at 22:11

## 2024-09-01 RX ADMIN — ASPIRIN 81 MG: 81 TABLET, COATED ORAL at 13:43

## 2024-09-01 RX ADMIN — PANTOPRAZOLE SODIUM 40 MG: 40 TABLET, DELAYED RELEASE ORAL at 09:34

## 2024-09-01 RX ADMIN — SACUBITRIL AND VALSARTAN 1 TABLET: 24; 26 TABLET, FILM COATED ORAL at 21:47

## 2024-09-01 RX ADMIN — LEVETIRACETAM 750 MG: 500 TABLET, FILM COATED ORAL at 09:33

## 2024-09-01 RX ADMIN — Medication 10 ML: at 22:11

## 2024-09-01 RX ADMIN — POTASSIUM BICARBONATE 50 MEQ: 978 TABLET, EFFERVESCENT ORAL at 09:33

## 2024-09-02 LAB
ALBUMIN SERPL-MCNC: 2.9 G/DL (ref 3.5–5.2)
ALP SERPL-CCNC: 75 U/L (ref 35–104)
ALT SERPL-CCNC: 248 U/L (ref 0–32)
ANION GAP SERPL CALCULATED.3IONS-SCNC: 10 MMOL/L (ref 7–16)
AST SERPL-CCNC: 165 U/L (ref 0–31)
BASOPHILS # BLD: 0.01 K/UL (ref 0–0.2)
BASOPHILS NFR BLD: 0 % (ref 0–2)
BILIRUB SERPL-MCNC: 0.3 MG/DL (ref 0–1.2)
BUN SERPL-MCNC: 9 MG/DL (ref 6–20)
CALCIUM SERPL-MCNC: 8.1 MG/DL (ref 8.6–10.2)
CHLORIDE SERPL-SCNC: 110 MMOL/L (ref 98–107)
CK SERPL-CCNC: 7696 U/L (ref 20–180)
CO2 SERPL-SCNC: 25 MMOL/L (ref 22–29)
CREAT SERPL-MCNC: 0.9 MG/DL (ref 0.5–1)
EOSINOPHIL # BLD: 0.12 K/UL (ref 0.05–0.5)
EOSINOPHILS RELATIVE PERCENT: 1 % (ref 0–6)
ERYTHROCYTE [DISTWIDTH] IN BLOOD BY AUTOMATED COUNT: 15.1 % (ref 11.5–15)
GFR, ESTIMATED: 79 ML/MIN/1.73M2
GLUCOSE SERPL-MCNC: 113 MG/DL (ref 74–99)
HCT VFR BLD AUTO: 35.9 % (ref 34–48)
HGB BLD-MCNC: 11.2 G/DL (ref 11.5–15.5)
IMM GRANULOCYTES # BLD AUTO: 0.09 K/UL (ref 0–0.58)
IMM GRANULOCYTES NFR BLD: 1 % (ref 0–5)
LYMPHOCYTES NFR BLD: 2.23 K/UL (ref 1.5–4)
LYMPHOCYTES RELATIVE PERCENT: 22 % (ref 20–42)
MAGNESIUM SERPL-MCNC: 1.4 MG/DL (ref 1.6–2.6)
MAGNESIUM SERPL-MCNC: 2.3 MG/DL (ref 1.6–2.6)
MCH RBC QN AUTO: 27.9 PG (ref 26–35)
MCHC RBC AUTO-ENTMCNC: 31.2 G/DL (ref 32–34.5)
MCV RBC AUTO: 89.5 FL (ref 80–99.9)
MICROORGANISM SPEC CULT: NO GROWTH
MONOCYTES NFR BLD: 0.47 K/UL (ref 0.1–0.95)
MONOCYTES NFR BLD: 5 % (ref 2–12)
NEUTROPHILS NFR BLD: 71 % (ref 43–80)
NEUTS SEG NFR BLD: 7.11 K/UL (ref 1.8–7.3)
PHOSPHATE SERPL-MCNC: 3.5 MG/DL (ref 2.5–4.5)
PLATELET # BLD AUTO: 346 K/UL (ref 130–450)
PMV BLD AUTO: 10.7 FL (ref 7–12)
POTASSIUM SERPL-SCNC: 3.5 MMOL/L (ref 3.5–5)
PROT SERPL-MCNC: 5.2 G/DL (ref 6.4–8.3)
RBC # BLD AUTO: 4.01 M/UL (ref 3.5–5.5)
SERVICE CMNT-IMP: NORMAL
SODIUM SERPL-SCNC: 145 MMOL/L (ref 132–146)
SPECIMEN DESCRIPTION: NORMAL
WBC OTHER # BLD: 10 K/UL (ref 4.5–11.5)

## 2024-09-02 PROCEDURE — 6370000000 HC RX 637 (ALT 250 FOR IP): Performed by: INTERNAL MEDICINE

## 2024-09-02 PROCEDURE — 6370000000 HC RX 637 (ALT 250 FOR IP)

## 2024-09-02 PROCEDURE — 6360000002 HC RX W HCPCS: Performed by: INTERNAL MEDICINE

## 2024-09-02 PROCEDURE — 99232 SBSQ HOSP IP/OBS MODERATE 35: CPT | Performed by: INTERNAL MEDICINE

## 2024-09-02 PROCEDURE — 97161 PT EVAL LOW COMPLEX 20 MIN: CPT | Performed by: PHYSICAL THERAPIST

## 2024-09-02 PROCEDURE — 94660 CPAP INITIATION&MGMT: CPT

## 2024-09-02 PROCEDURE — 85025 COMPLETE CBC W/AUTO DIFF WBC: CPT

## 2024-09-02 PROCEDURE — 83735 ASSAY OF MAGNESIUM: CPT

## 2024-09-02 PROCEDURE — 97530 THERAPEUTIC ACTIVITIES: CPT | Performed by: PHYSICAL THERAPIST

## 2024-09-02 PROCEDURE — 84100 ASSAY OF PHOSPHORUS: CPT

## 2024-09-02 PROCEDURE — 99233 SBSQ HOSP IP/OBS HIGH 50: CPT | Performed by: INTERNAL MEDICINE

## 2024-09-02 PROCEDURE — 82550 ASSAY OF CK (CPK): CPT

## 2024-09-02 PROCEDURE — 2580000003 HC RX 258: Performed by: INTERNAL MEDICINE

## 2024-09-02 PROCEDURE — 80053 COMPREHEN METABOLIC PANEL: CPT

## 2024-09-02 PROCEDURE — 2060000000 HC ICU INTERMEDIATE R&B

## 2024-09-02 PROCEDURE — 99232 SBSQ HOSP IP/OBS MODERATE 35: CPT

## 2024-09-02 RX ORDER — METOPROLOL SUCCINATE 25 MG/1
25 TABLET, EXTENDED RELEASE ORAL ONCE
Status: COMPLETED | OUTPATIENT
Start: 2024-09-02 | End: 2024-09-02

## 2024-09-02 RX ORDER — MECOBALAMIN 5000 MCG
5 TABLET,DISINTEGRATING ORAL NIGHTLY
Status: DISCONTINUED | OUTPATIENT
Start: 2024-09-02 | End: 2024-09-14 | Stop reason: HOSPADM

## 2024-09-02 RX ORDER — MAGNESIUM SULFATE IN WATER 40 MG/ML
2000 INJECTION, SOLUTION INTRAVENOUS ONCE
Status: COMPLETED | OUTPATIENT
Start: 2024-09-02 | End: 2024-09-02

## 2024-09-02 RX ORDER — METOPROLOL SUCCINATE 50 MG/1
50 TABLET, EXTENDED RELEASE ORAL DAILY
Status: DISCONTINUED | OUTPATIENT
Start: 2024-09-03 | End: 2024-09-03

## 2024-09-02 RX ORDER — LORAZEPAM 2 MG/ML
1 INJECTION INTRAMUSCULAR ONCE
Status: COMPLETED | OUTPATIENT
Start: 2024-09-02 | End: 2024-09-03

## 2024-09-02 RX ADMIN — HEPARIN SODIUM 5000 UNITS: 5000 INJECTION INTRAVENOUS; SUBCUTANEOUS at 09:53

## 2024-09-02 RX ADMIN — SACUBITRIL AND VALSARTAN 1 TABLET: 24; 26 TABLET, FILM COATED ORAL at 09:52

## 2024-09-02 RX ADMIN — SACUBITRIL AND VALSARTAN 1 TABLET: 24; 26 TABLET, FILM COATED ORAL at 21:09

## 2024-09-02 RX ADMIN — POTASSIUM BICARBONATE 50 MEQ: 978 TABLET, EFFERVESCENT ORAL at 17:48

## 2024-09-02 RX ADMIN — HEPARIN SODIUM 5000 UNITS: 5000 INJECTION INTRAVENOUS; SUBCUTANEOUS at 21:09

## 2024-09-02 RX ADMIN — METOPROLOL SUCCINATE 25 MG: 25 TABLET, EXTENDED RELEASE ORAL at 09:53

## 2024-09-02 RX ADMIN — WATER 1000 MG: 1 INJECTION INTRAMUSCULAR; INTRAVENOUS; SUBCUTANEOUS at 09:53

## 2024-09-02 RX ADMIN — Medication 5 MG: at 21:09

## 2024-09-02 RX ADMIN — PANTOPRAZOLE SODIUM 40 MG: 40 TABLET, DELAYED RELEASE ORAL at 06:06

## 2024-09-02 RX ADMIN — METOPROLOL SUCCINATE 25 MG: 25 TABLET, EXTENDED RELEASE ORAL at 13:15

## 2024-09-02 RX ADMIN — LEVETIRACETAM 750 MG: 500 TABLET, FILM COATED ORAL at 09:52

## 2024-09-02 RX ADMIN — OLANZAPINE 2.5 MG: 5 TABLET, FILM COATED ORAL at 21:09

## 2024-09-02 RX ADMIN — LEVETIRACETAM 750 MG: 500 TABLET, FILM COATED ORAL at 21:09

## 2024-09-02 RX ADMIN — MAGNESIUM SULFATE HEPTAHYDRATE 2000 MG: 40 INJECTION, SOLUTION INTRAVENOUS at 15:27

## 2024-09-02 RX ADMIN — Medication 10 ML: at 21:13

## 2024-09-02 RX ADMIN — MAGNESIUM SULFATE HEPTAHYDRATE 2000 MG: 40 INJECTION, SOLUTION INTRAVENOUS at 09:59

## 2024-09-02 RX ADMIN — ASPIRIN 81 MG: 81 TABLET, COATED ORAL at 09:53

## 2024-09-03 ENCOUNTER — APPOINTMENT (OUTPATIENT)
Dept: ULTRASOUND IMAGING | Age: 54
End: 2024-09-03
Payer: MEDICARE

## 2024-09-03 ENCOUNTER — APPOINTMENT (OUTPATIENT)
Dept: MRI IMAGING | Age: 54
End: 2024-09-03
Payer: MEDICARE

## 2024-09-03 ENCOUNTER — APPOINTMENT (OUTPATIENT)
Age: 54
End: 2024-09-03
Payer: MEDICARE

## 2024-09-03 PROBLEM — I63.89 ACUTE ISCHEMIC MULTIFOCAL MULTIPLE VASCULAR TERRITORIES STROKE (HCC): Status: ACTIVE | Noted: 2024-09-03

## 2024-09-03 LAB
ALBUMIN SERPL-MCNC: 3.1 G/DL (ref 3.5–5.2)
ALP SERPL-CCNC: 69 U/L (ref 35–104)
ALT SERPL-CCNC: 175 U/L (ref 0–32)
ANION GAP SERPL CALCULATED.3IONS-SCNC: 11 MMOL/L (ref 7–16)
AST SERPL-CCNC: 94 U/L (ref 0–31)
BASOPHILS # BLD: 0.01 K/UL (ref 0–0.2)
BASOPHILS NFR BLD: 0 % (ref 0–2)
BILIRUB SERPL-MCNC: 0.2 MG/DL (ref 0–1.2)
BUN SERPL-MCNC: 11 MG/DL (ref 6–20)
CA-I BLD-SCNC: 1.17 MMOL/L (ref 1.15–1.33)
CALCIUM SERPL-MCNC: 8 MG/DL (ref 8.6–10.2)
CHLORIDE SERPL-SCNC: 110 MMOL/L (ref 98–107)
CO2 SERPL-SCNC: 24 MMOL/L (ref 22–29)
CREAT SERPL-MCNC: 0.9 MG/DL (ref 0.5–1)
ECHO BSA: 1.87 M2
ECHO LA DIAMETER INDEX: 1.22 CM/M2
ECHO LA DIAMETER: 2.3 CM
ECHO LA VOL A-L A2C: 43 ML (ref 22–52)
ECHO LA VOL A-L A4C: 35 ML (ref 22–52)
ECHO LA VOL BP: 37 ML (ref 22–52)
ECHO LA VOL MOD A2C: 41 ML (ref 22–52)
ECHO LA VOL MOD A4C: 33 ML (ref 22–52)
ECHO LA VOL/BSA BIPLANE: 20 ML/M2 (ref 16–34)
ECHO LA VOLUME AREA LENGTH: 39 ML
ECHO LA VOLUME INDEX A-L A2C: 23 ML/M2 (ref 16–34)
ECHO LA VOLUME INDEX A-L A4C: 19 ML/M2 (ref 16–34)
ECHO LA VOLUME INDEX AREA LENGTH: 21 ML/M2 (ref 16–34)
ECHO LA VOLUME INDEX MOD A2C: 22 ML/M2 (ref 16–34)
ECHO LA VOLUME INDEX MOD A4C: 18 ML/M2 (ref 16–34)
ECHO LV EDV A2C: 103 ML
ECHO LV EDV A4C: 86 ML
ECHO LV EDV BP: 94 ML (ref 56–104)
ECHO LV EDV INDEX A4C: 46 ML/M2
ECHO LV EDV INDEX BP: 50 ML/M2
ECHO LV EDV NDEX A2C: 55 ML/M2
ECHO LV EJECTION FRACTION A2C: 42 %
ECHO LV EJECTION FRACTION A4C: 38 %
ECHO LV EJECTION FRACTION BIPLANE: 40 % (ref 55–100)
ECHO LV ESV A2C: 59 ML
ECHO LV ESV A4C: 53 ML
ECHO LV ESV BP: 56 ML (ref 19–49)
ECHO LV ESV INDEX A2C: 31 ML/M2
ECHO LV ESV INDEX A4C: 28 ML/M2
ECHO LV ESV INDEX BP: 30 ML/M2
ECHO LV FRACTIONAL SHORTENING: 20 % (ref 28–44)
ECHO LV INTERNAL DIMENSION DIASTOLE INDEX: 2.13 CM/M2
ECHO LV INTERNAL DIMENSION DIASTOLIC: 4 CM (ref 3.9–5.3)
ECHO LV INTERNAL DIMENSION SYSTOLIC INDEX: 1.7 CM/M2
ECHO LV INTERNAL DIMENSION SYSTOLIC: 3.2 CM
ECHO LV IVSD: 1.1 CM (ref 0.6–0.9)
ECHO LV MASS 2D: 175.8 G (ref 67–162)
ECHO LV MASS INDEX 2D: 93.5 G/M2 (ref 43–95)
ECHO LV POSTERIOR WALL DIASTOLIC: 1.4 CM (ref 0.6–0.9)
ECHO LV RELATIVE WALL THICKNESS RATIO: 0.7
ECHO MV A VELOCITY: 0.85 M/S
ECHO MV E DECELERATION TIME (DT): 71.7 MS
ECHO MV E VELOCITY: 0.53 M/S
ECHO MV E/A RATIO: 0.62
ECHO MV MAX VELOCITY: 1.1 M/S
ECHO MV MEAN GRADIENT: 2 MMHG
ECHO MV MEAN VELOCITY: 0.7 M/S
ECHO MV PEAK GRADIENT: 5 MMHG
ECHO MV VTI: 11.7 CM
ECHO RV INTERNAL DIMENSION: 2.8 CM
EOSINOPHIL # BLD: 0.09 K/UL (ref 0.05–0.5)
EOSINOPHILS RELATIVE PERCENT: 1 % (ref 0–6)
ERYTHROCYTE [DISTWIDTH] IN BLOOD BY AUTOMATED COUNT: 15.9 % (ref 11.5–15)
GFR, ESTIMATED: 76 ML/MIN/1.73M2
GLUCOSE SERPL-MCNC: 124 MG/DL (ref 74–99)
HCT VFR BLD AUTO: 35 % (ref 34–48)
HGB BLD-MCNC: 10.8 G/DL (ref 11.5–15.5)
IMM GRANULOCYTES # BLD AUTO: 0.03 K/UL (ref 0–0.58)
IMM GRANULOCYTES NFR BLD: 0 % (ref 0–5)
LYMPHOCYTES NFR BLD: 1.99 K/UL (ref 1.5–4)
LYMPHOCYTES RELATIVE PERCENT: 23 % (ref 20–42)
MAGNESIUM SERPL-MCNC: 1.6 MG/DL (ref 1.6–2.6)
MCH RBC QN AUTO: 27.6 PG (ref 26–35)
MCHC RBC AUTO-ENTMCNC: 30.9 G/DL (ref 32–34.5)
MCV RBC AUTO: 89.5 FL (ref 80–99.9)
MONOCYTES NFR BLD: 0.53 K/UL (ref 0.1–0.95)
MONOCYTES NFR BLD: 6 % (ref 2–12)
NEUTROPHILS NFR BLD: 70 % (ref 43–80)
NEUTS SEG NFR BLD: 6.07 K/UL (ref 1.8–7.3)
PHOSPHATE SERPL-MCNC: 4.3 MG/DL (ref 2.5–4.5)
PLATELET # BLD AUTO: 379 K/UL (ref 130–450)
PMV BLD AUTO: 11.1 FL (ref 7–12)
POTASSIUM SERPL-SCNC: 3.7 MMOL/L (ref 3.5–5)
PROT SERPL-MCNC: 5.3 G/DL (ref 6.4–8.3)
RBC # BLD AUTO: 3.91 M/UL (ref 3.5–5.5)
SODIUM SERPL-SCNC: 145 MMOL/L (ref 132–146)
WBC OTHER # BLD: 8.7 K/UL (ref 4.5–11.5)

## 2024-09-03 PROCEDURE — 6370000000 HC RX 637 (ALT 250 FOR IP): Performed by: INTERNAL MEDICINE

## 2024-09-03 PROCEDURE — 84100 ASSAY OF PHOSPHORUS: CPT

## 2024-09-03 PROCEDURE — 99232 SBSQ HOSP IP/OBS MODERATE 35: CPT | Performed by: INTERNAL MEDICINE

## 2024-09-03 PROCEDURE — 97535 SELF CARE MNGMENT TRAINING: CPT

## 2024-09-03 PROCEDURE — 93325 DOPPLER ECHO COLOR FLOW MAPG: CPT | Performed by: INTERNAL MEDICINE

## 2024-09-03 PROCEDURE — 99233 SBSQ HOSP IP/OBS HIGH 50: CPT | Performed by: INTERNAL MEDICINE

## 2024-09-03 PROCEDURE — 94660 CPAP INITIATION&MGMT: CPT

## 2024-09-03 PROCEDURE — 93880 EXTRACRANIAL BILAT STUDY: CPT

## 2024-09-03 PROCEDURE — 97165 OT EVAL LOW COMPLEX 30 MIN: CPT

## 2024-09-03 PROCEDURE — 6370000000 HC RX 637 (ALT 250 FOR IP)

## 2024-09-03 PROCEDURE — 2060000000 HC ICU INTERMEDIATE R&B

## 2024-09-03 PROCEDURE — 85025 COMPLETE CBC W/AUTO DIFF WBC: CPT

## 2024-09-03 PROCEDURE — 70551 MRI BRAIN STEM W/O DYE: CPT

## 2024-09-03 PROCEDURE — 83735 ASSAY OF MAGNESIUM: CPT

## 2024-09-03 PROCEDURE — 93321 DOPPLER ECHO F-UP/LMTD STD: CPT | Performed by: INTERNAL MEDICINE

## 2024-09-03 PROCEDURE — 82330 ASSAY OF CALCIUM: CPT

## 2024-09-03 PROCEDURE — 36415 COLL VENOUS BLD VENIPUNCTURE: CPT

## 2024-09-03 PROCEDURE — 6370000000 HC RX 637 (ALT 250 FOR IP): Performed by: PSYCHIATRY & NEUROLOGY

## 2024-09-03 PROCEDURE — 80053 COMPREHEN METABOLIC PANEL: CPT

## 2024-09-03 PROCEDURE — 93308 TTE F-UP OR LMTD: CPT | Performed by: INTERNAL MEDICINE

## 2024-09-03 PROCEDURE — 6360000002 HC RX W HCPCS: Performed by: INTERNAL MEDICINE

## 2024-09-03 PROCEDURE — 2580000003 HC RX 258: Performed by: INTERNAL MEDICINE

## 2024-09-03 PROCEDURE — 93308 TTE F-UP OR LMTD: CPT

## 2024-09-03 PROCEDURE — 99232 SBSQ HOSP IP/OBS MODERATE 35: CPT | Performed by: FAMILY MEDICINE

## 2024-09-03 RX ORDER — GAUZE BANDAGE 2" X 2"
100 BANDAGE TOPICAL DAILY
Status: DISCONTINUED | OUTPATIENT
Start: 2024-09-03 | End: 2024-09-14 | Stop reason: HOSPADM

## 2024-09-03 RX ADMIN — LORAZEPAM 1 MG: 2 INJECTION INTRAMUSCULAR; INTRAVENOUS at 08:11

## 2024-09-03 RX ADMIN — LEVETIRACETAM 750 MG: 500 TABLET, FILM COATED ORAL at 21:51

## 2024-09-03 RX ADMIN — HEPARIN SODIUM 5000 UNITS: 5000 INJECTION INTRAVENOUS; SUBCUTANEOUS at 10:32

## 2024-09-03 RX ADMIN — SACUBITRIL AND VALSARTAN 1 TABLET: 24; 26 TABLET, FILM COATED ORAL at 21:50

## 2024-09-03 RX ADMIN — SACUBITRIL AND VALSARTAN 1 TABLET: 24; 26 TABLET, FILM COATED ORAL at 10:30

## 2024-09-03 RX ADMIN — ASPIRIN 81 MG: 81 TABLET, COATED ORAL at 10:30

## 2024-09-03 RX ADMIN — Medication 10 ML: at 12:09

## 2024-09-03 RX ADMIN — Medication 5 MG: at 21:50

## 2024-09-03 RX ADMIN — PANTOPRAZOLE SODIUM 40 MG: 40 TABLET, DELAYED RELEASE ORAL at 06:18

## 2024-09-03 RX ADMIN — LEVETIRACETAM 750 MG: 500 TABLET, FILM COATED ORAL at 10:31

## 2024-09-03 RX ADMIN — Medication 100 MG: at 10:31

## 2024-09-03 RX ADMIN — OLANZAPINE 2.5 MG: 5 TABLET, FILM COATED ORAL at 21:50

## 2024-09-03 RX ADMIN — METOPROLOL SUCCINATE 75 MG: 50 TABLET, EXTENDED RELEASE ORAL at 10:30

## 2024-09-03 RX ADMIN — HEPARIN SODIUM 5000 UNITS: 5000 INJECTION INTRAVENOUS; SUBCUTANEOUS at 21:50

## 2024-09-04 PROCEDURE — 94660 CPAP INITIATION&MGMT: CPT

## 2024-09-04 PROCEDURE — 2580000003 HC RX 258: Performed by: INTERNAL MEDICINE

## 2024-09-04 PROCEDURE — 99232 SBSQ HOSP IP/OBS MODERATE 35: CPT | Performed by: INTERNAL MEDICINE

## 2024-09-04 PROCEDURE — 6370000000 HC RX 637 (ALT 250 FOR IP): Performed by: INTERNAL MEDICINE

## 2024-09-04 PROCEDURE — 6370000000 HC RX 637 (ALT 250 FOR IP)

## 2024-09-04 PROCEDURE — 99232 SBSQ HOSP IP/OBS MODERATE 35: CPT | Performed by: FAMILY MEDICINE

## 2024-09-04 PROCEDURE — 97110 THERAPEUTIC EXERCISES: CPT

## 2024-09-04 PROCEDURE — 97535 SELF CARE MNGMENT TRAINING: CPT

## 2024-09-04 PROCEDURE — 97530 THERAPEUTIC ACTIVITIES: CPT

## 2024-09-04 PROCEDURE — 6370000000 HC RX 637 (ALT 250 FOR IP): Performed by: PSYCHIATRY & NEUROLOGY

## 2024-09-04 PROCEDURE — 2060000000 HC ICU INTERMEDIATE R&B

## 2024-09-04 PROCEDURE — 92526 ORAL FUNCTION THERAPY: CPT | Performed by: SPEECH-LANGUAGE PATHOLOGIST

## 2024-09-04 PROCEDURE — 99233 SBSQ HOSP IP/OBS HIGH 50: CPT | Performed by: INTERNAL MEDICINE

## 2024-09-04 PROCEDURE — 6360000002 HC RX W HCPCS: Performed by: INTERNAL MEDICINE

## 2024-09-04 RX ORDER — ATORVASTATIN CALCIUM 40 MG/1
40 TABLET, FILM COATED ORAL NIGHTLY
Status: DISCONTINUED | OUTPATIENT
Start: 2024-09-04 | End: 2024-09-14 | Stop reason: HOSPADM

## 2024-09-04 RX ORDER — METOPROLOL SUCCINATE 50 MG/1
100 TABLET, EXTENDED RELEASE ORAL DAILY
Status: DISCONTINUED | OUTPATIENT
Start: 2024-09-05 | End: 2024-09-05

## 2024-09-04 RX ADMIN — Medication 10 ML: at 08:49

## 2024-09-04 RX ADMIN — ATORVASTATIN CALCIUM 40 MG: 40 TABLET, FILM COATED ORAL at 21:24

## 2024-09-04 RX ADMIN — PANTOPRAZOLE SODIUM 40 MG: 40 TABLET, DELAYED RELEASE ORAL at 05:02

## 2024-09-04 RX ADMIN — LEVETIRACETAM 750 MG: 500 TABLET, FILM COATED ORAL at 08:46

## 2024-09-04 RX ADMIN — SACUBITRIL AND VALSARTAN 1 TABLET: 24; 26 TABLET, FILM COATED ORAL at 21:23

## 2024-09-04 RX ADMIN — OLANZAPINE 2.5 MG: 5 TABLET, FILM COATED ORAL at 08:46

## 2024-09-04 RX ADMIN — SACUBITRIL AND VALSARTAN 1 TABLET: 24; 26 TABLET, FILM COATED ORAL at 08:43

## 2024-09-04 RX ADMIN — HEPARIN SODIUM 5000 UNITS: 5000 INJECTION INTRAVENOUS; SUBCUTANEOUS at 21:24

## 2024-09-04 RX ADMIN — Medication 100 MG: at 08:46

## 2024-09-04 RX ADMIN — METOPROLOL SUCCINATE 75 MG: 50 TABLET, EXTENDED RELEASE ORAL at 08:45

## 2024-09-04 RX ADMIN — LEVETIRACETAM 750 MG: 500 TABLET, FILM COATED ORAL at 21:23

## 2024-09-04 RX ADMIN — Medication 10 ML: at 21:24

## 2024-09-04 RX ADMIN — Medication 5 MG: at 21:24

## 2024-09-04 RX ADMIN — HEPARIN SODIUM 5000 UNITS: 5000 INJECTION INTRAVENOUS; SUBCUTANEOUS at 08:44

## 2024-09-04 RX ADMIN — ASPIRIN 81 MG: 81 TABLET, COATED ORAL at 08:44

## 2024-09-05 ENCOUNTER — APPOINTMENT (OUTPATIENT)
Age: 54
End: 2024-09-05
Payer: MEDICARE

## 2024-09-05 PROBLEM — E83.42 HYPOMAGNESEMIA: Status: ACTIVE | Noted: 2024-09-05

## 2024-09-05 LAB
ECHO BSA: 1.96 M2
EKG ATRIAL RATE: 115 BPM
EKG P AXIS: 57 DEGREES
EKG P-R INTERVAL: 118 MS
EKG Q-T INTERVAL: 346 MS
EKG QRS DURATION: 68 MS
EKG QTC CALCULATION (BAZETT): 478 MS
EKG R AXIS: 76 DEGREES
EKG T AXIS: 50 DEGREES
EKG VENTRICULAR RATE: 115 BPM
ERYTHROCYTE [DISTWIDTH] IN BLOOD BY AUTOMATED COUNT: 15.8 % (ref 11.5–15)
HCT VFR BLD AUTO: 34 % (ref 34–48)
HGB BLD-MCNC: 10.4 G/DL (ref 11.5–15.5)
MAGNESIUM SERPL-MCNC: 1.6 MG/DL (ref 1.6–2.6)
MCH RBC QN AUTO: 27.9 PG (ref 26–35)
MCHC RBC AUTO-ENTMCNC: 30.6 G/DL (ref 32–34.5)
MCV RBC AUTO: 91.2 FL (ref 80–99.9)
PHOSPHATE SERPL-MCNC: 4.9 MG/DL (ref 2.5–4.5)
PLATELET # BLD AUTO: 477 K/UL (ref 130–450)
PMV BLD AUTO: 9.6 FL (ref 7–12)
RBC # BLD AUTO: 3.73 M/UL (ref 3.5–5.5)
T4 FREE SERPL-MCNC: 1.4 NG/DL (ref 0.9–1.7)
TSH SERPL DL<=0.05 MIU/L-ACNC: 1.01 UIU/ML (ref 0.27–4.2)
WBC OTHER # BLD: 9.8 K/UL (ref 4.5–11.5)

## 2024-09-05 PROCEDURE — 6370000000 HC RX 637 (ALT 250 FOR IP)

## 2024-09-05 PROCEDURE — 36415 COLL VENOUS BLD VENIPUNCTURE: CPT

## 2024-09-05 PROCEDURE — 05HC33Z INSERTION OF INFUSION DEVICE INTO LEFT BASILIC VEIN, PERCUTANEOUS APPROACH: ICD-10-PCS | Performed by: INTERNAL MEDICINE

## 2024-09-05 PROCEDURE — 99233 SBSQ HOSP IP/OBS HIGH 50: CPT | Performed by: INTERNAL MEDICINE

## 2024-09-05 PROCEDURE — 93308 TTE F-UP OR LMTD: CPT

## 2024-09-05 PROCEDURE — 93005 ELECTROCARDIOGRAM TRACING: CPT | Performed by: FAMILY MEDICINE

## 2024-09-05 PROCEDURE — 97530 THERAPEUTIC ACTIVITIES: CPT

## 2024-09-05 PROCEDURE — 6370000000 HC RX 637 (ALT 250 FOR IP): Performed by: PSYCHIATRY & NEUROLOGY

## 2024-09-05 PROCEDURE — 93308 TTE F-UP OR LMTD: CPT | Performed by: INTERNAL MEDICINE

## 2024-09-05 PROCEDURE — 36410 VNPNXR 3YR/> PHY/QHP DX/THER: CPT

## 2024-09-05 PROCEDURE — 84100 ASSAY OF PHOSPHORUS: CPT

## 2024-09-05 PROCEDURE — 2580000003 HC RX 258: Performed by: FAMILY MEDICINE

## 2024-09-05 PROCEDURE — C1751 CATH, INF, PER/CENT/MIDLINE: HCPCS

## 2024-09-05 PROCEDURE — 2500000003 HC RX 250 WO HCPCS: Performed by: FAMILY MEDICINE

## 2024-09-05 PROCEDURE — 6370000000 HC RX 637 (ALT 250 FOR IP): Performed by: INTERNAL MEDICINE

## 2024-09-05 PROCEDURE — 76937 US GUIDE VASCULAR ACCESS: CPT

## 2024-09-05 PROCEDURE — 84439 ASSAY OF FREE THYROXINE: CPT

## 2024-09-05 PROCEDURE — 97535 SELF CARE MNGMENT TRAINING: CPT

## 2024-09-05 PROCEDURE — 99232 SBSQ HOSP IP/OBS MODERATE 35: CPT | Performed by: FAMILY MEDICINE

## 2024-09-05 PROCEDURE — 84443 ASSAY THYROID STIM HORMONE: CPT

## 2024-09-05 PROCEDURE — 85027 COMPLETE CBC AUTOMATED: CPT

## 2024-09-05 PROCEDURE — 6360000002 HC RX W HCPCS: Performed by: FAMILY MEDICINE

## 2024-09-05 PROCEDURE — 93010 ELECTROCARDIOGRAM REPORT: CPT | Performed by: INTERNAL MEDICINE

## 2024-09-05 PROCEDURE — 83735 ASSAY OF MAGNESIUM: CPT

## 2024-09-05 PROCEDURE — 2060000000 HC ICU INTERMEDIATE R&B

## 2024-09-05 PROCEDURE — 99232 SBSQ HOSP IP/OBS MODERATE 35: CPT | Performed by: INTERNAL MEDICINE

## 2024-09-05 PROCEDURE — 6360000002 HC RX W HCPCS: Performed by: INTERNAL MEDICINE

## 2024-09-05 PROCEDURE — 94660 CPAP INITIATION&MGMT: CPT

## 2024-09-05 RX ORDER — IVABRADINE 5 MG/1
5 TABLET, FILM COATED ORAL 2 TIMES DAILY WITH MEALS
Status: DISCONTINUED | OUTPATIENT
Start: 2024-09-05 | End: 2024-09-14 | Stop reason: HOSPADM

## 2024-09-05 RX ORDER — LIDOCAINE HYDROCHLORIDE 10 MG/ML
50 INJECTION, SOLUTION EPIDURAL; INFILTRATION; INTRACAUDAL; PERINEURAL ONCE
Status: COMPLETED | OUTPATIENT
Start: 2024-09-05 | End: 2024-09-05

## 2024-09-05 RX ORDER — SODIUM CHLORIDE 0.9 % (FLUSH) 0.9 %
5-40 SYRINGE (ML) INJECTION PRN
Status: DISCONTINUED | OUTPATIENT
Start: 2024-09-05 | End: 2024-09-14 | Stop reason: HOSPADM

## 2024-09-05 RX ORDER — SODIUM CHLORIDE 9 MG/ML
INJECTION, SOLUTION INTRAVENOUS PRN
Status: DISCONTINUED | OUTPATIENT
Start: 2024-09-05 | End: 2024-09-14 | Stop reason: HOSPADM

## 2024-09-05 RX ORDER — SODIUM CHLORIDE 0.9 % (FLUSH) 0.9 %
5-40 SYRINGE (ML) INJECTION EVERY 12 HOURS SCHEDULED
Status: DISCONTINUED | OUTPATIENT
Start: 2024-09-05 | End: 2024-09-14 | Stop reason: HOSPADM

## 2024-09-05 RX ORDER — MAGNESIUM SULFATE IN WATER 40 MG/ML
2000 INJECTION, SOLUTION INTRAVENOUS ONCE
Status: COMPLETED | OUTPATIENT
Start: 2024-09-05 | End: 2024-09-05

## 2024-09-05 RX ADMIN — HEPARIN SODIUM 5000 UNITS: 5000 INJECTION INTRAVENOUS; SUBCUTANEOUS at 10:28

## 2024-09-05 RX ADMIN — METOPROLOL SUCCINATE 100 MG: 50 TABLET, EXTENDED RELEASE ORAL at 10:26

## 2024-09-05 RX ADMIN — Medication 5 MG: at 20:52

## 2024-09-05 RX ADMIN — OLANZAPINE 2.5 MG: 5 TABLET, FILM COATED ORAL at 00:59

## 2024-09-05 RX ADMIN — SACUBITRIL AND VALSARTAN 1 TABLET: 24; 26 TABLET, FILM COATED ORAL at 10:26

## 2024-09-05 RX ADMIN — LEVETIRACETAM 750 MG: 500 TABLET, FILM COATED ORAL at 10:25

## 2024-09-05 RX ADMIN — Medication 10 ML: at 10:27

## 2024-09-05 RX ADMIN — OLANZAPINE 2.5 MG: 5 TABLET, FILM COATED ORAL at 21:00

## 2024-09-05 RX ADMIN — ATORVASTATIN CALCIUM 40 MG: 40 TABLET, FILM COATED ORAL at 20:53

## 2024-09-05 RX ADMIN — HEPARIN SODIUM 5000 UNITS: 5000 INJECTION INTRAVENOUS; SUBCUTANEOUS at 20:53

## 2024-09-05 RX ADMIN — Medication 10 ML: at 21:00

## 2024-09-05 RX ADMIN — IVABRADINE 5 MG: 5 TABLET, FILM COATED ORAL at 10:26

## 2024-09-05 RX ADMIN — PANTOPRAZOLE SODIUM 40 MG: 40 TABLET, DELAYED RELEASE ORAL at 06:53

## 2024-09-05 RX ADMIN — Medication 100 MG: at 10:26

## 2024-09-05 RX ADMIN — LEVETIRACETAM 750 MG: 500 TABLET, FILM COATED ORAL at 20:52

## 2024-09-05 RX ADMIN — LIDOCAINE HYDROCHLORIDE 50 MG: 10 INJECTION, SOLUTION EPIDURAL; INFILTRATION; INTRACAUDAL at 10:57

## 2024-09-05 RX ADMIN — ASPIRIN 81 MG: 81 TABLET, COATED ORAL at 10:26

## 2024-09-05 RX ADMIN — IVABRADINE 5 MG: 5 TABLET, FILM COATED ORAL at 16:21

## 2024-09-05 RX ADMIN — SACUBITRIL AND VALSARTAN 1 TABLET: 24; 26 TABLET, FILM COATED ORAL at 20:52

## 2024-09-05 RX ADMIN — MAGNESIUM SULFATE HEPTAHYDRATE 2000 MG: 40 INJECTION, SOLUTION INTRAVENOUS at 14:16

## 2024-09-06 ENCOUNTER — APPOINTMENT (OUTPATIENT)
Dept: ULTRASOUND IMAGING | Age: 54
End: 2024-09-06
Payer: MEDICARE

## 2024-09-06 ENCOUNTER — ANESTHESIA (OUTPATIENT)
Dept: ENDOSCOPY | Age: 54
End: 2024-09-06
Payer: MEDICARE

## 2024-09-06 ENCOUNTER — ANESTHESIA EVENT (OUTPATIENT)
Dept: ENDOSCOPY | Age: 54
End: 2024-09-06
Payer: MEDICARE

## 2024-09-06 ENCOUNTER — APPOINTMENT (OUTPATIENT)
Dept: CT IMAGING | Age: 54
End: 2024-09-06
Payer: MEDICARE

## 2024-09-06 ENCOUNTER — APPOINTMENT (OUTPATIENT)
Age: 54
End: 2024-09-06
Payer: MEDICARE

## 2024-09-06 PROBLEM — I63.432 CEREBROVASCULAR ACCIDENT (CVA) DUE TO EMBOLISM OF LEFT POSTERIOR CEREBRAL ARTERY (HCC): Status: ACTIVE | Noted: 2024-09-06

## 2024-09-06 LAB
ANION GAP SERPL CALCULATED.3IONS-SCNC: 11 MMOL/L (ref 7–16)
BUN SERPL-MCNC: 12 MG/DL (ref 6–20)
CALCIUM SERPL-MCNC: 8.5 MG/DL (ref 8.6–10.2)
CHLORIDE SERPL-SCNC: 110 MMOL/L (ref 98–107)
CO2 SERPL-SCNC: 24 MMOL/L (ref 22–29)
CREAT SERPL-MCNC: 0.8 MG/DL (ref 0.5–1)
ECHO BSA: 1.96 M2
ECHO TV REGURGITANT MAX VELOCITY: 2.72 M/S
ECHO TV REGURGITANT PEAK GRADIENT: 30 MMHG
GFR, ESTIMATED: 87 ML/MIN/1.73M2
GLUCOSE SERPL-MCNC: 121 MG/DL (ref 74–99)
POTASSIUM SERPL-SCNC: 3.6 MMOL/L (ref 3.5–5)
SODIUM SERPL-SCNC: 145 MMOL/L (ref 132–146)

## 2024-09-06 PROCEDURE — 6370000000 HC RX 637 (ALT 250 FOR IP): Performed by: INTERNAL MEDICINE

## 2024-09-06 PROCEDURE — 97535 SELF CARE MNGMENT TRAINING: CPT

## 2024-09-06 PROCEDURE — 99232 SBSQ HOSP IP/OBS MODERATE 35: CPT | Performed by: INTERNAL MEDICINE

## 2024-09-06 PROCEDURE — 2580000003 HC RX 258: Performed by: FAMILY MEDICINE

## 2024-09-06 PROCEDURE — 6360000002 HC RX W HCPCS: Performed by: NURSE ANESTHETIST, CERTIFIED REGISTERED

## 2024-09-06 PROCEDURE — 80048 BASIC METABOLIC PNL TOTAL CA: CPT

## 2024-09-06 PROCEDURE — 94660 CPAP INITIATION&MGMT: CPT

## 2024-09-06 PROCEDURE — 2060000000 HC ICU INTERMEDIATE R&B

## 2024-09-06 PROCEDURE — 2709999900 HC NON-CHARGEABLE SUPPLY: Performed by: INTERNAL MEDICINE

## 2024-09-06 PROCEDURE — 99233 SBSQ HOSP IP/OBS HIGH 50: CPT | Performed by: INTERNAL MEDICINE

## 2024-09-06 PROCEDURE — 7100000010 HC PHASE II RECOVERY - FIRST 15 MIN: Performed by: INTERNAL MEDICINE

## 2024-09-06 PROCEDURE — 97530 THERAPEUTIC ACTIVITIES: CPT

## 2024-09-06 PROCEDURE — 2580000003 HC RX 258: Performed by: INTERNAL MEDICINE

## 2024-09-06 PROCEDURE — 93312 ECHO TRANSESOPHAGEAL: CPT

## 2024-09-06 PROCEDURE — 93312 ECHO TRANSESOPHAGEAL: CPT | Performed by: INTERNAL MEDICINE

## 2024-09-06 PROCEDURE — 97110 THERAPEUTIC EXERCISES: CPT

## 2024-09-06 PROCEDURE — 6360000004 HC RX CONTRAST MEDICATION: Performed by: RADIOLOGY

## 2024-09-06 PROCEDURE — 6360000002 HC RX W HCPCS: Performed by: INTERNAL MEDICINE

## 2024-09-06 PROCEDURE — 3700000000 HC ANESTHESIA ATTENDED CARE: Performed by: INTERNAL MEDICINE

## 2024-09-06 PROCEDURE — 2580000003 HC RX 258: Performed by: NURSE ANESTHETIST, CERTIFIED REGISTERED

## 2024-09-06 PROCEDURE — 93325 DOPPLER ECHO COLOR FLOW MAPG: CPT | Performed by: INTERNAL MEDICINE

## 2024-09-06 PROCEDURE — 71275 CT ANGIOGRAPHY CHEST: CPT

## 2024-09-06 PROCEDURE — 93970 EXTREMITY STUDY: CPT

## 2024-09-06 PROCEDURE — B24BZZ4 ULTRASONOGRAPHY OF HEART WITH AORTA, TRANSESOPHAGEAL: ICD-10-PCS | Performed by: INTERNAL MEDICINE

## 2024-09-06 PROCEDURE — 6370000000 HC RX 637 (ALT 250 FOR IP): Performed by: PSYCHIATRY & NEUROLOGY

## 2024-09-06 PROCEDURE — 7100000011 HC PHASE II RECOVERY - ADDTL 15 MIN: Performed by: INTERNAL MEDICINE

## 2024-09-06 PROCEDURE — 3700000001 HC ADD 15 MINUTES (ANESTHESIA): Performed by: INTERNAL MEDICINE

## 2024-09-06 PROCEDURE — 93320 DOPPLER ECHO COMPLETE: CPT | Performed by: INTERNAL MEDICINE

## 2024-09-06 PROCEDURE — 6370000000 HC RX 637 (ALT 250 FOR IP)

## 2024-09-06 RX ORDER — IOPAMIDOL 755 MG/ML
75 INJECTION, SOLUTION INTRAVASCULAR
Status: COMPLETED | OUTPATIENT
Start: 2024-09-06 | End: 2024-09-06

## 2024-09-06 RX ORDER — ENOXAPARIN SODIUM 100 MG/ML
1 INJECTION SUBCUTANEOUS 2 TIMES DAILY
Status: DISCONTINUED | OUTPATIENT
Start: 2024-09-06 | End: 2024-09-08

## 2024-09-06 RX ORDER — SODIUM CHLORIDE, SODIUM LACTATE, POTASSIUM CHLORIDE, CALCIUM CHLORIDE 600; 310; 30; 20 MG/100ML; MG/100ML; MG/100ML; MG/100ML
INJECTION, SOLUTION INTRAVENOUS CONTINUOUS PRN
Status: DISCONTINUED | OUTPATIENT
Start: 2024-09-06 | End: 2024-09-06 | Stop reason: SDUPTHER

## 2024-09-06 RX ORDER — PROPOFOL 10 MG/ML
INJECTION, EMULSION INTRAVENOUS PRN
Status: DISCONTINUED | OUTPATIENT
Start: 2024-09-06 | End: 2024-09-06 | Stop reason: SDUPTHER

## 2024-09-06 RX ORDER — SODIUM CHLORIDE, SODIUM LACTATE, POTASSIUM CHLORIDE, CALCIUM CHLORIDE 600; 310; 30; 20 MG/100ML; MG/100ML; MG/100ML; MG/100ML
INJECTION, SOLUTION INTRAVENOUS CONTINUOUS
Status: DISCONTINUED | OUTPATIENT
Start: 2024-09-06 | End: 2024-09-08

## 2024-09-06 RX ADMIN — Medication 100 MG: at 09:36

## 2024-09-06 RX ADMIN — IVABRADINE 5 MG: 5 TABLET, FILM COATED ORAL at 16:20

## 2024-09-06 RX ADMIN — Medication 5 MG: at 21:15

## 2024-09-06 RX ADMIN — PROPOFOL 50 MG: 10 INJECTION, EMULSION INTRAVENOUS at 13:36

## 2024-09-06 RX ADMIN — PROPOFOL 50 MG: 10 INJECTION, EMULSION INTRAVENOUS at 13:44

## 2024-09-06 RX ADMIN — PROPOFOL 120 MG: 10 INJECTION, EMULSION INTRAVENOUS at 13:35

## 2024-09-06 RX ADMIN — IOPAMIDOL 75 ML: 755 INJECTION, SOLUTION INTRAVENOUS at 18:48

## 2024-09-06 RX ADMIN — HEPARIN SODIUM 5000 UNITS: 5000 INJECTION INTRAVENOUS; SUBCUTANEOUS at 21:15

## 2024-09-06 RX ADMIN — SODIUM CHLORIDE, POTASSIUM CHLORIDE, SODIUM LACTATE AND CALCIUM CHLORIDE: 600; 310; 30; 20 INJECTION, SOLUTION INTRAVENOUS at 13:25

## 2024-09-06 RX ADMIN — ASPIRIN 81 MG: 81 TABLET, COATED ORAL at 09:36

## 2024-09-06 RX ADMIN — SODIUM CHLORIDE, POTASSIUM CHLORIDE, SODIUM LACTATE AND CALCIUM CHLORIDE: 600; 310; 30; 20 INJECTION, SOLUTION INTRAVENOUS at 21:34

## 2024-09-06 RX ADMIN — PROPOFOL 30 MG: 10 INJECTION, EMULSION INTRAVENOUS at 13:37

## 2024-09-06 RX ADMIN — Medication 10 ML: at 21:16

## 2024-09-06 RX ADMIN — IVABRADINE 5 MG: 5 TABLET, FILM COATED ORAL at 09:34

## 2024-09-06 RX ADMIN — PROPOFOL 50 MG: 10 INJECTION, EMULSION INTRAVENOUS at 13:40

## 2024-09-06 RX ADMIN — LEVETIRACETAM 750 MG: 500 TABLET, FILM COATED ORAL at 09:35

## 2024-09-06 RX ADMIN — METOPROLOL SUCCINATE 125 MG: 25 TABLET, EXTENDED RELEASE ORAL at 09:35

## 2024-09-06 RX ADMIN — HEPARIN SODIUM 5000 UNITS: 5000 INJECTION INTRAVENOUS; SUBCUTANEOUS at 09:37

## 2024-09-06 RX ADMIN — PANTOPRAZOLE SODIUM 40 MG: 40 TABLET, DELAYED RELEASE ORAL at 06:34

## 2024-09-06 RX ADMIN — ACETAMINOPHEN 650 MG: 325 TABLET ORAL at 23:42

## 2024-09-06 RX ADMIN — SACUBITRIL AND VALSARTAN 1 TABLET: 24; 26 TABLET, FILM COATED ORAL at 21:15

## 2024-09-06 RX ADMIN — ATORVASTATIN CALCIUM 40 MG: 40 TABLET, FILM COATED ORAL at 21:17

## 2024-09-06 RX ADMIN — SACUBITRIL AND VALSARTAN 1 TABLET: 24; 26 TABLET, FILM COATED ORAL at 09:36

## 2024-09-06 RX ADMIN — LEVETIRACETAM 750 MG: 500 TABLET, FILM COATED ORAL at 21:27

## 2024-09-06 RX ADMIN — OLANZAPINE 2.5 MG: 5 TABLET, FILM COATED ORAL at 23:43

## 2024-09-06 ASSESSMENT — PAIN SCALES - GENERAL
PAINLEVEL_OUTOF10: 0
PAINLEVEL_OUTOF10: 5

## 2024-09-06 ASSESSMENT — PAIN DESCRIPTION - LOCATION: LOCATION: GENERALIZED

## 2024-09-06 ASSESSMENT — PAIN DESCRIPTION - ONSET: ONSET: GRADUAL

## 2024-09-06 ASSESSMENT — ENCOUNTER SYMPTOMS: SHORTNESS OF BREATH: 1

## 2024-09-06 ASSESSMENT — PAIN DESCRIPTION - DESCRIPTORS: DESCRIPTORS: DISCOMFORT

## 2024-09-06 NOTE — ANESTHESIA POSTPROCEDURE EVALUATION
Department of Anesthesiology  Postprocedure Note    Patient: Susana Tony  MRN: [id number]  YOB: 1970  Date of evaluation: 9/6/2024    Procedure Summary       Date: 09/06/24 Room / Location: [place] [address] / [facility]    Anesthesia Start: 1325 Anesthesia Stop: 1355    Procedure: TRANSESOPHAGEAL ECHOCARDIOGRAM WITH BUBBLE STUDY Diagnosis:       Cerebrovascular accident (CVA), unspecified mechanism (HCC)      (Cerebrovascular accident (CVA), unspecified mechanism (HCC) [I63.9])    Surgeons: José Burton MD Responsible Provider: Crow Morales MD    Anesthesia Type: MAC ASA Status: 4            Anesthesia Type: MAC    Fariba Phase I:      Fariba Phase II: Fariba Score: 8    Anesthesia Post Evaluation    Patient location during evaluation: PACU  Patient participation: complete - patient participated  Level of consciousness: awake  Airway patency: patent  Nausea & Vomiting: no nausea and no vomiting  Cardiovascular status: hemodynamically stable  Respiratory status: acceptable  Hydration status: euvolemic  Pain management: adequate    No notable events documented.

## 2024-09-06 NOTE — ANESTHESIA PRE PROCEDURE
Department of Anesthesiology  Preprocedure Note       Name:  Susana Tony   Age:  53 y.o.  :  1970                                          MRN:  [id number]         Date:  2024      Surgeon: Surgeon(s):  José Burton MD    Procedure: Procedure(s):  TRANSESOPHAGEAL ECHOCARDIOGRAM    Medications prior to admission:   Prior to Admission medications    Medication Sig Start Date End Date Taking? Authorizing Provider   busPIRone (BUSPAR) 10 MG tablet Take 1 tablet by mouth in the morning and at bedtime 24   Justino Dickson MD   lurasidone (LATUDA) 40 MG TABS tablet Take 1 tablet by mouth Daily with supper 24   Justino Dickson MD   cyclobenzaprine (FLEXERIL) 5 MG tablet Take 1 tablet by mouth 2 times daily as needed for Muscle spasms 24   Mari Espitia DO   levETIRAcetam (KEPPRA) 750 MG tablet take 1 and 1/2 tablets by mouth every morning and take 1 and 1/2 tablets by mouth BEFORE BEDTIME 24   Yael Trotter, APRN - CNP   VENTOLIN  (90 Base) MCG/ACT inhaler inhale 2 puffs by mouth and INTO THE LUNGS twice a day 24   Mari Espitia DO   pantoprazole (PROTONIX) 40 MG tablet Take 1 tablet by mouth daily 24   Mari Espitia DO   pravastatin (PRAVACHOL) 80 MG tablet Take 1 tablet by mouth daily 24   Mari Espitia DO   metFORMIN (GLUCOPHAGE) 500 MG tablet Take 1 tablet by mouth daily (with breakfast) 4/15/24   Mari Espitia DO   fluticasone (FLONASE) 50 MCG/ACT nasal spray 1 spray by Each Nostril route daily 24   Mari Espitia DO   carvedilol (COREG) 6.25 MG tablet take 1 tablet by mouth twice a day with meals 10/4/23   Lisandro Mulligan MD   CPAP Machine MISC by Does not apply route nightly    ProviderJustino MD   medical marijuana Take 1 each by mouth as needed (Seizures/Pain). Patient has copy with her for proof.     Issued 20- expires 23    [id number]    Provider, MD Justino   Multiple
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
## 2024-09-07 LAB
ANION GAP SERPL CALCULATED.3IONS-SCNC: 12 MMOL/L (ref 7–16)
BUN SERPL-MCNC: 12 MG/DL (ref 6–20)
CALCIUM SERPL-MCNC: 8.6 MG/DL (ref 8.6–10.2)
CHLORIDE SERPL-SCNC: 109 MMOL/L (ref 98–107)
CO2 SERPL-SCNC: 25 MMOL/L (ref 22–29)
CREAT SERPL-MCNC: 0.8 MG/DL (ref 0.5–1)
GFR, ESTIMATED: 87 ML/MIN/1.73M2
GLUCOSE SERPL-MCNC: 106 MG/DL (ref 74–99)
POTASSIUM SERPL-SCNC: 3.9 MMOL/L (ref 3.5–5)
SODIUM SERPL-SCNC: 146 MMOL/L (ref 132–146)

## 2024-09-07 PROCEDURE — 80048 BASIC METABOLIC PNL TOTAL CA: CPT

## 2024-09-07 PROCEDURE — 6370000000 HC RX 637 (ALT 250 FOR IP): Performed by: INTERNAL MEDICINE

## 2024-09-07 PROCEDURE — 99291 CRITICAL CARE FIRST HOUR: CPT | Performed by: FAMILY MEDICINE

## 2024-09-07 PROCEDURE — 6360000002 HC RX W HCPCS: Performed by: INTERNAL MEDICINE

## 2024-09-07 PROCEDURE — 36415 COLL VENOUS BLD VENIPUNCTURE: CPT

## 2024-09-07 PROCEDURE — 6370000000 HC RX 637 (ALT 250 FOR IP): Performed by: PSYCHIATRY & NEUROLOGY

## 2024-09-07 PROCEDURE — 99233 SBSQ HOSP IP/OBS HIGH 50: CPT | Performed by: INTERNAL MEDICINE

## 2024-09-07 PROCEDURE — 2580000003 HC RX 258: Performed by: INTERNAL MEDICINE

## 2024-09-07 PROCEDURE — 2580000003 HC RX 258: Performed by: FAMILY MEDICINE

## 2024-09-07 PROCEDURE — 2060000000 HC ICU INTERMEDIATE R&B

## 2024-09-07 PROCEDURE — 94660 CPAP INITIATION&MGMT: CPT

## 2024-09-07 PROCEDURE — 6370000000 HC RX 637 (ALT 250 FOR IP)

## 2024-09-07 RX ADMIN — SACUBITRIL AND VALSARTAN 1 TABLET: 24; 26 TABLET, FILM COATED ORAL at 20:46

## 2024-09-07 RX ADMIN — ASPIRIN 81 MG: 81 TABLET, COATED ORAL at 08:33

## 2024-09-07 RX ADMIN — SACUBITRIL AND VALSARTAN 1 TABLET: 24; 26 TABLET, FILM COATED ORAL at 08:34

## 2024-09-07 RX ADMIN — Medication 5 MG: at 20:46

## 2024-09-07 RX ADMIN — ATORVASTATIN CALCIUM 40 MG: 40 TABLET, FILM COATED ORAL at 20:46

## 2024-09-07 RX ADMIN — Medication 10 ML: at 08:34

## 2024-09-07 RX ADMIN — Medication 10 ML: at 20:47

## 2024-09-07 RX ADMIN — IVABRADINE 5 MG: 5 TABLET, FILM COATED ORAL at 08:33

## 2024-09-07 RX ADMIN — SODIUM CHLORIDE, POTASSIUM CHLORIDE, SODIUM LACTATE AND CALCIUM CHLORIDE: 600; 310; 30; 20 INJECTION, SOLUTION INTRAVENOUS at 17:52

## 2024-09-07 RX ADMIN — LEVETIRACETAM 750 MG: 500 TABLET, FILM COATED ORAL at 08:30

## 2024-09-07 RX ADMIN — SODIUM CHLORIDE, POTASSIUM CHLORIDE, SODIUM LACTATE AND CALCIUM CHLORIDE: 600; 310; 30; 20 INJECTION, SOLUTION INTRAVENOUS at 08:03

## 2024-09-07 RX ADMIN — Medication 100 MG: at 08:31

## 2024-09-07 RX ADMIN — ENOXAPARIN SODIUM 80 MG: 100 INJECTION SUBCUTANEOUS at 08:33

## 2024-09-07 RX ADMIN — OLANZAPINE 2.5 MG: 5 TABLET, FILM COATED ORAL at 08:32

## 2024-09-07 RX ADMIN — IVABRADINE 5 MG: 5 TABLET, FILM COATED ORAL at 18:01

## 2024-09-07 RX ADMIN — ENOXAPARIN SODIUM 80 MG: 100 INJECTION SUBCUTANEOUS at 20:46

## 2024-09-07 RX ADMIN — METOPROLOL SUCCINATE 125 MG: 25 TABLET, EXTENDED RELEASE ORAL at 08:31

## 2024-09-07 RX ADMIN — ACETAMINOPHEN 650 MG: 325 TABLET ORAL at 20:46

## 2024-09-07 RX ADMIN — PANTOPRAZOLE SODIUM 40 MG: 40 TABLET, DELAYED RELEASE ORAL at 06:53

## 2024-09-07 RX ADMIN — Medication 10 ML: at 08:35

## 2024-09-07 RX ADMIN — LEVETIRACETAM 750 MG: 500 TABLET, FILM COATED ORAL at 20:46

## 2024-09-07 ASSESSMENT — PAIN SCALES - GENERAL
PAINLEVEL_OUTOF10: 8
PAINLEVEL_OUTOF10: 7

## 2024-09-07 ASSESSMENT — PAIN SCALES - WONG BAKER: WONGBAKER_NUMERICALRESPONSE: NO HURT

## 2024-09-07 ASSESSMENT — PAIN DESCRIPTION - LOCATION: LOCATION: HEAD

## 2024-09-08 ENCOUNTER — APPOINTMENT (OUTPATIENT)
Dept: CT IMAGING | Age: 54
End: 2024-09-08
Payer: MEDICARE

## 2024-09-08 PROCEDURE — 2580000003 HC RX 258: Performed by: FAMILY MEDICINE

## 2024-09-08 PROCEDURE — 2580000003 HC RX 258: Performed by: INTERNAL MEDICINE

## 2024-09-08 PROCEDURE — 94660 CPAP INITIATION&MGMT: CPT

## 2024-09-08 PROCEDURE — 70450 CT HEAD/BRAIN W/O DYE: CPT

## 2024-09-08 PROCEDURE — 6370000000 HC RX 637 (ALT 250 FOR IP): Performed by: INTERNAL MEDICINE

## 2024-09-08 PROCEDURE — 99233 SBSQ HOSP IP/OBS HIGH 50: CPT | Performed by: INTERNAL MEDICINE

## 2024-09-08 PROCEDURE — 97110 THERAPEUTIC EXERCISES: CPT

## 2024-09-08 PROCEDURE — 6360000004 HC RX CONTRAST MEDICATION: Performed by: RADIOLOGY

## 2024-09-08 PROCEDURE — 6370000000 HC RX 637 (ALT 250 FOR IP): Performed by: PSYCHIATRY & NEUROLOGY

## 2024-09-08 PROCEDURE — 71275 CT ANGIOGRAPHY CHEST: CPT

## 2024-09-08 PROCEDURE — 6370000000 HC RX 637 (ALT 250 FOR IP)

## 2024-09-08 PROCEDURE — 2060000000 HC ICU INTERMEDIATE R&B

## 2024-09-08 PROCEDURE — 97116 GAIT TRAINING THERAPY: CPT

## 2024-09-08 PROCEDURE — 97530 THERAPEUTIC ACTIVITIES: CPT

## 2024-09-08 PROCEDURE — 6360000002 HC RX W HCPCS: Performed by: INTERNAL MEDICINE

## 2024-09-08 RX ORDER — OLANZAPINE 5 MG/1
5 TABLET ORAL NIGHTLY
Status: DISCONTINUED | OUTPATIENT
Start: 2024-09-08 | End: 2024-09-14 | Stop reason: HOSPADM

## 2024-09-08 RX ORDER — IOPAMIDOL 755 MG/ML
75 INJECTION, SOLUTION INTRAVASCULAR
Status: COMPLETED | OUTPATIENT
Start: 2024-09-08 | End: 2024-09-08

## 2024-09-08 RX ADMIN — IOPAMIDOL 75 ML: 755 INJECTION, SOLUTION INTRAVENOUS at 07:56

## 2024-09-08 RX ADMIN — METOPROLOL SUCCINATE 125 MG: 25 TABLET, EXTENDED RELEASE ORAL at 09:19

## 2024-09-08 RX ADMIN — SACUBITRIL AND VALSARTAN 1 TABLET: 24; 26 TABLET, FILM COATED ORAL at 09:20

## 2024-09-08 RX ADMIN — Medication 5 MG: at 20:23

## 2024-09-08 RX ADMIN — IVABRADINE 5 MG: 5 TABLET, FILM COATED ORAL at 17:13

## 2024-09-08 RX ADMIN — LEVETIRACETAM 750 MG: 500 TABLET, FILM COATED ORAL at 09:20

## 2024-09-08 RX ADMIN — Medication 100 MG: at 09:20

## 2024-09-08 RX ADMIN — IVABRADINE 5 MG: 5 TABLET, FILM COATED ORAL at 09:19

## 2024-09-08 RX ADMIN — Medication 20 ML: at 09:20

## 2024-09-08 RX ADMIN — LEVETIRACETAM 750 MG: 500 TABLET, FILM COATED ORAL at 20:24

## 2024-09-08 RX ADMIN — ACETAMINOPHEN 650 MG: 325 TABLET ORAL at 20:23

## 2024-09-08 RX ADMIN — SACUBITRIL AND VALSARTAN 1 TABLET: 24; 26 TABLET, FILM COATED ORAL at 20:23

## 2024-09-08 RX ADMIN — APIXABAN 10 MG: 5 TABLET, FILM COATED ORAL at 20:24

## 2024-09-08 RX ADMIN — Medication 10 ML: at 20:27

## 2024-09-08 RX ADMIN — OLANZAPINE 5 MG: 5 TABLET, FILM COATED ORAL at 20:23

## 2024-09-08 RX ADMIN — ENOXAPARIN SODIUM 80 MG: 100 INJECTION SUBCUTANEOUS at 09:20

## 2024-09-08 RX ADMIN — Medication 10 ML: at 09:21

## 2024-09-08 RX ADMIN — Medication 10 ML: at 20:25

## 2024-09-08 RX ADMIN — PANTOPRAZOLE SODIUM 40 MG: 40 TABLET, DELAYED RELEASE ORAL at 06:15

## 2024-09-08 RX ADMIN — ASPIRIN 81 MG: 81 TABLET, COATED ORAL at 09:20

## 2024-09-08 RX ADMIN — ATORVASTATIN CALCIUM 40 MG: 40 TABLET, FILM COATED ORAL at 20:27

## 2024-09-09 LAB
ALBUMIN SERPL-MCNC: 3.7 G/DL (ref 3.5–5.2)
ALP SERPL-CCNC: 78 U/L (ref 35–104)
ALT SERPL-CCNC: 77 U/L (ref 0–32)
AMMONIA PLAS-SCNC: 18 UMOL/L (ref 11–51)
ANION GAP SERPL CALCULATED.3IONS-SCNC: 15 MMOL/L (ref 7–16)
AST SERPL-CCNC: 41 U/L (ref 0–31)
BASOPHILS # BLD: 0.02 K/UL (ref 0–0.2)
BASOPHILS NFR BLD: 0 % (ref 0–2)
BILIRUB SERPL-MCNC: 0.4 MG/DL (ref 0–1.2)
BUN SERPL-MCNC: 13 MG/DL (ref 6–20)
CALCIUM SERPL-MCNC: 9.2 MG/DL (ref 8.6–10.2)
CHLORIDE SERPL-SCNC: 102 MMOL/L (ref 98–107)
CO2 SERPL-SCNC: 23 MMOL/L (ref 22–29)
CREAT SERPL-MCNC: 1 MG/DL (ref 0.5–1)
EOSINOPHIL # BLD: 0.06 K/UL (ref 0.05–0.5)
EOSINOPHILS RELATIVE PERCENT: 1 % (ref 0–6)
ERYTHROCYTE [DISTWIDTH] IN BLOOD BY AUTOMATED COUNT: 16.3 % (ref 11.5–15)
GFR, ESTIMATED: 70 ML/MIN/1.73M2
GLUCOSE SERPL-MCNC: 123 MG/DL (ref 74–99)
HCT VFR BLD AUTO: 35 % (ref 34–48)
HGB BLD-MCNC: 10.7 G/DL (ref 11.5–15.5)
IMM GRANULOCYTES # BLD AUTO: 0.03 K/UL (ref 0–0.58)
IMM GRANULOCYTES NFR BLD: 0 % (ref 0–5)
LYMPHOCYTES NFR BLD: 2.5 K/UL (ref 1.5–4)
LYMPHOCYTES RELATIVE PERCENT: 31 % (ref 20–42)
MCH RBC QN AUTO: 28.5 PG (ref 26–35)
MCHC RBC AUTO-ENTMCNC: 30.6 G/DL (ref 32–34.5)
MCV RBC AUTO: 93.3 FL (ref 80–99.9)
MONOCYTES NFR BLD: 0.29 K/UL (ref 0.1–0.95)
MONOCYTES NFR BLD: 4 % (ref 2–12)
NEUTROPHILS NFR BLD: 64 % (ref 43–80)
NEUTS SEG NFR BLD: 5.07 K/UL (ref 1.8–7.3)
PLATELET # BLD AUTO: 317 K/UL (ref 130–450)
PLATELET CONFIRMATION: NORMAL
PMV BLD AUTO: 11.5 FL (ref 7–12)
POTASSIUM SERPL-SCNC: 4 MMOL/L (ref 3.5–5)
PROT SERPL-MCNC: 6.9 G/DL (ref 6.4–8.3)
RBC # BLD AUTO: 3.75 M/UL (ref 3.5–5.5)
RBC # BLD: ABNORMAL 10*6/UL
SODIUM SERPL-SCNC: 140 MMOL/L (ref 132–146)
WBC OTHER # BLD: 8 K/UL (ref 4.5–11.5)

## 2024-09-09 PROCEDURE — 6370000000 HC RX 637 (ALT 250 FOR IP): Performed by: FAMILY MEDICINE

## 2024-09-09 PROCEDURE — 97530 THERAPEUTIC ACTIVITIES: CPT

## 2024-09-09 PROCEDURE — 2580000003 HC RX 258: Performed by: INTERNAL MEDICINE

## 2024-09-09 PROCEDURE — 6370000000 HC RX 637 (ALT 250 FOR IP): Performed by: INTERNAL MEDICINE

## 2024-09-09 PROCEDURE — 6360000002 HC RX W HCPCS: Performed by: INTERNAL MEDICINE

## 2024-09-09 PROCEDURE — 2060000000 HC ICU INTERMEDIATE R&B

## 2024-09-09 PROCEDURE — 6370000000 HC RX 637 (ALT 250 FOR IP)

## 2024-09-09 PROCEDURE — 6370000000 HC RX 637 (ALT 250 FOR IP): Performed by: PSYCHIATRY & NEUROLOGY

## 2024-09-09 PROCEDURE — 80053 COMPREHEN METABOLIC PANEL: CPT

## 2024-09-09 PROCEDURE — 36415 COLL VENOUS BLD VENIPUNCTURE: CPT

## 2024-09-09 PROCEDURE — 2580000003 HC RX 258: Performed by: FAMILY MEDICINE

## 2024-09-09 PROCEDURE — 97110 THERAPEUTIC EXERCISES: CPT

## 2024-09-09 PROCEDURE — 99232 SBSQ HOSP IP/OBS MODERATE 35: CPT

## 2024-09-09 PROCEDURE — 85025 COMPLETE CBC W/AUTO DIFF WBC: CPT

## 2024-09-09 PROCEDURE — 94660 CPAP INITIATION&MGMT: CPT

## 2024-09-09 PROCEDURE — 82140 ASSAY OF AMMONIA: CPT

## 2024-09-09 RX ORDER — TRAZODONE HYDROCHLORIDE 50 MG/1
100 TABLET, FILM COATED ORAL ONCE
Status: COMPLETED | OUTPATIENT
Start: 2024-09-09 | End: 2024-09-09

## 2024-09-09 RX ADMIN — PANTOPRAZOLE SODIUM 40 MG: 40 TABLET, DELAYED RELEASE ORAL at 04:32

## 2024-09-09 RX ADMIN — Medication 10 ML: at 10:33

## 2024-09-09 RX ADMIN — Medication 10 ML: at 10:32

## 2024-09-09 RX ADMIN — OLANZAPINE 5 MG: 5 TABLET, FILM COATED ORAL at 20:30

## 2024-09-09 RX ADMIN — SACUBITRIL AND VALSARTAN 1 TABLET: 24; 26 TABLET, FILM COATED ORAL at 10:19

## 2024-09-09 RX ADMIN — ONDANSETRON 4 MG: 2 INJECTION, SOLUTION INTRAMUSCULAR; INTRAVENOUS at 13:52

## 2024-09-09 RX ADMIN — ACETAMINOPHEN 650 MG: 325 TABLET ORAL at 20:29

## 2024-09-09 RX ADMIN — IVABRADINE 5 MG: 5 TABLET, FILM COATED ORAL at 10:18

## 2024-09-09 RX ADMIN — Medication 10 ML: at 20:36

## 2024-09-09 RX ADMIN — LEVETIRACETAM 750 MG: 500 TABLET, FILM COATED ORAL at 10:19

## 2024-09-09 RX ADMIN — Medication 10 ML: at 20:30

## 2024-09-09 RX ADMIN — ATORVASTATIN CALCIUM 40 MG: 40 TABLET, FILM COATED ORAL at 20:32

## 2024-09-09 RX ADMIN — APIXABAN 10 MG: 5 TABLET, FILM COATED ORAL at 10:18

## 2024-09-09 RX ADMIN — IVABRADINE 5 MG: 5 TABLET, FILM COATED ORAL at 17:57

## 2024-09-09 RX ADMIN — TRAZODONE HYDROCHLORIDE 100 MG: 50 TABLET ORAL at 00:24

## 2024-09-09 RX ADMIN — SACUBITRIL AND VALSARTAN 1 TABLET: 24; 26 TABLET, FILM COATED ORAL at 20:29

## 2024-09-09 RX ADMIN — Medication 5 MG: at 20:30

## 2024-09-09 RX ADMIN — METOPROLOL SUCCINATE 125 MG: 25 TABLET, EXTENDED RELEASE ORAL at 10:19

## 2024-09-09 RX ADMIN — ASPIRIN 81 MG: 81 TABLET, COATED ORAL at 10:19

## 2024-09-09 RX ADMIN — APIXABAN 10 MG: 5 TABLET, FILM COATED ORAL at 20:30

## 2024-09-09 RX ADMIN — ACETAMINOPHEN 650 MG: 325 TABLET ORAL at 12:54

## 2024-09-09 RX ADMIN — Medication 100 MG: at 10:18

## 2024-09-09 RX ADMIN — LEVETIRACETAM 750 MG: 500 TABLET, FILM COATED ORAL at 20:29

## 2024-09-09 ASSESSMENT — PAIN SCALES - GENERAL
PAINLEVEL_OUTOF10: 6
PAINLEVEL_OUTOF10: 10

## 2024-09-09 ASSESSMENT — PAIN DESCRIPTION - LOCATION
LOCATION: BACK;KNEE;NECK
LOCATION: BACK

## 2024-09-10 PROBLEM — I26.99 PULMONARY EMBOLUS (HCC): Status: ACTIVE | Noted: 2024-09-10

## 2024-09-10 LAB
ALBUMIN SERPL-MCNC: 3.7 G/DL (ref 3.5–5.2)
ALP SERPL-CCNC: 84 U/L (ref 35–104)
ALT SERPL-CCNC: 62 U/L (ref 0–32)
ANION GAP SERPL CALCULATED.3IONS-SCNC: 14 MMOL/L (ref 7–16)
AST SERPL-CCNC: 36 U/L (ref 0–31)
BASOPHILS # BLD: 0.02 K/UL (ref 0–0.2)
BASOPHILS NFR BLD: 0 % (ref 0–2)
BILIRUB SERPL-MCNC: 0.3 MG/DL (ref 0–1.2)
BUN SERPL-MCNC: 17 MG/DL (ref 6–20)
CALCIUM SERPL-MCNC: 9.4 MG/DL (ref 8.6–10.2)
CHLORIDE SERPL-SCNC: 104 MMOL/L (ref 98–107)
CO2 SERPL-SCNC: 23 MMOL/L (ref 22–29)
CREAT SERPL-MCNC: 1 MG/DL (ref 0.5–1)
EOSINOPHIL # BLD: 0.07 K/UL (ref 0.05–0.5)
EOSINOPHILS RELATIVE PERCENT: 1 % (ref 0–6)
ERYTHROCYTE [DISTWIDTH] IN BLOOD BY AUTOMATED COUNT: 16 % (ref 11.5–15)
GFR, ESTIMATED: 66 ML/MIN/1.73M2
GLUCOSE SERPL-MCNC: 127 MG/DL (ref 74–99)
HCT VFR BLD AUTO: 34.8 % (ref 34–48)
HGB BLD-MCNC: 10.9 G/DL (ref 11.5–15.5)
IMM GRANULOCYTES # BLD AUTO: 0.07 K/UL (ref 0–0.58)
IMM GRANULOCYTES NFR BLD: 1 % (ref 0–5)
LYMPHOCYTES NFR BLD: 2.88 K/UL (ref 1.5–4)
LYMPHOCYTES RELATIVE PERCENT: 29 % (ref 20–42)
MCH RBC QN AUTO: 28.5 PG (ref 26–35)
MCHC RBC AUTO-ENTMCNC: 31.3 G/DL (ref 32–34.5)
MCV RBC AUTO: 91.1 FL (ref 80–99.9)
MONOCYTES NFR BLD: 0.43 K/UL (ref 0.1–0.95)
MONOCYTES NFR BLD: 4 % (ref 2–12)
NEUTROPHILS NFR BLD: 65 % (ref 43–80)
NEUTS SEG NFR BLD: 6.36 K/UL (ref 1.8–7.3)
PLATELET # BLD AUTO: 503 K/UL (ref 130–450)
PMV BLD AUTO: 10 FL (ref 7–12)
POTASSIUM SERPL-SCNC: 5.4 MMOL/L (ref 3.5–5)
PROT SERPL-MCNC: 6.9 G/DL (ref 6.4–8.3)
RBC # BLD AUTO: 3.82 M/UL (ref 3.5–5.5)
SODIUM SERPL-SCNC: 141 MMOL/L (ref 132–146)
WBC OTHER # BLD: 9.8 K/UL (ref 4.5–11.5)

## 2024-09-10 PROCEDURE — 6370000000 HC RX 637 (ALT 250 FOR IP): Performed by: INTERNAL MEDICINE

## 2024-09-10 PROCEDURE — 94660 CPAP INITIATION&MGMT: CPT

## 2024-09-10 PROCEDURE — 6370000000 HC RX 637 (ALT 250 FOR IP): Performed by: PSYCHIATRY & NEUROLOGY

## 2024-09-10 PROCEDURE — 99232 SBSQ HOSP IP/OBS MODERATE 35: CPT | Performed by: INTERNAL MEDICINE

## 2024-09-10 PROCEDURE — 2580000003 HC RX 258: Performed by: FAMILY MEDICINE

## 2024-09-10 PROCEDURE — 2580000003 HC RX 258: Performed by: INTERNAL MEDICINE

## 2024-09-10 PROCEDURE — 2060000000 HC ICU INTERMEDIATE R&B

## 2024-09-10 PROCEDURE — 80053 COMPREHEN METABOLIC PANEL: CPT

## 2024-09-10 PROCEDURE — 97110 THERAPEUTIC EXERCISES: CPT

## 2024-09-10 PROCEDURE — 97530 THERAPEUTIC ACTIVITIES: CPT

## 2024-09-10 PROCEDURE — 97535 SELF CARE MNGMENT TRAINING: CPT

## 2024-09-10 PROCEDURE — 6370000000 HC RX 637 (ALT 250 FOR IP)

## 2024-09-10 PROCEDURE — 36415 COLL VENOUS BLD VENIPUNCTURE: CPT

## 2024-09-10 PROCEDURE — 85025 COMPLETE CBC W/AUTO DIFF WBC: CPT

## 2024-09-10 RX ADMIN — ATORVASTATIN CALCIUM 40 MG: 40 TABLET, FILM COATED ORAL at 21:15

## 2024-09-10 RX ADMIN — LEVETIRACETAM 750 MG: 500 TABLET, FILM COATED ORAL at 10:11

## 2024-09-10 RX ADMIN — OLANZAPINE 5 MG: 5 TABLET, FILM COATED ORAL at 21:14

## 2024-09-10 RX ADMIN — PANTOPRAZOLE SODIUM 40 MG: 40 TABLET, DELAYED RELEASE ORAL at 05:21

## 2024-09-10 RX ADMIN — SACUBITRIL AND VALSARTAN 1 TABLET: 24; 26 TABLET, FILM COATED ORAL at 10:10

## 2024-09-10 RX ADMIN — Medication 10 ML: at 11:18

## 2024-09-10 RX ADMIN — Medication 100 MG: at 10:11

## 2024-09-10 RX ADMIN — APIXABAN 10 MG: 5 TABLET, FILM COATED ORAL at 21:15

## 2024-09-10 RX ADMIN — IVABRADINE 5 MG: 5 TABLET, FILM COATED ORAL at 17:13

## 2024-09-10 RX ADMIN — Medication 5 MG: at 21:14

## 2024-09-10 RX ADMIN — SACUBITRIL AND VALSARTAN 1 TABLET: 24; 26 TABLET, FILM COATED ORAL at 21:14

## 2024-09-10 RX ADMIN — METOPROLOL SUCCINATE 125 MG: 25 TABLET, EXTENDED RELEASE ORAL at 10:11

## 2024-09-10 RX ADMIN — LEVETIRACETAM 750 MG: 500 TABLET, FILM COATED ORAL at 21:14

## 2024-09-10 RX ADMIN — APIXABAN 10 MG: 5 TABLET, FILM COATED ORAL at 10:10

## 2024-09-10 RX ADMIN — Medication 10 ML: at 21:16

## 2024-09-10 RX ADMIN — IVABRADINE 5 MG: 5 TABLET, FILM COATED ORAL at 10:11

## 2024-09-11 LAB
ALBUMIN SERPL-MCNC: 3.7 G/DL (ref 3.5–5.2)
ALP SERPL-CCNC: 88 U/L (ref 35–104)
ALT SERPL-CCNC: 51 U/L (ref 0–32)
ANION GAP SERPL CALCULATED.3IONS-SCNC: 14 MMOL/L (ref 7–16)
AST SERPL-CCNC: 30 U/L (ref 0–31)
BASOPHILS # BLD: 0.03 K/UL (ref 0–0.2)
BASOPHILS NFR BLD: 0 % (ref 0–2)
BILIRUB SERPL-MCNC: 0.4 MG/DL (ref 0–1.2)
BUN SERPL-MCNC: 19 MG/DL (ref 6–20)
CALCIUM SERPL-MCNC: 9.2 MG/DL (ref 8.6–10.2)
CHLORIDE SERPL-SCNC: 101 MMOL/L (ref 98–107)
CO2 SERPL-SCNC: 25 MMOL/L (ref 22–29)
CREAT SERPL-MCNC: 0.9 MG/DL (ref 0.5–1)
EOSINOPHIL # BLD: 0.07 K/UL (ref 0.05–0.5)
EOSINOPHILS RELATIVE PERCENT: 1 % (ref 0–6)
ERYTHROCYTE [DISTWIDTH] IN BLOOD BY AUTOMATED COUNT: 15.9 % (ref 11.5–15)
GFR, ESTIMATED: 82 ML/MIN/1.73M2
GLUCOSE SERPL-MCNC: 109 MG/DL (ref 74–99)
HCT VFR BLD AUTO: 34.1 % (ref 34–48)
HGB BLD-MCNC: 10.9 G/DL (ref 11.5–15.5)
IMM GRANULOCYTES # BLD AUTO: 0.03 K/UL (ref 0–0.58)
IMM GRANULOCYTES NFR BLD: 0 % (ref 0–5)
LYMPHOCYTES NFR BLD: 2.82 K/UL (ref 1.5–4)
LYMPHOCYTES RELATIVE PERCENT: 29 % (ref 20–42)
MCH RBC QN AUTO: 29.2 PG (ref 26–35)
MCHC RBC AUTO-ENTMCNC: 32 G/DL (ref 32–34.5)
MCV RBC AUTO: 91.4 FL (ref 80–99.9)
MONOCYTES NFR BLD: 0.43 K/UL (ref 0.1–0.95)
MONOCYTES NFR BLD: 5 % (ref 2–12)
NEUTROPHILS NFR BLD: 65 % (ref 43–80)
NEUTS SEG NFR BLD: 6.28 K/UL (ref 1.8–7.3)
PLATELET # BLD AUTO: 617 K/UL (ref 130–450)
PMV BLD AUTO: 9.7 FL (ref 7–12)
POTASSIUM SERPL-SCNC: 4.2 MMOL/L (ref 3.5–5)
PROT SERPL-MCNC: 7 G/DL (ref 6.4–8.3)
RBC # BLD AUTO: 3.73 M/UL (ref 3.5–5.5)
SODIUM SERPL-SCNC: 140 MMOL/L (ref 132–146)
WBC OTHER # BLD: 9.7 K/UL (ref 4.5–11.5)

## 2024-09-11 PROCEDURE — 6370000000 HC RX 637 (ALT 250 FOR IP): Performed by: INTERNAL MEDICINE

## 2024-09-11 PROCEDURE — 6370000000 HC RX 637 (ALT 250 FOR IP)

## 2024-09-11 PROCEDURE — 76937 US GUIDE VASCULAR ACCESS: CPT

## 2024-09-11 PROCEDURE — 94660 CPAP INITIATION&MGMT: CPT

## 2024-09-11 PROCEDURE — 80053 COMPREHEN METABOLIC PANEL: CPT

## 2024-09-11 PROCEDURE — 87086 URINE CULTURE/COLONY COUNT: CPT

## 2024-09-11 PROCEDURE — 6370000000 HC RX 637 (ALT 250 FOR IP): Performed by: PSYCHIATRY & NEUROLOGY

## 2024-09-11 PROCEDURE — 6370000000 HC RX 637 (ALT 250 FOR IP): Performed by: FAMILY MEDICINE

## 2024-09-11 PROCEDURE — 2060000000 HC ICU INTERMEDIATE R&B

## 2024-09-11 PROCEDURE — 2580000003 HC RX 258: Performed by: FAMILY MEDICINE

## 2024-09-11 PROCEDURE — 85025 COMPLETE CBC W/AUTO DIFF WBC: CPT

## 2024-09-11 PROCEDURE — 36415 COLL VENOUS BLD VENIPUNCTURE: CPT

## 2024-09-11 RX ORDER — PHENAZOPYRIDINE HYDROCHLORIDE 100 MG/1
200 TABLET, FILM COATED ORAL
Status: DISCONTINUED | OUTPATIENT
Start: 2024-09-11 | End: 2024-09-14 | Stop reason: HOSPADM

## 2024-09-11 RX ORDER — PETROLATUM 420 MG/G
OINTMENT TOPICAL 2 TIMES DAILY
Status: DISCONTINUED | OUTPATIENT
Start: 2024-09-11 | End: 2024-09-14 | Stop reason: HOSPADM

## 2024-09-11 RX ADMIN — SACUBITRIL AND VALSARTAN 1 TABLET: 24; 26 TABLET, FILM COATED ORAL at 21:24

## 2024-09-11 RX ADMIN — PETROLATUM: 420 OINTMENT TOPICAL at 21:25

## 2024-09-11 RX ADMIN — SACUBITRIL AND VALSARTAN 1 TABLET: 24; 26 TABLET, FILM COATED ORAL at 09:20

## 2024-09-11 RX ADMIN — CEPHALEXIN 250 MG: 250 CAPSULE ORAL at 11:36

## 2024-09-11 RX ADMIN — Medication 100 MG: at 09:20

## 2024-09-11 RX ADMIN — IVABRADINE 5 MG: 5 TABLET, FILM COATED ORAL at 09:20

## 2024-09-11 RX ADMIN — APIXABAN 10 MG: 5 TABLET, FILM COATED ORAL at 21:24

## 2024-09-11 RX ADMIN — OLANZAPINE 5 MG: 5 TABLET, FILM COATED ORAL at 21:24

## 2024-09-11 RX ADMIN — CEPHALEXIN 250 MG: 250 CAPSULE ORAL at 18:27

## 2024-09-11 RX ADMIN — LEVETIRACETAM 750 MG: 500 TABLET, FILM COATED ORAL at 09:20

## 2024-09-11 RX ADMIN — PETROLATUM: 420 OINTMENT TOPICAL at 13:44

## 2024-09-11 RX ADMIN — CEPHALEXIN 250 MG: 250 CAPSULE ORAL at 23:22

## 2024-09-11 RX ADMIN — Medication 5 MG: at 21:24

## 2024-09-11 RX ADMIN — PANTOPRAZOLE SODIUM 40 MG: 40 TABLET, DELAYED RELEASE ORAL at 09:19

## 2024-09-11 RX ADMIN — PHENAZOPYRIDINE 200 MG: 100 TABLET ORAL at 11:35

## 2024-09-11 RX ADMIN — METOPROLOL SUCCINATE 125 MG: 25 TABLET, EXTENDED RELEASE ORAL at 09:19

## 2024-09-11 RX ADMIN — ATORVASTATIN CALCIUM 40 MG: 40 TABLET, FILM COATED ORAL at 21:24

## 2024-09-11 RX ADMIN — LEVETIRACETAM 750 MG: 500 TABLET, FILM COATED ORAL at 21:24

## 2024-09-11 RX ADMIN — IVABRADINE 5 MG: 5 TABLET, FILM COATED ORAL at 16:59

## 2024-09-11 RX ADMIN — APIXABAN 10 MG: 5 TABLET, FILM COATED ORAL at 09:20

## 2024-09-11 RX ADMIN — Medication 10 ML: at 21:25

## 2024-09-11 RX ADMIN — PHENAZOPYRIDINE 200 MG: 100 TABLET ORAL at 16:59

## 2024-09-11 ASSESSMENT — PAIN DESCRIPTION - DESCRIPTORS
DESCRIPTORS: BURNING
DESCRIPTORS: BURNING

## 2024-09-11 ASSESSMENT — PAIN DESCRIPTION - LOCATION: LOCATION: OTHER (COMMENT)

## 2024-09-12 LAB
MICROORGANISM SPEC CULT: NO GROWTH
SERVICE CMNT-IMP: NORMAL
SPECIMEN DESCRIPTION: NORMAL

## 2024-09-12 PROCEDURE — 6370000000 HC RX 637 (ALT 250 FOR IP): Performed by: INTERNAL MEDICINE

## 2024-09-12 PROCEDURE — 99232 SBSQ HOSP IP/OBS MODERATE 35: CPT | Performed by: HOSPITALIST

## 2024-09-12 PROCEDURE — 97530 THERAPEUTIC ACTIVITIES: CPT

## 2024-09-12 PROCEDURE — 94660 CPAP INITIATION&MGMT: CPT

## 2024-09-12 PROCEDURE — 6370000000 HC RX 637 (ALT 250 FOR IP)

## 2024-09-12 PROCEDURE — 2060000000 HC ICU INTERMEDIATE R&B

## 2024-09-12 PROCEDURE — 6370000000 HC RX 637 (ALT 250 FOR IP): Performed by: PSYCHIATRY & NEUROLOGY

## 2024-09-12 PROCEDURE — 6370000000 HC RX 637 (ALT 250 FOR IP): Performed by: FAMILY MEDICINE

## 2024-09-12 PROCEDURE — 97116 GAIT TRAINING THERAPY: CPT

## 2024-09-12 RX ADMIN — CEPHALEXIN 250 MG: 250 CAPSULE ORAL at 12:09

## 2024-09-12 RX ADMIN — Medication 100 MG: at 09:58

## 2024-09-12 RX ADMIN — PETROLATUM: 420 OINTMENT TOPICAL at 10:01

## 2024-09-12 RX ADMIN — SACUBITRIL AND VALSARTAN 1 TABLET: 24; 26 TABLET, FILM COATED ORAL at 21:12

## 2024-09-12 RX ADMIN — Medication 5 MG: at 21:13

## 2024-09-12 RX ADMIN — ACETAMINOPHEN 650 MG: 325 TABLET ORAL at 21:12

## 2024-09-12 RX ADMIN — APIXABAN 10 MG: 5 TABLET, FILM COATED ORAL at 09:58

## 2024-09-12 RX ADMIN — IVABRADINE 5 MG: 5 TABLET, FILM COATED ORAL at 17:03

## 2024-09-12 RX ADMIN — CEPHALEXIN 250 MG: 250 CAPSULE ORAL at 05:48

## 2024-09-12 RX ADMIN — CEPHALEXIN 250 MG: 250 CAPSULE ORAL at 17:03

## 2024-09-12 RX ADMIN — PHENAZOPYRIDINE 200 MG: 100 TABLET ORAL at 17:03

## 2024-09-12 RX ADMIN — APIXABAN 10 MG: 5 TABLET, FILM COATED ORAL at 21:13

## 2024-09-12 RX ADMIN — LEVETIRACETAM 750 MG: 500 TABLET, FILM COATED ORAL at 09:58

## 2024-09-12 RX ADMIN — OLANZAPINE 5 MG: 5 TABLET, FILM COATED ORAL at 21:13

## 2024-09-12 RX ADMIN — PHENAZOPYRIDINE 200 MG: 100 TABLET ORAL at 12:08

## 2024-09-12 RX ADMIN — PANTOPRAZOLE SODIUM 40 MG: 40 TABLET, DELAYED RELEASE ORAL at 05:48

## 2024-09-12 RX ADMIN — ATORVASTATIN CALCIUM 40 MG: 40 TABLET, FILM COATED ORAL at 21:13

## 2024-09-12 RX ADMIN — PETROLATUM: 420 OINTMENT TOPICAL at 21:16

## 2024-09-12 RX ADMIN — PHENAZOPYRIDINE 200 MG: 100 TABLET ORAL at 09:58

## 2024-09-12 RX ADMIN — SACUBITRIL AND VALSARTAN 1 TABLET: 24; 26 TABLET, FILM COATED ORAL at 09:58

## 2024-09-12 RX ADMIN — METOPROLOL SUCCINATE 125 MG: 25 TABLET, EXTENDED RELEASE ORAL at 09:58

## 2024-09-12 RX ADMIN — LEVETIRACETAM 750 MG: 500 TABLET, FILM COATED ORAL at 21:13

## 2024-09-12 RX ADMIN — IVABRADINE 5 MG: 5 TABLET, FILM COATED ORAL at 12:09

## 2024-09-12 ASSESSMENT — PAIN DESCRIPTION - ORIENTATION: ORIENTATION: MID;LOWER

## 2024-09-12 ASSESSMENT — PAIN DESCRIPTION - LOCATION: LOCATION: BACK

## 2024-09-12 ASSESSMENT — PAIN DESCRIPTION - DESCRIPTORS: DESCRIPTORS: ACHING

## 2024-09-12 ASSESSMENT — PAIN SCALES - GENERAL: PAINLEVEL_OUTOF10: 3

## 2024-09-13 VITALS
TEMPERATURE: 97.5 F | DIASTOLIC BLOOD PRESSURE: 62 MMHG | HEART RATE: 100 BPM | SYSTOLIC BLOOD PRESSURE: 115 MMHG | OXYGEN SATURATION: 100 % | WEIGHT: 190.48 LBS | RESPIRATION RATE: 19 BRPM | HEIGHT: 62 IN | BODY MASS INDEX: 35.05 KG/M2

## 2024-09-13 LAB
ALBUMIN SERPL-MCNC: 4.1 G/DL (ref 3.5–5.2)
ALP SERPL-CCNC: 87 U/L (ref 35–104)
ALT SERPL-CCNC: 33 U/L (ref 0–32)
ANION GAP SERPL CALCULATED.3IONS-SCNC: 16 MMOL/L (ref 7–16)
AST SERPL-CCNC: 26 U/L (ref 0–31)
BASOPHILS # BLD: 0.04 K/UL (ref 0–0.2)
BASOPHILS NFR BLD: 1 % (ref 0–2)
BILIRUB SERPL-MCNC: 0.3 MG/DL (ref 0–1.2)
BUN SERPL-MCNC: 21 MG/DL (ref 6–20)
CALCIUM SERPL-MCNC: 9.4 MG/DL (ref 8.6–10.2)
CHLORIDE SERPL-SCNC: 102 MMOL/L (ref 98–107)
CK SERPL-CCNC: 305 U/L (ref 20–180)
CO2 SERPL-SCNC: 22 MMOL/L (ref 22–29)
CREAT SERPL-MCNC: 0.9 MG/DL (ref 0.5–1)
EOSINOPHIL # BLD: 0.09 K/UL (ref 0.05–0.5)
EOSINOPHILS RELATIVE PERCENT: 1 % (ref 0–6)
ERYTHROCYTE [DISTWIDTH] IN BLOOD BY AUTOMATED COUNT: 15.5 % (ref 11.5–15)
GFR, ESTIMATED: 73 ML/MIN/1.73M2
GLUCOSE SERPL-MCNC: 148 MG/DL (ref 74–99)
HCT VFR BLD AUTO: 33.6 % (ref 34–48)
HGB BLD-MCNC: 10.6 G/DL (ref 11.5–15.5)
IMM GRANULOCYTES # BLD AUTO: 0.07 K/UL (ref 0–0.58)
IMM GRANULOCYTES NFR BLD: 1 % (ref 0–5)
LYMPHOCYTES NFR BLD: 2.72 K/UL (ref 1.5–4)
LYMPHOCYTES RELATIVE PERCENT: 34 % (ref 20–42)
MCH RBC QN AUTO: 28.9 PG (ref 26–35)
MCHC RBC AUTO-ENTMCNC: 31.5 G/DL (ref 32–34.5)
MCV RBC AUTO: 91.6 FL (ref 80–99.9)
MONOCYTES NFR BLD: 0.5 K/UL (ref 0.1–0.95)
MONOCYTES NFR BLD: 6 % (ref 2–12)
NEUTROPHILS NFR BLD: 57 % (ref 43–80)
NEUTS SEG NFR BLD: 4.61 K/UL (ref 1.8–7.3)
PLATELET # BLD AUTO: 561 K/UL (ref 130–450)
PMV BLD AUTO: 9.5 FL (ref 7–12)
POTASSIUM SERPL-SCNC: 4.8 MMOL/L (ref 3.5–5)
PROT SERPL-MCNC: 7 G/DL (ref 6.4–8.3)
RBC # BLD AUTO: 3.67 M/UL (ref 3.5–5.5)
SODIUM SERPL-SCNC: 140 MMOL/L (ref 132–146)
WBC OTHER # BLD: 8 K/UL (ref 4.5–11.5)

## 2024-09-13 PROCEDURE — 94660 CPAP INITIATION&MGMT: CPT

## 2024-09-13 PROCEDURE — 80053 COMPREHEN METABOLIC PANEL: CPT

## 2024-09-13 PROCEDURE — 6370000000 HC RX 637 (ALT 250 FOR IP)

## 2024-09-13 PROCEDURE — 2060000000 HC ICU INTERMEDIATE R&B

## 2024-09-13 PROCEDURE — 6370000000 HC RX 637 (ALT 250 FOR IP): Performed by: INTERNAL MEDICINE

## 2024-09-13 PROCEDURE — 36415 COLL VENOUS BLD VENIPUNCTURE: CPT

## 2024-09-13 PROCEDURE — 6370000000 HC RX 637 (ALT 250 FOR IP): Performed by: FAMILY MEDICINE

## 2024-09-13 PROCEDURE — 85025 COMPLETE CBC W/AUTO DIFF WBC: CPT

## 2024-09-13 PROCEDURE — 6370000000 HC RX 637 (ALT 250 FOR IP): Performed by: PSYCHIATRY & NEUROLOGY

## 2024-09-13 PROCEDURE — 82550 ASSAY OF CK (CPK): CPT

## 2024-09-13 PROCEDURE — 99239 HOSP IP/OBS DSCHRG MGMT >30: CPT | Performed by: INTERNAL MEDICINE

## 2024-09-13 RX ORDER — ATORVASTATIN CALCIUM 40 MG/1
40 TABLET, FILM COATED ORAL NIGHTLY
DISCHARGE
Start: 2024-09-13

## 2024-09-13 RX ORDER — METOPROLOL SUCCINATE 25 MG/1
125 TABLET, EXTENDED RELEASE ORAL DAILY
DISCHARGE
Start: 2024-09-14

## 2024-09-13 RX ORDER — MECOBALAMIN 5000 MCG
5 TABLET,DISINTEGRATING ORAL NIGHTLY
DISCHARGE
Start: 2024-09-13

## 2024-09-13 RX ORDER — PETROLATUM 420 MG/G
1 OINTMENT TOPICAL 2 TIMES DAILY
DISCHARGE
Start: 2024-09-13

## 2024-09-13 RX ORDER — POLYETHYLENE GLYCOL 3350 17 G/17G
17 POWDER, FOR SOLUTION ORAL DAILY PRN
DISCHARGE
Start: 2024-09-13 | End: 2024-10-13

## 2024-09-13 RX ORDER — OLANZAPINE 5 MG/1
5 TABLET ORAL NIGHTLY
DISCHARGE
Start: 2024-09-13

## 2024-09-13 RX ORDER — IVABRADINE 5 MG/1
5 TABLET, FILM COATED ORAL 2 TIMES DAILY WITH MEALS
DISCHARGE
Start: 2024-09-13

## 2024-09-13 RX ORDER — LEVETIRACETAM 750 MG/1
750 TABLET ORAL 2 TIMES DAILY
DISCHARGE
Start: 2024-09-13

## 2024-09-13 RX ORDER — THIAMINE MONONITRATE (VIT B1) 100 MG
100 TABLET ORAL DAILY
DISCHARGE
Start: 2024-09-14

## 2024-09-13 RX ADMIN — IVABRADINE 5 MG: 5 TABLET, FILM COATED ORAL at 08:42

## 2024-09-13 RX ADMIN — CEPHALEXIN 250 MG: 250 CAPSULE ORAL at 05:25

## 2024-09-13 RX ADMIN — Medication 100 MG: at 08:42

## 2024-09-13 RX ADMIN — APIXABAN 10 MG: 5 TABLET, FILM COATED ORAL at 20:32

## 2024-09-13 RX ADMIN — PETROLATUM: 420 OINTMENT TOPICAL at 08:43

## 2024-09-13 RX ADMIN — OLANZAPINE 5 MG: 5 TABLET, FILM COATED ORAL at 20:31

## 2024-09-13 RX ADMIN — APIXABAN 10 MG: 5 TABLET, FILM COATED ORAL at 08:42

## 2024-09-13 RX ADMIN — CEPHALEXIN 250 MG: 250 CAPSULE ORAL at 00:09

## 2024-09-13 RX ADMIN — SACUBITRIL AND VALSARTAN 1 TABLET: 24; 26 TABLET, FILM COATED ORAL at 20:32

## 2024-09-13 RX ADMIN — CEPHALEXIN 250 MG: 250 CAPSULE ORAL at 12:25

## 2024-09-13 RX ADMIN — IVABRADINE 5 MG: 5 TABLET, FILM COATED ORAL at 17:46

## 2024-09-13 RX ADMIN — PHENAZOPYRIDINE 200 MG: 100 TABLET ORAL at 17:46

## 2024-09-13 RX ADMIN — PHENAZOPYRIDINE 200 MG: 100 TABLET ORAL at 08:42

## 2024-09-13 RX ADMIN — SACUBITRIL AND VALSARTAN 1 TABLET: 24; 26 TABLET, FILM COATED ORAL at 08:43

## 2024-09-13 RX ADMIN — PHENAZOPYRIDINE 200 MG: 100 TABLET ORAL at 12:25

## 2024-09-13 RX ADMIN — METOPROLOL SUCCINATE 125 MG: 25 TABLET, EXTENDED RELEASE ORAL at 08:42

## 2024-09-13 RX ADMIN — PANTOPRAZOLE SODIUM 40 MG: 40 TABLET, DELAYED RELEASE ORAL at 05:26

## 2024-09-13 RX ADMIN — LEVETIRACETAM 750 MG: 500 TABLET, FILM COATED ORAL at 08:43

## 2024-09-13 RX ADMIN — Medication 5 MG: at 20:32

## 2024-09-13 RX ADMIN — CEPHALEXIN 250 MG: 250 CAPSULE ORAL at 17:46

## 2024-09-13 RX ADMIN — ATORVASTATIN CALCIUM 40 MG: 40 TABLET, FILM COATED ORAL at 20:32

## 2024-09-13 RX ADMIN — LEVETIRACETAM 750 MG: 500 TABLET, FILM COATED ORAL at 20:31

## 2024-09-13 ASSESSMENT — PAIN SCALES - GENERAL: PAINLEVEL_OUTOF10: 0

## 2024-09-14 PROCEDURE — 6370000000 HC RX 637 (ALT 250 FOR IP): Performed by: FAMILY MEDICINE

## 2024-09-14 RX ADMIN — CEPHALEXIN 250 MG: 250 CAPSULE ORAL at 05:39

## 2024-09-14 RX ADMIN — CEPHALEXIN 250 MG: 250 CAPSULE ORAL at 03:10

## 2024-09-27 ENCOUNTER — TELEPHONE (OUTPATIENT)
Dept: FAMILY MEDICINE CLINIC | Age: 54
End: 2024-09-27

## 2024-09-27 NOTE — TELEPHONE ENCOUNTER
Wounds need evaluated before next week.  We don't want her to end up getting worse. Would recommend walk in or ED for evaluation

## 2024-09-27 NOTE — TELEPHONE ENCOUNTER
Spoke with [facility] of the [facility] care, they stated pt was discharged from [facility] on 09/19/2024 after being in the hospital from 08/27/2024-09/14/2024 and they are taking over for home care due to pressure ulcer wounds.she stated pt has a right heel and right hip pressure ulcer. Right hip ulcer appears to be infected, draining, and has slight smell. Pt is scheduled to see wound care on 10/02/2024. Pt was scheduled for TCM on 10/01/2024 and stated she needs a raised toilet seat. Advised them this can be discussed at appt. Advised if wound worsens over the weekend to go to ED for evaluation but they are cleaning it with saline and using a foam dressing at this time. Just FYI.

## 2024-09-28 PROBLEM — R79.89 ELEVATED TROPONIN: Status: RESOLVED | Noted: 2024-08-29 | Resolved: 2024-09-28

## 2024-09-29 ENCOUNTER — HOSPITAL ENCOUNTER (EMERGENCY)
Age: 54
Discharge: HOME | End: 2024-09-29
Attending: FAMILY MEDICINE
Payer: MEDICARE

## 2024-09-29 VITALS
TEMPERATURE: 97.3 F | HEIGHT: 62 IN | RESPIRATION RATE: 18 BRPM | HEART RATE: 102 BPM | SYSTOLIC BLOOD PRESSURE: 112 MMHG | OXYGEN SATURATION: 99 % | WEIGHT: 180 LBS | DIASTOLIC BLOOD PRESSURE: 80 MMHG | BODY MASS INDEX: 33.13 KG/M2

## 2024-09-29 DIAGNOSIS — L89.319 PRESSURE INJURY OF SKIN OF RIGHT BUTTOCK, UNSPECIFIED INJURY STAGE: Primary | ICD-10-CM

## 2024-09-29 PROCEDURE — 87070 CULTURE OTHR SPECIMN AEROBIC: CPT

## 2024-09-29 PROCEDURE — 99283 EMERGENCY DEPT VISIT LOW MDM: CPT

## 2024-09-29 PROCEDURE — 87205 SMEAR GRAM STAIN: CPT

## 2024-09-29 PROCEDURE — 87077 CULTURE AEROBIC IDENTIFY: CPT

## 2024-09-29 ASSESSMENT — PAIN - FUNCTIONAL ASSESSMENT: PAIN_FUNCTIONAL_ASSESSMENT: NONE - DENIES PAIN

## 2024-09-29 NOTE — ED PROVIDER NOTES
right upper buttocks.  On the right heel there is a 2.5 cm shallow ulceration.  Neurologic: GCS 15,  Psych: Normal Affect      ------------------------------ ED COURSE/MEDICAL DECISION MAKING----------------------  Medications - No data to display      Medical Decision Making:    The right upper buttocks wound was cultured.  Results will be sent to the patient's primary care provider, Dr. Giron.  The patient will keep her appointment with wound care in 2 days.    Counseling:   The emergency provider has spoken with the patient and discussed today’s results, in addition to providing specific details for the plan of care and counseling regarding the diagnosis and prognosis.  Questions are answered at this time and they are agreeable with the plan.      --------------------------------- IMPRESSION AND DISPOSITION ---------------------------------    IMPRESSION  1. Pressure injury of skin of right buttock, unspecified injury stage        DISPOSITION  Disposition: Discharge to home  Patient condition is stable                 Keegan Munson MD  09/29/24 [id number]

## 2024-10-01 ENCOUNTER — OFFICE VISIT (OUTPATIENT)
Dept: FAMILY MEDICINE CLINIC | Age: 54
End: 2024-10-01

## 2024-10-01 VITALS
SYSTOLIC BLOOD PRESSURE: 106 MMHG | RESPIRATION RATE: 18 BRPM | HEART RATE: 94 BPM | TEMPERATURE: 97 F | BODY MASS INDEX: 32.94 KG/M2 | WEIGHT: 179 LBS | HEIGHT: 62 IN | DIASTOLIC BLOOD PRESSURE: 64 MMHG | OXYGEN SATURATION: 98 %

## 2024-10-01 DIAGNOSIS — Z09 HOSPITAL DISCHARGE FOLLOW-UP: ICD-10-CM

## 2024-10-01 DIAGNOSIS — F33.2 SEVERE EPISODE OF RECURRENT MAJOR DEPRESSIVE DISORDER, WITHOUT PSYCHOTIC FEATURES (HCC): ICD-10-CM

## 2024-10-01 DIAGNOSIS — I50.22 CHRONIC SYSTOLIC (CONGESTIVE) HEART FAILURE (HCC): ICD-10-CM

## 2024-10-01 DIAGNOSIS — Z86.73 HISTORY OF STROKE: Primary | ICD-10-CM

## 2024-10-01 DIAGNOSIS — Z51.89 VISIT FOR WOUND CHECK: ICD-10-CM

## 2024-10-01 DIAGNOSIS — I26.99 ACUTE PULMONARY EMBOLISM WITHOUT ACUTE COR PULMONALE, UNSPECIFIED PULMONARY EMBOLISM TYPE (HCC): ICD-10-CM

## 2024-10-01 DIAGNOSIS — T79.6XXD TRAUMATIC RHABDOMYOLYSIS, SUBSEQUENT ENCOUNTER: ICD-10-CM

## 2024-10-01 PROBLEM — I63.89 ACUTE ISCHEMIC MULTIFOCAL MULTIPLE VASCULAR TERRITORIES STROKE (HCC): Status: RESOLVED | Noted: 2024-09-03 | Resolved: 2024-10-01

## 2024-10-01 RX ORDER — METOPROLOL SUCCINATE 100 MG/1
100 TABLET, EXTENDED RELEASE ORAL DAILY
COMMUNITY
Start: 2024-09-25

## 2024-10-01 RX ORDER — GABAPENTIN 300 MG/1
300 CAPSULE ORAL 3 TIMES DAILY
COMMUNITY
Start: 2024-09-25

## 2024-10-01 RX ORDER — IBUPROFEN 600 MG/1
600 TABLET, FILM COATED ORAL
COMMUNITY
Start: 2024-02-12

## 2024-10-01 RX ORDER — DULOXETIN HYDROCHLORIDE 30 MG/1
30 CAPSULE, DELAYED RELEASE ORAL DAILY
Qty: 30 CAPSULE | Refills: 2 | Status: SHIPPED | OUTPATIENT
Start: 2024-10-01

## 2024-10-01 RX ORDER — LANOLIN ALCOHOL/MO/W.PET/CERES
100 CREAM (GRAM) TOPICAL DAILY
COMMUNITY
Start: 2024-09-25

## 2024-10-01 NOTE — PROGRESS NOTES
Accurate as of October 1, 2024  3:04 PM. If you have any questions, ask your nurse or doctor.                START taking these medications      DULoxetine 30 MG extended release capsule  Commonly known as: CYMBALTA  Take 1 capsule by mouth daily  Started by: Dr. Mari Espitia, DO            CHANGE how you take these medications      * metoprolol succinate 25 MG extended release tablet  Commonly known as: TOPROL XL  Take 5 tablets by mouth daily  What changed:   how much to take  additional instructions     * metoprolol succinate 100 MG extended release tablet  Commonly known as: TOPROL XL  What changed: Another medication with the same name was changed. Make sure you understand how and when to take each.           * This list has 2 medication(s) that are the same as other medications prescribed for you. Read the directions carefully, and ask your doctor or other care provider to review them with you.                CONTINUE taking these medications      apixaban 5 MG Tabs tablet  Commonly known as: ELIQUIS  Take 2 tablets by mouth 2 times daily for 2 days, THEN 1 tablet 2 times daily.  Start taking on: September 13, 2024     atorvastatin 40 MG tablet  Commonly known as: LIPITOR  Take 1 tablet by mouth nightly Replaced pravastatin     CPAP Machine Misc     fluticasone 50 MCG/ACT nasal spray  Commonly known as: FLONASE  1 spray by Each Nostril route daily     gabapentin 300 MG capsule  Commonly known as: NEURONTIN     ibuprofen 600 MG tablet  Commonly known as: ADVIL;MOTRIN     ivabradine 5 MG Tabs tablet  Commonly known as: CORLANOR  Take 1 tablet by mouth 2 times daily (with meals)     levETIRAcetam 750 MG tablet  Commonly known as: KEPPRA  Take 1 tablet by mouth 2 times daily     medical marijuana     melatonin 5 MG Tbdp disintegrating tablet  Take 1 tablet by mouth nightly     metFORMIN 500 MG tablet  Commonly known as: GLUCOPHAGE  Take 1 tablet by mouth daily (with breakfast)     OLANZapine 5 MG

## 2024-10-02 LAB
MICROORGANISM SPEC CULT: ABNORMAL
MICROORGANISM/AGENT SPEC: ABNORMAL
SPECIMEN DESCRIPTION: ABNORMAL
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
## 2024-10-25 DIAGNOSIS — J06.9 VIRAL URI: ICD-10-CM

## 2024-10-25 DIAGNOSIS — R73.03 PREDIABETES: ICD-10-CM

## 2024-10-25 DIAGNOSIS — K31.84 GASTROPARESIS: Chronic | ICD-10-CM

## 2024-10-25 RX ORDER — METOPROLOL SUCCINATE 25 MG/1
25 TABLET, EXTENDED RELEASE ORAL DAILY
Qty: 90 TABLET | Refills: 1 | Status: SHIPPED | OUTPATIENT
Start: 2024-10-25

## 2024-10-25 RX ORDER — MECOBALAMIN 5000 MCG
5 TABLET,DISINTEGRATING ORAL NIGHTLY
Qty: 90 TABLET | Refills: 1 | Status: SHIPPED | OUTPATIENT
Start: 2024-10-25

## 2024-10-25 RX ORDER — FLUTICASONE PROPIONATE 50 MCG
1 SPRAY, SUSPENSION (ML) NASAL DAILY
Qty: 16 G | Refills: 2 | Status: SHIPPED | OUTPATIENT
Start: 2024-10-25

## 2024-10-25 RX ORDER — OLANZAPINE 5 MG/1
5 TABLET ORAL NIGHTLY
Qty: 90 TABLET | Refills: 1 | Status: SHIPPED | OUTPATIENT
Start: 2024-10-25

## 2024-10-25 RX ORDER — ATORVASTATIN CALCIUM 40 MG/1
40 TABLET, FILM COATED ORAL NIGHTLY
Qty: 90 TABLET | Refills: 1 | Status: SHIPPED | OUTPATIENT
Start: 2024-10-25

## 2024-10-25 RX ORDER — ALBUTEROL SULFATE 90 UG/1
2 INHALANT RESPIRATORY (INHALATION) EVERY 6 HOURS PRN
Qty: 18 G | Refills: 3 | Status: SHIPPED | OUTPATIENT
Start: 2024-10-25

## 2024-10-25 RX ORDER — LEVETIRACETAM 750 MG/1
750 TABLET ORAL 2 TIMES DAILY
Qty: 180 TABLET | Refills: 1 | Status: SHIPPED | OUTPATIENT
Start: 2024-10-25

## 2024-10-25 RX ORDER — IVABRADINE 5 MG/1
5 TABLET, FILM COATED ORAL 2 TIMES DAILY WITH MEALS
Qty: 180 TABLET | Refills: 1 | Status: SHIPPED | OUTPATIENT
Start: 2024-10-25

## 2024-10-25 RX ORDER — THIAMINE MONONITRATE (VIT B1) 100 MG
100 TABLET ORAL DAILY
Qty: 90 TABLET | Refills: 1 | Status: SHIPPED | OUTPATIENT
Start: 2024-10-25

## 2024-10-25 NOTE — TELEPHONE ENCOUNTER
10/1/2024  11/4/2024      Pt is currently taking eliquis 5 mg BID , needs new RX ordered due to multiple doseges in chart. Please advise and send 90 day RX

## 2024-10-29 DIAGNOSIS — F33.2 SEVERE EPISODE OF RECURRENT MAJOR DEPRESSIVE DISORDER, WITHOUT PSYCHOTIC FEATURES (HCC): ICD-10-CM

## 2024-10-29 RX ORDER — METOPROLOL SUCCINATE 25 MG/1
25 TABLET, EXTENDED RELEASE ORAL DAILY
Qty: 90 TABLET | Refills: 1 | Status: SHIPPED | OUTPATIENT
Start: 2024-10-29

## 2024-10-29 RX ORDER — DULOXETIN HYDROCHLORIDE 30 MG/1
30 CAPSULE, DELAYED RELEASE ORAL DAILY
Qty: 30 CAPSULE | Refills: 2 | Status: SHIPPED | OUTPATIENT
Start: 2024-10-29

## 2024-10-29 NOTE — TELEPHONE ENCOUNTER
Name of Medication(s) Requested:  Requested Prescriptions     Pending Prescriptions Disp Refills    DULoxetine (CYMBALTA) 30 MG extended release capsule 30 capsule 2     Sig: Take 1 capsule by mouth daily    metoprolol succinate (TOPROL XL) 25 MG extended release tablet 90 tablet 1     Sig: Take 1 tablet by mouth daily Take along with 100 mg tablet       Medication is on current medication list Yes    Dosage and directions were verified? Yes    Quantity verified: 30 day supply     Pharmacy Verified?  Yes    Last Appointment:  10/1/2024    Future appts:  Future Appointments   Date Time Provider Department Center   11/4/2024  1:30 PM Mari Espitia DO [facility] DEP   11/14/2024 11:15 AM Lisandro Mulligan MD [facility]   4/14/2025  3:30 PM Mari Espitia DO [facility] DEP        (If no appt send self scheduling link. .REFILLAPPT)  Scheduling request sent?     [] Yes  [x] No    Does patient need updated?  [] Yes  [x] No

## 2024-10-29 NOTE — TELEPHONE ENCOUNTER
Patient called in requesting refill on    DULoxetine (CYMBALTA) 30 MG extended release capsule   metoprolol succinate (TOPROL XL) 100 MG extended release tablet      [facility]

## 2024-11-27 RX ORDER — METOPROLOL SUCCINATE 25 MG/1
25 TABLET, EXTENDED RELEASE ORAL DAILY
Qty: 90 TABLET | Refills: 1 | Status: SHIPPED | OUTPATIENT
Start: 2024-11-27

## 2024-11-27 RX ORDER — METOPROLOL SUCCINATE 100 MG/1
100 TABLET, EXTENDED RELEASE ORAL DAILY
Qty: 90 TABLET | Refills: 1 | Status: SHIPPED | OUTPATIENT
Start: 2024-11-27

## 2024-11-27 NOTE — TELEPHONE ENCOUNTER
Name of Medication(s) Requested:  Requested Prescriptions     Pending Prescriptions Disp Refills    metoprolol succinate (TOPROL XL) 25 MG extended release tablet 90 tablet 1     Sig: Take 1 tablet by mouth daily Take along with 100 mg tablet    metoprolol succinate (TOPROL XL) 100 MG extended release tablet 90 tablet 1     Sig: Take 1 tablet by mouth daily       Medication is on current medication list Yes    Dosage and directions were verified? Yes    Quantity verified: 90 day supply     Pharmacy Verified?  Yes    Last Appointment:  10/1/2024    Future appts:  Future Appointments   Date Time Provider Department Center   4/14/2025  3:30 PM Mari Espitia DO [facility] ECC DEP        (If no appt send self scheduling link. .REFILLAPPT)  Scheduling request sent?     [] Yes  [x] No    Does patient need updated?  [] Yes  [x] No

## 2024-12-17 ENCOUNTER — TELEPHONE (OUTPATIENT)
Dept: FAMILY MEDICINE CLINIC | Age: 54
End: 2024-12-17

## 2024-12-17 DIAGNOSIS — Z12.31 ENCOUNTER FOR SCREENING MAMMOGRAM FOR MALIGNANT NEOPLASM OF BREAST: Primary | ICD-10-CM

## 2024-12-17 NOTE — TELEPHONE ENCOUNTER
Pt called in requesting an order for her yearly mammogram be placed so she can schedule it to be done at [facility]. Please advise    PCP

## 2024-12-26 RX ORDER — APIXABAN 5 MG/1
5 TABLET, FILM COATED ORAL 2 TIMES DAILY
Qty: 180 TABLET | Refills: 0 | Status: SHIPPED | OUTPATIENT
Start: 2024-12-26

## 2024-12-26 NOTE — TELEPHONE ENCOUNTER
Name of Medication(s) Requested:  Requested Prescriptions     Pending Prescriptions Disp Refills    ELIQUIS 5 MG TABS tablet [Pharmacy Med Name: ELIQUIS 5 MG TABLET] 180 tablet 0     Sig: TAKE 1 TABLET BY MOUTH TWICE A DAY       Medication is on current medication list Yes    Dosage and directions were verified? Yes    Quantity verified: 90 day supply     Pharmacy Verified?  Yes    Last Appointment:  Visit date not found    Future appts:  Future Appointments   Date Time Provider Department Center   1/8/2025  4:00 PM [facility] MAMMO HOLOGIC [facility] MAMMO [facility] Radiolo   1/14/2025  2:40 PM Remedios Kaur PA [facility] NEURO Neurology -   1/30/2025  2:45 PM Lisandro Mulligan MD [facility]   4/14/2025  3:30 PM Mari Espitia DO [facility] ECC DEP        (If no appt send self scheduling link. .REFILLAPPT)  Scheduling request sent?     [] Yes  [x] No    Does patient need updated?  [] Yes  [x] No

## 2025-01-08 ENCOUNTER — HOSPITAL ENCOUNTER (OUTPATIENT)
Dept: MAMMOGRAPHY | Age: 55
Discharge: HOME | End: 2025-01-10
Payer: MEDICARE

## 2025-01-08 VITALS — BODY MASS INDEX: 30.36 KG/M2 | WEIGHT: 165 LBS | HEIGHT: 62 IN

## 2025-01-08 DIAGNOSIS — Z12.31 ENCOUNTER FOR SCREENING MAMMOGRAM FOR MALIGNANT NEOPLASM OF BREAST: ICD-10-CM

## 2025-01-08 PROCEDURE — 77063 BREAST TOMOSYNTHESIS BI: CPT

## 2025-01-29 ENCOUNTER — TELEPHONE (OUTPATIENT)
Dept: FAMILY MEDICINE CLINIC | Age: 55
End: 2025-01-29

## 2025-01-29 NOTE — TELEPHONE ENCOUNTER
Pt called in and her entresto and eliquis is very expensive now; I told her to call her insurance and see what is covered and then call us back and we will send that over  fyi

## 2025-01-30 ENCOUNTER — OFFICE VISIT (OUTPATIENT)
Dept: CARDIOLOGY CLINIC | Age: 55
End: 2025-01-30

## 2025-01-30 VITALS
RESPIRATION RATE: 16 BRPM | SYSTOLIC BLOOD PRESSURE: 132 MMHG | WEIGHT: 173 LBS | BODY MASS INDEX: 31.83 KG/M2 | HEART RATE: 58 BPM | DIASTOLIC BLOOD PRESSURE: 78 MMHG | HEIGHT: 62 IN

## 2025-01-30 DIAGNOSIS — R00.0 SINUS TACHYCARDIA: Primary | ICD-10-CM

## 2025-01-30 RX ORDER — ATORVASTATIN CALCIUM 40 MG/1
40 TABLET, FILM COATED ORAL NIGHTLY
COMMUNITY
Start: 2025-01-22

## 2025-01-30 RX ORDER — IVABRADINE 5 MG/1
5 TABLET, FILM COATED ORAL 2 TIMES DAILY WITH MEALS
COMMUNITY
End: 2025-01-30

## 2025-01-30 RX ORDER — VALSARTAN 160 MG/1
160 TABLET ORAL DAILY
Qty: 90 TABLET | Refills: 3 | Status: SHIPPED | OUTPATIENT
Start: 2025-01-30

## 2025-01-30 NOTE — PROGRESS NOTES
of PFO.3/4/21 .Summary   Compared to prior echo, no significant changes noted. Technically adequate study.   Left ventricle size is normal.   Normal left ventricular wall thickness.   Ejection fraction is visually estimated at 55%.   No regional wall motion abnormalities seen.   There is doppler evidence of stage II diastolic dysfunction.   Physiologic and/or trace tricuspid regurgitation.   There is a small pericardial effusion noted.    Stress Test: 10/26/19   FINDINGS:   Perfusion images demonstrate small anterior wall reversible defect   Wall motion is within normal limits.   The end diastolic volume is 54 ml.    The end systolic volume is 17 ml.   The estimated ejection fraction is 69 %.            Impression   1. There is a small reversible defect in the inferior wall   2. Ejection fraction is 69 %.   3. No significant wall motion abnormality       Angiography: 11/4/19 IMPRESSION:  1.  Angiographically normal coronary arteries.  2.  Successful deployment of 6-Yi Angio-Seal in the right common  femoral artery.  Cardiology Labs: BMP:    Lab Results   Component Value Date/Time     09/13/2024 02:45 PM    K 4.8 09/13/2024 02:45 PM    K 4.4 06/16/2023 12:58 PM     09/13/2024 02:45 PM    CO2 22 09/13/2024 02:45 PM    BUN 21 09/13/2024 02:45 PM    CREATININE 0.9 09/13/2024 02:45 PM     CMP:    Lab Results   Component Value Date/Time     09/13/2024 02:45 PM    K 4.8 09/13/2024 02:45 PM    K 4.4 06/16/2023 12:58 PM     09/13/2024 02:45 PM    CO2 22 09/13/2024 02:45 PM    BUN 21 09/13/2024 02:45 PM    CREATININE 0.9 09/13/2024 02:45 PM     CBC:    Lab Results   Component Value Date/Time    WBC 8.0 09/13/2024 02:45 PM    RBC 3.67 09/13/2024 02:45 PM    HGB 10.6 09/13/2024 02:45 PM    HCT 33.6 09/13/2024 02:45 PM    MCV 91.6 09/13/2024 02:45 PM    RDW 15.5 09/13/2024 02:45 PM     09/13/2024 02:45 PM     PT/INR:  No results found for: \"PTINR\"  PT/INR Warfarin:  No components found for:

## 2025-02-05 ENCOUNTER — TELEPHONE (OUTPATIENT)
Dept: CARDIOLOGY CLINIC | Age: 55
End: 2025-02-05

## 2025-02-05 NOTE — TELEPHONE ENCOUNTER
Pt phnd states Dr Mulligan advsd her at her 1/30/25 appt that he wanted her to have a stress test.  No order has been entered.  Please call pt to advise [phone]

## 2025-02-10 ENCOUNTER — TELEPHONE (OUTPATIENT)
Dept: CARDIOLOGY | Age: 55
End: 2025-02-10

## 2025-02-10 NOTE — TELEPHONE ENCOUNTER
This patient called saying Dr. Mulligan's office called her to schedule stress test and she was transferred to  to schedule, however there is no stress order in chart. I told her I would call her back when I found the order.

## 2025-02-12 DIAGNOSIS — I63.432 CEREBROVASCULAR ACCIDENT (CVA) DUE TO EMBOLISM OF LEFT POSTERIOR CEREBRAL ARTERY (HCC): ICD-10-CM

## 2025-02-12 DIAGNOSIS — R55 SYNCOPE AND COLLAPSE: ICD-10-CM

## 2025-02-21 ENCOUNTER — TELEPHONE (OUTPATIENT)
Dept: CARDIOLOGY | Age: 55
End: 2025-02-21

## 2025-02-21 NOTE — TELEPHONE ENCOUNTER
Spoke to the patient on the phone. Reminded of her 1030am stress test on Tues. And where to report. She was instructed on Npo after MN except meds with water but to avoid caffeine products for 12 hours prior. She verbalized an understanding. She was also instructed to bring her inhaler

## 2025-03-11 ENCOUNTER — TELEPHONE (OUTPATIENT)
Dept: CARDIOLOGY | Age: 55
End: 2025-03-11

## 2025-03-11 NOTE — TELEPHONE ENCOUNTER
Called patient 2x to reschedule stress on 03/24 due to no doctor.     Electronically signed by Nathalie Snow on 3/11/2025 at 1:56 PM

## 2025-03-11 NOTE — TELEPHONE ENCOUNTER
Called patient to reschedule stress test scheduled on 3/24 due to no doctor. L/M for patient to call back asap.    Electronically signed by Nathalie Snow on 3/11/2025 at 8:20 AM

## 2025-03-17 ENCOUNTER — TELEPHONE (OUTPATIENT)
Dept: CARDIOLOGY | Age: 55
End: 2025-03-17

## 2025-03-20 ENCOUNTER — TELEPHONE (OUTPATIENT)
Dept: CARDIOLOGY | Age: 55
End: 2025-03-20

## 2025-03-20 NOTE — TELEPHONE ENCOUNTER
Spoke with patient and confirmed pharmacological stress test appointment on March 24, 2025 at 0830. Instructions for test,including bringing albuterol inhaler to appointment, and COVID-19 preprocedure information reviewed with patient, questions answered. Patient verbalized understanding. Also instructed to call office if unable to keep appointment.

## 2025-03-24 ENCOUNTER — HOSPITAL ENCOUNTER (OUTPATIENT)
Dept: CARDIOLOGY | Age: 55
Discharge: HOME | End: 2025-03-26
Payer: MEDICARE

## 2025-03-24 VITALS
HEART RATE: 67 BPM | BODY MASS INDEX: 31.83 KG/M2 | SYSTOLIC BLOOD PRESSURE: 124 MMHG | WEIGHT: 173 LBS | DIASTOLIC BLOOD PRESSURE: 78 MMHG | HEIGHT: 62 IN

## 2025-03-24 DIAGNOSIS — R06.02 SHORTNESS OF BREATH: ICD-10-CM

## 2025-03-24 LAB
ECHO BSA: 1.85 M2
NUC STRESS EJECTION FRACTION: 67 %
STRESS BASELINE DIAS BP: 78 MMHG
STRESS BASELINE HR: 63 BPM
STRESS BASELINE SYS BP: 124 MMHG
STRESS ESTIMATED WORKLOAD: 1 METS
STRESS PEAK DIAS BP: 74 MMHG
STRESS PEAK SYS BP: 132 MMHG
STRESS PERCENT HR ACHIEVED: 62 %
STRESS POST PEAK HR: 103 BPM
STRESS RATE PRESSURE PRODUCT: NORMAL BPM*MMHG
STRESS TARGET HR: 166 BPM
TID: 0.98

## 2025-03-24 PROCEDURE — 78452 HT MUSCLE IMAGE SPECT MULT: CPT | Performed by: INTERNAL MEDICINE

## 2025-03-24 PROCEDURE — 2500000003 HC RX 250 WO HCPCS: Performed by: INTERNAL MEDICINE

## 2025-03-24 PROCEDURE — 3430000000 HC RX DIAGNOSTIC RADIOPHARMACEUTICAL: Performed by: INTERNAL MEDICINE

## 2025-03-24 PROCEDURE — 93017 CV STRESS TEST TRACING ONLY: CPT

## 2025-03-24 PROCEDURE — 93018 CV STRESS TEST I&R ONLY: CPT | Performed by: INTERNAL MEDICINE

## 2025-03-24 PROCEDURE — A9502 TC99M TETROFOSMIN: HCPCS | Performed by: INTERNAL MEDICINE

## 2025-03-24 PROCEDURE — 93016 CV STRESS TEST SUPVJ ONLY: CPT | Performed by: INTERNAL MEDICINE

## 2025-03-24 PROCEDURE — 6360000002 HC RX W HCPCS: Performed by: INTERNAL MEDICINE

## 2025-03-24 RX ORDER — REGADENOSON 0.08 MG/ML
0.4 INJECTION, SOLUTION INTRAVENOUS
Status: COMPLETED | OUTPATIENT
Start: 2025-03-24 | End: 2025-03-24

## 2025-03-24 RX ORDER — SODIUM CHLORIDE 0.9 % (FLUSH) 0.9 %
10 SYRINGE (ML) INJECTION PRN
Status: DISCONTINUED | OUTPATIENT
Start: 2025-03-24 | End: 2025-03-27 | Stop reason: HOSPADM

## 2025-03-24 RX ADMIN — TETROFOSMIN 32.4 MILLICURIE: 0.23 INJECTION, POWDER, LYOPHILIZED, FOR SOLUTION INTRAVENOUS at 10:35

## 2025-03-24 RX ADMIN — SODIUM CHLORIDE, PRESERVATIVE FREE 10 ML: 5 INJECTION INTRAVENOUS at 08:55

## 2025-03-24 RX ADMIN — REGADENOSON 0.4 MG: 0.08 INJECTION, SOLUTION INTRAVENOUS at 10:35

## 2025-03-24 RX ADMIN — TETROFOSMIN 10.4 MILLICURIE: 0.23 INJECTION, POWDER, LYOPHILIZED, FOR SOLUTION INTRAVENOUS at 08:54

## 2025-03-24 RX ADMIN — SODIUM CHLORIDE, PRESERVATIVE FREE 10 ML: 5 INJECTION INTRAVENOUS at 10:36

## 2025-04-03 RX ORDER — LEVETIRACETAM 750 MG/1
750 TABLET ORAL 2 TIMES DAILY
Qty: 180 TABLET | Refills: 1 | Status: SHIPPED | OUTPATIENT
Start: 2025-04-03

## 2025-04-03 RX ORDER — DULOXETIN HYDROCHLORIDE 30 MG/1
30 CAPSULE, DELAYED RELEASE ORAL DAILY
Qty: 90 CAPSULE | Refills: 1 | OUTPATIENT
Start: 2025-04-03

## 2025-04-03 RX ORDER — DULOXETIN HYDROCHLORIDE 30 MG/1
30 CAPSULE, DELAYED RELEASE ORAL DAILY
COMMUNITY
Start: 2024-10-17 | End: 2025-04-04 | Stop reason: SDUPTHER

## 2025-04-03 NOTE — TELEPHONE ENCOUNTER
Name of Medication(s) Requested:  Requested Prescriptions      No prescriptions requested or ordered in this encounter       Medication is on current medication list Yes    Dosage and directions were verified? Yes    Quantity verified: 90 day supply     Pharmacy Verified?  Yes    Last Appointment:  10/1/2024    Future appts:  Future Appointments   Date Time Provider Department Center   4/4/2025  1:40 PM Remedios Kaur PA [facility] NEURO Neurology -   4/8/2025  5:00 PM [facility] SCAN 3 [facility] Rad/Car   4/8/2025  5:30 PM [facility] SCAN 3 [facility] CT [facility] Rad/Car   4/14/2025  3:30 PM Mari Espitia, DO [facility] ECC DEP   4/18/2025  2:00 PM Kari Hernandez, APRN - CNP ELECTRO PHYS [facility]   5/6/2025  2:30 PM Lisandro Mulligan MD [facility]        (If no appt send self scheduling link. .REFILLAPPT)  Scheduling request sent?     [] Yes  [x] No    Does patient need updated?  [] Yes  [x] No

## 2025-04-04 ENCOUNTER — TELEPHONE (OUTPATIENT)
Dept: ADMINISTRATIVE | Age: 55
End: 2025-04-04

## 2025-04-04 RX ORDER — DULOXETIN HYDROCHLORIDE 30 MG/1
30 CAPSULE, DELAYED RELEASE ORAL DAILY
Qty: 30 CAPSULE | Refills: 0 | Status: SHIPPED | OUTPATIENT
Start: 2025-04-04

## 2025-04-04 NOTE — TELEPHONE ENCOUNTER
Name of Medication(s) Requested:  Requested Prescriptions     Pending Prescriptions Disp Refills    DULoxetine (CYMBALTA) 30 MG extended release capsule 30 capsule 1     Sig: Take 1 capsule by mouth daily       Medication is on current medication list Yes    Dosage and directions were verified? Yes    Quantity verified: 30 day supply     Pharmacy Verified?  Yes    Last Appointment:  10/1/2024    Future appts:  Future Appointments   Date Time Provider Department Center   4/8/2025  5:00 PM SE CT SCAN 3 [facility] CT [facility] Rad/Car   4/8/2025  5:30 PM SE CT SCAN 3 [facility] CT [facility] Rad/Car   4/14/2025  3:30 PM Mari Espitia, DO [facility] ECC DEP   4/18/2025  2:00 PM Kari Hernandez, APRN - CNP ELECTRO PHYS [facility]   4/21/2025  1:20 PM Remedios Kaur PA [facility] NEURO Neurology -   5/6/2025  2:30 PM Lisandro Mulligan MD [facility]        (If no appt send self scheduling link. .REFILLAPPT)  Scheduling request sent?     [] Yes  [x] No    Does patient need updated?  [] Yes  [x] No

## 2025-04-04 NOTE — TELEPHONE ENCOUNTER
Pt called in very upset that her cymbalta was refused to be refilled by  and she is going to run out this weekend. I advised pt I would look to see what happened and advised her that the medication was discontinued by ZACARIAS Garsia on 01/14/2025  which pt explained  is her neurologist.  I advised pt medication was added as historical provider and I advised I would explain to  she is still taking medication and would send another refill request. Pt grew even more upset stating \"why would my neurologist discontinue my medication when she has nothing to do with it or my anxiety and depression.  has been filling that medication for a year\"  I stated to pt I did not know and am sending  a refill request now. Pt stated Very angrily and yelling at me \"how am I supposed to know she is gonna fill it today? I'm going to give you one hour and I am calling back to make sure she fills my medication and you better answer that phone and it better be filled\", I advised pt  had 24 to 48 hours to fill any medications and if I did not give her a call back before I left for the day at 4:30 PM to contact her pharmacy to see if the medication was filled. Pt yelled at me stated \"my medication better be filled by the time you leave and why would I have to contact my pharmacy about my medication? They always fill it when its called in so I don't see why I have to contact them. Someone is not doing their job.\" I advised to the patient that we did not work on the weekends and would not be able to let her know if it was filled over the weekend and her pharmacy could let her know and I stated if she continued to yell at me I was going to end the call. Pt yelled stating \"girl, shut up\" and I ended the call. Please advise

## 2025-04-08 ENCOUNTER — HOSPITAL ENCOUNTER (OUTPATIENT)
Age: 55
Discharge: HOME | End: 2025-04-08
Payer: MEDICARE

## 2025-04-08 ENCOUNTER — RESULTS FOLLOW-UP (OUTPATIENT)
Dept: FAMILY MEDICINE CLINIC | Age: 55
End: 2025-04-08

## 2025-04-08 ENCOUNTER — HOSPITAL ENCOUNTER (OUTPATIENT)
Dept: CT IMAGING | Age: 55
Discharge: HOME | End: 2025-04-10
Payer: MEDICARE

## 2025-04-08 DIAGNOSIS — I50.22 CHRONIC SYSTOLIC (CONGESTIVE) HEART FAILURE: ICD-10-CM

## 2025-04-08 DIAGNOSIS — I26.99 ACUTE PULMONARY EMBOLISM WITHOUT ACUTE COR PULMONALE, UNSPECIFIED PULMONARY EMBOLISM TYPE (HCC): ICD-10-CM

## 2025-04-08 DIAGNOSIS — F33.2 SEVERE EPISODE OF RECURRENT MAJOR DEPRESSIVE DISORDER, WITHOUT PSYCHOTIC FEATURES (HCC): ICD-10-CM

## 2025-04-08 DIAGNOSIS — T79.6XXD TRAUMATIC RHABDOMYOLYSIS, SUBSEQUENT ENCOUNTER: ICD-10-CM

## 2025-04-08 DIAGNOSIS — I63.432 CEREBROVASCULAR ACCIDENT (CVA) DUE TO EMBOLISM OF LEFT POSTERIOR CEREBRAL ARTERY (HCC): ICD-10-CM

## 2025-04-08 DIAGNOSIS — Z86.73 HISTORY OF STROKE: ICD-10-CM

## 2025-04-08 LAB
ALBUMIN SERPL-MCNC: 4.6 G/DL (ref 3.5–5.2)
ALP SERPL-CCNC: 153 U/L (ref 35–104)
ALT SERPL-CCNC: 35 U/L (ref 0–32)
ANION GAP SERPL CALCULATED.3IONS-SCNC: 12 MMOL/L (ref 7–16)
AST SERPL-CCNC: 18 U/L (ref 0–31)
BASOPHILS # BLD: 0.02 K/UL (ref 0–0.2)
BASOPHILS NFR BLD: 0 % (ref 0–2)
BILIRUB SERPL-MCNC: 0.6 MG/DL (ref 0–1.2)
BUN SERPL-MCNC: 11 MG/DL (ref 6–20)
CALCIUM SERPL-MCNC: 9.4 MG/DL (ref 8.6–10.2)
CHLORIDE SERPL-SCNC: 104 MMOL/L (ref 98–107)
CO2 SERPL-SCNC: 25 MMOL/L (ref 22–29)
CREAT SERPL-MCNC: 0.7 MG/DL (ref 0.5–1)
EOSINOPHIL # BLD: 0.03 K/UL (ref 0.05–0.5)
EOSINOPHILS RELATIVE PERCENT: 0 % (ref 0–6)
ERYTHROCYTE [DISTWIDTH] IN BLOOD BY AUTOMATED COUNT: 12.7 % (ref 11.5–15)
GFR, ESTIMATED: >90 ML/MIN/1.73M2
GLUCOSE SERPL-MCNC: 102 MG/DL (ref 74–99)
HCT VFR BLD AUTO: 38.9 % (ref 34–48)
HGB BLD-MCNC: 12.4 G/DL (ref 11.5–15.5)
IMM GRANULOCYTES # BLD AUTO: <0.03 K/UL (ref 0–0.58)
IMM GRANULOCYTES NFR BLD: 0 % (ref 0–5)
LYMPHOCYTES NFR BLD: 2.15 K/UL (ref 1.5–4)
LYMPHOCYTES RELATIVE PERCENT: 28 % (ref 20–42)
MCH RBC QN AUTO: 28.1 PG (ref 26–35)
MCHC RBC AUTO-ENTMCNC: 31.9 G/DL (ref 32–34.5)
MCV RBC AUTO: 88 FL (ref 80–99.9)
MONOCYTES NFR BLD: 0.38 K/UL (ref 0.1–0.95)
MONOCYTES NFR BLD: 5 % (ref 2–12)
NEUTROPHILS NFR BLD: 66 % (ref 43–80)
NEUTS SEG NFR BLD: 5.09 K/UL (ref 1.8–7.3)
PLATELET # BLD AUTO: 364 K/UL (ref 130–450)
PMV BLD AUTO: 8.7 FL (ref 7–12)
POTASSIUM SERPL-SCNC: 4.1 MMOL/L (ref 3.5–5)
PROT SERPL-MCNC: 7.3 G/DL (ref 6.4–8.3)
RBC # BLD AUTO: 4.42 M/UL (ref 3.5–5.5)
SODIUM SERPL-SCNC: 141 MMOL/L (ref 132–146)
TSH SERPL DL<=0.05 MIU/L-ACNC: 0.86 UIU/ML (ref 0.27–4.2)
WBC OTHER # BLD: 7.7 K/UL (ref 4.5–11.5)

## 2025-04-08 PROCEDURE — 80053 COMPREHEN METABOLIC PANEL: CPT

## 2025-04-08 PROCEDURE — 6360000004 HC RX CONTRAST MEDICATION: Performed by: RADIOLOGY

## 2025-04-08 PROCEDURE — 70498 CT ANGIOGRAPHY NECK: CPT

## 2025-04-08 PROCEDURE — 84443 ASSAY THYROID STIM HORMONE: CPT

## 2025-04-08 PROCEDURE — 36415 COLL VENOUS BLD VENIPUNCTURE: CPT

## 2025-04-08 PROCEDURE — 85025 COMPLETE CBC W/AUTO DIFF WBC: CPT

## 2025-04-08 PROCEDURE — 70496 CT ANGIOGRAPHY HEAD: CPT

## 2025-04-08 RX ORDER — IOPAMIDOL 755 MG/ML
75 INJECTION, SOLUTION INTRAVASCULAR
Status: COMPLETED | OUTPATIENT
Start: 2025-04-08 | End: 2025-04-08

## 2025-04-08 RX ADMIN — IOPAMIDOL 75 ML: 755 INJECTION, SOLUTION INTRAVENOUS at 16:33

## 2025-04-09 DIAGNOSIS — R79.89 ELEVATED LFTS: Primary | ICD-10-CM

## 2025-04-18 ENCOUNTER — OFFICE VISIT (OUTPATIENT)
Dept: NON INVASIVE DIAGNOSTICS | Age: 55
End: 2025-04-18
Payer: MEDICARE

## 2025-04-18 VITALS
RESPIRATION RATE: 16 BRPM | DIASTOLIC BLOOD PRESSURE: 80 MMHG | OXYGEN SATURATION: 96 % | BODY MASS INDEX: 30.51 KG/M2 | TEMPERATURE: 97.5 F | SYSTOLIC BLOOD PRESSURE: 116 MMHG | HEIGHT: 62 IN | HEART RATE: 74 BPM | WEIGHT: 165.8 LBS

## 2025-04-18 DIAGNOSIS — G47.33 OSA (OBSTRUCTIVE SLEEP APNEA): ICD-10-CM

## 2025-04-18 DIAGNOSIS — G40.909 SEIZURE DISORDER (HCC): Chronic | ICD-10-CM

## 2025-04-18 DIAGNOSIS — I63.9 CRYPTOGENIC STROKE (HCC): ICD-10-CM

## 2025-04-18 DIAGNOSIS — I49.9 IRREGULAR HEART BEAT: Primary | ICD-10-CM

## 2025-04-18 DIAGNOSIS — I10 PRIMARY HYPERTENSION: ICD-10-CM

## 2025-04-18 DIAGNOSIS — Z86.711 HISTORY OF PULMONARY EMBOLISM: ICD-10-CM

## 2025-04-18 PROCEDURE — 3074F SYST BP LT 130 MM HG: CPT | Performed by: NURSE PRACTITIONER

## 2025-04-18 PROCEDURE — 3079F DIAST BP 80-89 MM HG: CPT | Performed by: NURSE PRACTITIONER

## 2025-04-18 PROCEDURE — 99204 OFFICE O/P NEW MOD 45 MIN: CPT | Performed by: NURSE PRACTITIONER

## 2025-04-18 NOTE — PROGRESS NOTES
face-to-face time providing counseling and or coordination of care with the other providers.      Thank you for allowing me to participate in your patient's care.  Please call me if there are any questions or concerns.        Kari Hernandez, TITI - CNP  Cardiac Electrophysiology  [facility]  [facility]  2:02 PM  4/18/2025

## 2025-04-21 ENCOUNTER — TELEPHONE (OUTPATIENT)
Dept: NON INVASIVE DIAGNOSTICS | Age: 55
End: 2025-04-21

## 2025-04-21 RX ORDER — METOPROLOL SUCCINATE 100 MG/1
100 TABLET, EXTENDED RELEASE ORAL DAILY
Qty: 30 TABLET | Refills: 0 | Status: SHIPPED | OUTPATIENT
Start: 2025-04-21

## 2025-04-21 NOTE — TELEPHONE ENCOUNTER
Please check prior auth.    Date:not scheduled yet    Doc:Khunnawat    Procedure:  ILR insert    CPT: 91899    ICD: I63.9    Electronically signed by Lucie Ingram MA on 4/21/2025 at 2:08 PM

## 2025-04-21 NOTE — TELEPHONE ENCOUNTER
Name of Medication(s) Requested:  Requested Prescriptions     Pending Prescriptions Disp Refills    metoprolol succinate (TOPROL XL) 100 MG extended release tablet [Pharmacy Med Name: METOPROLOL SUCC  MG TAB] 90 tablet 1     Sig: TAKE 1 TABLET BY MOUTH EVERY DAY       Medication is on current medication list Yes    Dosage and directions were verified? Yes    Quantity verified: 90 day supply     Pharmacy Verified?  Yes    Last Appointment:  10/1/2024    Future appts:  Future Appointments   Date Time Provider Department Center   4/21/2025  1:20 PM Remedios Kaur PA [facility] NEURO Neurology -   5/6/2025  2:30 PM [facility]        (If no appt send self scheduling link. .REFILLAPPT)  Scheduling request sent?     [] Yes  [x] No    Does patient need updated?  [] Yes  [x] No

## 2025-04-22 NOTE — TELEPHONE ENCOUNTER
[employer] authorization request and all clinicals uploaded.  Reference #[id number], pending medical review

## 2025-04-24 DIAGNOSIS — I63.9 CEREBROVASCULAR ACCIDENT (CVA), UNSPECIFIED MECHANISM (HCC): Primary | ICD-10-CM

## 2025-04-24 DIAGNOSIS — R55 SYNCOPE, UNSPECIFIED SYNCOPE TYPE: ICD-10-CM

## 2025-04-24 DIAGNOSIS — R94.31 ABNORMAL EKG: ICD-10-CM

## 2025-04-24 RX ORDER — ATORVASTATIN CALCIUM 40 MG/1
40 TABLET, FILM COATED ORAL NIGHTLY
Qty: 90 TABLET | Refills: 1 | Status: SHIPPED | OUTPATIENT
Start: 2025-04-24

## 2025-04-24 NOTE — TELEPHONE ENCOUNTER
Name of Medication(s) Requested:  Requested Prescriptions     Pending Prescriptions Disp Refills    atorvastatin (LIPITOR) 40 MG tablet [Pharmacy Med Name: ATORVASTATIN 40 MG TABLET] 90 tablet 1     Sig: TAKE 1 TABLET BY MOUTH NIGHTLY       Medication is on current medication list Yes    Dosage and directions were verified? Yes    Quantity verified: 90 day supply     Pharmacy Verified?  Yes    Last Appointment:  Visit date not found    Future appts:  Future Appointments   Date Time Provider Department Center   5/6/2025  2:30 PM Lisandro Mulligan MD [facility]   5/19/2025  2:00 PM Anny Jaramillo MD [facility] GEN SURG [facility]   7/22/2025  8:20 AM Remedios Kaur PA [facility] NEURO Neurology -        (If no appt send self scheduling link. .REFILLAPPT)  Scheduling request sent?     [] Yes  [x] No    Does patient need updated?  [] Yes  [x] No

## 2025-04-25 ENCOUNTER — TELEPHONE (OUTPATIENT)
Dept: NON INVASIVE DIAGNOSTICS | Age: 55
End: 2025-04-25

## 2025-04-25 NOTE — TELEPHONE ENCOUNTER
The patient's insurance denied an ILR insert due to the patient needing to wear another 14 day ZIO XT monitor. The patient is scheduled to come to the office next week to have one placed.     Electronically signed by Lucie Ingram MA on 4/25/2025 at 2:22 PM

## 2025-04-25 NOTE — TELEPHONE ENCOUNTER
CPT 3285 denied by [employer].  Patient to wear another 14-day monitor and then resubmit for prior authorization.  Order in EPIC per TITI Castellon-CNP

## 2025-05-02 ENCOUNTER — TELEPHONE (OUTPATIENT)
Dept: CARDIOLOGY CLINIC | Age: 55
End: 2025-05-02

## 2025-05-04 DIAGNOSIS — J06.9 VIRAL URI: ICD-10-CM

## 2025-05-05 RX ORDER — FLUTICASONE PROPIONATE 50 MCG
SPRAY, SUSPENSION (ML) NASAL
Qty: 16 ML | Refills: 2 | Status: SHIPPED | OUTPATIENT
Start: 2025-05-05

## 2025-05-05 NOTE — TELEPHONE ENCOUNTER
Name of Medication(s) Requested:  Requested Prescriptions     Pending Prescriptions Disp Refills    fluticasone (FLONASE) 50 MCG/ACT nasal spray [Pharmacy Med Name: FLUTICASONE PROP 50 MCG SPRAY] 16 mL 2     Sig: SPRAY 1 SPRAY INTO EACH NOSTRIL EVERY DAY       Medication is on current medication list Yes    Dosage and directions were verified? Yes    Quantity verified: 30 day supply     Pharmacy Verified?  Yes    Last Appointment:  Visit date not found    Future appts:  Future Appointments   Date Time Provider Department Center   5/6/2025  2:30 PM Lisandro Mulligan MD [facility]   5/8/2025  1:40 PM SCHEDULE, EP LAB/MA ELECTRO PHYS [facility]   5/19/2025  2:00 PM Anny Jaramillo MD [facility] GEN SURG [facility]   7/22/2025  8:20 AM Remedios Kaur PA [facility] NEURO Neurology -   7/23/2025  1:15 PM James Aguilar MD [facility] SLEEP [facility]        (If no appt send self scheduling link. .REFILLAPPT)  Scheduling request sent?     [] Yes  [x] No    Does patient need updated?  [] Yes  [x] No

## 2025-05-06 ENCOUNTER — OFFICE VISIT (OUTPATIENT)
Dept: CARDIOLOGY CLINIC | Age: 55
End: 2025-05-06
Payer: MEDICARE

## 2025-05-06 VITALS
RESPIRATION RATE: 16 BRPM | DIASTOLIC BLOOD PRESSURE: 86 MMHG | HEIGHT: 62 IN | BODY MASS INDEX: 31.28 KG/M2 | SYSTOLIC BLOOD PRESSURE: 116 MMHG | WEIGHT: 170 LBS | HEART RATE: 72 BPM

## 2025-05-06 DIAGNOSIS — R00.0 SINUS TACHYCARDIA: Primary | ICD-10-CM

## 2025-05-06 PROCEDURE — 93000 ELECTROCARDIOGRAM COMPLETE: CPT | Performed by: INTERNAL MEDICINE

## 2025-05-06 PROCEDURE — 3079F DIAST BP 80-89 MM HG: CPT | Performed by: INTERNAL MEDICINE

## 2025-05-06 PROCEDURE — 3074F SYST BP LT 130 MM HG: CPT | Performed by: INTERNAL MEDICINE

## 2025-05-06 PROCEDURE — 99213 OFFICE O/P EST LOW 20 MIN: CPT | Performed by: INTERNAL MEDICINE

## 2025-05-06 RX ORDER — METOPROLOL SUCCINATE 25 MG/1
25 TABLET, EXTENDED RELEASE ORAL DAILY
COMMUNITY
Start: 2025-04-21

## 2025-05-06 NOTE — PROGRESS NOTES
artery.    Cardiology Labs: BMP:    Lab Results   Component Value Date/Time     04/08/2025 03:32 PM    K 4.1 04/08/2025 03:32 PM    K 4.4 06/16/2023 12:58 PM     04/08/2025 03:32 PM    CO2 25 04/08/2025 03:32 PM    BUN 11 04/08/2025 03:32 PM    CREATININE 0.7 04/08/2025 03:32 PM     CMP:    Lab Results   Component Value Date/Time     04/08/2025 03:32 PM    K 4.1 04/08/2025 03:32 PM    K 4.4 06/16/2023 12:58 PM     04/08/2025 03:32 PM    CO2 25 04/08/2025 03:32 PM    BUN 11 04/08/2025 03:32 PM    CREATININE 0.7 04/08/2025 03:32 PM     CBC:    Lab Results   Component Value Date/Time    WBC 7.7 04/08/2025 03:32 PM    RBC 4.42 04/08/2025 03:32 PM    HGB 12.4 04/08/2025 03:32 PM    HCT 38.9 04/08/2025 03:32 PM    MCV 88.0 04/08/2025 03:32 PM    RDW 12.7 04/08/2025 03:32 PM     04/08/2025 03:32 PM     PT/INR:  No results found for: \"PTINR\"  PT/INR Warfarin:  No components found for: \"PTPATWAR\", \"PTINRWAR\"  PTT:    Lab Results   Component Value Date/Time    APTT 35.8 08/27/2024 11:18 AM    APTT 32.4 10/24/2019 09:30 PM     PTT Heparin:  No components found for: \"APTTHEP\"  Magnesium:    Lab Results   Component Value Date/Time    MG 1.6 09/05/2024 11:45 AM     TSH:    Lab Results   Component Value Date/Time    TSH 0.86 04/08/2025 03:32 PM     TROPONIN:  No components found for: \"TROP\"  BNP:  No results found for: \"BNP\"  FASTING LIPID PANEL:    Lab Results   Component Value Date/Time    CHOL 170 04/11/2024 03:39 PM    CHOL 211 03/23/2023 12:26 PM    HDL 53 04/11/2024 03:39 PM    TRIG 109 04/11/2024 03:39 PM     No orders to display     I have personally reviewed the laboratory, cardiac diagnostic and radiographic testing as outlined above:      IMPRESSION:  Pericardial effusion: Small on an echocardiogram done in September 2024 which has been stable.    Hypertension: Controlled, continue current treatment  Abnormal EKG with nonspecific T wave changes: Chronic changes   hyperlipidemia: On

## 2025-05-08 ENCOUNTER — CLINICAL SUPPORT (OUTPATIENT)
Dept: NON INVASIVE DIAGNOSTICS | Age: 55
End: 2025-05-08

## 2025-05-08 NOTE — PROGRESS NOTES
Patient was seen today and ZIO XT 14 day monitor was placed.  Monitor was ordered by DAFNE BARRETT CNP Monitor was applied and instructions given to patient.  Patient stated understanding and gave verbalized readback.    Monitor company: ZIO XT 14 day  Serial number : [id number]    Electronically signed by RHIANNON HOUSTON MA on 5/8/2025 at 1:48 PM

## 2025-05-21 ENCOUNTER — TELEPHONE (OUTPATIENT)
Dept: NON INVASIVE DIAGNOSTICS | Age: 55
End: 2025-05-21

## 2025-05-21 DIAGNOSIS — R55 SYNCOPE, UNSPECIFIED SYNCOPE TYPE: ICD-10-CM

## 2025-05-21 DIAGNOSIS — I63.9 CEREBROVASCULAR ACCIDENT (CVA), UNSPECIFIED MECHANISM (HCC): ICD-10-CM

## 2025-05-21 DIAGNOSIS — R94.31 ABNORMAL EKG: ICD-10-CM

## 2025-05-21 NOTE — TELEPHONE ENCOUNTER
Please check prior auth.    Date:Not scheduled yet    Doc:Khunnawat    Procedure:  ILR insert    CPT: 86470    ICD: I63.9    Electronically signed by Lucie Ingram MA on 5/21/2025 at 4:26 PM

## 2025-05-22 ENCOUNTER — TELEPHONE (OUTPATIENT)
Dept: NON INVASIVE DIAGNOSTICS | Age: 55
End: 2025-05-22

## 2025-05-22 NOTE — TELEPHONE ENCOUNTER
----- Message from TITI Castellon CNP sent at 5/22/2025  7:16 AM EDT -----  Regarding: MCOT results  Can you please advise patient of her Zio monitor results?    She had to wear another 14 day one, in order to get a ILR approved. Looks like this is in the works,      Patient had a min HR of 48 bpm, max HR of 179 bpm, and avg HR of 74  bpm. Predominant underlying rhythm was Sinus Rhythm. 2  Supraventricular Tachycardia runs occurred, the run with the  longest lasting 19.2 secs.  No VT or AF. No pause  or AV block. Triggered event correlated with sinus rhythm      Thank you,      Electronically signed by TITI Castellon CNP on 5/22/2025 at 7:18 AM

## 2025-05-22 NOTE — TELEPHONE ENCOUNTER
Spoke to patient about monitor results. Pt concerned about the Supraventricular Tachycardia runs occurred, the run with the  longest lasting 19.2 secs. Pt stated why is she having SVT and she's on 2 heart medications. Please advise.

## 2025-06-04 ENCOUNTER — HOSPITAL ENCOUNTER (OUTPATIENT)
Age: 55
Discharge: HOME | End: 2025-06-04
Payer: MEDICARE

## 2025-06-04 DIAGNOSIS — I63.432 CEREBROVASCULAR ACCIDENT (CVA) DUE TO EMBOLISM OF LEFT POSTERIOR CEREBRAL ARTERY (HCC): ICD-10-CM

## 2025-06-04 LAB
ALBUMIN SERPL-MCNC: 4.1 G/DL (ref 3.5–5.2)
ALP SERPL-CCNC: 149 U/L (ref 35–104)
ALT SERPL-CCNC: 47 U/L (ref 0–32)
ANION GAP SERPL CALCULATED.3IONS-SCNC: 13 MMOL/L (ref 7–16)
AST SERPL-CCNC: 27 U/L (ref 0–31)
BASOPHILS # BLD: 0.03 K/UL (ref 0–0.2)
BASOPHILS NFR BLD: 0 % (ref 0–2)
BILIRUB SERPL-MCNC: 0.5 MG/DL (ref 0–1.2)
BUN SERPL-MCNC: 21 MG/DL (ref 6–20)
CALCIUM SERPL-MCNC: 9.4 MG/DL (ref 8.6–10.2)
CHLORIDE SERPL-SCNC: 103 MMOL/L (ref 98–107)
CHOLEST SERPL-MCNC: 175 MG/DL
CO2 SERPL-SCNC: 24 MMOL/L (ref 22–29)
CREAT SERPL-MCNC: 0.8 MG/DL (ref 0.5–1)
EOSINOPHIL # BLD: 0.06 K/UL (ref 0.05–0.5)
EOSINOPHILS RELATIVE PERCENT: 1 % (ref 0–6)
ERYTHROCYTE [DISTWIDTH] IN BLOOD BY AUTOMATED COUNT: 13.6 % (ref 11.5–15)
GFR, ESTIMATED: >90 ML/MIN/1.73M2
GLUCOSE SERPL-MCNC: 101 MG/DL (ref 74–99)
HBA1C MFR BLD: 6.1 % (ref 4–5.6)
HCT VFR BLD AUTO: 37.9 % (ref 34–48)
HDLC SERPL-MCNC: 65 MG/DL
HGB BLD-MCNC: 11.8 G/DL (ref 11.5–15.5)
IMM GRANULOCYTES # BLD AUTO: <0.03 K/UL (ref 0–0.58)
IMM GRANULOCYTES NFR BLD: 0 % (ref 0–5)
LDLC SERPL CALC-MCNC: 88 MG/DL
LYMPHOCYTES NFR BLD: 3.26 K/UL (ref 1.5–4)
LYMPHOCYTES RELATIVE PERCENT: 40 % (ref 20–42)
MCH RBC QN AUTO: 27.9 PG (ref 26–35)
MCHC RBC AUTO-ENTMCNC: 31.1 G/DL (ref 32–34.5)
MCV RBC AUTO: 89.6 FL (ref 80–99.9)
MONOCYTES NFR BLD: 0.35 K/UL (ref 0.1–0.95)
MONOCYTES NFR BLD: 4 % (ref 2–12)
NEUTROPHILS NFR BLD: 54 % (ref 43–80)
NEUTS SEG NFR BLD: 4.37 K/UL (ref 1.8–7.3)
PLATELET # BLD AUTO: 392 K/UL (ref 130–450)
PMV BLD AUTO: 9 FL (ref 7–12)
POTASSIUM SERPL-SCNC: 4 MMOL/L (ref 3.5–5)
PROT SERPL-MCNC: 7 G/DL (ref 6.4–8.3)
RBC # BLD AUTO: 4.23 M/UL (ref 3.5–5.5)
SEND OUT REPORT: NORMAL
SODIUM SERPL-SCNC: 140 MMOL/L (ref 132–146)
TEST NAME: NORMAL
TRIGL SERPL-MCNC: 112 MG/DL
VLDLC SERPL CALC-MCNC: 22 MG/DL
WBC OTHER # BLD: 8.1 K/UL (ref 4.5–11.5)

## 2025-06-04 PROCEDURE — 36415 COLL VENOUS BLD VENIPUNCTURE: CPT

## 2025-06-04 PROCEDURE — 85610 PROTHROMBIN TIME: CPT

## 2025-06-04 PROCEDURE — 81240 F2 GENE: CPT

## 2025-06-04 PROCEDURE — 83090 ASSAY OF HOMOCYSTEINE: CPT

## 2025-06-04 PROCEDURE — 85730 THROMBOPLASTIN TIME PARTIAL: CPT

## 2025-06-04 PROCEDURE — 85025 COMPLETE CBC W/AUTO DIFF WBC: CPT

## 2025-06-04 PROCEDURE — 83036 HEMOGLOBIN GLYCOSYLATED A1C: CPT

## 2025-06-04 PROCEDURE — 85307 ASSAY ACTIVATED PROTEIN C: CPT

## 2025-06-04 PROCEDURE — 80061 LIPID PANEL: CPT

## 2025-06-04 PROCEDURE — 85613 RUSSELL VIPER VENOM DILUTED: CPT

## 2025-06-04 PROCEDURE — 85306 CLOT INHIBIT PROT S FREE: CPT

## 2025-06-04 PROCEDURE — 85300 ANTITHROMBIN III ACTIVITY: CPT

## 2025-06-04 PROCEDURE — 86146 BETA-2 GLYCOPROTEIN ANTIBODY: CPT

## 2025-06-04 PROCEDURE — 85303 CLOT INHIBIT PROT C ACTIVITY: CPT

## 2025-06-04 PROCEDURE — 80053 COMPREHEN METABOLIC PANEL: CPT

## 2025-06-04 PROCEDURE — 86147 CARDIOLIPIN ANTIBODY EA IG: CPT

## 2025-06-11 ENCOUNTER — RESULTS FOLLOW-UP (OUTPATIENT)
Dept: NON INVASIVE DIAGNOSTICS | Age: 55
End: 2025-06-11

## 2025-06-11 LAB
SEND OUT REPORT: NORMAL
TEST NAME: NORMAL

## 2025-06-11 NOTE — TELEPHONE ENCOUNTER
----- Message from TITI Castellon CNP sent at 6/11/2025 11:27 AM EDT -----  Regarding: MCOT  Can you advise patient of monitor results?      Predominant underlying rhythm was Sinus Rhythm.  Isolated SVEs were rare (<1.0%). Isolated  VEs were rare (<1.0%). No VT or AF. No pause or AV block.     Thanks,    TITI Castellon CNP  ----- Message -----  From: Lucie Ingram MA  Sent: 6/6/2025   1:40 PM EDT  To: TTII Castellon CNP

## 2025-06-25 PROCEDURE — 33285 INSJ SUBQ CAR RHYTHM MNTR: CPT | Performed by: INTERNAL MEDICINE

## 2025-06-25 NOTE — DISCHARGE INSTRUCTIONS
[facility]  Implantable Cardiac Monitor Discharge Instructions For Patients      Medications:  Resume all prescribed medications.    Follow-Up: You will be seen on Thursday 7/10/25 at 11:30 am at the [facility] for an incision and device check.  Please call the office if you need to reschedule this appointment. The number is [phone]     Incision Care:  Leave the brown AquaCel dressing on for 7 days.  Remove AquaCel dressing on Thursday 7/3/25, but do not remove the Steri-Strips from your incision. They will fall off on their own, or they will be removed at your follow-up appointment.   You may shower starting tomorrow, but do not let the water run directly on the incision  for 7 days.   Check the area daily. If you find any redness, swelling, drainage, warmth, or have a fever greater than 100 degrees, notify the office immediately at the number listed below.     Activity: You may continue regular daily activities, but try to prevent any hard blows to the incision site.    Driving: No driving until the day after your procedure.    Special Instructions: Let your dentist, doctor, or medical specialist know that you have an implantable cardiac monitor so precautions can be taken to protect the device. There is no need to take antibiotics prior to dental procedures. Your device is considered to be \"MRI conditional,\" meaning that under certain circumstances, MRI exams may be performed immediately post implantation. Your device may set off a metal detector, such as those used in airports and courtrooms. Let security know you have an implantable cardiac monitor and show them your ID card.    ID Card: You will have a temporary ID card until a permanent card is sent to you by the device company. The permanent card will look like a ’s license or credit card and should arrive within 8 weeks. Carry your ID card with you at all times.     [facility]:

## 2025-06-26 ENCOUNTER — HOSPITAL ENCOUNTER (OUTPATIENT)
Age: 55
Setting detail: OUTPATIENT SURGERY
Discharge: HOME | End: 2025-06-26
Attending: INTERNAL MEDICINE | Admitting: INTERNAL MEDICINE
Payer: MEDICARE

## 2025-06-26 VITALS
WEIGHT: 175 LBS | BODY MASS INDEX: 31.01 KG/M2 | OXYGEN SATURATION: 96 % | DIASTOLIC BLOOD PRESSURE: 71 MMHG | TEMPERATURE: 97.3 F | RESPIRATION RATE: 18 BRPM | HEART RATE: 73 BPM | HEIGHT: 63 IN | SYSTOLIC BLOOD PRESSURE: 125 MMHG

## 2025-06-26 DIAGNOSIS — I63.9 IMPENDING CEREBROVASCULAR ACCIDENT (HCC): ICD-10-CM

## 2025-06-26 LAB — ECHO BSA: 1.87 M2

## 2025-06-26 PROCEDURE — 7100000010 HC PHASE II RECOVERY - FIRST 15 MIN: Performed by: INTERNAL MEDICINE

## 2025-06-26 PROCEDURE — 33285 INSJ SUBQ CAR RHYTHM MNTR: CPT | Performed by: INTERNAL MEDICINE

## 2025-06-26 PROCEDURE — 2709999900 HC NON-CHARGEABLE SUPPLY: Performed by: INTERNAL MEDICINE

## 2025-06-26 PROCEDURE — C1764 EVENT RECORDER, CARDIAC: HCPCS | Performed by: INTERNAL MEDICINE

## 2025-06-26 PROCEDURE — 7100000011 HC PHASE II RECOVERY - ADDTL 15 MIN: Performed by: INTERNAL MEDICINE

## 2025-06-26 PROCEDURE — 6360000002 HC RX W HCPCS: Performed by: INTERNAL MEDICINE

## 2025-06-26 DEVICE — ICM LNQ22 LINQ II PRIME US
Type: IMPLANTABLE DEVICE | Status: FUNCTIONAL
Brand: LINQ II™

## 2025-06-26 NOTE — PROGRESS NOTES
Resumed care of patient. Patient A&O x 3. VSS. Patient post Loop recorder insertion. Mid chest site soft, without oozing or hematoma. Rep at bedside giving instructions.

## 2025-06-26 NOTE — H&P
[facility]  Outpatient Progress Note  Susana Tony  1970  Date of Service: 6/26/2025  PCP: Adonay Olivier MD  Cardiologist: Dr. Mulligan  Electrophysiologist: Dr. 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 She has PMH as noted below significant for seizures and pseudoseizures, stroke, PE, cardiomyopathy, HTN, depression, acid reflux, hyperlipidemia, hepatitis.   She previously has followed with [facility].  In August 2024, patient reported to the emergency room after being found down, unresponsive.  She was diagnosed with metabolic encephalopathy, SINGH seen by nephrology, rhabdomyolysis.  Pulmonary, cardiology, psychiatry neurology and nephrology were consulted.  She was found to have pulmonary embolism.  She was started on Eliquis.  She was also found to have acute CVA on MRI. She was seen by Dr. Ma of neurology and continue Keppra. Ultrasound negative for DVT.  No significant shunting noted on JERMAINE with normal ejection fraction.  No definite cardiac source of embolism identified at that time.  2-week monitor for which did not show any atrial fibrillation.  Was hospitalized for 1.5 months and then  discharged to rehabilitation for 2-3 weeks after.  She is now recovered but does have some weakness in right leg , numbness in left arm.  She is able to ambulate without cane or walker.  She has a history of MYNOR in the past and was prescribed CPAP.  She does admit that she does not use this often and has not seen sleep medicine in years.  She was heavier and has lost weight since that time also.  The patient denies any chest pain, dyspnea, palpitations, dizziness, syncope, orthopnea or paroxysmal nocturnal

## 2025-07-02 ENCOUNTER — TELEPHONE (OUTPATIENT)
Age: 55
End: 2025-07-02

## 2025-07-02 NOTE — TELEPHONE ENCOUNTER
I called Ms. Tony to see how she's doing since her Linq insertion on 6/26/25.  She states she's doing well. Denies any bleeding, drainage, or swelling from insertion site. Aquacel dressing will be removed today and steri strips remains intact. I reminded her of her follow up appt @ the [facility] on 7/10/25 at 11:30 am.  She offers no complaints and is thankful for the call.

## 2025-07-10 ENCOUNTER — CLINICAL SUPPORT (OUTPATIENT)
Dept: NON INVASIVE DIAGNOSTICS | Age: 55
End: 2025-07-10
Payer: MEDICARE

## 2025-07-10 DIAGNOSIS — Z95.818 IMPLANTABLE LOOP RECORDER PRESENT: Primary | ICD-10-CM

## 2025-07-10 PROCEDURE — 93285 PRGRMG DEV EVAL SCRMS IP: CPT | Performed by: INTERNAL MEDICINE

## 2025-07-10 NOTE — PROGRESS NOTES
Here for post ILR implant incision/device check. Mid left chest incision well approximated, free of erythema, edema, ecchymosis, and drainage. Post implant wound care and remote monitoring done by Mallory Pinzon MA.    See Murj report.    Tonja Chowdhury RN, BSN   [facility] [place]   [facility] Clinic

## 2025-07-10 NOTE — PATIENT INSTRUCTIONS
You may shower starting now    Call if any signs or symptoms of infection [phone] ext: [phone]  Fevers, chills, redness, swelling or drainage.       Hook up home  Monitor  [employer] Stay connected: [phone]

## 2025-07-22 ENCOUNTER — TELEPHONE (OUTPATIENT)
Dept: NON INVASIVE DIAGNOSTICS | Age: 55
End: 2025-07-22

## 2025-07-22 ENCOUNTER — OFFICE VISIT (OUTPATIENT)
Age: 55
End: 2025-07-22
Payer: MEDICARE

## 2025-07-22 VITALS
HEART RATE: 57 BPM | RESPIRATION RATE: 18 BRPM | DIASTOLIC BLOOD PRESSURE: 76 MMHG | HEIGHT: 63 IN | OXYGEN SATURATION: 99 % | TEMPERATURE: 97.7 F | BODY MASS INDEX: 31.18 KG/M2 | SYSTOLIC BLOOD PRESSURE: 134 MMHG | WEIGHT: 176 LBS

## 2025-07-22 DIAGNOSIS — G40.909 SEIZURE DISORDER (HCC): ICD-10-CM

## 2025-07-22 DIAGNOSIS — I63.9 CRYPTOGENIC STROKE (HCC): Primary | ICD-10-CM

## 2025-07-22 DIAGNOSIS — F44.5 PSYCHOGENIC NONEPILEPTIC SEIZURE: ICD-10-CM

## 2025-07-22 PROCEDURE — 3078F DIAST BP <80 MM HG: CPT | Performed by: PHYSICIAN ASSISTANT

## 2025-07-22 PROCEDURE — 3075F SYST BP GE 130 - 139MM HG: CPT | Performed by: PHYSICIAN ASSISTANT

## 2025-07-22 PROCEDURE — 99213 OFFICE O/P EST LOW 20 MIN: CPT | Performed by: PHYSICIAN ASSISTANT

## 2025-07-22 RX ORDER — DULOXETIN HYDROCHLORIDE 60 MG/1
60 CAPSULE, DELAYED RELEASE ORAL DAILY
COMMUNITY
Start: 2025-07-08

## 2025-07-22 RX ORDER — ASPIRIN 81 MG/1
81 TABLET ORAL DAILY
COMMUNITY
Start: 2025-06-06

## 2025-07-22 NOTE — PROGRESS NOTES
[facility]  NEUROLOGY  SMILEY Obregon PA-C  Phone: [phone]  Fax: [phone]       Susana Tony is a 54 y.o. right handed female     Patient typically follows with me for seizures (please see prior documentation for further information regarding this).     She presents forl follow up for stroke that occurred in 8/2024 and was seen at [facility]. She presented after being found down and unresponsive between her bed and nightstand.     During admission she was treated for rhabdomyolysis, SINGH, PE, cardiomyopathy, stroke.     Neurology was consulted due to AMS. Dr. Ma evaluated the patient. An MRI of the brain was obtained which showed areas of infarct in the L PCA territory. CUS showed no significant stenosis. TTE was suboptimal and she had JERMAINE completed which showed improved EF to 55-60%(initially 20-25%), no intracardiac thrombus or mass identified. No PFO.    During the course of her hospitalization was also noted to have a PE and was started on Eliquis. ASA was d/c at that time.     It was recommended she have extended cardiac monitoring on discharge, however this was not completed.     A DVT ultrasound was negative.   Lipid panel and HA1C were not checked.   No intracranial vessel imaging was completed.     No prior hx of clots. No known family hx of clotting disorders. She does have prior hx of 1 miscarriage. Thrombotic risk panel was unrevealing.     She is s/p ILR a couple weeks ago.     Has completed therapy. Able to drive again. Some mild residual weakness in the RLE     No chest pain or palpitations  No SOB  No vertigo, lightheadedness or loss of consciousness  No falls, tripping or stumbling  No incontinence of bowels or bladder  No itching or bruising appreciated  No numbness, tingling or focal arm/leg weakness    ROS otherwise negative     Objective:       /76 (BP Site: Left Upper Arm, Patient Position: Sitting, BP Cuff Size: Large Adult)   Pulse 57   Temp 97.7 °F (36.5 °C)

## 2025-07-22 NOTE — TELEPHONE ENCOUNTER
Called and spoke with patient in regards to ILR home monitor being disconnected. She stated she was having issues with her email and jose so I informed her I would order her a new monitor.        Mallory Pinzon CMA

## 2025-07-23 ENCOUNTER — TELEMEDICINE (OUTPATIENT)
Dept: SLEEP CENTER | Age: 55
End: 2025-07-23
Payer: MEDICARE

## 2025-07-23 DIAGNOSIS — E66.9 OBESITY (BMI 30-39.9): ICD-10-CM

## 2025-07-23 DIAGNOSIS — G47.33 OSA (OBSTRUCTIVE SLEEP APNEA): Primary | ICD-10-CM

## 2025-07-23 PROCEDURE — 99204 OFFICE O/P NEW MOD 45 MIN: CPT | Performed by: STUDENT IN AN ORGANIZED HEALTH CARE EDUCATION/TRAINING PROGRAM

## 2025-07-23 NOTE — PROGRESS NOTES
[facility]      Objective   Patient-Reported Vitals  No data recorded     Physical Exam  [INSTRUCTIONS:  \"[x]\" Indicates a positive item  \"[]\" Indicates a negative item  -- DELETE ALL ITEMS NOT EXAMINED]    Constitutional: [x] Appears well-developed and well-nourished [x] No apparent distress      [] Abnormal -     Mental status: [x] Alert and awake  [x] Oriented to person/place/time [x] Able to follow commands    [] Abnormal -     Eyes:   EOM    [x]  Normal    [] Abnormal -   Sclera  [x]  Normal    [] Abnormal -          Discharge [x]  None visible   [] Abnormal -     HENT: [x] Normocephalic, atraumatic  [] Abnormal -   [x] Mouth/Throat: Mucous membranes are moist    External Ears [x] Normal  [] Abnormal -    Neck: [x] No visualized mass [] Abnormal -     Pulmonary/Chest: [x] Respiratory effort normal   [x] No visualized signs of difficulty breathing or respiratory distress        [] Abnormal -      Musculoskeletal:   [x] Normal gait with no signs of ataxia         [x] Normal range of motion of neck        [] Abnormal -     Neurological:        [x] No Facial Asymmetry (Cranial nerve 7 motor function) (limited exam due to video visit)          [x] No gaze palsy        [] Abnormal -          Skin:        [x] No significant exanthematous lesions or discoloration noted on facial skin         [] Abnormal -            Psychiatric:       [x] Normal Affect [] Abnormal -        [x] No Hallucinations    Other pertinent observable physical exam findings:-       Level of Service:  Notes Reviewed: 3+ (PCP)  Labs Reviewed: 3+ (CBC, CMP, TSH)    James Aguilar MD  Sleep Medicine

## 2025-08-26 PROBLEM — I63.432 CEREBROVASCULAR ACCIDENT (CVA) DUE TO EMBOLISM OF LEFT POSTERIOR CEREBRAL ARTERY (HCC): Status: RESOLVED | Noted: 2024-09-06 | Resolved: 2025-08-26

## 2025-09-04 ENCOUNTER — OFFICE VISIT (OUTPATIENT)
Dept: SURGERY | Age: 55
End: 2025-09-04
Payer: MEDICARE

## 2025-09-04 VITALS
DIASTOLIC BLOOD PRESSURE: 75 MMHG | TEMPERATURE: 97 F | WEIGHT: 183 LBS | HEIGHT: 63 IN | BODY MASS INDEX: 32.43 KG/M2 | HEART RATE: 66 BPM | SYSTOLIC BLOOD PRESSURE: 123 MMHG

## 2025-09-04 DIAGNOSIS — K59.09 CHRONIC CONSTIPATION: Primary | ICD-10-CM

## 2025-09-04 PROBLEM — K59.00 CONSTIPATION: Status: ACTIVE | Noted: 2025-09-04

## 2025-09-04 PROCEDURE — 99203 OFFICE O/P NEW LOW 30 MIN: CPT | Performed by: SURGERY

## 2025-09-04 PROCEDURE — 3074F SYST BP LT 130 MM HG: CPT | Performed by: SURGERY

## 2025-09-04 PROCEDURE — 3078F DIAST BP <80 MM HG: CPT | Performed by: SURGERY

## 2025-09-04 RX ORDER — SODIUM CHLORIDE 9 MG/ML
INJECTION, SOLUTION INTRAVENOUS CONTINUOUS
OUTPATIENT
Start: 2025-09-04

## 2025-09-04 RX ORDER — WARFARIN SODIUM 1 MG/1
TABLET ORAL
COMMUNITY
Start: 2025-07-22

## 2025-09-04 RX ORDER — WARFARIN SODIUM 5 MG/1
TABLET ORAL
COMMUNITY
Start: 2025-07-22

## 2025-09-04 RX ORDER — BACLOFEN 10 MG/1
TABLET ORAL
COMMUNITY
Start: 2025-08-25

## (undated) DEVICE — MARKER,SKIN,WI/RULER AND LABELS: Brand: MEDLINE

## (undated) DEVICE — GAUZE,SPONGE,4"X4",12PLY,STERILE,LF,2'S: Brand: MEDLINE

## (undated) DEVICE — BLOCK BITE 60FR CAREGUARD

## (undated) DEVICE — NON-DEHP CATHETER EXTENSION SET, MALE LUER LOCK ADAPTER

## (undated) DEVICE — NEEDLE HYPO 18GA L1.5IN PNK POLYPR HUB S STL THN WALL FILL

## (undated) DEVICE — GOWN ISOLATN REG YEL M WT MULTIPLY SIDETIE LEV 2

## (undated) DEVICE — GLOVE ORANGE PI 7   MSG9070

## (undated) DEVICE — 6 ML SYRINGE LUER-LOCK TIP: Brand: MONOJECT

## (undated) DEVICE — SPONGE GZ W4XL4IN RAYON POLY FILL CVR W/ NONWOVEN FAB

## (undated) DEVICE — 12 ML SYRINGE,LUER-LOCK TIP: Brand: MONOJECT

## (undated) DEVICE — FORCEPS BX OVL CUP FEN DISPOSABLE CAP L 160CM RAD JAW 4

## (undated) DEVICE — COUNTER NDL 10 COUNT HLD 20 FOAM BLK SGL MAG

## (undated) DEVICE — NEEDLE, QUINCKE, 22GX5": Brand: MEDLINE

## (undated) DEVICE — KENDALL 450 SERIES MONITORING FOAM ELECTRODE - RECTANGULAR SHAPE ( 3/PK): Brand: KENDALL

## (undated) DEVICE — APPLICATOR MEDICATED 10.5 CC SOLUTION HI LT ORNG CHLORAPREP

## (undated) DEVICE — NEEDLE HYPO 25GA L1.5IN BLU POLYPR HUB S STL REG BVL STR

## (undated) DEVICE — TOWEL,OR,DSP,ST,BLUE,STD,6/PK,12PK/CS: Brand: MEDLINE

## (undated) DEVICE — BLOCK BITE 60FR RUBBER ADLT DENTAL

## (undated) DEVICE — GRADUATE TRIANG MEASURE 1000ML BLK PRNT

## (undated) DEVICE — TOWEL OR BLUEE 16X26IN ST 8 PACK ORB08 16X26ORTWL

## (undated) DEVICE — PAD, DEFIB, ADULT, RADIOTRAN, PHYSIO, LO: Brand: MEDLINE

## (undated) DEVICE — BANDAGE ADH W1XL3IN NAT FAB WVN FLX DURABLE N ADH PD SEAL

## (undated) DEVICE — 3M™ RED DOT™ MONITORING ELECTRODE WITH FOAM TAPE AND STICKY GEL 2560, 50/BAG, 20/CASE, 72/PLT: Brand: RED DOT™

## (undated) DEVICE — 22GX5" WHITACRE SPINAL NEEDLE: Brand: MEDLINE

## (undated) DEVICE — 3 ML SYRINGE LUER-LOCK TIP: Brand: MONOJECT

## (undated) DEVICE — JELLY,LUBE,STERILE,FLIP TOP,TUBE,4-OZ: Brand: MEDLINE